# Patient Record
Sex: FEMALE | Race: WHITE | Employment: OTHER | URBAN - METROPOLITAN AREA
[De-identification: names, ages, dates, MRNs, and addresses within clinical notes are randomized per-mention and may not be internally consistent; named-entity substitution may affect disease eponyms.]

---

## 2017-03-16 PROBLEM — I35.9 AORTIC VALVE DISEASE: Status: ACTIVE | Noted: 2017-03-16

## 2017-04-21 PROBLEM — Z79.01 LONG TERM (CURRENT) USE OF ANTICOAGULANTS: Status: ACTIVE | Noted: 2017-04-21

## 2017-06-27 PROCEDURE — 85025 COMPLETE CBC W/AUTO DIFF WBC: CPT | Performed by: FAMILY MEDICINE

## 2017-06-27 PROCEDURE — 36415 COLL VENOUS BLD VENIPUNCTURE: CPT | Performed by: FAMILY MEDICINE

## 2017-10-18 PROBLEM — I65.23 CAROTID ARTERY PLAQUE, BILATERAL: Status: ACTIVE | Noted: 2017-10-18

## 2017-10-18 PROCEDURE — 82607 VITAMIN B-12: CPT | Performed by: FAMILY MEDICINE

## 2017-10-18 PROCEDURE — 82746 ASSAY OF FOLIC ACID SERUM: CPT | Performed by: FAMILY MEDICINE

## 2018-03-06 PROBLEM — I77.9 BILATERAL CAROTID ARTERY DISEASE: Status: ACTIVE | Noted: 2018-03-06

## 2018-03-06 PROBLEM — I77.9 BILATERAL CAROTID ARTERY DISEASE (HCC): Status: ACTIVE | Noted: 2018-03-06

## 2018-10-16 PROCEDURE — 81003 URINALYSIS AUTO W/O SCOPE: CPT | Performed by: FAMILY MEDICINE

## 2019-03-28 PROBLEM — N28.9 RENAL INSUFFICIENCY SYNDROME: Status: ACTIVE | Noted: 2019-03-14

## 2019-06-06 PROBLEM — N17.9 AKI (ACUTE KIDNEY INJURY): Status: ACTIVE | Noted: 2019-06-06

## 2019-06-06 PROBLEM — N17.9 AKI (ACUTE KIDNEY INJURY) (HCC): Status: ACTIVE | Noted: 2019-06-06

## 2019-09-10 PROBLEM — J44.9 COPD (CHRONIC OBSTRUCTIVE PULMONARY DISEASE) (HCC): Status: ACTIVE | Noted: 2019-08-20

## 2020-07-30 PROBLEM — I50.31 ACUTE DIASTOLIC (CONGESTIVE) HEART FAILURE (HCC): Status: ACTIVE | Noted: 2020-07-30

## 2020-07-30 PROBLEM — L03.115 CELLULITIS OF RIGHT LEG: Status: ACTIVE | Noted: 2020-07-17

## 2020-07-30 PROBLEM — N18.30 CKD (CHRONIC KIDNEY DISEASE) STAGE 3, GFR 30-59 ML/MIN (HCC): Status: ACTIVE | Noted: 2020-07-30

## 2020-08-11 PROBLEM — E66.01 MORBID OBESITY WITH BMI OF 40.0-44.9, ADULT (HCC): Status: ACTIVE | Noted: 2020-08-11

## 2020-09-29 PROBLEM — F50.81 BINGE EATING DISORDER: Status: ACTIVE | Noted: 2020-09-29

## 2020-09-29 PROBLEM — F50.819 BINGE EATING DISORDER: Status: ACTIVE | Noted: 2020-09-29

## 2020-10-16 ENCOUNTER — APPOINTMENT (OUTPATIENT)
Dept: WOUND CARE | Facility: HOSPITAL | Age: 65
End: 2020-10-16
Attending: NURSE PRACTITIONER
Payer: MEDICARE

## 2020-10-22 PROBLEM — M10.341: Status: ACTIVE | Noted: 2020-01-09

## 2020-10-23 ENCOUNTER — OFFICE VISIT (OUTPATIENT)
Dept: ORTHOPEDICS CLINIC | Facility: CLINIC | Age: 65
End: 2020-10-23
Payer: MEDICARE

## 2020-10-23 DIAGNOSIS — I73.9 PVD (PERIPHERAL VASCULAR DISEASE) (HCC): Primary | ICD-10-CM

## 2020-10-23 DIAGNOSIS — R09.89 DECREASED PEDAL PULSES: ICD-10-CM

## 2020-10-23 DIAGNOSIS — G62.9 NEUROPATHY: ICD-10-CM

## 2020-10-23 DIAGNOSIS — I87.8 VENOUS STASIS OF BOTH LOWER EXTREMITIES: ICD-10-CM

## 2020-10-23 PROCEDURE — 99212 OFFICE O/P EST SF 10 MIN: CPT | Performed by: PODIATRIST

## 2020-10-23 PROCEDURE — 11721 DEBRIDE NAIL 6 OR MORE: CPT | Performed by: PODIATRIST

## 2020-10-23 NOTE — PROGRESS NOTES
[facility] Progress Note    Subjective: Pt here for nail care    Objective: non palpable pedal pulses, incurvated thickened nails 1 through 10, sensation grossly intact diminished sensation of the toes, lower extremity swelling dry leathery skin s

## 2020-11-08 ENCOUNTER — HOSPITAL ENCOUNTER (INPATIENT)
Facility: HOSPITAL | Age: 65
LOS: 6 days | Discharge: HOME HEALTH | DRG: 603 | End: 2020-11-14
Attending: HOSPITALIST | Admitting: HOSPITALIST
Payer: MEDICARE

## 2020-11-08 ENCOUNTER — APPOINTMENT (OUTPATIENT)
Dept: GENERAL RADIOLOGY | Facility: HOSPITAL | Age: 65
DRG: 603 | End: 2020-11-08
Attending: HOSPITALIST
Payer: MEDICARE

## 2020-11-08 PROCEDURE — 85610 PROTHROMBIN TIME: CPT | Performed by: HOSPITALIST

## 2020-11-08 PROCEDURE — 73560 X-RAY EXAM OF KNEE 1 OR 2: CPT | Performed by: HOSPITALIST

## 2020-11-08 PROCEDURE — 5A09357 ASSISTANCE WITH RESPIRATORY VENTILATION, LESS THAN 24 CONSECUTIVE HOURS, CONTINUOUS POSITIVE AIRWAY PRESSURE: ICD-10-PCS | Performed by: HOSPITALIST

## 2020-11-08 PROCEDURE — 94660 CPAP INITIATION&MGMT: CPT

## 2020-11-08 RX ORDER — ACETAMINOPHEN 325 MG/1
650 TABLET ORAL EVERY 6 HOURS PRN
Status: DISCONTINUED | OUTPATIENT
Start: 2020-11-08 | End: 2020-11-08 | Stop reason: DRUGHIGH

## 2020-11-08 RX ORDER — LEVOTHYROXINE SODIUM 0.15 MG/1
150 TABLET ORAL
Status: DISCONTINUED | OUTPATIENT
Start: 2020-11-09 | End: 2020-11-14

## 2020-11-08 RX ORDER — CEFAZOLIN SODIUM/WATER 2 G/20 ML
2 SYRINGE (ML) INTRAVENOUS EVERY 8 HOURS
Status: DISCONTINUED | OUTPATIENT
Start: 2020-11-08 | End: 2020-11-10

## 2020-11-08 RX ORDER — BUPROPION HYDROCHLORIDE 150 MG/1
150 TABLET, EXTENDED RELEASE ORAL 2 TIMES DAILY
Status: DISCONTINUED | OUTPATIENT
Start: 2020-11-08 | End: 2020-11-14

## 2020-11-08 RX ORDER — NALTREXONE HYDROCHLORIDE 50 MG/1
50 TABLET, FILM COATED ORAL DAILY
Status: DISCONTINUED | OUTPATIENT
Start: 2020-11-09 | End: 2020-11-14

## 2020-11-08 RX ORDER — ONDANSETRON 2 MG/ML
4 INJECTION INTRAMUSCULAR; INTRAVENOUS EVERY 6 HOURS PRN
Status: DISCONTINUED | OUTPATIENT
Start: 2020-11-08 | End: 2020-11-09

## 2020-11-08 RX ORDER — HEPARIN SODIUM 5000 [USP'U]/ML
7500 INJECTION, SOLUTION INTRAVENOUS; SUBCUTANEOUS EVERY 8 HOURS SCHEDULED
Status: DISCONTINUED | OUTPATIENT
Start: 2020-11-08 | End: 2020-11-14

## 2020-11-08 RX ORDER — LOSARTAN POTASSIUM 100 MG/1
100 TABLET ORAL NIGHTLY
Refills: 3 | Status: DISCONTINUED | OUTPATIENT
Start: 2020-11-09 | End: 2020-11-10

## 2020-11-08 RX ORDER — FUROSEMIDE 40 MG/1
40 TABLET ORAL DAILY
Status: DISCONTINUED | OUTPATIENT
Start: 2020-11-09 | End: 2020-11-14

## 2020-11-08 RX ORDER — ACETAMINOPHEN 500 MG
1000 TABLET ORAL EVERY 6 HOURS PRN
Status: DISCONTINUED | OUTPATIENT
Start: 2020-11-08 | End: 2020-11-14

## 2020-11-08 RX ORDER — FLUOXETINE HYDROCHLORIDE 20 MG/1
20 CAPSULE ORAL 2 TIMES DAILY
Status: DISCONTINUED | OUTPATIENT
Start: 2020-11-08 | End: 2020-11-14

## 2020-11-08 RX ORDER — ALLOPURINOL 100 MG/1
100 TABLET ORAL DAILY
Status: DISCONTINUED | OUTPATIENT
Start: 2020-11-08 | End: 2020-11-14

## 2020-11-08 RX ORDER — CETIRIZINE HYDROCHLORIDE 10 MG/1
10 TABLET ORAL NIGHTLY
Status: DISCONTINUED | OUTPATIENT
Start: 2020-11-08 | End: 2020-11-14

## 2020-11-08 RX ORDER — ONDANSETRON 2 MG/ML
4 INJECTION INTRAMUSCULAR; INTRAVENOUS EVERY 6 HOURS PRN
Status: DISCONTINUED | OUTPATIENT
Start: 2020-11-08 | End: 2020-11-14

## 2020-11-08 RX ORDER — ROSUVASTATIN CALCIUM 5 MG/1
5 TABLET, COATED ORAL EVERY OTHER DAY
Status: DISCONTINUED | OUTPATIENT
Start: 2020-11-09 | End: 2020-11-14

## 2020-11-08 RX ORDER — BIOTIN 10000 MCG
CAPSULE ORAL DAILY
Status: DISCONTINUED | OUTPATIENT
Start: 2020-11-08 | End: 2020-11-08

## 2020-11-08 RX ORDER — CYPROHEPTADINE HYDROCHLORIDE 4 MG/1
4 TABLET ORAL NIGHTLY
Status: DISCONTINUED | OUTPATIENT
Start: 2020-11-08 | End: 2020-11-14

## 2020-11-08 RX ORDER — NORTRIPTYLINE HYDROCHLORIDE 25 MG/1
25 CAPSULE ORAL NIGHTLY
Status: DISCONTINUED | OUTPATIENT
Start: 2020-11-08 | End: 2020-11-14

## 2020-11-08 NOTE — PLAN OF CARE
NURSING ADMISSION NOTE      Patient admitted via Wheelchair  Oriented to room. Safety precautions initiated. Bed in low position. Call light in reach. This RN completed admission navigator. Pt in calm and stable manor. Addendum:  Pt A&Ox4. ANG KIMBALL-

## 2020-11-08 NOTE — PROGRESS NOTES
[facility] Pharmacy Note:  Anticoagulation Weight Dose Adjustment for heparin    Prieto Arteaga is a 77year old patient who has been prescribed heparin 5,000 units every 8 hours. Estimated Creatinine Clearance: 64.8 mL/min (based on SCr of 0.88 mg/dL).  Body

## 2020-11-09 ENCOUNTER — APPOINTMENT (OUTPATIENT)
Dept: ULTRASOUND IMAGING | Facility: HOSPITAL | Age: 65
DRG: 603 | End: 2020-11-09
Attending: INTERNAL MEDICINE
Payer: MEDICARE

## 2020-11-09 ENCOUNTER — APPOINTMENT (OUTPATIENT)
Dept: CV DIAGNOSTICS | Facility: HOSPITAL | Age: 65
DRG: 603 | End: 2020-11-09
Attending: HOSPITALIST
Payer: MEDICARE

## 2020-11-09 ENCOUNTER — APPOINTMENT (OUTPATIENT)
Dept: MRI IMAGING | Facility: HOSPITAL | Age: 65
DRG: 603 | End: 2020-11-09
Attending: Other
Payer: MEDICARE

## 2020-11-09 PROCEDURE — 87077 CULTURE AEROBIC IDENTIFY: CPT | Performed by: INTERNAL MEDICINE

## 2020-11-09 PROCEDURE — 87070 CULTURE OTHR SPECIMN AEROBIC: CPT | Performed by: INTERNAL MEDICINE

## 2020-11-09 PROCEDURE — 76882 US LMTD JT/FCL EVL NVASC XTR: CPT | Performed by: INTERNAL MEDICINE

## 2020-11-09 PROCEDURE — 86140 C-REACTIVE PROTEIN: CPT | Performed by: HOSPITALIST

## 2020-11-09 PROCEDURE — 80048 BASIC METABOLIC PNL TOTAL CA: CPT | Performed by: HOSPITALIST

## 2020-11-09 PROCEDURE — 85025 COMPLETE CBC W/AUTO DIFF WBC: CPT | Performed by: HOSPITALIST

## 2020-11-09 PROCEDURE — 85610 PROTHROMBIN TIME: CPT | Performed by: HOSPITALIST

## 2020-11-09 PROCEDURE — 87186 SC STD MICRODIL/AGAR DIL: CPT | Performed by: INTERNAL MEDICINE

## 2020-11-09 PROCEDURE — 87205 SMEAR GRAM STAIN: CPT | Performed by: INTERNAL MEDICINE

## 2020-11-09 PROCEDURE — 93306 TTE W/DOPPLER COMPLETE: CPT | Performed by: HOSPITALIST

## 2020-11-09 PROCEDURE — 70551 MRI BRAIN STEM W/O DYE: CPT | Performed by: OTHER

## 2020-11-09 PROCEDURE — 85652 RBC SED RATE AUTOMATED: CPT | Performed by: HOSPITALIST

## 2020-11-09 PROCEDURE — 83880 ASSAY OF NATRIURETIC PEPTIDE: CPT | Performed by: HOSPITALIST

## 2020-11-09 RX ORDER — POTASSIUM CHLORIDE 20 MEQ/1
40 TABLET, EXTENDED RELEASE ORAL ONCE
Status: COMPLETED | OUTPATIENT
Start: 2020-11-09 | End: 2020-11-09

## 2020-11-09 RX ORDER — DIPHENHYDRAMINE HCL 50 MG
50 CAPSULE ORAL ONCE
Status: COMPLETED | OUTPATIENT
Start: 2020-11-09 | End: 2020-11-09

## 2020-11-09 NOTE — PLAN OF CARE
Assumed care at 7:30 am.  Cardiac monitoring. Electrolyte protocol, K replaced. PIV, SL/ABX per orders. Fall risk education reviewed with patient, patient refusing bed alarm. Floor pads. Patient using call light to get up/use bathroom.   MRI checklist c environment to reduce risk of injury  - Provide assistive devices as appropriate  - Consider OT/PT consult to assist with strengthening/mobility  - Encourage toileting schedule  Outcome: Progressing

## 2020-11-09 NOTE — PLAN OF CARE
Patient A&O x4, c/o pain to right lower leg, lungs diminished,  Dressings changed on lower wounds, currently the larger wound to open to air. Patient allergic to most tape, so wrap used with gauze to cover wounds.   Legs are carmen and discolored from knee

## 2020-11-09 NOTE — CONSULTS
Consulting Physician: Dr. Fraser     CC:  Falls, Word Finding Difficulty    HPI:  This is a 77year old female presenting for evaluation of the above chief complaint. 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   She had mild, residual speech issue • furosemide  40 mg Oral Daily   • Levothyroxine Sodium  150 mcg Oral QAM AC   • melatonin  10 mg Oral Nightly   • Naltrexone HCl  50 mg Oral Daily   • Nortriptyline HCl  25 mg Oral Nightly   • Rosuvastatin Calcium  5 mg Oral QOD   • Losartan Potassium point review of systems was completed. Pertinent positives and negatives noted in the the HPI.     Objective     /65   Pulse 79   Temp 98.2 °F (36.8 °C) (Oral)   Resp 19   Wt 283 lb 1.1 oz (128.4 kg)   LMP  (LMP Unknown)   SpO2 98%   BMI 41.80 kg/m²

## 2020-11-09 NOTE — CM/SW NOTE
11/09/20 1000   CM/SW Referral Data   Referral Source Physician;Social Work (self-referral)   Reason for Referral Discharge planning   Informant Spouse   Patient Info   Patient's Mental Status Alert;Oriented   Patient lives with Alone       MSW spoke to

## 2020-11-09 NOTE — CONSULTS
[address] [facility] Cardiology  Report of Consultation    Raulito Bosch Patient Status:  Inpatient    1955 MRN [id number]   [facility] 3NE-A Attending Michelle Varner MD   Hosp Day # 1 PCP Matthew Claros DO     Reason for Consultation: Infusion:       PRN Medications:   ondansetron HCl, acetaminophen    Outpatient Medications:   No current facility-administered medications on file prior to encounter.      •  Cefadroxil 500 MG Oral Cap, Take 1 capsule (500 mg total) by mouth 2 (two) times Take 5 mg by mouth every other day., Disp: , Rfl:     •  mupirocin 2 % External Ointment, Apply to open areas BID PRN, Disp: 30 g, Rfl: 1    •  ANORO ELLIPTA 62.5-25 MCG/INH Inhalation Aerosol Powder, Breath Activated, , Disp: , Rfl:     •  TRIAMCINOLONE A distress. HEENT:   Normocephalic. Atraumatic. Eyes with no scleral icterus. Neck: Supple. No JVD. Carotids 2+ and equal in symmetric fashion. No bruits are noted. Cardiac: Regular rate and rhythm. There is a normal S1 and S2. No S3 or S4.   No mu Mild regurgitation. Peak velocity (S): 4.55m/sec. Mean gradient      (S): 48mm Hg. 3. Mitral valve: Mildly calcified annulus. 4. Left atrium: The left atrium was mildly to moderately dilated.    5. Pulmonary arteries: Systolic pressure was moderately to

## 2020-11-09 NOTE — PHYSICAL THERAPY NOTE
PT orders received, chart reviewed and per chart review there are several consults pending, specifically ortho consult for the L knee. Also awaiting a MRI of the brain.   Will hold PT eval until these consults/tests have been completed and there are activit

## 2020-11-09 NOTE — H&P
[facility] Hospitalist H&P       CC: No chief complaint on file.        PCP: Gadiel Turner DO    History of Present Illness:  Patient is a 77year old female with PMH sig for COPD, HTN, AS undergoing TAVR eval, PE, hypothyroidism, morbid obesity presents from I • KNEE REPLACEMENT SURGERY  2003    Both knees   • OTHER SURGICAL HISTORY  1989     Thyroid removal   • OTHER SURGICAL HISTORY  2004    LT foot spur removal        ALL:    Adhesive Tape           HIVES    Comment:ALL ADHESIVES  Doxycycline             SW VALSARTAN 160 MG Oral Tab, TAKE 1 TABLET BY MOUTH  DAILY, Disp: 90 tablet, Rfl: 3    •  Cyproheptadine HCl 4 MG Oral Tab, TAKE 1 TABLET BY MOUTH  NIGHTLY, Disp: 90 tablet, Rfl: 3    •  POTASSIUM CHLORIDE ER 10 MEQ Oral Tab CR, TAKE 1 TABLET BY MOUTH  DAILY Hx  Social History    Tobacco Use      Smoking status: Former Smoker        Packs/day: 0.50        Years: 30.00        Pack years: 15        Types: Cigarettes        Quit date: 1991        Years since quittin.8      Smokeless tobacco: Never Used undergoing w/u for TAVR  - also with reported speech difficulties at times  - cardiology and neuro consults appreciated   - echo, orthostatic BP  - PT/OT  - fall precautions    # b/l LE edema, h/o diastolic CHF - ?acute on chronic CHF  - INR therapeutic, d

## 2020-11-09 NOTE — OCCUPATIONAL THERAPY NOTE
OT orders received, chart reviewed and per chart review there are several consults pending, specifically ortho consult for the L knee. Also awaiting a MRI of the brain.   Will hold OT eval until these consults/tests have been completed and there are activit

## 2020-11-09 NOTE — CONSULTS
INFECTIOUS DISEASE CONSULT NOTE    Abel Sneed Patient Status:  Inpatient    1955 MRN [id number]   [facility] 3NE-A Attending Alicia Rapp MD   [address] Day # 1 PCP Viri Arias, removal     Family History   Problem Relation Age of Onset   • Hypertension Father    • Lipids Father    • Diabetes Mother    • Hypertension Mother    • Lipids Mother    • Cancer Brother    • Hypertension Brother       reports that she quit smoking about 2 (SYNTHROID) tab 150 mcg, 150 mcg, Oral, QAM AC  •  melatonin cap/tab 10 mg, 10 mg, Oral, Nightly  •  Naltrexone HCl (ReVia) tab 50 mg, 50 mg, Oral, Daily  •  Nortriptyline HCl (PAMELOR) cap 25 mg, 25 mg, Oral, Nightly  •  Rosuvastatin Calcium (CRESTOR) tab 3.37* 3.7    110   CO2 27.2 27.0       Microbiology: Reviewed in EMR    Radiology: Reviewed    Antibiotics:  cefazolin    ASSESSMENT:  # L knee wound   - Suspect pain with movement is largely due to wound overlying knee rather than PJI   - XR without

## 2020-11-09 NOTE — PROGRESS NOTES
11/09/20 0891   Clinical Encounter Type   Visited With Patient   Referral To Nurse  ( provided HPOA form to Ezra Muller. Ezra Muller would like to review the form.)    received Advance Directives consult. Patient is currently Full Code per nurse.

## 2020-11-10 PROCEDURE — 84132 ASSAY OF SERUM POTASSIUM: CPT | Performed by: HOSPITALIST

## 2020-11-10 PROCEDURE — 97165 OT EVAL LOW COMPLEX 30 MIN: CPT

## 2020-11-10 PROCEDURE — 97116 GAIT TRAINING THERAPY: CPT

## 2020-11-10 PROCEDURE — 97161 PT EVAL LOW COMPLEX 20 MIN: CPT

## 2020-11-10 PROCEDURE — 97530 THERAPEUTIC ACTIVITIES: CPT

## 2020-11-10 PROCEDURE — 85610 PROTHROMBIN TIME: CPT | Performed by: HOSPITALIST

## 2020-11-10 PROCEDURE — 94640 AIRWAY INHALATION TREATMENT: CPT

## 2020-11-10 RX ORDER — VALSARTAN 320 MG/1
160 TABLET ORAL NIGHTLY
Status: DISCONTINUED | OUTPATIENT
Start: 2020-11-10 | End: 2020-11-14

## 2020-11-10 RX ORDER — HYDROXYZINE HYDROCHLORIDE 25 MG/1
25 TABLET, FILM COATED ORAL ONCE
Status: COMPLETED | OUTPATIENT
Start: 2020-11-10 | End: 2020-11-10

## 2020-11-10 NOTE — PROGRESS NOTES
Subjective     No overnight events    • valsartan  160 mg Oral Nightly   • Heparin Sodium (Porcine)  7,500 Units Subcutaneous Q8H [address]   • ceFAZolin  2 g Intravenous Q8H   • allopurinol  100 mg Oral Daily   • umeclidinium-vilanterol  1 puff Inhalation Daily

## 2020-11-10 NOTE — PLAN OF CARE
Received patient awake in bed, with visitor. AOx4. On room air, cpap at night. MRI done for inability to find words. SR on tele. On heparin SQ for VTE. Potassium was replaced. Voids to the bathroom. Up steady , slow with cane. IV ancef for wound infection. mobilization  - Obtain PT/OT consults as needed  - Advance activity as appropriate  - Communicate ordered activity level and limitations with patient/family  Outcome: Progressing     Problem: Impaired Functional Mobility  Goal: Achieve highest/safest level

## 2020-11-10 NOTE — PROGRESS NOTES
[address] [facility] Cardiology  Progress Note    Beltran Fast Patient Status:  Inpatient    1955 MRN [id number]   [facility] 3NE-A Attending Jojo Garcia MD   Hosp Day # 2 PCP Debi Thomas DO     Impression:  1. Severe AS  2.  LLE Subcutaneous, Q8H [address]    •  ceFAZolin sodium (ANCEF/KEFZOL) 2 GM/20ML premix IV syringe 2 g, 2 g, Intravenous, Q8H    •  ondansetron HCl (ZOFRAN) injection 4 mg, 4 mg, Intravenous, Q6H PRN    •  acetaminophen (TYLENOL EXTRA STRENGTH) tab 1,000 mg, 1,000 mg

## 2020-11-10 NOTE — CONSULTS
Critical access hospital  Inpatient Wound Care Contact Note    Sheron Дмитрий Patient Status:  Inpatient    1955 MRN [id number]   [facility] 3NE-A Attending Scott Tobar MD   Hosp Day # 2 PCP Faiza Shepherd DO     Attempted to see patient f

## 2020-11-10 NOTE — PHYSICAL THERAPY NOTE
PHYSICAL THERAPY QUICK EVALUATION - INPATIENT    Room Number: 5665/9097-N  Evaluation Date: 11/10/2020  Presenting Problem: LLE worsening wound  Physician Order: PT Eval and Treat    History related to current admission: Pt is 77year old female admitted [address]  09/2009   • HIP REPLACEMENT SURGERY  09/2009    Rt hip   • KNEE REPLACEMENT SURGERY  2003    Both knees   • OTHER SURGICAL HISTORY  1989     Thyroid removal   • OTHER SURGICAL HISTORY  2004    LT foot spur removal MOBILITY  How much difficulty does the patient currently have. ..  -   Turning over in bed (including adjusting bedclothes, sheets and blankets)?: None   -   Sitting down on and standing up from a chair with arms (e.g., wheelchair, bedside commode, etc.): N at home on 10/28/2020. Pertinent comorbidities and personal factors impacting therapy include those listed above in history related to current admission.   Functional outcome measures completed include AM-PAC with score of 28.97% impairment which supports

## 2020-11-10 NOTE — PROGRESS NOTES
[facility] Hospitalist Progress Note     Millie Abebe Patient Status:  Inpatient    1955 MRN [id number]   [facility] 3NE-A Attending Juan Shepard MD   Hosp Day # 1 PCP Juan Cooley DO     CC: follow up    SUBJECTIVE:  No new complaints Nortriptyline HCl  25 mg Oral Nightly   • Rosuvastatin Calcium  5 mg Oral QOD   • Losartan Potassium  100 mg Oral Nightly     Continuous Infusing Medication:  PRN Medication:ondansetron HCl, acetaminophen       Microbiology:    Hospital Encounter on 11/08/

## 2020-11-10 NOTE — OCCUPATIONAL THERAPY NOTE
OCCUPATIONAL THERAPY QUICK EVALUATION - INPATIENT    Room Number: 5269/3356-C  Evaluation Date: 11/10/2020     Type of Evaluation: Quick Eval  Presenting Problem: cellulitis of R leg    Physician Order: IP Consult to Occupational Therapy  Reason for [facility]) Drives: Yes  Patient Regularly Uses: Glasses    Prior Level of Function: Pt reports IND with ADLs and functional transfers using crutches or RW as needed. Pt IND with IADLs, driving, cooking, cleaning. Pt has adjustable bed at home.      SUBJECTIVE  \"I'v mobility supine <> sit MOD I. Pt completed transfers sit <> stand MOD I utilizing fore-arm crutches from bed. Pt facilitated in functional mobility within room for several minutes requiring MOD I utilizing forearm crutches.      Pt discussed LB dressing d following goals:  Patient able to toilet transfer: MOD I  Patient able to dress lower extremities: MOD I  Patient/Caregiver able to demonstrate safety with ADLS: MOD I

## 2020-11-10 NOTE — PLAN OF CARE
Problem: Impaired Activities of Daily Living  Goal: Achieve highest/safest level of independence in self care  Description: Interventions:  - Assess ability and encourage patient to participate in ADLs to maximize function  - Promote sitting position [facility]

## 2020-11-11 PROCEDURE — 85025 COMPLETE CBC W/AUTO DIFF WBC: CPT | Performed by: HOSPITALIST

## 2020-11-11 PROCEDURE — 80048 BASIC METABOLIC PNL TOTAL CA: CPT | Performed by: HOSPITALIST

## 2020-11-11 PROCEDURE — 05HB33Z INSERTION OF INFUSION DEVICE INTO RIGHT BASILIC VEIN, PERCUTANEOUS APPROACH: ICD-10-PCS | Performed by: HOSPITALIST

## 2020-11-11 PROCEDURE — 36410 VNPNXR 3YR/> PHY/QHP DX/THER: CPT

## 2020-11-11 PROCEDURE — 76937 US GUIDE VASCULAR ACCESS: CPT

## 2020-11-11 RX ORDER — CEFEPIME HYDROCHLORIDE 1 G/50ML
2 INJECTION, SOLUTION INTRAVENOUS EVERY 8 HOURS
Qty: 42 EACH | Refills: 0 | Status: SHIPPED | OUTPATIENT
Start: 2020-11-11 | End: 2020-11-25

## 2020-11-11 RX ORDER — DIPHENHYDRAMINE HCL 50 MG
50 CAPSULE ORAL NIGHTLY
Status: DISCONTINUED | OUTPATIENT
Start: 2020-11-11 | End: 2020-11-14

## 2020-11-11 RX ORDER — DIPHENHYDRAMINE HCL 25 MG
25 CAPSULE ORAL NIGHTLY PRN
Status: DISCONTINUED | OUTPATIENT
Start: 2020-11-11 | End: 2020-11-14

## 2020-11-11 NOTE — PLAN OF CARE
Pt. Is A&o x4 this am. VSS, no tele, RA. Iso begun to r/o COVID - PCR swab was obtained today and results are outstanding. IV antibiotics adjusted.  Bed alarm refused, continued education on importance of bed alarm, patient persistent she will call us when

## 2020-11-11 NOTE — PLAN OF CARE
Pt. Is A&o x4 this am. VSSMONTY. Tele ordered per Cardiology - pt. Shirley she had an allergic reaction to the tele probes previously and would not like tele now. R midline placed today.  IV ABx continued inpatient - to be d/c with home health care on IV ABx

## 2020-11-11 NOTE — CM/SW NOTE
Pt discussed in rounds this am.  Plan is to dc home with IV abs. Pt has midline placed. SW received call from [facility] at [facility] [phone]. They have pt approved for IV abs and need clinical sent to them. Referral sent to [facility] in [facility] [address]   They will arran

## 2020-11-11 NOTE — PROGRESS NOTES
INFECTIOUS DISEASE PROGRESS NOTE    Millie Abebe Patient Status:  Inpatient    1955 MRN [id number]   [facility] 3NE-A Attending Hany Galloway, *   Hosp Day # 3 PCP Juan Cooley DO     Subjective: no acute events o/n.   Not s of IVC filter     Nonrheumatic aortic valve stenosis     Depression, unspecified depression type     Aortic valve disease     Long term (current) use of anticoagulants [Z79.01]     Carotid artery plaque, bilateral     Bilateral carotid artery disease ([address]

## 2020-11-11 NOTE — CM/SW NOTE
Received call from [facility]- they are unable to service pt due to not being able to find [facility] agency that will contract with them. Referral sent to [facility] and [facility]. SW met with pt. Discussed IV abs. She feels she can manage this at home alone.   She

## 2020-11-11 NOTE — RESPIRATORY THERAPY NOTE
SHELDON - Equipment Use Daily Summary:                  . Set Mode:                . Usage in hours:                . 90% Pressure (EPAP) level:                . 90% Insp. Pressure (IPAP): Isabel Mosquera AHI:                .  Supplemental Oxygen:    LPM

## 2020-11-11 NOTE — CONSULTS
Patient took fall 12 days ago injuring her left knee. Patient has bilateral TKAs. Incision dehisced and was closed primarily. Patient admitted for possible wound infection and concern for deeper infection. Exam show mild erythema around incision.  No sig

## 2020-11-11 NOTE — PROGRESS NOTES
[facility] Hospitalist Progress Note     Tata Joanemilee Patient Status:  Inpatient    1955 MRN [id number]   [facility] 3NE-A Attending Sai Lowery MD   Hosp Day # 3 PCP Denice Rojas DO     CC: follow up    SUBJECTIVE:  Sitting up in bed • furosemide  40 mg Oral Daily   • Levothyroxine Sodium  150 mcg Oral QAM AC   • melatonin  10 mg Oral Nightly   • Naltrexone HCl  50 mg Oral Daily   • Nortriptyline HCl  25 mg Oral Nightly   • Rosuvastatin Calcium  5 mg Oral QOD     Continuous Infusing recs  - cont OP diuretics at this t mor     # SHELDON  - per protcol     # morbid obesity  - f/u with PCP  -encourage weight loss     # CKD stage 2  - Cr wnl  - follows with [facility] renal as op - low threshold for renal consultation if needed     # gout  - cont OP

## 2020-11-11 NOTE — PROGRESS NOTES
[facility] Hospitalist Progress Note     Alexis Westbrook Patient Status:  Inpatient    1955 MRN [id number]   [facility] 3NE-A Attending Olesya William MD   Hosp Day # 2 PCP Jaynee Favre, DO     CC: follow up    SUBJECTIVE:  No new complaints Oral Nightly   • FLUoxetine HCl  20 mg Oral BID   • furosemide  40 mg Oral Daily   • Levothyroxine Sodium  150 mcg Oral QAM AC   • melatonin  10 mg Oral Nightly   • Naltrexone HCl  50 mg Oral Daily   • Nortriptyline HCl  25 mg Oral Nightly   • Rosuvastatin rheum as OP    Covid pending (pt was direct admission; ordered per protocol, no symptoms of covid)     Prophy: hep sq.  Resume coumadin when ok with ortho.      Dispo: admit     Elliott Guillaume MD  [facility] IM Hospitalist  Pager: [phone]

## 2020-11-11 NOTE — PLAN OF CARE
A&Ox4, VSS. Lungs clear bilaterally/diminished in bases. Abd is soft,non-tender, non-distended. Bowel sounds active. Voiding freely. L Knee incision red/dry/edematous , closure w/ sutures SARAH. R&L lower leg wounds wrapped w/ kerlix roll c/d/i.  Pt currentl RN  Outcome: Progressing  11/11/2020 0144 by Nae Pat RN  Outcome: Progressing     Problem: Impaired Functional Mobility  Goal: Achieve highest/safest level of mobility/gait  Description: Interventions:  - Assess patient's functional ability and stabil

## 2020-11-11 NOTE — PLAN OF CARE
A&Ox4, VSS.  Lungs clear bilaterally       Problem: SKIN/TISSUE INTEGRITY - ADULT  Goal: Skin integrity remains intact  Description: INTERVENTIONS  - Assess and document risk factors for pressure ulcer development  - Assess and document skin integrity  - Mo

## 2020-11-11 NOTE — CM/SW NOTE
[facility] states out of pocket costs to patient will be $269.83 per week. Pt awaer and asked if they would work out a payment plan. Sw updated Belkys Echevarria from [facility].     Rosy needs advance notice for start of care due to staffing of the [facility]

## 2020-11-11 NOTE — PROGRESS NOTES
[address] [facility] Cardiology  Progress Note    Sheron Choe Patient Status:  Inpatient    1955 MRN [id number]   [facility] 3NE-A Attending Scott Tobar MD   Hosp Day # 3 PCP Faiza Shepherd DO     ADDENDUM:  The patient was person ml     Wt Readings from Last 3 Encounters:  11/08/20 : 283 lb 1.1 oz (128.4 kg)  11/08/20 : 283 lb (128.4 kg)  11/07/20 : 283 lb (128.4 kg)      General: Awake and alert; in no acute distress  Cardiac: irregular rate and regular rhythm; III/VI ALVINO  Lungs: AM

## 2020-11-12 ENCOUNTER — APPOINTMENT (OUTPATIENT)
Dept: ULTRASOUND IMAGING | Facility: HOSPITAL | Age: 65
DRG: 603 | End: 2020-11-12
Attending: NURSE PRACTITIONER
Payer: MEDICARE

## 2020-11-12 PROCEDURE — 84550 ASSAY OF BLOOD/URIC ACID: CPT | Performed by: HOSPITALIST

## 2020-11-12 PROCEDURE — 36410 VNPNXR 3YR/> PHY/QHP DX/THER: CPT

## 2020-11-12 PROCEDURE — 3E03329 INTRODUCTION OF OTHER ANTI-INFECTIVE INTO PERIPHERAL VEIN, PERCUTANEOUS APPROACH: ICD-10-PCS | Performed by: HOSPITALIST

## 2020-11-12 PROCEDURE — 85025 COMPLETE CBC W/AUTO DIFF WBC: CPT | Performed by: HOSPITALIST

## 2020-11-12 PROCEDURE — 94010 BREATHING CAPACITY TEST: CPT

## 2020-11-12 PROCEDURE — 85610 PROTHROMBIN TIME: CPT | Performed by: HOSPITALIST

## 2020-11-12 PROCEDURE — 83735 ASSAY OF MAGNESIUM: CPT | Performed by: HOSPITALIST

## 2020-11-12 PROCEDURE — 05HF33Z INSERTION OF INFUSION DEVICE INTO LEFT CEPHALIC VEIN, PERCUTANEOUS APPROACH: ICD-10-PCS | Performed by: HOSPITALIST

## 2020-11-12 PROCEDURE — 80048 BASIC METABOLIC PNL TOTAL CA: CPT | Performed by: HOSPITALIST

## 2020-11-12 PROCEDURE — 93880 EXTRACRANIAL BILAT STUDY: CPT | Performed by: NURSE PRACTITIONER

## 2020-11-12 PROCEDURE — 83540 ASSAY OF IRON: CPT | Performed by: HOSPITALIST

## 2020-11-12 PROCEDURE — 82728 ASSAY OF FERRITIN: CPT | Performed by: HOSPITALIST

## 2020-11-12 PROCEDURE — 76937 US GUIDE VASCULAR ACCESS: CPT

## 2020-11-12 PROCEDURE — 83550 IRON BINDING TEST: CPT | Performed by: HOSPITALIST

## 2020-11-12 RX ORDER — WARFARIN SODIUM 2 MG/1
6 TABLET ORAL
Status: COMPLETED | OUTPATIENT
Start: 2020-11-12 | End: 2020-11-12

## 2020-11-12 RX ORDER — DIPHENHYDRAMINE HCL 50 MG
50 CAPSULE ORAL ONCE
Status: COMPLETED | OUTPATIENT
Start: 2020-11-13 | End: 2020-11-13

## 2020-11-12 NOTE — PLAN OF CARE
A&Ox4. CPAP at night. VSS. Refuses tele r/t allergic reaction to electrodes. IV cefepime Q8. Complains of pain in RUE at midline insertion site. Heat packs applied with minimal relief. No blood return. Vascular access team reconsulted. Dressings c/d/I.  No Educate and engage patient/family in tolerated activity level and precautions    Outcome: Progressing     Problem: Impaired Functional Mobility  Goal: Achieve highest/safest level of mobility/gait  Description: Interventions:  - Assess patient's functional

## 2020-11-12 NOTE — RESPIRATORY THERAPY NOTE
SHELDON : EQUIPMENT USE: DAILY SUMMARY                                            SET MODE:AUTO CPAP WITH CFLEX                                          USAGE IN HOURS:7:48                                          90%

## 2020-11-12 NOTE — CONSULTS
[facility]  Report of Inpatient Wound Care Consultation     Prieto Arteaga Patient Status:  Inpatient    1955 MRN [id number]   [facility] 3NE-A Attending Roland Tirado, *   Hosp Day # 4 PCP Lucho Arango, DO     REASON F No    Drug use: No      Allergies:  @ALLERGY    Laboratory Data:  Recent Labs   Lab 11/09/20  0739 11/10/20  0846 11/11/20  0952 11/12/20  0756   WBC 6.5  --  6.3 7.3   HGB 8.7*  --  9.2* 10.1*   HCT 27.8*  --  30.7* 31.9*   .0  --  297.0 283.0   K

## 2020-11-12 NOTE — PROGRESS NOTES
[facility] Hospitalist Progress Note     Danelle Patricia Patient Status:  Inpatient    1955 MRN [id number]   [facility] 3NE-A Attending Juliette Hoff MD   Hosp Day # 4 PCP Arnie Burk DO     CC: follow up    SUBJECTIVE:  Sitting up in b 150 mg Oral BID   • Cyproheptadine HCl  4 mg Oral Nightly   • Cetirizine HCl  10 mg Oral Nightly   • FLUoxetine HCl  20 mg Oral BID   • furosemide  40 mg Oral Daily   • Levothyroxine Sodium  150 mcg Oral QAM AC   • melatonin  10 mg Oral Nightly   • Rogeliotre also with reported speech difficulties at times  - cardiology and neuro consults appreciated   - apprec cardiology review - case discussed at structural conference on 11/10.  Plan for further testing for valve replacement prior to discharge     # b/l ROBERT gomez

## 2020-11-12 NOTE — CM/SW NOTE
MSW spoke to bedside Rn who states dc is possible. Barrier to [employer]  MSW to secure home health provider.     DC plan  Home with home health  Home with [facility] rx for ivabex

## 2020-11-12 NOTE — PROGRESS NOTES
[address] [facility] Cardiology  Progress Note    Magda Hill Patient Status:  Inpatient    1955 MRN [id number]   [facility] 3NE-A Attending Courtney Hartman MD   Hosp Day # 4 PCP Ryan Nash DO     Impression:  1. Severe AS  2.  LLE non-tender; obese bowel sounds are normoactive  Extremities: No clubbing/cyanosis; incision left knee, legs wrapped, bilateral non pitting edema noted      •  diphenhydrAMINE HCl (BENADRYL) cap 50 mg, 50 mg, Oral, Nightly    •  diphenhydrAMINE (BENADRYL) c 11/12/2020     11/12/2020    CA 9.3 11/12/2020    MG 2.8 11/12/2020       Telemetry: Patient refusing tele     Discussed plan of care with Dr. Ilana Lopez.      Dariela Aguilar PA-C  11/12/2020  10:12 AM      I saw and examined the patient and agree w

## 2020-11-12 NOTE — PROGRESS NOTES
Pharmacy Dosing Service  Warfarin (Coumadin) Initial Dosing         Pablo Augustin is a 77year old female for whom pharmacy has been consulted to dose warfarin for  Hx of Afib & PE (has IVC filter) .     Pharmacy has been asked to dose warfarin by Giovanny Saucedo

## 2020-11-12 NOTE — IMAGING NOTE
Patient states she developed hives from \"kidney IV dye\" 40 years ago. Nazia Mcdowell RN made aware about 13-hour pre-medication preparation for CT IV contrast dye allergy. (Prednisone 50 mg PO  13 hour, 7 hour and 1 hour prior to Nov 13 at 1015 appointment.   P

## 2020-11-12 NOTE — PLAN OF CARE
Assumed care at 0730. A&O x 4. On RA tolerating well. Patient refusing tele d/t allergic reaction to tele stickers. Patient is cleared for discharge prior to d/c patient is to get cardiac work up for TAVR.  Patient has previous reaction to contrast dye so t Problem: Impaired Functional Mobility  Goal: Achieve highest/safest level of mobility/gait  Description: Interventions:  - Assess patient's functional ability and stability  - Promote increasing activity/tolerance for mobility and gait  - Educate and eng

## 2020-11-13 ENCOUNTER — APPOINTMENT (OUTPATIENT)
Dept: CT IMAGING | Facility: HOSPITAL | Age: 65
DRG: 603 | End: 2020-11-13
Attending: NURSE PRACTITIONER
Payer: MEDICARE

## 2020-11-13 PROCEDURE — 74174 CTA ABD&PLVS W/CONTRAST: CPT | Performed by: NURSE PRACTITIONER

## 2020-11-13 PROCEDURE — 80048 BASIC METABOLIC PNL TOTAL CA: CPT | Performed by: HOSPITALIST

## 2020-11-13 PROCEDURE — 85610 PROTHROMBIN TIME: CPT | Performed by: HOSPITALIST

## 2020-11-13 PROCEDURE — 85025 COMPLETE CBC W/AUTO DIFF WBC: CPT | Performed by: HOSPITALIST

## 2020-11-13 PROCEDURE — 71275 CT ANGIOGRAPHY CHEST: CPT | Performed by: NURSE PRACTITIONER

## 2020-11-13 PROCEDURE — 83735 ASSAY OF MAGNESIUM: CPT | Performed by: HOSPITALIST

## 2020-11-13 RX ORDER — WARFARIN SODIUM 2 MG/1
6 TABLET ORAL
Status: COMPLETED | OUTPATIENT
Start: 2020-11-13 | End: 2020-11-13

## 2020-11-13 NOTE — PLAN OF CARE
Patient alertx4, RA, no tele due to allergic reaction, up to the bathroom with crutches (her own) in the room. No c/o pain or discomfort. Patient pre-medicated for CT scan today. 7 stitches removed from her left shin with minimal bleeding.   Currently, a

## 2020-11-13 NOTE — PLAN OF CARE
Pt. A&O x4, on RA during day, CPAP at night. No tele d/t rash caused by stickers. Up with standby. Daily weight. Left arm precautions. Unit draw. No c/o pain. IV saline locked. Fall and safety precautions in place. Will continue to monitor.      Problem: Pa wounds/incisions during mobilization  - Obtain PT/OT consults as needed  - Advance activity as appropriate  - Communicate ordered activity level and limitations with patient/family  Outcome: Progressing     Problem: Impaired Functional Mobility  Goal: Achi

## 2020-11-13 NOTE — PROGRESS NOTES
[facility] Hospitalist Progress Note     Di Gu Patient Status:  Inpatient    1955 MRN [id number]   [facility] 3NE-A Attending Nia Ennis MD   Hosp Day # 5 PCP Cece Hallman DO     CC: follow up    SUBJECTIVE:  Sitting up in b cefepime  2 g Intravenous Q8H   • Heparin Sodium (Porcine)  7,500 Units Subcutaneous Q8H [address]   • allopurinol  100 mg Oral Daily   • umeclidinium-vilanterol  1 puff Inhalation Daily   • buPROPion HCl ER (SR)  150 mg Oral BID   • Cyproheptadine HCl  4 mg Ora cefepime, mildline in place  - seen by ortho- appreciate- no need for debridement.  Resume coumadin  - wound consult appreciated  -covid neg     # recurrent falls, presyncope  - severe AS, undergoing w/u for TAVR  - also with reported speech difficulties at

## 2020-11-13 NOTE — PROGRESS NOTES
[address] [facility] Cardiology  Progress Note    Jae Avina Patient Status:  Inpatient    1955 MRN [id number]   [facility] 3NE-A Attending Suni Ceballos MD   Hosp Day # 5 PCP Thomas Bojorquez DO     Impression:  1. Severe AS  2.  LLE regular rhythm; +ALVINO  Lungs: Clear to auscultation bilaterally without wheeze or rhonchi; no accessory muscle use  Abdomen: Soft, non-tender; obese bowel sounds are normoactive  Extremities: No clubbing/cyanosis; incision left knee, legs wrapped, bilateral  11/13/2020    K 4.9 11/13/2020     11/13/2020    CO2 27.0 11/13/2020    BUN 35 11/13/2020    CREATSERUM 1.26 11/13/2020     11/13/2020    CA 8.5 11/13/2020    MG 2.6 11/13/2020         [facility]

## 2020-11-13 NOTE — CM/SW NOTE
Expected Discharge Plan:    Destination:  [address]:   Current Barriers to d/c: Medical Clearance; CT on 11/13  Pt/Family aware of plan: yes  [facility] Staff aware of dc plan: Patient discussed in care rounds.     Home Health provider is Yehuda

## 2020-11-13 NOTE — IMAGING NOTE
Patient arrived in room 4 CT scan for her CTA Gated study, alert  , coherent. Assisted patient from bed to CT table. Oxygen at 2 liters per NC. Patient tolerated procedure well. Average heart rate =  79 .  Noted  No allergic reaction  to IV dye contrast: n

## 2020-11-13 NOTE — RESPIRATORY THERAPY NOTE
SHELDON - Equipment Use Daily Summary:                  . Set Mode: AUTO CPAP WITH C-FLEX                . Usage in hours: 1:42                . 90% Pressure (EPAP) level: 11.5                . 90% Insp. Pressure (IPAP): Frandy García  AHI: 13.5

## 2020-11-13 NOTE — PROGRESS NOTES
Patient with minimal complaints regarding her left knee. Exam shows minimal erythema. No drainage. No pain with ROM left knee. Impression: resolving cellulitis left knee  Plan: remove remaining sutures today or tomorrow.  Follow up at office in 1-2 weeks

## 2020-11-14 VITALS
RESPIRATION RATE: 20 BRPM | HEART RATE: 86 BPM | TEMPERATURE: 98 F | DIASTOLIC BLOOD PRESSURE: 76 MMHG | SYSTOLIC BLOOD PRESSURE: 149 MMHG | BODY MASS INDEX: 42 KG/M2 | WEIGHT: 285.63 LBS | OXYGEN SATURATION: 98 %

## 2020-11-14 PROCEDURE — 83735 ASSAY OF MAGNESIUM: CPT | Performed by: HOSPITALIST

## 2020-11-14 PROCEDURE — 85610 PROTHROMBIN TIME: CPT | Performed by: HOSPITALIST

## 2020-11-14 PROCEDURE — 85027 COMPLETE CBC AUTOMATED: CPT | Performed by: HOSPITALIST

## 2020-11-14 RX ORDER — DIPHENHYDRAMINE HCL 25 MG
25 CAPSULE ORAL NIGHTLY PRN
Qty: 30 CAPSULE | Refills: 0 | Status: SHIPPED | OUTPATIENT
Start: 2020-11-14

## 2020-11-14 RX ORDER — WARFARIN SODIUM 2 MG/1
6 TABLET ORAL
Status: DISCONTINUED | OUTPATIENT
Start: 2020-11-14 | End: 2020-11-14

## 2020-11-14 NOTE — PROGRESS NOTES
NURSING DISCHARGE NOTE    Discharged Home via Wheelchair. Accompanied by Support staff  Belongings Taken by patient/family. Patient discharged to home. Tele removed. Patient has midline that will stay at discharge due to L-T abx.   Home health set u

## 2020-11-14 NOTE — CM/SW NOTE
Pt to dc home today. [facility] to deliver after 330pm.  Pt does not want Home RN to come out today. She insists she can do her own infusions tonight. Dilan Phoenix will be out to see pt at 8am tomorrow. Above discussed with RN.     LAUREN Murdock

## 2020-11-14 NOTE — PLAN OF CARE
Patient A&Ox4. No complaints of pain or discomfort. Fall and safety precautions in place. Will continue to monitor. Plan to discharge today.     Problem: Patient/Family Goals  Goal: Patient/Family Long Term Goal  Description: Patient's Long Term Goal: To go

## 2020-11-14 NOTE — PLAN OF CARE
Pt. A&O x4, on RA during day, CPAP at night. No tele d/t allergic reaction to adhesive. No c/o pain, only slight itching around midline site. Fall and safety precautions in place. Will continue to monitor.      Problem: Patient/Family Goals  Goal: Patient/F PT/OT consults as needed  - Advance activity as appropriate  - Communicate ordered activity level and limitations with patient/family  Outcome: Progressing     Problem: Impaired Functional Mobility  Goal: Achieve highest/safest level of mobility/gait  Desc

## 2020-11-14 NOTE — PROCEDURES
Spirometry Findings:  FEV1 is 1.25L, 39% predicted. FVC is 1.85L, 44% predicted. FEV1/ FVC ratio is 0.68. The flow-volume loop demonstrates an obstructive pattern.     Impression:  There is severe airway obstruction on spirometry and visualized   on flow

## 2020-11-14 NOTE — PROGRESS NOTES
Pharmacy Dosing Service  Warfarin (Coumadin) Subsequent Dosing         Cem Newman is a 77year old female for whom pharmacy has been dosing warfarin.       Consulted by:  IRIS Gibson      Indication Hx of Afib & PE (has IVC filter)      Goal INR i

## 2020-11-14 NOTE — PROGRESS NOTES
[address] [facility] Cardiology  Progress Note    Taryn Isaacs Patient Status:  Inpatient    1955 MRN [id number]   [facility] 3NE-A Attending Sara Christy MD   Hosp Day # 6 PCP Katia Paulino DO     Impression:  1. Severe AS  2.  LLE +ALVINO  Lungs: Clear to auscultation bilaterally without wheeze or rhonchi; no accessory muscle use  Abdomen: Soft, non-tender; obese bowel sounds are normoactive  Extremities: No clubbing/cyanosis; incision left knee C/D/I, legs wrapped, bilateral non pitti  11/13/2020    CA 8.6 11/13/2020

## 2020-11-14 NOTE — DISCHARGE SUMMARY
General Medicine Discharge Summary     Patient ID:  Devin Celestin  77year old  [id number]  4/26/1955    Admit date: 11/8/2020    Discharge date and time:  11/14/20    Attending Physician: Karl Meehan, *     Primary Care Physician: Tamanna West instructions/plans     # b/l LE edema, h/o diastolic CHF - ?acute on chronic CHF  - INR therapeutic, dvt unlikely  - daily weights  - apprec cardiology recs  - cont OP diuretics at this t mor     # SHELDON  - per protcol     # morbid obesity  - f/u with PCP  - prosthesis is appropriately aligned without evidence of fracture or loosening.    Dictated by (CST): Milly Mehta MD on 11/08/2020 at 8:04 PM     Finalized by (CST): Milly Mehta MD on 11/08/2020 at 8:05 PM       Mri Brain (cpt=[id number])    Result Date: 11/1 CONCLUSION:  1. No evidence of acute infarct or hemorrhage. 2. Mild chronic microvascular disease.  3. Complete opacification of the maxillary sinuses with decreased signal intensity on the T2 weighted images which may represent concretions from chronic Left CCA Peak Systolic Velocity:  15.16 cm/s             Left Proximal ICA Peak Systolic Velocity:  614.27 cm/s             Left Mid ICA Peak Systolic Velocity:  775.50 cm/s             Left Distal ICA Peak Systolic Velocity:  07.85 cm/s             Left No mass, obstruction, or calcification. ADRENALS:  No mass or enlargement. RETROPERITONEUM:  No mass or adenopathy. BOWEL/MESENTERY:  Gastric postsurgical changes. Diverticular disease. Normal caliber small and large bowel. No free fluid.  ABDOMINAL clinically questioned. 2. Small superficial focal fluid lateral to laceration as detailed above, correlate clinically.    Dictated by (CST): Jersey Champagne MD on 11/09/2020 at 1:07 PM     Finalized by (CST): Jersey Champagne MD on 11/09/2020 at 1:13 PM       [address] [place] [address] Bilateral atelectasis and or scar, consolidation cannot be excluded involving the left lower lobe, correlate clinically. Please see details as above.     Dictated by (CST): Ann-Marie Del Angel MD on 11/13/2020 at 12:45 PM     Finalized by (CST): Ann-Marie Del Angel MD on 11 1. Left ventricle: The cavity size was normal. Wall thickness was normal.    Systolic function was normal. The estimated ejection fraction was 60-65%. No diagnostic evidence for regional wall motion abnormalities.  LVOT    diameter (S): 2.3cm. 2. Aortic Transvalvular velocity was within the normal range. There was no evidence for stenosis. There was trivial regurgitation. Pulmonic valve:   Not well visualized. Doppler:  Transvalvular velocity was within the normal range.  There was no evidence for stenosi valve VTI, S                             106.0 cm       ---------  Aortic mean gradient, S                         48    mm Hg    ---------  Aortic peak gradient, S                         83    mm Hg    ---------  Aortic valve area, VTI 3.6   m/sec    --------- Legend: (L)  and  (H)  romelia values outside specified reference range. ---------------------------------------------------------------------------- Prepared and electronically signed by Alexis Cameron MD 11/09/2020 14:01     Cta VENTRICLE: The left ventricle appears normal in size. There is concentric hypertrophy. AORTIC VALVE: The aortic valve morphology is Orville Type I bicuspid with fusion of the right and non coronary cusps.  There is severe aortic valve calcification (1400 A pericardial thickness at the surgical apex measures less than 1 mm in axial views. There is no pericardial effusion. CONCLUSIONS:  1. The aortic valve morphology is Orville Type I bicuspid with fusion of the right and non coronary cusps.  There is severe MG Oral Cap      !!  Warfarin Sodium 1 MG Oral Tab  TAKE 1 TABLET BY MOUTH AT  NIGHT    !! WARFARIN SODIUM 3 MG Oral Tab  TAKE 1 TABLET BY MOUTH  DAILY    FLUoxetine HCl 20 MG Oral Cap  TAKE 1 CAPSULE BY MOUTH  TWICE DAILY    LEVOTHYROXINE SODIUM 150 MCG Or Medication Changes:      Activity: as directed  Diet: as directed  Wound Care: as directed  Code Status: No Order  O2: prn    Follow-up with       Schedule an appointment with Faiza Shepherd, DO as soon as possible for a visit in 2 week(s) Dago Peterson [phone] who are 72 and older do an annual Medicare Benefit Plan review.  This will help make sure you are on the best Medicare plan to fit your healthcare needs.  Please call [phone] to speak to a licensed agent who can review your Medicare benefits with you. [id number] Follow Up Visit with Gavino Burk MD  Wednesday Jul 14, 2021 12:20 PM  As a patient of ours, you should have received our [employer].  [facility] physicians recommend that their patients who are 72 and older do an annual Medicare Benefit asking patients not to bring anyone with them to their appointment, unless clinically required.             Total Time Coordinating Care: Greater than 30 minutes    Patient had opportunity to ask questions and state understand and agree with therapeutic tara

## 2020-11-14 NOTE — CM/SW NOTE
Sw spoke to RN this am- await medical clearance for dc so we can finalize arrangements for IV abs.     Evelin Hills LCSW  /Discharge Planner  [phone]

## 2020-11-14 NOTE — RESPIRATORY THERAPY NOTE
SHELDON - Equipment Use Daily Summary:  · Set Mode AFLEX  · Usage in hours: 5:05  · 90% Pressure (EPAP) level: 8.0  · 90% Insp Pressure (IPAP):   · AHI: 6.2  · Supplemental Oxygen:   · Comments:

## 2020-11-19 NOTE — CM/SW NOTE
VIKRAM from [facility] ([facility]) called and requested copy of dc instructions. MSW spoke to patient to verify hippa and her consent for msw to send dc instructions, pt consented.  AVS only sent to [facility] for continuity of care in regards to arranging

## 2020-12-22 RX ORDER — ANTIARTHRITIC COMBINATION NO.2 900 MG
5000 TABLET ORAL NIGHTLY
COMMUNITY
End: 2021-04-13

## 2020-12-22 RX ORDER — WARFARIN SODIUM 3 MG/1
3 TABLET ORAL NIGHTLY
COMMUNITY
End: 2020-12-23

## 2020-12-22 RX ORDER — WARFARIN SODIUM 4 MG/1
4 TABLET ORAL NIGHTLY
COMMUNITY
End: 2020-12-23

## 2020-12-26 ENCOUNTER — LAB ENCOUNTER (OUTPATIENT)
Dept: LAB | Age: 65
End: 2020-12-26
Attending: INTERNAL MEDICINE
Payer: MEDICARE

## 2020-12-26 DIAGNOSIS — I35.0 AORTIC STENOSIS: ICD-10-CM

## 2020-12-28 ENCOUNTER — HOSPITAL ENCOUNTER (OUTPATIENT)
Dept: INTERVENTIONAL RADIOLOGY/VASCULAR | Facility: HOSPITAL | Age: 65
Discharge: HOME | End: 2020-12-28
Attending: INTERNAL MEDICINE | Admitting: INTERNAL MEDICINE
Payer: MEDICARE

## 2020-12-28 VITALS
TEMPERATURE: 98 F | RESPIRATION RATE: 15 BRPM | OXYGEN SATURATION: 96 % | SYSTOLIC BLOOD PRESSURE: 99 MMHG | DIASTOLIC BLOOD PRESSURE: 67 MMHG | BODY MASS INDEX: 38.36 KG/M2 | WEIGHT: 259 LBS | HEIGHT: 69 IN | HEART RATE: 85 BPM

## 2020-12-28 DIAGNOSIS — I35.0 AORTIC VALVE STENOSIS, ETIOLOGY OF CARDIAC VALVE DISEASE UNSPECIFIED: ICD-10-CM

## 2020-12-28 DIAGNOSIS — I35.0 AORTIC STENOSIS: Primary | ICD-10-CM

## 2020-12-28 PROCEDURE — 4A023N6 MEASUREMENT OF CARDIAC SAMPLING AND PRESSURE, RIGHT HEART, PERCUTANEOUS APPROACH: ICD-10-PCS | Performed by: INTERNAL MEDICINE

## 2020-12-28 PROCEDURE — 85025 COMPLETE CBC W/AUTO DIFF WBC: CPT | Performed by: INTERNAL MEDICINE

## 2020-12-28 PROCEDURE — 93005 ELECTROCARDIOGRAM TRACING: CPT

## 2020-12-28 PROCEDURE — 93010 ELECTROCARDIOGRAM REPORT: CPT | Performed by: INTERNAL MEDICINE

## 2020-12-28 PROCEDURE — 36415 COLL VENOUS BLD VENIPUNCTURE: CPT

## 2020-12-28 PROCEDURE — B211YZZ FLUOROSCOPY OF MULTIPLE CORONARY ARTERIES USING OTHER CONTRAST: ICD-10-PCS | Performed by: INTERNAL MEDICINE

## 2020-12-28 PROCEDURE — 85610 PROTHROMBIN TIME: CPT

## 2020-12-28 PROCEDURE — 80048 BASIC METABOLIC PNL TOTAL CA: CPT | Performed by: INTERNAL MEDICINE

## 2020-12-28 PROCEDURE — 93456 R HRT CORONARY ARTERY ANGIO: CPT

## 2020-12-28 PROCEDURE — 99152 MOD SED SAME PHYS/QHP 5/>YRS: CPT

## 2020-12-28 RX ORDER — PREDNISONE 50 MG/1
50 TABLET ORAL ONCE
Status: COMPLETED | OUTPATIENT
Start: 2020-12-28 | End: 2020-12-28

## 2020-12-28 RX ORDER — LIDOCAINE HYDROCHLORIDE 10 MG/ML
INJECTION, SOLUTION EPIDURAL; INFILTRATION; INTRACAUDAL; PERINEURAL
Status: COMPLETED
Start: 2020-12-28 | End: 2020-12-28

## 2020-12-28 RX ORDER — ASPIRIN 81 MG/1
324 TABLET, CHEWABLE ORAL ONCE
Status: COMPLETED | OUTPATIENT
Start: 2020-12-28 | End: 2020-12-28

## 2020-12-28 RX ORDER — ASPIRIN 81 MG/1
TABLET, CHEWABLE ORAL
Status: COMPLETED
Start: 2020-12-28 | End: 2020-12-28

## 2020-12-28 RX ORDER — VERAPAMIL HYDROCHLORIDE 2.5 MG/ML
INJECTION, SOLUTION INTRAVENOUS
Status: COMPLETED
Start: 2020-12-28 | End: 2020-12-28

## 2020-12-28 RX ORDER — NITROGLYCERIN 20 MG/100ML
INJECTION INTRAVENOUS
Status: COMPLETED
Start: 2020-12-28 | End: 2020-12-28

## 2020-12-28 RX ORDER — MIDAZOLAM HYDROCHLORIDE 1 MG/ML
INJECTION INTRAMUSCULAR; INTRAVENOUS
Status: COMPLETED
Start: 2020-12-28 | End: 2020-12-28

## 2020-12-28 RX ORDER — SODIUM CHLORIDE 9 MG/ML
INJECTION, SOLUTION INTRAVENOUS
Status: DISCONTINUED | OUTPATIENT
Start: 2020-12-29 | End: 2020-12-28 | Stop reason: HOSPADM

## 2020-12-28 RX ORDER — HEPARIN SODIUM 5000 [USP'U]/ML
INJECTION, SOLUTION INTRAVENOUS; SUBCUTANEOUS
Status: COMPLETED
Start: 2020-12-28 | End: 2020-12-28

## 2020-12-28 RX ADMIN — PREDNISONE 50 MG: 50 TABLET ORAL at 10:45:00

## 2020-12-28 RX ADMIN — ASPIRIN 324 MG: 81 TABLET, CHEWABLE ORAL at 09:45:00

## 2020-12-28 NOTE — CONSULTS
Cardiothoracic Surgery  Structural Heart Consult       Referring: Dr Kolby Huntley  PCP: Dr Selvin Padilla  Reason for consult: severe symptomatic aortic stenosis  77year old with symptomatic aortic stenosis with worse SOB and syncope  Has been hospitalized x 2 for falls, TALAMANTES  Gastrointestinal: denies nausea, vomiting, diarrhea, or abdominal pain  Genitourinary: negative  Musculoskeletal: normal  Integument: had cellulitis, rx with abx  Psychiatric: no issues  Endocrine: negative  Hematological/lymphatic: normal    Physical option for her  The indications, procedure and possible complications as well as the post-operative course was   discussed with the patient and family and questions answered.   Plan: cath today, TAVR next week

## 2020-12-28 NOTE — PROCEDURES
[facility]    PATIENT'S NAME: Marine Old   ATTENDING PHYSICIAN: Lois Parmar. Carol Mraion MD   OPERATING PHYSICIAN: Lois Parmar.  Carol Marion MD   PATIENT ACCOUNT#:   [id number]    LOCATION:  [place] [address] [address]  MEDICAL RECORD #:   [id number]       DATE OF BIR moderate caliber obtuse marginal which gives rise to a trifurcating marginal/posterolateral system. There is 50% disease in the mid circumflex proximal to this 75% stenosis.     Right coronary is a dominant large vessel with moderate diffuse disease into t

## 2020-12-28 NOTE — H&P
[facility] Cardiology  Report of Consultation    Desmond Bar Patient Status:  Outpatient in a Bed    1955 MRN [id number]   Location [facility] Attending Gabrielle Fernández MD   Hosp Day # 0 PCP Sloane Urrutia activity        Days per week: Not on file        Minutes per session: Not on file      Stress: Not on file    Relationships      Social connections        Talks on phone: Not on file        Gets together: Not on file        Attends [employer] service: Not Naltrexone HCl 50 MG Oral Tab, Take 1 tablet (50 mg total) by mouth daily.  (Patient taking differently: Take 50 mg by mouth nightly.  ), Disp: 90 tablet, Rfl: 1    •  Nortriptyline HCl 25 MG Oral Cap, Take 25 mg by mouth nightly.  , Disp: , Rfl:     •  FLU •  Rosuvastatin Calcium 5 MG Oral Tab, Take 5 mg by mouth every other day., Disp: , Rfl:     •  mupirocin 2 % External Ointment, Apply to open areas BID PRN (Patient taking differently: 1 Application 2 (two) times daily as needed.  Apply to open areas B BP: 138/65   Pulse: 80   Resp: 16   Temp: 98.2 °F (36.8 °C)     Wt Readings from Last 3 Encounters:  12/22/20 : 259 lb (117.5 kg)  12/23/20 : 261 lb (118.4 kg)  12/18/20 : 257 lb (116.6 kg)          General: Well developed, well nourished female.   Pt is AVR in future given symptomatic, severe AS    Partha Kent MD  12/28/2020  11:57 AM

## 2020-12-28 NOTE — IVS NOTE
Pt s/p [facility] with Dr. Nick Nguyen. Pt recovered in holding. Right wrist with vasc band, 9ml of air. Right brachial manual, dressing cdi. No bleeding or hematoma. Pt ate and drank. Pt friend at bedside.  After an hour in recovery, air was removed in increments from

## 2020-12-28 NOTE — PROGRESS NOTES
Cath:    W: 20, PA 40/20, RV 40/10, RA 9, CI 3.0L/min/m2    Coronaries: Mid CX 75% into trifurcation. Radial artery/brachial vein. Dictated. May want to fix CX. Plan TAVR for now as is. FU per TAVR RN/coordinator.     [facility] MD

## 2021-01-02 ENCOUNTER — LAB ENCOUNTER (OUTPATIENT)
Dept: LAB | Facility: HOSPITAL | Age: 66
DRG: 267 | End: 2021-01-02
Attending: INTERNAL MEDICINE
Payer: MEDICARE

## 2021-01-02 DIAGNOSIS — I35.0 AORTIC STENOSIS: ICD-10-CM

## 2021-01-02 LAB — SARS-COV-2 RNA RESP QL NAA+PROBE: NOT DETECTED

## 2021-01-04 ENCOUNTER — HOSPITAL ENCOUNTER (INPATIENT)
Facility: HOSPITAL | Age: 66
LOS: 3 days | Discharge: NURSING, REHAB OR LONG-TERM CARE | DRG: 267 | End: 2021-01-07
Attending: INTERNAL MEDICINE | Admitting: INTERNAL MEDICINE
Payer: MEDICARE

## 2021-01-04 ENCOUNTER — ANESTHESIA EVENT (OUTPATIENT)
Dept: CARDIAC SURGERY | Facility: HOSPITAL | Age: 66
DRG: 267 | End: 2021-01-04
Payer: MEDICARE

## 2021-01-04 ENCOUNTER — APPOINTMENT (OUTPATIENT)
Dept: GENERAL RADIOLOGY | Facility: HOSPITAL | Age: 66
DRG: 267 | End: 2021-01-04
Attending: NURSE PRACTITIONER
Payer: MEDICARE

## 2021-01-04 DIAGNOSIS — I48.0 PAF (PAROXYSMAL ATRIAL FIBRILLATION) (HCC): Primary | ICD-10-CM

## 2021-01-04 DIAGNOSIS — Z79.01 LONG TERM (CURRENT) USE OF ANTICOAGULANTS: ICD-10-CM

## 2021-01-04 PROBLEM — Z01.818 PRE-OP TESTING: Status: ACTIVE | Noted: 2021-01-04

## 2021-01-04 LAB
ALBUMIN SERPL-MCNC: 2.5 G/DL (ref 3.4–5)
ALBUMIN/GLOB SERPL: 0.7 {RATIO} (ref 1–2)
ALP LIVER SERPL-CCNC: 66 U/L
ALT SERPL-CCNC: 20 U/L
ANION GAP SERPL CALC-SCNC: 6 MMOL/L (ref 0–18)
ANTIBODY SCREEN: NEGATIVE
AST SERPL-CCNC: 16 U/L (ref 15–37)
ATRIAL RATE: 69 BPM
BILIRUB SERPL-MCNC: 0.5 MG/DL (ref 0.1–2)
BUN BLD-MCNC: 20 MG/DL (ref 7–18)
BUN/CREAT SERPL: 24.1 (ref 10–20)
CALCIUM BLD-MCNC: 8.3 MG/DL (ref 8.5–10.1)
CHLORIDE SERPL-SCNC: 107 MMOL/L (ref 98–112)
CO2 SERPL-SCNC: 28 MMOL/L (ref 21–32)
CREAT BLD-MCNC: 0.83 MG/DL
DEPRECATED RDW RBC AUTO: 47.8 FL (ref 35.1–46.3)
ERYTHROCYTE [DISTWIDTH] IN BLOOD BY AUTOMATED COUNT: 14.1 % (ref 11–15)
EST. AVERAGE GLUCOSE BLD GHB EST-MCNC: 126 MG/DL (ref 68–126)
GLOBULIN PLAS-MCNC: 3.6 G/DL (ref 2.8–4.4)
GLUCOSE BLD-MCNC: 102 MG/DL (ref 70–99)
HBA1C MFR BLD HPLC: 6 % (ref ?–5.7)
HCT VFR BLD AUTO: 30.4 %
HGB BLD-MCNC: 9.4 G/DL
INR BLD: 1.25 (ref 0.89–1.11)
M PROTEIN MFR SERPL ELPH: 6.1 G/DL (ref 6.4–8.2)
MCH RBC QN AUTO: 28.7 PG (ref 26–34)
MCHC RBC AUTO-ENTMCNC: 30.9 G/DL (ref 31–37)
MCV RBC AUTO: 92.7 FL
NT-PROBNP SERPL-MCNC: 531 PG/ML (ref ?–125)
OSMOLALITY SERPL CALC.SUM OF ELEC: 295 MOSM/KG (ref 275–295)
P AXIS: 72 DEGREES
P-R INTERVAL: 188 MS
PLATELET # BLD AUTO: 311 10(3)UL (ref 150–450)
POTASSIUM SERPL-SCNC: 2.6 MMOL/L (ref 3.5–5.1)
PSA SERPL DL<=0.01 NG/ML-MCNC: 16.1 SECONDS (ref 12.4–14.6)
Q-T INTERVAL: 432 MS
QRS DURATION: 104 MS
QTC CALCULATION (BEZET): 462 MS
R AXIS: 36 DEGREES
RBC # BLD AUTO: 3.28 X10(6)UL
RH BLOOD TYPE: POSITIVE
SODIUM SERPL-SCNC: 141 MMOL/L (ref 136–145)
T AXIS: 48 DEGREES
VENTRICULAR RATE: 69 BPM
WBC # BLD AUTO: 6.8 X10(3) UL (ref 4–11)

## 2021-01-04 PROCEDURE — 86920 COMPATIBILITY TEST SPIN: CPT

## 2021-01-04 PROCEDURE — 85027 COMPLETE CBC AUTOMATED: CPT | Performed by: NURSE PRACTITIONER

## 2021-01-04 PROCEDURE — 87081 CULTURE SCREEN ONLY: CPT | Performed by: NURSE PRACTITIONER

## 2021-01-04 PROCEDURE — 83880 ASSAY OF NATRIURETIC PEPTIDE: CPT | Performed by: NURSE PRACTITIONER

## 2021-01-04 PROCEDURE — 86850 RBC ANTIBODY SCREEN: CPT | Performed by: NURSE PRACTITIONER

## 2021-01-04 PROCEDURE — 93005 ELECTROCARDIOGRAM TRACING: CPT

## 2021-01-04 PROCEDURE — 85610 PROTHROMBIN TIME: CPT | Performed by: NURSE PRACTITIONER

## 2021-01-04 PROCEDURE — 80053 COMPREHEN METABOLIC PANEL: CPT | Performed by: NURSE PRACTITIONER

## 2021-01-04 PROCEDURE — 86901 BLOOD TYPING SEROLOGIC RH(D): CPT | Performed by: NURSE PRACTITIONER

## 2021-01-04 PROCEDURE — 83036 HEMOGLOBIN GLYCOSYLATED A1C: CPT | Performed by: NURSE PRACTITIONER

## 2021-01-04 PROCEDURE — 94660 CPAP INITIATION&MGMT: CPT

## 2021-01-04 PROCEDURE — 76937 US GUIDE VASCULAR ACCESS: CPT

## 2021-01-04 PROCEDURE — 93010 ELECTROCARDIOGRAM REPORT: CPT | Performed by: INTERNAL MEDICINE

## 2021-01-04 PROCEDURE — 71045 X-RAY EXAM CHEST 1 VIEW: CPT | Performed by: NURSE PRACTITIONER

## 2021-01-04 PROCEDURE — 86900 BLOOD TYPING SEROLOGIC ABO: CPT | Performed by: NURSE PRACTITIONER

## 2021-01-04 RX ORDER — CYPROHEPTADINE HYDROCHLORIDE 4 MG/1
4 TABLET ORAL NIGHTLY
Status: DISCONTINUED | OUTPATIENT
Start: 2021-01-04 | End: 2021-01-07

## 2021-01-04 RX ORDER — ALLOPURINOL 100 MG/1
100 TABLET ORAL NIGHTLY
Status: DISCONTINUED | OUTPATIENT
Start: 2021-01-04 | End: 2021-01-07

## 2021-01-04 RX ORDER — CHLORHEXIDINE GLUCONATE 4 G/100ML
30 SOLUTION TOPICAL ONCE
Status: COMPLETED | OUTPATIENT
Start: 2021-01-04 | End: 2021-01-04

## 2021-01-04 RX ORDER — CETIRIZINE HYDROCHLORIDE 10 MG/1
10 TABLET ORAL NIGHTLY
Status: DISCONTINUED | OUTPATIENT
Start: 2021-01-04 | End: 2021-01-07

## 2021-01-04 RX ORDER — LEVOTHYROXINE SODIUM 0.15 MG/1
150 TABLET ORAL
Status: DISCONTINUED | OUTPATIENT
Start: 2021-01-05 | End: 2021-01-07

## 2021-01-04 RX ORDER — FLUOXETINE HYDROCHLORIDE 20 MG/1
20 CAPSULE ORAL 2 TIMES DAILY
Status: DISCONTINUED | OUTPATIENT
Start: 2021-01-04 | End: 2021-01-07

## 2021-01-04 RX ORDER — NORTRIPTYLINE HYDROCHLORIDE 25 MG/1
25 CAPSULE ORAL NIGHTLY
Status: DISCONTINUED | OUTPATIENT
Start: 2021-01-04 | End: 2021-01-07

## 2021-01-04 RX ORDER — SODIUM CHLORIDE 9 MG/ML
INJECTION, SOLUTION INTRAVENOUS CONTINUOUS
Status: DISCONTINUED | OUTPATIENT
Start: 2021-01-05 | End: 2021-01-07

## 2021-01-04 RX ORDER — DIPHENHYDRAMINE HCL 25 MG
50 CAPSULE ORAL NIGHTLY
Status: DISCONTINUED | OUTPATIENT
Start: 2021-01-04 | End: 2021-01-07

## 2021-01-04 RX ORDER — ROSUVASTATIN CALCIUM 5 MG/1
5 TABLET, COATED ORAL EVERY OTHER DAY
Status: DISCONTINUED | OUTPATIENT
Start: 2021-01-05 | End: 2021-01-07

## 2021-01-04 RX ORDER — BUPROPION HYDROCHLORIDE 150 MG/1
150 TABLET, EXTENDED RELEASE ORAL 2 TIMES DAILY
Status: DISCONTINUED | OUTPATIENT
Start: 2021-01-04 | End: 2021-01-07

## 2021-01-04 RX ORDER — LOSARTAN POTASSIUM 100 MG/1
100 TABLET ORAL DAILY
Refills: 3 | Status: DISCONTINUED | OUTPATIENT
Start: 2021-01-04 | End: 2021-01-07

## 2021-01-04 NOTE — H&P
General Medicine H&P     No chief complaint on file.        PCP: Ely Hence, DO    History of Present Illness: Patient is a 77year old female with PMH including but not limited to thyroid ca, asthma, COPD, depression, HTN, HT, PSA who p/t EH for planned Smoking status: Former Smoker        Packs/day: 0.50        Years: 30.00        Pack years: 15        Types: Cigarettes        Quit date: 1991        Years since quittin.9      Smokeless tobacco: Never Used    Alcohol use: No       Fam Hx  Family allupurinol    # hypokalemia, severe  -replete per protocol. 2/6    # Proph  - coumadin on hold, INR 1.25        Outpatient records or previous hospital records reviewed.  D/w FAUSTO Vieyra    [facility] to continue to follow patient while in house    [place]

## 2021-01-04 NOTE — PLAN OF CARE
Assumed care of pt at 1130  A&Ox4, up with standby assist and crutches from home, no complaints of pain  R/A, NSR, no chest pain, no shortness of breath at rest, pt endorses dyspnea upon exertion, IS 2000cc  Continent of bowel and bladder, last BM prior to cough, deep breathe, Incentive Spirometry  - Assess the need for suctioning and perform as needed  - Assess and instruct to report SOB or any respiratory difficulty  - Respiratory Therapy support as indicated  - Manage/alleviate anxiety  - Monitor for sign

## 2021-01-04 NOTE — ANESTHESIA PREPROCEDURE EVALUATION
PRE-OP EVALUATION    Patient Name: Rene Carbone    Pre-op Diagnosis: aortic stenosis    Procedure(s):  transcatheter aortic valve replacement, 23mm, femoral approach    Surgeon(s) and Role:     * Wan Dawkins MD - Primary     * Sari Sawant MD    P Inhalation Aerosol Powder, Breath Activated, Inhale 1 puff into the lungs daily.   , Disp: , Rfl:     •  Fexofenadine HCl 180 MG Oral Tab, Take 180 mg by mouth nightly.  , Disp: , Rfl:     •  Cholecalciferol 5000 units Oral Tab, Take 5,000 Units by mouth ni 160 MG Oral Tab, TAKE 1 TABLET BY MOUTH  DAILY (Patient taking differently: Take 160 mg by mouth daily.  ), Disp: 90 tablet, Rfl: 3    •  Cyproheptadine HCl 4 MG Oral Tab, TAKE 1 TABLET BY MOUTH  NIGHTLY (Patient taking differently: Take 4 mg by mouth nigh 0.50        Years: 30.00        Pack years: 15        Types: Cigarettes        Quit date: 1991        Years since quittin.9      Smokeless tobacco: Never Used    Alcohol use: No      Drug use: No     Available pre-op labs reviewed.   Lab Results

## 2021-01-04 NOTE — PROGRESS NOTES
[address] [facility] Cardiology  Inpatient Progress Note    Ramo Field Patient Status:  Inpatient    1955 MRN [id number]   [facility] 8NE-A Attending Margorie Essex, MD   Hosp Day # 0 PCP Yokasta Ospina DO     Impression:  1.  Yessica Damon procedure. Patient and family verbalized understanding and agree with plan of care.     Objective:  /48 (BP Location: Right arm)   Pulse 77   Temp 97.8 °F (36.6 °C) (Tympanic)   Resp 22   Ht 5' 9.02\" (1.753 m)   Wt 268 lb (121.6 kg)   LMP  (LMP U mEq in sodium chloride 0.9% 250 mL IVPB, 40 mEq, Intravenous, Once      DIAGNOSTICS:   BMP:     Lab Results   Component Value Date    GLUCOSE 69 11/05/2014    GLUCOSE 78 05/13/2013    GLUCOSE 81 04/20/2012     Lab Results   Component Value Date    K 2.6 (L moderate caliber obtuse marginal which gives rise to a trifurcating marginal/posterolateral system.   There is 50% disease in the mid circumflex proximal to this 75% stenosis.     Right coronary is a dominant large vessel with moderate diffuse disease into

## 2021-01-04 NOTE — CONSULTS
[address] [place] [address] Cardiology  Report of Consultation    Driscoll Jerome Patient Status:  Inpatient    1955 MRN [id number]   [facility] 8NE-A Attending Lasha Duque MD   Hosp Day # 0 PCP Margaret Malone DO     Reason for Consult Rfl: 0    •  Biotin 5000 MCG Oral Tab, Take 5,000 mcg by mouth nightly., Disp: , Rfl:     •  predniSONE 10 MG Oral Tab, Take 50 mg (5 tabs) by mouth, 13 hours prior to procedure, 50 mg 7 hours prior, and 50 mg 1 hour prior (along with 50 mg Benadryl), Disp DAILY FOR 1-2 WEEKS THEN TAPER TO AS  NEEDED FOR FLARES ), Disp: 454 g, Rfl: 0    •  allopurinol 100 MG Oral Tab, Take 1 tablet (100 mg total) by mouth daily. (Patient taking differently: Take 100 mg by mouth daily.  Gout ), Disp: 90 tablet, Rfl: 0    •  VA then with no side effect. -             kidney IVP dye  Lisinopril              TONGUE SWELLING  Morphine Sulfate        HIVES  Oxycontin               ITCHING  Vicodin [Hydrocodon*    ITCHING    Review of Systems:   No fevers, chills, change in weight or b 01/04/21  1243   PTP  --  16.1*   INR 1.1* 1.25*       No results for input(s): TROP, CK in the last 168 hours. Diagnostics:   Tele: Sinus. EKG: Sinus. Unremarkable. Echo: Normal EF. Peak velocity 4.55m/s. Moderate pulm HTN. Cath:  Moderate disease

## 2021-01-05 ENCOUNTER — APPOINTMENT (OUTPATIENT)
Dept: CV DIAGNOSTICS | Facility: HOSPITAL | Age: 66
DRG: 267 | End: 2021-01-05
Attending: NURSE PRACTITIONER
Payer: MEDICARE

## 2021-01-05 ENCOUNTER — ANESTHESIA (OUTPATIENT)
Dept: CARDIAC SURGERY | Facility: HOSPITAL | Age: 66
DRG: 267 | End: 2021-01-05
Payer: MEDICARE

## 2021-01-05 LAB
ALBUMIN SERPL-MCNC: 2.6 G/DL (ref 3.4–5)
ALBUMIN/GLOB SERPL: 0.6 {RATIO} (ref 1–2)
ALP LIVER SERPL-CCNC: 72 U/L
ALT SERPL-CCNC: 22 U/L
ANION GAP SERPL CALC-SCNC: 6 MMOL/L (ref 0–18)
ANION GAP SERPL CALC-SCNC: 8 MMOL/L (ref 0–18)
AST SERPL-CCNC: 27 U/L (ref 15–37)
ATRIAL RATE: 38 BPM
BILIRUB SERPL-MCNC: 0.5 MG/DL (ref 0.1–2)
BUN BLD-MCNC: 16 MG/DL (ref 7–18)
BUN BLD-MCNC: 18 MG/DL (ref 7–18)
BUN/CREAT SERPL: 21.3 (ref 10–20)
BUN/CREAT SERPL: 22.8 (ref 10–20)
CALCIUM BLD-MCNC: 8.6 MG/DL (ref 8.5–10.1)
CALCIUM BLD-MCNC: 8.8 MG/DL (ref 8.5–10.1)
CHLORIDE SERPL-SCNC: 109 MMOL/L (ref 98–112)
CHLORIDE SERPL-SCNC: 109 MMOL/L (ref 98–112)
CO2 SERPL-SCNC: 23 MMOL/L (ref 21–32)
CO2 SERPL-SCNC: 26 MMOL/L (ref 21–32)
CREAT BLD-MCNC: 0.75 MG/DL
CREAT BLD-MCNC: 0.79 MG/DL
DEPRECATED RDW RBC AUTO: 46.9 FL (ref 35.1–46.3)
DEPRECATED RDW RBC AUTO: 47.8 FL (ref 35.1–46.3)
ERYTHROCYTE [DISTWIDTH] IN BLOOD BY AUTOMATED COUNT: 13.8 % (ref 11–15)
ERYTHROCYTE [DISTWIDTH] IN BLOOD BY AUTOMATED COUNT: 14.2 % (ref 11–15)
GLOBULIN PLAS-MCNC: 4.1 G/DL (ref 2.8–4.4)
GLUCOSE BLD-MCNC: 111 MG/DL (ref 70–99)
GLUCOSE BLD-MCNC: 113 MG/DL (ref 70–99)
GLUCOSE BLD-MCNC: 89 MG/DL (ref 70–99)
HAV IGM SER QL: 2.1 MG/DL (ref 1.6–2.6)
HCT VFR BLD AUTO: 29.8 %
HCT VFR BLD AUTO: 32.8 %
HGB BLD-MCNC: 10.7 G/DL
HGB BLD-MCNC: 9.6 G/DL
INR BLD: 1.21 (ref 0.89–1.11)
M PROTEIN MFR SERPL ELPH: 6.7 G/DL (ref 6.4–8.2)
MCH RBC QN AUTO: 29.7 PG (ref 26–34)
MCH RBC QN AUTO: 30.1 PG (ref 26–34)
MCHC RBC AUTO-ENTMCNC: 32.2 G/DL (ref 31–37)
MCHC RBC AUTO-ENTMCNC: 32.6 G/DL (ref 31–37)
MCV RBC AUTO: 92.3 FL
MCV RBC AUTO: 92.4 FL
OSMOLALITY SERPL CALC.SUM OF ELEC: 292 MOSM/KG (ref 275–295)
OSMOLALITY SERPL CALC.SUM OF ELEC: 293 MOSM/KG (ref 275–295)
PLATELET # BLD AUTO: 252 10(3)UL (ref 150–450)
PLATELET # BLD AUTO: 279 10(3)UL (ref 150–450)
POTASSIUM SERPL-SCNC: 3.8 MMOL/L (ref 3.5–5.1)
POTASSIUM SERPL-SCNC: 3.9 MMOL/L (ref 3.5–5.1)
PSA SERPL DL<=0.01 NG/ML-MCNC: 15.7 SECONDS (ref 12.4–14.6)
Q-T INTERVAL: 458 MS
QRS DURATION: 100 MS
QTC CALCULATION (BEZET): 461 MS
R AXIS: 36 DEGREES
RBC # BLD AUTO: 3.23 X10(6)UL
RBC # BLD AUTO: 3.55 X10(6)UL
SODIUM SERPL-SCNC: 140 MMOL/L (ref 136–145)
SODIUM SERPL-SCNC: 141 MMOL/L (ref 136–145)
T AXIS: 30 DEGREES
VENTRICULAR RATE: 61 BPM
WBC # BLD AUTO: 5.6 X10(3) UL (ref 4–11)
WBC # BLD AUTO: 7.6 X10(3) UL (ref 4–11)

## 2021-01-05 PROCEDURE — 85610 PROTHROMBIN TIME: CPT | Performed by: NURSE PRACTITIONER

## 2021-01-05 PROCEDURE — 85027 COMPLETE CBC AUTOMATED: CPT | Performed by: INTERNAL MEDICINE

## 2021-01-05 PROCEDURE — 93308 TTE F-UP OR LMTD: CPT | Performed by: NURSE PRACTITIONER

## 2021-01-05 PROCEDURE — B3101ZZ FLUOROSCOPY OF THORACIC AORTA USING LOW OSMOLAR CONTRAST: ICD-10-PCS | Performed by: INTERNAL MEDICINE

## 2021-01-05 PROCEDURE — B24BZZ4 ULTRASONOGRAPHY OF HEART WITH AORTA, TRANSESOPHAGEAL: ICD-10-PCS | Performed by: INTERNAL MEDICINE

## 2021-01-05 PROCEDURE — 85027 COMPLETE CBC AUTOMATED: CPT | Performed by: NURSE PRACTITIONER

## 2021-01-05 PROCEDURE — 85347 COAGULATION TIME ACTIVATED: CPT

## 2021-01-05 PROCEDURE — 80053 COMPREHEN METABOLIC PANEL: CPT | Performed by: INTERNAL MEDICINE

## 2021-01-05 PROCEDURE — 83735 ASSAY OF MAGNESIUM: CPT | Performed by: INTERNAL MEDICINE

## 2021-01-05 PROCEDURE — 02RF38Z REPLACEMENT OF AORTIC VALVE WITH ZOOPLASTIC TISSUE, PERCUTANEOUS APPROACH: ICD-10-PCS | Performed by: INTERNAL MEDICINE

## 2021-01-05 PROCEDURE — B41D1ZZ FLUOROSCOPY OF AORTA AND BILATERAL LOWER EXTREMITY ARTERIES USING LOW OSMOLAR CONTRAST: ICD-10-PCS | Performed by: INTERNAL MEDICINE

## 2021-01-05 PROCEDURE — 82962 GLUCOSE BLOOD TEST: CPT

## 2021-01-05 PROCEDURE — S0028 INJECTION, FAMOTIDINE, 20 MG: HCPCS | Performed by: ANESTHESIOLOGY

## 2021-01-05 PROCEDURE — 93010 ELECTROCARDIOGRAM REPORT: CPT | Performed by: INTERNAL MEDICINE

## 2021-01-05 PROCEDURE — 93355 ECHO TRANSESOPHAGEAL (TEE): CPT | Performed by: NURSE PRACTITIONER

## 2021-01-05 PROCEDURE — 93005 ELECTROCARDIOGRAM TRACING: CPT

## 2021-01-05 PROCEDURE — 80048 BASIC METABOLIC PNL TOTAL CA: CPT | Performed by: INTERNAL MEDICINE

## 2021-01-05 DEVICE — VALVE FEM SAPIEN 23MM COMM ULT: Type: IMPLANTABLE DEVICE | Site: HEART | Status: FUNCTIONAL

## 2021-01-05 RX ORDER — ALBUMIN, HUMAN INJ 5% 5 %
250 SOLUTION INTRAVENOUS ONCE AS NEEDED
Status: ACTIVE | OUTPATIENT
Start: 2021-01-05 | End: 2021-01-05

## 2021-01-05 RX ORDER — DOBUTAMINE HYDROCHLORIDE 200 MG/100ML
INJECTION INTRAVENOUS CONTINUOUS PRN
Status: DISCONTINUED | OUTPATIENT
Start: 2021-01-05 | End: 2021-01-07

## 2021-01-05 RX ORDER — CEFAZOLIN SODIUM/WATER 2 G/20 ML
2 SYRINGE (ML) INTRAVENOUS EVERY 8 HOURS
Status: COMPLETED | OUTPATIENT
Start: 2021-01-05 | End: 2021-01-06

## 2021-01-05 RX ORDER — SODIUM PHOSPHATE, DIBASIC AND SODIUM PHOSPHATE, MONOBASIC 7; 19 G/133ML; G/133ML
1 ENEMA RECTAL ONCE AS NEEDED
Status: DISCONTINUED | OUTPATIENT
Start: 2021-01-05 | End: 2021-01-07

## 2021-01-05 RX ORDER — CEFAZOLIN SODIUM/WATER 2 G/20 ML
SYRINGE (ML) INTRAVENOUS AS NEEDED
Status: DISCONTINUED | OUTPATIENT
Start: 2021-01-05 | End: 2021-01-05 | Stop reason: SURG

## 2021-01-05 RX ORDER — NALOXONE HYDROCHLORIDE 0.4 MG/ML
80 INJECTION, SOLUTION INTRAMUSCULAR; INTRAVENOUS; SUBCUTANEOUS AS NEEDED
Status: ACTIVE | OUTPATIENT
Start: 2021-01-05 | End: 2021-01-05

## 2021-01-05 RX ORDER — DOCUSATE SODIUM 100 MG/1
100 CAPSULE, LIQUID FILLED ORAL 2 TIMES DAILY
Status: DISCONTINUED | OUTPATIENT
Start: 2021-01-05 | End: 2021-01-07

## 2021-01-05 RX ORDER — ROCURONIUM BROMIDE 10 MG/ML
INJECTION, SOLUTION INTRAVENOUS AS NEEDED
Status: DISCONTINUED | OUTPATIENT
Start: 2021-01-05 | End: 2021-01-05 | Stop reason: SURG

## 2021-01-05 RX ORDER — PROTAMINE SULFATE 10 MG/ML
INJECTION, SOLUTION INTRAVENOUS AS NEEDED
Status: DISCONTINUED | OUTPATIENT
Start: 2021-01-05 | End: 2021-01-05 | Stop reason: SURG

## 2021-01-05 RX ORDER — HYDRALAZINE HYDROCHLORIDE 20 MG/ML
10 INJECTION INTRAMUSCULAR; INTRAVENOUS
Status: DISCONTINUED | OUTPATIENT
Start: 2021-01-05 | End: 2021-01-07

## 2021-01-05 RX ORDER — MIDAZOLAM HYDROCHLORIDE 1 MG/ML
INJECTION INTRAMUSCULAR; INTRAVENOUS AS NEEDED
Status: DISCONTINUED | OUTPATIENT
Start: 2021-01-05 | End: 2021-01-05 | Stop reason: SURG

## 2021-01-05 RX ORDER — BISACODYL 10 MG
10 SUPPOSITORY, RECTAL RECTAL
Status: DISCONTINUED | OUTPATIENT
Start: 2021-01-05 | End: 2021-01-07

## 2021-01-05 RX ORDER — SODIUM CHLORIDE 9 MG/ML
INJECTION, SOLUTION INTRAVENOUS CONTINUOUS
Status: DISCONTINUED | OUTPATIENT
Start: 2021-01-05 | End: 2021-01-07

## 2021-01-05 RX ORDER — ENOXAPARIN SODIUM 150 MG/ML
1 INJECTION SUBCUTANEOUS EVERY 12 HOURS SCHEDULED
Status: DISCONTINUED | OUTPATIENT
Start: 2021-01-06 | End: 2021-01-07

## 2021-01-05 RX ORDER — AMOXICILLIN 500 MG/1
2000 CAPSULE ORAL DAILY PRN
Qty: 4 CAPSULE | Refills: 4 | Status: SHIPPED | OUTPATIENT
Start: 2021-01-05 | End: 2021-01-15

## 2021-01-05 RX ORDER — NITROGLYCERIN 20 MG/100ML
INJECTION INTRAVENOUS CONTINUOUS PRN
Status: DISCONTINUED | OUTPATIENT
Start: 2021-01-05 | End: 2021-01-07

## 2021-01-05 RX ORDER — ONDANSETRON 2 MG/ML
4 INJECTION INTRAMUSCULAR; INTRAVENOUS EVERY 6 HOURS PRN
Status: DISCONTINUED | OUTPATIENT
Start: 2021-01-05 | End: 2021-01-07

## 2021-01-05 RX ORDER — SODIUM CHLORIDE, SODIUM LACTATE, POTASSIUM CHLORIDE, CALCIUM CHLORIDE 600; 310; 30; 20 MG/100ML; MG/100ML; MG/100ML; MG/100ML
INJECTION, SOLUTION INTRAVENOUS CONTINUOUS
Status: DISCONTINUED | OUTPATIENT
Start: 2021-01-05 | End: 2021-01-07

## 2021-01-05 RX ORDER — DIPHENHYDRAMINE HYDROCHLORIDE 50 MG/ML
50 INJECTION INTRAMUSCULAR; INTRAVENOUS
Status: COMPLETED | OUTPATIENT
Start: 2021-01-05 | End: 2021-01-05

## 2021-01-05 RX ORDER — METHYLPREDNISOLONE SODIUM SUCCINATE 125 MG/2ML
125 INJECTION, POWDER, LYOPHILIZED, FOR SOLUTION INTRAMUSCULAR; INTRAVENOUS
Status: COMPLETED | OUTPATIENT
Start: 2021-01-05 | End: 2021-01-05

## 2021-01-05 RX ORDER — FAMOTIDINE 10 MG/ML
INJECTION, SOLUTION INTRAVENOUS AS NEEDED
Status: DISCONTINUED | OUTPATIENT
Start: 2021-01-05 | End: 2021-01-05 | Stop reason: SURG

## 2021-01-05 RX ORDER — NEOSTIGMINE METHYLSULFATE 1 MG/ML
INJECTION INTRAVENOUS AS NEEDED
Status: DISCONTINUED | OUTPATIENT
Start: 2021-01-05 | End: 2021-01-05 | Stop reason: SURG

## 2021-01-05 RX ORDER — GLYCOPYRROLATE 0.2 MG/ML
INJECTION, SOLUTION INTRAMUSCULAR; INTRAVENOUS AS NEEDED
Status: DISCONTINUED | OUTPATIENT
Start: 2021-01-05 | End: 2021-01-05 | Stop reason: SURG

## 2021-01-05 RX ORDER — CEFAZOLIN SODIUM 1 G/3ML
INJECTION, POWDER, FOR SOLUTION INTRAMUSCULAR; INTRAVENOUS AS NEEDED
Status: DISCONTINUED | OUTPATIENT
Start: 2021-01-05 | End: 2021-01-05 | Stop reason: SURG

## 2021-01-05 RX ORDER — ONDANSETRON 2 MG/ML
4 INJECTION INTRAMUSCULAR; INTRAVENOUS ONCE AS NEEDED
Status: ACTIVE | OUTPATIENT
Start: 2021-01-05 | End: 2021-01-05

## 2021-01-05 RX ORDER — FAMOTIDINE 20 MG/1
20 TABLET ORAL 2 TIMES DAILY
Status: DISCONTINUED | OUTPATIENT
Start: 2021-01-05 | End: 2021-01-07

## 2021-01-05 RX ORDER — POLYETHYLENE GLYCOL 3350 17 G/17G
17 POWDER, FOR SOLUTION ORAL DAILY PRN
Status: DISCONTINUED | OUTPATIENT
Start: 2021-01-05 | End: 2021-01-07

## 2021-01-05 RX ORDER — CEPHALEXIN 500 MG/1
500 CAPSULE ORAL EVERY 6 HOURS SCHEDULED
Status: DISCONTINUED | OUTPATIENT
Start: 2021-01-06 | End: 2021-01-06 | Stop reason: ALTCHOICE

## 2021-01-05 RX ORDER — LIDOCAINE HYDROCHLORIDE 10 MG/ML
INJECTION, SOLUTION EPIDURAL; INFILTRATION; INTRACAUDAL; PERINEURAL AS NEEDED
Status: DISCONTINUED | OUTPATIENT
Start: 2021-01-05 | End: 2021-01-05 | Stop reason: SURG

## 2021-01-05 RX ORDER — FAMOTIDINE 10 MG/ML
20 INJECTION, SOLUTION INTRAVENOUS 2 TIMES DAILY
Status: DISCONTINUED | OUTPATIENT
Start: 2021-01-05 | End: 2021-01-07

## 2021-01-05 RX ORDER — METOCLOPRAMIDE HYDROCHLORIDE 5 MG/ML
10 INJECTION INTRAMUSCULAR; INTRAVENOUS EVERY 8 HOURS PRN
Status: DISCONTINUED | OUTPATIENT
Start: 2021-01-05 | End: 2021-01-07

## 2021-01-05 RX ADMIN — SODIUM CHLORIDE: 9 INJECTION, SOLUTION INTRAVENOUS at 11:45:00

## 2021-01-05 RX ADMIN — GLYCOPYRROLATE 0.8 MG: 0.2 INJECTION, SOLUTION INTRAMUSCULAR; INTRAVENOUS at 11:40:00

## 2021-01-05 RX ADMIN — NEOSTIGMINE METHYLSULFATE 5 MG: 1 INJECTION INTRAVENOUS at 11:40:00

## 2021-01-05 RX ADMIN — CEFAZOLIN SODIUM 1 G: 1 INJECTION, POWDER, FOR SOLUTION INTRAMUSCULAR; INTRAVENOUS at 10:12:00

## 2021-01-05 RX ADMIN — FAMOTIDINE 20 MG: 10 INJECTION, SOLUTION INTRAVENOUS at 09:56:00

## 2021-01-05 RX ADMIN — CEFAZOLIN SODIUM/WATER 2 G: 2 G/20 ML SYRINGE (ML) INTRAVENOUS at 10:12:00

## 2021-01-05 RX ADMIN — SODIUM CHLORIDE: 9 INJECTION, SOLUTION INTRAVENOUS at 09:52:00

## 2021-01-05 RX ADMIN — MIDAZOLAM HYDROCHLORIDE 2 MG: 1 INJECTION INTRAMUSCULAR; INTRAVENOUS at 09:54:00

## 2021-01-05 RX ADMIN — SODIUM CHLORIDE: 9 INJECTION, SOLUTION INTRAVENOUS at 12:02:00

## 2021-01-05 RX ADMIN — PROTAMINE SULFATE 10 MG: 10 INJECTION, SOLUTION INTRAVENOUS at 11:14:00

## 2021-01-05 RX ADMIN — LIDOCAINE HYDROCHLORIDE 100 MG: 10 INJECTION, SOLUTION EPIDURAL; INFILTRATION; INTRACAUDAL; PERINEURAL at 10:06:00

## 2021-01-05 RX ADMIN — ROCURONIUM BROMIDE 70 MG: 10 INJECTION, SOLUTION INTRAVENOUS at 10:06:00

## 2021-01-05 RX ADMIN — PROTAMINE SULFATE 40 MG: 10 INJECTION, SOLUTION INTRAVENOUS at 11:16:00

## 2021-01-05 NOTE — CONSULTS
Pharmacy Dosing Service: Warfarin (Coumadin)    77year old female on Coumadin for chronic Afib and history of PE. Goal INR 2-3. Coumadin was held for TAVR surgery (last dose was Thursday) with Lovenox bridging.  She is now s/p TAVR and will resume Coumad

## 2021-01-05 NOTE — PLAN OF CARE
Pt. Is alert and oriented times four. Lungs clear on ausculation. She is NPO for TAVR this AM. She is sinus rhythm on monitor. She is up in the room with crutches.

## 2021-01-05 NOTE — PLAN OF CARE
Problem: CARDIOVASCULAR - ADULT  Goal: Maintains optimal cardiac output and hemodynamic stability  Description: INTERVENTIONS:  - Monitor vital signs, rhythm, and trends  - Monitor for bleeding, hypotension and signs of decreased cardiac output  - Evalua assess patient for signs and symptoms of electrolyte imbalances  - Administer electrolyte replacement as ordered  - Monitor response to electrolyte replacements, including rhythm and repeat lab results as appropriate  - Fluid restriction as ordered  - Inst

## 2021-01-05 NOTE — OPERATIVE REPORT
[facility]    PATIENT'S NAME: GOPAL, [address] [facility] PHYSICIAN: Andrés Frank. Sarahi Walker M.D. OPERATING PHYSICIAN: Elian Renae.  Fatou Murrell MD   PATIENT ACCOUNT#:   [id number]    LOCATION:  [address] [place] [address]  MEDICAL RECORD #:   [id number]       DATE OF BIRTH: balloon-assisted closure of the right common femoral artery since our Perclose sutures were suboptimal.  An Angio-Seal device was unsuccessful and therefore, we used balloon-assisted closure from the left common femoral artery.   We went up and over with a

## 2021-01-05 NOTE — PROGRESS NOTES
[facility] Hospitalist Progress Note     PCP: Aayush Horn DO    Chief Complaint: follow-up    Overnight/Interim Events:      SUBJECTIVE:  Laying in bed, on 3L. Tired post procedure. 125/64. No cp.  Hard to tell if any SOB    OBJECTIVE:  Temp:  [97.2 °F (36.2 Intravenous Q8H   • [START ON 1/6/2021] cephALEXin  500 mg Oral Q6H [facility]   • [START ON 1/6/2021] enoxaparin  1 mg/kg Subcutaneous Q12H [facility]   • Warfarin Sodium  3 mg Oral Once at night   • buPROPion HCl ER (SR)  150 mg Oral BID   • Levothyroxine Sodium  150 wellbutrin, nortripyline, currently appears stable      # HTN  - arb if ok per cards, monitor c contrast     # CKD 3  -monitor Cr    # Anemia of chronic dz and CKD  -follow hgb, 10.7    # Gout  - allupurinol     # hypokalemia, severe  -replete per protocol

## 2021-01-05 NOTE — ANESTHESIA PROCEDURE NOTES
Peripheral IV  Date/Time: 1/5/2021 10:10 AM  Inserted by: Naaman Barthel, MD    Placement  Needle size: 18 G  Laterality: right  Location: forearm  Local anesthetic: none  Site prep: alcohol  Technique: anatomical landmarks  Attempts: 1

## 2021-01-05 NOTE — CM/SW NOTE
Order noted \"Will be s/p TAVR and lives alone. Leonel Craig need 3-5 days of help with groin care and supervision. \"   There are no facilities nearby her home to chose from.   CM initiated referrals to facilities near [address] [place] [address] [place] will follow up with kailey

## 2021-01-05 NOTE — PROGRESS NOTES
[facility] Cardiology   Post TAVR progress note    Chela Sullivan Patient Status:  Inpatient    1955 MRN [id number]   Location [address] [place] [address] Attending Adri Foreman MD   Hosp Day # 1 PCP Gin Castillo DO     Date of [facility].        Subjective:   Pt recovering in CCU. Arousable; follows commands and answers questions appropriately. C/o scratchy throat but denies any CP or SOB.      Dr. Fabiano Beasley spoke via phone with patient's brother Don Cardona post procedure and discussed pr complete c-3 -7 ectomy   • COLONOSCOPY, POSSIBLE BIOPSY, POSSIBLE POLYPECTOMY 57344 N/A 5/10/2018    Performed by Wen Hardy MD at [address] [place] [address]  09/2009   • HIP REPLACEMENT SURGERY  09/2009    Rt hip   • KNEE REPLACEMENT SURGE HIVES  Oxycontin               ITCHING  Vicodin [Hydrocodon*    ITCHING   Current Meds:  No current outpatient medications on file.        •  iodixanol (VISIPAQUE) 320 MG/ML injection 100 mL, 100 mL, Injection, ONCE PRN    •  0.9% NaCl infusion, , Intraven Net 360 ml     Wt Readings from Last 3 Encounters:  01/05/21 : 268 lb 11.9 oz (121.9 kg)  12/22/20 : 259 lb (117.5 kg)  12/23/20 : 261 lb (118.4 kg)      General: Awake and alert, answers questions and follows directions appropriately  HEENT: No focal de

## 2021-01-05 NOTE — ANESTHESIA PROCEDURE NOTES
Airway  Date/Time: 1/5/2021 10:09 AM  Urgency: elective      General Information and Staff    Patient location during procedure: OR  Anesthesiologist: Mona Priest MD  Performed: anesthesiologist     Indications and Patient Condition  Indications for air

## 2021-01-05 NOTE — PROCEDURES
[facility] [address] [place] [address] Cardiology  Transesophageal Echocardiogram Report    PREOPERATIVE DIAGNOSIS:   Severe aortic stenosis    POSTOPERATIVE DIAGNOSIS:  Transcatheter aortic valve replacement    PROCEDURE PERFORMED: Transesophageal echocardiogram with Doppl

## 2021-01-05 NOTE — ANESTHESIA POSTPROCEDURE EVALUATION
[address] Patient Status:  Inpatient   Age/Gender 77year old female MRN [id number]   [facility] 6NE-A Attending Santa Rankin MD   Hosp Day # 1 PCP Abdelrahman Navarro DO       Anesthesia Post-op Note    Procedure(s):

## 2021-01-05 NOTE — PLAN OF CARE
Pt is AOx4, denies chest pain or dyspnea. Room air. SR on tele. Up with SBA. Plan for TAVR tomorrow. K replaced IV, will check level again in the am.  Consents are signed, groins shaved, and she showered this evening.

## 2021-01-06 ENCOUNTER — TELEPHONE (OUTPATIENT)
Dept: ORTHOPEDICS CLINIC | Facility: CLINIC | Age: 66
End: 2021-01-06

## 2021-01-06 LAB
ANION GAP SERPL CALC-SCNC: 7 MMOL/L (ref 0–18)
ATRIAL RATE: 72 BPM
BUN BLD-MCNC: 15 MG/DL (ref 7–18)
BUN/CREAT SERPL: 20.8 (ref 10–20)
CALCIUM BLD-MCNC: 8.7 MG/DL (ref 8.5–10.1)
CHLORIDE SERPL-SCNC: 109 MMOL/L (ref 98–112)
CO2 SERPL-SCNC: 24 MMOL/L (ref 21–32)
CREAT BLD-MCNC: 0.72 MG/DL
DEPRECATED RDW RBC AUTO: 48.2 FL (ref 35.1–46.3)
ERYTHROCYTE [DISTWIDTH] IN BLOOD BY AUTOMATED COUNT: 14.2 % (ref 11–15)
GLUCOSE BLD-MCNC: 123 MG/DL (ref 70–99)
HCT VFR BLD AUTO: 30.6 %
HGB BLD-MCNC: 9.7 G/DL
INR BLD: 1.02 (ref 0.89–1.11)
ISTAT ACTIVATED CLOTTING TIME: 257 SECONDS (ref 74–137)
MCH RBC QN AUTO: 29.6 PG (ref 26–34)
MCHC RBC AUTO-ENTMCNC: 31.7 G/DL (ref 31–37)
MCV RBC AUTO: 93.3 FL
OSMOLALITY SERPL CALC.SUM OF ELEC: 292 MOSM/KG (ref 275–295)
P AXIS: 79 DEGREES
P-R INTERVAL: 174 MS
PLATELET # BLD AUTO: 270 10(3)UL (ref 150–450)
POTASSIUM SERPL-SCNC: 4.1 MMOL/L (ref 3.5–5.1)
PSA SERPL DL<=0.01 NG/ML-MCNC: 13.7 SECONDS (ref 12.4–14.6)
Q-T INTERVAL: 410 MS
QRS DURATION: 94 MS
QTC CALCULATION (BEZET): 448 MS
R AXIS: 16 DEGREES
RBC # BLD AUTO: 3.28 X10(6)UL
SODIUM SERPL-SCNC: 140 MMOL/L (ref 136–145)
T AXIS: 42 DEGREES
VENTRICULAR RATE: 72 BPM
WBC # BLD AUTO: 11.7 X10(3) UL (ref 4–11)

## 2021-01-06 PROCEDURE — 97161 PT EVAL LOW COMPLEX 20 MIN: CPT

## 2021-01-06 PROCEDURE — 93010 ELECTROCARDIOGRAM REPORT: CPT | Performed by: INTERNAL MEDICINE

## 2021-01-06 PROCEDURE — 85610 PROTHROMBIN TIME: CPT | Performed by: NURSE PRACTITIONER

## 2021-01-06 PROCEDURE — S0028 INJECTION, FAMOTIDINE, 20 MG: HCPCS | Performed by: NURSE PRACTITIONER

## 2021-01-06 PROCEDURE — 80048 BASIC METABOLIC PNL TOTAL CA: CPT | Performed by: NURSE PRACTITIONER

## 2021-01-06 PROCEDURE — 97535 SELF CARE MNGMENT TRAINING: CPT

## 2021-01-06 PROCEDURE — 97165 OT EVAL LOW COMPLEX 30 MIN: CPT

## 2021-01-06 PROCEDURE — 85027 COMPLETE CBC AUTOMATED: CPT | Performed by: NURSE PRACTITIONER

## 2021-01-06 PROCEDURE — 93005 ELECTROCARDIOGRAM TRACING: CPT

## 2021-01-06 RX ORDER — WARFARIN SODIUM 2 MG/1
4 TABLET ORAL
Status: COMPLETED | OUTPATIENT
Start: 2021-01-06 | End: 2021-01-06

## 2021-01-06 RX ORDER — ASPIRIN 81 MG/1
81 TABLET ORAL DAILY
Status: DISCONTINUED | OUTPATIENT
Start: 2021-01-06 | End: 2021-01-07

## 2021-01-06 RX ORDER — CEFADROXIL 500 MG/1
500 CAPSULE ORAL EVERY 12 HOURS
Status: DISCONTINUED | OUTPATIENT
Start: 2021-01-06 | End: 2021-01-07

## 2021-01-06 NOTE — TELEPHONE ENCOUNTER
Patient is calling in regards to her LOV and services. Prefers to speak to Bean Bernstein in regards to this matter. Patient didn't want to explain with me what's going on.

## 2021-01-06 NOTE — PLAN OF CARE
Pharmacy Dosing Service: Warfarin (Coumadin)    77year old female on Coumadin for chronic Afib and history of PE. Goal INR 2-3.     Recent Labs   Lab 01/04/21  1243 01/05/21  1204 01/06/21  0552   INR 1.25* 1.21* 1.02     Potential Drug Interactions: aspi

## 2021-01-06 NOTE — PROGRESS NOTES
[facility] Hospitalist Progress Note     PCP: Elroy Diaz DO    Chief Complaint: follow-up    Overnight/Interim Events:      SUBJECTIVE:  Doing pretty good. Ambulating with PT. Denies cp and sob. OBJECTIVE:  Temp:  [97 °F (36.1 °C)-97.6 °F (36.4 °C)] 97. mg Oral BID   • famoTIDine  20 mg Oral BID    Or   • famoTIDine  20 mg Intravenous BID   • cephALEXin  500 mg Oral Q6H [address]   • enoxaparin  1 mg/kg Subcutaneous Q12H [address]   • buPROPion HCl ER (SR)  150 mg Oral BID   • Levothyroxine Sodium  150 mcg Oral Befor contrast     # CKD 3  -monitor Cr    # Anemia of chronic dz and CKD  -follow hgb     # Gout  - allupurinol     # hypokalemia   -replete per protocol.      # Proph  - coumadin      Dispo: transfer to [address], [facility] Hospitalist  [phone]

## 2021-01-06 NOTE — PHYSICAL THERAPY NOTE
PHYSICAL THERAPY QUICK EVALUATION - INPATIENT    Room Number: 0135/3675-Q  Evaluation Date: 1/6/2021  Presenting Problem: severe aortic stenosis s/p TAVR on 1/5/20  Physician Order: PT Eval and Treat    Problem List  Active Problems:    Pre-op testing order to make her environment safe for her. For example, ramp to enter the home, grab bars by toilet and shower. Pt reports \"everything is elevated\" in order to facilitate sit<>stand transfers and adjustable bed.     SUBJECTIVE  \"I haven't walked this at EOB. Pt completed sit>stand with Mod I.  Pt able to stand unsupported and reach outside base of support in order reach and grasp [place] crutches and put them in placed independently.   Pt ambulated 150 feet with Mod I.  Pt completed stand>sit>supine services. Please re-order if a new functional limitation presents during this admission. GOALS  Patient was able to achieve the following goals . ..     Patient was able to transfer Safely and independently   Patient able to ambulate on level surfaces Sa

## 2021-01-06 NOTE — OCCUPATIONAL THERAPY NOTE
OCCUPATIONAL THERAPY QUICK EVALUATION - INPATIENT    Room Number: 8491/0079-T  Evaluation Date: 1/6/2021     Type of Evaluation: Quick Eval  Presenting Problem: TAVR 1/5    Physician Order: IP Consult to Occupational Therapy  Reason for Therapy:  ADL/IADL bar;Shower chair  Other Equipment: Reacher;Sock aid;Long-handled shoehorn       Hand Dominance: Right  Drives: Yes       Prior Level of Function: lives alone. Independent with ADL and IADL. Uses 2 canes (with arm support).  Enjoys spending time with her ca to the sofa where she kept her travel bag. Pt sorted her clothing, removed underwear and pants, and transported them to bedside, modified independent level. Pt was able to guide underwear on using reacher modified independent level.  Pt uses sock aid to do Please re-order if a new functional limitation presents during this admission.     Patient was able to achieve the following goals:  Patient able to toilet transfer: safely and independently  Patient able to dress lower extremities: safely and independently

## 2021-01-06 NOTE — CM/SW NOTE
01/06/21 1200   CM/SW Referral Data   Referral Source    Reason for Referral Discharge planning   Informant Patient   Patient Info   Patient's Mental Status Alert;Oriented   Patient lives with Alone   Patient Status Prior to Admission   Inde

## 2021-01-06 NOTE — RESPIRATORY THERAPY NOTE
SHELDON - Equipment Use Daily Summary:                  . Set Mode:                . Usage in hours:                . 90% Pressure (EPAP) level:                . 90% Insp. Pressure (IPAP): [place] AHI:                .  Supplemental Oxygen:    LPM

## 2021-01-06 NOTE — PLAN OF CARE
Assumed care of [address] [place] [address]. Alert/oriented x 4. Breathing comfortable with 2L/NC early this morning (did not put on CPAP because of mask being uncomfortable). Ambulated along the hallway only for 25 ft with both groin stable.  Coumadin re-started as orde

## 2021-01-07 VITALS
WEIGHT: 272.69 LBS | OXYGEN SATURATION: 97 % | SYSTOLIC BLOOD PRESSURE: 118 MMHG | HEIGHT: 69.02 IN | RESPIRATION RATE: 18 BRPM | BODY MASS INDEX: 40.39 KG/M2 | DIASTOLIC BLOOD PRESSURE: 65 MMHG | HEART RATE: 71 BPM | TEMPERATURE: 99 F

## 2021-01-07 LAB
INR BLD: 1.06 (ref 0.86–1.11)
PSA SERPL DL<=0.01 NG/ML-MCNC: 14 SECONDS (ref 12.1–14.7)

## 2021-01-07 PROCEDURE — 85610 PROTHROMBIN TIME: CPT | Performed by: NURSE PRACTITIONER

## 2021-01-07 RX ORDER — WARFARIN SODIUM 3 MG/1
TABLET ORAL
Qty: 90 TABLET | Refills: 1 | Status: SHIPPED | OUTPATIENT
Start: 2021-01-07 | End: 2021-04-12

## 2021-01-07 RX ORDER — ENOXAPARIN SODIUM 150 MG/ML
1 INJECTION SUBCUTANEOUS EVERY 12 HOURS SCHEDULED
Qty: 6 SYRINGE | Refills: 0 | Status: SHIPPED | OUTPATIENT
Start: 2021-01-07 | End: 2021-02-03 | Stop reason: ALTCHOICE

## 2021-01-07 RX ORDER — WARFARIN SODIUM 4 MG/1
TABLET ORAL
Refills: 0 | Status: SHIPPED | COMMUNITY
Start: 2021-01-07 | End: 2021-04-12

## 2021-01-07 RX ORDER — CEFADROXIL 500 MG/1
500 CAPSULE ORAL EVERY 12 HOURS
Refills: 0 | Status: SHIPPED | COMMUNITY
Start: 2021-01-07 | End: 2021-02-03 | Stop reason: ALTCHOICE

## 2021-01-07 RX ORDER — ASPIRIN 81 MG/1
81 TABLET ORAL DAILY
Qty: 90 TABLET | Refills: 3 | Status: SHIPPED | OUTPATIENT
Start: 2021-01-08 | End: 2021-02-03 | Stop reason: ALTCHOICE

## 2021-01-07 NOTE — DISCHARGE SUMMARY
General Medicine Discharge Summary     Patient ID:  Hamida Ledesma  77year old  4/26/1955    Admit date: 1/4/2021    Discharge date and time: 1/7/21    Attending Physician: Guss Kawasaki, DO P Depression/ Psych hx  -reviewed home meds, continue SSRI, wellbutrin, nortripyline, currently appears stable      # HTN  - cont home meds     # CKD 3  -monitor Cr     # Anemia of chronic dz and CKD  -follow hgb      # Gout  - allupurinol     # hypokalemia Hr  TAKE 1 TABLET BY MOUTH  TWICE DAILY    furosemide 40 MG Oral Tab  Take 40 mg by mouth nightly.       TRIAMCINOLONE ACETONIDE 0.1 % External Ointment  APPLY TO AFFECTED AREA(S)  TOPICALLY 2 TIMES DAILY FOR 1-2 WEEKS THEN TAPER TO AS  NEEDED FOR FLARES

## 2021-01-07 NOTE — CARDIAC REHAB
Cardiac rehab education completed with patient. Patient referred to outpatient cardiac rehabilitation program at [facility] at [phone].

## 2021-01-07 NOTE — PLAN OF CARE
Report to [facility]. Rad Rehab. V/S stable groins intact . Friend will  and drive to Rehab place. Transported by W/C per RN to [place] entrance where friend is picking up. Discharge instructions and AVS transfer given in envelop.

## 2021-01-07 NOTE — PLAN OF CARE
Assumed care of [facility] since 1930. Alert/oriented x 4. Breathing comfortable with sats >92% on room air (no CPAP during the night because mask is not comfortable.) NSR; VSS. Coumadin 4mg & Lovenox were given as scheduled; waiting for INR result today.  Up

## 2021-01-07 NOTE — CM/SW NOTE
Patient to dc today. Per Paullette Link at [facility], facility is ok to receive at 2 pm. RN aware. Patient to have holter monitor placed prior to transport to [facility]. Patient updated, is agreeable and appreciative.

## 2021-01-08 LAB — BLOOD TYPE BARCODE: 6200

## 2021-01-13 PROBLEM — I50.32 CHRONIC DIASTOLIC CHF (CONGESTIVE HEART FAILURE) (HCC): Status: ACTIVE | Noted: 2021-01-13

## 2021-01-13 PROBLEM — Z95.2 S/P TAVR (TRANSCATHETER AORTIC VALVE REPLACEMENT): Status: ACTIVE | Noted: 2021-01-13

## 2021-01-23 NOTE — OPERATIVE REPORT
CARDIOVASCULAR SURGERY  OPERATIVE NOTE  TAVR        DATE OF PROCEDURE: 1/5/21    INDICATIONS:         77year old with symptomatic aortic stenosis  Pt seen in TAVR clinic  STS Risk Score: 2.9 %       The assessment is that the patient is at low to interme

## 2021-02-08 ENCOUNTER — PATIENT MESSAGE (OUTPATIENT)
Dept: ORTHOPEDICS CLINIC | Facility: CLINIC | Age: 66
End: 2021-02-08

## 2021-02-12 NOTE — TELEPHONE ENCOUNTER
From: Macho Patino  To: Nelson Israel. Guzman Tang DPM  Sent: 2/8/2021 10:42 AM CST  Subject: Non-Urgent Medical Question    Received another bill with the $131 debridement charge. Have you been able to correct this?   I need to set up an appointment with Dr Dewey Landry

## 2021-02-22 ENCOUNTER — LAB ENCOUNTER (OUTPATIENT)
Dept: LAB | Age: 66
End: 2021-02-22
Attending: INTERNAL MEDICINE
Payer: MEDICARE

## 2021-02-22 DIAGNOSIS — I25.10 CAD (CORONARY ARTERY DISEASE): ICD-10-CM

## 2021-02-23 VITALS — WEIGHT: 262 LBS | HEIGHT: 69 IN | BODY MASS INDEX: 38.8 KG/M2

## 2021-02-23 LAB — SARS-COV-2 RNA RESP QL NAA+PROBE: NOT DETECTED

## 2021-02-23 NOTE — IMAGING NOTE
[facility] [phone] [facility] Dr Ean Tenorio. Pt to hold Valsartan and lasix prior to the  CT scan and  Office will call patient with direction and oral hydration.  Okay to proceed with CT

## 2021-02-23 NOTE — IMAGING NOTE
2/23 @ 6607 I spoke to Vaughn De Leon from Dr. [facility] office to make Dr. Savage Bradley aware patient is scheduled for a CTA gated thoracic aorta study with IV contrast. Last GFR= 31.44 from February 18.   Patient appointment is February 23 at 1100 at [facility].  I al

## 2021-02-23 NOTE — IMAGING NOTE
Left message with call back number  in preparation for gated study procedure:  Check-in in the [address] side out-patient registration at 1045. Hold caffeine, decaf drink  and chocolate 12 hours prior. Inquired about CT IV contrast dye allergy.   If she has h

## 2021-02-24 ENCOUNTER — HOSPITAL ENCOUNTER (OUTPATIENT)
Dept: CT IMAGING | Facility: HOSPITAL | Age: 66
Discharge: HOME | End: 2021-02-24
Attending: INTERNAL MEDICINE
Payer: MEDICARE

## 2021-02-24 ENCOUNTER — LAB ENCOUNTER (OUTPATIENT)
Dept: LAB | Age: 66
End: 2021-02-24
Attending: INTERNAL MEDICINE
Payer: MEDICARE

## 2021-02-24 DIAGNOSIS — Z95.2 S/P TAVR (TRANSCATHETER AORTIC VALVE REPLACEMENT): ICD-10-CM

## 2021-02-24 DIAGNOSIS — R79.89 ELEVATED SERUM CREATININE: ICD-10-CM

## 2021-02-24 LAB
ANION GAP SERPL CALC-SCNC: 5 MMOL/L (ref 0–18)
BUN BLD-MCNC: 29 MG/DL (ref 7–18)
BUN/CREAT SERPL: 24.4 (ref 10–20)
CALCIUM BLD-MCNC: 9.1 MG/DL (ref 8.5–10.1)
CHLORIDE SERPL-SCNC: 109 MMOL/L (ref 98–112)
CO2 SERPL-SCNC: 26 MMOL/L (ref 21–32)
CREAT BLD-MCNC: 1.19 MG/DL
GLUCOSE BLD-MCNC: 132 MG/DL (ref 70–99)
OSMOLALITY SERPL CALC.SUM OF ELEC: 298 MOSM/KG (ref 275–295)
PATIENT FASTING Y/N/NP: NO
POTASSIUM SERPL-SCNC: 4.2 MMOL/L (ref 3.5–5.1)
SODIUM SERPL-SCNC: 140 MMOL/L (ref 136–145)

## 2021-02-24 PROCEDURE — 80048 BASIC METABOLIC PNL TOTAL CA: CPT

## 2021-02-24 PROCEDURE — 36415 COLL VENOUS BLD VENIPUNCTURE: CPT

## 2021-02-25 ENCOUNTER — HOSPITAL ENCOUNTER (OUTPATIENT)
Dept: INTERVENTIONAL RADIOLOGY/VASCULAR | Facility: HOSPITAL | Age: 66
Discharge: HOME | End: 2021-02-25
Attending: INTERNAL MEDICINE
Payer: MEDICARE

## 2021-02-25 DIAGNOSIS — I25.10 CAD (CORONARY ARTERY DISEASE): Primary | ICD-10-CM

## 2021-03-03 ENCOUNTER — HOSPITAL ENCOUNTER (OUTPATIENT)
Dept: CT IMAGING | Facility: HOSPITAL | Age: 66
Discharge: HOME | End: 2021-03-03
Attending: INTERNAL MEDICINE
Payer: MEDICARE

## 2021-03-03 VITALS — HEART RATE: 62 BPM | DIASTOLIC BLOOD PRESSURE: 84 MMHG | SYSTOLIC BLOOD PRESSURE: 122 MMHG

## 2021-03-03 DIAGNOSIS — Z95.2 S/P TAVR (TRANSCATHETER AORTIC VALVE REPLACEMENT): ICD-10-CM

## 2021-03-03 DIAGNOSIS — I35.0 MODERATE AORTIC STENOSIS: ICD-10-CM

## 2021-03-03 PROCEDURE — 76937 US GUIDE VASCULAR ACCESS: CPT

## 2021-03-03 PROCEDURE — 36410 VNPNXR 3YR/> PHY/QHP DX/THER: CPT

## 2021-03-03 PROCEDURE — 71275 CT ANGIOGRAPHY CHEST: CPT | Performed by: INTERNAL MEDICINE

## 2021-03-03 NOTE — IMAGING NOTE
Received pt to CT 4, GE at 1055. Pt verified name, , and allergies. Pt noted to have drop in b/p as she lay on the table. Pt sts that is not unusual for her. She is alert and oriented and feeling well. Skin pink, dry, warm.   Pt sts she does have CT

## 2021-03-10 NOTE — PROGRESS NOTES
Pre-procedure labs  Future Appointments  3/15/2021  6:00 AM     IVS RM 2 EP             [facility] IVS              Michelle Garrett  3/15/2021  2:00 PM    Guanaco العراقي MD           HOSP                [address]  3/17/2021  2:40 PM    MD Lea Mancia

## 2021-03-12 ENCOUNTER — LAB ENCOUNTER (OUTPATIENT)
Dept: LAB | Age: 66
End: 2021-03-12
Attending: INTERNAL MEDICINE
Payer: MEDICARE

## 2021-03-12 DIAGNOSIS — I25.10 CAD (CORONARY ARTERY DISEASE): ICD-10-CM

## 2021-03-12 LAB — SARS-COV-2 RNA RESP QL NAA+PROBE: NOT DETECTED

## 2021-03-15 ENCOUNTER — HOSPITAL ENCOUNTER (INPATIENT)
Dept: INTERVENTIONAL RADIOLOGY/VASCULAR | Facility: HOSPITAL | Age: 66
LOS: 3 days | Discharge: HOME | DRG: 302 | End: 2021-03-22
Attending: INTERNAL MEDICINE | Admitting: INTERNAL MEDICINE
Payer: MEDICARE

## 2021-03-15 DIAGNOSIS — I25.10 CAD (CORONARY ARTERY DISEASE): Primary | ICD-10-CM

## 2021-03-15 PROCEDURE — 93452 LEFT HRT CATH W/VENTRCLGRPHY: CPT

## 2021-03-15 PROCEDURE — 99152 MOD SED SAME PHYS/QHP 5/>YRS: CPT

## 2021-03-15 PROCEDURE — 02703ZZ DILATION OF CORONARY ARTERY, ONE ARTERY, PERCUTANEOUS APPROACH: ICD-10-PCS | Performed by: INTERNAL MEDICINE

## 2021-03-15 PROCEDURE — 92921 HC PTCA EA ADDL MAJOR CORONARY ARTERY/BRANCH: CPT

## 2021-03-15 PROCEDURE — 93005 ELECTROCARDIOGRAM TRACING: CPT

## 2021-03-15 PROCEDURE — 0270346 DILATION OF CORONARY ARTERY, ONE ARTERY, BIFURCATION, WITH DRUG-ELUTING INTRALUMINAL DEVICE, PERCUTANEOUS APPROACH: ICD-10-PCS | Performed by: INTERNAL MEDICINE

## 2021-03-15 PROCEDURE — 85025 COMPLETE CBC W/AUTO DIFF WBC: CPT | Performed by: INTERNAL MEDICINE

## 2021-03-15 PROCEDURE — B215YZZ FLUOROSCOPY OF LEFT HEART USING OTHER CONTRAST: ICD-10-PCS | Performed by: INTERNAL MEDICINE

## 2021-03-15 PROCEDURE — 4A023N7 MEASUREMENT OF CARDIAC SAMPLING AND PRESSURE, LEFT HEART, PERCUTANEOUS APPROACH: ICD-10-PCS | Performed by: INTERNAL MEDICINE

## 2021-03-15 PROCEDURE — 99153 MOD SED SAME PHYS/QHP EA: CPT

## 2021-03-15 PROCEDURE — 85610 PROTHROMBIN TIME: CPT | Performed by: INTERNAL MEDICINE

## 2021-03-15 PROCEDURE — 93010 ELECTROCARDIOGRAM REPORT: CPT | Performed by: INTERNAL MEDICINE

## 2021-03-15 RX ORDER — CLOPIDOGREL BISULFATE 75 MG/1
TABLET ORAL
Status: COMPLETED
Start: 2021-03-15 | End: 2021-03-15

## 2021-03-15 RX ORDER — HEPARIN SODIUM 5000 [USP'U]/ML
INJECTION, SOLUTION INTRAVENOUS; SUBCUTANEOUS
Status: COMPLETED
Start: 2021-03-15 | End: 2021-03-15

## 2021-03-15 RX ORDER — LIDOCAINE HYDROCHLORIDE 10 MG/ML
INJECTION, SOLUTION EPIDURAL; INFILTRATION; INTRACAUDAL; PERINEURAL
Status: COMPLETED
Start: 2021-03-15 | End: 2021-03-15

## 2021-03-15 RX ORDER — MIDAZOLAM HYDROCHLORIDE 1 MG/ML
INJECTION INTRAMUSCULAR; INTRAVENOUS
Status: COMPLETED
Start: 2021-03-15 | End: 2021-03-15

## 2021-03-15 RX ORDER — SODIUM CHLORIDE 9 MG/ML
INJECTION, SOLUTION INTRAVENOUS
Status: DISCONTINUED | OUTPATIENT
Start: 2021-03-16 | End: 2021-03-15 | Stop reason: HOSPADM

## 2021-03-15 NOTE — PROGRESS NOTES
Cath:    PCI to CX with 3.5x22mm Tristen, posted with 4.0mm NC.  predilated with 2.5 (as well as OM). Then rewired OM and posted with 1.2 and 2.0. Excellent result. Checked mean gradient across aortic valve, 18-20mmHg mean. High fidelity measurements.

## 2021-03-15 NOTE — H&P
Cards    See EMR. Post TAVR. 80% CX lesion. Planned PCI. Also high echo gradient. Plan LHC, measurement of gradient invasively and PCI to CX.     Past Medical History:   Diagnosis Date   • ASTHMA    • CANCER     thyroid   • COPD (chronic obst Attends [employer] Services:       Active Member of Clubs or Organizations:       Attends Club or Organization Meetings:       Marital Status:   Intimate Partner Violence:       Fear of Current or Ex-Partner:       Emotionally Abused:       Physically Abuse

## 2021-03-15 NOTE — PROGRESS NOTES
Here for PCI with Dr. Chanel Beck, no new complaints since office visit, very SOB with exertion, INR 2.7 will be giving Vit K. Took prednisone and benadryl PO for dye allergy.

## 2021-03-16 PROCEDURE — 80048 BASIC METABOLIC PNL TOTAL CA: CPT | Performed by: HOSPITALIST

## 2021-03-16 PROCEDURE — 85610 PROTHROMBIN TIME: CPT | Performed by: HOSPITALIST

## 2021-03-16 PROCEDURE — 85027 COMPLETE CBC AUTOMATED: CPT | Performed by: HOSPITALIST

## 2021-03-16 PROCEDURE — 82570 ASSAY OF URINE CREATININE: CPT | Performed by: INTERNAL MEDICINE

## 2021-03-16 PROCEDURE — 84540 ASSAY OF URINE/UREA-N: CPT | Performed by: INTERNAL MEDICINE

## 2021-03-16 PROCEDURE — 94640 AIRWAY INHALATION TREATMENT: CPT

## 2021-03-16 PROCEDURE — 84300 ASSAY OF URINE SODIUM: CPT | Performed by: INTERNAL MEDICINE

## 2021-03-16 PROCEDURE — 80048 BASIC METABOLIC PNL TOTAL CA: CPT | Performed by: INTERNAL MEDICINE

## 2021-03-16 RX ORDER — SODIUM CHLORIDE 9 MG/ML
INJECTION, SOLUTION INTRAVENOUS CONTINUOUS
Status: ACTIVE | OUTPATIENT
Start: 2021-03-16 | End: 2021-03-16

## 2021-03-16 RX ORDER — LEVOTHYROXINE SODIUM 0.15 MG/1
150 TABLET ORAL
Status: DISCONTINUED | OUTPATIENT
Start: 2021-03-16 | End: 2021-03-22

## 2021-03-16 RX ORDER — CLOPIDOGREL BISULFATE 75 MG/1
75 TABLET ORAL DAILY
Status: DISCONTINUED | OUTPATIENT
Start: 2021-03-16 | End: 2021-03-22

## 2021-03-16 RX ORDER — FUROSEMIDE 40 MG/1
40 TABLET ORAL
Status: DISCONTINUED | OUTPATIENT
Start: 2021-03-17 | End: 2021-03-16

## 2021-03-16 RX ORDER — ALBUTEROL SULFATE 90 UG/1
2 AEROSOL, METERED RESPIRATORY (INHALATION) EVERY 6 HOURS PRN
Status: DISCONTINUED | OUTPATIENT
Start: 2021-03-16 | End: 2021-03-22

## 2021-03-16 RX ORDER — CLOPIDOGREL BISULFATE 75 MG/1
75 TABLET ORAL DAILY
Qty: 90 TABLET | Refills: 3 | Status: SHIPPED | OUTPATIENT
Start: 2021-03-16 | End: 2021-09-08

## 2021-03-16 RX ORDER — CYPROHEPTADINE HYDROCHLORIDE 4 MG/1
4 TABLET ORAL ONCE
Status: COMPLETED | OUTPATIENT
Start: 2021-03-16 | End: 2021-03-16

## 2021-03-16 RX ORDER — FLUOXETINE HYDROCHLORIDE 20 MG/1
20 CAPSULE ORAL 2 TIMES DAILY
Status: DISCONTINUED | OUTPATIENT
Start: 2021-03-16 | End: 2021-03-22

## 2021-03-16 RX ORDER — LOSARTAN POTASSIUM 100 MG/1
100 TABLET ORAL DAILY
Refills: 3 | Status: DISCONTINUED | OUTPATIENT
Start: 2021-03-16 | End: 2021-03-16

## 2021-03-16 RX ORDER — SODIUM CHLORIDE 9 MG/ML
INJECTION, SOLUTION INTRAVENOUS CONTINUOUS
Status: DISCONTINUED | OUTPATIENT
Start: 2021-03-16 | End: 2021-03-18

## 2021-03-16 RX ORDER — BUPROPION HYDROCHLORIDE 150 MG/1
150 TABLET, EXTENDED RELEASE ORAL 2 TIMES DAILY
Status: DISCONTINUED | OUTPATIENT
Start: 2021-03-16 | End: 2021-03-22

## 2021-03-16 RX ORDER — SODIUM CHLORIDE 9 MG/ML
INJECTION, SOLUTION INTRAVENOUS CONTINUOUS
Status: DISCONTINUED | OUTPATIENT
Start: 2021-03-16 | End: 2021-03-16

## 2021-03-16 RX ORDER — DIPHENHYDRAMINE HCL 50 MG
50 CAPSULE ORAL ONCE
Status: COMPLETED | OUTPATIENT
Start: 2021-03-16 | End: 2021-03-16

## 2021-03-16 RX ORDER — ROSUVASTATIN CALCIUM 5 MG/1
5 TABLET, COATED ORAL EVERY OTHER DAY
Status: DISCONTINUED | OUTPATIENT
Start: 2021-03-16 | End: 2021-03-22

## 2021-03-16 RX ORDER — NORTRIPTYLINE HYDROCHLORIDE 25 MG/1
25 CAPSULE ORAL NIGHTLY
Status: DISCONTINUED | OUTPATIENT
Start: 2021-03-16 | End: 2021-03-22

## 2021-03-16 RX ORDER — ALLOPURINOL 100 MG/1
100 TABLET ORAL DAILY
Status: DISCONTINUED | OUTPATIENT
Start: 2021-03-16 | End: 2021-03-22

## 2021-03-16 RX ORDER — ACETAMINOPHEN 325 MG/1
650 TABLET ORAL EVERY 6 HOURS PRN
Status: DISCONTINUED | OUTPATIENT
Start: 2021-03-16 | End: 2021-03-22

## 2021-03-16 RX ADMIN — FLUOXETINE HYDROCHLORIDE 20 MG: 20 CAPSULE ORAL at 08:19:00

## 2021-03-16 RX ADMIN — DIPHENHYDRAMINE HCL 50 MG: 50 MG CAPSULE ORAL at 01:24:00

## 2021-03-16 RX ADMIN — SODIUM CHLORIDE: 9 INJECTION, SOLUTION INTRAVENOUS at 15:00:00

## 2021-03-16 RX ADMIN — DIPHENHYDRAMINE HCL 50 MG: 50 MG CAPSULE ORAL at 22:11:00

## 2021-03-16 RX ADMIN — BUPROPION HYDROCHLORIDE 150 MG: 150 TABLET, EXTENDED RELEASE ORAL at 01:15:00

## 2021-03-16 RX ADMIN — ALLOPURINOL 100 MG: 100 TABLET ORAL at 08:19:00

## 2021-03-16 RX ADMIN — BUPROPION HYDROCHLORIDE 150 MG: 150 TABLET, EXTENDED RELEASE ORAL at 08:19:00

## 2021-03-16 RX ADMIN — CYPROHEPTADINE HYDROCHLORIDE 4 MG: 4 TABLET ORAL at 22:11:00

## 2021-03-16 RX ADMIN — FLUOXETINE HYDROCHLORIDE 20 MG: 20 CAPSULE ORAL at 01:58:00

## 2021-03-16 RX ADMIN — CLOPIDOGREL BISULFATE 75 MG: 75 TABLET ORAL at 12:23:00

## 2021-03-16 RX ADMIN — LEVOTHYROXINE SODIUM 150 MCG: 0.15 TABLET ORAL at 06:09:00

## 2021-03-16 RX ADMIN — NORTRIPTYLINE HYDROCHLORIDE 25 MG: 25 CAPSULE ORAL at 22:12:00

## 2021-03-16 RX ADMIN — SODIUM CHLORIDE: 9 INJECTION, SOLUTION INTRAVENOUS at 12:24:00

## 2021-03-16 RX ADMIN — ACETAMINOPHEN 650 MG: 325 TABLET ORAL at 23:28:00

## 2021-03-16 RX ADMIN — BUPROPION HYDROCHLORIDE 150 MG: 150 TABLET, EXTENDED RELEASE ORAL at 21:00:00

## 2021-03-16 RX ADMIN — SODIUM CHLORIDE: 9 INJECTION, SOLUTION INTRAVENOUS at 08:20:00

## 2021-03-16 RX ADMIN — FLUOXETINE HYDROCHLORIDE 20 MG: 20 CAPSULE ORAL at 22:12:00

## 2021-03-16 RX ADMIN — NORTRIPTYLINE HYDROCHLORIDE 25 MG: 25 CAPSULE ORAL at 01:58:00

## 2021-03-16 RX ADMIN — ROSUVASTATIN CALCIUM 5 MG: 5 TABLET, COATED ORAL at 08:19:00

## 2021-03-16 NOTE — PLAN OF CARE
Patient with hypotension to 84/30, now getting IVF bolus per renal orders. Discussed with Dr. Juanita Lopes, patient is NOT stable for discharge today.

## 2021-03-16 NOTE — PLAN OF CARE
Pt alert able to make needs known. Denies any chest pain. Right groin soft to touch, no hematoma noted. Kt 5.4 rpt potassium draw this afternoon. IV fluids started. PRV =0. Possible discharge later this afternoon.  Plan of care discussed with pt paulina cardiac monitoring, monitor vital signs, obtain 12 lead EKG if indicated  - Evaluate effectiveness of antiarrhythmic and heart rate control medications as ordered  - Initiate emergency measures for life threatening arrhythmias  - Monitor electrolytes and a

## 2021-03-16 NOTE — CONSULTS
[facility]    Report of Consultation    Hamida Ledesma Patient Status:  Outpatient in a Bed    1955 MRN [id number]   [facility] 2NE-A Attending Miguel Aguirre MD   Hosp Day # 0 PCP Karen Caldera DO     Date of consult: 3/16/2021 09/2009    Rt hip   • KNEE REPLACEMENT SURGERY  2003    Both knees   • OTHER SURGICAL HISTORY  1989     Thyroid removal   • OTHER SURGICAL HISTORY  2004    LT foot spur removal     Family History   Problem Relation Age of Onset   • Hypertension Father    • mg, Oral, Daily  Clopidogrel Bisulfate 75 MG Oral Tab, Take 1 tablet (75 mg total) by mouth daily.           PHYSICAL EXAM:     Vital Signs: BP 93/40   Pulse 68   Temp 97.4 °F (36.3 °C) (Oral)   Resp 20   Ht 5' 9\" (1.753 m)   Wt 265 lb (120.2 kg)   LMP  (L 03/15/2021    BAPERCENT 0.2 03/15/2021    NE 4.18 03/15/2021    LYMABS 0.64 (L) 03/15/2021    MOABSO 0.08 (L) 03/15/2021    EOABSO 0.01 03/15/2021    BAABSO 0.01 03/15/2021     Lab Results   Component Value Date    CREUR 128.00 03/16/2021     Lab Results

## 2021-03-16 NOTE — CARDIAC REHAB
CAD education completed with pt, stent card given. Pt had just begun phase 2 CR close to home post TAVR. Encouraged pt to get order from cardiologist to return.

## 2021-03-16 NOTE — PROGRESS NOTES
[facility] Cardiology  Progress Note    Karine Godwin Patient Status:  Inpatient    1955 MRN [id number]   [facility] 2NE-A Attending Jerardo Garland MD   Hosp Day # 1 PCP Sravani Reasons, DO     Impression:  1.  Severe AS s/p TAVR use  Abdomen: Soft, non-tender; bowel sounds are normoactive  Extremities: No clubbing/cyanosis; moves all 4 extremities normally    Albuterol Sulfate  (90 Base) MCG/ACT inhaler 2 puff, 2 puff, Inhalation, Q6H PRN  allopurinol (ZYLOPRIM) tab 100 mg,

## 2021-03-16 NOTE — PLAN OF CARE
Assumed care of pt at [address]. Alert and oriented x4. On RA. TALAMANTES. Junctional rhythm on tele noted, cardiologist has been made aware by previous RN, asymptomatic. R groin incision site c/d/I. Soft to touch, no hematoma present. Continent to bowel and bladder.

## 2021-03-16 NOTE — RESPIRATORY THERAPY NOTE
PATIENT HAS HISTORY OF SHELDON. PATIENT DECLINED TO USE CPAP DURING HOSPITAL STAY. SHELDON PROTOCOL IN CHART.

## 2021-03-16 NOTE — CONSULTS
General Medicine Consult      Reason for consult: post-op medical management     Consulted by: Dr. Wilmar Mason    PCP: Nguyễn Hawk DO      History of Present Illness: Patient is a 77year old female with PMH sig for CAD, aortic stenosis s/p TAVR, chronic afib, mg benadryl), Disp: 15 tablet, Rfl: 2  Potassium Chloride ER 10 MEQ Oral Cap CR, Take 1 capsule (10 mEq total) by mouth 2 (two) times daily. , Disp: 180 capsule, Rfl: 1  allopurinol 100 MG Oral Tab, Take 1 tablet (100 mg total) by mouth daily. , Disp: 90 tab FOR 1-2 WEEKS THEN TAPER TO AS  NEEDED FOR FLARES (Patient taking differently: Apply 1 Application topically 2 (two) times daily as needed.  APPLY TO AFFECTED AREA(S)  TOPICALLY 2 TIMES DAILY FOR 1-2 WEEKS THEN TAPER TO AS  NEEDED FOR FLARES ), Disp: 629 g, IVP dye  Lisinopril              TONGUE SWELLING  Morphine Sulfate        HIVES  Oxycontin               ITCHING  Vicodin [Hydrocodon*    ITCHING     Soc Hx:  Social History    Tobacco Use      Smoking status: Former Smoker        Packs/day: 0.50        Kassi Pradhan AST, ALB, AMYLASE, LIPASE, LDH in the last 168 hours.     Invalid input(s): ALPHOS, TBIL, DBIL, TPROT    Additional Diagnostics: ECG: Junctional rhythm    Radiology: CARD ECHO 2D DOPPLER (CPT=93306)    Result Date: 2/17/2021  ------------------------------- Stroke volume, 2D                         128   ml     61         ---------  LV E/e', medial                           15           18         ---------   Ventricular septum                        Value        03/12/2020 Reference  IVS thickness, ED, COYX Value        03/12/2020 Reference  Aortic root ID, ED                        3.0   cm     3.1        <4.4  Ascending aorta ID, A-P, ED               3.5   cm     3.4        ---------   Left atrium                               Value 3.1   m/sec  2.6        ---------  Tricuspid peak RV-RA gradient             38    mm Hg  34         ---------  Tricuspid maximal regurg                  2.9   m/sec  ---------- ---------  velocity, PISA   Systemic veins                            Value The vessel is normal in size.  The respirophasic diameter changes are in the normal range (greater than or equal to 50%). ---------------------------------------------------------------------------- Study data:   Location:  [address] [facility] images of the chest were obtained as part of a cardiac CT evaluation. Please refer to Cardiologist report for contrast volume and technique. Dose reduction techniques were used.  Dose information is transmitted to the  [facility] St of Radiology) N techniques were used. Dose information is transmitted to the  [facility] St of Radiology) NRDR ([address]) which includes the Dose Index Registry.  STUDY QUALITY:  Fair LIMITATIONS:    Limited because of noise EXAM FORM: CT Sca vessel. It gives off first and second diagonal branches. LEFT CIRCUMFLEX CORONARY ARTERY (LCx): The left circumflex is a moderate-sized, non-dominant vessel. It gives off a first obtuse marginal branch. It terminates as a second obtuse branch.  RIGHT CORON Dr. Sarai Roth know regarding consult    # Hx Gout  - home allopurinol    # Depression  - home fluoxetine  - home wellbutrin  - home nortriptyline    Dispo: admitted [facility], discharge pending repeat labs    Outpatient records or previous hospital records re

## 2021-03-17 PROCEDURE — 94640 AIRWAY INHALATION TREATMENT: CPT

## 2021-03-17 PROCEDURE — 85027 COMPLETE CBC AUTOMATED: CPT | Performed by: PHYSICIAN ASSISTANT

## 2021-03-17 PROCEDURE — 93010 ELECTROCARDIOGRAM REPORT: CPT | Performed by: INTERNAL MEDICINE

## 2021-03-17 PROCEDURE — 85610 PROTHROMBIN TIME: CPT | Performed by: HOSPITALIST

## 2021-03-17 PROCEDURE — 93005 ELECTROCARDIOGRAM TRACING: CPT

## 2021-03-17 PROCEDURE — 83735 ASSAY OF MAGNESIUM: CPT | Performed by: INTERNAL MEDICINE

## 2021-03-17 PROCEDURE — 80069 RENAL FUNCTION PANEL: CPT | Performed by: INTERNAL MEDICINE

## 2021-03-17 PROCEDURE — 85025 COMPLETE CBC W/AUTO DIFF WBC: CPT | Performed by: INTERNAL MEDICINE

## 2021-03-17 RX ORDER — WARFARIN SODIUM 2 MG/1
4 TABLET ORAL ONCE
Status: COMPLETED | OUTPATIENT
Start: 2021-03-17 | End: 2021-03-17

## 2021-03-17 RX ORDER — WARFARIN SODIUM 2 MG/1
4 TABLET ORAL
Status: COMPLETED | OUTPATIENT
Start: 2021-03-17 | End: 2021-03-17

## 2021-03-17 RX ADMIN — WARFARIN SODIUM 4 MG: 2 TABLET ORAL at 00:12:00

## 2021-03-17 RX ADMIN — ACETAMINOPHEN 650 MG: 325 TABLET ORAL at 14:02:00

## 2021-03-17 RX ADMIN — ACETAMINOPHEN 650 MG: 325 TABLET ORAL at 20:26:00

## 2021-03-17 RX ADMIN — Medication 1 APPLICATION: at 06:05:00

## 2021-03-17 RX ADMIN — BUPROPION HYDROCHLORIDE 150 MG: 150 TABLET, EXTENDED RELEASE ORAL at 20:27:00

## 2021-03-17 RX ADMIN — FLUOXETINE HYDROCHLORIDE 20 MG: 20 CAPSULE ORAL at 09:45:00

## 2021-03-17 RX ADMIN — LEVOTHYROXINE SODIUM 150 MCG: 0.15 TABLET ORAL at 06:05:00

## 2021-03-17 RX ADMIN — SODIUM CHLORIDE: 9 INJECTION, SOLUTION INTRAVENOUS at 09:42:00

## 2021-03-17 RX ADMIN — BUPROPION HYDROCHLORIDE 150 MG: 150 TABLET, EXTENDED RELEASE ORAL at 09:45:00

## 2021-03-17 RX ADMIN — WARFARIN SODIUM 4 MG: 2 TABLET ORAL at 20:27:00

## 2021-03-17 RX ADMIN — ALBUTEROL SULFATE 2 PUFF: 90 AEROSOL, METERED RESPIRATORY (INHALATION) at 00:02:00

## 2021-03-17 RX ADMIN — ALLOPURINOL 100 MG: 100 TABLET ORAL at 09:45:00

## 2021-03-17 RX ADMIN — NORTRIPTYLINE HYDROCHLORIDE 25 MG: 25 CAPSULE ORAL at 20:27:00

## 2021-03-17 RX ADMIN — CLOPIDOGREL BISULFATE 75 MG: 75 TABLET ORAL at 09:45:00

## 2021-03-17 RX ADMIN — SODIUM CHLORIDE: 9 INJECTION, SOLUTION INTRAVENOUS at 22:00:00

## 2021-03-17 RX ADMIN — FLUOXETINE HYDROCHLORIDE 20 MG: 20 CAPSULE ORAL at 20:27:00

## 2021-03-17 RX ADMIN — ALBUTEROL SULFATE 2 PUFF: 90 AEROSOL, METERED RESPIRATORY (INHALATION) at 16:04:00

## 2021-03-17 NOTE — PROGRESS NOTES
[facility] Hospitalist Progress Note     [facility]      SUBJECTIVE:  No acute events overnight  Was not feeling well this afternoon    OBJECTIVE:  Temp:  [97.8 °F (36.6 °C)-98.3 °F (36.8 °C)] 98.3 °F (36.8 °C)  Pulse:  [64-93] 88  Resp:  [19-22] 21  BP: ( 2. 29m^2 :            1955 (65yrs)             Height:           (69in) Gender:         F                              Weight:           (254.5lb) Ordered by:     Laura Neri --------------------------------------------------------------- 2.4   cm     ---------- ---------  LVOT area                                 5     cm^2   3          ---------  LVOT ID                                   2.4   cm     2.0        ---------  LVOT peak velocity, S                     1.25  m/sec 91         22 - 52  LA volume, ES, 1-p A4C            (H)     79    ml     ---------- 22 - 52  LA volume/bsa, ES, 1-p A4C                35    ml/m^2 ---------- ---------  LA volume, ES, 1-p A2C            (H)     87    ml     91         22 - 52  LA volume Hg  37         --------- Legend: (L)  and  (H)  romelia values outside specified reference range. ---------------------------------------------------------------------------- Findings Left ventricle:   The cavity size is normal. Wall thickness is moderately in normal. The estimated ejection fraction    is 60-65%. Wall motion is normal; there are no regional wall motion    abnormalities. Diastolic function cannot be assessed due to the presence    of mitral stenosis. 2. Right ventricle:  The cavity size is normal. MEDIASTINUM:  No mass or adenopathy. PLEURA:  No mass or effusion. CHEST WALL:  No mass or axillary adenopathy. LIMITED ABDOMEN:  Changes of previous partial gastrectomy. BONES:  Degenerative changes in the spine with flowing osteophytes noted.   This is Systemic venous return to the right atrium appears normal. The interatrial septum appears intact.  LEFT ATRIUM:  Pulmonary venous return to the left atrium appears normal. Four pulmonary veins are noted: right superior, right inferior, left superior and lef appears normal in thickness. There is no pericardial effusion. CONCLUSIONS: 1. An Elizondo 23mm Radha 3 Ultra valve is implanted in the aortic position. It has an average internal diameter of 20.2 mm and average external diameter of 23.6 mm.  The valve appe planned C with PCI to Cx. # CAD s/p PCI to Cx  # HLD, HTN  - low blood pressure yesterday, monitor.  Has been getting fluids  - plavix  - ARB  - statin     # Afib  - resume coumadin today per cards notes  - tele     # Chronic Diastolic CHF  - appears e

## 2021-03-17 NOTE — PROGRESS NOTES
[facility] Cardiology  Progress Note    Moraima Gannon Patient Status:  Outpatient in a Bed    1955 MRN [id number]   [facility] 2NE-A Attending Guanaco العراقي MD   Hosp Day # 0 PCP Aayush Horn DO     ADDENDUM:  The patient w 11.3*  --  10.6*  --   --    MCV  --  93.5  --  93.8  --   --    PLT  --  216.0  --  202.0  --   --    INR 3.9* 2.30* 2.7*  --  1.31* 1.32*       Recent Labs   Lab 03/16/21  0623 03/16/21  1430 03/17/21  0535    137 141   K 5.4* 4.5 4.7    109 than or equal to 50%).      Meds:   • Warfarin Sodium  4 mg Oral Once at night   • allopurinol  100 mg Oral Daily   • umeclidinium-vilanterol  1 puff Inhalation Daily   • buPROPion HCl ER (SR)  150 mg Oral BID   • FLUoxetine HCl  20 mg Oral BID   • Levothyr

## 2021-03-17 NOTE — CONSULTS
[facility] Dosing Service  Warfarin (Coumadin) Subsequent Dosing    Ernesto Cifuentes is a 77year old patient for whom pharmacy is dosing warfarin (Coumadin).  Goal INR is 2-3    Recent Labs   Lab 03/14/21  0000 03/15/21  1337 03/15/21  1431 03/16/21  2245 0

## 2021-03-17 NOTE — PROGRESS NOTES
[facility] Dosing Service  Warfarin (Coumadin) Initial Dosing    Jae Avina is a 77year old patient for whom pharmacy has been consulted to dose warfarin (COUMADIN) for atrial fibrillation by Dr. Jerry Ham.  Based on this indication, goal INR is 2-

## 2021-03-17 NOTE — PROGRESS NOTES
[facility]    Nephrology Progress Note    Alexis Westbrook Attending:  Rani Spaulding MD     Cc: Axel Puente    SUBJECTIVE     Not feeling great today  Did not have breakfast  States she is not drinking as much     PHYSICAL EXAM   Vital signs: /56 (BP Locat Continuous        LABS     Lab Results   Component Value Date    CREATSERUM 1.27 03/17/2021    BUN 56 03/17/2021     03/17/2021    K 4.7 03/17/2021     03/17/2021    CO2 24.0 03/17/2021    GLU 75 03/17/2021    CA 8.4 03/17/2021    ALB 2.9 03/17

## 2021-03-17 NOTE — PLAN OF CARE
Alert and oriented x4. On RA. TALAMANTES, prn albuterol given per respiratory. NSR on tele. Continent to bowel and bladder. IVF infusing per order. Prn tylenol given for arthritis pain. Up with 1 x walker. Call light within reach.

## 2021-03-18 ENCOUNTER — APPOINTMENT (OUTPATIENT)
Dept: GENERAL RADIOLOGY | Facility: HOSPITAL | Age: 66
DRG: 302 | End: 2021-03-18
Attending: INTERNAL MEDICINE
Payer: MEDICARE

## 2021-03-18 ENCOUNTER — APPOINTMENT (OUTPATIENT)
Dept: CV DIAGNOSTICS | Facility: HOSPITAL | Age: 66
DRG: 302 | End: 2021-03-18
Attending: PHYSICIAN ASSISTANT
Payer: MEDICARE

## 2021-03-18 PROCEDURE — 93306 TTE W/DOPPLER COMPLETE: CPT | Performed by: PHYSICIAN ASSISTANT

## 2021-03-18 PROCEDURE — 71045 X-RAY EXAM CHEST 1 VIEW: CPT | Performed by: INTERNAL MEDICINE

## 2021-03-18 PROCEDURE — 83735 ASSAY OF MAGNESIUM: CPT | Performed by: INTERNAL MEDICINE

## 2021-03-18 PROCEDURE — 84443 ASSAY THYROID STIM HORMONE: CPT | Performed by: HOSPITALIST

## 2021-03-18 PROCEDURE — 83880 ASSAY OF NATRIURETIC PEPTIDE: CPT | Performed by: HOSPITALIST

## 2021-03-18 PROCEDURE — 80069 RENAL FUNCTION PANEL: CPT | Performed by: INTERNAL MEDICINE

## 2021-03-18 PROCEDURE — 94660 CPAP INITIATION&MGMT: CPT

## 2021-03-18 PROCEDURE — 85610 PROTHROMBIN TIME: CPT | Performed by: HOSPITALIST

## 2021-03-18 PROCEDURE — 94640 AIRWAY INHALATION TREATMENT: CPT

## 2021-03-18 RX ORDER — ALBUTEROL SULFATE 2.5 MG/3ML
2.5 SOLUTION RESPIRATORY (INHALATION)
Status: DISCONTINUED | OUTPATIENT
Start: 2021-03-18 | End: 2021-03-22

## 2021-03-18 RX ORDER — FUROSEMIDE 10 MG/ML
40 INJECTION INTRAMUSCULAR; INTRAVENOUS ONCE
Status: COMPLETED | OUTPATIENT
Start: 2021-03-18 | End: 2021-03-18

## 2021-03-18 RX ORDER — PREDNISONE 20 MG/1
40 TABLET ORAL
Status: DISCONTINUED | OUTPATIENT
Start: 2021-03-19 | End: 2021-03-19

## 2021-03-18 RX ORDER — WARFARIN SODIUM 5 MG/1
5 TABLET ORAL
Status: COMPLETED | OUTPATIENT
Start: 2021-03-18 | End: 2021-03-18

## 2021-03-18 RX ADMIN — LEVOTHYROXINE SODIUM 150 MCG: 0.15 TABLET ORAL at 06:30:00

## 2021-03-18 RX ADMIN — WARFARIN SODIUM 5 MG: 5 TABLET ORAL at 20:43:00

## 2021-03-18 RX ADMIN — ALBUTEROL SULFATE 2.5 MG: 2.5 SOLUTION RESPIRATORY (INHALATION) at 19:19:00

## 2021-03-18 RX ADMIN — ACETAMINOPHEN 650 MG: 325 TABLET ORAL at 20:36:00

## 2021-03-18 RX ADMIN — ROSUVASTATIN CALCIUM 5 MG: 5 TABLET, COATED ORAL at 09:24:00

## 2021-03-18 RX ADMIN — ACETAMINOPHEN 650 MG: 325 TABLET ORAL at 06:29:00

## 2021-03-18 RX ADMIN — NORTRIPTYLINE HYDROCHLORIDE 25 MG: 25 CAPSULE ORAL at 20:49:00

## 2021-03-18 RX ADMIN — ALLOPURINOL 100 MG: 100 TABLET ORAL at 09:24:00

## 2021-03-18 RX ADMIN — ALBUTEROL SULFATE 2 PUFF: 90 AEROSOL, METERED RESPIRATORY (INHALATION) at 06:31:00

## 2021-03-18 RX ADMIN — FUROSEMIDE 40 MG: 10 INJECTION INTRAMUSCULAR; INTRAVENOUS at 15:36:00

## 2021-03-18 RX ADMIN — CLOPIDOGREL BISULFATE 75 MG: 75 TABLET ORAL at 09:24:00

## 2021-03-18 RX ADMIN — FLUOXETINE HYDROCHLORIDE 20 MG: 20 CAPSULE ORAL at 20:43:00

## 2021-03-18 RX ADMIN — BUPROPION HYDROCHLORIDE 150 MG: 150 TABLET, EXTENDED RELEASE ORAL at 20:43:00

## 2021-03-18 RX ADMIN — SODIUM CHLORIDE: 9 INJECTION, SOLUTION INTRAVENOUS at 09:31:00

## 2021-03-18 RX ADMIN — FLUOXETINE HYDROCHLORIDE 20 MG: 20 CAPSULE ORAL at 09:24:00

## 2021-03-18 RX ADMIN — BUPROPION HYDROCHLORIDE 150 MG: 150 TABLET, EXTENDED RELEASE ORAL at 09:24:00

## 2021-03-18 RX ADMIN — ALBUTEROL SULFATE 2 PUFF: 90 AEROSOL, METERED RESPIRATORY (INHALATION) at 00:50:00

## 2021-03-18 NOTE — PROGRESS NOTES
[facility] Hospitalist Progress Note     PCP: Zenaida Alvarado DO    CC:  Follow up    SUBJECTIVE:   Pt had echo done. Sitting up in bed, on RA. Says sob is better.  No current cp/n/v/f/c. +U    OBJECTIVE:  Temp:  [97.9 °F (36.6 °C)-98.3 °F (36.8 °C)] 98 °F (36.7 100 mg Oral Daily   • umeclidinium-vilanterol  1 puff Inhalation Daily   • buPROPion HCl ER (SR)  150 mg Oral BID   • FLUoxetine HCl  20 mg Oral BID   • Levothyroxine Sodium  150 mcg Oral Before breakfast   • Nortriptyline HCl  25 mg Oral Nightly   • Rosuv

## 2021-03-18 NOTE — PLAN OF CARE
Received patient at 1900. Patient A/O x 4. Tele Rhythm NSR, ST with activity. O2 Saturation 96%. On RA. No C/O chest pain or SOB. TALAMANTES noted when pt up to bathroom. Patient voiding with no issue. Prn tylenol given for pain.  Bed is locked and in low p effects  - Notify MD/LIP if interventions unsuccessful or patient reports new pain  - Anticipate increased pain with activity and pre-medicate as appropriate  Outcome: Progressing

## 2021-03-18 NOTE — CONSULTS
[facility] Dosing Service  Warfarin (Coumadin) Subsequent Dosing    Harjeet Garcia is a 77year old patient for whom pharmacy is dosing warfarin (Coumadin).  Goal INR is 2-3    Recent Labs   Lab 03/15/21  1337 03/15/21  1431 03/16/21  2245 03/17/21  0535 0

## 2021-03-18 NOTE — PLAN OF CARE
Assumed care of patient at 0730. Alert and oriented. Vital signs stable. Sinus rhythm on telemetry. IVF maintained as ordered. SBP stable. Patient c/o left arm pain, citing her arthritis. Reports generally not feeling good. Cardiology to bedside.   EK effectiveness of antiarrhythmic and heart rate control medications as ordered  - Initiate emergency measures for life threatening arrhythmias  - Monitor electrolytes and administer replacement therapy as ordered  Outcome: Progressing

## 2021-03-18 NOTE — CONSULTS
[address] [facility] Consult Note  [facility]  Report of Consultation    Lolyd Claire Patient Status:  Outpatient in a Bed    1955 MRN [id number]   [facility] 2NE-A Attending Joanie Willis MD   [facility] D Thyroid removal   • OTHER SURGICAL HISTORY  2004    LT foot spur removal     Family History   Problem Relation Age of Onset   • Hypertension Father    • Lipids Father    • Diabetes Mother    • Hypertension Mother    • Lipids Mother    • Cancer Brother    • paroxysmal nocturnal dyspnea, lower extremity edema. Gastrointestinal: Negative for dysphagia, odynophagia, reflux symptoms, nausea, vomiting, change in bowel habits, diarrhea, constipation and abdominal pain.   Integument/breast: Negative for rash, skin l Normal strength and sensation in all extremities  EXT: trace edema  SKIN: No rashes, ulcers, nodules    Lab Data Review:  Recent Labs   Lab 03/16/21  1430 03/17/21  0535 03/18/21  0610   * 75 91   BUN 58* 56* 31*   CREATSERUM 1.78* 1.27* 0.84   GFRA hypertension Aortic Stenosis s/p TAVR with persistent AV gradient  · Moderate Mitral Stenosis  · COPD hx  · SHELDON hx  · PE hx on warfarin  · Herbert    PLAN    · Pt has multifactorial cause of dyspnea.  Echo today now showing elevated rvsp which presumably came f

## 2021-03-18 NOTE — PROGRESS NOTES
Problem: CARDIOVASCULAR - ADULT  Goal: Maintains optimal cardiac output and hemodynamic stability  Description: INTERVENTIONS:  - Monitor vital signs, rhythm, and trends  - Monitor for bleeding, hypotension and signs of decreased cardiac output  - Evaluate consult as order. Lasix IVP given as order. Daily wt. Medicate prn for pain  Up in chair with all meals. Will continue POC.

## 2021-03-18 NOTE — PROGRESS NOTES
[facility]    Nephrology Progress Note    Cem Newman Attending:  Koffi Fregoso MD     Cc: Crow Dewitt    SUBJECTIVE     Not feeling great  Some SOB overnight, IVFs stopped.  On RA    PHYSICAL EXAM   Vital signs: /45 (BP Location: Right arm)   Pulse 82 Lab Results   Component Value Date    CREATSERUM 0.84 03/18/2021    BUN 31 03/18/2021     03/18/2021    K 4.3 03/18/2021     03/18/2021    CO2 21.0 03/18/2021    GLU 91 03/18/2021    CA 9.0 03/18/2021    ALB 3.1 03/18/2021    INR 1.14 03/18 suspect hypovolemic. Sp IVFs, improved but still low (pt notes BP normally 120s). Hold ARB. Rechecking ECHO    SOB  -- CXR no sig pulm edema  -- repeat echo pending   -- IV diuretic ordered     Hyperkalemia   -- sp IVFs for kaliuresis. Low K diet.  ARB on h

## 2021-03-18 NOTE — PROGRESS NOTES
[facility] Cardiology  Progress Note    Tata Mariano Patient Status:  Outpatient in a Bed    1955 MRN [id number]   [facility] 2NE-A Attending Lalita Rubio MD   Hosp Day # 0 PCP Denice Rojas DO       Subjective:      Ms. Trixie Paniagua --   --  2.5 2.3   PHOS  --   --  4.0 2.9   * 124* 75 91       Recent Labs   Lab 03/17/21  0535 03/18/21  0610   ALB 2.9* 3.1*       No results for input(s): PGLU in the last 168 hours.     Tele: sinus rhythm in the 90s    Pertinent cardiac diagnosti Calcium  5 mg Oral QOD   • Clopidogrel Bisulfate  75 mg Oral Daily        Assessment/Plan:      Impression:  1. Severe AS s/p TAVR with 23mm S3 Ultra valve on 1/5/2021->mean gradient of 32 by echo on 2/17/2021; cath gradient 18-20 on 3/16/2021  2.  Paroxysm

## 2021-03-19 ENCOUNTER — APPOINTMENT (OUTPATIENT)
Dept: CT IMAGING | Facility: HOSPITAL | Age: 66
DRG: 302 | End: 2021-03-19
Attending: INTERNAL MEDICINE
Payer: MEDICARE

## 2021-03-19 ENCOUNTER — APPOINTMENT (OUTPATIENT)
Dept: NUCLEAR MEDICINE | Facility: HOSPITAL | Age: 66
DRG: 302 | End: 2021-03-19
Attending: HOSPITALIST
Payer: MEDICARE

## 2021-03-19 PROBLEM — I25.10 CAD (CORONARY ARTERY DISEASE): Status: ACTIVE | Noted: 2021-03-19

## 2021-03-19 PROCEDURE — 94640 AIRWAY INHALATION TREATMENT: CPT

## 2021-03-19 PROCEDURE — 85027 COMPLETE CBC AUTOMATED: CPT | Performed by: INTERNAL MEDICINE

## 2021-03-19 PROCEDURE — 78580 LUNG PERFUSION IMAGING: CPT | Performed by: HOSPITALIST

## 2021-03-19 PROCEDURE — 83735 ASSAY OF MAGNESIUM: CPT | Performed by: INTERNAL MEDICINE

## 2021-03-19 PROCEDURE — 80069 RENAL FUNCTION PANEL: CPT | Performed by: INTERNAL MEDICINE

## 2021-03-19 PROCEDURE — 74176 CT ABD & PELVIS W/O CONTRAST: CPT | Performed by: INTERNAL MEDICINE

## 2021-03-19 PROCEDURE — 85610 PROTHROMBIN TIME: CPT | Performed by: HOSPITALIST

## 2021-03-19 RX ORDER — FUROSEMIDE 10 MG/ML
20 INJECTION INTRAMUSCULAR; INTRAVENOUS ONCE
Status: COMPLETED | OUTPATIENT
Start: 2021-03-19 | End: 2021-03-19

## 2021-03-19 RX ORDER — WARFARIN SODIUM 5 MG/1
5 TABLET ORAL
Status: COMPLETED | OUTPATIENT
Start: 2021-03-19 | End: 2021-03-19

## 2021-03-19 RX ADMIN — NORTRIPTYLINE HYDROCHLORIDE 25 MG: 25 CAPSULE ORAL at 20:41:00

## 2021-03-19 RX ADMIN — ALLOPURINOL 100 MG: 100 TABLET ORAL at 08:27:00

## 2021-03-19 RX ADMIN — WARFARIN SODIUM 5 MG: 5 TABLET ORAL at 20:41:00

## 2021-03-19 RX ADMIN — BUPROPION HYDROCHLORIDE 150 MG: 150 TABLET, EXTENDED RELEASE ORAL at 08:27:00

## 2021-03-19 RX ADMIN — PREDNISONE 40 MG: 20 TABLET ORAL at 08:27:00

## 2021-03-19 RX ADMIN — LEVOTHYROXINE SODIUM 150 MCG: 0.15 TABLET ORAL at 06:46:00

## 2021-03-19 RX ADMIN — ALBUTEROL SULFATE 2.5 MG: 2.5 SOLUTION RESPIRATORY (INHALATION) at 07:20:00

## 2021-03-19 RX ADMIN — ALBUTEROL SULFATE 2.5 MG: 2.5 SOLUTION RESPIRATORY (INHALATION) at 12:30:00

## 2021-03-19 RX ADMIN — FLUOXETINE HYDROCHLORIDE 20 MG: 20 CAPSULE ORAL at 20:41:00

## 2021-03-19 RX ADMIN — CLOPIDOGREL BISULFATE 75 MG: 75 TABLET ORAL at 08:27:00

## 2021-03-19 RX ADMIN — FUROSEMIDE 20 MG: 10 INJECTION INTRAMUSCULAR; INTRAVENOUS at 13:21:00

## 2021-03-19 RX ADMIN — ACETAMINOPHEN 650 MG: 325 TABLET ORAL at 16:42:00

## 2021-03-19 RX ADMIN — FLUOXETINE HYDROCHLORIDE 20 MG: 20 CAPSULE ORAL at 08:27:00

## 2021-03-19 RX ADMIN — ALBUTEROL SULFATE 2.5 MG: 2.5 SOLUTION RESPIRATORY (INHALATION) at 20:45:00

## 2021-03-19 RX ADMIN — BUPROPION HYDROCHLORIDE 150 MG: 150 TABLET, EXTENDED RELEASE ORAL at 20:41:00

## 2021-03-19 NOTE — PLAN OF CARE
Received patient at 1900. Patient A/O x 4. Tele Rhythm NSR. O2 Saturation 96%. On RA. No C/O chest pain or SOB. TALAMANTES noted when pt up to bathroom. Patient voiding with no issue. Prn tylenol given for pain. Bed is locked and in low position.   Call light MD/LIP if interventions unsuccessful or patient reports new pain  - Anticipate increased pain with activity and pre-medicate as appropriate  Outcome: Progressing

## 2021-03-19 NOTE — PROGRESS NOTES
[facility] Cardiology  Progress Note    Saud Vinson Patient Status:  Outpatient in a Bed    1955 MRN [id number]   [facility] 2NE-A Attending Jess Alexander MD   Hosp Day # 0 PCP Randa Hidalgo DO     Impression:  1.  Severe A night  furosemide (LASIX) injection 20 mg, 20 mg, Intravenous, Once  albuterol sulfate (VENTOLIN) (2.5 MG/3ML) 0.083% nebulizer solution 2.5 mg, 2.5 mg, Nebulization, Q6H WA  triamcinolone acetonide (KENALOG) 0.1 % ointment 1 Application, 1 Application, To

## 2021-03-19 NOTE — PLAN OF CARE
Received patient at 0730. Alert and orientated x4. Tele Rhythm NSR. O2 saturation 100% on RA. Breath sounds clear/diminished. Productive, strong cough. Pt reports shortness of breath improved. No C/O chest pain or dizziness. Pt voiding with no issues.  Pt pain and pain management  - Manage/alleviate anxiety  - Utilize distraction and/or relaxation techniques  - Monitor for opioid side effects  - Notify MD/LIP if interventions unsuccessful or patient reports new pain  - Anticipate increased pain with activit

## 2021-03-19 NOTE — PROGRESS NOTES
[facility]    Nephrology Progress Note    Karine Godwin Attending:  Jerardo Garland MD     Cc: Harrison Bailey    SUBJECTIVE     SOB improved but still worse than baseline   No other complaints    PHYSICAL EXAM   Vital signs: /43 (BP Location: Left arm)   Pul (TYLENOL) tab 650 mg, 650 mg, Oral, Q6H PRN  Clopidogrel Bisulfate (PLAVIX) tab 75 mg, 75 mg, Oral, Daily        LABS     Lab Results   Component Value Date    WBC 5.2 03/19/2021    HGB 8.8 03/19/2021    HCT 26.5 03/19/2021    .0 03/19/2021    CREAT Cardiomegaly with normal pulmonary vascularity. Mild scarring/atelectasis     in the lower lungs. No pleural     effusion or pneumothorax.   Degenerative changes in the spine.                               =====    CONCLUSION:  No lobar pneumonia or overt

## 2021-03-19 NOTE — PROGRESS NOTES
Pt refuses bed alarm, bed in lowest position, and will not allow nurse to leave door open until she is safely seated in the bathroom. Explained to pt floor protocol.

## 2021-03-19 NOTE — PROGRESS NOTES
[facility] Dosing Service  Warfarin (Coumadin) Subsequent Dosing    Rene Carbone is a 77year old patient for whom pharmacy is dosing warfarin (Coumadin).  Goal INR is 2-3    Recent Labs   Lab 03/15/21  1431 03/16/21  2245 03/17/21  0535 03/18/21  0610 0

## 2021-03-19 NOTE — PROGRESS NOTES
[facility]  Progress Note    Prieto Arteaga Patient Status:  Outpatient in a Bed    1955 MRN [id number]   [facility] 2NE-A Attending Coco Ochoa MD   Hosp Day # 0 PCP Lucho Arango DO     Subjective:   Prieto Arteaga is a(n) 77 ye SERGEI    Invalid input(s): [address] [facility], [address], [address] [place] [address], [address] [place] [address] Lab Results    COVID-19  Lab Results   Component Value Date    COVID19 Not Detected 03/12/2021    COVID19 Not Detected 02/22/2021    COVID19 Not Detected 01/02/2021       Pro-Ca warfarin  · ERINN    Plan:  · ProBNP elevated.   Would continue diuresis with close monitoring of renal function  · O2 per protocol, wean as tolerated for sats >89%  · Stop prednisone, continue inhalers  · Encourage us of nocturnal CPAP, used well overnight

## 2021-03-19 NOTE — PROGRESS NOTES
[facility] Hospitalist Progress Note     PCP: Elroy Diaz DO    CC:  Follow up    SUBJECTIVE:  Just returned from v/q scan. Says sob improved with albuterol and lasix. +U, +flatus.  No cp/n/v/f/c    OBJECTIVE:  Temp:  [97.7 °F (36.5 °C)-98.6 °F (37 °C)] 98.1 03/18/21  0610 03/19/21  0614   ALB 2.9* 3.1* 2.7*       cxr 3/17 reviewed personally- no consolidation    Meds:     • predniSONE  40 mg Oral Daily with breakfast   • albuterol sulfate  2.5 mg Nebulization Q6H WA   • allopurinol  100 mg Oral Daily   • umec Michelle    Questions/concerns were discussed with patient and/or family by bedside.        Thank Vincent Soto MD    [facility]  Answering Service number: [phone]

## 2021-03-20 PROCEDURE — 85027 COMPLETE CBC AUTOMATED: CPT | Performed by: HOSPITALIST

## 2021-03-20 PROCEDURE — 94640 AIRWAY INHALATION TREATMENT: CPT

## 2021-03-20 PROCEDURE — 85610 PROTHROMBIN TIME: CPT | Performed by: HOSPITALIST

## 2021-03-20 PROCEDURE — 82728 ASSAY OF FERRITIN: CPT | Performed by: INTERNAL MEDICINE

## 2021-03-20 PROCEDURE — 83540 ASSAY OF IRON: CPT | Performed by: INTERNAL MEDICINE

## 2021-03-20 PROCEDURE — 83735 ASSAY OF MAGNESIUM: CPT | Performed by: INTERNAL MEDICINE

## 2021-03-20 PROCEDURE — 83550 IRON BINDING TEST: CPT | Performed by: INTERNAL MEDICINE

## 2021-03-20 PROCEDURE — 80069 RENAL FUNCTION PANEL: CPT | Performed by: INTERNAL MEDICINE

## 2021-03-20 RX ORDER — FUROSEMIDE 10 MG/ML
40 INJECTION INTRAMUSCULAR; INTRAVENOUS
Status: DISCONTINUED | OUTPATIENT
Start: 2021-03-20 | End: 2021-03-21

## 2021-03-20 RX ORDER — DIPHENHYDRAMINE HCL 50 MG
CAPSULE ORAL
Status: COMPLETED
Start: 2021-03-20 | End: 2021-03-21

## 2021-03-20 RX ORDER — CYPROHEPTADINE HYDROCHLORIDE 4 MG/1
4 TABLET ORAL NIGHTLY
Status: DISCONTINUED | OUTPATIENT
Start: 2021-03-20 | End: 2021-03-22

## 2021-03-20 RX ORDER — DIPHENHYDRAMINE HCL 25 MG
25 CAPSULE ORAL ONCE AS NEEDED
Status: DISCONTINUED | OUTPATIENT
Start: 2021-03-20 | End: 2021-03-20

## 2021-03-20 RX ORDER — DIPHENHYDRAMINE HCL 50 MG
50 CAPSULE ORAL ONCE AS NEEDED
Status: ACTIVE | OUTPATIENT
Start: 2021-03-20 | End: 2021-03-20

## 2021-03-20 RX ORDER — DIPHENHYDRAMINE HCL 50 MG
50 CAPSULE ORAL NIGHTLY PRN
Status: DISCONTINUED | OUTPATIENT
Start: 2021-03-20 | End: 2021-03-22

## 2021-03-20 RX ADMIN — ALBUTEROL SULFATE 2.5 MG: 2.5 SOLUTION RESPIRATORY (INHALATION) at 20:17:00

## 2021-03-20 RX ADMIN — ALBUTEROL SULFATE 2.5 MG: 2.5 SOLUTION RESPIRATORY (INHALATION) at 13:42:00

## 2021-03-20 RX ADMIN — FLUOXETINE HYDROCHLORIDE 20 MG: 20 CAPSULE ORAL at 20:32:00

## 2021-03-20 RX ADMIN — BUPROPION HYDROCHLORIDE 150 MG: 150 TABLET, EXTENDED RELEASE ORAL at 20:24:00

## 2021-03-20 RX ADMIN — NORTRIPTYLINE HYDROCHLORIDE 25 MG: 25 CAPSULE ORAL at 20:32:00

## 2021-03-20 RX ADMIN — CLOPIDOGREL BISULFATE 75 MG: 75 TABLET ORAL at 09:14:00

## 2021-03-20 RX ADMIN — FUROSEMIDE 40 MG: 10 INJECTION INTRAMUSCULAR; INTRAVENOUS at 09:13:00

## 2021-03-20 RX ADMIN — CYPROHEPTADINE HYDROCHLORIDE 4 MG: 4 TABLET ORAL at 23:00:00

## 2021-03-20 RX ADMIN — ACETAMINOPHEN 650 MG: 325 TABLET ORAL at 20:23:00

## 2021-03-20 RX ADMIN — ALBUTEROL SULFATE 2.5 MG: 2.5 SOLUTION RESPIRATORY (INHALATION) at 07:49:00

## 2021-03-20 RX ADMIN — ROSUVASTATIN CALCIUM 5 MG: 5 TABLET, COATED ORAL at 09:14:00

## 2021-03-20 RX ADMIN — LEVOTHYROXINE SODIUM 150 MCG: 0.15 TABLET ORAL at 06:16:00

## 2021-03-20 RX ADMIN — BUPROPION HYDROCHLORIDE 150 MG: 150 TABLET, EXTENDED RELEASE ORAL at 09:14:00

## 2021-03-20 RX ADMIN — ACETAMINOPHEN 650 MG: 325 TABLET ORAL at 02:04:00

## 2021-03-20 RX ADMIN — DIPHENHYDRAMINE HCL 50 MG: 50 MG CAPSULE ORAL at 22:35:00

## 2021-03-20 RX ADMIN — FUROSEMIDE 40 MG: 10 INJECTION INTRAMUSCULAR; INTRAVENOUS at 16:05:00

## 2021-03-20 RX ADMIN — FLUOXETINE HYDROCHLORIDE 20 MG: 20 CAPSULE ORAL at 09:15:00

## 2021-03-20 RX ADMIN — ALLOPURINOL 100 MG: 100 TABLET ORAL at 09:14:00

## 2021-03-20 NOTE — PLAN OF CARE
Received bedside report on this Pt., at 1520. Pt., awake, A&Ox4, calm, pleasant and cooperative. Pt., is in SR on Tele monitor, sats greater than 94% on RA during the day, CPAP at night. Pt., up to the BR with SBA, independent at times, steady gait noted.

## 2021-03-20 NOTE — PLAN OF CARE
Assumed care of pt @2330. Pt axox4. Denies chest pain or sob. C/o sob with activity but states it is \"better today\". Sinus rhythm on tele. Non pitting edema to ble. Legs taught/carmen. Right groin soft, alejandro. Lungs clear. O2 sat on RA 97%.  Pt wears cpap at hematoma  - Assess quality of pulses, skin color and temperature  - Assess for signs of decreased coronary artery perfusion - ex.  Angina  - Evaluate fluid balance, assess for edema, trend weights  Outcome: Progressing  Goal: Absence of cardiac arrhythmias

## 2021-03-20 NOTE — PROGRESS NOTES
[facility] Cardiology  Progress Note    Hamida Ledesma Patient Status:  Outpatient in a Bed    1955 MRN [id number]   [facility] 2NE-A Attending Miguel Aguirre MD   Hosp Day # 1 PCP Karen Caldera DO     Impression:  1.  Severe A 1 Application, 1 Application, Topical, BID PRN  Albuterol Sulfate  (90 Base) MCG/ACT inhaler 2 puff, 2 puff, Inhalation, Q6H PRN  allopurinol (ZYLOPRIM) tab 100 mg, 100 mg, Oral, Daily  umeclidinium-vilanterol (ANORO ELLIPTA) 62.5-25 MCG/INH inhaler

## 2021-03-20 NOTE — PROGRESS NOTES
[facility] Hospitalist Progress Note     PCP: Melia Marquez DO    CC:  Follow up    SUBJECTIVE:   Says sob improved- walking from bathroom to bed with walker.  +U, +flatus.  No cp/n/v/f/c    OBJECTIVE:  Temp:  [97.6 °F (36.4 °C)-98.2 °F (36.8 °C)] 97.8 °F (36.6 Recent Labs   Lab 03/17/21  0535 03/18/21  0610 03/19/21  0614 03/20/21  0637   ALB 2.9* 3.1* 2.7* 3.1*       cxr 3/17 reviewed personally- no consolidation    Meds:     • furosemide  40 mg Intravenous BID (Diuretic)   • albuterol sulfate  2.5 mg Neb anemia- HDS, order venofer, pt has had venofer in past  -follow up outpatient  -ct a/p without rp bleed     **PPx-scds, coumadin monitor INR       Questions/concerns were discussed with patient and/or family by bedside.        Thank Rowena Neff

## 2021-03-20 NOTE — PROGRESS NOTES
[facility] Pulmonary/Critical Care Progress Note     SUBJECTIVE/Interval history: All events, procedures, notes reviewed. Pt feels better today with less sob.  Diuresis and nebs helping most. No chest pain    Review of S GFRAA 83 84 71   GFRNAA 72 73 61   CA 9.0 8.9 9.3    140 139   K 4.3 3.8 3.8    110 107   CO2 21.0 24.0 26.0     Recent Labs   Lab 03/15/21  1337 03/16/21  0623 03/17/21  1417 03/19/21  0614 03/20/21  0637   RBC 3.67*   < > 2.97* 2.84* 3.18* 9:43 AM       XR CHEST AP PORTABLE  (CPT=71045)    Result Date: 3/18/2021  CONCLUSION:  No lobar pneumonia or overt congestive failure. Mild atelectasis/scarring in the lower lungs.     Dictated by (CST): Naima Boyd MD on 3/18/2021 at 10:44 AM     Fin

## 2021-03-20 NOTE — PROGRESS NOTES
[facility]    Nephrology Progress Note    Macho Patino Attending:  Ailyn Phillips MD     Cc: Jojo Mccabe    SUBJECTIVE     Respiratory status much improved  Good urine output this morning with IV Lasix  No acute complaints    PHYSICAL EXAM   Vital signs: BP 1 25 mg, 25 mg, Oral, Nightly  Rosuvastatin Calcium (CRESTOR) tab 5 mg, 5 mg, Oral, QOD  acetaminophen (TYLENOL) tab 650 mg, 650 mg, Oral, Q6H PRN  Clopidogrel Bisulfate (PLAVIX) tab 75 mg, 75 mg, Oral, Daily        LABS     Lab Results   Component Value Roger noted.    BOWEL/MESENTERY:  The appendix is normal.    ABDOMINAL WALL:  No mass or hernia. URINARY BLADDER:  No visible focal wall thickening, lesion, or calculus. PELVIC NODES:  No adenopathy. PELVIC ORGANS:  No visible mass.   Pelvic organs Technologist)  Patient has     shortness of breath. FINDINGS:  Cervical fusion hardware, right shoulder arthroplasty device,     and changes of previous TAVR noted. Surgical clips in the upper abdomen.       Cardiomegaly with normal pulmonary

## 2021-03-21 PROCEDURE — 94640 AIRWAY INHALATION TREATMENT: CPT

## 2021-03-21 PROCEDURE — 85610 PROTHROMBIN TIME: CPT | Performed by: HOSPITALIST

## 2021-03-21 PROCEDURE — 80069 RENAL FUNCTION PANEL: CPT | Performed by: INTERNAL MEDICINE

## 2021-03-21 PROCEDURE — 85018 HEMOGLOBIN: CPT | Performed by: HOSPITALIST

## 2021-03-21 PROCEDURE — 83735 ASSAY OF MAGNESIUM: CPT | Performed by: INTERNAL MEDICINE

## 2021-03-21 RX ORDER — FUROSEMIDE 40 MG/1
40 TABLET ORAL
Status: DISCONTINUED | OUTPATIENT
Start: 2021-03-21 | End: 2021-03-22

## 2021-03-21 RX ADMIN — CYPROHEPTADINE HYDROCHLORIDE 4 MG: 4 TABLET ORAL at 21:54:00

## 2021-03-21 RX ADMIN — NORTRIPTYLINE HYDROCHLORIDE 25 MG: 25 CAPSULE ORAL at 21:11:00

## 2021-03-21 RX ADMIN — ALBUTEROL SULFATE 2.5 MG: 2.5 SOLUTION RESPIRATORY (INHALATION) at 13:49:00

## 2021-03-21 RX ADMIN — FUROSEMIDE 40 MG: 40 TABLET ORAL at 10:42:00

## 2021-03-21 RX ADMIN — FLUOXETINE HYDROCHLORIDE 20 MG: 20 CAPSULE ORAL at 09:11:00

## 2021-03-21 RX ADMIN — DIPHENHYDRAMINE HCL 50 MG: 50 MG CAPSULE ORAL at 21:10:00

## 2021-03-21 RX ADMIN — CLOPIDOGREL BISULFATE 75 MG: 75 TABLET ORAL at 09:11:00

## 2021-03-21 RX ADMIN — BUPROPION HYDROCHLORIDE 150 MG: 150 TABLET, EXTENDED RELEASE ORAL at 09:11:00

## 2021-03-21 RX ADMIN — ALBUTEROL SULFATE 2.5 MG: 2.5 SOLUTION RESPIRATORY (INHALATION) at 08:53:00

## 2021-03-21 RX ADMIN — ALLOPURINOL 100 MG: 100 TABLET ORAL at 09:11:00

## 2021-03-21 RX ADMIN — FLUOXETINE HYDROCHLORIDE 20 MG: 20 CAPSULE ORAL at 21:05:00

## 2021-03-21 RX ADMIN — LEVOTHYROXINE SODIUM 150 MCG: 0.15 TABLET ORAL at 05:00:00

## 2021-03-21 RX ADMIN — ALBUTEROL SULFATE 2.5 MG: 2.5 SOLUTION RESPIRATORY (INHALATION) at 19:39:00

## 2021-03-21 RX ADMIN — BUPROPION HYDROCHLORIDE 150 MG: 150 TABLET, EXTENDED RELEASE ORAL at 21:00:00

## 2021-03-21 RX ADMIN — FUROSEMIDE 40 MG: 40 TABLET ORAL at 16:52:00

## 2021-03-21 NOTE — PROGRESS NOTES
[facility] Pulmonary/Critical Care Progress Note     SUBJECTIVE/Interval history: All events, procedures, notes reviewed. Pt ambulated in hallway today but reported \"panting\" nebs helping.  She has not ambulated in day 03/19/21  0614 03/20/21  0637 03/21/21  0605   GLU 90 79 77   BUN 23* 30* 31*   CREATSERUM 0.83 0.96 0.90   GFRAA 84 71 77   GFRNAA 73 61 66   CA 8.9 9.3 8.7    139 138   K 3.8 3.8 3.9    107 107   CO2 24.0 26.0 26.0     Recent Labs   Lab 03/15 (VQL=[id number])    Result Date: 3/19/2021  CONCLUSION:  No perfusion defects seen. Low probability for acute pulmonary embolism.    Dictated by (CST): Dulce Maria Colon MD on 3/19/2021 at 9:41 AM     Finalized by (CST): Dulce Maria Colon MD on 3/19/2021 at 9:43 AM

## 2021-03-21 NOTE — PLAN OF CARE
Patient is alert and oriented x4. NSR on tele. Oxygenation is 100% on RA. L sounds diminished. VSS. Patient denies chest pain, shortness of breath, palpitations. Denies pain. Patient is resting comfortably in bed at this time.     POC: switch to PO lasix, I and heart rate control medications as ordered  - Initiate emergency measures for life threatening arrhythmias  - Monitor electrolytes and administer replacement therapy as ordered  Outcome: Progressing     Problem: RESPIRATORY - ADULT  Goal: Achieves optim and assess patient for signs and symptoms of electrolyte imbalances  - Administer electrolyte replacement as ordered  - Monitor response to electrolyte replacements, including rhythm and repeat lab results as appropriate  - Fluid restriction as ordered  -

## 2021-03-21 NOTE — PROGRESS NOTES
[facility] Cardiology  Progress Note    Moraima Gannon Patient Status:  Outpatient in a Bed    1955 MRN [id number]   [facility] 2NE-A Attending Guanaco العراقي MD   Hosp Day # 2 PCP Aayush Horn DO     Impression:  1.  Severe A Nightly  diphenhydrAMINE HCl (BENADRYL) cap 50 mg, 50 mg, Oral, Nightly PRN  albuterol sulfate (VENTOLIN) (2.5 MG/3ML) 0.083% nebulizer solution 2.5 mg, 2.5 mg, Nebulization, Q6H WA  triamcinolone acetonide (KENALOG) 0.1 % ointment 1 Application, 1 Applica

## 2021-03-21 NOTE — PLAN OF CARE
Tele monitoring. I/o. Daily weight. Possible discharge in am, right groin with per close. Viktor/. Dr kinsey regarding pt request for benadryl/periactin at hs order as per pta meds. Awaiting return call.  Pt states that she has not had her allergy medication perfusion - ex.  Angina  - Evaluate fluid balance, assess for edema, trend weights  Outcome: Progressing  Goal: Absence of cardiac arrhythmias or at baseline  Description: INTERVENTIONS:  - Continuous cardiac monitoring, monitor vital signs, obtain 12 lead replacement as ordered  - Monitor response to electrolyte replacements, including rhythm and repeat lab results as appropriate  - Fluid restriction as ordered  - Instruct patient on fluid and nutrition restrictions as appropriate  Outcome: Alexandrea August

## 2021-03-21 NOTE — PROGRESS NOTES
[facility] Hospitalist Progress Note     PCP: Cece Hallman DO    CC:  Follow up    SUBJECTIVE:  Says feels about same as yesterday. Was slightly sob when fixing sheet on bed. Says has chronic cough. Was slightly lightheaded this AM. +U, +flatus.  No cp/n/v/f/ 0. 96 0.90   CA 8.4* 9.0 8.9 9.3 8.7   MG 2.5 2.3 2.1 2.2 2.0   PHOS 4.0 2.9 3.8 3.9 4.1   GLU 75 91 90 79 77       Recent Labs   Lab 03/17/21  0535 03/18/21  0610 03/19/21  0614 03/20/21  0637 03/21/21  0605   ALB 2.9* 3.1* 2.7* 3.1* 2.9*        Meds: IVF  - tele  - appreciate renal recs     # Hx Gout  - home allopurinol     # MDD In partial remission-stable  - home fluoxetine  - home wellbutrin  - home nortriptyline    **chronic anemia- HDS, s/p venofer   -follow up outpatient  -ct a/p without rp bleed

## 2021-03-21 NOTE — PROGRESS NOTES
[facility]    Nephrology Progress Note    Lloyd Claire Attending:  Joanie Willis MD     Cc: Leopold Cheney    SUBJECTIVE     Nice diuresis with Lasix.   Weight down 8 pounds this admission  Hemoglobin stable  INR stubborn    PHYSICAL EXAM   Vital signs: /5 mcg, Oral, Before breakfast  Nortriptyline HCl (PAMELOR) cap 25 mg, 25 mg, Oral, Nightly  Rosuvastatin Calcium (CRESTOR) tab 5 mg, 5 mg, Oral, QOD  acetaminophen (TYLENOL) tab 650 mg, 650 mg, Oral, Q6H PRN  Clopidogrel Bisulfate (PLAVIX) tab 75 mg, 75 mg, BOWEL/MESENTERY:  The appendix is normal.    ABDOMINAL WALL:  No mass or hernia. URINARY BLADDER:  No visible focal wall thickening, lesion, or calculus. PELVIC NODES:  No adenopathy. PELVIC ORGANS:  No visible mass.   Pelvic organs appropria Technologist)  Patient has     shortness of breath. FINDINGS:  Cervical fusion hardware, right shoulder arthroplasty device,     and changes of previous TAVR noted. Surgical clips in the upper abdomen.       Cardiomegaly with normal pulmonary

## 2021-03-22 VITALS
HEART RATE: 88 BPM | WEIGHT: 261.94 LBS | SYSTOLIC BLOOD PRESSURE: 148 MMHG | OXYGEN SATURATION: 93 % | DIASTOLIC BLOOD PRESSURE: 73 MMHG | RESPIRATION RATE: 18 BRPM | TEMPERATURE: 98 F | BODY MASS INDEX: 38.8 KG/M2 | HEIGHT: 69 IN

## 2021-03-22 PROCEDURE — 80069 RENAL FUNCTION PANEL: CPT | Performed by: INTERNAL MEDICINE

## 2021-03-22 PROCEDURE — 83735 ASSAY OF MAGNESIUM: CPT | Performed by: INTERNAL MEDICINE

## 2021-03-22 PROCEDURE — 85610 PROTHROMBIN TIME: CPT | Performed by: INTERNAL MEDICINE

## 2021-03-22 PROCEDURE — 94640 AIRWAY INHALATION TREATMENT: CPT

## 2021-03-22 RX ORDER — POTASSIUM CHLORIDE 20 MEQ/1
40 TABLET, EXTENDED RELEASE ORAL ONCE
Status: COMPLETED | OUTPATIENT
Start: 2021-03-22 | End: 2021-03-22

## 2021-03-22 RX ORDER — FUROSEMIDE 40 MG/1
40 TABLET ORAL
Status: DISCONTINUED | OUTPATIENT
Start: 2021-03-24 | End: 2021-03-22

## 2021-03-22 RX ORDER — FUROSEMIDE 40 MG/1
40 TABLET ORAL
Qty: 30 TABLET | Refills: 1 | Status: SHIPPED | OUTPATIENT
Start: 2021-03-24 | End: 2021-09-08 | Stop reason: DRUGHIGH

## 2021-03-22 RX ORDER — FUROSEMIDE 40 MG/1
40 TABLET ORAL DAILY
Qty: 30 TABLET | Refills: 11 | Status: SHIPPED | OUTPATIENT
Start: 2021-03-22 | End: 2021-03-22

## 2021-03-22 RX ADMIN — FUROSEMIDE 40 MG: 40 TABLET ORAL at 09:21:00

## 2021-03-22 RX ADMIN — ALLOPURINOL 100 MG: 100 TABLET ORAL at 09:20:00

## 2021-03-22 RX ADMIN — BUPROPION HYDROCHLORIDE 150 MG: 150 TABLET, EXTENDED RELEASE ORAL at 09:20:00

## 2021-03-22 RX ADMIN — FLUOXETINE HYDROCHLORIDE 20 MG: 20 CAPSULE ORAL at 09:20:00

## 2021-03-22 RX ADMIN — ALBUTEROL SULFATE 2.5 MG: 2.5 SOLUTION RESPIRATORY (INHALATION) at 13:36:00

## 2021-03-22 RX ADMIN — ALBUTEROL SULFATE 2.5 MG: 2.5 SOLUTION RESPIRATORY (INHALATION) at 07:30:00

## 2021-03-22 RX ADMIN — ROSUVASTATIN CALCIUM 5 MG: 5 TABLET, COATED ORAL at 09:21:00

## 2021-03-22 RX ADMIN — CLOPIDOGREL BISULFATE 75 MG: 75 TABLET ORAL at 09:20:00

## 2021-03-22 RX ADMIN — LEVOTHYROXINE SODIUM 150 MCG: 0.15 TABLET ORAL at 06:32:00

## 2021-03-22 RX ADMIN — POTASSIUM CHLORIDE 40 MEQ: 20 TABLET, EXTENDED RELEASE ORAL at 10:25:00

## 2021-03-22 NOTE — PLAN OF CARE
Received patient, alert and oriented. Denied chest pain, denied SOB. Up independently in the room. Discussed POC. Due med given. Safety measures reinforced, call light within reach. Needs attended to. Will continue to monitor.     Problem: [address] and administer replacement therapy as ordered  Outcome: Progressing     Problem: RESPIRATORY - ADULT  Goal: Achieves optimal ventilation and oxygenation  Description: INTERVENTIONS:  - Assess for changes in respiratory status  - Assess for changes in menta response to electrolyte replacements, including rhythm and repeat lab results as appropriate  - Fluid restriction as ordered  - Instruct patient on fluid and nutrition restrictions as appropriate  Outcome: Progressing  Goal: Hemodynamic stability and optim

## 2021-03-22 NOTE — PROGRESS NOTES
[facility]  Progress Note    Di Gu Patient Status:  Outpatient in a Bed    1955 MRN [id number]   [facility] 2NE-A Attending Pablo Garcia MD   Hosp Day # 3 PCP Cece Hallman DO     Subjective:   Di Gu is a(n) 77 ye 139 138 140   K 3.8 3.9 3.5    107 106   CO2 26.0 26.0 29.0       Recent Labs   Lab 03/20/21  0637 03/21/21  0605 03/22/21  0558   MG 2.2 2.0 2.0       Lab Results   Component Value Date    PHOS 3.2 03/22/2021        Recent Labs   Lab 03/20/21  8263 (SYNTHROID) tab 150 mcg, 150 mcg, Oral, Before breakfast  Nortriptyline HCl (PAMELOR) cap 25 mg, 25 mg, Oral, Nightly  Rosuvastatin Calcium (CRESTOR) tab 5 mg, 5 mg, Oral, QOD  acetaminophen (TYLENOL) tab 650 mg, 650 mg, Oral, Q6H PRN  Clopidogrel Bisulfat

## 2021-03-22 NOTE — PLAN OF CARE
Patient is alert and oriented x4. NSR on tele. Oxygenation is 100% on RA. L sounds clear-diminished. VSS. Patient denies chest pain, shortness of breath, palpitations. Demonstrates TALAMANTES. Denies pain. Patient is resting comfortably in bed at this time.     PO Absence of cardiac arrhythmias or at baseline  Description: INTERVENTIONS:  - Continuous cardiac monitoring, monitor vital signs, obtain 12 lead EKG if indicated  - Evaluate effectiveness of antiarrhythmic and heart rate control medications as ordered  - I within prescribed range  Description: INTERVENTIONS:  - Monitor Blood Glucose as ordered  - Assess for signs and symptoms of hyperglycemia and hypoglycemia  - Administer ordered medications to maintain glucose within target range  - Assess barriers to adeq specific interventions  3/22/2021 0953 by Anne Gandhi RN  Outcome: Progressing  3/22/2021 0953 by Anne Gandhi RN  Outcome: Progressing  Goal: Patient/Family Short Term Goal  Description: Patient's Short Term Goal: decrease shortness of breath    In

## 2021-03-22 NOTE — PROGRESS NOTES
[facility] Cardiology Progress Note - Shraddha Anna Patient Status:  Inpatient    1955 MRN [id number]   [facility] 2NE-A Attending Coco Ochoa MD   Hosp Day # 3 PCP Lucho Arango DO     Subjective:  Patient feel failure (HCC)     CKD (chronic kidney disease) stage 3, GFR 30-59 ml/min     Cellulitis of right leg     Morbid obesity with BMI of 40.0-44.9, adult (HCC)     Binge eating disorder     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, right hand     Pre-op testing     Chronic 5.2 6. 2  --   --    HGB 10.6*  --  9.0*  --  9.3*  --  8.8* 9.7* 9.2*  --    MCV 93.8  --  96.6  --  96.3  --  93.3 94.7  --   --    .0  --  179.0  --  170.0  --  161.0 207.0  --   --    INR  --    < > 1.32*   < >  --  1.14* 1.41* 1.29* 1.39* 1.71* PRN  allopurinol (ZYLOPRIM) tab 100 mg, 100 mg, Oral, Daily  umeclidinium-vilanterol (ANORO ELLIPTA) 62.5-25 MCG/INH inhaler 1 puff, 1 puff, Inhalation, Daily  buPROPion HCl ER (SR) (WELLBUTRIN SR) 12 hr tab 150 mg, 150 mg, Oral, BID  FLUoxetine HCl (PROZA

## 2021-03-22 NOTE — DIETARY NOTE
[address]     Admitting diagnosis:  CAD (coronary artery disease) [I25.10]    Ht: 175.3 cm (5' 9\")  Wt: 118.8 kg (261 lb 14.5 oz). Body mass index is 38.68 kg/m².   Ideal body weight: 66.2 kg (145 lb 15.1 oz)

## 2021-03-22 NOTE — DISCHARGE SUMMARY
General Medicine Discharge Summary     Patient ID:  Shanae Layton  77year old  4/26/1955    Admit date: 3/15/2021    Discharge date and time: 3/22/2021    Attending Physician: Denise Baires MD     [facility] inhalers  - no signs of acute exacerbation  -v/q scan unremarkable. pulm on, appreciate.  MDI per pulm     # mild ERINN (resolved) on CKD stage II  - appreciate renal recs  - cont to hold ARB on dc     # Hyperkalemia, RESOLVED  - IVF  - tele  - appreciate meredith nightly.       FLUoxetine HCl 20 MG Oral Cap  TAKE 1 CAPSULE BY MOUTH  TWICE DAILY    LEVOTHYROXINE SODIUM 150 MCG Oral Tab  TAKE 1 TABLET BY MOUTH  BEFORE BREAKFAST    buPROPion HCl ER, SR, 150 MG Oral Tablet 12 Hr  TAKE 1 TABLET BY MOUTH  TWICE DAILY    T Yolanda [facility]  [phone]

## 2021-03-22 NOTE — PROGRESS NOTES
[facility]    Nephrology Progress Note    Ryan Shrestha Attending:  Disha Norris MD       SUBJECTIVE:     Swelling has resolved  Still with some shortness of breath with ambulation  Has some questions about diet/rehab - this was discussed with mayur 94.7 03/20/2021    MCH 30.5 03/20/2021    MCHC 32.2 03/20/2021    RDW 14.2 03/20/2021    NEPRELIM 7.60 03/17/2021    NEPERCENT 78.2 03/17/2021    LYPERCENT 11.7 03/17/2021    MOPERCENT 8.0 03/17/2021    EOPERCENT 1.3 03/17/2021    BAPERCENT 0.4 03/17/2021 5 mg, 5 mg, Oral, QOD  acetaminophen (TYLENOL) tab 650 mg, 650 mg, Oral, Q6H PRN  Clopidogrel Bisulfate (PLAVIX) tab 75 mg, 75 mg, Oral, Daily        ASSESSMENT/PLAN:     76 yo F with history of CKD stage 3 in the past, followed by Dr. Madelaine Rosario, CAD, aortic s

## 2021-03-23 NOTE — PLAN OF CARE
Received bedside report on this Pt., at 1530. Pt., awake, A&Ox4, calm, pleasant and cooperative. Pt., was in SR on Tele monitor, sats greater than 92% on RA, denied any pain or distress. Pt., up ad shayan in room. Chart reviewed, Discharge orders on chart.  Pt

## 2021-03-26 ENCOUNTER — OFFICE VISIT (OUTPATIENT)
Dept: ORTHOPEDICS CLINIC | Facility: CLINIC | Age: 66
End: 2021-03-26
Payer: MEDICARE

## 2021-03-26 DIAGNOSIS — M20.41 HAMMER TOES OF BOTH FEET: ICD-10-CM

## 2021-03-26 DIAGNOSIS — I73.9 PERIPHERAL VASCULAR DISEASE (HCC): Primary | ICD-10-CM

## 2021-03-26 DIAGNOSIS — M20.42 HAMMER TOES OF BOTH FEET: ICD-10-CM

## 2021-03-26 DIAGNOSIS — M79.671 PAIN IN BOTH FEET: ICD-10-CM

## 2021-03-26 DIAGNOSIS — M79.672 PAIN IN BOTH FEET: ICD-10-CM

## 2021-03-26 PROCEDURE — 99213 OFFICE O/P EST LOW 20 MIN: CPT | Performed by: PODIATRIST

## 2021-03-26 PROCEDURE — 1111F DSCHRG MED/CURRENT MED MERGE: CPT | Performed by: PODIATRIST

## 2021-03-26 NOTE — PROGRESS NOTES
[facility] Progress Note    Subjective: Patient is here today for foot care, there has been some issues with billing and she has not been able to come back.   She has also had other medical issues since that time    Objective:     Exam atrophic ski

## 2021-03-30 NOTE — PROCEDURES
[address]    PATIENT'S NAME: Lyric Gonsales   ATTENDING PHYSICIAN: Mary Avila. Jovanny Marx MD   OPERATING PHYSICIAN: Mary Avila.  Jovanny Marx MD   PATIENT ACCOUNT#:   [id number]    LOCATION:  [address] [place] [address]  MEDICAL RECORD #:   [id number]       DATE OF BIRTH:  [birth year] re-zeroed several times and we did a pullback to check for normalization which was appropriate. This would demonstrate that the echo main gradient is indeed significantly over estimating what the true hemodynamic invasive gradient is.   We therefore ended

## 2021-04-09 ENCOUNTER — TELEPHONE (OUTPATIENT)
Dept: ORTHOPEDICS CLINIC | Facility: CLINIC | Age: 66
End: 2021-04-09

## 2021-04-13 ENCOUNTER — OFFICE VISIT (OUTPATIENT)
Dept: ORTHOPEDICS CLINIC | Facility: CLINIC | Age: 66
End: 2021-04-13
Payer: MEDICARE

## 2021-04-13 VITALS — HEIGHT: 69 IN | WEIGHT: 262 LBS | BODY MASS INDEX: 38.8 KG/M2

## 2021-04-13 DIAGNOSIS — M12.812 ROTATOR CUFF TEAR ARTHROPATHY OF LEFT SHOULDER: ICD-10-CM

## 2021-04-13 DIAGNOSIS — M75.102 ROTATOR CUFF TEAR ARTHROPATHY OF LEFT SHOULDER: ICD-10-CM

## 2021-04-13 DIAGNOSIS — M75.42 IMPINGEMENT SYNDROME OF LEFT SHOULDER REGION: ICD-10-CM

## 2021-04-13 DIAGNOSIS — M75.41 IMPINGEMENT SYNDROME OF RIGHT SHOULDER: Primary | ICD-10-CM

## 2021-04-13 PROCEDURE — 99212 OFFICE O/P EST SF 10 MIN: CPT | Performed by: ORTHOPAEDIC SURGERY

## 2021-04-13 PROCEDURE — 20610 DRAIN/INJ JOINT/BURSA W/O US: CPT | Performed by: ORTHOPAEDIC SURGERY

## 2021-04-13 RX ORDER — TRIAMCINOLONE ACETONIDE 40 MG/ML
40 INJECTION, SUSPENSION INTRA-ARTICULAR; INTRAMUSCULAR ONCE
Status: COMPLETED | OUTPATIENT
Start: 2021-04-13 | End: 2021-04-13

## 2021-04-13 RX ADMIN — TRIAMCINOLONE ACETONIDE 40 MG: 40 INJECTION, SUSPENSION INTRA-ARTICULAR; INTRAMUSCULAR at 17:03:00

## 2021-04-13 RX ADMIN — TRIAMCINOLONE ACETONIDE 40 MG: 40 INJECTION, SUSPENSION INTRA-ARTICULAR; INTRAMUSCULAR at 17:05:00

## 2021-04-13 NOTE — PROGRESS NOTES
Patient is a 55-year-old white female with bilateral shoulder pain. Patient has had a diagnosis of a rotator cuff tear of the left shoulder.   She has had a hemiarthroplasty of her right shoulder in the past and has had a diagnosis of residual impingement

## 2021-04-15 PROBLEM — Z09 HOSPITAL DISCHARGE FOLLOW-UP: Status: ACTIVE | Noted: 2021-04-15

## 2021-04-15 PROBLEM — Z95.5 S/P CORONARY ARTERY STENT PLACEMENT: Status: ACTIVE | Noted: 2021-04-15

## 2021-07-20 ENCOUNTER — OFFICE VISIT (OUTPATIENT)
Dept: ORTHOPEDICS CLINIC | Facility: CLINIC | Age: 66
End: 2021-07-20
Payer: MEDICARE

## 2021-07-20 VITALS — OXYGEN SATURATION: 97 % | HEART RATE: 88 BPM

## 2021-07-20 DIAGNOSIS — M20.41 HAMMER TOES OF BOTH FEET: Primary | ICD-10-CM

## 2021-07-20 DIAGNOSIS — M20.42 HAMMER TOES OF BOTH FEET: Primary | ICD-10-CM

## 2021-07-20 DIAGNOSIS — I73.9 PERIPHERAL VASCULAR DISEASE (HCC): ICD-10-CM

## 2021-07-20 PROCEDURE — 99213 OFFICE O/P EST LOW 20 MIN: CPT | Performed by: PODIATRIST

## 2021-07-20 NOTE — PROGRESS NOTES
[facility] Progress Note    Subjective: Is here after some absence for evaluation of her feet specifically hammertoes and painful feet. Morelia Fearing is a fall risk and she has multiple medical problems.   She recently had her own nails done so she does no

## 2021-07-21 ENCOUNTER — TELEPHONE (OUTPATIENT)
Dept: ORTHOPEDICS CLINIC | Facility: CLINIC | Age: 66
End: 2021-07-21

## 2021-07-21 NOTE — TELEPHONE ENCOUNTER
Patient is still being billed for her nail care back on 10/23/20. She is trying to support the nail trimming with other foot diagnoses but billing has said they cannot submit a claim twice. Is there anything else [facility] can do to help get this covered?  Ventura Godwin

## 2021-07-28 ENCOUNTER — APPOINTMENT (OUTPATIENT)
Dept: GENERAL RADIOLOGY | Facility: HOSPITAL | Age: 66
End: 2021-07-28
Attending: EMERGENCY MEDICINE
Payer: MEDICARE

## 2021-07-28 ENCOUNTER — HOSPITAL ENCOUNTER (EMERGENCY)
Facility: HOSPITAL | Age: 66
Discharge: HOME | End: 2021-07-29
Attending: EMERGENCY MEDICINE
Payer: MEDICARE

## 2021-07-28 DIAGNOSIS — N17.9 AKI (ACUTE KIDNEY INJURY) (HCC): ICD-10-CM

## 2021-07-28 DIAGNOSIS — R04.2 HEMOPTYSIS: Primary | ICD-10-CM

## 2021-07-28 DIAGNOSIS — E87.6 HYPOKALEMIA: ICD-10-CM

## 2021-07-28 LAB
ALBUMIN SERPL-MCNC: 3.7 G/DL (ref 3.4–5)
ALBUMIN/GLOB SERPL: 0.8 {RATIO} (ref 1–2)
ALP LIVER SERPL-CCNC: 101 U/L
ALT SERPL-CCNC: 20 U/L
ANION GAP SERPL CALC-SCNC: 4 MMOL/L (ref 0–18)
APTT PPP: 50.1 SECONDS (ref 25.4–36.1)
AST SERPL-CCNC: 25 U/L (ref 15–37)
BASOPHILS # BLD AUTO: 0.06 X10(3) UL (ref 0–0.2)
BASOPHILS NFR BLD AUTO: 0.9 %
BILIRUB SERPL-MCNC: 0.4 MG/DL (ref 0.1–2)
BUN BLD-MCNC: 35 MG/DL (ref 7–18)
BUN/CREAT SERPL: 19.4 (ref 10–20)
CALCIUM BLD-MCNC: 9.6 MG/DL (ref 8.5–10.1)
CHLORIDE SERPL-SCNC: 95 MMOL/L (ref 98–112)
CO2 SERPL-SCNC: 34 MMOL/L (ref 21–32)
CREAT BLD-MCNC: 1.8 MG/DL
DEPRECATED RDW RBC AUTO: 48.6 FL (ref 35.1–46.3)
EOSINOPHIL # BLD AUTO: 0.25 X10(3) UL (ref 0–0.7)
EOSINOPHIL NFR BLD AUTO: 3.6 %
ERYTHROCYTE [DISTWIDTH] IN BLOOD BY AUTOMATED COUNT: 14.1 % (ref 11–15)
GLOBULIN PLAS-MCNC: 4.4 G/DL (ref 2.8–4.4)
GLUCOSE BLD-MCNC: 119 MG/DL (ref 70–99)
HCT VFR BLD AUTO: 33.7 %
HGB BLD-MCNC: 10.7 G/DL
IMM GRANULOCYTES # BLD AUTO: 0.02 X10(3) UL (ref 0–1)
IMM GRANULOCYTES NFR BLD: 0.3 %
INR BLD: 2.49 (ref 0.89–1.11)
LYMPHOCYTES # BLD AUTO: 1.81 X10(3) UL (ref 1–4)
LYMPHOCYTES NFR BLD AUTO: 26.4 %
M PROTEIN MFR SERPL ELPH: 8.1 G/DL (ref 6.4–8.2)
MCH RBC QN AUTO: 29.8 PG (ref 26–34)
MCHC RBC AUTO-ENTMCNC: 31.8 G/DL (ref 31–37)
MCV RBC AUTO: 93.9 FL
MONOCYTES # BLD AUTO: 0.76 X10(3) UL (ref 0.1–1)
MONOCYTES NFR BLD AUTO: 11.1 %
NEUTROPHILS # BLD AUTO: 3.95 X10 (3) UL (ref 1.5–7.7)
NEUTROPHILS # BLD AUTO: 3.95 X10(3) UL (ref 1.5–7.7)
NEUTROPHILS NFR BLD AUTO: 57.7 %
OSMOLALITY SERPL CALC.SUM OF ELEC: 285 MOSM/KG (ref 275–295)
PLATELET # BLD AUTO: 388 10(3)UL (ref 150–450)
POTASSIUM SERPL-SCNC: 3.2 MMOL/L (ref 3.5–5.1)
PSA SERPL DL<=0.01 NG/ML-MCNC: 27.5 SECONDS (ref 12.2–14.5)
RBC # BLD AUTO: 3.59 X10(6)UL
SODIUM SERPL-SCNC: 133 MMOL/L (ref 136–145)
TROPONIN I SERPL-MCNC: <0.045 NG/ML (ref ?–0.04)
WBC # BLD AUTO: 6.9 X10(3) UL (ref 4–11)

## 2021-07-28 PROCEDURE — 84484 ASSAY OF TROPONIN QUANT: CPT | Performed by: EMERGENCY MEDICINE

## 2021-07-28 PROCEDURE — 36415 COLL VENOUS BLD VENIPUNCTURE: CPT

## 2021-07-28 PROCEDURE — 99284 EMERGENCY DEPT VISIT MOD MDM: CPT

## 2021-07-28 PROCEDURE — 80053 COMPREHEN METABOLIC PANEL: CPT | Performed by: EMERGENCY MEDICINE

## 2021-07-28 PROCEDURE — 85730 THROMBOPLASTIN TIME PARTIAL: CPT | Performed by: EMERGENCY MEDICINE

## 2021-07-28 PROCEDURE — 85025 COMPLETE CBC W/AUTO DIFF WBC: CPT | Performed by: EMERGENCY MEDICINE

## 2021-07-28 PROCEDURE — 93010 ELECTROCARDIOGRAM REPORT: CPT

## 2021-07-28 PROCEDURE — 85610 PROTHROMBIN TIME: CPT | Performed by: EMERGENCY MEDICINE

## 2021-07-28 PROCEDURE — 71045 X-RAY EXAM CHEST 1 VIEW: CPT | Performed by: EMERGENCY MEDICINE

## 2021-07-28 PROCEDURE — 93005 ELECTROCARDIOGRAM TRACING: CPT

## 2021-07-29 VITALS
RESPIRATION RATE: 17 BRPM | TEMPERATURE: 98 F | OXYGEN SATURATION: 100 % | BODY MASS INDEX: 39.27 KG/M2 | HEART RATE: 78 BPM | SYSTOLIC BLOOD PRESSURE: 108 MMHG | DIASTOLIC BLOOD PRESSURE: 57 MMHG | WEIGHT: 265.13 LBS | HEIGHT: 69 IN

## 2021-07-29 LAB
ATRIAL RATE: 79 BPM
P AXIS: 64 DEGREES
P-R INTERVAL: 192 MS
Q-T INTERVAL: 428 MS
QRS DURATION: 106 MS
QTC CALCULATION (BEZET): 490 MS
R AXIS: 33 DEGREES
T AXIS: 47 DEGREES
VENTRICULAR RATE: 79 BPM

## 2021-07-29 NOTE — ED PROVIDER NOTES
Patient Seen in: [facility] Emergency Department      History   Patient presents with:  Cough/URI  Abnormal Result    Stated Complaint: abnormal echo today, cough    HPI/Subjective:   HPI    26-year-old female past medical history of aortic valve re Palpations: Abdomen is soft. Musculoskeletal:      Cervical back: Normal range of motion and neck supple. Skin:     General: Skin is warm and dry. Capillary Refill: Capillary refill takes less than 2 seconds. Neurological:      Mental Status:  Sh depressions, mild QT prolongation              XR CHEST AP PORTABLE  (CPT=71045)    Result Date: 7/28/2021  CONCLUSION:  No evidence of active cardiopulmonary disease.     Dictated by (CST): Janae Oliver MD on 7/28/2021 at 11:17 PM     Finalized by (CST):

## 2021-07-29 NOTE — ED INITIAL ASSESSMENT (HPI)
Pt states she started coughing yesterday, \" sputum is dark green, today it's old coagulated blood. \" Pt is on Coumadin and Plavix. Also c/o ANDRIA, she denies CP, reports dizziness/lightheadedness in the last 2 days.  Pt had a 2 D echo today, no results yet

## 2021-08-09 NOTE — PROGRESS NOTES
Received report from cath nurse. Pt Lying supine in bed. R groin soft, no hematoma. Dressing CDI. R pedal +2. BP and site assessment every 15 minutes. EKG completed, found to be in junctional rhythm. HR 40s-50s. BP stable. Pt asymptomatic at this time.  [facility] No

## 2021-09-08 ENCOUNTER — OFFICE VISIT (OUTPATIENT)
Dept: WOUND CARE | Facility: HOSPITAL | Age: 66
End: 2021-09-08
Attending: NURSE PRACTITIONER
Payer: MEDICARE

## 2021-09-08 VITALS
HEIGHT: 69 IN | TEMPERATURE: 98 F | WEIGHT: 278 LBS | DIASTOLIC BLOOD PRESSURE: 71 MMHG | SYSTOLIC BLOOD PRESSURE: 157 MMHG | BODY MASS INDEX: 41.18 KG/M2 | HEART RATE: 69 BPM | RESPIRATION RATE: 18 BRPM

## 2021-09-08 DIAGNOSIS — L97.812 NON-PRESSURE CHRONIC ULCER OF OTHER PART OF RIGHT LOWER LEG WITH FAT LAYER EXPOSED (HCC): ICD-10-CM

## 2021-09-08 DIAGNOSIS — L97.919 CHRONIC VENOUS HYPERTENSION WITH ULCER AND INFLAMMATION, BILATERAL (HCC): Primary | ICD-10-CM

## 2021-09-08 DIAGNOSIS — I87.333 CHRONIC VENOUS HYPERTENSION WITH ULCER AND INFLAMMATION, BILATERAL (HCC): Primary | ICD-10-CM

## 2021-09-08 DIAGNOSIS — L97.929 CHRONIC VENOUS HYPERTENSION WITH ULCER AND INFLAMMATION, BILATERAL (HCC): Primary | ICD-10-CM

## 2021-09-08 PROCEDURE — 99203 OFFICE O/P NEW LOW 30 MIN: CPT | Performed by: NURSE PRACTITIONER

## 2021-09-08 NOTE — PATIENT INSTRUCTIONS
Please return in 1 week, (call and come back in if the wraps fall    You may shower with protection of the wound (ie a cast cover or similar). Cleansing/dressing:   In clinic , with each dressing change:    1. please cleanse the limb, foot, and between t wound healing) and take 2 packets/day.  you can order online at [employer] or [employer]    Concerns:  Signs of infection may include the following: • Increase in redness • Red \"streaks\" from wound • Increase in swelling • Fever • Unusual odor • Change in the adryan

## 2021-09-08 NOTE — PROGRESS NOTES
CHIEF COMPLAINT:   Patient presents with:  Wound Care: Patient is here for the management of a venous ulcer on the right medilal lower leg. Patient states she noticed the wound about 2 weeks ago. Has been using listerine on the wound and wears tubigrip. Monet Solomon (100 mg total) by mouth daily. , Disp: 90 tablet, Rfl: 3  •  Levothyroxine Sodium 150 MCG Oral Tab, TAKE 1 TABLET BY MOUTH  DAILY BEFORE BREAKFAST, Disp: 90 tablet, Rfl: 1  •  dilTIAZem (CARDIZEM CD) 120 MG Oral Capsule SR 24 Hr, Take 1 capsule (120 mg tota daily.  ), Disp: 180 capsule, Rfl: 3  •  buPROPion HCl ER, SR, 150 MG Oral Tablet 12 Hr, TAKE 1 TABLET BY MOUTH  TWICE DAILY (Patient taking differently: Take 150 mg by mouth 2 (two) times daily.  ), Disp: 180 tablet, Rfl: 3  •  Melatonin 10 MG Oral Cap, T Sulfate        HIVES  Oxycontin               ITCHING  Vicodin [Hydrocodon*    ITCHING   REVIEW OF SYSTEMS:   This information was obtained from the patient/family and chart. See HPI for pertinent positives, otherwise 10 pt ROS negative.   Review of Syst (126.1 kg).      Lab Results   Component Value Date    BUN 35 (H) 07/28/2021    CREATSERUM 1.80 (H) 07/28/2021    GFRCKDEPI 40.37 (L) 07/24/2021    ALB 3.7 07/28/2021    TP 8.1 07/28/2021    A1C 6.0 (H) 01/04/2021         POC Glucose   Date Value Ref Range 09/08/21 1113    Wound Number (Wound Clinic Only): # 1 right medial lower leg  Primary Wound Type: Venous Ulcer  Location: Leg  Wound Location Orientation: Anterior;Right;Medial      Assessments 9/8/2021 11:20 AM   Wound Image     Drainage Amount Small   D Questions and concerns addressed. Red flags to RTC or ED reviewed. Patient (or parent) agrees to plan. Patient is new to clinic, not followed by ehv. I spent  44  minutes with the patient.  This time included:    preparing to see the patient (eg, rev (Follow the protein handout in your welcome folder)  11. Vitamin C: Citrus fruits and juices, strawberries, tomatoes, tomato juice, peppers, baked potatoes, spinach, broccoli, cauliflower, Chunky sprouts, cabbage  12.  Vitamin A: Dark green, leafy vegetab

## 2021-09-08 NOTE — PROGRESS NOTES
.Weekly Wound Education Note    Teaching Provided To: Patient  Training Topics: Edema control; Compression; Discharge instructions;Dressing  Training Method: Explain/Verbal;Written  Training Response: Patient responds and understands         tarah Mcclendon

## 2021-09-15 ENCOUNTER — TELEPHONE (OUTPATIENT)
Dept: WOUND CARE | Facility: HOSPITAL | Age: 66
End: 2021-09-15

## 2021-09-15 NOTE — TELEPHONE ENCOUNTER
Patient called asking if she is able to remove the wrap today to shower, since she will be coming in tomorrow. Writer advised pt not to remove wrap - this could increase swelling. She verbalizes understanding.

## 2021-09-16 ENCOUNTER — OFFICE VISIT (OUTPATIENT)
Dept: WOUND CARE | Facility: HOSPITAL | Age: 66
End: 2021-09-16
Attending: NURSE PRACTITIONER
Payer: MEDICARE

## 2021-09-16 VITALS
RESPIRATION RATE: 18 BRPM | DIASTOLIC BLOOD PRESSURE: 72 MMHG | TEMPERATURE: 97 F | HEART RATE: 70 BPM | SYSTOLIC BLOOD PRESSURE: 124 MMHG

## 2021-09-16 DIAGNOSIS — L97.919 CHRONIC VENOUS HYPERTENSION WITH ULCER AND INFLAMMATION, BILATERAL (HCC): Primary | ICD-10-CM

## 2021-09-16 DIAGNOSIS — L97.812 NON-PRESSURE CHRONIC ULCER OF OTHER PART OF RIGHT LOWER LEG WITH FAT LAYER EXPOSED (HCC): ICD-10-CM

## 2021-09-16 DIAGNOSIS — L97.929 CHRONIC VENOUS HYPERTENSION WITH ULCER AND INFLAMMATION, BILATERAL (HCC): Primary | ICD-10-CM

## 2021-09-16 DIAGNOSIS — I87.333 CHRONIC VENOUS HYPERTENSION WITH ULCER AND INFLAMMATION, BILATERAL (HCC): Primary | ICD-10-CM

## 2021-09-16 PROCEDURE — 99214 OFFICE O/P EST MOD 30 MIN: CPT | Performed by: NURSE PRACTITIONER

## 2021-09-16 NOTE — PROGRESS NOTES
CHIEF COMPLAINT:   Patient presents with:  Wound Care: Follow-up, denies pain. Some discomfort to ankle ankle, wrap tolerated.      HPI:   Information obtained from patient and chart  9-8-21 INITIAL:  49-year-old female past medical history of aortic valve insurance for skin subs. MEDICATIONS:     Current Outpatient Medications:   •  furosemide 40 MG Oral Tab, Take 1 tablet (40 mg total) by mouth daily as needed. , Disp: 90 tablet, Rfl: 3  •  valsartan 40 MG Oral Tab, Take 40 mg by mouth daily. , Disp: , R for Allergies. , Disp: 30 capsule, Rfl: 0  •  Nortriptyline HCl 25 MG Oral Cap, Take 25 mg by mouth nightly.  , Disp: , Rfl:   •  FLUoxetine HCl 20 MG Oral Cap, TAKE 1 CAPSULE BY MOUTH  TWICE DAILY (Patient taking differently: Take 20 mg by mouth 2 (two) ti Sulfate        HIVES  Oxycontin               ITCHING  Vicodin [Hydrocodon*    ITCHING   REVIEW OF SYSTEMS:   This information was obtained from the patient/family and chart. See HPI for pertinent positives, otherwise 10 pt ROS negative.   Review of Syst of Measurement - Right Ankle:  (11)              Right Ankle cm[de-identified] 22.3     WOUND ASSESSMENT:       Wound 09/08/21 # 1 right medial lower leg Venous Ulcer Leg Anterior;Right;Medial (Active)   Date First Assessed/Time First Assessed: 09/08/21 1113    Wound N Chronic venous hypertension with ulcer and inflammation, bilateral (HCC)  - VEIN CLINIC EVAL & TREAT ([facility])  - VEIN CLINIC FOLLOW UP ([facility])    Risks, benefits, and alternatives of current treatment plan discussed in detail. Questions and concerns addressed. toes remove wrap completely and call the wound center @ [phone]. Refer to the \"Multi-layer compression bandage application patient information sheet\" given to you in clinic.     Nutrition and blood sugar control:  Focus on the following:  10. Polyi

## 2021-09-16 NOTE — PATIENT INSTRUCTIONS
Please return in 1 week. Please call [facility] and schedule an appointment for venous insufficiency work up/evaluation   [id number] may shower with protection of the wound (ie a cast cover or similar). Cleansing/dressing:   In clinic , w seafood  14. Consider supplementing with Alvino by [employer] (These are essential branch chain amino acids that help with tissue building and wound healing) and take 2 packets/day.  you can order online at [employer]ott or [address] [place] [address]    Concerns:  Signs of infection

## 2021-09-16 NOTE — PROGRESS NOTES
Weekly Wound Education Note    Teaching Provided To: Patient  Training Topics: Cleasing and general instructions; Compression;  Discharge instructions; Dressing; Edema control; Nutrition; Signs and symptoms of infection; Venous disease; Test/procedures  Tra

## 2021-09-23 ENCOUNTER — OFFICE VISIT (OUTPATIENT)
Dept: WOUND CARE | Facility: HOSPITAL | Age: 66
End: 2021-09-23
Attending: NURSE PRACTITIONER
Payer: MEDICARE

## 2021-09-23 VITALS
RESPIRATION RATE: 14 BRPM | DIASTOLIC BLOOD PRESSURE: 69 MMHG | SYSTOLIC BLOOD PRESSURE: 133 MMHG | HEART RATE: 68 BPM | TEMPERATURE: 99 F

## 2021-09-23 DIAGNOSIS — L97.919 CHRONIC VENOUS HYPERTENSION WITH ULCER AND INFLAMMATION, BILATERAL (HCC): ICD-10-CM

## 2021-09-23 DIAGNOSIS — L97.812 NON-PRESSURE CHRONIC ULCER OF OTHER PART OF RIGHT LOWER LEG WITH FAT LAYER EXPOSED (HCC): Primary | ICD-10-CM

## 2021-09-23 DIAGNOSIS — I87.333 CHRONIC VENOUS HYPERTENSION WITH ULCER AND INFLAMMATION, BILATERAL (HCC): ICD-10-CM

## 2021-09-23 DIAGNOSIS — L97.929 CHRONIC VENOUS HYPERTENSION WITH ULCER AND INFLAMMATION, BILATERAL (HCC): ICD-10-CM

## 2021-09-23 PROCEDURE — 99213 OFFICE O/P EST LOW 20 MIN: CPT | Performed by: NURSE PRACTITIONER

## 2021-09-23 NOTE — PATIENT INSTRUCTIONS
Please return in 1 week    Please call [facility] and schedule an appointment for venous insufficiency work up/evaluation   [phone] may shower with protection of the wound (ie a cast cover or similar). Cleansing/dressing:   In clinic , jimmie seafood  14. Consider supplementing with Alvino by Marley [employer]oon (These are essential branch chain amino acids that help with tissue building and wound healing) and take 2 packets/day.  you can order online at [employer] or [address] [place] [address]    Concerns:  Signs of infection

## 2021-09-23 NOTE — PROGRESS NOTES
CHIEF COMPLAINT:   Patient presents with:  Wound Care: Patients is here for a follow up.  Patients denies any issues     HPI:   Information obtained from patient and chart  9-8-21 INITIAL:  18-year-old female past medical history of aortic valve replacement subs.     9-23-21 Patient returns today, she adopted a senior cat and so has not had a chance to make her vascular appointment yet. She did receive her garments and we verified they are the correct size.   The wounds are less fibrotic with some granulation (along with 50 mg benadryl) (Patient not taking: No sig reported), Disp: 15 tablet, Rfl: 2  •  Albuterol Sulfate  (90 Base) MCG/ACT Inhalation Aero Soln, Inhale 2 puffs into the lungs every 6 (six) hours as needed. , Disp: , Rfl:   •  Potassium Chlor HIVES    Comment:ALL ADHESIVES  Codeine                 HIVES  Doxycycline             SWELLING  I.V. Dye                HIVES    Comment:Patient does not remember name of certain IV dye. Has had scans since then with no side effect. - crutches  Integumentary:  refer to wound characteristics and images   Psychiatric:  Judgment and insight intact. Alert and oriented times 3. No evidence of depression, anxiety, or agitation. Calm, cooperative, and communicative.  Appropriate interactions an Comprilan 8cm; Comprilan 10cm; Comprilan 12cm; Stockinette 4in Comprilan 8cm; Comprilan 10cm; Comprilan 12cm;  Stockinette 4in   Dressing Applied Yes Yes   Compression Wrap Location Toes to Knee Toes to Knee   Compression Wrap Status Clean; Dry Clean; Dry; diuretics as directed by your physician. Do not skip doses or change doses unless instructed to do so by your physician. 8. Decrease salt intake, follow recommended 2 grams daily. 9. Do not get leg(s) with compression wrap wet.  If wraps are too tight as risk of your wound not healing, infection, possible loss of limb and even loss of life.        Lani Galarza FNP-C, CWCN-AP, CFCN, CSWS, WC, [facility]  9/23/2021

## 2021-09-23 NOTE — PROGRESS NOTES
Weekly Wound Education Note    Teaching Provided To: Patient  Training Topics: Cleasing and general instructions; Compression;  Discharge instructions; Edema control; Dressing  Training Method: Explain/Verbal  Training Response: Patient responds and underst

## 2021-09-30 ENCOUNTER — OFFICE VISIT (OUTPATIENT)
Dept: WOUND CARE | Facility: HOSPITAL | Age: 66
End: 2021-09-30
Attending: NURSE PRACTITIONER
Payer: MEDICARE

## 2021-09-30 VITALS
RESPIRATION RATE: 16 BRPM | SYSTOLIC BLOOD PRESSURE: 154 MMHG | TEMPERATURE: 98 F | DIASTOLIC BLOOD PRESSURE: 71 MMHG | HEART RATE: 73 BPM

## 2021-09-30 DIAGNOSIS — I87.333 CHRONIC VENOUS HYPERTENSION WITH ULCER AND INFLAMMATION, BILATERAL (HCC): ICD-10-CM

## 2021-09-30 DIAGNOSIS — L97.919 CHRONIC VENOUS HYPERTENSION WITH ULCER AND INFLAMMATION, BILATERAL (HCC): ICD-10-CM

## 2021-09-30 DIAGNOSIS — L97.929 CHRONIC VENOUS HYPERTENSION WITH ULCER AND INFLAMMATION, BILATERAL (HCC): ICD-10-CM

## 2021-09-30 DIAGNOSIS — L97.812 NON-PRESSURE CHRONIC ULCER OF OTHER PART OF RIGHT LOWER LEG WITH FAT LAYER EXPOSED (HCC): Primary | ICD-10-CM

## 2021-09-30 PROCEDURE — 99213 OFFICE O/P EST LOW 20 MIN: CPT | Performed by: NURSE PRACTITIONER

## 2021-09-30 NOTE — PROGRESS NOTES
Weekly Wound Education Note    Teaching Provided To: Patient  Training Topics: Cleasing and general instructions; Compression;  Discharge instructions; Dressing; Edema control  Training Method: Explain/Verbal  Training Response: Patient responds and underst

## 2021-09-30 NOTE — PATIENT INSTRUCTIONS
Please return in 1 week    [facility] appointment for venous insufficiency ultrasound, scheduled for October    You may shower with protection of the wound (ie a cast cover or similar). Cleansing/dressing: In clinic , with each dressing change: 1. [employer] (These are essential branch chain amino acids that help with tissue building and wound healing) and take 2 packets/day.  you can order online at [employer] or [employer]    Concerns:  Signs of infection may include the following: • Increase in redness •

## 2021-09-30 NOTE — PROGRESS NOTES
CHIEF COMPLAINT:   Patient presents with:  Wound Care: Follow up for Right lower leg wound. No concerns at this time.     HPI:   Information obtained from patient and chart  9-8-21 INITIAL:  41-year-old female past medical history of aortic valve replacemen skin subs. 9-23-21 Patient returns today, she adopted a senior cat and so has not had a chance to make her vascular appointment yet. She did receive her garments and we verified they are the correct size.   The wounds are less fibrotic with some granul 453.6 g, Rfl: 1  •  amoxicillin 500 MG Oral Cap, Take 2000 mg (4 capsules) 1 hour prior to dental procedures and cleanings, Disp: 4 capsule, Rfl: 4  •  predniSONE 10 MG Oral Tab, Take 50 mg by mouth 13 hours prior to test, 50 mg 7 hours prior to test, and Units by mouth nightly.  , Disp: , Rfl:   •  triamcinolone acetonide 40 MG/ML Injection Suspension, Inject 80 mg into the muscle every 4 (four) months., Disp: , Rfl:   •  Multiple Vitamin (MULTIVITAMIN OR), Take 1 tablet by mouth daily.   , Disp: , Rfl:   A are warm. toenails are wnl for color, thickness and hygeine. Skin is dry. no hairgrowth on leg. Musculoskeletal:  Gait and station stable with crutches  Integumentary:  refer to wound characteristics and images   Psychiatric:  Judgment and insight intact. 9/8/2021 11:49 AM 9/30/2021  1:36 PM   Response to Treatment Well tolerated Well tolerated   Compression Layers Multilayer Multilayer   Compression Product Type Comprilan 8cm; Comprilan 10cm; Comprilan 12cm;  Stockinette 4in Coflex   Dressing Applied Yes Andrés Martinez the heart when sitting or as much as possible. 7. Take you diuretics as directed by your physician. Do not skip doses or change doses unless instructed to do so by your physician. 8. Decrease salt intake, follow recommended 2 grams daily.   9. Do not get important. If you do not follow all instructions you are at risk of your wound not healing, infection, possible loss of limb and even loss of life.        Lani Galarza FNP-C, CWCN-AP, CFCN, CSWS, [facility]  9/30/2021

## 2021-10-07 ENCOUNTER — OFFICE VISIT (OUTPATIENT)
Dept: WOUND CARE | Facility: HOSPITAL | Age: 66
End: 2021-10-07
Attending: NURSE PRACTITIONER
Payer: MEDICARE

## 2021-10-07 VITALS
SYSTOLIC BLOOD PRESSURE: 145 MMHG | DIASTOLIC BLOOD PRESSURE: 85 MMHG | RESPIRATION RATE: 14 BRPM | HEART RATE: 67 BPM | TEMPERATURE: 98 F

## 2021-10-07 DIAGNOSIS — I87.333 CHRONIC VENOUS HYPERTENSION WITH ULCER AND INFLAMMATION, BILATERAL (HCC): ICD-10-CM

## 2021-10-07 DIAGNOSIS — L97.919 CHRONIC VENOUS HYPERTENSION WITH ULCER AND INFLAMMATION, BILATERAL (HCC): ICD-10-CM

## 2021-10-07 DIAGNOSIS — L97.812 NON-PRESSURE CHRONIC ULCER OF OTHER PART OF RIGHT LOWER LEG WITH FAT LAYER EXPOSED (HCC): Primary | ICD-10-CM

## 2021-10-07 DIAGNOSIS — L97.929 CHRONIC VENOUS HYPERTENSION WITH ULCER AND INFLAMMATION, BILATERAL (HCC): ICD-10-CM

## 2021-10-07 PROCEDURE — 99213 OFFICE O/P EST LOW 20 MIN: CPT | Performed by: NURSE PRACTITIONER

## 2021-10-07 NOTE — PROGRESS NOTES
Weekly Wound Education Note    Teaching Provided To: Patient  Training Topics: Compression;  Discharge instructions; Dressing; Edema control; Cleasing and general instructions  Training Method: Explain/Verbal  Training Response: Patient responds and underst

## 2021-10-07 NOTE — PATIENT INSTRUCTIONS
Please return in 1 week    [facility] appointment for venous insufficiency ultrasound, scheduled for October    You may shower with protection of the wound (ie a cast cover or similar). Cleansing/dressing: In clinic , with each dressing change: 1. Radiant Zemax (These are essential branch chain amino acids that help with tissue building and wound healing) and take 2 packets/day.  you can order online at [employer] or [employer]    Concerns:  Signs of infection may include the following: • Increase in redness •

## 2021-10-07 NOTE — PROGRESS NOTES
CHIEF COMPLAINT:   Patient presents with:  Wound Care: Patients is here for a follow up.  Patients has no complian    HPI:   Information obtained from patient and chart  9-8-21 INITIAL:  13-year-old female past medical history of aortic valve replacement mi subs.     9-23-21 Patient returns today, she adopted a senior cat and so has not had a chance to make her vascular appointment yet. She did receive her garments and we verified they are the correct size.   The wounds are less fibrotic with some granulation tablet, Rfl: 1  •  dilTIAZem (CARDIZEM CD) 120 MG Oral Capsule SR 24 Hr, Take 1 capsule (120 mg total) by mouth daily. , Disp: 90 capsule, Rfl: 1  •  metolazone 2.5 MG Oral Tab, Take 1 tablet (2.5 mg total) by mouth daily as needed (weight gain). , Disp: 14 External Ointment, Apply to open areas BID PRN (Patient taking differently: 1 Application 2 (two) times daily as needed.  Apply to open areas BID PRN), Disp: 30 g, Rfl: 1  •  ANORO ELLIPTA 62.5-25 MCG/INH Inhalation Aerosol Powder, Breath Activated, Inhale Final       Vital signs reviewed. Appears stated age, well groomed. Constitutional:  Bp wnl for patient. Pulse Regular and wnl for patient. Respirations easy and unlabored. Temperature wnl. obese. Appearance neat and clean. Appears in no acute distress. Edema; Other (Comment) Hemosiderin staining; Edema;  Maceration   Wound Granulation Tissue Pink; Firm Spongy; Pink   Wound Bed Granulation (%) 5 % 80 %   Wound Bed Epithelium (%) 75 % —   Wound Bed Slough (%) 20 % 20 %   Wound Odor None None       No Linked and washcloth. 2. Dry thoroughly. 3. Cleanse/soak the wound with VASHE (or other hypochlorous wound cleanser), dab dry.     4. Apply the following dressings:  betamethasone and ammonium lactate periwound>endoform>xeroform> abd pad    Compression:  cofl odor • Change in the amount of wound drainage     Should you experience any significant changes in your wound(s) or have any questions regarding your home care instructions please contact the wound center [facility] @ [phone] If after regular bus

## 2021-10-11 ENCOUNTER — TELEPHONE (OUTPATIENT)
Dept: WOUND CARE | Facility: HOSPITAL | Age: 66
End: 2021-10-11

## 2021-10-11 NOTE — TELEPHONE ENCOUNTER
Patient called stating wrap slid down and causing irritation. Writer returned call - advising patient to remove wrap and apply compression. She verbalizes understanding. She will be removing wrap and applying spandagrip.  Pt states she has an appointment to

## 2021-10-11 NOTE — TELEPHONE ENCOUNTER
Got a call from the patient she said that her wrap is sliding down and it's irritating, Offer the patient to come in today, She said she can't make it today she has 2 's appointments.  Told the patient if she can remove her wrap and put compression stock

## 2021-10-12 ENCOUNTER — OFFICE VISIT (OUTPATIENT)
Dept: WOUND CARE | Facility: HOSPITAL | Age: 66
End: 2021-10-12
Attending: NURSE PRACTITIONER
Payer: MEDICARE

## 2021-10-12 VITALS
DIASTOLIC BLOOD PRESSURE: 89 MMHG | TEMPERATURE: 98 F | SYSTOLIC BLOOD PRESSURE: 121 MMHG | HEART RATE: 76 BPM | RESPIRATION RATE: 18 BRPM

## 2021-10-12 DIAGNOSIS — I87.333 CHRONIC VENOUS HYPERTENSION WITH ULCER AND INFLAMMATION, BILATERAL (HCC): ICD-10-CM

## 2021-10-12 DIAGNOSIS — L97.919 CHRONIC VENOUS HYPERTENSION WITH ULCER AND INFLAMMATION, BILATERAL (HCC): ICD-10-CM

## 2021-10-12 DIAGNOSIS — L97.812 NON-PRESSURE CHRONIC ULCER OF OTHER PART OF RIGHT LOWER LEG WITH FAT LAYER EXPOSED (HCC): Primary | ICD-10-CM

## 2021-10-12 DIAGNOSIS — L97.929 CHRONIC VENOUS HYPERTENSION WITH ULCER AND INFLAMMATION, BILATERAL (HCC): ICD-10-CM

## 2021-10-12 PROCEDURE — 99213 OFFICE O/P EST LOW 20 MIN: CPT | Performed by: NURSE PRACTITIONER

## 2021-10-12 NOTE — PATIENT INSTRUCTIONS
Please return in 1 week    [facility] appointment for venous insufficiency ultrasound, scheduled for October    You may shower with protection of the wound (ie a cast cover or similar). Cleansing/dressing: In clinic , with each dressing change: 1. are essential branch chain amino acids that help with tissue building and wound healing) and take 2 packets/day.  you can order online at [employer] or [employer]    Concerns:  Signs of infection may include the following: • Increase in redness • Red \"streaks\" fr

## 2021-10-12 NOTE — PROGRESS NOTES
CHIEF COMPLAINT:   Patient presents with:  Wound Care: Pt here for follow up. She states the applied her wrap wrong last week and it caused it to slide down.     HPI:   Information obtained from patient and chart  9-8-21 INITIAL:  49-year-old female past me omega, will also run insurance for skin subs. 9-23-21 Patient returns today, she adopted a senior cat and so has not had a chance to make her vascular appointment yet. She did receive her garments and we verified they are the correct size.   The wou 2  •  warfarin 3 MG Oral Tab, Take two tabs by mouth on tues, thur, sat and one tab along with a 1 mg tab all other days or as directed by anticoagulation clinic., Disp: 130 tablet, Rfl: 3  •  allopurinol 100 MG Oral Tab, Take 1 tablet (100 mg total) by mo SR, 150 MG Oral Tablet 12 Hr, TAKE 1 TABLET BY MOUTH  TWICE DAILY (Patient taking differently: Take 150 mg by mouth 2 (two) times daily.), Disp: 180 tablet, Rfl: 3  •  Melatonin 10 MG Oral Cap, Take 10 mg by mouth nightly.  , Disp: , Rfl:   •  Rosuvastatin as of an earlier encounter on 10/12/21: 69\". Weight as of an earlier encounter on 10/12/21: 278 lb (126.1 kg).      Lab Results   Component Value Date    BUN 29.0 (H) 10/07/2021    CREATSERUM 1.03 (H) 10/07/2021    GFRCKDEPI 55.99 (L) 10/07/2021    ALB Drainage Description Serous;  Yellow Serosanguineous   Treatments Compression Compression; Vashe   Wound Length (cm) 6.8 cm 0.7 cm   Wound Width (cm) 1.7 cm 0.4 cm   Wound Surface Area (cm^2) 11.56 cm^2 0.28 cm^2   Wound Depth (cm) 0.1 cm 0.1 cm   Wound V examination and/or evaluation, counseling and educating the patient, documenting in the record  DISCHARGE:    Patient Instructions   Please return in 1 week    [facility] appointment for venous insufficiency ultrasound, scheduled for October    You may s liver, fortified cereals  13. Zinc: Fortified cereals, red meats, seafood  14. Consider supplementing with Alvino by [employer] (These are essential branch chain amino acids that help with tissue building and wound healing) and take 2 packets/day.  you can

## 2021-10-14 ENCOUNTER — APPOINTMENT (OUTPATIENT)
Dept: WOUND CARE | Facility: HOSPITAL | Age: 66
End: 2021-10-14
Attending: NURSE PRACTITIONER
Payer: MEDICARE

## 2021-10-19 ENCOUNTER — OFFICE VISIT (OUTPATIENT)
Dept: WOUND CARE | Facility: HOSPITAL | Age: 66
End: 2021-10-19
Attending: NURSE PRACTITIONER
Payer: MEDICARE

## 2021-10-19 VITALS
HEART RATE: 88 BPM | RESPIRATION RATE: 16 BRPM | SYSTOLIC BLOOD PRESSURE: 144 MMHG | TEMPERATURE: 99 F | DIASTOLIC BLOOD PRESSURE: 68 MMHG

## 2021-10-19 DIAGNOSIS — L97.929 CHRONIC VENOUS HYPERTENSION WITH ULCER AND INFLAMMATION, BILATERAL (HCC): ICD-10-CM

## 2021-10-19 DIAGNOSIS — L97.919 CHRONIC VENOUS HYPERTENSION WITH ULCER AND INFLAMMATION, BILATERAL (HCC): ICD-10-CM

## 2021-10-19 DIAGNOSIS — I87.333 CHRONIC VENOUS HYPERTENSION WITH ULCER AND INFLAMMATION, BILATERAL (HCC): ICD-10-CM

## 2021-10-19 DIAGNOSIS — L97.812 NON-PRESSURE CHRONIC ULCER OF OTHER PART OF RIGHT LOWER LEG WITH FAT LAYER EXPOSED (HCC): Primary | ICD-10-CM

## 2021-10-19 PROCEDURE — 99213 OFFICE O/P EST LOW 20 MIN: CPT | Performed by: NURSE PRACTITIONER

## 2021-10-19 NOTE — PROGRESS NOTES
Weekly Wound Education Note    Teaching Provided To: Patient  Training Topics: Cleasing and general instructions; Compression; Discharge instructions;Dressing;Edema control  Training Method: Explain/Verbal  Training Response: Patient responds and understands

## 2021-10-19 NOTE — PROGRESS NOTES
CHIEF COMPLAINT:   Patient presents with:  Wound Care: Patient is here for a wound care follow up. No new concerns or current pain.     HPI:   Information obtained from patient and chart  9-8-21 INITIAL:  55-year-old female past medical history of aortic va insurance for skin subs. 9-23-21 Patient returns today, she adopted a senior cat and so has not had a chance to make her vascular appointment yet. She did receive her garments and we verified they are the correct size.   The wounds are less fibrotic wi Take 1 tablet (75 mg total) by mouth daily. , Disp: 90 tablet, Rfl: 3  •  furosemide 40 MG Oral Tab, Take 1 tablet (40 mg total) by mouth daily as needed. , Disp: 90 tablet, Rfl: 3  •  valsartan 40 MG Oral Tab, Take 40 mg by mouth daily. , Disp: , Rfl:   •  w HCl 25 MG Oral Cap, Take 1 capsule (25 mg total) by mouth nightly as needed for Allergies. , Disp: 30 capsule, Rfl: 0  •  Nortriptyline HCl 25 MG Oral Cap, Take 25 mg by mouth nightly.  , Disp: , Rfl:   •  FLUoxetine HCl 20 MG Oral Cap, TAKE 1 CAPSULE BY MO patient/family and chart. See HPI for pertinent positives, otherwise 10 pt ROS negative. Review of Systems     HISTORY:   Past medical, surgical, family and social history updated where appropriate. PHYSICAL EXAM:   /68   Pulse 88   Temp 98. (Active)   Date First Assessed/Time First Assessed: 09/08/21 1113    Wound Number (Wound Clinic Only): # 1 right medial lower leg  Primary Wound Type: Venous Ulcer  Location: Leg  Wound Location Orientation: Anterior;Right;Medial      Assessments 9/8/2021 (or parent) agrees to plan. I spent 20 minutes with the patient.  This time included:    preparing to see the patient (eg, review notes and recent diagnostics),  seeing the patient, obtaining and/or reviewing separately obtained history, performing a m fruits and juices, strawberries, tomatoes, tomato juice, peppers, baked potatoes, spinach, broccoli, cauliflower, [place] sprouts, cabbage  11.  Vitamin A: Dark green, leafy vegetables, orange or yellow vegetables, cantaloupe, fortified dairy products, kelly

## 2021-10-19 NOTE — PATIENT INSTRUCTIONS
Please return in 1 week    Patient discharge and wound care instructions  Hamida Ledesma  10/19/2021    You may shower with protection of the wound (ie a cast cover or similar). Cleansing/dressing:   In clinic , with each dressing change:    1. please kelly chain amino acids that help with tissue building and wound healing) and take 2 packets/day.  you can order online at [employer] or [employer]    Concerns:  Signs of infection may include the following: • Increase in redness • Red \"streaks\" from wound • Increase i

## 2021-10-26 ENCOUNTER — OFFICE VISIT (OUTPATIENT)
Dept: WOUND CARE | Facility: HOSPITAL | Age: 66
End: 2021-10-26
Attending: NURSE PRACTITIONER
Payer: MEDICARE

## 2021-10-26 VITALS
DIASTOLIC BLOOD PRESSURE: 80 MMHG | SYSTOLIC BLOOD PRESSURE: 166 MMHG | RESPIRATION RATE: 16 BRPM | TEMPERATURE: 98 F | HEART RATE: 86 BPM

## 2021-10-26 DIAGNOSIS — L97.812 NON-PRESSURE CHRONIC ULCER OF OTHER PART OF RIGHT LOWER LEG WITH FAT LAYER EXPOSED (HCC): Primary | ICD-10-CM

## 2021-10-26 DIAGNOSIS — L97.919 CHRONIC VENOUS HYPERTENSION WITH ULCER AND INFLAMMATION, BILATERAL (HCC): ICD-10-CM

## 2021-10-26 DIAGNOSIS — I87.333 CHRONIC VENOUS HYPERTENSION WITH ULCER AND INFLAMMATION, BILATERAL (HCC): ICD-10-CM

## 2021-10-26 DIAGNOSIS — L97.929 CHRONIC VENOUS HYPERTENSION WITH ULCER AND INFLAMMATION, BILATERAL (HCC): ICD-10-CM

## 2021-10-26 PROCEDURE — 99215 OFFICE O/P EST HI 40 MIN: CPT | Performed by: NURSE PRACTITIONER

## 2021-10-26 NOTE — PATIENT INSTRUCTIONS
Please return in 1 week    [facility]:  Dr. Cecy Zamora [phone]    Patient discharge and wound care instructions  Saud Vinson  10/26/2021      You may shower with protection of the wound (ie a cast cover or similar). Cleansing/dressing:   In clinic , acids that help with tissue building and wound healing) and take 2 packets/day.  you can order online at [employer] or [employer]    Concerns:  Signs of infection may include the following: • Increase in redness • Red \"streaks\" from wound • Increase in swelling •

## 2021-10-26 NOTE — PROGRESS NOTES
CHIEF COMPLAINT:   Patient presents with:  Wound Care: Patient is here for a wound care follow up. She denies any pain. She is having diffculty \"tapering\" the Circaid.      HPI:   Information obtained from patient and chart  9-8-21 INITIAL:  73-year-old f patient will be fully resolved in the next 2 weeks. 10-12-21 patient returns today, she is here a couple days early as her wrap was falling down and causing irritation and pain, so her appointment was moved up to earlier in the week.  She has a red, blan 60 tablet, Rfl: 2  •  warfarin 3 MG Oral Tab, Take two tabs by mouth on tues, thur, sat and one tab along with a 1 mg tab all other days or as directed by anticoagulation clinic., Disp: 130 tablet, Rfl: 3  •  allopurinol 100 MG Oral Tab, Take 1 tablet (100 buPROPion HCl ER, SR, 150 MG Oral Tablet 12 Hr, TAKE 1 TABLET BY MOUTH  TWICE DAILY (Patient taking differently: Take 150 mg by mouth 2 (two) times daily.), Disp: 180 tablet, Rfl: 3  •  Melatonin 10 MG Oral Cap, Take 10 mg by mouth nightly.  , Disp: , Rfl: the following:    Height as of 10/12/21: 69\". Weight as of 10/12/21: 278 lb (126.1 kg). Lab Results   Component Value Date    BUN 29.0 (H) 10/07/2021    CREATSERUM 1.03 (H) 10/07/2021    GFRCKDEPI 55.99 (L) 10/07/2021    ALB 3.7 07/28/2021    TP 8. Description Serous; Yellow Serosanguineous   Treatments Compression —   Wound Length (cm) 6.8 cm 1.3 cm   Wound Width (cm) 1.7 cm 1 cm   Wound Surface Area (cm^2) 11.56 cm^2 1. 3 cm^2   Wound Depth (cm) 0.1 cm 0.1 cm   Wound Volume (cm^3) 1.16 cm^3 0. 13 cm^3 Apply the following dressings:  Enluxtra>coflex 2 layer  Managing your edema:  4. Avoid prolonged standing in one place. It is better to have your calf muscles moving to pump fluid out of the legs      5.  Elevate leg(s) above the level of the heart when [facility] @ [phone] If after regular business hours, please call your family doctor or local emergency room. The treatment plan has been discussed at length between you and your provider. Follow all instructions carefully, it is very important.  If you

## 2021-10-28 ENCOUNTER — APPOINTMENT (OUTPATIENT)
Dept: WOUND CARE | Facility: HOSPITAL | Age: 66
End: 2021-10-28
Attending: NURSE PRACTITIONER
Payer: MEDICARE

## 2021-11-02 ENCOUNTER — OFFICE VISIT (OUTPATIENT)
Dept: WOUND CARE | Facility: HOSPITAL | Age: 66
End: 2021-11-02
Attending: NURSE PRACTITIONER
Payer: MEDICARE

## 2021-11-02 VITALS
HEART RATE: 69 BPM | TEMPERATURE: 98 F | SYSTOLIC BLOOD PRESSURE: 105 MMHG | DIASTOLIC BLOOD PRESSURE: 60 MMHG | RESPIRATION RATE: 16 BRPM

## 2021-11-02 DIAGNOSIS — L97.812 NON-PRESSURE CHRONIC ULCER OF OTHER PART OF RIGHT LOWER LEG WITH FAT LAYER EXPOSED (HCC): Primary | ICD-10-CM

## 2021-11-02 DIAGNOSIS — I87.333 CHRONIC VENOUS HYPERTENSION WITH ULCER AND INFLAMMATION, BILATERAL (HCC): ICD-10-CM

## 2021-11-02 DIAGNOSIS — L97.919 CHRONIC VENOUS HYPERTENSION WITH ULCER AND INFLAMMATION, BILATERAL (HCC): ICD-10-CM

## 2021-11-02 DIAGNOSIS — L97.929 CHRONIC VENOUS HYPERTENSION WITH ULCER AND INFLAMMATION, BILATERAL (HCC): ICD-10-CM

## 2021-11-02 PROCEDURE — 99214 OFFICE O/P EST MOD 30 MIN: CPT | Performed by: NURSE PRACTITIONER

## 2021-11-02 RX ORDER — CEPHALEXIN 500 MG/1
500 CAPSULE ORAL 3 TIMES DAILY
Qty: 21 CAPSULE | Refills: 0 | Status: SHIPPED | OUTPATIENT
Start: 2021-11-02 | End: 2021-11-09

## 2021-11-02 RX ORDER — CEPHALEXIN 500 MG/1
500 CAPSULE ORAL 3 TIMES DAILY
Qty: 21 CAPSULE | Refills: 0 | Status: SHIPPED | OUTPATIENT
Start: 2021-11-02 | End: 2021-11-02

## 2021-11-02 NOTE — PROGRESS NOTES
Weekly Wound Education Note    Teaching Provided To: Patient  Training Topics: Cleasing and general instructions; Discharge instructions; Compression;Edema control;Dressing  Training Method: Explain/Verbal  Training Response: Patient responds and understands

## 2021-11-02 NOTE — PROGRESS NOTES
CHIEF COMPLAINT:   Patient presents with:  Wound Care: Patients is here for a follow up.  Patients denies any issues     HPI:   Information obtained from patient and chart  9-8-21 INITIAL:  78-year-old female past medical history of aortic valve replacement weeks. 10-12-21 patient returns today, she is here a couple days early as her wrap was falling down and causing irritation and pain, so her appointment was moved up to earlier in the week.  She has a red, blancheable area on the lateral calf (~2cm diamet Current Outpatient Medications:   •  cephalexin 500 MG Oral Cap, Take 1 capsule (500 mg total) by mouth 3 (three) times daily for 7 days. , Disp: 21 capsule, Rfl: 0  •  POTASSIUM CHLORIDE ER 10 MEQ Oral Cap CR, TAKE 1 CAPSULE BY MOUTH  TWICE DAILY, Disp prior to test, and 50 mg 1 hour prior to test (along with 50 mg benadryl), Disp: 15 tablet, Rfl: 2  •  Albuterol Sulfate  (90 Base) MCG/ACT Inhalation Aero Soln, Inhale 2 puffs into the lungs every 6 (six) hours as needed. , Disp: , Rfl:   •  Caro dye  Lisinopril              TONGUE SWELLING  Morphine Sulfate        HIVES  Oxycontin               ITCHING  Vicodin [Hydrocodon*    ITCHING   REVIEW OF SYSTEMS:   This information was obtained from the patient/family and chart.     See HPI for pertinent p Point of Measurement - Right Ankle: 11           Right Ankle from[de-identified] Heel  Right Ankle cm[de-identified] 21.4     WOUND ASSESSMENT:       Wound 09/08/21 # 1 right medial lower leg Venous Ulcer Leg Anterior;Right;Medial (Active)   Date First Assessed/Time First Assesse current treatment plan discussed in detail. Questions and concerns addressed. Red flags to RTC or ED reviewed. Patient (or parent) agrees to plan. I spent 30 minutes with the patient.  This time included:    preparing to see the patient (eg, review n particularly [place] yogurt), tofu, soy nuts, soy protein products (Follow the protein handout in your welcome folder)  10.  Vitamin C: Citrus fruits and juices, strawberries, tomatoes, tomato juice, peppers, baked potatoes, spinach, broccoli, cauliflower, Br

## 2021-11-05 NOTE — PROGRESS NOTES
Spoke to patient - went over culture results, continue cephalexin. Another bacteria growing - sensitivities is needed - will call if new orders need to be done. She verbalizes understanding.

## 2021-11-09 ENCOUNTER — OFFICE VISIT (OUTPATIENT)
Dept: WOUND CARE | Facility: HOSPITAL | Age: 66
End: 2021-11-09
Attending: NURSE PRACTITIONER
Payer: MEDICARE

## 2021-11-09 VITALS
DIASTOLIC BLOOD PRESSURE: 59 MMHG | HEART RATE: 80 BPM | RESPIRATION RATE: 18 BRPM | TEMPERATURE: 97 F | SYSTOLIC BLOOD PRESSURE: 90 MMHG

## 2021-11-09 DIAGNOSIS — I87.333 CHRONIC VENOUS HYPERTENSION WITH ULCER AND INFLAMMATION, BILATERAL (HCC): ICD-10-CM

## 2021-11-09 DIAGNOSIS — A49.01 NON-INVASIVE METHICILLIN SENSITIVE STAPHYLOCOCCUS AUREUS INFECTION: ICD-10-CM

## 2021-11-09 DIAGNOSIS — L97.812 NON-PRESSURE CHRONIC ULCER OF OTHER PART OF RIGHT LOWER LEG WITH FAT LAYER EXPOSED (HCC): Primary | ICD-10-CM

## 2021-11-09 DIAGNOSIS — L97.919 CHRONIC VENOUS HYPERTENSION WITH ULCER AND INFLAMMATION, BILATERAL (HCC): ICD-10-CM

## 2021-11-09 DIAGNOSIS — L97.929 CHRONIC VENOUS HYPERTENSION WITH ULCER AND INFLAMMATION, BILATERAL (HCC): ICD-10-CM

## 2021-11-09 PROCEDURE — 15271 SKIN SUB GRAFT TRNK/ARM/LEG: CPT | Performed by: NURSE PRACTITIONER

## 2021-11-09 NOTE — PROGRESS NOTES
Patient ID: Macho Patino is a 74year old female.     Cellular tissue product application    Date/Time: 11/9/2021 11:59 AM  Performed by: Marylouise Schirmer, APRN  Authorized by: Marylouise Schirmer, APRN   Associated Wounds:   Wound 09/08/21 # 1 right medial lower

## 2021-11-09 NOTE — PROGRESS NOTES
CHIEF COMPLAINT:   Patient presents with:  Wound Care: Patient is here for a follow up of right lower leg wound.     HPI:   Information obtained from patient and chart  9-8-21 INITIAL:  39-year-old female past medical history of aortic valve replacement clemente weeks. 10-12-21 patient returns today, she is here a couple days early as her wrap was falling down and causing irritation and pain, so her appointment was moved up to earlier in the week.  She has a red, blancheable area on the lateral calf (~2cm diamet returns today, her cx did grow out mssa and cornybacterium patient instructed to continue on the keflex started at her appointment, she also did have carotid ultrasound which showed subclavian steal syndrome:   she is to f/u with neurology.   She has not ha FOR 1-2 WEEKS THEN TAPER TO AS  NEEDED FOR FLARES, Disp: 453.6 g, Rfl: 1  •  predniSONE 10 MG Oral Tab, Take 50 mg by mouth 13 hours prior to test, 50 mg 7 hours prior to test, and 50 mg 1 hour prior to test (along with 50 mg benadryl), Disp: 15 tablet, Rf Take 2000 mg (4 capsules) 1 hour prior to dental procedures and cleanings (Patient not taking: Reported on 11/9/2021), Disp: 4 capsule, Rfl: 4  ALLERGIES:     Adhesive Tape           HIVES    Comment:ALL ADHESIVES  Codeine                 HIVES  Doxycyclin thickness and hygeine. Skin hydration is wnl. no hairgrowth on leg. Musculoskeletal:  Gait and station stable with crutches  Integumentary:  refer to wound characteristics and images   Psychiatric:  Judgment and insight intact. Alert and oriented times 3. Date/Time: 10/26/21 1702   Location: Leg  Wound Location Orientation: Anterior;Right      Assessments 10/26/2021  5:03 PM 11/9/2021 11:38 AM   Response to Treatment Well tolerated Well tolerated   Compression Layers 2 2   Compression Product Type Coflex Co separately obtained history, performing a medically appropriate examination and/or evaluation, counseling and educating the patient, documenting in the record, Bill skin sub application only.   DISCHARGE:    Patient Instructions   Please return in 1 week leafy vegetables, orange or yellow vegetables, cantaloupe, fortified dairy products, liver, fortified cereals  12. Zinc: Fortified cereals, red meats, seafood  13.  Consider supplementing with Alvino by [employer] (These are essential branch chain amino aci

## 2021-11-09 NOTE — PROGRESS NOTES
Weekly Wound Education Note    Teaching Provided To: Patient  Training Topics: Cleasing and general instructions;Dressing;Edema control; Discharge instructions; Compression  Training Method: Demonstration;Explain/Verbal  Training Response: Patient responds a

## 2021-11-09 NOTE — PATIENT INSTRUCTIONS
Please return in 1 week    [facility]:  Dr. Author Lockwood [phone]    Patient discharge and wound care instructions  Rene Carbone  11/9/2021      You may shower with protection of the wound (ie a cast cover or similar). Cleansing/dressing:   In clinic , essential branch chain amino acids that help with tissue building and wound healing) and take 2 packets/day.  you can order online at [employer] or [employer]    Concerns:  Signs of infection may include the following: • Increase in redness • Red \"streaks\" from w

## 2021-11-12 ENCOUNTER — TELEPHONE (OUTPATIENT)
Dept: HEMATOLOGY/ONCOLOGY | Age: 66
End: 2021-11-12

## 2021-11-16 ENCOUNTER — OFFICE VISIT (OUTPATIENT)
Dept: WOUND CARE | Facility: HOSPITAL | Age: 66
End: 2021-11-16
Attending: NURSE PRACTITIONER
Payer: MEDICARE

## 2021-11-16 VITALS
DIASTOLIC BLOOD PRESSURE: 59 MMHG | TEMPERATURE: 98 F | RESPIRATION RATE: 14 BRPM | SYSTOLIC BLOOD PRESSURE: 89 MMHG | HEART RATE: 60 BPM

## 2021-11-16 DIAGNOSIS — L97.812 NON-PRESSURE CHRONIC ULCER OF OTHER PART OF RIGHT LOWER LEG WITH FAT LAYER EXPOSED (HCC): Primary | ICD-10-CM

## 2021-11-16 DIAGNOSIS — L97.919 CHRONIC VENOUS HYPERTENSION WITH ULCER AND INFLAMMATION, BILATERAL (HCC): ICD-10-CM

## 2021-11-16 DIAGNOSIS — I87.333 CHRONIC VENOUS HYPERTENSION WITH ULCER AND INFLAMMATION, BILATERAL (HCC): ICD-10-CM

## 2021-11-16 DIAGNOSIS — L97.929 CHRONIC VENOUS HYPERTENSION WITH ULCER AND INFLAMMATION, BILATERAL (HCC): ICD-10-CM

## 2021-11-16 PROCEDURE — 99214 OFFICE O/P EST MOD 30 MIN: CPT | Performed by: NURSE PRACTITIONER

## 2021-11-16 NOTE — PROGRESS NOTES
Weekly Wound Education Note    Teaching Provided To: Patient  Training Topics: Cleasing and general instructions; Compression; Discharge instructions;Edema control;Dressing  Training Method: Explain/Verbal  Training Response: Patient responds and understands

## 2021-11-16 NOTE — PATIENT INSTRUCTIONS
Please return in 1 week    [facility]:  Dr. Dutch Oppenheim [phone]    Patient discharge and wound care instructions  Nicanor Hollow  11/16/2021      You may shower with protection of the wound (ie a cast cover or similar). Cleansing/dressing:   In clinic , essential branch chain amino acids that help with tissue building and wound healing) and take 2 packets/day.  you can order online at [employer] or [employer]    Concerns:  Signs of infection may include the following: • Increase in redness • Red \"streaks\" from w

## 2021-11-16 NOTE — PROGRESS NOTES
CHIEF COMPLAINT:   Patient presents with:  Wound Care: Patient is here for a follow up visit for a right leg wound.     HPI:   Information obtained from patient and chart  9-8-21 INITIAL:  78-year-old female past medical history of aortic valve replacement weeks. 10-12-21 patient returns today, she is here a couple days early as her wrap was falling down and causing irritation and pain, so her appointment was moved up to earlier in the week.  She has a red, blancheable area on the lateral calf (~2cm diamet returns today, her cx did grow out mssa and cornybacterium patient instructed to continue on the keflex started at her appointment, she also did have carotid ultrasound which showed subclavian steal syndrome:   she is to f/u with neurology.   She has not ha BREAKFAST, Disp: 90 tablet, Rfl: 1  •  dilTIAZem (CARDIZEM CD) 120 MG Oral Capsule SR 24 Hr, Take 1 capsule (120 mg total) by mouth daily. , Disp: 90 capsule, Rfl: 1  •  metolazone 2.5 MG Oral Tab, Take 1 tablet (2.5 mg total) by mouth daily as needed (teodoro Disp: , Rfl:   •  Fexofenadine HCl 180 MG Oral Tab, Take 180 mg by mouth nightly.  , Disp: , Rfl:   •  Cholecalciferol 5000 units Oral Tab, Take 5,000 Units by mouth nightly.  , Disp: , Rfl:   •  triamcinolone acetonide 40 MG/ML Injection Suspension, Injec developed. Cardiovascular:dp/pt palpable right. Right lower Extremity free of varicosities, edema stable, hyperpigmented, skin remains fibrotic. Capillary refill < 3 seconds. Digits are warm. toenails are wnl for color, thickness and hygeine.  Skin hydrati Order Priority Status Authorizing Provider   11/09/21 1159 Cellular tissue product application Routine Completed ERICA Treadwell       Compression Wrap 10/26/21 Leg Anterior;Right (Active)   Placement Date/Time: 10/26/21 3411   Location: Leg  Wound Lo wound with VASHE (or other hypochlorous wound cleanser), dab dry. Apply the following dressings: keracis>silicone contact>xeroform>coflex 2 layer  Managing your edema:  4. Avoid prolonged standing in one place.  It is better to have your calf muscles mov any questions regarding your home care instructions please contact the wound center [facility] @ [phone] If after regular business hours, please call your family doctor or local emergency room.  The treatment plan has been discussed at length betluis

## 2021-11-23 ENCOUNTER — HOSPITAL ENCOUNTER (OUTPATIENT)
Dept: GENERAL RADIOLOGY | Age: 66
Discharge: HOME | End: 2021-11-23
Attending: ORTHOPAEDIC SURGERY
Payer: MEDICARE

## 2021-11-23 ENCOUNTER — APPOINTMENT (OUTPATIENT)
Dept: WOUND CARE | Facility: HOSPITAL | Age: 66
End: 2021-11-23
Attending: NURSE PRACTITIONER
Payer: MEDICARE

## 2021-11-23 ENCOUNTER — OFFICE VISIT (OUTPATIENT)
Dept: ORTHOPEDICS CLINIC | Facility: CLINIC | Age: 66
End: 2021-11-23
Payer: MEDICARE

## 2021-11-23 VITALS — HEIGHT: 69 IN | WEIGHT: 284 LBS | BODY MASS INDEX: 42.06 KG/M2

## 2021-11-23 VITALS
DIASTOLIC BLOOD PRESSURE: 75 MMHG | HEART RATE: 90 BPM | TEMPERATURE: 98 F | RESPIRATION RATE: 20 BRPM | SYSTOLIC BLOOD PRESSURE: 145 MMHG

## 2021-11-23 DIAGNOSIS — M19.012 PRIMARY OSTEOARTHRITIS OF LEFT SHOULDER: Primary | ICD-10-CM

## 2021-11-23 DIAGNOSIS — Z96.641 H/O TOTAL HIP ARTHROPLASTY, RIGHT: ICD-10-CM

## 2021-11-23 DIAGNOSIS — I87.333 CHRONIC VENOUS HYPERTENSION WITH ULCER AND INFLAMMATION, BILATERAL (HCC): ICD-10-CM

## 2021-11-23 DIAGNOSIS — L97.812 NON-PRESSURE CHRONIC ULCER OF OTHER PART OF RIGHT LOWER LEG WITH FAT LAYER EXPOSED (HCC): Primary | ICD-10-CM

## 2021-11-23 DIAGNOSIS — L97.919 CHRONIC VENOUS HYPERTENSION WITH ULCER AND INFLAMMATION, BILATERAL (HCC): ICD-10-CM

## 2021-11-23 DIAGNOSIS — L97.929 CHRONIC VENOUS HYPERTENSION WITH ULCER AND INFLAMMATION, BILATERAL (HCC): ICD-10-CM

## 2021-11-23 DIAGNOSIS — M19.012 PRIMARY OSTEOARTHRITIS OF LEFT SHOULDER: ICD-10-CM

## 2021-11-23 PROCEDURE — 99213 OFFICE O/P EST LOW 20 MIN: CPT | Performed by: NURSE PRACTITIONER

## 2021-11-23 PROCEDURE — 99213 OFFICE O/P EST LOW 20 MIN: CPT | Performed by: ORTHOPAEDIC SURGERY

## 2021-11-23 PROCEDURE — 73030 X-RAY EXAM OF SHOULDER: CPT | Performed by: ORTHOPAEDIC SURGERY

## 2021-11-23 PROCEDURE — 73502 X-RAY EXAM HIP UNI 2-3 VIEWS: CPT | Performed by: ORTHOPAEDIC SURGERY

## 2021-11-23 PROCEDURE — 20610 DRAIN/INJ JOINT/BURSA W/O US: CPT | Performed by: ORTHOPAEDIC SURGERY

## 2021-11-23 RX ORDER — TRIAMCINOLONE ACETONIDE 40 MG/ML
40 INJECTION, SUSPENSION INTRA-ARTICULAR; INTRAMUSCULAR ONCE
Status: COMPLETED | OUTPATIENT
Start: 2021-11-23 | End: 2021-11-23

## 2021-11-23 RX ADMIN — TRIAMCINOLONE ACETONIDE 40 MG: 40 INJECTION, SUSPENSION INTRA-ARTICULAR; INTRAMUSCULAR at 14:59:00

## 2021-11-23 RX ADMIN — TRIAMCINOLONE ACETONIDE 40 MG: 40 INJECTION, SUSPENSION INTRA-ARTICULAR; INTRAMUSCULAR at 14:58:00

## 2021-11-23 NOTE — PROGRESS NOTES
Weekly Wound Education Note    Teaching Provided To: Patient  Training Topics: Cleasing and general instructions; Discharge instructions;Dressing;Edema control; Compression  Training Method: Explain/Verbal  Training Response: Patient responds and understands

## 2021-11-23 NOTE — PROGRESS NOTES
CHIEF COMPLAINT:   Patient presents with:  Wound Care: Patient is here for a wound care follow up. She complains of increased pain to the wound that is 3/10.      HPI:   Information obtained from patient and chart  9-8-21 INITIAL:  78-year-old female past m been managing her edema with her farrow wraps and spandagrip, I did adjust her farrow wraps to \"smooth\" the overlaps better so that she can get 40 mmhg of compression. We will return patient to the coflex for more consistent compression.   No s/s of infe leigha.     MEDICATIONS:     Current Outpatient Medications:   •  POTASSIUM CHLORIDE ER 10 MEQ Oral Cap CR, TAKE 1 CAPSULE BY MOUTH  TWICE DAILY, Disp: 180 capsule, Rfl: 1  •  FLUOXETINE 20 MG Oral Cap, TAKE 1 CAPSULE BY MOUTH  TWICE DAILY, Disp: 180 benadryl), Disp: 15 tablet, Rfl: 2  •  Albuterol Sulfate  (90 Base) MCG/ACT Inhalation Aero Soln, Inhale 2 puffs into the lungs every 6 (six) hours as needed. , Disp: , Rfl:   •  Cyproheptadine HCl 4 MG Oral Tab, TAKE 1 TABLET BY MOUTH  NIGHTLY, Disp HIVES  Oxycontin               ITCHING  Vicodin [Hydrocodon*    ITCHING   REVIEW OF SYSTEMS:   This information was obtained from the patient/family and chart. See HPI for pertinent positives, otherwise 10 pt ROS negative.   Review of Systems     HISTORY from[de-identified] Heel  Right Ankle cm[de-identified] 22.5     WOUND ASSESSMENT:       Wound 09/08/21 # 1 right medial lower leg Venous Ulcer Leg Anterior;Right;Medial (Active)   Date First Assessed/Time First Assessed: 09/08/21 1113    Wound Number (Wound Clinic Only): # 1 right ulcer and inflammation, bilateral ([facility] [address]    Risks, benefits, and alternatives of current treatment plan discussed in detail. Questions and concerns addressed. Red flags to RTC or ED reviewed. Patient (or parent) agrees to plan.       I spent 25 minutes wit clinic. Nutrition and blood sugar control:  Focus on the followin. Protein: Meats, beans, eggs, milk and yogurt particularly [place] yogurt), tofu, soy nuts, soy protein products (Follow the protein handout in your welcome folder)  10.  Vitamin C: Cit

## 2021-11-23 NOTE — PROGRESS NOTES
Patient is a 64-year-old white female here for evaluation of her left shoulder and her right hip. She has had a bilateral total hip arthroplasties performed. Right hip gives her problems particularly when she is lying on her side.   Patient is also notici prosthesis might be very helpful to her since I do not think these injections have been helping her as much as she would like. She will think about this. Regarding the hip will keep an eye on it. There is no evidence of the prosthesis loosening however.

## 2021-11-30 ENCOUNTER — APPOINTMENT (OUTPATIENT)
Dept: WOUND CARE | Facility: HOSPITAL | Age: 66
End: 2021-11-30
Attending: NURSE PRACTITIONER
Payer: MEDICARE

## 2021-11-30 VITALS
TEMPERATURE: 98 F | RESPIRATION RATE: 18 BRPM | HEART RATE: 65 BPM | SYSTOLIC BLOOD PRESSURE: 109 MMHG | DIASTOLIC BLOOD PRESSURE: 71 MMHG

## 2021-11-30 DIAGNOSIS — I87.333 CHRONIC VENOUS HYPERTENSION WITH ULCER AND INFLAMMATION, BILATERAL (HCC): ICD-10-CM

## 2021-11-30 DIAGNOSIS — L97.812 NON-PRESSURE CHRONIC ULCER OF OTHER PART OF RIGHT LOWER LEG WITH FAT LAYER EXPOSED (HCC): Primary | ICD-10-CM

## 2021-11-30 DIAGNOSIS — L97.919 CHRONIC VENOUS HYPERTENSION WITH ULCER AND INFLAMMATION, BILATERAL (HCC): ICD-10-CM

## 2021-11-30 DIAGNOSIS — L97.929 CHRONIC VENOUS HYPERTENSION WITH ULCER AND INFLAMMATION, BILATERAL (HCC): ICD-10-CM

## 2021-11-30 PROBLEM — L03.115 CELLULITIS OF RIGHT LEG: Status: RESOLVED | Noted: 2020-07-17 | Resolved: 2021-11-30

## 2021-11-30 PROBLEM — I50.31 ACUTE DIASTOLIC (CONGESTIVE) HEART FAILURE (HCC): Status: RESOLVED | Noted: 2020-07-30 | Resolved: 2021-11-30

## 2021-11-30 PROCEDURE — 99213 OFFICE O/P EST LOW 20 MIN: CPT | Performed by: NURSE PRACTITIONER

## 2021-11-30 NOTE — PROGRESS NOTES
CHIEF COMPLAINT:   Patient presents with:  Wound Care: Follow-up. Denies pain or concerns. Arrives with wrap on.     HPI:   Information obtained from patient and chart  9-8-21 INITIAL:  58-year-old female past medical history of aortic valve replacement clemente infection) but that need to be addressed. I will start her on keflex, wound was cultured. We discussed that if the abx needs to be changed with the culture results/sensitivities we will call her.  Patient is agreeable to plan    11-9-21 patient returns to rick wraps seem to \"sag\" down and she is needing to adjust them multiple times a day. We will see if she would qualify for an extremitease garment (as it is the correct compression and a more \"firm\" garment and may work better for her).  Patient is ag MG Oral Cap, Take 2000 mg (4 capsules) 1 hour prior to dental procedures and cleanings, Disp: 4 capsule, Rfl: 4  •  predniSONE 10 MG Oral Tab, Take 50 mg by mouth 13 hours prior to test, 50 mg 7 hours prior to test, and 50 mg 1 hour prior to test (along wi SWELLING  I.V. Dye                HIVES    Comment:Patient does not remember name of certain IV dye. Has had scans since then with no side effect. -             kidney IVP dye  Lisinopril              TONGUE SWELLING  Morphine Sulfate anxiety, or agitation. Calm, cooperative, and communicative. Appropriate interactions and affect.   EDEMA:   Calf     Point of Measurement - Right Calf: 32           Right Calf cm[de-identified] 34  Ankle     Point of Measurement - Right Ankle: 11              Right Ank Compression Wrap Status Clean;Dry; Intact Clean;Dry; Intact       No Linked orders to display        ASSESSMENT AND PLAN:      1. 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  Chronic venous hypertension wit recommended 2 grams daily. 8. Do not get leg(s) with compression wrap wet. If wraps are too tight as indicated by pain, numbness/tingling or discoloration of toes remove wrap completely and call the wound center @ [phone].   Refer to the \"Multi-layer [facility]  11/30/2021

## 2021-11-30 NOTE — PATIENT INSTRUCTIONS
Please return on Thursday December 9    Will order extremitease garment from [employer]  Bring your compression and the spandagrip to the appointment for your tests    Patient discharge and wound care instructions  Yi Mccall  11/30/2021    You may shower wi cereals  12. Zinc: Fortified cereals, red meats, seafood  13. Consider supplementing with Alvino by [employer] (These are essential branch chain amino acids that help with tissue building and wound healing) and take 2 packets/day.  you can order online at a

## 2021-12-07 ENCOUNTER — APPOINTMENT (OUTPATIENT)
Dept: WOUND CARE | Facility: HOSPITAL | Age: 66
End: 2021-12-07
Attending: NURSE PRACTITIONER
Payer: MEDICARE

## 2021-12-09 ENCOUNTER — OFFICE VISIT (OUTPATIENT)
Dept: WOUND CARE | Facility: HOSPITAL | Age: 66
End: 2021-12-09
Attending: NURSE PRACTITIONER
Payer: MEDICARE

## 2021-12-09 VITALS
TEMPERATURE: 98 F | SYSTOLIC BLOOD PRESSURE: 160 MMHG | RESPIRATION RATE: 14 BRPM | DIASTOLIC BLOOD PRESSURE: 70 MMHG | HEART RATE: 79 BPM

## 2021-12-09 DIAGNOSIS — L97.812 NON-PRESSURE CHRONIC ULCER OF OTHER PART OF RIGHT LOWER LEG WITH FAT LAYER EXPOSED (HCC): Primary | ICD-10-CM

## 2021-12-09 DIAGNOSIS — I87.333 CHRONIC VENOUS HYPERTENSION WITH ULCER AND INFLAMMATION, BILATERAL (HCC): ICD-10-CM

## 2021-12-09 DIAGNOSIS — L97.929 CHRONIC VENOUS HYPERTENSION WITH ULCER AND INFLAMMATION, BILATERAL (HCC): ICD-10-CM

## 2021-12-09 DIAGNOSIS — L97.919 CHRONIC VENOUS HYPERTENSION WITH ULCER AND INFLAMMATION, BILATERAL (HCC): ICD-10-CM

## 2021-12-09 PROCEDURE — 99215 OFFICE O/P EST HI 40 MIN: CPT | Performed by: NURSE PRACTITIONER

## 2021-12-09 RX ORDER — GENTAMICIN SULFATE 1 MG/G
1 OINTMENT TOPICAL 2 TIMES DAILY
Qty: 45 G | Refills: 0 | Status: SHIPPED | OUTPATIENT
Start: 2021-12-09 | End: 2021-12-23

## 2021-12-09 NOTE — PROGRESS NOTES
CHIEF COMPLAINT:   Patient presents with:  Wound Care: Patients is here for a follow up.  Patients denies any problem     HPI:   Information obtained from patient and chart  9-8-21 INITIAL:  59-year-old female past medical history of aortic valve replacemen infection) but that need to be addressed. I will start her on keflex, wound was cultured. We discussed that if the abx needs to be changed with the culture results/sensitivities we will call her.  Patient is agreeable to plan    11-9-21 patient returns to rick wraps seem to \"sag\" down and she is needing to adjust them multiple times a day. We will see if she would qualify for an extremitease garment (as it is the correct compression and a more \"firm\" garment and may work better for her).  Patient is ag anticoagulation clinic., Disp: 60 tablet, Rfl: 2  •  warfarin 3 MG Oral Tab, Take two tabs by mouth on tues, thur, sat and one tab along with a 1 mg tab all other days or as directed by anticoagulation clinic., Disp: 130 tablet, Rfl: 3  •  allopurinol 100 Calcium 5 MG Oral Tab, Take 5 mg by mouth every other day., Disp: , Rfl:   •  mupirocin 2 % External Ointment, Apply to open areas BID PRN (Patient taking differently: 1 Application 2 (two) times daily as needed.  Apply to open areas BID PRN), Disp: 30 g, R (A) 11/30/2021         POC Glucose   Date Value Ref Range Status   01/05/2021 113 (H) 70 - 99 mg/dL Final       Vital signs reviewed. Appears stated age, well groomed. Constitutional:  Bp wnl for patient. Pulse Regular and wnl for patient.  Respirations e Non-staged Wound Description Full thickness Full thickness   Mira-wound Assessment Hemosiderin staining;Edema; Other (Comment) Hemosiderin staining;Edema   Wound Granulation Tissue Pink;Firm Firm;Pink   Wound Bed Granulation (%) 5 % 100 %   Wound Bed Epit following websites/ products for options:   Www.MyActivityPal. PBC Lasers   Www.Terra-Gen Powere. PBC Lasers     Sonja averyorts   Athleta       Patient discharge and wound care instructions  Nicanor Hollow  12/9/2021      You may shower and cleanse area with mild soap and water packets/day.  you can order online at [employer] or [facility]    Concerns:  Signs of infection may include the following: • Increase in redness • Red \"streaks\" from wound • Increase in swelling • Fever • Unusual odor • Change in the amount of wound drainage

## 2021-12-09 NOTE — PATIENT INSTRUCTIONS
Please return on 1 week    Options for Thigh compression:  1) call [facility] [email or web address]  2)  Check the following websites/ products for options:   Www.Gauzy. [employer] Shoes   Www.Fishin' Glue. [employer] Shoes     Sonja Reyes 2       Patient di Twilio (These are essential branch chain amino acids that help with tissue building and wound healing) and take 2 packets/day.  you can order online at [employer] or [employer]    Concerns:  Signs of infection may include the following: • Increase in redness •

## 2021-12-10 ENCOUNTER — TELEPHONE (OUTPATIENT)
Dept: WOUND CARE | Facility: HOSPITAL | Age: 66
End: 2021-12-10

## 2021-12-10 NOTE — TELEPHONE ENCOUNTER
Returned call to patient who stated originally the Gentamicin ointment was too expensive but with her RX card is turned out to be $5 a tube.

## 2021-12-16 ENCOUNTER — OFFICE VISIT (OUTPATIENT)
Dept: WOUND CARE | Facility: HOSPITAL | Age: 66
End: 2021-12-16
Attending: NURSE PRACTITIONER
Payer: MEDICARE

## 2021-12-16 VITALS
SYSTOLIC BLOOD PRESSURE: 133 MMHG | HEART RATE: 77 BPM | DIASTOLIC BLOOD PRESSURE: 83 MMHG | TEMPERATURE: 98 F | RESPIRATION RATE: 14 BRPM

## 2021-12-16 DIAGNOSIS — I87.333 CHRONIC VENOUS HYPERTENSION WITH ULCER AND INFLAMMATION, BILATERAL (HCC): ICD-10-CM

## 2021-12-16 DIAGNOSIS — L97.919 CHRONIC VENOUS HYPERTENSION WITH ULCER AND INFLAMMATION, BILATERAL (HCC): ICD-10-CM

## 2021-12-16 DIAGNOSIS — L97.929 CHRONIC VENOUS HYPERTENSION WITH ULCER AND INFLAMMATION, BILATERAL (HCC): ICD-10-CM

## 2021-12-16 DIAGNOSIS — L97.812 NON-PRESSURE CHRONIC ULCER OF OTHER PART OF RIGHT LOWER LEG WITH FAT LAYER EXPOSED (HCC): Primary | ICD-10-CM

## 2021-12-16 PROCEDURE — 99213 OFFICE O/P EST LOW 20 MIN: CPT | Performed by: NURSE PRACTITIONER

## 2021-12-16 NOTE — PATIENT INSTRUCTIONS
Please return week after your procedure with Dr. Cindi Soria for Thigh compression:  1) order the capris from [email or web address]. com OR  2)  Check the following websites/ products for options:   Www.KidsCash. com   Www.[email or web address] com     [employer] com cereals  13. Zinc: Fortified cereals, red meats, seafood  14. Consider supplementing with Alvino by [employer] (These are essential branch chain amino acids that help with tissue building and wound healing) and take 2 packets/day.  you can order online at a

## 2021-12-16 NOTE — PROGRESS NOTES
CHIEF COMPLAINT:   Patient presents with:  Wound Care: Follow up for right lower leg wound. No complaints at this time.      HPI:   Information obtained from patient and chart  9-8-21 INITIAL:  69-year-old female past medical history of aortic valve replace infection) but that need to be addressed. I will start her on keflex, wound was cultured. We discussed that if the abx needs to be changed with the culture results/sensitivities we will call her.  Patient is agreeable to plan    11-9-21 patient returns to rick wraps seem to \"sag\" down and she is needing to adjust them multiple times a day. We will see if she would qualify for an extremitease garment (as it is the correct compression and a more \"firm\" garment and may work better for her).  Patient is ag daily for 14 days. , Disp: 45 g, Rfl: 0  •  FLUoxetine 20 MG Oral Cap, Take two tablets in am and one at night, Disp: 270 capsule, Rfl: 1  •  POTASSIUM CHLORIDE ER 10 MEQ Oral Cap CR, TAKE 1 CAPSULE BY MOUTH  TWICE DAILY, Disp: 180 capsule, Rfl: 1  •  FLUOX Inhalation Aero Soln, Inhale 2 puffs into the lungs every 6 (six) hours as needed. , Disp: , Rfl:   •  Cyproheptadine HCl 4 MG Oral Tab, TAKE 1 TABLET BY MOUTH  NIGHTLY, Disp: 90 tablet, Rfl: 3  •  diphenhydrAMINE HCl 25 MG Oral Cap, Take 1 capsule (25 mg t surgical, family and social history updated where appropriate.       PHYSICAL EXAM:   /83   Pulse 77   Temp 98.3 °F (36.8 °C)   Resp 14   LMP  (LMP Unknown)    Estimated body mass index is 42.38 kg/m² as calculated from the following:    Height as of Location: Leg  Wound Location Orientation: Anterior;Right;Medial      Assessments 9/8/2021 11:20 AM 12/16/2021  3:00 PM   Wound Image       Drainage Amount Small Small   Drainage Description Serous; Yellow Yellow;Serous   Treatments Compression —   Wound Vikas Mendoza following websites/ products for options:   Www.[email or web address]. [employer]   Www.[employer]. SnapDash     Sonja averyorts   Athleta       Patient discharge and wound care instructions  Nicanor Johnson  12/16/2021    You may shower and cleanse area with mild soap and water, that help with tissue building and wound healing) and take 2 packets/day.  you can order online at [employer] or [employer]    Concerns:  Signs of infection may include the following: • Increase in redness • Red \"streaks\" from wound • Increase in swelling • Fever

## 2022-01-06 ENCOUNTER — OFFICE VISIT (OUTPATIENT)
Dept: WOUND CARE | Facility: HOSPITAL | Age: 67
End: 2022-01-06
Attending: NURSE PRACTITIONER
Payer: MEDICARE

## 2022-01-06 VITALS
TEMPERATURE: 98 F | DIASTOLIC BLOOD PRESSURE: 54 MMHG | RESPIRATION RATE: 20 BRPM | HEART RATE: 84 BPM | SYSTOLIC BLOOD PRESSURE: 100 MMHG

## 2022-01-06 DIAGNOSIS — L97.919 CHRONIC VENOUS HYPERTENSION WITH ULCER AND INFLAMMATION, BILATERAL (HCC): ICD-10-CM

## 2022-01-06 DIAGNOSIS — I87.333 CHRONIC VENOUS HYPERTENSION WITH ULCER AND INFLAMMATION, BILATERAL (HCC): ICD-10-CM

## 2022-01-06 DIAGNOSIS — L97.812 NON-PRESSURE CHRONIC ULCER OF OTHER PART OF RIGHT LOWER LEG WITH FAT LAYER EXPOSED (HCC): Primary | ICD-10-CM

## 2022-01-06 DIAGNOSIS — A49.01 NON-INVASIVE METHICILLIN SENSITIVE STAPHYLOCOCCUS AUREUS INFECTION: ICD-10-CM

## 2022-01-06 DIAGNOSIS — L97.929 CHRONIC VENOUS HYPERTENSION WITH ULCER AND INFLAMMATION, BILATERAL (HCC): ICD-10-CM

## 2022-01-06 PROBLEM — I25.118 CORONARY ARTERY DISEASE INVOLVING NATIVE CORONARY ARTERY OF NATIVE HEART WITH OTHER FORM OF ANGINA PECTORIS (HCC): Status: ACTIVE | Noted: 2021-03-19

## 2022-01-06 PROBLEM — F32.4 MAJOR DEPRESSIVE DISORDER, SINGLE EPISODE, IN PARTIAL REMISSION: Status: ACTIVE | Noted: 2022-01-06

## 2022-01-06 PROBLEM — F32.4 MAJOR DEPRESSIVE DISORDER, SINGLE EPISODE, IN PARTIAL REMISSION (HCC): Status: ACTIVE | Noted: 2022-01-06

## 2022-01-06 PROBLEM — I25.118 CORONARY ARTERY DISEASE INVOLVING NATIVE CORONARY ARTERY OF NATIVE HEART WITH OTHER FORM OF ANGINA PECTORIS: Status: ACTIVE | Noted: 2021-03-19

## 2022-01-06 PROCEDURE — 99214 OFFICE O/P EST MOD 30 MIN: CPT | Performed by: NURSE PRACTITIONER

## 2022-01-06 NOTE — PROGRESS NOTES
Weekly Wound Education Note    Teaching Provided To: Patient  Training Topics: Dressing;Cleasing and general instructions; Discharge instructions;Edema control; Compression; Foot care  Training Method: Explain/Verbal;Written  Training Response: Patient Yarely El

## 2022-01-06 NOTE — PROGRESS NOTES
CHIEF COMPLAINT:   Patient presents with:  Wound Care: Patient is here for a wound care follow up.  She states that she has a new wound today from trying to cut her toe nail     HPI:   Information obtained from patient and chart  9-8-21 INITIAL:  66-year-ol would most likely either need a totally custom stocking or she could look into a 2 part velcro system or \"alexander\" compression. I showed her different options and gave her websites to look into options. The wound has not improved.  Her edema is stable and Current Outpatient Medications:   •  enoxaparin 150 MG/ML Subcutaneous Solution, Inject 0.87 ml (130 mg) into the abdomen every 12 hours as directed by anticoagulation clinic. Rotate injection sites. Keep 2 inches from belly button. , Disp: 6 each, Rfl: WEEKS THEN TAPER TO AS  NEEDED FOR FLARES, Disp: 453.6 g, Rfl: 1  •  amoxicillin 500 MG Oral Cap, Take 2000 mg (4 capsules) 1 hour prior to dental procedures and cleanings, Disp: 4 capsule, Rfl: 4  •  predniSONE 10 MG Oral Tab, Take 50 mg by mouth 13 hours dye.             Has had scans since then with no side effect. -             kidney IVP dye  Lisinopril              TONGUE SWELLING  Morphine Sulfate        HIVES  Oxycontin               ITCHING  Vicodin [Hydrocodon*    ITCHING   REVIEW OF SYSTEMS:   This 32        Right Calf from[de-identified] Heel  Right Calf cm[de-identified] 34.5  Ankle     Point of Measurement - Right Ankle: 11           Right Ankle from[de-identified] Heel  Right Ankle cm[de-identified] 21.5     WOUND ASSESSMENT:       Wound 09/08/21 # 1 right medial lower leg Venous Ulcer Leg Anterio cm   Wound Surface Area (cm^2) 0.55 cm^2   Wound Depth (cm) 0.1 cm   Wound Volume (cm^3) 0.055 cm^3   Non-staged Wound Description Full thickness   Mira-wound Assessment Edema   Wound Granulation Tissue Spongy;Pink   Wound Bed Granulation (%) 100 %       N moisture  Wear your compression    Managing your edema:  5. Avoid prolonged standing in one place. It is better to have your calf muscles moving to pump fluid out of the legs      6.  Elevate leg(s) above the level of the heart when sitting or as much as po infection, possible loss of limb and even loss of life.        Lani Galarza FNP-C, CWCN-AP, CFCN, CSWS, [facility], [facility]  1/6/2022

## 2022-01-06 NOTE — PATIENT INSTRUCTIONS
Please return 2 weeks  Monroeadonis Horvath! Make sure you monitor the toe redness.     Patient discharge and wound care instructions  Freddy Lawton  1/6/2022      You may shower and cleanse area with mild soap and water, rinse wound with saline or wound cleanser, dab dry and wound healing) and take 2 packets/day.  you can order online at [employer] or [facility] DEBORAH GLEASON    Concerns:  Signs of infection may include the following: • Increase in redness • Red \"streaks\" from wound • Increase in swelling • Fever • Unusual odor • Change in the

## 2022-01-11 ENCOUNTER — APPOINTMENT (OUTPATIENT)
Dept: GENERAL RADIOLOGY | Facility: HOSPITAL | Age: 67
End: 2022-01-11
Attending: EMERGENCY MEDICINE
Payer: MEDICARE

## 2022-01-11 ENCOUNTER — HOSPITAL ENCOUNTER (EMERGENCY)
Facility: HOSPITAL | Age: 67
Discharge: HOME | End: 2022-01-11
Attending: EMERGENCY MEDICINE
Payer: MEDICARE

## 2022-01-11 ENCOUNTER — OFFICE VISIT (OUTPATIENT)
Dept: WOUND CARE | Facility: HOSPITAL | Age: 67
End: 2022-01-11
Attending: NURSE PRACTITIONER
Payer: MEDICARE

## 2022-01-11 VITALS
SYSTOLIC BLOOD PRESSURE: 97 MMHG | HEART RATE: 77 BPM | RESPIRATION RATE: 19 BRPM | OXYGEN SATURATION: 98 % | TEMPERATURE: 97 F | DIASTOLIC BLOOD PRESSURE: 61 MMHG | WEIGHT: 283 LBS | BODY MASS INDEX: 42 KG/M2

## 2022-01-11 VITALS
HEART RATE: 80 BPM | SYSTOLIC BLOOD PRESSURE: 90 MMHG | TEMPERATURE: 97 F | DIASTOLIC BLOOD PRESSURE: 50 MMHG | RESPIRATION RATE: 18 BRPM

## 2022-01-11 DIAGNOSIS — I95.0 IDIOPATHIC HYPOTENSION: ICD-10-CM

## 2022-01-11 DIAGNOSIS — L97.922 NON-PRESSURE CHRONIC ULCER OF LEFT LOWER LEG WITH FAT LAYER EXPOSED (HCC): ICD-10-CM

## 2022-01-11 DIAGNOSIS — L97.812 NON-PRESSURE CHRONIC ULCER OF OTHER PART OF RIGHT LOWER LEG WITH FAT LAYER EXPOSED (HCC): ICD-10-CM

## 2022-01-11 DIAGNOSIS — L97.919 CHRONIC VENOUS HYPERTENSION WITH ULCER AND INFLAMMATION, BILATERAL (HCC): Primary | ICD-10-CM

## 2022-01-11 DIAGNOSIS — I87.333 CHRONIC VENOUS HYPERTENSION WITH ULCER AND INFLAMMATION, BILATERAL (HCC): Primary | ICD-10-CM

## 2022-01-11 DIAGNOSIS — D64.9 ANEMIA, UNSPECIFIED TYPE: ICD-10-CM

## 2022-01-11 DIAGNOSIS — R06.02 SHORTNESS OF BREATH: Primary | ICD-10-CM

## 2022-01-11 DIAGNOSIS — L97.929 CHRONIC VENOUS HYPERTENSION WITH ULCER AND INFLAMMATION, BILATERAL (HCC): Primary | ICD-10-CM

## 2022-01-11 DIAGNOSIS — R06.02 SHORTNESS OF BREATH AT REST: ICD-10-CM

## 2022-01-11 LAB
ANION GAP SERPL CALC-SCNC: 5 MMOL/L (ref 0–18)
BASOPHILS # BLD AUTO: 0.03 X10(3) UL (ref 0–0.2)
BASOPHILS NFR BLD AUTO: 0.5 %
BUN BLD-MCNC: 42 MG/DL (ref 7–18)
CALCIUM BLD-MCNC: 9.3 MG/DL (ref 8.5–10.1)
CHLORIDE SERPL-SCNC: 106 MMOL/L (ref 98–112)
CO2 SERPL-SCNC: 28 MMOL/L (ref 21–32)
CREAT BLD-MCNC: 1.77 MG/DL
EOSINOPHIL # BLD AUTO: 0.09 X10(3) UL (ref 0–0.7)
EOSINOPHIL NFR BLD AUTO: 1.4 %
ERYTHROCYTE [DISTWIDTH] IN BLOOD BY AUTOMATED COUNT: 15.8 %
GLUCOSE BLD-MCNC: 98 MG/DL (ref 70–99)
HCT VFR BLD AUTO: 34.5 %
HGB BLD-MCNC: 11.1 G/DL
IMM GRANULOCYTES # BLD AUTO: 0.06 X10(3) UL (ref 0–1)
IMM GRANULOCYTES NFR BLD: 1 %
LYMPHOCYTES # BLD AUTO: 1.49 X10(3) UL (ref 1–4)
LYMPHOCYTES NFR BLD AUTO: 23.9 %
MCH RBC QN AUTO: 30.9 PG (ref 26–34)
MCHC RBC AUTO-ENTMCNC: 32.2 G/DL (ref 31–37)
MCV RBC AUTO: 96.1 FL
MONOCYTES # BLD AUTO: 0.74 X10(3) UL (ref 0.1–1)
MONOCYTES NFR BLD AUTO: 11.9 %
NEUTROPHILS # BLD AUTO: 3.83 X10 (3) UL (ref 1.5–7.7)
NEUTROPHILS # BLD AUTO: 3.83 X10(3) UL (ref 1.5–7.7)
NEUTROPHILS NFR BLD AUTO: 61.3 %
NT-PROBNP SERPL-MCNC: 440 PG/ML (ref ?–125)
OSMOLALITY SERPL CALC.SUM OF ELEC: 298 MOSM/KG (ref 275–295)
PLATELET # BLD AUTO: 280 10(3)UL (ref 150–450)
POTASSIUM SERPL-SCNC: 4.2 MMOL/L (ref 3.5–5.1)
RBC # BLD AUTO: 3.59 X10(6)UL
SODIUM SERPL-SCNC: 139 MMOL/L (ref 136–145)
TROPONIN I HIGH SENSITIVITY: 22 NG/L
WBC # BLD AUTO: 6.2 X10(3) UL (ref 4–11)

## 2022-01-11 PROCEDURE — 80048 BASIC METABOLIC PNL TOTAL CA: CPT | Performed by: EMERGENCY MEDICINE

## 2022-01-11 PROCEDURE — 99284 EMERGENCY DEPT VISIT MOD MDM: CPT

## 2022-01-11 PROCEDURE — 83880 ASSAY OF NATRIURETIC PEPTIDE: CPT | Performed by: EMERGENCY MEDICINE

## 2022-01-11 PROCEDURE — 93005 ELECTROCARDIOGRAM TRACING: CPT

## 2022-01-11 PROCEDURE — 93010 ELECTROCARDIOGRAM REPORT: CPT

## 2022-01-11 PROCEDURE — 85025 COMPLETE CBC W/AUTO DIFF WBC: CPT | Performed by: EMERGENCY MEDICINE

## 2022-01-11 PROCEDURE — 36415 COLL VENOUS BLD VENIPUNCTURE: CPT

## 2022-01-11 PROCEDURE — 84484 ASSAY OF TROPONIN QUANT: CPT | Performed by: EMERGENCY MEDICINE

## 2022-01-11 PROCEDURE — 71045 X-RAY EXAM CHEST 1 VIEW: CPT | Performed by: EMERGENCY MEDICINE

## 2022-01-11 PROCEDURE — 99214 OFFICE O/P EST MOD 30 MIN: CPT | Performed by: NURSE PRACTITIONER

## 2022-01-11 NOTE — ED INITIAL ASSESSMENT (HPI)
Per pt, \"I was up at the wound clinic, but I've been having a lot of trouble breathing. I have CHF and COPD. I had low blood pressure in the clinic so they decided I should be seen here. \"    Pt presents alert, orientedx4, easy WOB at rest, +SOB with exer

## 2022-01-11 NOTE — ED QUICK NOTES
Pt is here from the [address] [place] [address] following hypotension and difficulty breathing prior to arrival. Pt presents awake and alert. Pt has a hx of congestive heart failure.

## 2022-01-11 NOTE — PROGRESS NOTES
Weekly Wound Education Note    Teaching Provided To: Patient  Training Topics: Cleasing and general instructions;Dressing; Discharge instructions; Compression;Edema control  Training Method: Explain/Verbal  Training Response: Patient responds and understands

## 2022-01-11 NOTE — PATIENT INSTRUCTIONS
Please return 1 week    TO ED TODAY FOR SHORTNESS OF BREATH, LOW BP, BLEEDING WITH LOW INR  Will order extremitease for the left leg    Patient discharge and wound care instructions  Rina Werner  1/11/2022      You may shower and cleanse area with mild s building and wound healing) and take 2 packets/day.  you [facility] at [facility] [facility]    Concerns:  Signs of infection may include the following: • Increase in redness • Red \"streaks\" from wound • Increase in swelling • Fever • Unusual odor • Layne Bevel

## 2022-01-11 NOTE — PROGRESS NOTES
CHIEF COMPLAINT:   Patient presents with:  Wound Care: Patient is here for a follow up of right lower leg and right great toe wounds. New wound noted on the left leg.     HPI:   Information obtained from patient and chart  9-8-21 INITIAL:  49-year-old female likely either need a totally custom stocking or she could look into a 2 part velcro system or \"alexander\" compression. I showed her different options and gave her websites to look into options. The wound has not improved.  Her edema is stable and the wound i today, she did f/u with her pcp last week re several health issues. She also did contact me via [employer] last week that she had a new wound on her left leg, she was putting gentamicin on the area.  Her inr is subtherapeutic and she is working with her pcp m directed by anticoagulation clinic., Disp: 130 tablet, Rfl: 3  •  allopurinol 100 MG Oral Tab, Take 1 tablet (100 mg total) by mouth daily. , Disp: 90 tablet, Rfl: 3  •  dilTIAZem (CARDIZEM CD) 120 MG Oral Capsule SR 24 Hr, Take 1 capsule (120 mg total) by (Patient not taking: Reported on 1/11/2022), Disp: 4 capsule, Rfl: 4  •  predniSONE 10 MG Oral Tab, Take 50 mg by mouth 13 hours prior to test, 50 mg 7 hours prior to test, and 50 mg 1 hour prior to test (along with 50 mg benadryl) (Patient not taking: Rep is sob at rest. Pulse Regular and wnl for patient. Respirations easy and unlabored. Temperature wnl. obese. Appearance neat and clean. Appears in no acute distress. Well nourished and well developed. Cardiovascular: dp/pt palpable bilaterally.  Bilateral l staining;Edema; Other (Comment) Hemosiderin staining;Edema   Wound Granulation Tissue Pink;Firm Pink;Firm   Wound Bed Granulation (%) 5 % 100 %   Wound Bed Epithelium (%) 75 % —   Wound Bed Slough (%) 20 % —   Wound Odor None None       Inactive Orders   Da (cm) 1.1 cm   Wound Surface Area (cm^2) 0.66 cm^2   Wound Depth (cm) 0.1 cm   Wound Volume (cm^3) 0.066 cm^3   Margins Well-defined edges   Non-staged Wound Description Full thickness   Mira-wound Assessment Edema; Hyperpigmented   Wound Granulation Tissue sob   DISCHARGE:    Patient Instructions   Please return 1 week    TO ED TODAY FOR SHORTNESS OF BREATH, LOW BP, BLEEDING WITH LOW INR  Will order extremitease for the left leg    Patient discharge and wound care instructions  Vernelle Kussmaul  1/11/2022 chain amino acids that help with tissue building and wound healing) and take 2 packets/day.  you can order online at [employer] or [facility]    Concerns:  Signs of infection may include the following: • Increase in redness • Red \"streaks\" from wound • Increase i

## 2022-01-12 LAB
ATRIAL RATE: 75 BPM
P AXIS: 49 DEGREES
P-R INTERVAL: 164 MS
Q-T INTERVAL: 394 MS
QRS DURATION: 96 MS
QTC CALCULATION (BEZET): 439 MS
R AXIS: 34 DEGREES
T AXIS: 36 DEGREES
VENTRICULAR RATE: 75 BPM

## 2022-01-12 NOTE — ED PROVIDER NOTES
Patient Seen in: [facility] Emergency Department      History   Patient presents with:  Hypotension  Difficulty Breathing    Stated Complaint:     Subjective:   Patient is a 25-year-old female presents today with increased shortness of breath over t include asthma. She has been behaving normally. Urine output has been normal. The last void occurred less than 6 hours ago. There were no sick contacts. She has received no recent medical care.            Objective:   Past Medical History:   Diagnosis Date Endocrine: Negative for polyuria. Genitourinary: Negative for dysuria. Musculoskeletal: Negative for arthralgias. Skin: Negative for rash. Allergic/Immunologic: Negative for immunocompromised state. Neurological: Negative for headaches.    Hemat person, place, and time.    Psychiatric:         Mood and Affect: Mood normal.         Behavior: Behavior normal.              ED Course     Labs Reviewed   BASIC METABOLIC PANEL (8) - Abnormal; Notable for the following components:       Result Value    BU Sun ---------------------------------------------------------------------------- Indications:   (Varicose veins of both lower extremities with pain [J43.981 (ICD-10-CM)]). ---------------------------------------------------------------------------- Impress !-----------------! !femoral proximal !       !          !                   !                 ! +-----------------+-------+----------+-------------------+-----------------+ ! Right superficial!Patent ! None      ! Normal phasicity;  !-----------------! !femo +-----------------+-------+----------+-------------------+-----------------+ ! Right soleal     !Patent ! None      ! Compressible       !-----------------! +-----------------+-------+----------+-------------------+-----------------+ ! Right great      !------ 1/11/2022  PROCEDURE:  XR CHEST AP PORTABLE  (CPT=71045)  TECHNIQUE:  AP chest radiograph was obtained.   COMPARISON:  [facility] , XR, XR CHEST AP PORTABLE  (CPT=71045), 7/28/2021, 10:56 PM.  INDICATIONS:  sob  PATIENT STATED HISTORY: (As transcribed by Amalia Mcdanielcal ---------------------------------------------------------------------------- Indications:   (Varicose veins of both lower extremities with pain [G33.600 (ICD-10-CM)]). ---------------------------------------------------------------------------- Impressions

## 2022-01-13 LAB — SARS-COV-2 RNA RESP QL NAA+PROBE: NOT DETECTED

## 2022-01-20 ENCOUNTER — OFFICE VISIT (OUTPATIENT)
Dept: WOUND CARE | Facility: HOSPITAL | Age: 67
End: 2022-01-20
Attending: NURSE PRACTITIONER
Payer: MEDICARE

## 2022-01-20 VITALS
HEART RATE: 65 BPM | SYSTOLIC BLOOD PRESSURE: 112 MMHG | DIASTOLIC BLOOD PRESSURE: 71 MMHG | RESPIRATION RATE: 14 BRPM | TEMPERATURE: 98 F

## 2022-01-20 DIAGNOSIS — L97.812 NON-PRESSURE CHRONIC ULCER OF OTHER PART OF RIGHT LOWER LEG WITH FAT LAYER EXPOSED (HCC): ICD-10-CM

## 2022-01-20 DIAGNOSIS — L97.919 CHRONIC VENOUS HYPERTENSION WITH ULCER AND INFLAMMATION, BILATERAL (HCC): Primary | ICD-10-CM

## 2022-01-20 DIAGNOSIS — L97.929 CHRONIC VENOUS HYPERTENSION WITH ULCER AND INFLAMMATION, BILATERAL (HCC): Primary | ICD-10-CM

## 2022-01-20 DIAGNOSIS — L97.922 NON-PRESSURE CHRONIC ULCER OF LEFT LOWER LEG WITH FAT LAYER EXPOSED (HCC): ICD-10-CM

## 2022-01-20 DIAGNOSIS — I87.333 CHRONIC VENOUS HYPERTENSION WITH ULCER AND INFLAMMATION, BILATERAL (HCC): Primary | ICD-10-CM

## 2022-01-20 PROCEDURE — 99213 OFFICE O/P EST LOW 20 MIN: CPT | Performed by: NURSE PRACTITIONER

## 2022-01-20 NOTE — PATIENT INSTRUCTIONS
Please return 2 week    Patient discharge and wound care instructions  Mark Casillas  1/20/2022    You may shower and cleanse area with mild soap and water, rinse wound with saline or wound cleanser, dab dry with gauze and apply your dressings as directed. the following: • Increase in redness • Red \"streaks\" from wound • Increase in swelling • Fever • Unusual odor • Change in the amount of wound drainage     Should you experience any significant changes in your wound(s) or have any questions regarding your

## 2022-01-20 NOTE — PROGRESS NOTES
CHIEF COMPLAINT:   Patient presents with:  Wound Care: Patients is here for a follow up.  Patients denies any issues     HPI:   Information obtained from patient and chart  9-8-21 INITIAL:  17-year-old female past medical history of aortic valve replacement she could look into a 2 part velcro system or \"alexander\" compression. I showed her different options and gave her websites to look into options. The wound has not improved. Her edema is stable and the wound is very tender to touch.  We discussed utilizing g several health issues. She also did contact me via [employer] last week that she had a new wound on her left leg, she was putting gentamicin on the area. Her inr is subtherapeutic and she is working with her pcp managing that.  Today the presents and she is v in am and one at night, Disp: 270 capsule, Rfl: 1  •  POTASSIUM CHLORIDE ER 10 MEQ Oral Cap CR, TAKE 1 CAPSULE BY MOUTH  TWICE DAILY, Disp: 180 capsule, Rfl: 1  •  FLUOXETINE 20 MG Oral Cap, TAKE 1 CAPSULE BY MOUTH  TWICE DAILY, Disp: 180 capsule, Rfl: 1 the lungs every 6 (six) hours as needed. , Disp: , Rfl:   •  Cyproheptadine HCl 4 MG Oral Tab, TAKE 1 TABLET BY MOUTH  NIGHTLY, Disp: 90 tablet, Rfl: 3  •  diphenhydrAMINE HCl 25 MG Oral Cap, Take 1 capsule (25 mg total) by mouth nightly as needed for Aller Estimated body mass index is 41.5 kg/m² as calculated from the following:    Height as of 1/14/22: 69\". Weight as of 1/14/22: 281 lb (127.5 kg).       01/20/22  1501   BP: 112/71   Pulse: 65   Resp: 14   Temp: 98 °F (36.7 °C)     Lab Results   Compone Only): # 1 right medial lower leg  Primary Wound Type: Venous Ulcer  Location: Leg  Wound Location Orientation: Anterior;Right;Medial      Assessments 9/8/2021 11:20 AM 1/20/2022  3:16 PM   Wound Image       Drainage Amount Small Small   Drainage Descripti Wound Granulation Tissue Spongy;Pink Firm;Pink   Wound Bed Granulation (%) 100 % 100 %   Wound Odor — None       No Linked orders to display       Wound 01/11/22 #3 left posterior leg Venous Ulcer Leg Left; Lower; Posterior (Active)   Date First Assessed/T documents include medical language and terminology, abbreviations, and treatment information that may sound technical and at times possibly unfamiliar. In addition, at times, the verbiage may appear blunt or direct.  These documents are one tool providers u followin. Protein: Meats, beans, eggs, milk and yogurt particularly [place] yogurt), tofu, soy nuts, soy protein products (Follow the protein handout in your welcome folder)  10.  Vitamin C: Citrus fruits and juices, strawberries, tomatoes, tomato juice,

## 2022-02-03 ENCOUNTER — OFFICE VISIT (OUTPATIENT)
Dept: WOUND CARE | Facility: HOSPITAL | Age: 67
End: 2022-02-03
Attending: NURSE PRACTITIONER
Payer: MEDICARE

## 2022-02-03 VITALS
TEMPERATURE: 99 F | RESPIRATION RATE: 14 BRPM | HEART RATE: 88 BPM | SYSTOLIC BLOOD PRESSURE: 117 MMHG | DIASTOLIC BLOOD PRESSURE: 70 MMHG

## 2022-02-03 DIAGNOSIS — I87.333 CHRONIC VENOUS HYPERTENSION WITH ULCER AND INFLAMMATION, BILATERAL (HCC): Primary | ICD-10-CM

## 2022-02-03 DIAGNOSIS — L97.922 NON-PRESSURE CHRONIC ULCER OF LEFT LOWER LEG WITH FAT LAYER EXPOSED (HCC): ICD-10-CM

## 2022-02-03 DIAGNOSIS — L97.919 CHRONIC VENOUS HYPERTENSION WITH ULCER AND INFLAMMATION, BILATERAL (HCC): Primary | ICD-10-CM

## 2022-02-03 DIAGNOSIS — L97.929 CHRONIC VENOUS HYPERTENSION WITH ULCER AND INFLAMMATION, BILATERAL (HCC): Primary | ICD-10-CM

## 2022-02-03 DIAGNOSIS — L97.812 NON-PRESSURE CHRONIC ULCER OF OTHER PART OF RIGHT LOWER LEG WITH FAT LAYER EXPOSED (HCC): ICD-10-CM

## 2022-02-03 PROCEDURE — 99215 OFFICE O/P EST HI 40 MIN: CPT | Performed by: NURSE PRACTITIONER

## 2022-02-03 RX ORDER — CEPHALEXIN 500 MG/1
500 CAPSULE ORAL 3 TIMES DAILY
Qty: 21 CAPSULE | Refills: 0 | Status: SHIPPED | OUTPATIENT
Start: 2022-02-03 | End: 2022-02-10

## 2022-02-03 NOTE — PROGRESS NOTES
Weekly Wound Education Note    Teaching Provided To: Patient  Training Topics: Cleasing and general instructions;Dressing; Compression;Edema control; Discharge instructions  Training Method: Explain/Verbal  Training Response: Patient responds and understands        Notes: Toe and left leg healed. Right Leg: wound cultured.  Transfer and ABD pad, comprilan 1x8, 1x10 BLE - removed in 3-4 days applied extremit-ease garment

## 2022-02-04 ENCOUNTER — TELEPHONE (OUTPATIENT)
Dept: WOUND CARE | Facility: HOSPITAL | Age: 67
End: 2022-02-04

## 2022-02-04 NOTE — TELEPHONE ENCOUNTER
Returning patient's call - she states that wrap on right leg is very painful and unable to sleep. She removed and applied her velcro compression. She was able to keep dressing on wound will change it on Sunday.

## 2022-02-10 ENCOUNTER — OFFICE VISIT (OUTPATIENT)
Dept: WOUND CARE | Facility: HOSPITAL | Age: 67
End: 2022-02-10
Attending: NURSE PRACTITIONER
Payer: MEDICARE

## 2022-02-10 VITALS
HEART RATE: 69 BPM | TEMPERATURE: 99 F | RESPIRATION RATE: 18 BRPM | DIASTOLIC BLOOD PRESSURE: 75 MMHG | SYSTOLIC BLOOD PRESSURE: 135 MMHG

## 2022-02-10 DIAGNOSIS — L97.812 NON-PRESSURE CHRONIC ULCER OF OTHER PART OF RIGHT LOWER LEG WITH FAT LAYER EXPOSED (HCC): ICD-10-CM

## 2022-02-10 DIAGNOSIS — L97.919 CHRONIC VENOUS HYPERTENSION WITH ULCER AND INFLAMMATION, BILATERAL (HCC): Primary | ICD-10-CM

## 2022-02-10 DIAGNOSIS — I87.333 CHRONIC VENOUS HYPERTENSION WITH ULCER AND INFLAMMATION, BILATERAL (HCC): Primary | ICD-10-CM

## 2022-02-10 DIAGNOSIS — A49.01 NON-INVASIVE METHICILLIN SENSITIVE STAPHYLOCOCCUS AUREUS INFECTION: ICD-10-CM

## 2022-02-10 DIAGNOSIS — I89.0 LYMPHEDEMA: ICD-10-CM

## 2022-02-10 DIAGNOSIS — L97.929 CHRONIC VENOUS HYPERTENSION WITH ULCER AND INFLAMMATION, BILATERAL (HCC): Primary | ICD-10-CM

## 2022-02-10 PROCEDURE — 99215 OFFICE O/P EST HI 40 MIN: CPT | Performed by: NURSE PRACTITIONER

## 2022-02-10 NOTE — PROGRESS NOTES
Weekly Wound Education Note    Teaching Provided To: Patient  Training Topics: Cleasing and general instructions; Compression; Discharge instructions;Dressing;Edema control  Training Method: Explain/Verbal  Training Response: Patient responds and understands        Notes: Stable, switched to coloplaste traid paste with gauze. Abdulkadir paste order through [employer] [phone], face sheet faxed.

## 2022-02-17 ENCOUNTER — APPOINTMENT (OUTPATIENT)
Dept: WOUND CARE | Facility: HOSPITAL | Age: 67
End: 2022-02-17
Attending: NURSE PRACTITIONER
Payer: MEDICARE

## 2022-02-18 ENCOUNTER — OFFICE VISIT (OUTPATIENT)
Dept: WOUND CARE | Facility: HOSPITAL | Age: 67
End: 2022-02-18
Attending: NURSE PRACTITIONER
Payer: MEDICARE

## 2022-02-18 VITALS
RESPIRATION RATE: 18 BRPM | SYSTOLIC BLOOD PRESSURE: 109 MMHG | TEMPERATURE: 99 F | DIASTOLIC BLOOD PRESSURE: 71 MMHG | HEART RATE: 77 BPM

## 2022-02-18 DIAGNOSIS — I87.333 CHRONIC VENOUS HYPERTENSION WITH ULCER AND INFLAMMATION, BILATERAL (HCC): ICD-10-CM

## 2022-02-18 DIAGNOSIS — L97.929 CHRONIC VENOUS HYPERTENSION WITH ULCER AND INFLAMMATION, BILATERAL (HCC): ICD-10-CM

## 2022-02-18 DIAGNOSIS — L97.812 NON-PRESSURE CHRONIC ULCER OF OTHER PART OF RIGHT LOWER LEG WITH FAT LAYER EXPOSED (HCC): Primary | ICD-10-CM

## 2022-02-18 DIAGNOSIS — I89.0 LYMPHEDEMA: ICD-10-CM

## 2022-02-18 DIAGNOSIS — L97.919 CHRONIC VENOUS HYPERTENSION WITH ULCER AND INFLAMMATION, BILATERAL (HCC): ICD-10-CM

## 2022-02-18 DIAGNOSIS — A49.01 NON-INVASIVE METHICILLIN SENSITIVE STAPHYLOCOCCUS AUREUS INFECTION: ICD-10-CM

## 2022-02-18 PROCEDURE — 99214 OFFICE O/P EST MOD 30 MIN: CPT | Performed by: NURSE PRACTITIONER

## 2022-02-18 NOTE — PROGRESS NOTES
Weekly Wound Education Note    Teaching Provided To: Patient  Training Topics: Compression;Dressing; Discharge instructions;Edema control  Training Method: Explain/Verbal;Written  Training Response: Patient responds and understands         Silver alginate, border gauze, change daily. Continue wearing Extremitease with liner.

## 2022-02-24 ENCOUNTER — OFFICE VISIT (OUTPATIENT)
Dept: WOUND CARE | Facility: HOSPITAL | Age: 67
End: 2022-02-24
Attending: NURSE PRACTITIONER
Payer: MEDICARE

## 2022-02-24 VITALS
HEART RATE: 77 BPM | DIASTOLIC BLOOD PRESSURE: 68 MMHG | TEMPERATURE: 98 F | RESPIRATION RATE: 16 BRPM | SYSTOLIC BLOOD PRESSURE: 130 MMHG

## 2022-02-24 DIAGNOSIS — L97.929 CHRONIC VENOUS HYPERTENSION WITH ULCER AND INFLAMMATION, BILATERAL (HCC): Primary | ICD-10-CM

## 2022-02-24 DIAGNOSIS — I89.8 LYMPHORRHEA: ICD-10-CM

## 2022-02-24 DIAGNOSIS — I89.0 LYMPHEDEMA: ICD-10-CM

## 2022-02-24 DIAGNOSIS — I87.333 CHRONIC VENOUS HYPERTENSION WITH ULCER AND INFLAMMATION, BILATERAL (HCC): Primary | ICD-10-CM

## 2022-02-24 DIAGNOSIS — L97.812 NON-PRESSURE CHRONIC ULCER OF OTHER PART OF RIGHT LOWER LEG WITH FAT LAYER EXPOSED (HCC): ICD-10-CM

## 2022-02-24 DIAGNOSIS — L97.919 CHRONIC VENOUS HYPERTENSION WITH ULCER AND INFLAMMATION, BILATERAL (HCC): Primary | ICD-10-CM

## 2022-02-24 PROCEDURE — 99214 OFFICE O/P EST MOD 30 MIN: CPT | Performed by: NURSE PRACTITIONER

## 2022-02-24 NOTE — PROGRESS NOTES
Weekly Wound Education Note    Teaching Provided To: Patient  Training Topics: Cleasing and general instructions;Dressing; Compression; Discharge instructions;Edema control  Training Method: Explain/Verbal  Training Response: Patient responds and understands        Notes: Patient to start gentamycin. Bacitracin and ABD pad applied today in clinic. Tactile came in for a demostration.

## 2022-03-02 ENCOUNTER — TELEPHONE (OUTPATIENT)
Dept: ORTHOPEDICS CLINIC | Facility: CLINIC | Age: 67
End: 2022-03-02

## 2022-03-02 NOTE — TELEPHONE ENCOUNTER
Patient is looking to start physical therapy. Patient has an upcoming appt. Future Appointments   Date Time Provider Yanique Gallegos   3/29/2022  3:40 PM Marie Freedman MD [facility] [id number]       Referral can be faxed to [phone].

## 2022-03-02 NOTE — TELEPHONE ENCOUNTER
Spoke to patient who states that she has severe stenosis, of her lower back. States Dr Jeri Morales has seen her in the past for this. Would like to know if Dr Jeri Morales can write a PT script for her. She is a pt of Dr Jeri Morales for over 10 yrs.     Physical therapy-[facility] for [address] Phone [phone] fax [phone]  [facility]

## 2022-03-03 ENCOUNTER — APPOINTMENT (OUTPATIENT)
Dept: WOUND CARE | Facility: HOSPITAL | Age: 67
End: 2022-03-03
Attending: NURSE PRACTITIONER
Payer: MEDICARE

## 2022-03-03 NOTE — TELEPHONE ENCOUNTER
Patient is complaining of low back pain and pain radiating to the legs and weakness of the legs. Patient has had a prior diagnosis of degenerative disc disease lumbar spine and stenosis. Patient was advised to go ahead with physical therapy but if that is not effective she will need to make an appointment for follow-up and consider epidural injections or possible imaging studies.

## 2022-03-10 ENCOUNTER — OFFICE VISIT (OUTPATIENT)
Dept: WOUND CARE | Facility: HOSPITAL | Age: 67
End: 2022-03-10
Attending: NURSE PRACTITIONER
Payer: MEDICARE

## 2022-03-10 ENCOUNTER — HOSPITAL ENCOUNTER (OUTPATIENT)
Dept: GENERAL RADIOLOGY | Facility: HOSPITAL | Age: 67
Discharge: HOME | End: 2022-03-10
Attending: NURSE PRACTITIONER
Payer: MEDICARE

## 2022-03-10 VITALS
SYSTOLIC BLOOD PRESSURE: 121 MMHG | HEART RATE: 83 BPM | DIASTOLIC BLOOD PRESSURE: 69 MMHG | RESPIRATION RATE: 16 BRPM | TEMPERATURE: 99 F

## 2022-03-10 DIAGNOSIS — L97.512 NON-PRESSURE CHRONIC ULCER OF OTHER PART OF RIGHT FOOT WITH FAT LAYER EXPOSED (HCC): ICD-10-CM

## 2022-03-10 DIAGNOSIS — L97.919 CHRONIC VENOUS HYPERTENSION WITH ULCER AND INFLAMMATION, BILATERAL (HCC): Primary | ICD-10-CM

## 2022-03-10 DIAGNOSIS — L97.812 NON-PRESSURE CHRONIC ULCER OF OTHER PART OF RIGHT LOWER LEG WITH FAT LAYER EXPOSED (HCC): ICD-10-CM

## 2022-03-10 DIAGNOSIS — M79.674 PAIN OF RIGHT GREAT TOE: ICD-10-CM

## 2022-03-10 DIAGNOSIS — I89.0 LYMPHEDEMA: ICD-10-CM

## 2022-03-10 DIAGNOSIS — L97.929 CHRONIC VENOUS HYPERTENSION WITH ULCER AND INFLAMMATION, BILATERAL (HCC): Primary | ICD-10-CM

## 2022-03-10 DIAGNOSIS — I87.333 CHRONIC VENOUS HYPERTENSION WITH ULCER AND INFLAMMATION, BILATERAL (HCC): Primary | ICD-10-CM

## 2022-03-10 PROCEDURE — 73630 X-RAY EXAM OF FOOT: CPT | Performed by: NURSE PRACTITIONER

## 2022-03-10 PROCEDURE — 99215 OFFICE O/P EST HI 40 MIN: CPT | Performed by: NURSE PRACTITIONER

## 2022-03-10 NOTE — PROGRESS NOTES
Weekly Wound Education Note    Teaching Provided To: Patient  Training Topics: Cleasing and general instructions;Dressing; Discharge instructions; Compression;Edema control  Training Method: Explain/Verbal  Training Response: Patient responds and understands        Notes: Switched dressing to enluxtra, applied comprilan 1x8, 1x10, and black foam. New wound to Right great toe - honey gel and thick foam. xray ordered for toe.

## 2022-03-10 NOTE — PROGRESS NOTES
Spoke to patient - informed her xray appears to show osteomyelitis in big toe. Discussed that she will need to see a podiatrist and infectious disease. [facility]. She verbalizes understanding and will continue honey gel at this time.

## 2022-03-14 ENCOUNTER — HOSPITAL ENCOUNTER (INPATIENT)
Facility: HOSPITAL | Age: 67
LOS: 4 days | Discharge: NURSING, REHAB OR LONG-TERM CARE | DRG: 540 | End: 2022-03-18
Attending: EMERGENCY MEDICINE | Admitting: INTERNAL MEDICINE
Payer: MEDICARE

## 2022-03-14 ENCOUNTER — TELEPHONE (OUTPATIENT)
Dept: WOUND CARE | Facility: HOSPITAL | Age: 67
End: 2022-03-14

## 2022-03-14 DIAGNOSIS — E87.1 HYPONATREMIA: Primary | ICD-10-CM

## 2022-03-14 DIAGNOSIS — D64.9 ANEMIA, UNSPECIFIED TYPE: ICD-10-CM

## 2022-03-14 DIAGNOSIS — M86.9 OSTEOMYELITIS OF TOE OF RIGHT FOOT (HCC): ICD-10-CM

## 2022-03-14 DIAGNOSIS — E87.6 HYPOKALEMIA: ICD-10-CM

## 2022-03-14 LAB
ALBUMIN SERPL-MCNC: 3.4 G/DL (ref 3.4–5)
ALBUMIN/GLOB SERPL: 0.8 {RATIO} (ref 1–2)
ALP LIVER SERPL-CCNC: 93 U/L
ALT SERPL-CCNC: 21 U/L
ANION GAP SERPL CALC-SCNC: 6 MMOL/L (ref 0–18)
APTT PPP: 40 SECONDS (ref 23.3–35.6)
AST SERPL-CCNC: 29 U/L (ref 15–37)
BASOPHILS # BLD AUTO: 0.04 X10(3) UL (ref 0–0.2)
BASOPHILS NFR BLD AUTO: 0.4 %
BILIRUB SERPL-MCNC: 0.6 MG/DL (ref 0.1–2)
BUN BLD-MCNC: 25 MG/DL (ref 7–18)
CALCIUM BLD-MCNC: 9.7 MG/DL (ref 8.5–10.1)
CHLORIDE SERPL-SCNC: 103 MMOL/L (ref 98–112)
CO2 SERPL-SCNC: 26 MMOL/L (ref 21–32)
CREAT BLD-MCNC: 1.29 MG/DL
EOSINOPHIL # BLD AUTO: 0.1 X10(3) UL (ref 0–0.7)
EOSINOPHIL NFR BLD AUTO: 1.1 %
ERYTHROCYTE [DISTWIDTH] IN BLOOD BY AUTOMATED COUNT: 13.8 %
GLOBULIN PLAS-MCNC: 4.2 G/DL (ref 2.8–4.4)
GLUCOSE BLD-MCNC: 98 MG/DL (ref 70–99)
HCT VFR BLD AUTO: 33.8 %
HGB BLD-MCNC: 10.9 G/DL
IMM GRANULOCYTES # BLD AUTO: 0.04 X10(3) UL (ref 0–1)
IMM GRANULOCYTES NFR BLD: 0.4 %
INR BLD: 2.44 (ref 0.8–1.2)
LACTATE SERPL-SCNC: 0.5 MMOL/L (ref 0.4–2)
LYMPHOCYTES # BLD AUTO: 1.63 X10(3) UL (ref 1–4)
LYMPHOCYTES NFR BLD AUTO: 17.9 %
MCH RBC QN AUTO: 30.4 PG (ref 26–34)
MCHC RBC AUTO-ENTMCNC: 32.2 G/DL (ref 31–37)
MCV RBC AUTO: 94.2 FL
MONOCYTES # BLD AUTO: 1.23 X10(3) UL (ref 0.1–1)
MONOCYTES NFR BLD AUTO: 13.5 %
NEUTROPHILS # BLD AUTO: 6.09 X10 (3) UL (ref 1.5–7.7)
NEUTROPHILS # BLD AUTO: 6.09 X10(3) UL (ref 1.5–7.7)
NEUTROPHILS NFR BLD AUTO: 66.7 %
OSMOLALITY SERPL CALC.SUM OF ELEC: 284 MOSM/KG (ref 275–295)
PLATELET # BLD AUTO: 349 10(3)UL (ref 150–450)
POTASSIUM SERPL-SCNC: 3.3 MMOL/L (ref 3.5–5.1)
PROCALCITONIN SERPL-MCNC: <0.05 NG/ML (ref ?–0.16)
PROT SERPL-MCNC: 7.6 G/DL (ref 6.4–8.2)
PROTHROMBIN TIME: 26.6 SECONDS (ref 11.6–14.8)
RBC # BLD AUTO: 3.59 X10(6)UL
SARS-COV-2 RNA RESP QL NAA+PROBE: NOT DETECTED
SODIUM SERPL-SCNC: 135 MMOL/L (ref 136–145)
WBC # BLD AUTO: 9.1 X10(3) UL (ref 4–11)

## 2022-03-14 PROCEDURE — 85610 PROTHROMBIN TIME: CPT | Performed by: EMERGENCY MEDICINE

## 2022-03-14 PROCEDURE — 87040 BLOOD CULTURE FOR BACTERIA: CPT | Performed by: EMERGENCY MEDICINE

## 2022-03-14 PROCEDURE — 99285 EMERGENCY DEPT VISIT HI MDM: CPT

## 2022-03-14 PROCEDURE — 84145 PROCALCITONIN (PCT): CPT | Performed by: EMERGENCY MEDICINE

## 2022-03-14 PROCEDURE — 85730 THROMBOPLASTIN TIME PARTIAL: CPT | Performed by: EMERGENCY MEDICINE

## 2022-03-14 PROCEDURE — 84145 PROCALCITONIN (PCT): CPT

## 2022-03-14 PROCEDURE — 85025 COMPLETE CBC W/AUTO DIFF WBC: CPT | Performed by: EMERGENCY MEDICINE

## 2022-03-14 PROCEDURE — 83605 ASSAY OF LACTIC ACID: CPT | Performed by: EMERGENCY MEDICINE

## 2022-03-14 PROCEDURE — 87040 BLOOD CULTURE FOR BACTERIA: CPT

## 2022-03-14 PROCEDURE — 80053 COMPREHEN METABOLIC PANEL: CPT

## 2022-03-14 PROCEDURE — 36415 COLL VENOUS BLD VENIPUNCTURE: CPT

## 2022-03-14 PROCEDURE — 96365 THER/PROPH/DIAG IV INF INIT: CPT

## 2022-03-14 PROCEDURE — 83605 ASSAY OF LACTIC ACID: CPT

## 2022-03-14 PROCEDURE — 85025 COMPLETE CBC W/AUTO DIFF WBC: CPT

## 2022-03-14 PROCEDURE — 80053 COMPREHEN METABOLIC PANEL: CPT | Performed by: EMERGENCY MEDICINE

## 2022-03-14 RX ORDER — VANCOMYCIN 2 GRAM/500 ML IN 0.9 % SODIUM CHLORIDE INTRAVENOUS
25 ONCE
Status: COMPLETED | OUTPATIENT
Start: 2022-03-14 | End: 2022-03-15

## 2022-03-15 ENCOUNTER — TELEPHONE (OUTPATIENT)
Dept: ORTHOPEDICS CLINIC | Facility: CLINIC | Age: 67
End: 2022-03-15

## 2022-03-15 ENCOUNTER — APPOINTMENT (OUTPATIENT)
Dept: MRI IMAGING | Facility: HOSPITAL | Age: 67
DRG: 540 | End: 2022-03-15
Attending: INTERNAL MEDICINE
Payer: MEDICARE

## 2022-03-15 LAB
ANION GAP SERPL CALC-SCNC: 5 MMOL/L (ref 0–18)
BASOPHILS # BLD AUTO: 0.03 X10(3) UL (ref 0–0.2)
BASOPHILS NFR BLD AUTO: 0.4 %
BUN BLD-MCNC: 24 MG/DL (ref 7–18)
CALCIUM BLD-MCNC: 9.2 MG/DL (ref 8.5–10.1)
CHLORIDE SERPL-SCNC: 105 MMOL/L (ref 98–112)
CO2 SERPL-SCNC: 28 MMOL/L (ref 21–32)
CREAT BLD-MCNC: 1.1 MG/DL
EOSINOPHIL # BLD AUTO: 0.12 X10(3) UL (ref 0–0.7)
EOSINOPHIL NFR BLD AUTO: 1.5 %
ERYTHROCYTE [DISTWIDTH] IN BLOOD BY AUTOMATED COUNT: 13.9 %
GLUCOSE BLD-MCNC: 95 MG/DL (ref 70–99)
HCT VFR BLD AUTO: 30.2 %
HGB BLD-MCNC: 9.8 G/DL
IMM GRANULOCYTES # BLD AUTO: 0.03 X10(3) UL (ref 0–1)
IMM GRANULOCYTES NFR BLD: 0.4 %
INR BLD: 2.44 (ref 0.8–1.2)
LYMPHOCYTES # BLD AUTO: 1.6 X10(3) UL (ref 1–4)
LYMPHOCYTES NFR BLD AUTO: 19.5 %
MCH RBC QN AUTO: 31.2 PG (ref 26–34)
MCHC RBC AUTO-ENTMCNC: 32.5 G/DL (ref 31–37)
MCV RBC AUTO: 96.2 FL
MONOCYTES # BLD AUTO: 1.2 X10(3) UL (ref 0.1–1)
MONOCYTES NFR BLD AUTO: 14.7 %
NEUTROPHILS # BLD AUTO: 5.21 X10 (3) UL (ref 1.5–7.7)
NEUTROPHILS # BLD AUTO: 5.21 X10(3) UL (ref 1.5–7.7)
NEUTROPHILS NFR BLD AUTO: 63.5 %
OSMOLALITY SERPL CALC.SUM OF ELEC: 290 MOSM/KG (ref 275–295)
PLATELET # BLD AUTO: 287 10(3)UL (ref 150–450)
POTASSIUM SERPL-SCNC: 3.2 MMOL/L (ref 3.5–5.1)
PROTHROMBIN TIME: 26.6 SECONDS (ref 11.6–14.8)
RBC # BLD AUTO: 3.14 X10(6)UL
SODIUM SERPL-SCNC: 138 MMOL/L (ref 136–145)
URATE SERPL-MCNC: 7.6 MG/DL
VANCOMYCIN PEAK SERPL-MCNC: 25.4 UG/ML (ref 30–50)
WBC # BLD AUTO: 8.2 X10(3) UL (ref 4–11)

## 2022-03-15 PROCEDURE — 80202 ASSAY OF VANCOMYCIN: CPT | Performed by: STUDENT IN AN ORGANIZED HEALTH CARE EDUCATION/TRAINING PROGRAM

## 2022-03-15 PROCEDURE — 84550 ASSAY OF BLOOD/URIC ACID: CPT | Performed by: INTERNAL MEDICINE

## 2022-03-15 PROCEDURE — A9575 INJ GADOTERATE MEGLUMI 0.1ML: HCPCS | Performed by: HOSPITALIST

## 2022-03-15 PROCEDURE — 5A09357 ASSISTANCE WITH RESPIRATORY VENTILATION, LESS THAN 24 CONSECUTIVE HOURS, CONTINUOUS POSITIVE AIRWAY PRESSURE: ICD-10-PCS | Performed by: INTERNAL MEDICINE

## 2022-03-15 PROCEDURE — 80048 BASIC METABOLIC PNL TOTAL CA: CPT | Performed by: STUDENT IN AN ORGANIZED HEALTH CARE EDUCATION/TRAINING PROGRAM

## 2022-03-15 PROCEDURE — 87075 CULTR BACTERIA EXCEPT BLOOD: CPT | Performed by: PODIATRIST

## 2022-03-15 PROCEDURE — 87205 SMEAR GRAM STAIN: CPT | Performed by: PODIATRIST

## 2022-03-15 PROCEDURE — 87077 CULTURE AEROBIC IDENTIFY: CPT | Performed by: PODIATRIST

## 2022-03-15 PROCEDURE — 87186 SC STD MICRODIL/AGAR DIL: CPT | Performed by: PODIATRIST

## 2022-03-15 PROCEDURE — 87070 CULTURE OTHR SPECIMN AEROBIC: CPT | Performed by: PODIATRIST

## 2022-03-15 PROCEDURE — 85610 PROTHROMBIN TIME: CPT | Performed by: STUDENT IN AN ORGANIZED HEALTH CARE EDUCATION/TRAINING PROGRAM

## 2022-03-15 PROCEDURE — 73720 MRI LWR EXTREMITY W/O&W/DYE: CPT | Performed by: INTERNAL MEDICINE

## 2022-03-15 PROCEDURE — 94640 AIRWAY INHALATION TREATMENT: CPT

## 2022-03-15 PROCEDURE — 94660 CPAP INITIATION&MGMT: CPT

## 2022-03-15 PROCEDURE — 85025 COMPLETE CBC W/AUTO DIFF WBC: CPT | Performed by: STUDENT IN AN ORGANIZED HEALTH CARE EDUCATION/TRAINING PROGRAM

## 2022-03-15 RX ORDER — ALBUTEROL SULFATE 90 UG/1
2 AEROSOL, METERED RESPIRATORY (INHALATION) EVERY 6 HOURS PRN
Status: DISCONTINUED | OUTPATIENT
Start: 2022-03-15 | End: 2022-03-18

## 2022-03-15 RX ORDER — BUPROPION HYDROCHLORIDE 150 MG/1
150 TABLET, EXTENDED RELEASE ORAL 2 TIMES DAILY
Status: DISCONTINUED | OUTPATIENT
Start: 2022-03-15 | End: 2022-03-18

## 2022-03-15 RX ORDER — CEFAZOLIN SODIUM/WATER 2 G/20 ML
2 SYRINGE (ML) INTRAVENOUS EVERY 8 HOURS
Status: DISCONTINUED | OUTPATIENT
Start: 2022-03-15 | End: 2022-03-18

## 2022-03-15 RX ORDER — LEVOTHYROXINE SODIUM 0.15 MG/1
150 TABLET ORAL
Status: DISCONTINUED | OUTPATIENT
Start: 2022-03-15 | End: 2022-03-18

## 2022-03-15 RX ORDER — LOSARTAN POTASSIUM 50 MG/1
50 TABLET ORAL NIGHTLY
Refills: 3 | Status: DISCONTINUED | OUTPATIENT
Start: 2022-03-15 | End: 2022-03-18

## 2022-03-15 RX ORDER — ONDANSETRON 2 MG/ML
4 INJECTION INTRAMUSCULAR; INTRAVENOUS EVERY 6 HOURS PRN
Status: DISCONTINUED | OUTPATIENT
Start: 2022-03-15 | End: 2022-03-18

## 2022-03-15 RX ORDER — CLOPIDOGREL BISULFATE 75 MG/1
75 TABLET ORAL NIGHTLY
Status: DISCONTINUED | OUTPATIENT
Start: 2022-03-15 | End: 2022-03-18

## 2022-03-15 RX ORDER — ACETAMINOPHEN 325 MG/1
650 TABLET ORAL EVERY 6 HOURS PRN
Status: DISCONTINUED | OUTPATIENT
Start: 2022-03-15 | End: 2022-03-18

## 2022-03-15 RX ORDER — ROSUVASTATIN CALCIUM 5 MG/1
5 TABLET, COATED ORAL EVERY OTHER DAY
Status: DISCONTINUED | OUTPATIENT
Start: 2022-03-15 | End: 2022-03-18

## 2022-03-15 RX ORDER — NORTRIPTYLINE HYDROCHLORIDE 25 MG/1
25 CAPSULE ORAL NIGHTLY
Status: DISCONTINUED | OUTPATIENT
Start: 2022-03-15 | End: 2022-03-18

## 2022-03-15 RX ORDER — METOLAZONE 2.5 MG/1
2.5 TABLET ORAL DAILY PRN
Status: DISCONTINUED | OUTPATIENT
Start: 2022-03-15 | End: 2022-03-15

## 2022-03-15 RX ORDER — POTASSIUM CHLORIDE 20 MEQ/1
40 TABLET, EXTENDED RELEASE ORAL ONCE
Status: COMPLETED | OUTPATIENT
Start: 2022-03-15 | End: 2022-03-15

## 2022-03-15 RX ORDER — BISACODYL 10 MG
10 SUPPOSITORY, RECTAL RECTAL
Status: DISCONTINUED | OUTPATIENT
Start: 2022-03-15 | End: 2022-03-18

## 2022-03-15 RX ORDER — SENNOSIDES 8.6 MG
17.2 TABLET ORAL NIGHTLY PRN
Status: DISCONTINUED | OUTPATIENT
Start: 2022-03-15 | End: 2022-03-18

## 2022-03-15 RX ORDER — METOCLOPRAMIDE HYDROCHLORIDE 5 MG/ML
10 INJECTION INTRAMUSCULAR; INTRAVENOUS EVERY 8 HOURS PRN
Status: DISCONTINUED | OUTPATIENT
Start: 2022-03-15 | End: 2022-03-18

## 2022-03-15 RX ORDER — FLUOXETINE HYDROCHLORIDE 20 MG/1
20 CAPSULE ORAL 2 TIMES DAILY
Status: DISCONTINUED | OUTPATIENT
Start: 2022-03-15 | End: 2022-03-18

## 2022-03-15 RX ORDER — SODIUM PHOSPHATE, DIBASIC AND SODIUM PHOSPHATE, MONOBASIC 7; 19 G/133ML; G/133ML
1 ENEMA RECTAL ONCE AS NEEDED
Status: DISCONTINUED | OUTPATIENT
Start: 2022-03-15 | End: 2022-03-18

## 2022-03-15 RX ORDER — DILTIAZEM HYDROCHLORIDE 120 MG/1
120 CAPSULE, EXTENDED RELEASE ORAL DAILY
Status: DISCONTINUED | OUTPATIENT
Start: 2022-03-15 | End: 2022-03-18

## 2022-03-15 RX ORDER — POLYETHYLENE GLYCOL 3350 17 G/17G
17 POWDER, FOR SOLUTION ORAL DAILY PRN
Status: DISCONTINUED | OUTPATIENT
Start: 2022-03-15 | End: 2022-03-18

## 2022-03-15 RX ORDER — ALLOPURINOL 100 MG/1
100 TABLET ORAL DAILY
Status: DISCONTINUED | OUTPATIENT
Start: 2022-03-15 | End: 2022-03-18

## 2022-03-15 RX ORDER — VANCOMYCIN HYDROCHLORIDE
15 EVERY 12 HOURS
Status: DISCONTINUED | OUTPATIENT
Start: 2022-03-15 | End: 2022-03-15

## 2022-03-15 NOTE — PROGRESS NOTES
[facility] Dosing Service    Initial Pharmacokinetic Consult for Vancomycin AUC Dosing    Asya Quintanilla is a 74year old patient who is being treated for osteomyelitis. Pharmacy has been asked to dose vancomycin by Dr. Eugenie Spangler. Weights:  Ideal body weight: 66.2 kg (145 lb 15.1 oz)  Adjusted ideal body weight: 92.4 kg (203 lb 11.6 oz)  Actual weight:  131.7 kg (290 lb 6.4 oz)    Labs:  Lab Results   Component Value Date    CREATSERUM 1.29 03/14/2022      CrCl:  Estimated Creatinine Clearance: 43.6 mL/min (A) (based on SCr of 1.29 mg/dL (H)). Based on the above:    1. This patient has received a loading dose of Vancomycin  2000 mg IVPB (25mg/kg, capped at 2000 mg) x 1 dose. 2. Vancomycin peak and trough will be obtained prior to the next dose, in order to calculate AUC24. Goal AUC24 is 400-600 mg-h/L.    3. Pharmacy will order SCr as clinically indicated while on vancomycin to assess renal function. 4. Pharmacy will follow and monitor renal function, toxicity and efficacy. We appreciate the opportunity to assist in the care of this patient.     Farzaneh Butler, PharmD  3/15/2022  1:38 AM  [address] [place] [address] Extension: [phone]

## 2022-03-15 NOTE — PROGRESS NOTES
[facility] Dosing Service  Warfarin (Coumadin) Initial Dosing    Logan Vincent is a 74year old patient for whom pharmacy has been consulted to dose warfarin (COUMADIN) for atrial fibrillation by Dr. Sonny Granado. Based on this indication, goal INR is 2-3. Pertinent Patient Medical History: Atrial fibrillation, PE, Transcatheter aortic valve replacement, CHF, CAD  Potential Drug Interactions: Allopurinol, Clopidogrel and Levothyroxine    Latest INR:   Recent Labs     03/14/22 2127   INR 2.44*       Other Anticoagulants: None    Home regimen (if applicable): Warfarin 6 mg on Tuesday, Friday and 4.5 mg on Sunday, Monday, Wednesday, Thursday, Saturday    Date/Time last dose was given (if applicable): 2/44/42    Based on above -    1. For today, Give warfarin (COUMADIN) 4.5 mg x 1 dose now. 2.  PT/INR ordered daily while on warfarin. 3.  Pharmacy will continue to follow. We appreciate the opportunity to assist in the care of this patient.     Asael Guy, PharmD  3/15/2022  1:52 AM

## 2022-03-15 NOTE — PLAN OF CARE
Pt seen by Dr. Kizzy Duran this am.  Antibiotic changed to Ancef as ordered. Right medial shin wound with scant serous drainage, right great toe wound dry with redness and swelling to periwound. Seen by wound RN and new dressings applied. Podiatry consulted , awaiting return call. Up ad shayan in room. MRI ordered for right foot. Taking tylenol prn for pain control with relief. Pt verbalized understanding of POC.

## 2022-03-15 NOTE — PROGRESS NOTES
Pharmacy Dosing Service  Warfarin (Coumadin) Subsequent Dosing            Freddy Lawton is a 74year old female for whom pharmacy has been dosing warfarin. Consulting physician: Dr. Jenna Black     Indication: Afib, Hx of PE     Goal INR is 2-3 (2.5-3.5 per [facility] clinic)               Recent Labs   Lab 03/11/22  0000 03/14/22 2127 03/15/22  0542   INR 2.9 2.44* 2.44*      Significant drug interactions:  allopurinol, clopidogrel (taking bot PTA)   Other anticoagulants:  n/a  Home regimen (if applicable):  6mg Tues/Fri & 4.5mg ROW  Date/Time last dose was given: 3/15 early am        Inpatient Dosing History:     Date 3/14 3/15   INR 2.44 2.44   Warfarin dose   4.5mg early am                                                                                   A/P:  1. The INR is therapeutic. 2.  Ordered warfarin 6mg for tonight at 2100. 3.  PT/INR ordered daily while on warfarin. Pharmacy will continue to follow. We appreciate the opportunity to assist in her care.         Snow Cast, PharmD, BCPS  3/15/2022  8:26 AM

## 2022-03-15 NOTE — ED QUICK NOTES
Orders for admission, patient is aware of plan and ready to go upstairs. Any questions, please call ED RN jamil at extension [phone].      Patient Covid vaccination status: Fully vaccinated     COVID Test Ordered in ED: Rapid SARS-CoV-2 by PCR    COVID Suspicion at Admission: N/A    Running Infusions: vancomycin @250/hr      Mental Status/LOC at time of transport: a&o x4    Other pertinent information:   CIWA score: N/A   NIH score:  N/A

## 2022-03-15 NOTE — PLAN OF CARE
NURSING ADMISSION NOTE      Patient admitted via Cart  Oriented to room. Safety precautions initiated. Bed in low position. Call light in reach. Received from ER awake, A/Ox4. VSS, afebrile, NSR on tele monitoring. Able to ambulate to toilet independently using forearm crutches. Tip of rt big toe noted with intact blister. Rt medial lower leg with open wound, scant serous drainage noted, pt states she goes to wound clinic. Wound covered with Mepilex. PP appreciated using doppler, skin to BLE dusky in color, np edema present to both legs- chronic per pt. Dr Aaron Edmonds paged to order home meds. Pt updated with plan of care, verbalized understanding.

## 2022-03-15 NOTE — TELEPHONE ENCOUNTER
Patient is currently in [facility] in the Ortho Unit. Patient is requesting a call from Dr. Niels Lino because she would like to ask him some questions about what's going on with her.

## 2022-03-15 NOTE — ED INITIAL ASSESSMENT (HPI)
Pt to the ER via walk in for a right foot wound and fever that started in Jan. Pt states she went to the MD who told her it might be osteomyelitis. Pt presents to the ER a&ox4. Pt unable to ambulate on foot. Respiration even skin warm and dry.

## 2022-03-16 LAB
ANION GAP SERPL CALC-SCNC: 7 MMOL/L (ref 0–18)
BASOPHILS # BLD AUTO: 0.03 X10(3) UL (ref 0–0.2)
BASOPHILS NFR BLD AUTO: 0.5 %
BUN BLD-MCNC: 22 MG/DL (ref 7–18)
CALCIUM BLD-MCNC: 8.9 MG/DL (ref 8.5–10.1)
CHLORIDE SERPL-SCNC: 105 MMOL/L (ref 98–112)
CO2 SERPL-SCNC: 28 MMOL/L (ref 21–32)
CREAT BLD-MCNC: 1.19 MG/DL
EOSINOPHIL # BLD AUTO: 0.14 X10(3) UL (ref 0–0.7)
EOSINOPHIL NFR BLD AUTO: 2.3 %
ERYTHROCYTE [DISTWIDTH] IN BLOOD BY AUTOMATED COUNT: 13.7 %
GLUCOSE BLD-MCNC: 112 MG/DL (ref 70–99)
HCT VFR BLD AUTO: 31.1 %
HGB BLD-MCNC: 9.6 G/DL
IMM GRANULOCYTES # BLD AUTO: 0.02 X10(3) UL (ref 0–1)
IMM GRANULOCYTES NFR BLD: 0.3 %
INR BLD: 2.83 (ref 0.8–1.2)
LYMPHOCYTES # BLD AUTO: 1.27 X10(3) UL (ref 1–4)
LYMPHOCYTES NFR BLD AUTO: 20.9 %
MAGNESIUM SERPL-MCNC: 2.2 MG/DL (ref 1.6–2.6)
MCH RBC QN AUTO: 29.8 PG (ref 26–34)
MCHC RBC AUTO-ENTMCNC: 30.9 G/DL (ref 31–37)
MCV RBC AUTO: 96.6 FL
MONOCYTES # BLD AUTO: 0.88 X10(3) UL (ref 0.1–1)
MONOCYTES NFR BLD AUTO: 14.5 %
NEUTROPHILS # BLD AUTO: 3.74 X10 (3) UL (ref 1.5–7.7)
NEUTROPHILS # BLD AUTO: 3.74 X10(3) UL (ref 1.5–7.7)
NEUTROPHILS NFR BLD AUTO: 61.5 %
OSMOLALITY SERPL CALC.SUM OF ELEC: 294 MOSM/KG (ref 275–295)
PLATELET # BLD AUTO: 304 10(3)UL (ref 150–450)
POTASSIUM SERPL-SCNC: 3.2 MMOL/L (ref 3.5–5.1)
POTASSIUM SERPL-SCNC: 3.2 MMOL/L (ref 3.5–5.1)
PROTHROMBIN TIME: 29.9 SECONDS (ref 11.6–14.8)
RBC # BLD AUTO: 3.22 X10(6)UL
SODIUM SERPL-SCNC: 140 MMOL/L (ref 136–145)
WBC # BLD AUTO: 6.1 X10(3) UL (ref 4–11)

## 2022-03-16 PROCEDURE — 83735 ASSAY OF MAGNESIUM: CPT | Performed by: HOSPITALIST

## 2022-03-16 PROCEDURE — 85025 COMPLETE CBC W/AUTO DIFF WBC: CPT | Performed by: HOSPITALIST

## 2022-03-16 PROCEDURE — 84132 ASSAY OF SERUM POTASSIUM: CPT | Performed by: HOSPITALIST

## 2022-03-16 PROCEDURE — 85610 PROTHROMBIN TIME: CPT | Performed by: STUDENT IN AN ORGANIZED HEALTH CARE EDUCATION/TRAINING PROGRAM

## 2022-03-16 PROCEDURE — 80048 BASIC METABOLIC PNL TOTAL CA: CPT | Performed by: HOSPITALIST

## 2022-03-16 RX ORDER — POTASSIUM CHLORIDE 20 MEQ/1
40 TABLET, EXTENDED RELEASE ORAL ONCE
Status: COMPLETED | OUTPATIENT
Start: 2022-03-16 | End: 2022-03-16

## 2022-03-16 RX ORDER — WARFARIN SODIUM 1 MG/1
3 TABLET ORAL
Status: COMPLETED | OUTPATIENT
Start: 2022-03-16 | End: 2022-03-16

## 2022-03-16 RX ORDER — MINERAL OIL AND PETROLATUM 150; 830 MG/G; MG/G
OINTMENT OPHTHALMIC NIGHTLY
Status: DISCONTINUED | OUTPATIENT
Start: 2022-03-16 | End: 2022-03-18

## 2022-03-16 NOTE — PLAN OF CARE
Pt continues to receive IV Ancef as ordered. Dr. Des Degroot spoke with pt regarding MRI results, OM, and need for long term antibiotics. Pt verbalized understanding. RLE dressings changed as per order. Up with SBA in room. Will continue to monitor.

## 2022-03-16 NOTE — PROGRESS NOTES
Reviewed MRI and discussed findings with patient. Findings consistent with osteomyelitis at the distal phalanx of the right great toe. Discussed treatment options with patient. Toe is viable and there is no bone exposure within small wound. We would start with 6 weeks of IV antibiotics per infectious disease. Discussed with patient there is risk for amputation if failure to improve. Pt relates understanding. Cultures are pending. Afebrile. White blood cell 6.1. Continue local wound care.

## 2022-03-16 NOTE — PLAN OF CARE
Patient resting in room, vitals WDL. Tele, SHELDON w/ CPAP. Voiding freely. Mepilex to inner R shin, CDI. Gauze and tape to R great toe changed, santyl applied, CDI. R great toe is red at the tip. Patient reports pain 6/10, with some relief by PO medication. Plan of care discussed with patient, all questions answered. 7810:  Paged Dr. Byers Frees regarding patients L eye. Patient awoke with bright red L eye, w/o drainage, some discomfort. R eye has no redness.

## 2022-03-16 NOTE — CONSULTS
[facility] Dosing Service  Warfarin (Coumadin) Subsequent Dosing    Roberto Velásquez is a 74year old patient for whom pharmacy is dosing warfarin (Coumadin). Goal INR is 2-3    Recent Labs   Lab 03/11/22  0000 03/14/22  2127 03/15/22  0542 03/16/22  0457   INR 2.9 2.44* 2.44* 2.83*       Consulted by:  Dr Mark Serrano  Indication:  A-fib/ Hx of PE  Potential Drug Interactions:  Allopurinol, Levothyroxine, Plavix  Other Anticoagulants:  None  Home regimen (if applicable):   warfarin 6mg Tues/Fri & 4.5mg ROW    Inpatient Dosing History:       Date 3/14 3/15 3/16   INR 2.44 2.44 2.83   Warfarin dose   4.5mg early am   6 mg @ HS                                                                                    Based on above -  1. For today, Give warfarin (COUMADIN) 3 mg at 2100 tonight  2   PT/INR ordered daily while on warfarin  3. Pharmacy will continue to follow. We appreciate the opportunity to assist in the care of this patient.     Tom Mattson, PharmD  3/16/2022  2:44 PM

## 2022-03-17 ENCOUNTER — APPOINTMENT (OUTPATIENT)
Dept: WOUND CARE | Facility: HOSPITAL | Age: 67
End: 2022-03-17
Attending: NURSE PRACTITIONER
Payer: MEDICARE

## 2022-03-17 LAB
INR BLD: 3.04 (ref 0.8–1.2)
POTASSIUM SERPL-SCNC: 3.7 MMOL/L (ref 3.5–5.1)
PROTHROMBIN TIME: 31.7 SECONDS (ref 11.6–14.8)

## 2022-03-17 PROCEDURE — 84132 ASSAY OF SERUM POTASSIUM: CPT | Performed by: HOSPITALIST

## 2022-03-17 PROCEDURE — 85610 PROTHROMBIN TIME: CPT | Performed by: STUDENT IN AN ORGANIZED HEALTH CARE EDUCATION/TRAINING PROGRAM

## 2022-03-17 RX ORDER — WARFARIN SODIUM 2.5 MG/1
2.5 TABLET ORAL
Status: COMPLETED | OUTPATIENT
Start: 2022-03-17 | End: 2022-03-17

## 2022-03-17 NOTE — PLAN OF CARE
Patient A&Ox4, VSS on room air. SHELDON with Cpap at night. Dressing to right toe and calf changed this morning, wound care completed, dressing is C/D/I. Waiting cultures, will have ABX on discharge. Plan for PT/OT to evaluate patient. TELE, . Plan of care reviewed with patient, all questions answered, verbalized understanding.

## 2022-03-17 NOTE — PLAN OF CARE
Patient alert and oriented x 4 , vital signs stable ,denies any chest pain or short of breath . Using CPAP at night ,o2 sats in the 90's . Redness to left eye MD aware . Rt great toe and rt shin wound with dressing clean and dry . Dressing change daily . On iv antibiotics . Gets up with min assist ,voiding well . Waiting for final culture  ,may need long term antibiotics for the osteo . ID following . All safety precautions in place . Reminded to \"call don't fall\" . Will continue to monitor .

## 2022-03-17 NOTE — CM/SW NOTE
03/17/22 1100   CM/SW Referral Data   Referral Source Social Work (self-referral)   Reason for Referral Discharge planning   Informant Patient;EMR   Patient Info   Patient's Current Mental Status at Time of Assessment Oriented; Alert   Patient's [address]   Number of Levels in Home 1   Number of Stair in Home 4 to enter home   Patient lives with Alone   Discharge Needs   Anticipated D/C needs Home health care;Subacute rehab; To be determined; Infusion care     Patient is a 75 y/o female who admitted with hyponatremia and toe osteomyelitis. Patient will require IV abx at discharge. Met with patient, who is alert and oriented, to discuss discharge plan. Patient lives alone in a ranch style home, 4 stairs to enter, in [place]. Patient has two brothers who live locally, no children. Discussed need for IV abx at discharge and options of receiving them through: NH, outpatient IV abx, or IV abx at home with [facility]. Patient declined outpatient IV abx due to distance she would have to drive daily to an infusion center. Patient is interested in [facility] or Home with [facility]. Spoke with [facility] and they do not have a contract with a [address] [place] [address] that provides services to the [facility]. Patient has a hx with [facility] and [address]. Referrals sent to [facility], [facility], and [facility] in [address]. Awaiting PT/OT recommendations. SW will continue to follow.

## 2022-03-17 NOTE — CONSULTS
[facility] Dosing Service  Warfarin (Coumadin) Subsequent Dosing    Roberto Velásquez is a 74year old patient for whom pharmacy is dosing warfarin (Coumadin). Goal INR is 2-3    Recent Labs   Lab 03/11/22  0000 03/14/22  2127 03/15/22  0542 03/16/22  0457 03/17/22  0527   INR 2.9 2.44* 2.44* 2.83* 3.04*       Consulted by:  Dr Mark Serrano  Indication:  A-fib/ Hx of PE  Potential Drug Interactions:  Allopurinol, Levothyroxine, Plavix  Other Anticoagulants:  None  Home regimen (if applicable):   warfarin 6mg Tues/Fri & 4.5mg ROW     Inpatient Dosing History:        Date 3/14 3/15 3/16 3/17   INR 2.44 2.44 2.83 3.04   Warfarin dose   4.5mg early am   6 mg @ HS 3 mg                                                                                      Based on above -  1. For today, Give warfarin (COUMADIN) 2.5 mg at 2100 tonight  2   PT/INR ordered daily while on warfarin  3. Pharmacy will continue to follow. We appreciate the opportunity to assist in the care of this patient.     Mohit Atkins, Vianca  3/17/2022  10:50 AM

## 2022-03-18 VITALS
WEIGHT: 290.38 LBS | BODY MASS INDEX: 43.01 KG/M2 | OXYGEN SATURATION: 96 % | HEIGHT: 69 IN | RESPIRATION RATE: 20 BRPM | DIASTOLIC BLOOD PRESSURE: 47 MMHG | HEART RATE: 71 BPM | SYSTOLIC BLOOD PRESSURE: 104 MMHG | TEMPERATURE: 98 F

## 2022-03-18 LAB
ANION GAP SERPL CALC-SCNC: 6 MMOL/L (ref 0–18)
BASOPHILS # BLD AUTO: 0.03 X10(3) UL (ref 0–0.2)
BASOPHILS NFR BLD AUTO: 0.4 %
BUN BLD-MCNC: 29 MG/DL (ref 7–18)
CALCIUM BLD-MCNC: 9.2 MG/DL (ref 8.5–10.1)
CHLORIDE SERPL-SCNC: 106 MMOL/L (ref 98–112)
CREAT BLD-MCNC: 1.04 MG/DL
EOSINOPHIL # BLD AUTO: 0.23 X10(3) UL (ref 0–0.7)
EOSINOPHIL NFR BLD AUTO: 2.9 %
ERYTHROCYTE [DISTWIDTH] IN BLOOD BY AUTOMATED COUNT: 13.8 %
GLUCOSE BLD-MCNC: 104 MG/DL (ref 70–99)
HCT VFR BLD AUTO: 28.9 %
HGB BLD-MCNC: 9.2 G/DL
IMM GRANULOCYTES # BLD AUTO: 0.03 X10(3) UL (ref 0–1)
IMM GRANULOCYTES NFR BLD: 0.4 %
INR BLD: 2.71 (ref 0.8–1.2)
LYMPHOCYTES # BLD AUTO: 1.33 X10(3) UL (ref 1–4)
LYMPHOCYTES NFR BLD AUTO: 16.8 %
MCH RBC QN AUTO: 30.4 PG (ref 26–34)
MCHC RBC AUTO-ENTMCNC: 31.8 G/DL (ref 31–37)
MCV RBC AUTO: 95.4 FL
MONOCYTES # BLD AUTO: 0.78 X10(3) UL (ref 0.1–1)
MONOCYTES NFR BLD AUTO: 9.9 %
NEUTROPHILS # BLD AUTO: 5.5 X10 (3) UL (ref 1.5–7.7)
NEUTROPHILS # BLD AUTO: 5.5 X10(3) UL (ref 1.5–7.7)
NEUTROPHILS NFR BLD AUTO: 69.6 %
OSMOLALITY SERPL CALC.SUM OF ELEC: 294 MOSM/KG (ref 275–295)
PLATELET # BLD AUTO: 291 10(3)UL (ref 150–450)
POTASSIUM SERPL-SCNC: 3.4 MMOL/L (ref 3.5–5.1)
PROTHROMBIN TIME: 29 SECONDS (ref 11.6–14.8)
SODIUM SERPL-SCNC: 139 MMOL/L (ref 136–145)
WBC # BLD AUTO: 7.9 X10(3) UL (ref 4–11)

## 2022-03-18 PROCEDURE — 85610 PROTHROMBIN TIME: CPT | Performed by: STUDENT IN AN ORGANIZED HEALTH CARE EDUCATION/TRAINING PROGRAM

## 2022-03-18 PROCEDURE — 97530 THERAPEUTIC ACTIVITIES: CPT

## 2022-03-18 PROCEDURE — 80048 BASIC METABOLIC PNL TOTAL CA: CPT | Performed by: HOSPITALIST

## 2022-03-18 PROCEDURE — 85025 COMPLETE CBC W/AUTO DIFF WBC: CPT | Performed by: HOSPITALIST

## 2022-03-18 PROCEDURE — 97161 PT EVAL LOW COMPLEX 20 MIN: CPT

## 2022-03-18 PROCEDURE — 97116 GAIT TRAINING THERAPY: CPT

## 2022-03-18 PROCEDURE — 02HV33Z INSERTION OF INFUSION DEVICE INTO SUPERIOR VENA CAVA, PERCUTANEOUS APPROACH: ICD-10-PCS | Performed by: INTERNAL MEDICINE

## 2022-03-18 PROCEDURE — 97165 OT EVAL LOW COMPLEX 30 MIN: CPT

## 2022-03-18 PROCEDURE — 36573 INSJ PICC RS&I 5 YR+: CPT

## 2022-03-18 RX ORDER — LIDOCAINE HYDROCHLORIDE 10 MG/ML
5 INJECTION, SOLUTION EPIDURAL; INFILTRATION; INTRACAUDAL; PERINEURAL ONCE
Status: COMPLETED | OUTPATIENT
Start: 2022-03-18 | End: 2022-03-18

## 2022-03-18 RX ORDER — WARFARIN SODIUM 1 MG/1
3 TABLET ORAL
Status: DISCONTINUED | OUTPATIENT
Start: 2022-03-18 | End: 2022-03-18

## 2022-03-18 RX ORDER — CEFAZOLIN SODIUM/WATER 2 G/20 ML
2 SYRINGE (ML) INTRAVENOUS EVERY 12 HOURS
Qty: 1400 ML | Refills: 0 | Status: SHIPPED | OUTPATIENT
Start: 2022-03-18 | End: 2022-04-22

## 2022-03-18 RX ORDER — POTASSIUM CHLORIDE 20 MEQ/1
40 TABLET, EXTENDED RELEASE ORAL ONCE
Status: COMPLETED | OUTPATIENT
Start: 2022-03-18 | End: 2022-03-18

## 2022-03-18 NOTE — PLAN OF CARE
Patient A&O X4 on RA- hx SHELDON w/ CPAP. VSS, /IS/telemetry- NSR. Refusing SCDs, on coumadin- pharmacy dosing. Voiding freely, LBM 3/16. On IV Ancef Q8h. Redness noted to left eye- on scheduled eye ointment. Generalized bruising and skin tears. Dressings to right toe and right shin, c/d/i- orders to change daily. Pain controlled with PO medication. PT/OT to eval patient. Up ad shayan. Reminded to use call light.  Plan to wait for final cultures and PT/OT eval.

## 2022-03-18 NOTE — PLAN OF CARE
Problem: PAIN - ADULT  Goal: Verbalizes/displays adequate comfort level or patient's stated pain goal  Description: INTERVENTIONS:  - Encourage pt to monitor pain and request assistance  - Assess pain using appropriate pain scale  - Administer analgesics based on type and severity of pain and evaluate response  - Implement non-pharmacological measures as appropriate and evaluate response  - Consider cultural and social influences on pain and pain management  - Manage/alleviate anxiety  - Utilize distraction and/or relaxation techniques  - Monitor for opioid side effects  - Notify MD/LIP if interventions unsuccessful or patient reports new pain  - Anticipate increased pain with activity and pre-medicate as appropriate  Outcome: Progressing     Problem: SAFETY ADULT - FALL  Goal: Free from fall injury  Description: INTERVENTIONS:  - Assess pt frequently for physical needs  - Identify cognitive and physical deficits and behaviors that affect risk of falls.   - [place] fall precautions as indicated by assessment.  - Educate pt/family on patient safety including physical limitations  - Instruct pt to call for assistance with activity based on assessment  - Modify environment to reduce risk of injury  - Provide assistive devices as appropriate  - Consider OT/PT consult to assist with strengthening/mobility  - Encourage toileting schedule  Outcome: Progressing

## 2022-03-18 NOTE — PROGRESS NOTES
Patient medically cleared for discharge to [facility] pending [facility] [place] approval.  Patient is ok to drive herself to [facility] per Dr. Castro Thrasher.

## 2022-03-18 NOTE — CM/SW NOTE
Patient selected [facility] for [address], reserved in [address]. PASRR level 1 screen submitted and DON called. Updated Josep Snooks and [facility] that patient is discharging to [facility]. Informed by RN that patient is medically cleared for discharge. Spoke with Terence Humphrey at [facility] who confirmed bed available today. Spoke with [address] [place] [address] and scheduled transportation for 630pm. Spoke with patient who is agreeable with discharge and aware of [facility] cost. PCS form completed. SW will remain available. Addendum:   RN confirmed with Podiatrist that patient is able to drive herself to [facility]. Riki Couch. PASRR was completed, awaiting Level 1 clinical review. ED case manager following.

## 2022-03-18 NOTE — CONSULTS
[facility] Dosing Service  Warfarin (Coumadin) Subsequent Dosing    Vernelle Kussmaul is a 74year old patient for whom pharmacy is dosing warfarin (Coumadin). Goal INR is 2-3    Recent Labs   Lab 03/14/22 2127 03/15/22  0542 03/16/22  0457 03/17/22  0527 03/18/22  0518   INR 2.44* 2.44* 2.83* 3.04* 2.71*       Consulted by:  Dr Roseann Blum  Indication:  A-fib/ Hx of PE  Potential Drug Interactions:  Allopurinol, Levothyroxine, Plavix, Cefazolin  Other Anticoagulants:  None  Home regimen (if applicable):  warfarin 6mg Tues/Fri & 4.5mg ROW    Inpatient Dosing History:     Date 3/14 3/15 3/16 3/17 3/18   INR 2.44 2.44 2.83 3.04 2.71   Warfarin dose   4.5mg early am   6 mg @ HS 3 mg  2.5 mg                 Based on above -  1. For today, Give warfarin (COUMADIN) 3 mg at 2100 tonight  2   PT/INR ordered daily while on warfarin  3. Pharmacy will continue to follow. We appreciate the opportunity to assist in the care of this patient.     Jose Cruz Cowan, PharmD  3/18/2022  2:16 PM

## 2022-03-18 NOTE — CM/SW NOTE
Met with patient to discuss updated discharge plan. Patient prefers to go to [facility] for IV abx and wound care but would like to set up plan for home IV abx as back up plan. Provided patient with [facility] list from [address]. Informed patient that [facility] able to accept and service her area, discussed cost and informed patient she has a credit from 2020 on her account. Provided [facility] list from [address]. Awaiting patient choice and final plan for IV abx. SW will remain available.

## 2022-03-19 NOTE — PROGRESS NOTES
Called in report to [facility] [phone], report given to [facility]. Transfer papers given to patient and instructed to give it the RN at the facility, she verbalized understanding.

## 2022-03-19 NOTE — PROGRESS NOTES
NURSING DISCHARGE NOTE    Discharged Nursing home via Wheelchair. Accompanied by Support staff  Belongings Taken by patient/family.   Patient will drive herself to [facility]

## 2022-03-24 ENCOUNTER — APPOINTMENT (OUTPATIENT)
Dept: WOUND CARE | Facility: HOSPITAL | Age: 67
End: 2022-03-24
Attending: NURSE PRACTITIONER
Payer: MEDICARE

## 2022-03-31 ENCOUNTER — APPOINTMENT (OUTPATIENT)
Dept: WOUND CARE | Facility: HOSPITAL | Age: 67
End: 2022-03-31
Attending: NURSE PRACTITIONER
Payer: MEDICARE

## 2022-04-19 NOTE — IP AVS SNAPSHOT
Patient Demographics     Address  [address] [address] Phone  [phone] [facility])  [phone] (Work)  [phone] [facility]) *Preferred* E-mail Address  [email or web address]      Emergency Contact(s)     Name Relation Home Work Mobile · Notify your Dentist of need to premedicate prior to dental procedures including cleaning  · The antibiotic has been ordered for you. Call your pharmacy to make sure it is available.   · Contact [facility] [phone] if you are having any surgical pr · Before undergoing MRI (magnetic resonance imaging), always notify the doctor (or medical technician) that you have an implanted heart valve. Failure to do so may result in damage to the valve that could result in death.      FOLLOW-UP CARE:  · You will be Structural Heart Team:  [phone]  Dr. Valentino Coons Dr. Tressie Cohens Dr. Dossie Burn Dr. Craig Reeks APN, CVNP  [facility], [address] or on weekends, please call [phone]    Future Appointments   Date Time Provider Yanique Gallegos conclusive evidence that links GI or  tract procedures with the  development of IE.  They also concluded that antibiotics before dental  procedures are reasonable for certain patients at increased risk of  developing IE and at highest risk of poor outcome – Completely repaired congenital heart defect with prosthetic  material or device, whether placed by surgery or catheter  intervention, during the first six months after the procedure†  – Repaired CHD with residual shunts or valvular regurgitation  at the Ampicillin OR 2 g IM or IV* 50 mg/kg IM  or IV  Cefazolin or  ceftriaxone 1 g IM or IV 50 mg/kg IM  or IV  Allergic to  penicillins or  ampicillin—  Oral regimen  Cephalexin**† 2 g 50 mg/kg  OR  Clindamycin 600 mg 20 mg/kg  OR  Azithromycin or  clarithromy Specialty: Nurse Practitioner Family  Why: @ 10:30am for TAVR follow up with echocardiogram following at 11:00am .   Contact information:  [address] [address] [phone]             INR check Saturday 1/9/2020. [    ]     Cyproheptadine HCl 4 MG Tabs  Commonly known as: PERIACTIN      TAKE 1 TABLET BY MOUTH  NIGHTLY   Christiano Boyle MD   [    ]   [    ]   [    ]   [    ]     diphenhydrAMINE HCl 25 MG Caps  Commonly known as: BENADRYL      Take 1 capsule (25 mg t [    ]   [    ]     Potassium Chloride ER 10 MEQ Tbcr  Commonly known as: K-DUR      Take 1 tablet (10 mEq total) by mouth daily.    Nick Collins MD   [    ]   [    ]   [    ]   [    ]     predniSONE 10 MG Tabs  Commonly known as: DELTASONE      Take 50 Please  your prescriptions at the location directed by your doctor or nurse    Bring a paper prescription for each of these medications  aspirin 81 MG Tbec  Enoxaparin Sodium 120 MG/0.8ML Soln           [id number] - MAR ACTION REPORT  (last 24 hrs) [id number] Enoxaparin Sodium (LOVENOX) 120 MG/0.8ML injection 120 mg 01/07/21 1043 Given            RIGHT LOWER ABDOMEN     Order ID Medication Name Action Time Action Reason Comments    [id number] Enoxaparin Sodium (LOVENOX) 120 MG/0.8ML injection 120 mg 0 Author: Sarah Martinez MD Service: Hospitalist Author Type: Physician    Filed: 1/4/2021  2:56 PM Date of Service: 1/4/2021  1:24 PM Status: Addendum    :  Sarah Martinez MD (Physician)    Related Notes: Original Note by Verna Rosado Has had scans since then with no side effect. -             kidney IVP dye  Lisinopril              TONGUE SWELLING  Morphine Sulfate        HIVES  Oxycontin               ITCHING  Vicodin [Hydrocodon*    ITCHING       •  Chlorhexidine Gluconate, - had [facility] 12/28, reviewed notes  -     # SHELDON  -protocol    # Hypothyroidism  -cont home levothyroxine     # HL  -statin    # Major Depression/ Psych hx  -reviewed home meds, continue SSRI, wellbutrin, nortripyline, currently appears stable     # H • OTHER DISEASES     Lymph edema   • OTHER DISEASES     Pulmonary hypertension   • SLEEP APNEA       Past Surgical History:   Procedure Laterality Date   • ANESTH,SHOULDER REPLACEMENT Right 2014    hemiathroplasty   • BACK SURGERY  2000   • BACK SURGERY  2 Lungs: CTAB, no wheezes  Abd: s/nt/nd, +bs  LE: no c/e/e  Neuro: CN 2-12 intact, no focal deficits      LABS:   Lab Results   Component Value Date    WBC 6.8 01/04/2021    HGB 9.4 01/04/2021    HCT 30.4 01/04/2021    .0 01/04/2021    CREATSERUM 0.83 : Denise Baires MD (Physician)     Consult Orders    1.  Consult to Cardiology [[id number]] ordered by DISHA Griffin at 01/04/21 1310 Sarah Cash Cardiology  Report of Consultation    Shanae Layton Patient Status:  Inpatie No current facility-administered medications on file prior to encounter. •  Cefadroxil 500 MG Oral Cap, Take 1 capsule (500 mg total) by mouth 2 (two) times daily for 10 days. , Disp: 20 capsule, Rfl: 0    •  Biotin 5000 MCG Oral Tab, Take 5,000 mcg by •  TRIAMCINOLONE ACETONIDE 0.1 % External Ointment, APPLY TO AFFECTED AREA(S)  TOPICALLY 2 TIMES DAILY FOR 1-2 WEEKS THEN TAPER TO AS  NEEDED FOR FLARES (Patient taking differently: Apply 1 Application topically 2 (two) times daily as needed.  APPLY TO AFFE •  WARFARIN SODIUM 3 MG Oral Tab, TAKE 1 TABLET BY MOUTH  DAILY (Patient not taking:  ), Disp: 90 tablet, Rfl: 1        Allergies:     Adhesive Tape           HIVES    Comment:ALL ADHESIVES  Codeine                 HIVES  Doxycycline             SWELLING Lab 01/04/21  1243   *   BUN 20*   CREATSERUM 0.83   GFRAA 84   GFRNAA 73   CA 8.3*   ALB 2.5*      K 2.6*      CO2 28.0   ALKPHO 66   AST 16   ALT 20   BILT 0.5   TP 6.1*       Recent Labs   Lab 01/04/21  1243   RBC 3.28*   HGB 9.4*   H Please note that only IHP DMG and EMG patients enrolled in the Medicare ACO, [facility] ACO and [address] [place] [address] will be handled by the [address] [place], [place] [address]S [facility] Management team.  For all other patients, please follow usual protocol for discharge care transition.     Risk of readmissi IP CONSULT TO CARDIOLOGY  IP CONSULT TO CARDIOTHORACIC SURGERY  IP CONSULT TO PHARMACY  IP CONSULT TO RESPIRATORY CARE    Operative Procedures: Procedure(s) (LRB):  HEART TRANSCATHETER AORTIC VALVE REPLACEMENT FEMORAL APPROACH (N/A)       Patient instructi TAKE 1 TABLET BY MOUTH  NIGHTLY    Ubiquinol 100 MG Oral Cap  Take 100 mg by mouth daily. Melatonin 10 MG Oral Cap  Take 10 mg by mouth nightly. Rosuvastatin Calcium 5 MG Oral Tab  Take 5 mg by mouth every other day.     mupirocin 2 % External Oin Physical Therapy Notes (last 72 hours) (Notes from 1/4/2021  2:30 PM through 1/7/2021  2:30 PM)      Physical Therapy Note signed by Yamileth Anthony PT at 1/6/2021  1:22 PM  Version 1 of 1    Author: Yamileth Anthony PT Service: Rehab Author Type: Radha Mancuso Patient Owned Equipment: Rolling walker(Loftstrand crutches)  Patient Regularly Uses: Glasses    Prior Level of Navarro: The pt reports that she is typically ambulates with bilateral Loftstrand crutches, but will also the rolling walker in the house. Approx Degree of Impairment: 11.2%   Standardized Score (AM-PAC Scale): 56.93   CMS Modifier (G-Code): CI      FUNCTIONAL ABILITY STATUS  Gait Assessment  Gait Assistance: Modified independent  Distance (ft): 150  Assistive Device: (bilateral Loftstrand cr Functional outcome measures completed include Elderly Mobility Scale and AM-PAC. The patient scores 20/20 on the Elderly Mobility Scale, indicating patient is able to maneuver alone and safely and is idependent in basic ADLs.   Per the Elderly Mobility Sca Reason for Therapy:  ADL/IADL Dysfunction and Discharge Planning    History related to current admission: Pt was admitted from home on 1/4 for scheduled TAVR. 1/5 s/p TAVR. Hx of thyroid cancer, depression, HTN, OA, depression, B TKR 2003.   Recent hospital Prior Level of Function: lives alone. Independent with ADL and IADL. Uses 2 canes (with arm support). Enjoys spending time with her cat. Pt orders groceries and items online. SUBJECTIVE   \"I want to go home. I can take care of myself fine. \"    [facility] Provided: Modified independent to stand with 2 canes (has lower arm support) and to ambulate in hallway. HR 90 to 101, 98% room air after being back in the room. Pt commented, \"Wow, I can walk more than before. \"[MS.1]  OT returned in the Clinical Decision Making  LOW - Analysis of occupational profile, problem-focused assessments, limited treatment options    Overall Complexity  LOW     OT Discharge Recommendations: Home  OT Device Recommendations: None    PLAN   Patient has been evaluated Kenalog Per 10mg Inj 04/04/16     Kenalog Per 10mg Inj 12/01/15     Kenalog Per 10mg Inj 07/27/15     Kenalog Per 10mg Inj 03/23/15     Kenalog Per 10mg Inj 11/24/14     Kenalog Per 10mg Inj 07/28/14     Kenalog Per 10mg Inj 03/25/14     Kenalog Per 10mg Rifampin Counseling: I discussed with the patient the risks of rifampin including but not limited to liver damage, kidney damage, red-orange body fluids, nausea/vomiting and severe allergy.

## 2022-04-27 ENCOUNTER — OFFICE VISIT (OUTPATIENT)
Dept: WOUND CARE | Facility: HOSPITAL | Age: 67
End: 2022-04-27
Attending: NURSE PRACTITIONER
Payer: MEDICARE

## 2022-04-27 VITALS
DIASTOLIC BLOOD PRESSURE: 68 MMHG | RESPIRATION RATE: 18 BRPM | HEART RATE: 71 BPM | TEMPERATURE: 98 F | SYSTOLIC BLOOD PRESSURE: 102 MMHG

## 2022-04-27 DIAGNOSIS — L97.929 CHRONIC VENOUS HYPERTENSION WITH ULCER AND INFLAMMATION, BILATERAL (HCC): Primary | ICD-10-CM

## 2022-04-27 DIAGNOSIS — L97.812 NON-PRESSURE CHRONIC ULCER OF OTHER PART OF RIGHT LOWER LEG WITH FAT LAYER EXPOSED (HCC): ICD-10-CM

## 2022-04-27 DIAGNOSIS — I87.333 CHRONIC VENOUS HYPERTENSION WITH ULCER AND INFLAMMATION, BILATERAL (HCC): Primary | ICD-10-CM

## 2022-04-27 DIAGNOSIS — I89.8 LYMPHORRHEA: ICD-10-CM

## 2022-04-27 DIAGNOSIS — I89.0 LYMPHEDEMA: ICD-10-CM

## 2022-04-27 DIAGNOSIS — L97.919 CHRONIC VENOUS HYPERTENSION WITH ULCER AND INFLAMMATION, BILATERAL (HCC): Primary | ICD-10-CM

## 2022-04-27 PROCEDURE — 99215 OFFICE O/P EST HI 40 MIN: CPT | Performed by: NURSE PRACTITIONER

## 2022-04-27 NOTE — PROGRESS NOTES
.Weekly Wound Education Note    Teaching Provided To: Patient  Training Topics: Discharge instructions;Dressing;Edema control; Compression  Training Method: Explain/Verbal;Written  Training Response: Patient responds and understands           Betadine to great toe. Hydrofera transfer, kerramax, calamine layer under Coflex 2 layer.

## 2022-05-03 ENCOUNTER — OFFICE VISIT (OUTPATIENT)
Dept: WOUND CARE | Facility: HOSPITAL | Age: 67
End: 2022-05-03
Attending: NURSE PRACTITIONER
Payer: MEDICARE

## 2022-05-03 VITALS
SYSTOLIC BLOOD PRESSURE: 111 MMHG | DIASTOLIC BLOOD PRESSURE: 56 MMHG | RESPIRATION RATE: 16 BRPM | TEMPERATURE: 98 F | HEART RATE: 78 BPM

## 2022-05-03 DIAGNOSIS — I87.333 CHRONIC VENOUS HYPERTENSION WITH ULCER AND INFLAMMATION, BILATERAL (HCC): Primary | ICD-10-CM

## 2022-05-03 DIAGNOSIS — L97.929 CHRONIC VENOUS HYPERTENSION WITH ULCER AND INFLAMMATION, BILATERAL (HCC): Primary | ICD-10-CM

## 2022-05-03 DIAGNOSIS — I89.0 LYMPHEDEMA: ICD-10-CM

## 2022-05-03 DIAGNOSIS — L97.812 NON-PRESSURE CHRONIC ULCER OF OTHER PART OF RIGHT LOWER LEG WITH FAT LAYER EXPOSED (HCC): ICD-10-CM

## 2022-05-03 DIAGNOSIS — L97.919 CHRONIC VENOUS HYPERTENSION WITH ULCER AND INFLAMMATION, BILATERAL (HCC): Primary | ICD-10-CM

## 2022-05-03 PROCEDURE — 29581 APPL MULTLAYER CMPRN SYS LEG: CPT

## 2022-05-10 ENCOUNTER — OFFICE VISIT (OUTPATIENT)
Dept: ORTHOPEDICS CLINIC | Facility: CLINIC | Age: 67
End: 2022-05-10
Payer: MEDICARE

## 2022-05-10 VITALS — BODY MASS INDEX: 42.95 KG/M2 | HEIGHT: 69 IN | WEIGHT: 290 LBS

## 2022-05-10 DIAGNOSIS — M76.12 PSOAS TENDINITIS OF LEFT SIDE: ICD-10-CM

## 2022-05-10 DIAGNOSIS — M75.42 IMPINGEMENT SYNDROME OF LEFT SHOULDER REGION: ICD-10-CM

## 2022-05-10 DIAGNOSIS — M72.2 PLANTAR FASCIITIS, BILATERAL: Primary | ICD-10-CM

## 2022-05-10 DIAGNOSIS — Z96.643 H/O TOTAL HIP ARTHROPLASTY, BILATERAL: ICD-10-CM

## 2022-05-10 DIAGNOSIS — M19.012 PRIMARY OSTEOARTHRITIS OF LEFT SHOULDER: ICD-10-CM

## 2022-05-10 PROCEDURE — 99214 OFFICE O/P EST MOD 30 MIN: CPT | Performed by: ORTHOPAEDIC SURGERY

## 2022-05-10 NOTE — PROGRESS NOTES
Patient is a 58-year-old white female here for evaluation of her left shoulder left hip and bilateral feet. Patient has had somewhat progressive pain of her left shoulder but fairly severe more recently. Patient states however that since she made the appointment her pain of her left shoulder is substantially improved. Additionally she has had some persistent pain of her left hip and particularly difficulties in raising the left hip of the left leg off the ground when sitting. She also is complaining of bilateral foot pain. She was hospitalized recently for osteomyelitis of her toes and was off her feet for quite a while. Patient's exam shows her to have positive impingement sign left shoulder. Mild crepitation with passive range of motion left shoulder. She has about 130 degrees of elevation passively. Actively she has fairly good strength of abduction but grossly 4+/5. Exam of the patient's left hip shows her to have minimal pain with passive range of motion left hip. She does however have some pain moderate nature with resisted flexion of the left hip and also has some weakness of hip flexion on the left. Right hip shows no pain with resisted flexion and no significant weakness. Exam of her feet shows her to have tenderness in the mid plantar fascial region bilaterally. No palpable swelling. No palpable defects. Impression is that of mild degenerative arthritis of the left shoulder. Second impression is that of impingement of the left shoulder. Third impression is that of psoas tendinitis left hip. Fourth impression is that of stable total hip arthroplasty bilaterally. Fifth impression is that of bilateral plantar fasciitis. Recommendations are for her to see physical therapy for modalities for the plantar fascia as bilaterally. Regarding the left hip she might need to consider an injection of the psoas tendon under fluoroscopy if the problem persists or progresses.   Regarding the shoulder she is presently doing fairly well we will watch it for now. If her symptoms recur might consider a cortisone injection either in the bursa or in the glenohumeral joint. Patient understands the rationale. Did give her a prescription for therapy.

## 2022-05-12 ENCOUNTER — OFFICE VISIT (OUTPATIENT)
Dept: WOUND CARE | Facility: HOSPITAL | Age: 67
End: 2022-05-12
Attending: NURSE PRACTITIONER
Payer: MEDICARE

## 2022-05-12 VITALS
TEMPERATURE: 99 F | SYSTOLIC BLOOD PRESSURE: 111 MMHG | DIASTOLIC BLOOD PRESSURE: 70 MMHG | RESPIRATION RATE: 16 BRPM | HEART RATE: 105 BPM

## 2022-05-12 DIAGNOSIS — I89.8 LYMPHORRHEA: ICD-10-CM

## 2022-05-12 DIAGNOSIS — L97.812 NON-PRESSURE CHRONIC ULCER OF OTHER PART OF RIGHT LOWER LEG WITH FAT LAYER EXPOSED (HCC): ICD-10-CM

## 2022-05-12 DIAGNOSIS — I87.333 CHRONIC VENOUS HYPERTENSION WITH ULCER AND INFLAMMATION, BILATERAL (HCC): Primary | ICD-10-CM

## 2022-05-12 DIAGNOSIS — L97.929 CHRONIC VENOUS HYPERTENSION WITH ULCER AND INFLAMMATION, BILATERAL (HCC): Primary | ICD-10-CM

## 2022-05-12 DIAGNOSIS — I89.0 LYMPHEDEMA: ICD-10-CM

## 2022-05-12 DIAGNOSIS — L97.919 CHRONIC VENOUS HYPERTENSION WITH ULCER AND INFLAMMATION, BILATERAL (HCC): Primary | ICD-10-CM

## 2022-05-12 PROCEDURE — 99213 OFFICE O/P EST LOW 20 MIN: CPT | Performed by: NURSE PRACTITIONER

## 2022-05-12 NOTE — PROGRESS NOTES
Weekly Wound Education Note    Teaching Provided To: Patient  Training Topics: Cleasing and general instructions;Dressing; Compression;Edema control; Discharge instructions  Training Method: Explain/Verbal  Training Response: Reinforcement needed        Notes: Stable. Ya Tuttle measured pt today. Iodoflex, gauze, kerlix to leg. Continue betadine to toe.  Velcro wrap/liner BLE

## 2022-05-19 ENCOUNTER — APPOINTMENT (OUTPATIENT)
Dept: WOUND CARE | Facility: HOSPITAL | Age: 67
End: 2022-05-19
Attending: NURSE PRACTITIONER
Payer: MEDICARE

## 2022-05-26 ENCOUNTER — OFFICE VISIT (OUTPATIENT)
Dept: WOUND CARE | Facility: HOSPITAL | Age: 67
End: 2022-05-26
Attending: NURSE PRACTITIONER
Payer: MEDICARE

## 2022-05-26 VITALS
DIASTOLIC BLOOD PRESSURE: 67 MMHG | TEMPERATURE: 98 F | HEART RATE: 104 BPM | SYSTOLIC BLOOD PRESSURE: 116 MMHG | RESPIRATION RATE: 18 BRPM

## 2022-05-26 DIAGNOSIS — L97.919 CHRONIC VENOUS HYPERTENSION WITH ULCER AND INFLAMMATION, BILATERAL (HCC): Primary | ICD-10-CM

## 2022-05-26 DIAGNOSIS — L97.812 NON-PRESSURE CHRONIC ULCER OF OTHER PART OF RIGHT LOWER LEG WITH FAT LAYER EXPOSED (HCC): ICD-10-CM

## 2022-05-26 DIAGNOSIS — I87.333 CHRONIC VENOUS HYPERTENSION WITH ULCER AND INFLAMMATION, BILATERAL (HCC): Primary | ICD-10-CM

## 2022-05-26 DIAGNOSIS — L97.929 CHRONIC VENOUS HYPERTENSION WITH ULCER AND INFLAMMATION, BILATERAL (HCC): Primary | ICD-10-CM

## 2022-05-26 DIAGNOSIS — I89.0 LYMPHEDEMA: ICD-10-CM

## 2022-05-26 DIAGNOSIS — L97.512 NON-PRESSURE CHRONIC ULCER OF OTHER PART OF RIGHT FOOT WITH FAT LAYER EXPOSED (HCC): ICD-10-CM

## 2022-05-26 PROCEDURE — 15271 SKIN SUB GRAFT TRNK/ARM/LEG: CPT | Performed by: NURSE PRACTITIONER

## 2022-05-26 NOTE — PROGRESS NOTES
Patient ID: Binu Hartman is a 69year old female. Cellular tissue product application    Date/Time: 5/26/2022 2:08 PM  Performed by: ERICA Cross  Authorized by: ERICA Cross   Associated Wounds:   Wound 09/08/21 # 1 right medial lower leg Venous Ulcer Leg Anterior;Right;Medial    Consent:     Consent obtained:  Verbal    Consent given by:  Patient    Risks discussed:  Bleeding, infection and pain  Procedure details:     Location:  trunk/arms/legs    Product applied:  Epifix    Product lot #:  [id number]    Product expiration:  3/1/2027    Amount used (cm^2): 2    Secured: Yes      Secured with:  Silicone contact layer  Comments:      Dressed with hydrofera transfer and kerramax.

## 2022-05-26 NOTE — PROGRESS NOTES
Weekly Wound Education Note    Teaching Provided To: Patient  Training Topics: Cleasing and general instructions;Dressing; Discharge instructions; Compression;Edema control;Skin substitute  Training Method: Explain/Verbal  Training Response: Reinforcement needed        Notes: Epifix applied, silicone contact layer, hydrofera transfer, kerramax, conforming gauze to leg - extremitease applied. Monitor toe for drainage.

## 2022-05-31 ENCOUNTER — PATIENT MESSAGE (OUTPATIENT)
Dept: ORTHOPEDICS CLINIC | Facility: CLINIC | Age: 67
End: 2022-05-31

## 2022-06-01 ENCOUNTER — PATIENT MESSAGE (OUTPATIENT)
Dept: ORTHOPEDICS CLINIC | Facility: CLINIC | Age: 67
End: 2022-06-01

## 2022-06-02 ENCOUNTER — OFFICE VISIT (OUTPATIENT)
Dept: WOUND CARE | Facility: HOSPITAL | Age: 67
End: 2022-06-02
Attending: NURSE PRACTITIONER
Payer: MEDICARE

## 2022-06-02 VITALS
SYSTOLIC BLOOD PRESSURE: 116 MMHG | DIASTOLIC BLOOD PRESSURE: 63 MMHG | TEMPERATURE: 98 F | HEART RATE: 84 BPM | RESPIRATION RATE: 18 BRPM

## 2022-06-02 DIAGNOSIS — L97.929 CHRONIC VENOUS HYPERTENSION WITH ULCER AND INFLAMMATION, BILATERAL (HCC): Primary | ICD-10-CM

## 2022-06-02 DIAGNOSIS — I89.0 LYMPHEDEMA: ICD-10-CM

## 2022-06-02 DIAGNOSIS — I87.333 CHRONIC VENOUS HYPERTENSION WITH ULCER AND INFLAMMATION, BILATERAL (HCC): Primary | ICD-10-CM

## 2022-06-02 DIAGNOSIS — L97.919 CHRONIC VENOUS HYPERTENSION WITH ULCER AND INFLAMMATION, BILATERAL (HCC): Primary | ICD-10-CM

## 2022-06-02 DIAGNOSIS — L97.812 NON-PRESSURE CHRONIC ULCER OF OTHER PART OF RIGHT LOWER LEG WITH FAT LAYER EXPOSED (HCC): ICD-10-CM

## 2022-06-02 PROCEDURE — 99213 OFFICE O/P EST LOW 20 MIN: CPT | Performed by: NURSE PRACTITIONER

## 2022-06-02 NOTE — PROGRESS NOTES
Weekly Wound Education Note    Teaching Provided To: Patient  Training Topics: Cleasing and general instructions;Dressing; Discharge instructions; Compression;Edema control  Training Method: Explain/Verbal  Training Response: Reinforcement needed        Notes: Stable. Swithced to [employer]. Continue with velcro wraps. Pt to go to immediate care after this visit due to shoulder pain.

## 2022-06-02 NOTE — TELEPHONE ENCOUNTER
Future Appointments   Date Time Provider Yanique Gallegos   6/2/2022  2:30 PM Cindi Crigler, APRN [address] [facility] [address] Wound [place]   6/9/2022  2:30 PM Cindi Crigler, APRN [address] [facility] [address] Wound [place]   6/14/2022  3:00 PM Melvina Mirza MD [facility] [id number]   6/16/2022  2:30 PM Cindi Crigler, APRN [address] [facility] [address] Wound [place] Double   6/23/2022  2:30 PM Cindi Crigler, APRN [address] [facility] [address] Wound [place]   6/28/2022  3:20 PM Melvina Mirza MD [facility] [id number]   6/30/2022  2:30 PM Cindi Crigler, [address] [facility]

## 2022-06-09 ENCOUNTER — APPOINTMENT (OUTPATIENT)
Dept: WOUND CARE | Facility: HOSPITAL | Age: 67
End: 2022-06-09
Attending: NURSE PRACTITIONER
Payer: MEDICARE

## 2022-06-14 ENCOUNTER — OFFICE VISIT (OUTPATIENT)
Dept: ORTHOPEDICS CLINIC | Facility: CLINIC | Age: 67
End: 2022-06-14
Payer: MEDICARE

## 2022-06-14 DIAGNOSIS — M75.42 IMPINGEMENT SYNDROME OF LEFT SHOULDER REGION: Primary | ICD-10-CM

## 2022-06-14 DIAGNOSIS — M19.012 PRIMARY OSTEOARTHRITIS OF LEFT SHOULDER: ICD-10-CM

## 2022-06-14 PROCEDURE — 99212 OFFICE O/P EST SF 10 MIN: CPT | Performed by: ORTHOPAEDIC SURGERY

## 2022-06-14 RX ORDER — DILTIAZEM HYDROCHLORIDE 240 MG/1
240 CAPSULE, COATED, EXTENDED RELEASE ORAL DAILY
COMMUNITY
Start: 2022-05-17

## 2022-06-16 ENCOUNTER — OFFICE VISIT (OUTPATIENT)
Dept: WOUND CARE | Facility: HOSPITAL | Age: 67
End: 2022-06-16
Attending: NURSE PRACTITIONER
Payer: MEDICARE

## 2022-06-16 VITALS
RESPIRATION RATE: 18 BRPM | DIASTOLIC BLOOD PRESSURE: 69 MMHG | HEART RATE: 123 BPM | SYSTOLIC BLOOD PRESSURE: 107 MMHG | TEMPERATURE: 98 F

## 2022-06-16 DIAGNOSIS — I87.333 CHRONIC VENOUS HYPERTENSION WITH ULCER AND INFLAMMATION, BILATERAL (HCC): Primary | ICD-10-CM

## 2022-06-16 DIAGNOSIS — L97.812 NON-PRESSURE CHRONIC ULCER OF OTHER PART OF RIGHT LOWER LEG WITH FAT LAYER EXPOSED (HCC): ICD-10-CM

## 2022-06-16 DIAGNOSIS — L97.919 CHRONIC VENOUS HYPERTENSION WITH ULCER AND INFLAMMATION, BILATERAL (HCC): Primary | ICD-10-CM

## 2022-06-16 DIAGNOSIS — L03.115 CELLULITIS OF RIGHT THIGH: ICD-10-CM

## 2022-06-16 DIAGNOSIS — L97.512 NON-PRESSURE CHRONIC ULCER OF OTHER PART OF RIGHT FOOT WITH FAT LAYER EXPOSED (HCC): ICD-10-CM

## 2022-06-16 DIAGNOSIS — I89.0 LYMPHEDEMA: ICD-10-CM

## 2022-06-16 DIAGNOSIS — L97.929 CHRONIC VENOUS HYPERTENSION WITH ULCER AND INFLAMMATION, BILATERAL (HCC): Primary | ICD-10-CM

## 2022-06-16 PROCEDURE — 99214 OFFICE O/P EST MOD 30 MIN: CPT | Performed by: NURSE PRACTITIONER

## 2022-06-16 RX ORDER — CLINDAMYCIN HYDROCHLORIDE 300 MG/1
300 CAPSULE ORAL 3 TIMES DAILY
Qty: 30 CAPSULE | Refills: 0 | Status: SHIPPED | OUTPATIENT
Start: 2022-06-16 | End: 2022-06-26

## 2022-06-16 NOTE — PROGRESS NOTES
Weekly Wound Education Note    Teaching Provided To: Patient  Training Topics: Cleasing and general instructions;Dressing; Discharge instructions; Compression;Edema control  Training Method: Explain/Verbal  Training Response: Reinforcement needed        Notes: Oral abx ordered today. Wound stable, Switched to hydrofera transfer, gauze, conforming gauze and velcro wrap/liner.

## 2022-06-23 ENCOUNTER — APPOINTMENT (OUTPATIENT)
Dept: WOUND CARE | Facility: HOSPITAL | Age: 67
End: 2022-06-23
Attending: NURSE PRACTITIONER
Payer: MEDICARE

## 2022-06-25 ENCOUNTER — HOSPITAL ENCOUNTER (EMERGENCY)
Facility: HOSPITAL | Age: 67
Discharge: HOME | End: 2022-06-25
Payer: MEDICARE

## 2022-06-25 ENCOUNTER — APPOINTMENT (OUTPATIENT)
Dept: GENERAL RADIOLOGY | Facility: HOSPITAL | Age: 67
End: 2022-06-25
Payer: MEDICARE

## 2022-06-25 VITALS
SYSTOLIC BLOOD PRESSURE: 97 MMHG | HEART RATE: 71 BPM | RESPIRATION RATE: 20 BRPM | OXYGEN SATURATION: 93 % | BODY MASS INDEX: 40 KG/M2 | WEIGHT: 271 LBS | DIASTOLIC BLOOD PRESSURE: 66 MMHG | TEMPERATURE: 98 F

## 2022-06-25 DIAGNOSIS — T45.515A WARFARIN-INDUCED COAGULOPATHY (HCC): Primary | ICD-10-CM

## 2022-06-25 DIAGNOSIS — M25.00 HEMARTHROSIS: ICD-10-CM

## 2022-06-25 DIAGNOSIS — D68.32 WARFARIN-INDUCED COAGULOPATHY (HCC): Primary | ICD-10-CM

## 2022-06-25 LAB
BASOPHILS # BLD AUTO: 0.01 X10(3) UL (ref 0–0.2)
BASOPHILS NFR BLD AUTO: 0.1 %
EOSINOPHIL # BLD AUTO: 0.03 X10(3) UL (ref 0–0.7)
EOSINOPHIL NFR BLD AUTO: 0.3 %
ERYTHROCYTE [DISTWIDTH] IN BLOOD BY AUTOMATED COUNT: 17.3 %
HCT VFR BLD AUTO: 27.2 %
HGB BLD-MCNC: 8.6 G/DL
IMM GRANULOCYTES # BLD AUTO: 0.06 X10(3) UL (ref 0–1)
IMM GRANULOCYTES NFR BLD: 0.5 %
INR BLD: 6.15 (ref 0.8–1.2)
LYMPHOCYTES # BLD AUTO: 0.67 X10(3) UL (ref 1–4)
LYMPHOCYTES NFR BLD AUTO: 6.1 %
MCH RBC QN AUTO: 26.7 PG (ref 26–34)
MCHC RBC AUTO-ENTMCNC: 31.6 G/DL (ref 31–37)
MCV RBC AUTO: 84.5 FL
MONOCYTES # BLD AUTO: 0.99 X10(3) UL (ref 0.1–1)
MONOCYTES NFR BLD AUTO: 9 %
NEUTROPHILS # BLD AUTO: 9.18 X10 (3) UL (ref 1.5–7.7)
NEUTROPHILS # BLD AUTO: 9.18 X10(3) UL (ref 1.5–7.7)
NEUTROPHILS NFR BLD AUTO: 84 %
PLATELET # BLD AUTO: 270 10(3)UL (ref 150–450)
PROTHROMBIN TIME: 55.1 SECONDS (ref 11.6–14.8)
RBC # BLD AUTO: 3.22 X10(6)UL
WBC # BLD AUTO: 10.9 X10(3) UL (ref 4–11)

## 2022-06-25 PROCEDURE — 99283 EMERGENCY DEPT VISIT LOW MDM: CPT

## 2022-06-25 PROCEDURE — 85025 COMPLETE CBC W/AUTO DIFF WBC: CPT | Performed by: EMERGENCY MEDICINE

## 2022-06-25 PROCEDURE — 36415 COLL VENOUS BLD VENIPUNCTURE: CPT

## 2022-06-25 PROCEDURE — 73562 X-RAY EXAM OF KNEE 3: CPT

## 2022-06-25 PROCEDURE — 99284 EMERGENCY DEPT VISIT MOD MDM: CPT

## 2022-06-25 PROCEDURE — 73560 X-RAY EXAM OF KNEE 1 OR 2: CPT

## 2022-06-25 PROCEDURE — 85610 PROTHROMBIN TIME: CPT | Performed by: EMERGENCY MEDICINE

## 2022-06-25 NOTE — ED INITIAL ASSESSMENT (HPI)
Pt here for right knee pain and hematoma  Per pt, \"I went to the immediate care because I'm having pain with my right knee. They sent me here because there is a hematoma and I'm on warfarin. \"  No known injury    Pt presents alert, orientedx4, easy WOB, c/o 5/10 right knee pain  +swelling and bruising to right knee

## 2022-06-30 ENCOUNTER — HOSPITAL ENCOUNTER (INPATIENT)
Facility: HOSPITAL | Age: 67
LOS: 6 days | Discharge: NURSING, REHAB OR LONG-TERM CARE | End: 2022-07-06
Attending: STUDENT IN AN ORGANIZED HEALTH CARE EDUCATION/TRAINING PROGRAM | Admitting: HOSPITALIST
Payer: MEDICARE

## 2022-06-30 ENCOUNTER — APPOINTMENT (OUTPATIENT)
Dept: GENERAL RADIOLOGY | Facility: HOSPITAL | Age: 67
End: 2022-06-30
Attending: STUDENT IN AN ORGANIZED HEALTH CARE EDUCATION/TRAINING PROGRAM
Payer: MEDICARE

## 2022-06-30 ENCOUNTER — APPOINTMENT (OUTPATIENT)
Dept: WOUND CARE | Facility: HOSPITAL | Age: 67
End: 2022-06-30
Attending: NURSE PRACTITIONER
Payer: MEDICARE

## 2022-06-30 VITALS
DIASTOLIC BLOOD PRESSURE: 77 MMHG | HEART RATE: 112 BPM | TEMPERATURE: 99 F | SYSTOLIC BLOOD PRESSURE: 124 MMHG | RESPIRATION RATE: 16 BRPM

## 2022-06-30 DIAGNOSIS — E87.70 LOCALIZED EDEMA DUE TO FLUID OVERLOAD: ICD-10-CM

## 2022-06-30 DIAGNOSIS — R06.00 DYSPNEA ON EXERTION: ICD-10-CM

## 2022-06-30 DIAGNOSIS — L03.115 CELLULITIS OF RIGHT THIGH: ICD-10-CM

## 2022-06-30 DIAGNOSIS — L97.812 NON-PRESSURE CHRONIC ULCER OF OTHER PART OF RIGHT LOWER LEG WITH FAT LAYER EXPOSED (HCC): Primary | ICD-10-CM

## 2022-06-30 DIAGNOSIS — L03.115 CELLULITIS OF RIGHT LOWER EXTREMITY: Primary | ICD-10-CM

## 2022-06-30 DIAGNOSIS — I48.91 ATRIAL FIBRILLATION, UNSPECIFIED TYPE (HCC): ICD-10-CM

## 2022-06-30 LAB
ALBUMIN SERPL-MCNC: 2.4 G/DL (ref 3.4–5)
ALBUMIN/GLOB SERPL: 0.5 {RATIO} (ref 1–2)
ALP LIVER SERPL-CCNC: 98 U/L
ALT SERPL-CCNC: 21 U/L
ANION GAP SERPL CALC-SCNC: 11 MMOL/L (ref 0–18)
ANION GAP SERPL CALC-SCNC: 9 MMOL/L (ref 0–18)
AST SERPL-CCNC: 23 U/L (ref 15–37)
BASOPHILS # BLD AUTO: 0.04 X10(3) UL (ref 0–0.2)
BASOPHILS NFR BLD AUTO: 0.2 %
BILIRUB SERPL-MCNC: 0.8 MG/DL (ref 0.1–2)
BUN BLD-MCNC: 19 MG/DL (ref 7–18)
BUN BLD-MCNC: 22 MG/DL (ref 7–18)
CALCIUM BLD-MCNC: 9 MG/DL (ref 8.5–10.1)
CALCIUM BLD-MCNC: 9.1 MG/DL (ref 8.5–10.1)
CHLORIDE SERPL-SCNC: 93 MMOL/L (ref 98–112)
CHLORIDE SERPL-SCNC: 96 MMOL/L (ref 98–112)
CO2 SERPL-SCNC: 27 MMOL/L (ref 21–32)
CO2 SERPL-SCNC: 29 MMOL/L (ref 21–32)
CREAT BLD-MCNC: 0.91 MG/DL
CREAT BLD-MCNC: 1.17 MG/DL
EOSINOPHIL # BLD AUTO: 0.03 X10(3) UL (ref 0–0.7)
EOSINOPHIL NFR BLD AUTO: 0.2 %
ERYTHROCYTE [DISTWIDTH] IN BLOOD BY AUTOMATED COUNT: 17.2 %
GLOBULIN PLAS-MCNC: 4.9 G/DL (ref 2.8–4.4)
GLUCOSE BLD-MCNC: 67 MG/DL (ref 70–99)
GLUCOSE BLD-MCNC: 75 MG/DL (ref 70–99)
HCT VFR BLD AUTO: 30.1 %
HGB BLD-MCNC: 9.5 G/DL
IMM GRANULOCYTES # BLD AUTO: 0.37 X10(3) UL (ref 0–1)
IMM GRANULOCYTES NFR BLD: 2.1 %
INR BLD: 2.06 (ref 0.8–1.2)
LYMPHOCYTES # BLD AUTO: 1.2 X10(3) UL (ref 1–4)
LYMPHOCYTES NFR BLD AUTO: 7 %
MCH RBC QN AUTO: 26.7 PG (ref 26–34)
MCHC RBC AUTO-ENTMCNC: 31.6 G/DL (ref 31–37)
MCV RBC AUTO: 84.6 FL
MONOCYTES # BLD AUTO: 1.37 X10(3) UL (ref 0.1–1)
MONOCYTES NFR BLD AUTO: 7.9 %
NEUTROPHILS # BLD AUTO: 14.24 X10 (3) UL (ref 1.5–7.7)
NEUTROPHILS # BLD AUTO: 14.24 X10(3) UL (ref 1.5–7.7)
NEUTROPHILS NFR BLD AUTO: 82.6 %
NT-PROBNP SERPL-MCNC: 2232 PG/ML (ref ?–125)
OSMOLALITY SERPL CALC.SUM OF ELEC: 275 MOSM/KG (ref 275–295)
OSMOLALITY SERPL CALC.SUM OF ELEC: 278 MOSM/KG (ref 275–295)
PLATELET # BLD AUTO: 485 10(3)UL (ref 150–450)
POTASSIUM SERPL-SCNC: 2.6 MMOL/L (ref 3.5–5.1)
POTASSIUM SERPL-SCNC: 3.4 MMOL/L (ref 3.5–5.1)
PROT SERPL-MCNC: 7.3 G/DL (ref 6.4–8.2)
PROTHROMBIN TIME: 23.3 SECONDS (ref 11.6–14.8)
RBC # BLD AUTO: 3.56 X10(6)UL
SARS-COV-2 RNA RESP QL NAA+PROBE: NOT DETECTED
SODIUM SERPL-SCNC: 132 MMOL/L (ref 136–145)
SODIUM SERPL-SCNC: 133 MMOL/L (ref 136–145)
WBC # BLD AUTO: 17.3 X10(3) UL (ref 4–11)

## 2022-06-30 PROCEDURE — 71045 X-RAY EXAM CHEST 1 VIEW: CPT | Performed by: STUDENT IN AN ORGANIZED HEALTH CARE EDUCATION/TRAINING PROGRAM

## 2022-06-30 PROCEDURE — 83880 ASSAY OF NATRIURETIC PEPTIDE: CPT | Performed by: STUDENT IN AN ORGANIZED HEALTH CARE EDUCATION/TRAINING PROGRAM

## 2022-06-30 PROCEDURE — 99285 EMERGENCY DEPT VISIT HI MDM: CPT

## 2022-06-30 PROCEDURE — 96366 THER/PROPH/DIAG IV INF ADDON: CPT

## 2022-06-30 PROCEDURE — 87040 BLOOD CULTURE FOR BACTERIA: CPT | Performed by: STUDENT IN AN ORGANIZED HEALTH CARE EDUCATION/TRAINING PROGRAM

## 2022-06-30 PROCEDURE — 99214 OFFICE O/P EST MOD 30 MIN: CPT | Performed by: NURSE PRACTITIONER

## 2022-06-30 PROCEDURE — 96365 THER/PROPH/DIAG IV INF INIT: CPT

## 2022-06-30 PROCEDURE — 36415 COLL VENOUS BLD VENIPUNCTURE: CPT

## 2022-06-30 PROCEDURE — 96375 TX/PRO/DX INJ NEW DRUG ADDON: CPT

## 2022-06-30 PROCEDURE — 80053 COMPREHEN METABOLIC PANEL: CPT | Performed by: STUDENT IN AN ORGANIZED HEALTH CARE EDUCATION/TRAINING PROGRAM

## 2022-06-30 PROCEDURE — 85610 PROTHROMBIN TIME: CPT | Performed by: STUDENT IN AN ORGANIZED HEALTH CARE EDUCATION/TRAINING PROGRAM

## 2022-06-30 PROCEDURE — 85025 COMPLETE CBC W/AUTO DIFF WBC: CPT | Performed by: STUDENT IN AN ORGANIZED HEALTH CARE EDUCATION/TRAINING PROGRAM

## 2022-06-30 RX ORDER — ALLOPURINOL 100 MG/1
100 TABLET ORAL DAILY
Status: DISCONTINUED | OUTPATIENT
Start: 2022-06-30 | End: 2022-07-06

## 2022-06-30 RX ORDER — SENNOSIDES 8.6 MG
1300 CAPSULE ORAL EVERY 8 HOURS PRN
COMMUNITY

## 2022-06-30 RX ORDER — BUPROPION HYDROCHLORIDE 150 MG/1
150 TABLET, EXTENDED RELEASE ORAL 2 TIMES DAILY
Status: DISCONTINUED | OUTPATIENT
Start: 2022-06-30 | End: 2022-07-06

## 2022-06-30 RX ORDER — POTASSIUM CHLORIDE 14.9 MG/ML
20 INJECTION INTRAVENOUS ONCE
Status: DISCONTINUED | OUTPATIENT
Start: 2022-06-30 | End: 2022-06-30

## 2022-06-30 RX ORDER — MAGNESIUM SULFATE HEPTAHYDRATE 40 MG/ML
2 INJECTION, SOLUTION INTRAVENOUS ONCE
Status: DISCONTINUED | OUTPATIENT
Start: 2022-06-30 | End: 2022-07-06

## 2022-06-30 RX ORDER — MELATONIN
5000 NIGHTLY
Status: DISCONTINUED | OUTPATIENT
Start: 2022-06-30 | End: 2022-07-06

## 2022-06-30 RX ORDER — DIPHENHYDRAMINE HCL 25 MG
25 CAPSULE ORAL NIGHTLY PRN
Status: DISCONTINUED | OUTPATIENT
Start: 2022-06-30 | End: 2022-07-06

## 2022-06-30 RX ORDER — CLOPIDOGREL BISULFATE 75 MG/1
75 TABLET ORAL NIGHTLY
Status: DISCONTINUED | OUTPATIENT
Start: 2022-06-30 | End: 2022-07-06

## 2022-06-30 RX ORDER — CEFAZOLIN SODIUM/WATER 2 G/20 ML
2 SYRINGE (ML) INTRAVENOUS ONCE
Status: COMPLETED | OUTPATIENT
Start: 2022-06-30 | End: 2022-06-30

## 2022-06-30 RX ORDER — POTASSIUM CHLORIDE 20 MEQ/1
40 TABLET, EXTENDED RELEASE ORAL ONCE
Status: DISCONTINUED | OUTPATIENT
Start: 2022-06-30 | End: 2022-06-30

## 2022-06-30 RX ORDER — CYPROHEPTADINE HYDROCHLORIDE 4 MG/1
4 TABLET ORAL NIGHTLY
Status: DISCONTINUED | OUTPATIENT
Start: 2022-06-30 | End: 2022-07-06

## 2022-06-30 RX ORDER — POTASSIUM CHLORIDE 20 MEQ/1
40 TABLET, EXTENDED RELEASE ORAL ONCE
Status: COMPLETED | OUTPATIENT
Start: 2022-06-30 | End: 2022-06-30

## 2022-06-30 RX ORDER — LOSARTAN POTASSIUM 50 MG/1
50 TABLET ORAL DAILY
Status: DISCONTINUED | OUTPATIENT
Start: 2022-07-01 | End: 2022-06-30 | Stop reason: ALTCHOICE

## 2022-06-30 RX ORDER — DILTIAZEM HYDROCHLORIDE 180 MG/1
360 CAPSULE, EXTENDED RELEASE ORAL DAILY
Status: DISCONTINUED | OUTPATIENT
Start: 2022-06-30 | End: 2022-07-04

## 2022-06-30 RX ORDER — LEVOTHYROXINE SODIUM 0.15 MG/1
150 TABLET ORAL
Status: DISCONTINUED | OUTPATIENT
Start: 2022-07-01 | End: 2022-07-06

## 2022-06-30 RX ORDER — CEFAZOLIN SODIUM/WATER 2 G/20 ML
2 SYRINGE (ML) INTRAVENOUS EVERY 8 HOURS
Status: DISCONTINUED | OUTPATIENT
Start: 2022-07-01 | End: 2022-07-06

## 2022-06-30 RX ORDER — FLUOXETINE HYDROCHLORIDE 20 MG/1
20 CAPSULE ORAL 2 TIMES DAILY
Status: DISCONTINUED | OUTPATIENT
Start: 2022-06-30 | End: 2022-07-06

## 2022-06-30 RX ORDER — ROSUVASTATIN CALCIUM 5 MG/1
5 TABLET, COATED ORAL EVERY OTHER DAY
Status: DISCONTINUED | OUTPATIENT
Start: 2022-07-01 | End: 2022-07-06

## 2022-06-30 RX ORDER — ALBUTEROL SULFATE 90 UG/1
2 AEROSOL, METERED RESPIRATORY (INHALATION) EVERY 6 HOURS PRN
Status: DISCONTINUED | OUTPATIENT
Start: 2022-06-30 | End: 2022-07-06

## 2022-06-30 NOTE — ED INITIAL ASSESSMENT (HPI)
PT TO THE ED FROM THE WOUND CENTER. WOUNDCENTER SENT PT IN FOR INCREASED PULSE, FLUID OVERLOAD AND ANDRIA.  PT STATES SHE IS ALWAYS SOB, BUT TODAY IT SEEMS ALITTLE WORSE

## 2022-06-30 NOTE — PROGRESS NOTES
Weekly Wound Education Note    Teaching Provided To: Patient  Training Topics: Cleasing and general instructions;Dressing; Discharge instructions  Training Method: Explain/Verbal  Training Response: Patient responds and understands        Notes: Wound to leg stable, silver foam applied. Per provider toe wound healed. Sent to ER due to elevated pulse, difficulty breathing, pain.

## 2022-06-30 NOTE — PATIENT INSTRUCTIONS
Patient discharge and wound care instructions  Ofelia Nurse  6/30/2022    Patient to ED:  Heart rate, fluid overload, right knee cellulitis

## 2022-07-01 ENCOUNTER — APPOINTMENT (OUTPATIENT)
Dept: CT IMAGING | Facility: HOSPITAL | Age: 67
End: 2022-07-01
Attending: INTERNAL MEDICINE
Payer: MEDICARE

## 2022-07-01 LAB
ALBUMIN SERPL-MCNC: 2 G/DL (ref 3.4–5)
ALBUMIN/GLOB SERPL: 0.5 {RATIO} (ref 1–2)
ALP LIVER SERPL-CCNC: 84 U/L
ALT SERPL-CCNC: 16 U/L
ANION GAP SERPL CALC-SCNC: 8 MMOL/L (ref 0–18)
AST SERPL-CCNC: 21 U/L (ref 15–37)
BASOPHILS # BLD AUTO: 0.03 X10(3) UL (ref 0–0.2)
BASOPHILS NFR BLD AUTO: 0.2 %
BILIRUB SERPL-MCNC: 0.4 MG/DL (ref 0.1–2)
BUN BLD-MCNC: 20 MG/DL (ref 7–18)
CALCIUM BLD-MCNC: 8.7 MG/DL (ref 8.5–10.1)
CHLORIDE SERPL-SCNC: 98 MMOL/L (ref 98–112)
CO2 SERPL-SCNC: 30 MMOL/L (ref 21–32)
CREAT BLD-MCNC: 1.09 MG/DL
EOSINOPHIL # BLD AUTO: 0.02 X10(3) UL (ref 0–0.7)
EOSINOPHIL NFR BLD AUTO: 0.2 %
ERYTHROCYTE [DISTWIDTH] IN BLOOD BY AUTOMATED COUNT: 17.3 %
GLOBULIN PLAS-MCNC: 4.1 G/DL (ref 2.8–4.4)
GLUCOSE BLD-MCNC: 132 MG/DL (ref 70–99)
GLUCOSE BLD-MCNC: 154 MG/DL (ref 70–99)
HCT VFR BLD AUTO: 26 %
HGB BLD-MCNC: 8.2 G/DL
IMM GRANULOCYTES # BLD AUTO: 0.21 X10(3) UL (ref 0–1)
IMM GRANULOCYTES NFR BLD: 1.6 %
INR BLD: 2.49 (ref 0.8–1.2)
LYMPHOCYTES # BLD AUTO: 1.17 X10(3) UL (ref 1–4)
LYMPHOCYTES NFR BLD AUTO: 8.9 %
MAGNESIUM SERPL-MCNC: 2 MG/DL (ref 1.6–2.6)
MCH RBC QN AUTO: 26.9 PG (ref 26–34)
MCHC RBC AUTO-ENTMCNC: 31.5 G/DL (ref 31–37)
MCV RBC AUTO: 85.2 FL
MONOCYTES # BLD AUTO: 0.98 X10(3) UL (ref 0.1–1)
MONOCYTES NFR BLD AUTO: 7.4 %
NEUTROPHILS # BLD AUTO: 10.77 X10 (3) UL (ref 1.5–7.7)
NEUTROPHILS # BLD AUTO: 10.77 X10(3) UL (ref 1.5–7.7)
NEUTROPHILS NFR BLD AUTO: 81.7 %
OSMOLALITY SERPL CALC.SUM OF ELEC: 286 MOSM/KG (ref 275–295)
PLATELET # BLD AUTO: 454 10(3)UL (ref 150–450)
POTASSIUM SERPL-SCNC: 2.7 MMOL/L (ref 3.5–5.1)
POTASSIUM SERPL-SCNC: 3 MMOL/L (ref 3.5–5.1)
PROT SERPL-MCNC: 6.1 G/DL (ref 6.4–8.2)
PROTHROMBIN TIME: 27 SECONDS (ref 11.6–14.8)
RBC # BLD AUTO: 3.05 X10(6)UL
SODIUM SERPL-SCNC: 136 MMOL/L (ref 136–145)
WBC # BLD AUTO: 13.2 X10(3) UL (ref 4–11)

## 2022-07-01 PROCEDURE — 82962 GLUCOSE BLOOD TEST: CPT

## 2022-07-01 PROCEDURE — 84132 ASSAY OF SERUM POTASSIUM: CPT | Performed by: HOSPITALIST

## 2022-07-01 PROCEDURE — 83735 ASSAY OF MAGNESIUM: CPT | Performed by: HOSPITALIST

## 2022-07-01 PROCEDURE — 85610 PROTHROMBIN TIME: CPT | Performed by: HOSPITALIST

## 2022-07-01 PROCEDURE — 73700 CT LOWER EXTREMITY W/O DYE: CPT | Performed by: INTERNAL MEDICINE

## 2022-07-01 PROCEDURE — 80053 COMPREHEN METABOLIC PANEL: CPT | Performed by: HOSPITALIST

## 2022-07-01 PROCEDURE — 85025 COMPLETE CBC W/AUTO DIFF WBC: CPT | Performed by: HOSPITALIST

## 2022-07-01 PROCEDURE — 94660 CPAP INITIATION&MGMT: CPT

## 2022-07-01 RX ORDER — SALIVA STIMULANT COMB. NO.3
SPRAY, NON-AEROSOL (ML) MUCOUS MEMBRANE AS NEEDED
Status: DISCONTINUED | OUTPATIENT
Start: 2022-07-01 | End: 2022-07-06

## 2022-07-01 RX ORDER — DEXTROSE MONOHYDRATE 25 G/50ML
50 INJECTION, SOLUTION INTRAVENOUS
Status: DISCONTINUED | OUTPATIENT
Start: 2022-07-01 | End: 2022-07-06

## 2022-07-01 RX ORDER — POTASSIUM CHLORIDE 1.5 G/1.77G
20 POWDER, FOR SOLUTION ORAL 2 TIMES DAILY
Status: DISCONTINUED | OUTPATIENT
Start: 2022-07-01 | End: 2022-07-01 | Stop reason: SDUPTHER

## 2022-07-01 RX ORDER — SPIRONOLACTONE 25 MG/1
25 TABLET ORAL DAILY
Status: DISCONTINUED | OUTPATIENT
Start: 2022-07-02 | End: 2022-07-06

## 2022-07-01 RX ORDER — NICOTINE POLACRILEX 4 MG
15 LOZENGE BUCCAL
Status: DISCONTINUED | OUTPATIENT
Start: 2022-07-01 | End: 2022-07-06

## 2022-07-01 RX ORDER — ACETAMINOPHEN 325 MG/1
650 TABLET ORAL NIGHTLY PRN
Status: DISCONTINUED | OUTPATIENT
Start: 2022-07-01 | End: 2022-07-01

## 2022-07-01 RX ORDER — NICOTINE POLACRILEX 4 MG
30 LOZENGE BUCCAL
Status: DISCONTINUED | OUTPATIENT
Start: 2022-07-01 | End: 2022-07-06

## 2022-07-01 RX ORDER — POTASSIUM CHLORIDE 20 MEQ/1
40 TABLET, EXTENDED RELEASE ORAL EVERY 4 HOURS
Status: DISPENSED | OUTPATIENT
Start: 2022-07-01 | End: 2022-07-01

## 2022-07-01 RX ORDER — DIGOXIN 125 MCG
125 TABLET ORAL DAILY
Status: DISCONTINUED | OUTPATIENT
Start: 2022-07-01 | End: 2022-07-06

## 2022-07-01 RX ORDER — SPIRONOLACTONE 25 MG/1
25 TABLET ORAL DAILY
Status: DISCONTINUED | OUTPATIENT
Start: 2022-07-01 | End: 2022-07-01

## 2022-07-01 RX ORDER — ACETAMINOPHEN 325 MG/1
650 TABLET ORAL EVERY 6 HOURS PRN
Status: DISCONTINUED | OUTPATIENT
Start: 2022-07-01 | End: 2022-07-06

## 2022-07-01 RX ORDER — POTASSIUM CHLORIDE 14.9 MG/ML
20 INJECTION INTRAVENOUS ONCE
Status: COMPLETED | OUTPATIENT
Start: 2022-07-01 | End: 2022-07-01

## 2022-07-01 RX ORDER — NICOTINE POLACRILEX 4 MG
15 LOZENGE BUCCAL ONCE
Status: DISCONTINUED | OUTPATIENT
Start: 2022-07-01 | End: 2022-07-06

## 2022-07-01 RX ORDER — DILTIAZEM HYDROCHLORIDE 5 MG/ML
10 INJECTION INTRAVENOUS EVERY 6 HOURS PRN
Status: DISCONTINUED | OUTPATIENT
Start: 2022-07-01 | End: 2022-07-06

## 2022-07-01 RX ORDER — POTASSIUM CHLORIDE 29.8 MG/ML
40 INJECTION INTRAVENOUS ONCE
Status: DISCONTINUED | OUTPATIENT
Start: 2022-07-01 | End: 2022-07-01 | Stop reason: SDUPTHER

## 2022-07-01 NOTE — PROGRESS NOTES
[address] [facility] Dosing Service  Warfarin (Coumadin) Initial Dosing    Ezio Ying is a 69year old patient for whom pharmacy has been consulted to dose warfarin (COUMADIN) for  aortic valve replacement  by Dr. Costa White. Based on this indication, goal INR is 2.5-3.5. Pertinent Patient Medical History: Aortic valve replacement, pafib, PE. Potential Drug Interactions: allopurinol, Plavix, cefazolin    Latest INR:   Recent Labs     06/30/22  1639   INR 2.06*       Other Anticoagulants:  none  Home regimen (if applicable): Warfarin 4.5 mg daily. Patient follows with anticoagulation clinic. Per Coumadin Clinic, it was reduced after recently elevated INR. Date/Time last dose was given (if applicable): 3/15    Based on above -  1. For today, Give warfarin (COUMADIN) 4.5 mg at 2100 tonight    2. PT/INR ordered daily while on warfarin    3. Pharmacy will continue to follow. We appreciate the opportunity to assist in the care of this patient.     Yg Mccain PharmD  6/30/2022  9:30 PM

## 2022-07-01 NOTE — PROGRESS NOTES
120 [facility] Dosing Service  Warfarin (Coumadin) Subsequent Dosing    Carmine Khan is a 69year old patient for whom pharmacy is dosing warfarin (Coumadin). Goal INR is 2.5-3.5    Recent Labs   Lab 06/25/22  1902 06/30/22  1639 07/01/22  0633   INR 6.15* 2.06* 2.49*       Consulted by:  Dr Sotck Reveal  Indication:  aortic valve replacement, afib, PE  Potential Drug Interactions:  allopurinol, levothyroxine, cefazolin - can increase INR; Plavix - can increase risk of bleeding  Other Anticoagulants:  none  Home regimen (if applicable):  Coumadin 6.9II daily. Patient follows with Anticoagulation Clinic. Per Coumadin Clinic, it was reduced after recently elevated INR 6/25/22. Inpatient Dosing History:    Date 6/30 7/1       INR 2.09 2.49       Coumadin dose 4.5mg                 Based on above -  1. For today, Give warfarin (COUMADIN) 4.5 mg at 2100 tonight per home regimen  2   PT/INR ordered daily while on warfarin  3. Pharmacy will continue to follow. We appreciate the opportunity to assist in the care of this patient.     Roel Lemon, PharmD  7/1/2022  7:58 AM

## 2022-07-01 NOTE — PROGRESS NOTES
New admission, pt A&Ox4 from home alone came in due to worsening  RLE cellulitis, has chronic BLE edema/ wounds and sees out pt wound care. Finished clindamycin outpt didn't seem to help. Pt is on room air, uses CPAP machine at night. Using a purewix due to the leg pain and being on furosemide. Overnight glucose was at 67 pt hadn't had much of an appetite the last couple days so we gave pt food and a glucose tablet this brought sugar back up to 154. ID on for consult will notify in the morning. Call light within reach, frequent checks made, needs met.

## 2022-07-02 LAB
ALBUMIN SERPL-MCNC: 2 G/DL (ref 3.4–5)
ALBUMIN/GLOB SERPL: 0.5 {RATIO} (ref 1–2)
ALP LIVER SERPL-CCNC: 78 U/L
ALT SERPL-CCNC: 8 U/L
ANION GAP SERPL CALC-SCNC: 7 MMOL/L (ref 0–18)
AST SERPL-CCNC: 22 U/L (ref 15–37)
ATRIAL RATE: 107 BPM
BASOPHILS # BLD AUTO: 0.07 X10(3) UL (ref 0–0.2)
BASOPHILS NFR BLD AUTO: 0.5 %
BILIRUB SERPL-MCNC: 0.3 MG/DL (ref 0.1–2)
BUN BLD-MCNC: 19 MG/DL (ref 7–18)
CALCIUM BLD-MCNC: 8.6 MG/DL (ref 8.5–10.1)
CHLORIDE SERPL-SCNC: 101 MMOL/L (ref 98–112)
CO2 SERPL-SCNC: 28 MMOL/L (ref 21–32)
CREAT BLD-MCNC: 0.84 MG/DL
EOSINOPHIL # BLD AUTO: 0.04 X10(3) UL (ref 0–0.7)
EOSINOPHIL NFR BLD AUTO: 0.3 %
ERYTHROCYTE [DISTWIDTH] IN BLOOD BY AUTOMATED COUNT: 17.6 %
GLOBULIN PLAS-MCNC: 4.4 G/DL (ref 2.8–4.4)
GLUCOSE BLD-MCNC: 106 MG/DL (ref 70–99)
HCT VFR BLD AUTO: 27 %
HGB BLD-MCNC: 8.5 G/DL
IMM GRANULOCYTES # BLD AUTO: 0.28 X10(3) UL (ref 0–1)
IMM GRANULOCYTES NFR BLD: 1.9 %
INR BLD: 3.02 (ref 0.8–1.2)
LYMPHOCYTES # BLD AUTO: 1.27 X10(3) UL (ref 1–4)
LYMPHOCYTES NFR BLD AUTO: 8.5 %
MAGNESIUM SERPL-MCNC: 2.1 MG/DL (ref 1.6–2.6)
MCH RBC QN AUTO: 26.8 PG (ref 26–34)
MCHC RBC AUTO-ENTMCNC: 31.5 G/DL (ref 31–37)
MCV RBC AUTO: 85.2 FL
MONOCYTES # BLD AUTO: 1.32 X10(3) UL (ref 0.1–1)
MONOCYTES NFR BLD AUTO: 8.8 %
NEUTROPHILS # BLD AUTO: 11.95 X10 (3) UL (ref 1.5–7.7)
NEUTROPHILS # BLD AUTO: 11.95 X10(3) UL (ref 1.5–7.7)
NEUTROPHILS NFR BLD AUTO: 80 %
OSMOLALITY SERPL CALC.SUM OF ELEC: 285 MOSM/KG (ref 275–295)
PLATELET # BLD AUTO: 484 10(3)UL (ref 150–450)
POTASSIUM SERPL-SCNC: 3.4 MMOL/L (ref 3.5–5.1)
POTASSIUM SERPL-SCNC: 3.5 MMOL/L (ref 3.5–5.1)
POTASSIUM SERPL-SCNC: 3.7 MMOL/L (ref 3.5–5.1)
PROT SERPL-MCNC: 6.4 G/DL (ref 6.4–8.2)
PROTHROMBIN TIME: 31.6 SECONDS (ref 11.6–14.8)
Q-T INTERVAL: 282 MS
QRS DURATION: 104 MS
QTC CALCULATION (BEZET): 379 MS
R AXIS: -17 DEGREES
RBC # BLD AUTO: 3.17 X10(6)UL
SODIUM SERPL-SCNC: 136 MMOL/L (ref 136–145)
T AXIS: 73 DEGREES
TROPONIN I HIGH SENSITIVITY: 12 NG/L
TROPONIN I HIGH SENSITIVITY: 13 NG/L
VENTRICULAR RATE: 109 BPM
WBC # BLD AUTO: 14.9 X10(3) UL (ref 4–11)

## 2022-07-02 PROCEDURE — 84484 ASSAY OF TROPONIN QUANT: CPT | Performed by: HOSPITALIST

## 2022-07-02 PROCEDURE — 97116 GAIT TRAINING THERAPY: CPT

## 2022-07-02 PROCEDURE — 97530 THERAPEUTIC ACTIVITIES: CPT

## 2022-07-02 PROCEDURE — 84132 ASSAY OF SERUM POTASSIUM: CPT | Performed by: HOSPITALIST

## 2022-07-02 PROCEDURE — 97162 PT EVAL MOD COMPLEX 30 MIN: CPT

## 2022-07-02 PROCEDURE — 80053 COMPREHEN METABOLIC PANEL: CPT | Performed by: HOSPITALIST

## 2022-07-02 PROCEDURE — 93005 ELECTROCARDIOGRAM TRACING: CPT

## 2022-07-02 PROCEDURE — 93010 ELECTROCARDIOGRAM REPORT: CPT | Performed by: INTERNAL MEDICINE

## 2022-07-02 PROCEDURE — 83735 ASSAY OF MAGNESIUM: CPT | Performed by: HOSPITALIST

## 2022-07-02 PROCEDURE — 85610 PROTHROMBIN TIME: CPT | Performed by: HOSPITALIST

## 2022-07-02 PROCEDURE — 97166 OT EVAL MOD COMPLEX 45 MIN: CPT

## 2022-07-02 PROCEDURE — 85025 COMPLETE CBC W/AUTO DIFF WBC: CPT | Performed by: HOSPITALIST

## 2022-07-02 RX ORDER — WARFARIN SODIUM 2 MG/1
4 TABLET ORAL
Status: COMPLETED | OUTPATIENT
Start: 2022-07-02 | End: 2022-07-02

## 2022-07-02 RX ORDER — POTASSIUM CHLORIDE 20 MEQ/1
40 TABLET, EXTENDED RELEASE ORAL ONCE
Status: COMPLETED | OUTPATIENT
Start: 2022-07-02 | End: 2022-07-02

## 2022-07-02 RX ORDER — POTASSIUM CHLORIDE 20 MEQ/1
40 TABLET, EXTENDED RELEASE ORAL EVERY 4 HOURS
Status: COMPLETED | OUTPATIENT
Start: 2022-07-02 | End: 2022-07-02

## 2022-07-02 RX ORDER — CALCIUM CARBONATE 200(500)MG
1000 TABLET,CHEWABLE ORAL 3 TIMES DAILY PRN
Status: DISCONTINUED | OUTPATIENT
Start: 2022-07-02 | End: 2022-07-06

## 2022-07-02 NOTE — PROGRESS NOTES
Pt A&OX4 on room air, VSS, complaints of some pain near cellulitis site, administered prn tylenol. Pt had CT of the femur completed this evening results showed dermal thickening with subcutaneous edema involving the medial right thigh. No aggressive cortical destructive process to suggest osteomyelitis at this time. Postsurgical changes of right total hip arthoplasty and knee arthroplasty. Components appear well seated. There is a trace knee effusion. Pt complaint of dry mouth got order for biotene. purewix inplace, call light within reach, frequent checks made, needs met. Pt complaint of heartburn, requested for tums, MD placed order.

## 2022-07-02 NOTE — PLAN OF CARE
Pain controlled with tylenol at this time  Plan for PTOT  Right leg elevated  IV ancef  Potassium replaced per protocol  INR WNL  Daily weight needed today      1050  While working with physical therapy patient stated she was having chest pain  ekg was done and showed afib  Trop ordered  hosp and cardiology paged  IS given  INR therapeutic       1800  Potassium replacement scheduled for this evening again.

## 2022-07-02 NOTE — CONSULTS
120 [facility] Dosing Service  Warfarin (Coumadin) Subsequent Dosing    Keyanna Gordillo is a 69year old patient for whom pharmacy is dosing warfarin (Coumadin). Goal INR is 2.5-3.5    Recent Labs   Lab 06/25/22  1902 06/30/22  1639 07/01/22  0633 07/02/22  0547   INR 6.15* 2.06* 2.49* 3.02*       Consulted by: Dr. Estefani Foster  Indication: aortic valve,  Potential Drug Interactions: allopurinol  Other Anticoagulants: none  Home regimen (if applicable): 4.5 mg qhs    Inpatient Dosing History:     Date 6/30 7/1 7/2          INR 2.09 2.49 3.02          Coumadin dose 4.5mg 4.5 mg   4 mg                  Based on above -  1. For today, Give warfarin (COUMADIN) 4 mg at 2100 tonight  2   PT/INR ordered daily while on warfarin  3. Pharmacy will continue to follow. We appreciate the opportunity to assist in the care of this patient.     Ova Soulier, PharmD  7/2/2022  10:32 AM

## 2022-07-02 NOTE — PROGRESS NOTES
Seen by ID and Cardio for consult w/ orders given and carried out, On continued iv abt for cellulitis on rt upper leg,Has multiple bruises from falls at home, On high fall risk,Assisted needs, For ct scan of rt femur,Medicated w*/ tylenol for pain,Potassium replaced per protocol, Assisted needs

## 2022-07-02 NOTE — RESPIRATORY THERAPY NOTE
Patient did not use CPAP for SHELDON last night because he she is refusing . the nasal pillows as well as the nasal mask.

## 2022-07-03 LAB
ANION GAP SERPL CALC-SCNC: 7 MMOL/L (ref 0–18)
BUN BLD-MCNC: 17 MG/DL (ref 7–18)
CALCIUM BLD-MCNC: 9 MG/DL (ref 8.5–10.1)
CHLORIDE SERPL-SCNC: 103 MMOL/L (ref 98–112)
CO2 SERPL-SCNC: 29 MMOL/L (ref 21–32)
CREAT BLD-MCNC: 0.85 MG/DL
GLUCOSE BLD-MCNC: 110 MG/DL (ref 70–99)
INR BLD: 3.7 (ref 0.8–1.2)
OSMOLALITY SERPL CALC.SUM OF ELEC: 290 MOSM/KG (ref 275–295)
POTASSIUM SERPL-SCNC: 4 MMOL/L (ref 3.5–5.1)
POTASSIUM SERPL-SCNC: 4 MMOL/L (ref 3.5–5.1)
PROTHROMBIN TIME: 37 SECONDS (ref 11.6–14.8)
SODIUM SERPL-SCNC: 139 MMOL/L (ref 136–145)

## 2022-07-03 PROCEDURE — 80048 BASIC METABOLIC PNL TOTAL CA: CPT | Performed by: HOSPITALIST

## 2022-07-03 PROCEDURE — 85610 PROTHROMBIN TIME: CPT | Performed by: HOSPITALIST

## 2022-07-03 PROCEDURE — 84132 ASSAY OF SERUM POTASSIUM: CPT | Performed by: HOSPITALIST

## 2022-07-03 RX ORDER — PANTOPRAZOLE SODIUM 40 MG/1
40 TABLET, DELAYED RELEASE ORAL
Status: DISCONTINUED | OUTPATIENT
Start: 2022-07-03 | End: 2022-07-06

## 2022-07-03 RX ORDER — FAMOTIDINE 20 MG/1
20 TABLET, FILM COATED ORAL 2 TIMES DAILY
Status: DISCONTINUED | OUTPATIENT
Start: 2022-07-03 | End: 2022-07-03

## 2022-07-03 NOTE — PLAN OF CARE
Pt is aaox4, complains of tolerable LE pain, elevated on pillows, on IV abtx, afebrile. Problem: PAIN - ADULT  Goal: Verbalizes/displays adequate comfort level or patient's stated pain goal  Description: INTERVENTIONS:  - Encourage pt to monitor pain and request assistance  - Assess pain using appropriate pain scale  - Administer analgesics based on type and severity of pain and evaluate response  - Implement non-pharmacological measures as appropriate and evaluate response  - Consider cultural and social influences on pain and pain management  - Manage/alleviate anxiety  - Utilize distraction and/or relaxation techniques  - Monitor for opioid side effects  - Notify MD/LIP if interventions unsuccessful or patient reports new pain  - Anticipate increased pain with activity and pre-medicate as appropriate  Outcome: Progressing     Problem: SAFETY ADULT - FALL  Goal: Free from fall injury  Description: INTERVENTIONS:  - Assess pt frequently for physical needs  - Identify cognitive and physical deficits and behaviors that affect risk of falls.   - [place] fall precautions as indicated by assessment.  - Educate pt/family on patient safety including physical limitations  - Instruct pt to call for assistance with activity based on assessment  - Modify environment to reduce risk of injury  - Provide assistive devices as appropriate  - Consider OT/PT consult to assist with strengthening/mobility  - Encourage toileting schedule  Outcome: Progressing     Problem: RISK FOR INFECTION - ADULT  Goal: Absence of fever/infection during anticipated neutropenic period  Description: INTERVENTIONS  - Monitor WBC  - Administer growth factors as ordered  - Implement neutropenic guidelines  Outcome: Progressing

## 2022-07-03 NOTE — CONSULTS
120 [facility] Dosing Service  Warfarin (Coumadin) Subsequent Dosing    Mark Casillas is a 69year old patient for whom pharmacy is dosing warfarin (Coumadin). Goal INR is 2.5-3.5    Recent Labs   Lab 06/30/22  1639 07/01/22  0633 07/02/22  0547 07/03/22  0551   INR 2.06* 2.49* 3.02* 3.70*       Consulted by:  Dr. Yamile Bailey  Indication:  Aortic valve,  Potential Drug Interactions: Allopurinol  Other Anticoagulants:  none  Home regimen (if applicable): Warfarin 4.5 mg qhs    Inpatient Dosing History:    Date 6/30 7/1 7/2 7/3     INR 2.09 2.49 3.02 3.7     Coumadin dose 4.5 mg 4.5 mg 4 mg               Based on above -  1. For today, Hold  warfarin (COUMADIN) tonight  2   PT/INR ordered daily while on warfarin  3. Pharmacy will continue to follow. We appreciate the opportunity to assist in the care of this patient.     SATYA Jarrett [facility]  7/3/2022  8:28 AM

## 2022-07-03 NOTE — PLAN OF CARE
Patient alert and oriented x4. VSS. Afebrile. No complaints of pain at this time. Medications administered per MAR. Wound dressing c/d/I. Safety precautions continued. Call light within reach. Will continue to monitor. Problem: PAIN - ADULT  Goal: Verbalizes/displays adequate comfort level or patient's stated pain goal  Description: INTERVENTIONS:  - Encourage pt to monitor pain and request assistance  - Assess pain using appropriate pain scale  - Administer analgesics based on type and severity of pain and evaluate response  - Implement non-pharmacological measures as appropriate and evaluate response  - Consider cultural and social influences on pain and pain management  - Manage/alleviate anxiety  - Utilize distraction and/or relaxation techniques  - Monitor for opioid side effects  - Notify MD/LIP if interventions unsuccessful or patient reports new pain  - Anticipate increased pain with activity and pre-medicate as appropriate  Outcome: Progressing     Problem: SAFETY ADULT - FALL  Goal: Free from fall injury  Description: INTERVENTIONS:  - Assess pt frequently for physical needs  - Identify cognitive and physical deficits and behaviors that affect risk of falls.   - [place] fall precautions as indicated by assessment.  - Educate pt/family on patient safety including physical limitations  - Instruct pt to call for assistance with activity based on assessment  - Modify environment to reduce risk of injury  - Provide assistive devices as appropriate  - Consider OT/PT consult to assist with strengthening/mobility  - Encourage toileting schedule  Outcome: Progressing

## 2022-07-04 LAB
ANION GAP SERPL CALC-SCNC: 5 MMOL/L (ref 0–18)
BASOPHILS # BLD AUTO: 0.06 X10(3) UL (ref 0–0.2)
BASOPHILS NFR BLD AUTO: 0.5 %
BUN BLD-MCNC: 15 MG/DL (ref 7–18)
CALCIUM BLD-MCNC: 9.3 MG/DL (ref 8.5–10.1)
CHLORIDE SERPL-SCNC: 101 MMOL/L (ref 98–112)
CO2 SERPL-SCNC: 32 MMOL/L (ref 21–32)
CREAT BLD-MCNC: 0.94 MG/DL
EOSINOPHIL # BLD AUTO: 0.1 X10(3) UL (ref 0–0.7)
EOSINOPHIL NFR BLD AUTO: 0.8 %
ERYTHROCYTE [DISTWIDTH] IN BLOOD BY AUTOMATED COUNT: 18.1 %
GLUCOSE BLD-MCNC: 136 MG/DL (ref 70–99)
HCT VFR BLD AUTO: 29.6 %
HGB BLD-MCNC: 9.1 G/DL
IMM GRANULOCYTES # BLD AUTO: 0.23 X10(3) UL (ref 0–1)
IMM GRANULOCYTES NFR BLD: 1.9 %
INR BLD: 3.86 (ref 0.8–1.2)
LYMPHOCYTES # BLD AUTO: 1.26 X10(3) UL (ref 1–4)
LYMPHOCYTES NFR BLD AUTO: 10.6 %
MCH RBC QN AUTO: 27 PG (ref 26–34)
MCHC RBC AUTO-ENTMCNC: 30.7 G/DL (ref 31–37)
MCV RBC AUTO: 87.8 FL
MONOCYTES # BLD AUTO: 0.98 X10(3) UL (ref 0.1–1)
MONOCYTES NFR BLD AUTO: 8.2 %
NEUTROPHILS # BLD AUTO: 9.29 X10 (3) UL (ref 1.5–7.7)
NEUTROPHILS # BLD AUTO: 9.29 X10(3) UL (ref 1.5–7.7)
NEUTROPHILS NFR BLD AUTO: 78 %
OSMOLALITY SERPL CALC.SUM OF ELEC: 289 MOSM/KG (ref 275–295)
PLATELET # BLD AUTO: 506 10(3)UL (ref 150–450)
POTASSIUM SERPL-SCNC: 3.5 MMOL/L (ref 3.5–5.1)
POTASSIUM SERPL-SCNC: 4 MMOL/L (ref 3.5–5.1)
PROTHROMBIN TIME: 38.3 SECONDS (ref 11.6–14.8)
RBC # BLD AUTO: 3.37 X10(6)UL
SODIUM SERPL-SCNC: 138 MMOL/L (ref 136–145)
WBC # BLD AUTO: 11.9 X10(3) UL (ref 4–11)

## 2022-07-04 PROCEDURE — 85610 PROTHROMBIN TIME: CPT | Performed by: HOSPITALIST

## 2022-07-04 PROCEDURE — 84132 ASSAY OF SERUM POTASSIUM: CPT | Performed by: HOSPITALIST

## 2022-07-04 PROCEDURE — 85025 COMPLETE CBC W/AUTO DIFF WBC: CPT | Performed by: HOSPITALIST

## 2022-07-04 PROCEDURE — 80048 BASIC METABOLIC PNL TOTAL CA: CPT | Performed by: HOSPITALIST

## 2022-07-04 RX ORDER — DILTIAZEM HYDROCHLORIDE 180 MG/1
180 CAPSULE, EXTENDED RELEASE ORAL 2 TIMES DAILY
Status: DISCONTINUED | OUTPATIENT
Start: 2022-07-05 | End: 2022-07-05

## 2022-07-04 RX ORDER — DILTIAZEM HYDROCHLORIDE 120 MG/1
240 CAPSULE, EXTENDED RELEASE ORAL DAILY
Status: COMPLETED | OUTPATIENT
Start: 2022-07-04 | End: 2022-07-04

## 2022-07-04 RX ORDER — DIGOXIN 125 MCG
250 TABLET ORAL DAILY
Status: COMPLETED | OUTPATIENT
Start: 2022-07-04 | End: 2022-07-04

## 2022-07-04 RX ORDER — METOPROLOL SUCCINATE 50 MG/1
50 TABLET, EXTENDED RELEASE ORAL
Status: COMPLETED | OUTPATIENT
Start: 2022-07-04 | End: 2022-07-04

## 2022-07-04 RX ORDER — POTASSIUM CHLORIDE 1.5 G/1.77G
40 POWDER, FOR SOLUTION ORAL EVERY 4 HOURS
Status: DISCONTINUED | OUTPATIENT
Start: 2022-07-04 | End: 2022-07-04

## 2022-07-04 NOTE — PLAN OF CARE
Patient alert and oriented x4. VSS. Afebrile. PRN tylenol administered for pain per MAR. Wound dressing c/d/I. Medications administered per MAR. Safety precautions continued. Call light within reach. Will continue to monitor. Problem: PAIN - ADULT  Goal: Verbalizes/displays adequate comfort level or patient's stated pain goal  Description: INTERVENTIONS:  - Encourage pt to monitor pain and request assistance  - Assess pain using appropriate pain scale  - Administer analgesics based on type and severity of pain and evaluate response  - Implement non-pharmacological measures as appropriate and evaluate response  - Consider cultural and social influences on pain and pain management  - Manage/alleviate anxiety  - Utilize distraction and/or relaxation techniques  - Monitor for opioid side effects  - Notify MD/LIP if interventions unsuccessful or patient reports new pain  - Anticipate increased pain with activity and pre-medicate as appropriate  Outcome: Progressing     Problem: SAFETY ADULT - FALL  Goal: Free from fall injury  Description: INTERVENTIONS:  - Assess pt frequently for physical needs  - Identify cognitive and physical deficits and behaviors that affect risk of falls.   - [place] fall precautions as indicated by assessment.  - Educate pt/family on patient safety including physical limitations  - Instruct pt to call for assistance with activity based on assessment  - Modify environment to reduce risk of injury  - Provide assistive devices as appropriate  - Consider OT/PT consult to assist with strengthening/mobility  - Encourage toileting schedule  Outcome: Progressing     Problem: RISK FOR INFECTION - ADULT  Goal: Absence of fever/infection during anticipated neutropenic period  Description: INTERVENTIONS  - Monitor WBC  - Administer growth factors as ordered  - Implement neutropenic guidelines  Outcome: Progressing

## 2022-07-04 NOTE — PROGRESS NOTES
Pt A&Ox4, reports pain in RLE. HR afib 100-130, cards aware. All scheduled meds administered per [facility] Fall precautions maintained. Pt repositioned for comfort. Encouraged to ambulate and to avoid PW, pt verbalized understanding. Hourly rounded on, WCTM      @1500, cards MD notified of afib RVR. Telephone order given of diltiazem and digoxin. Administered to pt with no effects, maintaining HR of 110-140 in afib. MD notified, telephone ordered metoprolol. Pt HR maintained 120-130s afib. MD notified, instructed to start titratable Cardizem gtt. Pt educated on POC, [address].

## 2022-07-04 NOTE — CM/SW NOTE
PT is recommending NAIF at discharge. Per chart review, pt resides at home alone in [address] [place] [address] Dr. KABA sent NAIF referrals within the immediate area. Unable to find rehabs around the [facility] at this time. PASRR completed, however, queued for review. Will need to upload assessment in Aidin once available. Unit SENDY/CLIFFORD to follow pt for ongoing dc planning & provide choice list once available.  &  to remain available and supportive for discharge planning needs.     ERROL Malin, [facility]  Discharge [address] [facility].

## 2022-07-05 LAB
ANION GAP SERPL CALC-SCNC: 3 MMOL/L (ref 0–18)
BASOPHILS # BLD AUTO: 0.1 X10(3) UL (ref 0–0.2)
BASOPHILS NFR BLD AUTO: 0.8 %
BUN BLD-MCNC: 20 MG/DL (ref 7–18)
CALCIUM BLD-MCNC: 8.9 MG/DL (ref 8.5–10.1)
CHLORIDE SERPL-SCNC: 103 MMOL/L (ref 98–112)
CO2 SERPL-SCNC: 30 MMOL/L (ref 21–32)
CREAT BLD-MCNC: 0.68 MG/DL
EOSINOPHIL # BLD AUTO: 0.12 X10(3) UL (ref 0–0.7)
EOSINOPHIL NFR BLD AUTO: 1 %
ERYTHROCYTE [DISTWIDTH] IN BLOOD BY AUTOMATED COUNT: 18.1 %
GLUCOSE BLD-MCNC: 132 MG/DL (ref 70–99)
HCT VFR BLD AUTO: 28.9 %
HGB BLD-MCNC: 8.9 G/DL
IMM GRANULOCYTES # BLD AUTO: 0.25 X10(3) UL (ref 0–1)
IMM GRANULOCYTES NFR BLD: 2.1 %
INR BLD: 3.45 (ref 0.8–1.2)
LYMPHOCYTES # BLD AUTO: 1.46 X10(3) UL (ref 1–4)
LYMPHOCYTES NFR BLD AUTO: 12.4 %
MCH RBC QN AUTO: 26.9 PG (ref 26–34)
MCHC RBC AUTO-ENTMCNC: 30.8 G/DL (ref 31–37)
MCV RBC AUTO: 87.3 FL
MONOCYTES # BLD AUTO: 1.15 X10(3) UL (ref 0.1–1)
MONOCYTES NFR BLD AUTO: 9.7 %
NEUTROPHILS # BLD AUTO: 8.73 X10 (3) UL (ref 1.5–7.7)
NEUTROPHILS # BLD AUTO: 8.73 X10(3) UL (ref 1.5–7.7)
NEUTROPHILS NFR BLD AUTO: 74 %
OSMOLALITY SERPL CALC.SUM OF ELEC: 286 MOSM/KG (ref 275–295)
PLATELET # BLD AUTO: 528 10(3)UL (ref 150–450)
POTASSIUM SERPL-SCNC: 3.8 MMOL/L (ref 3.5–5.1)
PROTHROMBIN TIME: 35 SECONDS (ref 11.6–14.8)
RBC # BLD AUTO: 3.31 X10(6)UL
SODIUM SERPL-SCNC: 136 MMOL/L (ref 136–145)
WBC # BLD AUTO: 11.8 X10(3) UL (ref 4–11)

## 2022-07-05 PROCEDURE — 85610 PROTHROMBIN TIME: CPT | Performed by: HOSPITALIST

## 2022-07-05 PROCEDURE — 97530 THERAPEUTIC ACTIVITIES: CPT

## 2022-07-05 PROCEDURE — 85025 COMPLETE CBC W/AUTO DIFF WBC: CPT | Performed by: HOSPITALIST

## 2022-07-05 PROCEDURE — 97116 GAIT TRAINING THERAPY: CPT

## 2022-07-05 PROCEDURE — 80048 BASIC METABOLIC PNL TOTAL CA: CPT | Performed by: HOSPITALIST

## 2022-07-05 RX ORDER — WARFARIN SODIUM 1 MG/1
1 TABLET ORAL
Status: COMPLETED | OUTPATIENT
Start: 2022-07-05 | End: 2022-07-05

## 2022-07-05 RX ORDER — POTASSIUM CHLORIDE 20 MEQ/1
40 TABLET, EXTENDED RELEASE ORAL ONCE
Status: COMPLETED | OUTPATIENT
Start: 2022-07-05 | End: 2022-07-05

## 2022-07-05 RX ORDER — DILTIAZEM HYDROCHLORIDE 240 MG/1
240 CAPSULE, COATED, EXTENDED RELEASE ORAL 2 TIMES DAILY
Status: DISCONTINUED | OUTPATIENT
Start: 2022-07-05 | End: 2022-07-06

## 2022-07-05 NOTE — CONSULTS
120 [facility] Dosing Service  Warfarin (Coumadin) Subsequent Dosing    Alyse May is a 69year old patient for whom pharmacy is dosing warfarin (Coumadin). Goal INR is 2.5-3.5    Recent Labs   Lab 07/01/22  0633 07/02/22  0547 07/03/22  0551 07/04/22  0819 07/05/22  0544   INR 2.49* 3.02* 3.70* 3.86* 3.45*       Consulted by:  Dr Shay Gannon  Indication:  Aortic valve replacement  Potential Drug Interactions:  Allopurinol, Levothyroxine, Plavix  Other Anticoagulants:  None  Home regimen (if applicable): Warfarin 4.5 mg QHS    Inpatient Dosing History:     Date 6/30 7/1 7/2 7/3 7/4  7.5    INR 2.09 2.49 3.02 3.7 3.86  3.45    Coumadin dose 4.5 mg 4.5 mg 4 mg held   Held                    Based on above -  1. For today, Give warfarin (COUMADIN) 1 mg at 2100 tonight  2   PT/INR ordered daily while on warfarin  3. Pharmacy will continue to follow. We appreciate the opportunity to assist in the care of this patient.     Akiko Williamson, PharmD  7/5/2022  3:49 PM

## 2022-07-05 NOTE — CM/SW NOTE
Care Progression Note:  Length of stay: 5  GMLOS: 4.6  Avoidable Delays:   Code Status: full    Acute Medical Issue/Factors: right thigh cellulitis--wbc down, on IV cefazolin. A-fib, on iv cardiazem infusion, on coumadin--inr 3.45  Cellulitis of right lower extremity       Discharge Barriers:   Expected discharge date: ?7-6-22   Expected next site of care: [facility]    / available for discharge planning.

## 2022-07-05 NOTE — PLAN OF CARE
Assumed care of patient approx 2000. Patient alert and oriented x4. Afib on telemetry with -140s prior to cardizem gtt. Cardizem gtt initiated and HR approx 90s-120. On Coumadin, INR 3.86. Reports chronic pain, declines pain medication. Eduard Chalet in place for incontinence. IV Ancef given for cellulitis RLE, redness noted to upper leg. Patient updated on plan of care. Call light within reach, bed alarm in place. Problem: PAIN - ADULT  Goal: Verbalizes/displays adequate comfort level or patient's stated pain goal  Description: INTERVENTIONS:  - Encourage pt to monitor pain and request assistance  - Assess pain using appropriate pain scale  - Administer analgesics based on type and severity of pain and evaluate response  - Implement non-pharmacological measures as appropriate and evaluate response  - Consider cultural and social influences on pain and pain management  - Manage/alleviate anxiety  - Utilize distraction and/or relaxation techniques  - Monitor for opioid side effects  - Notify MD/LIP if interventions unsuccessful or patient reports new pain  - Anticipate increased pain with activity and pre-medicate as appropriate  Outcome: Progressing     Problem: SAFETY ADULT - FALL  Goal: Free from fall injury  Description: INTERVENTIONS:  - Assess pt frequently for physical needs  - Identify cognitive and physical deficits and behaviors that affect risk of falls.   - [place] fall precautions as indicated by assessment.  - Educate pt/family on patient safety including physical limitations  - Instruct pt to call for assistance with activity based on assessment  - Modify environment to reduce risk of injury  - Provide assistive devices as appropriate  - Consider OT/PT consult to assist with strengthening/mobility  - Encourage toileting schedule  Outcome: Progressing     Problem: RISK FOR INFECTION - ADULT  Goal: Absence of fever/infection during anticipated neutropenic period  Description: INTERVENTIONS  - Monitor WBC  - Administer growth factors as ordered  - Implement neutropenic guidelines  Outcome: Progressing     Problem: Patient/Family Goals  Goal: Patient/Family Long Term Goal  Description: Patient's Long Term Goal: return home     Interventions:  - PT eval  - IV abx  - cardiac consult  - See additional Care Plan goals for specific interventions  Outcome: Progressing  Goal: Patient/Family Short Term Goal  Description: Patient's Short Term Goal: heart rate control    Interventions:   - IV cardizem  - transfer to CTU  - cardiac consult  - See additional Care Plan goals for specific interventions  Outcome: Progressing

## 2022-07-05 NOTE — OCCUPATIONAL THERAPY NOTE
Att'd to see pt this AM. Pt politely declined at this time as she was eating breakfast. OT will re-attempt at later time. OT re-attempted in PM. Pt had just finished working with PT and reported feeling too fatigued at this time and politely declined OT this day.

## 2022-07-05 NOTE — CM/SW NOTE
Patient is a 68 y/o female who admitted with cellulitis of right lower extremity. PT recommending NAIF. Met with patient, who is alert and oriented, to discuss discharge planning. Patient is agreeable to NAIF. Provided accepting NAIF list. Patient was at [facility] previously and chose them. [place] [id number] in [facility]. Await patient to be medically cleared for discharge. SW will continue to follow.      GEN New  Discharge Planner  [phone]

## 2022-07-05 NOTE — PLAN OF CARE
Pt declines complaints of pain, malaise, or cardiac symptoms. A&Ox4, lungs diminished with equal expansion, on rm air. Pt in Afib and on Cardizem gtt @ 5mg/hr. Abdomen soft and non-tender with active bowel sounds in all 4 quadrants, incontinent of B&B. Right lower extremity warm and red. Patient  updated with plan of care. Problem: PAIN - ADULT  Goal: Verbalizes/displays adequate comfort level or patient's stated pain goal  Description: INTERVENTIONS:  - Encourage pt to monitor pain and request assistance  - Assess pain using appropriate pain scale  - Administer analgesics based on type and severity of pain and evaluate response  - Implement non-pharmacological measures as appropriate and evaluate response  - Consider cultural and social influences on pain and pain management  - Manage/alleviate anxiety  - Utilize distraction and/or relaxation techniques  - Monitor for opioid side effects  - Notify MD/LIP if interventions unsuccessful or patient reports new pain  - Anticipate increased pain with activity and pre-medicate as appropriate  7/5/2022 0856 by Shahid Angeles  Outcome: Progressing  7/5/2022 0856 by Shahid Angeles  Outcome: Progressing     Problem: SAFETY ADULT - FALL  Goal: Free from fall injury  Description: INTERVENTIONS:  - Assess pt frequently for physical needs  - Identify cognitive and physical deficits and behaviors that affect risk of falls.   - [place] fall precautions as indicated by assessment.  - Educate pt/family on patient safety including physical limitations  - Instruct pt to call for assistance with activity based on assessment  - Modify environment to reduce risk of injury  - Provide assistive devices as appropriate  - Consider OT/PT consult to assist with strengthening/mobility  - Encourage toileting schedule  7/5/2022 0856 by Shahid Angeles  Outcome: Progressing  7/5/2022 0856 by Shahid Angeles  Outcome: Progressing     Problem: RISK FOR INFECTION - ADULT  Goal: Absence of fever/infection during anticipated neutropenic period  Description: INTERVENTIONS  - Monitor WBC  - Administer growth factors as ordered  - Implement neutropenic guidelines  7/5/2022 0856 by Kiki Fernandez  Outcome: Progressing  7/5/2022 0856 by Kiki Fernandez  Outcome: Progressing     Problem: Patient/Family Goals  Goal: Patient/Family Long Term Goal  Description: Patient's Long Term Goal: \"go home\"    Interventions:  - PT eval  - IV abx  -Frequent ambulation  - See additional Care Plan goals for specific interventions  7/5/2022 0856 by Kiki Fernandez  Outcome: Progressing  7/5/2022 0856 by Kiki Fernandez  Outcome: Progressing  Goal: Patient/Family Short Term Goal  Description: Patient's Short Term Goal: \"control heart rate\"    Interventions:   - IV cardizem  -cards to see  - See additional Care Plan goals for specific interventions  7/5/2022 0856 by Kiki Fernandez  Outcome: Progressing  7/5/2022 0856 by Kiki Fernandez  Outcome: Progressing

## 2022-07-06 VITALS
HEART RATE: 81 BPM | SYSTOLIC BLOOD PRESSURE: 112 MMHG | OXYGEN SATURATION: 79 % | TEMPERATURE: 98 F | RESPIRATION RATE: 18 BRPM | DIASTOLIC BLOOD PRESSURE: 48 MMHG | BODY MASS INDEX: 41 KG/M2 | WEIGHT: 278.19 LBS

## 2022-07-06 LAB
ANION GAP SERPL CALC-SCNC: 5 MMOL/L (ref 0–18)
BASOPHILS # BLD AUTO: 0.04 X10(3) UL (ref 0–0.2)
BASOPHILS NFR BLD AUTO: 0.4 %
BUN BLD-MCNC: 20 MG/DL (ref 7–18)
CALCIUM BLD-MCNC: 9.1 MG/DL (ref 8.5–10.1)
CHLORIDE SERPL-SCNC: 104 MMOL/L (ref 98–112)
CO2 SERPL-SCNC: 29 MMOL/L (ref 21–32)
CREAT BLD-MCNC: 0.78 MG/DL
DIGOXIN SERPL-MCNC: 1.01 NG/ML (ref 0.8–2)
EOSINOPHIL # BLD AUTO: 0.1 X10(3) UL (ref 0–0.7)
EOSINOPHIL NFR BLD AUTO: 1 %
ERYTHROCYTE [DISTWIDTH] IN BLOOD BY AUTOMATED COUNT: 18.4 %
GLUCOSE BLD-MCNC: 122 MG/DL (ref 70–99)
HCT VFR BLD AUTO: 28.3 %
HGB BLD-MCNC: 8.6 G/DL
IMM GRANULOCYTES # BLD AUTO: 0.22 X10(3) UL (ref 0–1)
IMM GRANULOCYTES NFR BLD: 2.2 %
INR BLD: 2.65 (ref 0.8–1.2)
LYMPHOCYTES # BLD AUTO: 1.32 X10(3) UL (ref 1–4)
LYMPHOCYTES NFR BLD AUTO: 13.1 %
MCH RBC QN AUTO: 27 PG (ref 26–34)
MCHC RBC AUTO-ENTMCNC: 30.4 G/DL (ref 31–37)
MCV RBC AUTO: 88.7 FL
MONOCYTES # BLD AUTO: 1.02 X10(3) UL (ref 0.1–1)
MONOCYTES NFR BLD AUTO: 10.1 %
NEUTROPHILS # BLD AUTO: 7.35 X10 (3) UL (ref 1.5–7.7)
NEUTROPHILS # BLD AUTO: 7.35 X10(3) UL (ref 1.5–7.7)
NEUTROPHILS NFR BLD AUTO: 73.2 %
OSMOLALITY SERPL CALC.SUM OF ELEC: 290 MOSM/KG (ref 275–295)
PLATELET # BLD AUTO: 553 10(3)UL (ref 150–450)
POTASSIUM SERPL-SCNC: 3.9 MMOL/L (ref 3.5–5.1)
POTASSIUM SERPL-SCNC: 3.9 MMOL/L (ref 3.5–5.1)
PROTHROMBIN TIME: 28.4 SECONDS (ref 11.6–14.8)
RBC # BLD AUTO: 3.19 X10(6)UL
SODIUM SERPL-SCNC: 138 MMOL/L (ref 136–145)
WBC # BLD AUTO: 10.1 X10(3) UL (ref 4–11)

## 2022-07-06 PROCEDURE — 80048 BASIC METABOLIC PNL TOTAL CA: CPT | Performed by: HOSPITALIST

## 2022-07-06 PROCEDURE — 80162 ASSAY OF DIGOXIN TOTAL: CPT | Performed by: INTERNAL MEDICINE

## 2022-07-06 PROCEDURE — 85025 COMPLETE CBC W/AUTO DIFF WBC: CPT | Performed by: HOSPITALIST

## 2022-07-06 PROCEDURE — 85610 PROTHROMBIN TIME: CPT | Performed by: HOSPITALIST

## 2022-07-06 PROCEDURE — 84132 ASSAY OF SERUM POTASSIUM: CPT | Performed by: HOSPITALIST

## 2022-07-06 RX ORDER — DILTIAZEM HYDROCHLORIDE 240 MG/1
240 CAPSULE, COATED, EXTENDED RELEASE ORAL 2 TIMES DAILY
Qty: 30 CAPSULE | Refills: 0 | Status: SHIPPED | OUTPATIENT
Start: 2022-07-06

## 2022-07-06 RX ORDER — CEPHALEXIN 500 MG/1
500 CAPSULE ORAL 4 TIMES DAILY
Qty: 56 CAPSULE | Refills: 0 | Status: SHIPPED | COMMUNITY
Start: 2022-07-06 | End: 2022-07-06

## 2022-07-06 RX ORDER — SPIRONOLACTONE 25 MG/1
25 TABLET ORAL DAILY
Qty: 30 TABLET | Refills: 0 | Status: SHIPPED | OUTPATIENT
Start: 2022-07-07

## 2022-07-06 RX ORDER — SPIRONOLACTONE 25 MG/1
25 TABLET ORAL DAILY
Qty: 30 TABLET | Refills: 0 | Status: SHIPPED | OUTPATIENT
Start: 2022-07-07 | End: 2022-07-06

## 2022-07-06 RX ORDER — DIGOXIN 125 MCG
125 TABLET ORAL DAILY
Qty: 30 TABLET | Refills: 0 | Status: SHIPPED | OUTPATIENT
Start: 2022-07-07 | End: 2022-07-06

## 2022-07-06 RX ORDER — WARFARIN SODIUM 2.5 MG/1
2.5 TABLET ORAL NIGHTLY
Qty: 5 TABLET | Refills: 0 | Status: SHIPPED | OUTPATIENT
Start: 2022-07-06

## 2022-07-06 RX ORDER — DIGOXIN 125 MCG
125 TABLET ORAL DAILY
Qty: 30 TABLET | Refills: 0 | Status: SHIPPED | OUTPATIENT
Start: 2022-07-07

## 2022-07-06 RX ORDER — CEPHALEXIN 500 MG/1
500 CAPSULE ORAL 4 TIMES DAILY
Qty: 56 CAPSULE | Refills: 0 | Status: SHIPPED | COMMUNITY
Start: 2022-07-06 | End: 2022-07-21

## 2022-07-06 RX ORDER — CEPHALEXIN 500 MG/1
500 CAPSULE ORAL EVERY 6 HOURS SCHEDULED
Status: DISCONTINUED | OUTPATIENT
Start: 2022-07-06 | End: 2022-07-06

## 2022-07-06 RX ORDER — FUROSEMIDE 20 MG/1
60 TABLET ORAL DAILY
Qty: 90 TABLET | Refills: 0 | Status: SHIPPED | OUTPATIENT
Start: 2022-07-07

## 2022-07-06 RX ORDER — WARFARIN SODIUM 2.5 MG/1
2.5 TABLET ORAL
Status: DISCONTINUED | OUTPATIENT
Start: 2022-07-06 | End: 2022-07-06

## 2022-07-06 NOTE — PLAN OF CARE
Assumed care of patient at [address]. Pt A/Ox 4. O2 sats maintained on room air. Afib on tele, HR 80-90's. Last BM today. Fozia Cleverly in place for incontinence. Pt reports right leg pain, PRN tylenol given. Pt up x2 and walker. Cardizem drip infusing at 5mL/hr. IV Ancef for cellulitis. Pt updated on plan of care. Care needs met. Bed in lowest position, Call light within reach. Bed alarm on. Problem: PAIN - ADULT  Goal: Verbalizes/displays adequate comfort level or patient's stated pain goal  Description: INTERVENTIONS:  - Encourage pt to monitor pain and request assistance  - Assess pain using appropriate pain scale  - Administer analgesics based on type and severity of pain and evaluate response  - Implement non-pharmacological measures as appropriate and evaluate response  - Consider cultural and social influences on pain and pain management  - Manage/alleviate anxiety  - Utilize distraction and/or relaxation techniques  - Monitor for opioid side effects  - Notify MD/LIP if interventions unsuccessful or patient reports new pain  - Anticipate increased pain with activity and pre-medicate as appropriate  Outcome: Progressing     Problem: SAFETY ADULT - FALL  Goal: Free from fall injury  Description: INTERVENTIONS:  - Assess pt frequently for physical needs  - Identify cognitive and physical deficits and behaviors that affect risk of falls.   - [place] fall precautions as indicated by assessment.  - Educate pt/family on patient safety including physical limitations  - Instruct pt to call for assistance with activity based on assessment  - Modify environment to reduce risk of injury  - Provide assistive devices as appropriate  - Consider OT/PT consult to assist with strengthening/mobility  - Encourage toileting schedule  Outcome: Progressing     Problem: RISK FOR INFECTION - ADULT  Goal: Absence of fever/infection during anticipated neutropenic period  Description: INTERVENTIONS  - Monitor WBC  - Administer growth factors as ordered  - Implement neutropenic guidelines  Outcome: Progressing     Problem: Patient/Family Goals  Goal: Patient/Family Long Term Goal  Description: Patient's Long Term Goal: \"go home\"    Interventions:  - PT eval  - IV abx  -Frequent ambulation  - See additional Care Plan goals for specific interventions  Outcome: Progressing  Goal: Patient/Family Short Term Goal  Description: Patient's Short Term Goal: \"control heart rate\"    Interventions:   - IV and PO cardizem  - See additional Care Plan goals for specific interventions  Outcome: Progressing

## 2022-07-06 NOTE — PLAN OF CARE
Pt declines complaints of pain, malaise, or cardiac symptoms. A&Ox4, lungs diminished with equal expansion, on rm air. Pt in Afib on monitor. Cardizem gtt turned off. Abdomen soft and non-tender with active bowel sounds in all 4 quadrants, incontinent of B&B. Right lower extremity warm and red. Left IV red and tender to touch. IV removed and cold pack placed. Possible dc to [facility] today. Patient updated with plan of care. Problem: PAIN - ADULT  Goal: Verbalizes/displays adequate comfort level or patient's stated pain goal  Description: INTERVENTIONS:  - Encourage pt to monitor pain and request assistance  - Assess pain using appropriate pain scale  - Administer analgesics based on type and severity of pain and evaluate response  - Implement non-pharmacological measures as appropriate and evaluate response  - Consider cultural and social influences on pain and pain management  - Manage/alleviate anxiety  - Utilize distraction and/or relaxation techniques  - Monitor for opioid side effects  - Notify MD/LIP if interventions unsuccessful or patient reports new pain  - Anticipate increased pain with activity and pre-medicate as appropriate  Outcome: Progressing     Problem: SAFETY ADULT - FALL  Goal: Free from fall injury  Description: INTERVENTIONS:  - Assess pt frequently for physical needs  - Identify cognitive and physical deficits and behaviors that affect risk of falls.   - [place] fall precautions as indicated by assessment.  - Educate pt/family on patient safety including physical limitations  - Instruct pt to call for assistance with activity based on assessment  - Modify environment to reduce risk of injury  - Provide assistive devices as appropriate  - Consider OT/PT consult to assist with strengthening/mobility  - Encourage toileting schedule  Outcome: Progressing     Problem: RISK FOR INFECTION - ADULT  Goal: Absence of fever/infection during anticipated neutropenic period  Description: INTERVENTIONS  - Monitor WBC  - Administer growth factors as ordered  - Implement neutropenic guidelines  Outcome: Progressing     Problem: Patient/Family Goals  Goal: Patient/Family Long Term Goal  Description: Patient's Long Term Goal: \"go home\"    Interventions:  - PT eval  - IV abx  -Frequent ambulation  - See additional Care Plan goals for specific interventions  Outcome: Progressing  Goal: Patient/Family Short Term Goal  Description: Patient's Short Term Goal: \"control heart rate\"    Interventions:   - PO cardizem  -Cards to see  - See additional Care Plan goals for specific interventions  Outcome: Progressing

## 2022-07-06 NOTE — CONSULTS
[facility] Dosing Service  Warfarin (Coumadin) Subsequent Dosing    Louis Gomez is a 69year old patient for whom pharmacy is dosing warfarin (Coumadin). Goal INR is 2.5-3.5    Recent Labs   Lab 07/02/22  0547 07/03/22  0551 07/04/22  0819 07/05/22  0544 07/06/22  0554   INR 3.02* 3.70* 3.86* 3.45* 2.65*       Consulted by: Dr. Massiel Garnica  Indication: aortic valve   Potential Drug Interactions: allopurinol, plavix and synthroid  Other Anticoagulants: none  Home regimen (if applicable): 4.5 mg at bedtime    Inpatient Dosing History:     Date 6/30 7/1 7/2 7/3 7/4  7.5    INR 2.09 2.49 3.02 3.7 3.86  3.45    Coumadin dose 4.5 mg 4.5 mg 4 mg held   Held  1 mg       Inpatient Dosing History:    Date 7/6        INR 2.65        Coumadin dose 2.5 mg                 Based on above -  1. For today, Give warfarin (COUMADIN) 2.5 mg at 2100 tonight  2   PT/INR ordered daily while on warfarin  3. Pharmacy will continue to follow. We appreciate the opportunity to assist in the care of this patient.     Tammi Kumar, PharmD  7/6/2022  7:49 AM

## 2022-07-06 NOTE — CM/SW NOTE
Freya called Oralia at [employer] [facility] [employer] re: dc for today. She requested updates be sent in [facility] which was done. Await word from [place] on ok to transfer today.     Kacie Briscoe, [facility]  /Discharge Planner  [phone]

## 2022-07-06 NOTE — CM/SW NOTE
07/06/22 1016   Discharge disposition   Expected discharge disposition [id number] Provider   ([facility]   Discharge transportation [facility] has been arranged for transport today  (they are sending ambulance at a [facility] rate)  to [facility]. RN to call report to [phone]. Pt updated.     Morgan Culver LCSW  /Discharge Planner  [phone]

## 2022-07-06 NOTE — CM/SW NOTE
Pt discussed in rounds this am- pt may be cleared to dc today to [facility]. Per RN, pt is asking to drive herself to [facility] since her car is here. SW discussed this with PT and with [facility] and driving self is not advised. SW met with pt and she is agreeable to [employer] and understands costs. Her car is  parked here. SW spoke to supervisor of [employer] and no issues with keeping car here for duration of rehab. Await medical clearance for dc to [facility] today.     Olamide Conte LCSW  /Discharge Planner  [phone]

## 2022-07-06 NOTE — PROGRESS NOTES
Pt discharged to [facility]. Tele removed. IV removed earlier in day due to it being red and tender to touch. AVS, F/U instructions, all medications, adverse reactions and side effects of all medications were provided with handouts and discussed with patient and RN Whitney Shea at [facility]. Pt refused to have her medications that were prescribed for refilled because she \"has enough at home\". Only digoxin and spironolactone refilled through meds to beds [facility]. Pt verbalized understanding. Pt wheeled down via [address] Pt left denying of any complaints of pain, malaise, or cardiac symptoms. All needs met by staff.

## 2022-10-05 ENCOUNTER — HOSPITAL ENCOUNTER (INPATIENT)
Facility: HOSPITAL | Age: 67
LOS: 3 days | Discharge: HOME | End: 2022-10-11
Attending: EMERGENCY MEDICINE | Admitting: INTERNAL MEDICINE
Payer: MEDICARE

## 2022-10-05 DIAGNOSIS — E87.70 HYPERVOLEMIA, UNSPECIFIED HYPERVOLEMIA TYPE: Primary | ICD-10-CM

## 2022-10-05 DIAGNOSIS — R06.09 EXERTIONAL DYSPNEA: ICD-10-CM

## 2022-10-05 LAB
ALBUMIN SERPL-MCNC: 3 G/DL (ref 3.4–5)
ALBUMIN/GLOB SERPL: 0.7 {RATIO} (ref 1–2)
ALP LIVER SERPL-CCNC: 124 U/L
ALT SERPL-CCNC: 20 U/L
ANION GAP SERPL CALC-SCNC: 6 MMOL/L (ref 0–18)
AST SERPL-CCNC: 24 U/L (ref 15–37)
BASOPHILS # BLD AUTO: 0.05 X10(3) UL (ref 0–0.2)
BASOPHILS NFR BLD AUTO: 0.6 %
BILIRUB SERPL-MCNC: 0.7 MG/DL (ref 0.1–2)
BILIRUB UR QL STRIP.AUTO: NEGATIVE
BUN BLD-MCNC: 19 MG/DL (ref 7–18)
CALCIUM BLD-MCNC: 9 MG/DL (ref 8.5–10.1)
CHLORIDE SERPL-SCNC: 103 MMOL/L (ref 98–112)
CLARITY UR REFRACT.AUTO: CLEAR
CO2 SERPL-SCNC: 27 MMOL/L (ref 21–32)
COLOR UR AUTO: YELLOW
CREAT BLD-MCNC: 0.95 MG/DL
EOSINOPHIL # BLD AUTO: 0.09 X10(3) UL (ref 0–0.7)
EOSINOPHIL NFR BLD AUTO: 1 %
ERYTHROCYTE [DISTWIDTH] IN BLOOD BY AUTOMATED COUNT: 16.5 %
GFR SERPLBLD BASED ON 1.73 SQ M-ARVRAT: 65 ML/MIN/1.73M2 (ref 60–?)
GLOBULIN PLAS-MCNC: 4.1 G/DL (ref 2.8–4.4)
GLUCOSE BLD-MCNC: 112 MG/DL (ref 70–99)
GLUCOSE UR STRIP.AUTO-MCNC: NEGATIVE MG/DL
HCT VFR BLD AUTO: 32 %
HGB BLD-MCNC: 9.7 G/DL
IMM GRANULOCYTES # BLD AUTO: 0.06 X10(3) UL (ref 0–1)
IMM GRANULOCYTES NFR BLD: 0.7 %
KETONES UR STRIP.AUTO-MCNC: NEGATIVE MG/DL
LYMPHOCYTES # BLD AUTO: 1.75 X10(3) UL (ref 1–4)
LYMPHOCYTES NFR BLD AUTO: 20.1 %
MCH RBC QN AUTO: 26 PG (ref 26–34)
MCHC RBC AUTO-ENTMCNC: 30.3 G/DL (ref 31–37)
MCV RBC AUTO: 85.8 FL
MONOCYTES # BLD AUTO: 1.07 X10(3) UL (ref 0.1–1)
MONOCYTES NFR BLD AUTO: 12.3 %
NEUTROPHILS # BLD AUTO: 5.68 X10 (3) UL (ref 1.5–7.7)
NEUTROPHILS # BLD AUTO: 5.68 X10(3) UL (ref 1.5–7.7)
NEUTROPHILS NFR BLD AUTO: 65.3 %
NITRITE UR QL STRIP.AUTO: NEGATIVE
NT-PROBNP SERPL-MCNC: 1093 PG/ML (ref ?–125)
OSMOLALITY SERPL CALC.SUM OF ELEC: 285 MOSM/KG (ref 275–295)
PH UR STRIP.AUTO: 6 [PH] (ref 5–8)
PLATELET # BLD AUTO: 469 10(3)UL (ref 150–450)
POTASSIUM SERPL-SCNC: 3.6 MMOL/L (ref 3.5–5.1)
PROT SERPL-MCNC: 7.1 G/DL (ref 6.4–8.2)
PROT UR STRIP.AUTO-MCNC: 30 MG/DL
RBC # BLD AUTO: 3.73 X10(6)UL
RBC UR QL AUTO: NEGATIVE
SODIUM SERPL-SCNC: 136 MMOL/L (ref 136–145)
SP GR UR STRIP.AUTO: 1.02 (ref 1–1.03)
UROBILINOGEN UR STRIP.AUTO-MCNC: 4 MG/DL
WBC # BLD AUTO: 8.7 X10(3) UL (ref 4–11)

## 2022-10-05 PROCEDURE — 85025 COMPLETE CBC W/AUTO DIFF WBC: CPT

## 2022-10-05 PROCEDURE — 80053 COMPREHEN METABOLIC PANEL: CPT

## 2022-10-05 PROCEDURE — 83880 ASSAY OF NATRIURETIC PEPTIDE: CPT

## 2022-10-05 PROCEDURE — 81001 URINALYSIS AUTO W/SCOPE: CPT | Performed by: EMERGENCY MEDICINE

## 2022-10-05 PROCEDURE — 99285 EMERGENCY DEPT VISIT HI MDM: CPT

## 2022-10-05 PROCEDURE — 87086 URINE CULTURE/COLONY COUNT: CPT | Performed by: EMERGENCY MEDICINE

## 2022-10-05 PROCEDURE — 96374 THER/PROPH/DIAG INJ IV PUSH: CPT

## 2022-10-05 PROCEDURE — 83880 ASSAY OF NATRIURETIC PEPTIDE: CPT | Performed by: EMERGENCY MEDICINE

## 2022-10-05 PROCEDURE — 80053 COMPREHEN METABOLIC PANEL: CPT | Performed by: EMERGENCY MEDICINE

## 2022-10-05 PROCEDURE — 85610 PROTHROMBIN TIME: CPT | Performed by: EMERGENCY MEDICINE

## 2022-10-05 PROCEDURE — 93010 ELECTROCARDIOGRAM REPORT: CPT

## 2022-10-05 PROCEDURE — 85025 COMPLETE CBC W/AUTO DIFF WBC: CPT | Performed by: EMERGENCY MEDICINE

## 2022-10-06 ENCOUNTER — APPOINTMENT (OUTPATIENT)
Dept: GENERAL RADIOLOGY | Facility: HOSPITAL | Age: 67
End: 2022-10-06
Attending: EMERGENCY MEDICINE
Payer: MEDICARE

## 2022-10-06 ENCOUNTER — APPOINTMENT (OUTPATIENT)
Dept: CV DIAGNOSTICS | Facility: HOSPITAL | Age: 67
End: 2022-10-06
Attending: INTERNAL MEDICINE
Payer: MEDICARE

## 2022-10-06 PROBLEM — E87.70 HYPERVOLEMIA: Status: ACTIVE | Noted: 2022-10-06

## 2022-10-06 PROBLEM — R06.09 EXERTIONAL DYSPNEA: Status: ACTIVE | Noted: 2022-10-06

## 2022-10-06 PROBLEM — E87.70 HYPERVOLEMIA, UNSPECIFIED HYPERVOLEMIA TYPE: Status: ACTIVE | Noted: 2022-10-06

## 2022-10-06 LAB
ANION GAP SERPL CALC-SCNC: 5 MMOL/L (ref 0–18)
ATRIAL RATE: 108 BPM
BASOPHILS # BLD AUTO: 0.04 X10(3) UL (ref 0–0.2)
BASOPHILS NFR BLD AUTO: 0.5 %
BUN BLD-MCNC: 14 MG/DL (ref 7–18)
CALCIUM BLD-MCNC: 8.5 MG/DL (ref 8.5–10.1)
CHLORIDE SERPL-SCNC: 107 MMOL/L (ref 98–112)
CO2 SERPL-SCNC: 27 MMOL/L (ref 21–32)
CREAT BLD-MCNC: 0.7 MG/DL
DIGOXIN SERPL-MCNC: 0.92 NG/ML (ref 0.8–2)
EOSINOPHIL # BLD AUTO: 0.07 X10(3) UL (ref 0–0.7)
EOSINOPHIL NFR BLD AUTO: 0.8 %
ERYTHROCYTE [DISTWIDTH] IN BLOOD BY AUTOMATED COUNT: 16.7 %
GFR SERPLBLD BASED ON 1.73 SQ M-ARVRAT: 94 ML/MIN/1.73M2 (ref 60–?)
GLUCOSE BLD-MCNC: 94 MG/DL (ref 70–99)
HCT VFR BLD AUTO: 28.3 %
HGB BLD-MCNC: 8.9 G/DL
IMM GRANULOCYTES # BLD AUTO: 0.05 X10(3) UL (ref 0–1)
IMM GRANULOCYTES NFR BLD: 0.6 %
INR BLD: 3.33 (ref 0.85–1.16)
INR BLD: 3.96 (ref 0.85–1.16)
LYMPHOCYTES # BLD AUTO: 1.49 X10(3) UL (ref 1–4)
LYMPHOCYTES NFR BLD AUTO: 17.8 %
MCH RBC QN AUTO: 27.1 PG (ref 26–34)
MCHC RBC AUTO-ENTMCNC: 31.4 G/DL (ref 31–37)
MCV RBC AUTO: 86 FL
MONOCYTES # BLD AUTO: 1.05 X10(3) UL (ref 0.1–1)
MONOCYTES NFR BLD AUTO: 12.5 %
NEUTROPHILS # BLD AUTO: 5.68 X10 (3) UL (ref 1.5–7.7)
NEUTROPHILS # BLD AUTO: 5.68 X10(3) UL (ref 1.5–7.7)
NEUTROPHILS NFR BLD AUTO: 67.8 %
OSMOLALITY SERPL CALC.SUM OF ELEC: 288 MOSM/KG (ref 275–295)
PLATELET # BLD AUTO: 436 10(3)UL (ref 150–450)
POTASSIUM SERPL-SCNC: 3.2 MMOL/L (ref 3.5–5.1)
POTASSIUM SERPL-SCNC: 3.9 MMOL/L (ref 3.5–5.1)
PROTHROMBIN TIME: 33.3 SECONDS (ref 11.6–14.8)
PROTHROMBIN TIME: 38.1 SECONDS (ref 11.6–14.8)
Q-T INTERVAL: 356 MS
QRS DURATION: 108 MS
QTC CALCULATION (BEZET): 423 MS
R AXIS: 78 DEGREES
RBC # BLD AUTO: 3.29 X10(6)UL
SARS-COV-2 RNA RESP QL NAA+PROBE: NOT DETECTED
SODIUM SERPL-SCNC: 139 MMOL/L (ref 136–145)
T AXIS: 8 DEGREES
TROPONIN I HIGH SENSITIVITY: 33 NG/L
TROPONIN I HIGH SENSITIVITY: 33 NG/L
VENTRICULAR RATE: 85 BPM
WBC # BLD AUTO: 8.4 X10(3) UL (ref 4–11)

## 2022-10-06 PROCEDURE — 84132 ASSAY OF SERUM POTASSIUM: CPT | Performed by: INTERNAL MEDICINE

## 2022-10-06 PROCEDURE — 97161 PT EVAL LOW COMPLEX 20 MIN: CPT

## 2022-10-06 PROCEDURE — 71045 X-RAY EXAM CHEST 1 VIEW: CPT | Performed by: EMERGENCY MEDICINE

## 2022-10-06 PROCEDURE — 84484 ASSAY OF TROPONIN QUANT: CPT | Performed by: HOSPITALIST

## 2022-10-06 PROCEDURE — 93306 TTE W/DOPPLER COMPLETE: CPT | Performed by: INTERNAL MEDICINE

## 2022-10-06 PROCEDURE — 93005 ELECTROCARDIOGRAM TRACING: CPT

## 2022-10-06 PROCEDURE — 84484 ASSAY OF TROPONIN QUANT: CPT | Performed by: EMERGENCY MEDICINE

## 2022-10-06 PROCEDURE — 97530 THERAPEUTIC ACTIVITIES: CPT

## 2022-10-06 PROCEDURE — 80162 ASSAY OF DIGOXIN TOTAL: CPT | Performed by: HOSPITALIST

## 2022-10-06 PROCEDURE — 85025 COMPLETE CBC W/AUTO DIFF WBC: CPT | Performed by: HOSPITALIST

## 2022-10-06 PROCEDURE — 85610 PROTHROMBIN TIME: CPT | Performed by: HOSPITALIST

## 2022-10-06 PROCEDURE — 80048 BASIC METABOLIC PNL TOTAL CA: CPT | Performed by: HOSPITALIST

## 2022-10-06 RX ORDER — LEVOTHYROXINE SODIUM 0.15 MG/1
150 TABLET ORAL
Status: DISCONTINUED | OUTPATIENT
Start: 2022-10-06 | End: 2022-10-11

## 2022-10-06 RX ORDER — ONDANSETRON 2 MG/ML
4 INJECTION INTRAMUSCULAR; INTRAVENOUS EVERY 6 HOURS PRN
Status: DISCONTINUED | OUTPATIENT
Start: 2022-10-06 | End: 2022-10-11

## 2022-10-06 RX ORDER — DILTIAZEM HYDROCHLORIDE 240 MG/1
240 CAPSULE, COATED, EXTENDED RELEASE ORAL 2 TIMES DAILY
Status: DISCONTINUED | OUTPATIENT
Start: 2022-10-06 | End: 2022-10-11

## 2022-10-06 RX ORDER — FUROSEMIDE 10 MG/ML
40 INJECTION INTRAMUSCULAR; INTRAVENOUS ONCE
Status: COMPLETED | OUTPATIENT
Start: 2022-10-06 | End: 2022-10-06

## 2022-10-06 RX ORDER — ENOXAPARIN SODIUM 100 MG/ML
40 INJECTION SUBCUTANEOUS DAILY
Status: DISCONTINUED | OUTPATIENT
Start: 2022-10-06 | End: 2022-10-07

## 2022-10-06 RX ORDER — FUROSEMIDE 10 MG/ML
40 INJECTION INTRAMUSCULAR; INTRAVENOUS
Status: DISCONTINUED | OUTPATIENT
Start: 2022-10-06 | End: 2022-10-11

## 2022-10-06 RX ORDER — DIGOXIN 125 MCG
125 TABLET ORAL DAILY
Status: DISCONTINUED | OUTPATIENT
Start: 2022-10-06 | End: 2022-10-11

## 2022-10-06 RX ORDER — CYPROHEPTADINE HYDROCHLORIDE 4 MG/1
4 TABLET ORAL NIGHTLY
Status: DISCONTINUED | OUTPATIENT
Start: 2022-10-06 | End: 2022-10-11

## 2022-10-06 RX ORDER — ALBUTEROL SULFATE 90 UG/1
2 AEROSOL, METERED RESPIRATORY (INHALATION) EVERY 6 HOURS PRN
Status: DISCONTINUED | OUTPATIENT
Start: 2022-10-06 | End: 2022-10-11

## 2022-10-06 RX ORDER — DIPHENHYDRAMINE HCL 25 MG
25 CAPSULE ORAL NIGHTLY PRN
Status: DISCONTINUED | OUTPATIENT
Start: 2022-10-06 | End: 2022-10-11

## 2022-10-06 RX ORDER — BUPROPION HYDROCHLORIDE 150 MG/1
150 TABLET, EXTENDED RELEASE ORAL 2 TIMES DAILY
Status: DISCONTINUED | OUTPATIENT
Start: 2022-10-06 | End: 2022-10-11

## 2022-10-06 RX ORDER — CLOPIDOGREL BISULFATE 75 MG/1
75 TABLET ORAL NIGHTLY
Status: DISCONTINUED | OUTPATIENT
Start: 2022-10-06 | End: 2022-10-11

## 2022-10-06 RX ORDER — NORTRIPTYLINE HYDROCHLORIDE 25 MG/1
25 CAPSULE ORAL NIGHTLY
Status: DISCONTINUED | OUTPATIENT
Start: 2022-10-06 | End: 2022-10-11

## 2022-10-06 RX ORDER — ROSUVASTATIN CALCIUM 5 MG/1
5 TABLET, COATED ORAL EVERY OTHER DAY
Status: DISCONTINUED | OUTPATIENT
Start: 2022-10-06 | End: 2022-10-11

## 2022-10-06 RX ORDER — FLUOXETINE HYDROCHLORIDE 20 MG/1
20 CAPSULE ORAL 2 TIMES DAILY
Status: DISCONTINUED | OUTPATIENT
Start: 2022-10-06 | End: 2022-10-11

## 2022-10-06 RX ORDER — SPIRONOLACTONE 25 MG/1
25 TABLET ORAL DAILY
Status: DISCONTINUED | OUTPATIENT
Start: 2022-10-06 | End: 2022-10-11

## 2022-10-06 RX ORDER — ALLOPURINOL 100 MG/1
100 TABLET ORAL DAILY
Status: DISCONTINUED | OUTPATIENT
Start: 2022-10-06 | End: 2022-10-11

## 2022-10-06 RX ORDER — POTASSIUM CHLORIDE 20 MEQ/1
40 TABLET, EXTENDED RELEASE ORAL EVERY 4 HOURS
Status: COMPLETED | OUTPATIENT
Start: 2022-10-06 | End: 2022-10-06

## 2022-10-06 NOTE — ED QUICK NOTES
Orders for admission, patient is aware of plan and ready to go upstairs. Any questions, please call ED RN Trenna Barthel at extension [phone]. Patient Covid vaccination status: Fully vaccinated     COVID Test Ordered in ED: Rapid SARS-CoV-2 by PCR Neg    COVID Suspicion at Admission: Low clinical suspicion for COVID    Running Infusions:  None but Lasix 40mg IVP given and diuresed about 950 shortly after the lasix through the [address]. Mental Status/LOC at time of transport: X4    Other pertinent information: Lower extremities are extremely swollen. resps non labored at rest no resp distres at this time. O2 sat does not really work on her hands she will constantly desat with the probe there r/t poor circulation. I put a sticky one on her ear and she has not desatted since and keeps her sats in the mid to high 90's.   CIWA score: N/A   NIH score:  N/A

## 2022-10-06 NOTE — ED INITIAL ASSESSMENT (HPI)
Pt sts she started having increased swelling in her legs 3 weeks ago and continues to worsen.  Sent here from [facility]

## 2022-10-06 NOTE — PROGRESS NOTES
A&OX4. Reports pain 3 out of 10 but says this is her baseline. A-fib takes coumadin. Trop (-) x 2. INR @ 0556 3.33. Cardiology will be consulted this morning. Use crutches to ambulte they are at bedside. Pt/Ot evale in. Room air in day CPAP at night. Daily weight.  Not complaining of any SOB at the moment

## 2022-10-06 NOTE — PLAN OF CARE
Problem: CARDIOVASCULAR - ADULT  Goal: Maintains optimal cardiac output and hemodynamic stability  Description: INTERVENTIONS:  - Monitor vital signs, rhythm, and trends  - Monitor for bleeding, hypotension and signs of decreased cardiac output  - Evaluate effectiveness of vasoactive medications to optimize hemodynamic stability  - Monitor arterial and/or venous puncture sites for bleeding and/or hematoma  - Assess quality of pulses, skin color and temperature  - Assess for signs of decreased coronary artery perfusion - ex. Angina  - Evaluate fluid balance, assess for edema, trend weights  Outcome: Progressing  Goal: Absence of cardiac arrhythmias or at baseline  Description: INTERVENTIONS:  - Continuous cardiac monitoring, monitor vital signs, obtain 12 lead EKG if indicated  - Evaluate effectiveness of antiarrhythmic and heart rate control medications as ordered  - Initiate emergency measures for life threatening arrhythmias  - Monitor electrolytes and administer replacement therapy as ordered  Outcome: Progressing     Problem: RESPIRATORY - ADULT  Goal: Achieves optimal ventilation and oxygenation  Description: INTERVENTIONS:  - Assess for changes in respiratory status  - Assess for changes in mentation and behavior  - Position to facilitate oxygenation and minimize respiratory effort  - Oxygen supplementation based on oxygen saturation or ABGs  - Provide Smoking Cessation handout, if applicable  - Encourage broncho-pulmonary hygiene including cough, deep breathe, Incentive Spirometry  - Assess the need for suctioning and perform as needed  - Assess and instruct to report SOB or any respiratory difficulty  - Respiratory Therapy support as indicated  - Manage/alleviate anxiety  - Monitor for signs/symptoms of CO2 retention  Outcome: Progressing  Received in bed. Denies any CP or SOB at rest on RA. Fall precautions and fluid restrictions continued. K+3.2, creat 0.70 this am, INR 3.33. Will continue to monitor. Labs and meds as ordered. Up to chair tid for meals. Hold coumadin today as ordered (pharmacy dosing). IV diuretics as ordered; K+ replacement as per protocol, fluid restriction and fall precautions as ordered. 2DEcho today.

## 2022-10-06 NOTE — PROGRESS NOTES
[facility] Dosing Service  Warfarin (Coumadin) Initial Dosing    Nasreen Hollingsworth is a 69year old patient for whom pharmacy has been consulted to dose warfarin (COUMADIN) for hx of PE/atrial fibrillation by Dr. Randa Naranjo. Based on this indication, goal INR is 2-3. Pertinent Patient Medical History: PE, Atrial fibrillation, HTN, Aortic valve disease, CHF  Potential Drug Interactions: Allopurinol, Clopidogrel and Levothyroxine  Latest INR:   Recent Labs     10/06/22  0505   INR 3.33*       Other Anticoagulants: None    Home regimen (if applicable): Warfarin 3 mg on Tuesday, Thursday and 4.5 mg on Sunday, Monday, Wednesday, Friday, Saturday. Date/Time last dose was given (if applicable): 69/7/14    Based on above -    1. For today, Hold warfarin (COUMADIN) dose. 2.  PT/INR ordered daily while on warfarin. 3.  Pharmacy will continue to follow. We appreciate the opportunity to assist in the care of this patient.     Roxy Harman, PharmD  10/6/2022  7:07 AM

## 2022-10-07 LAB
ANION GAP SERPL CALC-SCNC: 7 MMOL/L (ref 0–18)
BASOPHILS # BLD AUTO: 0.03 X10(3) UL (ref 0–0.2)
BASOPHILS NFR BLD AUTO: 0.4 %
BUN BLD-MCNC: 14 MG/DL (ref 7–18)
CALCIUM BLD-MCNC: 8.9 MG/DL (ref 8.5–10.1)
CHLORIDE SERPL-SCNC: 105 MMOL/L (ref 98–112)
CO2 SERPL-SCNC: 27 MMOL/L (ref 21–32)
CREAT BLD-MCNC: 0.75 MG/DL
EOSINOPHIL # BLD AUTO: 0.08 X10(3) UL (ref 0–0.7)
EOSINOPHIL NFR BLD AUTO: 1.2 %
ERYTHROCYTE [DISTWIDTH] IN BLOOD BY AUTOMATED COUNT: 16.6 %
GFR SERPLBLD BASED ON 1.73 SQ M-ARVRAT: 86 ML/MIN/1.73M2 (ref 60–?)
GLUCOSE BLD-MCNC: 104 MG/DL (ref 70–99)
HCT VFR BLD AUTO: 28.8 %
HGB BLD-MCNC: 8.7 G/DL
IMM GRANULOCYTES # BLD AUTO: 0.02 X10(3) UL (ref 0–1)
IMM GRANULOCYTES NFR BLD: 0.3 %
INR BLD: 2.46 (ref 0.85–1.16)
LYMPHOCYTES # BLD AUTO: 1.34 X10(3) UL (ref 1–4)
LYMPHOCYTES NFR BLD AUTO: 19.5 %
MCH RBC QN AUTO: 25.7 PG (ref 26–34)
MCHC RBC AUTO-ENTMCNC: 30.2 G/DL (ref 31–37)
MCV RBC AUTO: 85 FL
MONOCYTES # BLD AUTO: 0.92 X10(3) UL (ref 0.1–1)
MONOCYTES NFR BLD AUTO: 13.4 %
NEUTROPHILS # BLD AUTO: 4.49 X10 (3) UL (ref 1.5–7.7)
NEUTROPHILS # BLD AUTO: 4.49 X10(3) UL (ref 1.5–7.7)
NEUTROPHILS NFR BLD AUTO: 65.2 %
OSMOLALITY SERPL CALC.SUM OF ELEC: 289 MOSM/KG (ref 275–295)
PLATELET # BLD AUTO: 393 10(3)UL (ref 150–450)
POTASSIUM SERPL-SCNC: 3.5 MMOL/L (ref 3.5–5.1)
POTASSIUM SERPL-SCNC: 4.7 MMOL/L (ref 3.5–5.1)
PROTHROMBIN TIME: 26.4 SECONDS (ref 11.6–14.8)
RBC # BLD AUTO: 3.39 X10(6)UL
SODIUM SERPL-SCNC: 139 MMOL/L (ref 136–145)
WBC # BLD AUTO: 6.9 X10(3) UL (ref 4–11)

## 2022-10-07 PROCEDURE — 84132 ASSAY OF SERUM POTASSIUM: CPT | Performed by: INTERNAL MEDICINE

## 2022-10-07 PROCEDURE — 80048 BASIC METABOLIC PNL TOTAL CA: CPT | Performed by: HOSPITALIST

## 2022-10-07 PROCEDURE — 85610 PROTHROMBIN TIME: CPT | Performed by: HOSPITALIST

## 2022-10-07 PROCEDURE — 85025 COMPLETE CBC W/AUTO DIFF WBC: CPT | Performed by: HOSPITALIST

## 2022-10-07 RX ORDER — POTASSIUM CHLORIDE 20 MEQ/1
40 TABLET, EXTENDED RELEASE ORAL EVERY 4 HOURS
Status: COMPLETED | OUTPATIENT
Start: 2022-10-07 | End: 2022-10-07

## 2022-10-07 NOTE — PLAN OF CARE
Assumed care of pt at 0700. Patient alert and oriented x 4. Continuous tele monitoring in place. Afib on the monitor. Rate controlled. Denies pain, dyspnea, and dizziness. . Potassium replaced per protocol. Educated regarding potassium replacement, reasons for low potassium, function or potassium in  the body and treatment. Verbalized understanding. INR 2.46. Lovenox Dcd and coumadin to be given this evening. IV lasix given per order. Safety and comfort maintained. Will continue to monitor closely. Problem: CARDIOVASCULAR - ADULT  Goal: Maintains optimal cardiac output and hemodynamic stability  Description: INTERVENTIONS:  - Monitor vital signs, rhythm, and trends  - Monitor for bleeding, hypotension and signs of decreased cardiac output  - Evaluate effectiveness of vasoactive medications to optimize hemodynamic stability  - Monitor arterial and/or venous puncture sites for bleeding and/or hematoma  - Assess quality of pulses, skin color and temperature  - Assess for signs of decreased coronary artery perfusion - ex.  Angina  - Evaluate fluid balance, assess for edema, trend weights  Outcome: Progressing  Goal: Absence of cardiac arrhythmias or at baseline  Description: INTERVENTIONS:  - Continuous cardiac monitoring, monitor vital signs, obtain 12 lead EKG if indicated  - Evaluate effectiveness of antiarrhythmic and heart rate control medications as ordered  - Initiate emergency measures for life threatening arrhythmias  - Monitor electrolytes and administer replacement therapy as ordered  Outcome: Progressing     Problem: RESPIRATORY - ADULT  Goal: Achieves optimal ventilation and oxygenation  Description: INTERVENTIONS:  - Assess for changes in respiratory status  - Assess for changes in mentation and behavior  - Position to facilitate oxygenation and minimize respiratory effort  - Oxygen supplementation based on oxygen saturation or ABGs  - Provide Smoking Cessation handout, if applicable  - Encourage broncho-pulmonary hygiene including cough, deep breathe, Incentive Spirometry  - Assess the need for suctioning and perform as needed  - Assess and instruct to report SOB or any respiratory difficulty  - Respiratory Therapy support as indicated  - Manage/alleviate anxiety  - Monitor for signs/symptoms of CO2 retention  Outcome: Progressing

## 2022-10-07 NOTE — PROGRESS NOTES
[address] [facility] Dosing Service  Warfarin (Coumadin) Initial Dosing    Freddy Lawton is a 69year old patient for whom pharmacy has been consulted to dose warfarin (COUMADIN) for hx of transcatheter aortic valve replacement by Dr. Katelyn Alvarez. Based on this indication, goal INR is 2.5-3.5 per cardiology. Pertinent Patient Medical History: s/p TAVR, PE, Atrial fibrillation, HTN, CHF  Potential Drug Interactions: Allopurinol, Clopidogrel and Levothyroxine    Latest INR:   Recent Labs     10/06/22  0505   INR 3.33*       Other Anticoagulants: None    Home regimen (if applicable): Warfarin 3 mg on Tuesday, Thursday and 4.5 mg on Sunday, Monday, Wednesday, Friday, Saturday. Date/Time last dose was given (if applicable): 10/4/93    Based on above -    1. For today, Give warfarin (COUMADIN) 3 mg x 1 dose now. 2.  PT/INR ordered daily while on warfarin. 3.  Pharmacy will continue to follow. We appreciate the opportunity to assist in the care of this patient.     Srinivasan Leyva, PharmD  10/6/2022  10:28 PM

## 2022-10-07 NOTE — OCCUPATIONAL THERAPY NOTE
OT order received and pt chart reviewed. Per discussion with Pt and review of PT eval, pt is at her baseline and presents with no skilled IP OT needs at this time. Pt declines politely, any OT services while in hospital at this time and acknowledges that she can notify the RN if new concerns arise. OT will sign off at this time.

## 2022-10-07 NOTE — CONSULTS
120 [facility] Dosing Service  Warfarin (Coumadin) Subsequent Dosing    Katie Caballero is a 69year old patient for whom pharmacy is dosing warfarin (Coumadin). Goal INR is 2.5-3.5    Recent Labs   Lab 10/05/22  2342 10/06/22  0505 10/07/22  0650   INR 3.96* 3.33* 2.46*       Consulted by:  Dr. Ned Smith  Indication:  Transcatheter aortic valve replacement   Potential Drug Interactions:  allopurinol, clopidogrel, levothyroxine  Other Anticoagulants:  lovenox dc'd 10/7 1045   Home regimen (if applicable): Warfarin 3 mg TuTh, warfarin 4.5mg SuMoWeFrSa    Inpatient Dosing History:    Date 10/6 10/7       INR 3.33 2.46       Coumadin dose 3 mg                 Based on above -  1. For today, Give warfarin (COUMADIN) 4.5 mg at 2100 tonight  2   PT/INR ordered daily while on warfarin  3. Pharmacy will continue to follow. We appreciate the opportunity to assist in the care of this patient.     Christen Moody, PharmNIRALI  10/7/2022  10:55 AM

## 2022-10-07 NOTE — PLAN OF CARE
Assumed care of patient at shift change. Patient alert, oriented, follows commands. RA, oxygen saturations stable. No complaints of pain. Purewick in place.

## 2022-10-08 LAB
ANION GAP SERPL CALC-SCNC: 5 MMOL/L (ref 0–18)
BASOPHILS # BLD AUTO: 0.03 X10(3) UL (ref 0–0.2)
BASOPHILS NFR BLD AUTO: 0.3 %
BUN BLD-MCNC: 16 MG/DL (ref 7–18)
CALCIUM BLD-MCNC: 9.2 MG/DL (ref 8.5–10.1)
CHLORIDE SERPL-SCNC: 103 MMOL/L (ref 98–112)
CO2 SERPL-SCNC: 29 MMOL/L (ref 21–32)
CREAT BLD-MCNC: 0.84 MG/DL
EOSINOPHIL # BLD AUTO: 0.08 X10(3) UL (ref 0–0.7)
EOSINOPHIL NFR BLD AUTO: 0.9 %
ERYTHROCYTE [DISTWIDTH] IN BLOOD BY AUTOMATED COUNT: 16.7 %
GFR SERPLBLD BASED ON 1.73 SQ M-ARVRAT: 75 ML/MIN/1.73M2 (ref 60–?)
GLUCOSE BLD-MCNC: 138 MG/DL (ref 70–99)
HCT VFR BLD AUTO: 28.8 %
HGB BLD-MCNC: 8.6 G/DL
IMM GRANULOCYTES # BLD AUTO: 0.05 X10(3) UL (ref 0–1)
IMM GRANULOCYTES NFR BLD: 0.6 %
INR BLD: 2.11 (ref 0.85–1.16)
LYMPHOCYTES # BLD AUTO: 1.24 X10(3) UL (ref 1–4)
LYMPHOCYTES NFR BLD AUTO: 14.4 %
MCH RBC QN AUTO: 25.6 PG (ref 26–34)
MCHC RBC AUTO-ENTMCNC: 29.9 G/DL (ref 31–37)
MCV RBC AUTO: 85.7 FL
MONOCYTES # BLD AUTO: 0.97 X10(3) UL (ref 0.1–1)
MONOCYTES NFR BLD AUTO: 11.2 %
NEUTROPHILS # BLD AUTO: 6.26 X10 (3) UL (ref 1.5–7.7)
NEUTROPHILS # BLD AUTO: 6.26 X10(3) UL (ref 1.5–7.7)
NEUTROPHILS NFR BLD AUTO: 72.6 %
OSMOLALITY SERPL CALC.SUM OF ELEC: 287 MOSM/KG (ref 275–295)
PLATELET # BLD AUTO: 405 10(3)UL (ref 150–450)
POTASSIUM SERPL-SCNC: 3.4 MMOL/L (ref 3.5–5.1)
POTASSIUM SERPL-SCNC: 3.5 MMOL/L (ref 3.5–5.1)
PROTHROMBIN TIME: 23.4 SECONDS (ref 11.6–14.8)
RBC # BLD AUTO: 3.36 X10(6)UL
SODIUM SERPL-SCNC: 137 MMOL/L (ref 136–145)
WBC # BLD AUTO: 8.6 X10(3) UL (ref 4–11)

## 2022-10-08 PROCEDURE — 80048 BASIC METABOLIC PNL TOTAL CA: CPT | Performed by: HOSPITALIST

## 2022-10-08 PROCEDURE — 84132 ASSAY OF SERUM POTASSIUM: CPT | Performed by: INTERNAL MEDICINE

## 2022-10-08 PROCEDURE — 85025 COMPLETE CBC W/AUTO DIFF WBC: CPT | Performed by: HOSPITALIST

## 2022-10-08 PROCEDURE — 85610 PROTHROMBIN TIME: CPT | Performed by: HOSPITALIST

## 2022-10-08 RX ORDER — POTASSIUM CHLORIDE 20 MEQ/1
40 TABLET, EXTENDED RELEASE ORAL EVERY 4 HOURS
Status: DISCONTINUED | OUTPATIENT
Start: 2022-10-08 | End: 2022-10-08

## 2022-10-08 RX ORDER — WARFARIN SODIUM 5 MG/1
5 TABLET ORAL
Status: COMPLETED | OUTPATIENT
Start: 2022-10-08 | End: 2022-10-08

## 2022-10-08 RX ORDER — POTASSIUM CHLORIDE 1.5 G/1.77G
40 POWDER, FOR SOLUTION ORAL 2 TIMES DAILY
Status: DISCONTINUED | OUTPATIENT
Start: 2022-10-08 | End: 2022-10-08

## 2022-10-08 RX ORDER — POTASSIUM CHLORIDE 1.5 G/1.77G
40 POWDER, FOR SOLUTION ORAL ONCE
Status: COMPLETED | OUTPATIENT
Start: 2022-10-08 | End: 2022-10-08

## 2022-10-08 NOTE — CONSULTS
120 [facility] Dosing Service  Warfarin (Coumadin) Subsequent Dosing    Angel Blackman is a 69year old patient for whom pharmacy is dosing warfarin (Coumadin). Goal INR is 2.5-3.5    Recent Labs   Lab 10/05/22  2342 10/06/22  0505 10/07/22  0650 10/08/22  0623   INR 3.96* 3.33* 2.46* 2.11*       Consulted by:  Dr. Rockland Skiff  Indication:  Transcatheter aortic valve replacement  Potential Drug Interactions:  allopurinol, clopidogrel, levothyroxine  Other Anticoagulants:  lovenox dc'd 10/7  Home regimen (if applicable):  warfarin 3 mg TuTh, warfarin 4.5mg SuMoWeFrSa    Inpatient Dosing History:    Date 10/6 10/7 10/8      INR 3.33 2.46 2.11      Coumadin dose 3 mg 4.5 mg                Based on above -  1. For today, Give warfarin (COUMADIN) 5 mg at 2100 tonight  2   PT/INR ordered daily while on warfarin  3. Pharmacy will continue to follow. We appreciate the opportunity to assist in the care of this patient.     Chiki Canas PharmD  10/8/2022  1:59 PM

## 2022-10-08 NOTE — PLAN OF CARE
A/O x 4  Room air. Tele Afib  Lasix, Purewick  1+ BLE edema, redness noted to LLE worse, not weeping overnight  Coumadin dosing per pharmacy  Benadryl PO PRN allergies  1-2 assist with own crutches  Denies anything for pain  Bed alarm on  Daily weights  Will continue to monitor. Problem: CARDIOVASCULAR - ADULT  Goal: Maintains optimal cardiac output and hemodynamic stability  Description: INTERVENTIONS:  - Monitor vital signs, rhythm, and trends  - Monitor for bleeding, hypotension and signs of decreased cardiac output  - Evaluate effectiveness of vasoactive medications to optimize hemodynamic stability  - Monitor arterial and/or venous puncture sites for bleeding and/or hematoma  - Assess quality of pulses, skin color and temperature  - Assess for signs of decreased coronary artery perfusion - ex.  Angina  - Evaluate fluid balance, assess for edema, trend weights  Outcome: Progressing  Goal: Absence of cardiac arrhythmias or at baseline  Description: INTERVENTIONS:  - Continuous cardiac monitoring, monitor vital signs, obtain 12 lead EKG if indicated  - Evaluate effectiveness of antiarrhythmic and heart rate control medications as ordered  - Initiate emergency measures for life threatening arrhythmias  - Monitor electrolytes and administer replacement therapy as ordered  Outcome: Progressing     Problem: RESPIRATORY - ADULT  Goal: Achieves optimal ventilation and oxygenation  Description: INTERVENTIONS:  - Assess for changes in respiratory status  - Assess for changes in mentation and behavior  - Position to facilitate oxygenation and minimize respiratory effort  - Oxygen supplementation based on oxygen saturation or ABGs  - Provide Smoking Cessation handout, if applicable  - Encourage broncho-pulmonary hygiene including cough, deep breathe, Incentive Spirometry  - Assess the need for suctioning and perform as needed  - Assess and instruct to report SOB or any respiratory difficulty  - Respiratory Therapy support as indicated  - Manage/alleviate anxiety  - Monitor for signs/symptoms of CO2 retention  Outcome: Progressing

## 2022-10-08 NOTE — PLAN OF CARE
Potassium replaced per protocol patient refused at first but took later  Patient prefers powder form in ice water  Refusing to get out of bed   Inr today=2.11  had coumadin 4.5 mg last night  pharmacy dosing 5 mg tonight  A fib controlled  rate;    hemog stable 8.6 today 8.7 yesterday  Deny lightheadedness  deny dizziness  Problem: CARDIOVASCULAR - ADULT  Goal: Maintains optimal cardiac output and hemodynamic stability  Description: INTERVENTIONS:  - Monitor vital signs, rhythm, and trends  - Monitor for bleeding, hypotension and signs of decreased cardiac output  - Evaluate effectiveness of vasoactive medications to optimize hemodynamic stability  - Monitor arterial and/or venous puncture sites for bleeding and/or hematoma  - Assess quality of pulses, skin color and temperature  - Assess for signs of decreased coronary artery perfusion - ex.  Angina  - Evaluate fluid balance, assess for edema, trend weights  10/8/2022 1641 by Pieter Chau RN  Outcome: Progressing  10/8/2022 1640 by Pieter Chau RN  Outcome: Progressing     Problem: CARDIOVASCULAR - ADULT  Goal: Absence of cardiac arrhythmias or at baseline  Description: INTERVENTIONS:  - Continuous cardiac monitoring, monitor vital signs, obtain 12 lead EKG if indicated  - Evaluate effectiveness of antiarrhythmic and heart rate control medications as ordered  - Initiate emergency measures for life threatening arrhythmias  - Monitor electrolytes and administer replacement therapy as ordered  10/8/2022 1641 by Pieter Chau RN  Outcome: Progressing  10/8/2022 1640 by Pieter Chau RN  Outcome: Progressing     Problem: RESPIRATORY - ADULT  Goal: Achieves optimal ventilation and oxygenation  Description: INTERVENTIONS:  - Assess for changes in respiratory status  - Assess for changes in mentation and behavior  - Position to facilitate oxygenation and minimize respiratory effort  - Oxygen supplementation based on oxygen saturation or ABGs  - Provide Smoking Cessation handout, if applicable  - Encourage broncho-pulmonary hygiene including cough, deep breathe, Incentive Spirometry  - Assess the need for suctioning and perform as needed  - Assess and instruct to report SOB or any respiratory difficulty  - Respiratory Therapy support as indicated  - Manage/alleviate anxiety  - Monitor for signs/symptoms of CO2 retention  10/8/2022 1641 by Mesha Ospina RN  Outcome: Progressing  10/8/2022 1640 by Mesha Ospina RN  Outcome: Progressing     Problem: METABOLIC/FLUID AND ELECTROLYTES - ADULT  Goal: Electrolytes maintained within normal limits  Description: INTERVENTIONS:  - Monitor labs and rhythm and assess patient for signs and symptoms of electrolyte imbalances  - Administer electrolyte replacement as ordered  - Monitor response to electrolyte replacements, including rhythm and repeat lab results as appropriate  - Fluid restriction as ordered  - Instruct patient on fluid and nutrition restrictions as appropriate  Outcome: Not Progressing

## 2022-10-09 LAB
ANION GAP SERPL CALC-SCNC: 5 MMOL/L (ref 0–18)
BUN BLD-MCNC: 16 MG/DL (ref 7–18)
CALCIUM BLD-MCNC: 9.1 MG/DL (ref 8.5–10.1)
CHLORIDE SERPL-SCNC: 104 MMOL/L (ref 98–112)
CO2 SERPL-SCNC: 28 MMOL/L (ref 21–32)
CREAT BLD-MCNC: 0.65 MG/DL
GFR SERPLBLD BASED ON 1.73 SQ M-ARVRAT: 95 ML/MIN/1.73M2 (ref 60–?)
GLUCOSE BLD-MCNC: 119 MG/DL (ref 70–99)
INR BLD: 2.21 (ref 0.85–1.16)
OSMOLALITY SERPL CALC.SUM OF ELEC: 286 MOSM/KG (ref 275–295)
POTASSIUM SERPL-SCNC: 4.4 MMOL/L (ref 3.5–5.1)
POTASSIUM SERPL-SCNC: 4.4 MMOL/L (ref 3.5–5.1)
PROTHROMBIN TIME: 24.3 SECONDS (ref 11.6–14.8)
SODIUM SERPL-SCNC: 137 MMOL/L (ref 136–145)

## 2022-10-09 PROCEDURE — 84132 ASSAY OF SERUM POTASSIUM: CPT | Performed by: INTERNAL MEDICINE

## 2022-10-09 PROCEDURE — 85610 PROTHROMBIN TIME: CPT | Performed by: INTERNAL MEDICINE

## 2022-10-09 PROCEDURE — 80048 BASIC METABOLIC PNL TOTAL CA: CPT | Performed by: NURSE PRACTITIONER

## 2022-10-09 RX ORDER — POTASSIUM CHLORIDE 1.5 G/1.77G
40 POWDER, FOR SOLUTION ORAL EVERY 4 HOURS
Status: COMPLETED | OUTPATIENT
Start: 2022-10-09 | End: 2022-10-09

## 2022-10-09 RX ORDER — WARFARIN SODIUM 5 MG/1
5 TABLET ORAL
Status: COMPLETED | OUTPATIENT
Start: 2022-10-09 | End: 2022-10-09

## 2022-10-09 NOTE — PLAN OF CARE
Assumed care of pt at [address], pt states she has chronic generalized pain but declined pain meds for such. Remains on Fluid restrictions  But K low and will only take po liquid replacement. Received pt on room air however with sleep pt desatted to 86% at 0230 and 2L per nc applied. purewick in place iv sl. Lungs diminished with crackles noted to the posterior bilateral lungs to the bases. Pt with np cough. Will continue to monitor.

## 2022-10-09 NOTE — PLAN OF CARE
Continue iv lasix today per cardiology  Patient needs a lot of encouragement to increase activity  Per patient w/ iv  lasix has to urinate a lot and unable to walk on hallways until done w/ iv lasix  Moderate assisst w/ transfer activity  Problem: CARDIOVASCULAR - ADULT  Goal: Maintains optimal cardiac output and hemodynamic stability  Description: INTERVENTIONS:  - Monitor vital signs, rhythm, and trends  - Monitor for bleeding, hypotension and signs of decreased cardiac output  - Evaluate effectiveness of vasoactive medications to optimize hemodynamic stability  - Monitor arterial and/or venous puncture sites for bleeding and/or hematoma  - Assess quality of pulses, skin color and temperature  - Assess for signs of decreased coronary artery perfusion - ex.  Angina  - Evaluate fluid balance, assess for edema, trend weights  Outcome: Progressing     Problem: CARDIOVASCULAR - ADULT  Goal: Absence of cardiac arrhythmias or at baseline  Description: INTERVENTIONS:  - Continuous cardiac monitoring, monitor vital signs, obtain 12 lead EKG if indicated  - Evaluate effectiveness of antiarrhythmic and heart rate control medications as ordered  - Initiate emergency measures for life threatening arrhythmias  - Monitor electrolytes and administer replacement therapy as ordered  Outcome: Progressing     Problem: RESPIRATORY - ADULT  Goal: Achieves optimal ventilation and oxygenation  Description: INTERVENTIONS:  - Assess for changes in respiratory status  - Assess for changes in mentation and behavior  - Position to facilitate oxygenation and minimize respiratory effort  - Oxygen supplementation based on oxygen saturation or ABGs  - Provide Smoking Cessation handout, if applicable  - Encourage broncho-pulmonary hygiene including cough, deep breathe, Incentive Spirometry  - Assess the need for suctioning and perform as needed  - Assess and instruct to report SOB or any respiratory difficulty  - Respiratory Therapy support as indicated  - Manage/alleviate anxiety  - Monitor for signs/symptoms of CO2 retention  Outcome: Progressing     Problem: METABOLIC/FLUID AND ELECTROLYTES - ADULT  Goal: Electrolytes maintained within normal limits  Description: INTERVENTIONS:  - Monitor labs and rhythm and assess patient for signs and symptoms of electrolyte imbalances  - Administer electrolyte replacement as ordered  - Monitor response to electrolyte replacements, including rhythm and repeat lab results as appropriate  - Fluid restriction as ordered  - Instruct patient on fluid and nutrition restrictions as appropriate  Outcome: Progressing

## 2022-10-09 NOTE — CONSULTS
[facility] Dosing Service  Warfarin (Coumadin) Subsequent Dosing    Joss Christianson is a 69year old patient for whom pharmacy is dosing warfarin (Coumadin). Goal INR is 2.5-3.5    Recent Labs   Lab 10/05/22  2342 10/06/22  0505 10/07/22  0650 10/08/22  0623 10/09/22  0856   INR 3.96* 3.33* 2.46* 2.11* 2.21*     Consulted by:  Dr. Gloria Camacho  Indication:  Transcatheter aortic valve replacement   Potential Drug Interactions:  allopurinol, clopidogrel, levothyroxine  Other Anticoagulants:  lovenox dc'd 10/7 1045   Home regimen (if applicable): Warfarin 3 mg TuTh, warfarin 4.5mg [id number]      Inpatient Dosing History:    Date 10/6 10/7 10/8 10/9     INR 3.33 2,46 2.11 2.21     Coumadin dose 3 mg 4.5 mg 5 mg               Based on above -  1. For today, Give warfarin (COUMADIN) 5 mg at 2100 tonight  2   PT/INR ordered daily while on warfarin  3. Pharmacy will continue to follow. We appreciate the opportunity to assist in the care of this patient.     Eun Nicoel, [facility]  10/9/2022  2:34 PM

## 2022-10-10 LAB
ANION GAP SERPL CALC-SCNC: 7 MMOL/L (ref 0–18)
BUN BLD-MCNC: 19 MG/DL (ref 7–18)
CALCIUM BLD-MCNC: 8.8 MG/DL (ref 8.5–10.1)
CHLORIDE SERPL-SCNC: 101 MMOL/L (ref 98–112)
CO2 SERPL-SCNC: 29 MMOL/L (ref 21–32)
CREAT BLD-MCNC: 0.62 MG/DL
GFR SERPLBLD BASED ON 1.73 SQ M-ARVRAT: 96 ML/MIN/1.73M2 (ref 60–?)
GLUCOSE BLD-MCNC: 118 MG/DL (ref 70–99)
INR BLD: 2.35 (ref 0.85–1.16)
OSMOLALITY SERPL CALC.SUM OF ELEC: 287 MOSM/KG (ref 275–295)
POTASSIUM SERPL-SCNC: 3.5 MMOL/L (ref 3.5–5.1)
POTASSIUM SERPL-SCNC: 3.8 MMOL/L (ref 3.5–5.1)
PROTHROMBIN TIME: 25.5 SECONDS (ref 11.6–14.8)
SODIUM SERPL-SCNC: 137 MMOL/L (ref 136–145)

## 2022-10-10 PROCEDURE — 85610 PROTHROMBIN TIME: CPT | Performed by: INTERNAL MEDICINE

## 2022-10-10 PROCEDURE — 80048 BASIC METABOLIC PNL TOTAL CA: CPT | Performed by: NURSE PRACTITIONER

## 2022-10-10 PROCEDURE — 84132 ASSAY OF SERUM POTASSIUM: CPT | Performed by: INTERNAL MEDICINE

## 2022-10-10 PROCEDURE — 80053 COMPREHEN METABOLIC PANEL: CPT | Performed by: NURSE PRACTITIONER

## 2022-10-10 RX ORDER — WARFARIN SODIUM 5 MG/1
5 TABLET ORAL
Status: DISCONTINUED | OUTPATIENT
Start: 2022-10-10 | End: 2022-10-10

## 2022-10-10 RX ORDER — SODIUM PHOSPHATE, DIBASIC AND SODIUM PHOSPHATE, MONOBASIC 7; 19 G/133ML; G/133ML
1 ENEMA RECTAL ONCE AS NEEDED
Status: DISCONTINUED | OUTPATIENT
Start: 2022-10-10 | End: 2022-10-11

## 2022-10-10 RX ORDER — POTASSIUM CHLORIDE 20 MEQ/1
40 TABLET, EXTENDED RELEASE ORAL EVERY 4 HOURS
Status: DISCONTINUED | OUTPATIENT
Start: 2022-10-10 | End: 2022-10-10

## 2022-10-10 RX ORDER — SENNOSIDES 8.6 MG
8.6 TABLET ORAL 2 TIMES DAILY PRN
Status: DISCONTINUED | OUTPATIENT
Start: 2022-10-10 | End: 2022-10-11

## 2022-10-10 RX ORDER — BISACODYL 10 MG
10 SUPPOSITORY, RECTAL RECTAL
Status: DISCONTINUED | OUTPATIENT
Start: 2022-10-10 | End: 2022-10-11

## 2022-10-10 RX ORDER — POTASSIUM CHLORIDE 1.5 G/1.77G
40 POWDER, FOR SOLUTION ORAL EVERY 4 HOURS
Status: COMPLETED | OUTPATIENT
Start: 2022-10-10 | End: 2022-10-10

## 2022-10-10 RX ORDER — POLYETHYLENE GLYCOL 3350 17 G/17G
17 POWDER, FOR SOLUTION ORAL DAILY PRN
Status: DISCONTINUED | OUTPATIENT
Start: 2022-10-10 | End: 2022-10-11

## 2022-10-10 NOTE — CONSULTS
[facility] Dosing Service  Warfarin (Coumadin) Subsequent Dosing    Nishant Fam is a 69year old patient for whom pharmacy is dosing warfarin (Coumadin). Goal INR is 2.5-3.5    Recent Labs   Lab 10/06/22  0505 10/07/22  0650 10/08/22  0623 10/09/22  0856 10/10/22  0728   INR 3.33* 2.46* 2.11* 2.21* 2.35*     Consulted by:  Dr. Francesco Wong  Indication:  Transcatheter aortic valve replacement   Potential Drug Interactions:  allopurinol, clopidogrel, levothyroxine  Other Anticoagulants:  lovenox dc'd 10/7 1045   Home regimen (if applicable): Warfarin 3 mg TuTh, warfarin 4.5mg SuMoWeFrSa    Inpatient Dosing History:     Date 10/6 10/7 10/8 10/9 10/10      INR 3.33 2,46 2.11 2.21 2.35      Coumadin dose 3 mg 4.5 mg 5 mg 5 mg            Based on above -  1. For today, Give warfarin (COUMADIN) 6 mg at 2100 tonight  2   PT/INR ordered daily while on warfarin  3. Pharmacy will continue to follow. We appreciate the opportunity to assist in the care of this patient.     Svetlana Head [facility]  10/10/2022  12:57 PM

## 2022-10-10 NOTE — PLAN OF CARE
Assumed care of patient at 1. Pt A/Ox 4. O2 sats maintained on room air. Afib on tele. Last BM 10/5, pt passing gas. Cristel Pagoda in place for incontinence. Pt reports generalized pain with repositioning, delcining need for pain medication. Pt up x2 assist and crutches in room. PT/OT to see. Pt updated on plan of care. Care needs met. Bed in lowest position, Call light within reach. Bed alarm on. POC: PT/OT to see, transition to oral lasix    Problem: Patient/Family Goals  Goal: Patient/Family Long Term Goal  Description: Patient's Long Term Goal: discharge home    Interventions:  - taking prescribed medications, PT/OT to see  - See additional Care Plan goals for specific interventions  Outcome: Progressing  Goal: Patient/Family Short Term Goal  Description: Patient's Short Term Goal: feel better/stronger    Interventions:   - taking prescribed medications, PT/OT to see  - See additional Care Plan goals for specific interventions  Outcome: Progressing     Problem: CARDIOVASCULAR - ADULT  Goal: Maintains optimal cardiac output and hemodynamic stability  Description: INTERVENTIONS:  - Monitor vital signs, rhythm, and trends  - Monitor for bleeding, hypotension and signs of decreased cardiac output  - Evaluate effectiveness of vasoactive medications to optimize hemodynamic stability  - Monitor arterial and/or venous puncture sites for bleeding and/or hematoma  - Assess quality of pulses, skin color and temperature  - Assess for signs of decreased coronary artery perfusion - ex.  Angina  - Evaluate fluid balance, assess for edema, trend weights  Outcome: Progressing  Goal: Absence of cardiac arrhythmias or at baseline  Description: INTERVENTIONS:  - Continuous cardiac monitoring, monitor vital signs, obtain 12 lead EKG if indicated  - Evaluate effectiveness of antiarrhythmic and heart rate control medications as ordered  - Initiate emergency measures for life threatening arrhythmias  - Monitor electrolytes and administer replacement therapy as ordered  Outcome: Progressing     Problem: RESPIRATORY - ADULT  Goal: Achieves optimal ventilation and oxygenation  Description: INTERVENTIONS:  - Assess for changes in respiratory status  - Assess for changes in mentation and behavior  - Position to facilitate oxygenation and minimize respiratory effort  - Oxygen supplementation based on oxygen saturation or ABGs  - Provide Smoking Cessation handout, if applicable  - Encourage broncho-pulmonary hygiene including cough, deep breathe, Incentive Spirometry  - Assess the need for suctioning and perform as needed  - Assess and instruct to report SOB or any respiratory difficulty  - Respiratory Therapy support as indicated  - Manage/alleviate anxiety  - Monitor for signs/symptoms of CO2 retention  Outcome: Progressing     Problem: METABOLIC/FLUID AND ELECTROLYTES - ADULT  Goal: Electrolytes maintained within normal limits  Description: INTERVENTIONS:  - Monitor labs and rhythm and assess patient for signs and symptoms of electrolyte imbalances  - Administer electrolyte replacement as ordered  - Monitor response to electrolyte replacements, including rhythm and repeat lab results as appropriate  - Fluid restriction as ordered  - Instruct patient on fluid and nutrition restrictions as appropriate  Outcome: Progressing

## 2022-10-10 NOTE — PLAN OF CARE
Shift Note:  Assumed care of patient. Patient alert and oriented x4. Patient on room air, denies difficulty breathing, lung sounds clear with mild bilateral lower lobe crackles. Denies any cardiac symptoms, Afib with controlled HR in 80s, on tele. Denies pain at this time. Continent of bowel and incontinent of bladder, last BM 10/5. Ambulates with crutches, pivots to chair, call light within reach, tolerating care well. POC:  - Diurese   - PT to reeval   - Poss DC? Problem: Patient/Family Goals  Goal: Patient/Family Long Term Goal  Description: Patient's Long Term Goal: discharge home    Interventions:  - taking prescribed medications, PT/OT to see  - See additional Care Plan goals for specific interventions  Outcome: Progressing  Goal: Patient/Family Short Term Goal  Description: Patient's Short Term Goal: feel better/stronger    Interventions:   - taking prescribed medications, PT/OT to see  - See additional Care Plan goals for specific interventions  Outcome: Progressing     Problem: CARDIOVASCULAR - ADULT  Goal: Maintains optimal cardiac output and hemodynamic stability  Description: INTERVENTIONS:  - Monitor vital signs, rhythm, and trends  - Monitor for bleeding, hypotension and signs of decreased cardiac output  - Evaluate effectiveness of vasoactive medications to optimize hemodynamic stability  - Monitor arterial and/or venous puncture sites for bleeding and/or hematoma  - Assess quality of pulses, skin color and temperature  - Assess for signs of decreased coronary artery perfusion - ex.  Angina  - Evaluate fluid balance, assess for edema, trend weights  Outcome: Progressing  Goal: Absence of cardiac arrhythmias or at baseline  Description: INTERVENTIONS:  - Continuous cardiac monitoring, monitor vital signs, obtain 12 lead EKG if indicated  - Evaluate effectiveness of antiarrhythmic and heart rate control medications as ordered  - Initiate emergency measures for life threatening arrhythmias  - Monitor electrolytes and administer replacement therapy as ordered  Outcome: Progressing     Problem: RESPIRATORY - ADULT  Goal: Achieves optimal ventilation and oxygenation  Description: INTERVENTIONS:  - Assess for changes in respiratory status  - Assess for changes in mentation and behavior  - Position to facilitate oxygenation and minimize respiratory effort  - Oxygen supplementation based on oxygen saturation or ABGs  - Provide Smoking Cessation handout, if applicable  - Encourage broncho-pulmonary hygiene including cough, deep breathe, Incentive Spirometry  - Assess the need for suctioning and perform as needed  - Assess and instruct to report SOB or any respiratory difficulty  - Respiratory Therapy support as indicated  - Manage/alleviate anxiety  - Monitor for signs/symptoms of CO2 retention  Outcome: Progressing     Problem: METABOLIC/FLUID AND ELECTROLYTES - ADULT  Goal: Electrolytes maintained within normal limits  Description: INTERVENTIONS:  - Monitor labs and rhythm and assess patient for signs and symptoms of electrolyte imbalances  - Administer electrolyte replacement as ordered  - Monitor response to electrolyte replacements, including rhythm and repeat lab results as appropriate  - Fluid restriction as ordered  - Instruct patient on fluid and nutrition restrictions as appropriate  Outcome: Progressing

## 2022-10-10 NOTE — PHYSICAL THERAPY NOTE
Pt attempted to see this afternoon for potential re-eval per pt request. PT previously signed off of PT services as she is at her baseline level of functioning. Pt continues to state she is at her baseline and does not need therapy. Will sign off again.     Kaylynn Heller, PT  10/10/22

## 2022-10-11 VITALS
RESPIRATION RATE: 17 BRPM | WEIGHT: 229.31 LBS | OXYGEN SATURATION: 97 % | HEART RATE: 78 BPM | SYSTOLIC BLOOD PRESSURE: 107 MMHG | DIASTOLIC BLOOD PRESSURE: 93 MMHG | TEMPERATURE: 98 F | BODY MASS INDEX: 34 KG/M2

## 2022-10-11 LAB
INR BLD: 2.46 (ref 0.85–1.16)
PROTHROMBIN TIME: 26.4 SECONDS (ref 11.6–14.8)

## 2022-10-11 PROCEDURE — 85610 PROTHROMBIN TIME: CPT | Performed by: INTERNAL MEDICINE

## 2022-10-11 PROCEDURE — 90471 IMMUNIZATION ADMIN: CPT

## 2022-10-11 PROCEDURE — 85025 COMPLETE CBC W/AUTO DIFF WBC: CPT | Performed by: EMERGENCY MEDICINE

## 2022-10-11 RX ORDER — FUROSEMIDE 20 MG/1
40 TABLET ORAL 2 TIMES DAILY
Qty: 120 TABLET | Refills: 5 | Status: SHIPPED | OUTPATIENT
Start: 2022-10-11

## 2022-10-11 RX ORDER — CARVEDILOL 3.12 MG/1
3.12 TABLET ORAL 2 TIMES DAILY WITH MEALS
Qty: 60 TABLET | Refills: 5 | Status: SHIPPED | OUTPATIENT
Start: 2022-10-11

## 2022-10-11 NOTE — PLAN OF CARE
Shift Note:  Assumed care of patient. Patient alert and oriented x4, glasses and crutches at the bedside Patient on room air, denies difficulty breathing, lung sounds clear with mild bilateral lower lobe crackles. Denies any cardiac symptoms, Afib with controlled HR in 80s, on tele. Denies pain at this time. Continent of bowel and incontinent of bladder, last BM 10/5. Ambulates with crutches, pivots to chair, call light within reach, tolerating care well. POC:  - Discharge later today         Problem: Patient/Family Goals  Goal: Patient/Family Long Term Goal  Description: Patient's Long Term Goal: discharge home    Interventions:  - taking prescribed medications, appropriate consults  - See additional Care Plan goals for specific interventions  Outcome: Completed  Goal: Patient/Family Short Term Goal  Description: Patient's Short Term Goal: feel better/stronger    Interventions:   - taking prescribed medications, appropriate consults  - See additional Care Plan goals for specific interventions  Outcome: Completed     Problem: CARDIOVASCULAR - ADULT  Goal: Maintains optimal cardiac output and hemodynamic stability  Description: INTERVENTIONS:  - Monitor vital signs, rhythm, and trends  - Monitor for bleeding, hypotension and signs of decreased cardiac output  - Evaluate effectiveness of vasoactive medications to optimize hemodynamic stability  - Monitor arterial and/or venous puncture sites for bleeding and/or hematoma  - Assess quality of pulses, skin color and temperature  - Assess for signs of decreased coronary artery perfusion - ex.  Angina  - Evaluate fluid balance, assess for edema, trend weights  Outcome: Completed  Goal: Absence of cardiac arrhythmias or at baseline  Description: INTERVENTIONS:  - Continuous cardiac monitoring, monitor vital signs, obtain 12 lead EKG if indicated  - Evaluate effectiveness of antiarrhythmic and heart rate control medications as ordered  - Initiate emergency measures for life threatening arrhythmias  - Monitor electrolytes and administer replacement therapy as ordered  Outcome: Completed     Problem: RESPIRATORY - ADULT  Goal: Achieves optimal ventilation and oxygenation  Description: INTERVENTIONS:  - Assess for changes in respiratory status  - Assess for changes in mentation and behavior  - Position to facilitate oxygenation and minimize respiratory effort  - Oxygen supplementation based on oxygen saturation or ABGs  - Provide Smoking Cessation handout, if applicable  - Encourage broncho-pulmonary hygiene including cough, deep breathe, Incentive Spirometry  - Assess the need for suctioning and perform as needed  - Assess and instruct to report SOB or any respiratory difficulty  - Respiratory Therapy support as indicated  - Manage/alleviate anxiety  - Monitor for signs/symptoms of CO2 retention  Outcome: Completed     Problem: METABOLIC/FLUID AND ELECTROLYTES - ADULT  Goal: Electrolytes maintained within normal limits  Description: INTERVENTIONS:  - Monitor labs and rhythm and assess patient for signs and symptoms of electrolyte imbalances  - Administer electrolyte replacement as ordered  - Monitor response to electrolyte replacements, including rhythm and repeat lab results as appropriate  - Fluid restriction as ordered  - Instruct patient on fluid and nutrition restrictions as appropriate  Outcome: Completed     Problem: Diabetes/Glucose Control  Goal: Glucose maintained within prescribed range  Description: INTERVENTIONS:  - Monitor Blood Glucose as ordered  - Assess for signs and symptoms of hyperglycemia and hypoglycemia  - Administer ordered medications to maintain glucose within target range  - Assess barriers to adequate nutritional intake and initiate nutrition consult as needed  - Instruct patient on self management of diabetes  Outcome: Completed

## 2022-10-11 NOTE — PROGRESS NOTES
Patient chart reviewed for discharge: Medication Reconciliation completed, Specialist/PCP follow up listed, and disease specific Instructions/Education included in After Visit Summary. Discharge RN notified patient's RN of AVS completion and verified all consultants have signed off. Patient's RN to notify DC RN if discharge status changes.

## 2022-10-11 NOTE — PLAN OF CARE
Assumed care of patient at [address] [facility]. Pt A/Ox 4. O2 sats maintained on room air, pt reporting dyspnea with exertion. Afib on tele. Last BM 10/10. Savita Mayans in place for incontinence. Pt reports generalized pain, declining need for pain medication. Pt up x1 with crutches to commode. Pt updated on plan of care. Care needs met. Bed in lowest position, Call light within reach. Bed alarm on. POC: IV lasix BID    Problem: Patient/Family Goals  Goal: Patient/Family Long Term Goal  Description: Patient's Long Term Goal: discharge home    Interventions:  - taking prescribed medications, appropriate consults  - See additional Care Plan goals for specific interventions  Outcome: Progressing  Goal: Patient/Family Short Term Goal  Description: Patient's Short Term Goal: feel better/stronger    Interventions:   - taking prescribed medications, appropriate consults  - See additional Care Plan goals for specific interventions  Outcome: Progressing     Problem: CARDIOVASCULAR - ADULT  Goal: Maintains optimal cardiac output and hemodynamic stability  Description: INTERVENTIONS:  - Monitor vital signs, rhythm, and trends  - Monitor for bleeding, hypotension and signs of decreased cardiac output  - Evaluate effectiveness of vasoactive medications to optimize hemodynamic stability  - Monitor arterial and/or venous puncture sites for bleeding and/or hematoma  - Assess quality of pulses, skin color and temperature  - Assess for signs of decreased coronary artery perfusion - ex.  Angina  - Evaluate fluid balance, assess for edema, trend weights  Outcome: Progressing  Goal: Absence of cardiac arrhythmias or at baseline  Description: INTERVENTIONS:  - Continuous cardiac monitoring, monitor vital signs, obtain 12 lead EKG if indicated  - Evaluate effectiveness of antiarrhythmic and heart rate control medications as ordered  - Initiate emergency measures for life threatening arrhythmias  - Monitor electrolytes and administer replacement therapy as ordered  Outcome: Progressing     Problem: RESPIRATORY - ADULT  Goal: Achieves optimal ventilation and oxygenation  Description: INTERVENTIONS:  - Assess for changes in respiratory status  - Assess for changes in mentation and behavior  - Position to facilitate oxygenation and minimize respiratory effort  - Oxygen supplementation based on oxygen saturation or ABGs  - Provide Smoking Cessation handout, if applicable  - Encourage broncho-pulmonary hygiene including cough, deep breathe, Incentive Spirometry  - Assess the need for suctioning and perform as needed  - Assess and instruct to report SOB or any respiratory difficulty  - Respiratory Therapy support as indicated  - Manage/alleviate anxiety  - Monitor for signs/symptoms of CO2 retention  Outcome: Progressing     Problem: METABOLIC/FLUID AND ELECTROLYTES - ADULT  Goal: Electrolytes maintained within normal limits  Description: INTERVENTIONS:  - Monitor labs and rhythm and assess patient for signs and symptoms of electrolyte imbalances  - Administer electrolyte replacement as ordered  - Monitor response to electrolyte replacements, including rhythm and repeat lab results as appropriate  - Fluid restriction as ordered  - Instruct patient on fluid and nutrition restrictions as appropriate  Outcome: Progressing

## 2022-10-11 NOTE — PROGRESS NOTES
Patient tele discontinued, IV discontinued with catheter intact. Patient denies chest pain, shortness of breath, dizziness, or palpitations. Patient discharge instructions reviewed with patient, verbalize understanding. Patient escorted via wheelchair to the [employer] by this RN.

## 2022-10-11 NOTE — PROGRESS NOTES
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 Goal INR is 2.5-3.5    Recent Labs   Lab 10/07/22  0650 10/08/22  0623 10/09/22  0856 10/10/22  0728 10/11/22  0654   INR 2.46* 2.11* 2.21* 2.35* 2.46*     Consulted by:  Dr. Andres Tripp  Indication:  Transcatheter aortic valve replacement   Potential Drug Interactions:  allopurinol, clopidogrel, levothyroxine  Other Anticoagulants:  lovenox dc'd 10/7 1045   Home regimen (if applicable): Warfarin 3 mg TuTh, warfarin 4.5mg [id number]     Inpatient Dosing History:     Date 10/6 10/7 10/8 10/9 10/10   10/11     INR 3.33 2,46 2.11 2.21 2.35   2.46     Coumadin dose 3 mg 4.5 mg 5 mg 5 mg  6 mg  6 mg                   Based on above -  1. For today, Give warfarin (COUMADIN) 6 mg at 2100 tonight  2   PT/INR ordered daily while on warfarin  3. Pharmacy will continue to follow. We appreciate the opportunity to assist in the care of this patient.     [address] [place] [address]  10/11/2022  11:34 AM

## 2022-10-12 ENCOUNTER — HOSPITAL ENCOUNTER (OUTPATIENT)
Facility: HOSPITAL | Age: 67
Setting detail: OBSERVATION
Discharge: NURSING, REHAB OR LONG-TERM CARE | End: 2022-10-14
Attending: EMERGENCY MEDICINE | Admitting: HOSPITALIST
Payer: MEDICARE

## 2022-10-12 ENCOUNTER — APPOINTMENT (OUTPATIENT)
Dept: GENERAL RADIOLOGY | Facility: HOSPITAL | Age: 67
End: 2022-10-12
Attending: EMERGENCY MEDICINE
Payer: MEDICARE

## 2022-10-12 DIAGNOSIS — R53.1 WEAKNESS GENERALIZED: Primary | ICD-10-CM

## 2022-10-12 DIAGNOSIS — M25.562 ACUTE PAIN OF LEFT KNEE: ICD-10-CM

## 2022-10-12 DIAGNOSIS — M25.559 HIP PAIN: ICD-10-CM

## 2022-10-12 LAB
ALBUMIN SERPL-MCNC: 2.7 G/DL (ref 3.4–5)
ALBUMIN/GLOB SERPL: 0.6 {RATIO} (ref 1–2)
ALP LIVER SERPL-CCNC: 106 U/L
ALT SERPL-CCNC: 19 U/L
ANION GAP SERPL CALC-SCNC: 6 MMOL/L (ref 0–18)
AST SERPL-CCNC: 19 U/L (ref 15–37)
BASOPHILS # BLD AUTO: 0.05 X10(3) UL (ref 0–0.2)
BASOPHILS NFR BLD AUTO: 0.6 %
BILIRUB SERPL-MCNC: 0.6 MG/DL (ref 0.1–2)
BUN BLD-MCNC: 19 MG/DL (ref 7–18)
CALCIUM BLD-MCNC: 9.4 MG/DL (ref 8.5–10.1)
CHLORIDE SERPL-SCNC: 99 MMOL/L (ref 98–112)
CO2 SERPL-SCNC: 31 MMOL/L (ref 21–32)
CREAT BLD-MCNC: 0.8 MG/DL
EOSINOPHIL # BLD AUTO: 0.13 X10(3) UL (ref 0–0.7)
EOSINOPHIL NFR BLD AUTO: 1.5 %
ERYTHROCYTE [DISTWIDTH] IN BLOOD BY AUTOMATED COUNT: 16.7 %
GFR SERPLBLD BASED ON 1.73 SQ M-ARVRAT: 80 ML/MIN/1.73M2 (ref 60–?)
GLOBULIN PLAS-MCNC: 4.6 G/DL (ref 2.8–4.4)
GLUCOSE BLD-MCNC: 101 MG/DL (ref 70–99)
HCT VFR BLD AUTO: 32.5 %
HGB BLD-MCNC: 9.7 G/DL
IMM GRANULOCYTES # BLD AUTO: 0.04 X10(3) UL (ref 0–1)
IMM GRANULOCYTES NFR BLD: 0.5 %
INR BLD: 2.27 (ref 0.85–1.16)
LYMPHOCYTES # BLD AUTO: 1.13 X10(3) UL (ref 1–4)
LYMPHOCYTES NFR BLD AUTO: 13.4 %
MCH RBC QN AUTO: 26 PG (ref 26–34)
MCHC RBC AUTO-ENTMCNC: 29.8 G/DL (ref 31–37)
MCV RBC AUTO: 87.1 FL
MONOCYTES # BLD AUTO: 1.06 X10(3) UL (ref 0.1–1)
MONOCYTES NFR BLD AUTO: 12.6 %
NEUTROPHILS # BLD AUTO: 6.03 X10 (3) UL (ref 1.5–7.7)
NEUTROPHILS # BLD AUTO: 6.03 X10(3) UL (ref 1.5–7.7)
NEUTROPHILS NFR BLD AUTO: 71.4 %
OSMOLALITY SERPL CALC.SUM OF ELEC: 284 MOSM/KG (ref 275–295)
PLATELET # BLD AUTO: 422 10(3)UL (ref 150–450)
POTASSIUM SERPL-SCNC: 3.9 MMOL/L (ref 3.5–5.1)
PROT SERPL-MCNC: 7.3 G/DL (ref 6.4–8.2)
PROTHROMBIN TIME: 24.8 SECONDS (ref 11.6–14.8)
RBC # BLD AUTO: 3.73 X10(6)UL
SARS-COV-2 RNA RESP QL NAA+PROBE: NOT DETECTED
SODIUM SERPL-SCNC: 136 MMOL/L (ref 136–145)
WBC # BLD AUTO: 8.4 X10(3) UL (ref 4–11)

## 2022-10-12 PROCEDURE — 36415 COLL VENOUS BLD VENIPUNCTURE: CPT

## 2022-10-12 PROCEDURE — 73560 X-RAY EXAM OF KNEE 1 OR 2: CPT | Performed by: EMERGENCY MEDICINE

## 2022-10-12 PROCEDURE — 99285 EMERGENCY DEPT VISIT HI MDM: CPT

## 2022-10-12 PROCEDURE — 73502 X-RAY EXAM HIP UNI 2-3 VIEWS: CPT | Performed by: EMERGENCY MEDICINE

## 2022-10-12 PROCEDURE — 85610 PROTHROMBIN TIME: CPT | Performed by: EMERGENCY MEDICINE

## 2022-10-12 RX ORDER — CARVEDILOL 3.12 MG/1
3.12 TABLET ORAL 2 TIMES DAILY WITH MEALS
Status: DISCONTINUED | OUTPATIENT
Start: 2022-10-12 | End: 2022-10-14

## 2022-10-12 RX ORDER — LEVOTHYROXINE SODIUM 0.15 MG/1
150 TABLET ORAL
Status: DISCONTINUED | OUTPATIENT
Start: 2022-10-13 | End: 2022-10-14

## 2022-10-12 RX ORDER — CLOPIDOGREL BISULFATE 75 MG/1
75 TABLET ORAL DAILY
Status: DISCONTINUED | OUTPATIENT
Start: 2022-10-13 | End: 2022-10-14

## 2022-10-12 RX ORDER — ACETAMINOPHEN 500 MG
1000 TABLET ORAL ONCE
Status: COMPLETED | OUTPATIENT
Start: 2022-10-12 | End: 2022-10-12

## 2022-10-12 RX ORDER — ACETAMINOPHEN 500 MG
500 TABLET ORAL EVERY 4 HOURS PRN
Status: DISCONTINUED | OUTPATIENT
Start: 2022-10-12 | End: 2022-10-14

## 2022-10-12 RX ORDER — FLUOXETINE HYDROCHLORIDE 20 MG/1
20 CAPSULE ORAL 2 TIMES DAILY
Status: DISCONTINUED | OUTPATIENT
Start: 2022-10-12 | End: 2022-10-14

## 2022-10-12 RX ORDER — SPIRONOLACTONE 25 MG/1
25 TABLET ORAL DAILY
Status: DISCONTINUED | OUTPATIENT
Start: 2022-10-13 | End: 2022-10-14

## 2022-10-12 RX ORDER — SENNOSIDES 8.6 MG
17.2 TABLET ORAL NIGHTLY PRN
Status: DISCONTINUED | OUTPATIENT
Start: 2022-10-12 | End: 2022-10-14

## 2022-10-12 RX ORDER — NORTRIPTYLINE HYDROCHLORIDE 25 MG/1
25 CAPSULE ORAL NIGHTLY
Status: DISCONTINUED | OUTPATIENT
Start: 2022-10-12 | End: 2022-10-14

## 2022-10-12 RX ORDER — METOCLOPRAMIDE HYDROCHLORIDE 5 MG/ML
10 INJECTION INTRAMUSCULAR; INTRAVENOUS EVERY 8 HOURS PRN
Status: DISCONTINUED | OUTPATIENT
Start: 2022-10-12 | End: 2022-10-14

## 2022-10-12 RX ORDER — FLUOXETINE HYDROCHLORIDE 20 MG/1
20 CAPSULE ORAL 2 TIMES DAILY
COMMUNITY

## 2022-10-12 RX ORDER — BISACODYL 10 MG
10 SUPPOSITORY, RECTAL RECTAL
Status: DISCONTINUED | OUTPATIENT
Start: 2022-10-12 | End: 2022-10-14

## 2022-10-12 RX ORDER — POLYETHYLENE GLYCOL 3350 17 G/17G
17 POWDER, FOR SOLUTION ORAL DAILY PRN
Status: DISCONTINUED | OUTPATIENT
Start: 2022-10-12 | End: 2022-10-14

## 2022-10-12 RX ORDER — DIGOXIN 125 MCG
125 TABLET ORAL DAILY
Status: DISCONTINUED | OUTPATIENT
Start: 2022-10-13 | End: 2022-10-14

## 2022-10-12 RX ORDER — ALBUTEROL SULFATE 90 UG/1
2 AEROSOL, METERED RESPIRATORY (INHALATION) EVERY 6 HOURS PRN
Status: DISCONTINUED | OUTPATIENT
Start: 2022-10-12 | End: 2022-10-14

## 2022-10-12 RX ORDER — FUROSEMIDE 40 MG/1
40 TABLET ORAL 2 TIMES DAILY
Status: DISCONTINUED | OUTPATIENT
Start: 2022-10-12 | End: 2022-10-14

## 2022-10-12 RX ORDER — POTASSIUM CHLORIDE 750 MG/1
10 TABLET, EXTENDED RELEASE ORAL 2 TIMES DAILY
COMMUNITY

## 2022-10-12 RX ORDER — CYPROHEPTADINE HYDROCHLORIDE 4 MG/1
4 TABLET ORAL NIGHTLY
Status: DISCONTINUED | OUTPATIENT
Start: 2022-10-12 | End: 2022-10-14

## 2022-10-12 RX ORDER — WARFARIN SODIUM 3 MG/1
3 TABLET ORAL SEE ADMIN INSTRUCTIONS
COMMUNITY

## 2022-10-12 RX ORDER — ALLOPURINOL 100 MG/1
100 TABLET ORAL DAILY
Status: DISCONTINUED | OUTPATIENT
Start: 2022-10-13 | End: 2022-10-14

## 2022-10-12 RX ORDER — DIPHENHYDRAMINE HCL 25 MG
50 CAPSULE ORAL NIGHTLY
Status: DISCONTINUED | OUTPATIENT
Start: 2022-10-12 | End: 2022-10-14

## 2022-10-12 RX ORDER — WARFARIN SODIUM 1 MG/1
4.5 TABLET ORAL SEE ADMIN INSTRUCTIONS
COMMUNITY

## 2022-10-12 RX ORDER — BUPROPION HYDROCHLORIDE 150 MG/1
150 TABLET, EXTENDED RELEASE ORAL 2 TIMES DAILY
COMMUNITY

## 2022-10-12 RX ORDER — ROSUVASTATIN CALCIUM 5 MG/1
5 TABLET, COATED ORAL EVERY OTHER DAY
Status: DISCONTINUED | OUTPATIENT
Start: 2022-10-12 | End: 2022-10-14

## 2022-10-12 RX ORDER — LEVOTHYROXINE SODIUM 0.15 MG/1
150 TABLET ORAL
COMMUNITY

## 2022-10-12 RX ORDER — CYPROHEPTADINE HYDROCHLORIDE 4 MG/1
4 TABLET ORAL NIGHTLY
COMMUNITY

## 2022-10-12 RX ORDER — ONDANSETRON 2 MG/ML
4 INJECTION INTRAMUSCULAR; INTRAVENOUS EVERY 6 HOURS PRN
Status: DISCONTINUED | OUTPATIENT
Start: 2022-10-12 | End: 2022-10-14

## 2022-10-12 RX ORDER — BUPROPION HYDROCHLORIDE 150 MG/1
150 TABLET, EXTENDED RELEASE ORAL 2 TIMES DAILY
Status: DISCONTINUED | OUTPATIENT
Start: 2022-10-13 | End: 2022-10-14

## 2022-10-12 NOTE — ED QUICK NOTES
Orders for admission, patient is aware of plan and ready to go upstairs. Any questions, please call ED RN Halina Miranda at extension [phone]. Patient Covid vaccination status: Fully vaccinated     COVID Test Ordered in ED: Rapid SARS-CoV-2 by PCR    COVID Suspicion at Admission: Low clinical suspicion for COVID    Running Infusions:  None    Mental Status/LOC at time of transport: A/Ox3    Other pertinent information: unable to get SNF placement from ED.   CIWA score: N/A   NIH score:  N/A

## 2022-10-12 NOTE — ED QUICK NOTES
Pt resting on cart, evaluated by case management and will be placed in SNF. Dinner tray offered, pt declined. Lights dimmed for pt comfort. Call light at bedside.

## 2022-10-12 NOTE — CM/SW NOTE
Spoke to patient at the bedside who was just recently hospitalized here. Pt just left the hospital yesterday and has been getting progressively weaker at this time. Pt is from [facility] but she follows up with her doctors here. She is open to going to a rehab facility at this time. She mentioned that she has gone to the [facility] facilities in the past and would be open to going to them. I will do a PASSR screen and send a referral on aidin.

## 2022-10-13 LAB
ANION GAP SERPL CALC-SCNC: 8 MMOL/L (ref 0–18)
BASOPHILS # BLD AUTO: 0.05 X10(3) UL (ref 0–0.2)
BASOPHILS NFR BLD AUTO: 0.6 %
BUN BLD-MCNC: 18 MG/DL (ref 7–18)
CALCIUM BLD-MCNC: 8.7 MG/DL (ref 8.5–10.1)
CHLORIDE SERPL-SCNC: 102 MMOL/L (ref 98–112)
CO2 SERPL-SCNC: 29 MMOL/L (ref 21–32)
CREAT BLD-MCNC: 0.64 MG/DL
EOSINOPHIL # BLD AUTO: 0.24 X10(3) UL (ref 0–0.7)
EOSINOPHIL NFR BLD AUTO: 3.1 %
ERYTHROCYTE [DISTWIDTH] IN BLOOD BY AUTOMATED COUNT: 16.5 %
GFR SERPLBLD BASED ON 1.73 SQ M-ARVRAT: 96 ML/MIN/1.73M2 (ref 60–?)
GLUCOSE BLD-MCNC: 101 MG/DL (ref 70–99)
HCT VFR BLD AUTO: 31.4 %
HGB BLD-MCNC: 9.4 G/DL
IMM GRANULOCYTES # BLD AUTO: 0.04 X10(3) UL (ref 0–1)
IMM GRANULOCYTES NFR BLD: 0.5 %
INR BLD: 2.04 (ref 0.85–1.16)
LYMPHOCYTES # BLD AUTO: 1.2 X10(3) UL (ref 1–4)
LYMPHOCYTES NFR BLD AUTO: 15.4 %
MCH RBC QN AUTO: 25.6 PG (ref 26–34)
MCHC RBC AUTO-ENTMCNC: 29.9 G/DL (ref 31–37)
MCV RBC AUTO: 85.6 FL
MONOCYTES # BLD AUTO: 0.96 X10(3) UL (ref 0.1–1)
MONOCYTES NFR BLD AUTO: 12.3 %
NEUTROPHILS # BLD AUTO: 5.32 X10 (3) UL (ref 1.5–7.7)
NEUTROPHILS # BLD AUTO: 5.32 X10(3) UL (ref 1.5–7.7)
NEUTROPHILS NFR BLD AUTO: 68.1 %
OSMOLALITY SERPL CALC.SUM OF ELEC: 290 MOSM/KG (ref 275–295)
PLATELET # BLD AUTO: 457 10(3)UL (ref 150–450)
POTASSIUM SERPL-SCNC: 3.2 MMOL/L (ref 3.5–5.1)
POTASSIUM SERPL-SCNC: 4 MMOL/L (ref 3.5–5.1)
PROTHROMBIN TIME: 22.8 SECONDS (ref 11.6–14.8)
RBC # BLD AUTO: 3.67 X10(6)UL
SODIUM SERPL-SCNC: 139 MMOL/L (ref 136–145)
WBC # BLD AUTO: 7.8 X10(3) UL (ref 4–11)

## 2022-10-13 PROCEDURE — 85610 PROTHROMBIN TIME: CPT | Performed by: STUDENT IN AN ORGANIZED HEALTH CARE EDUCATION/TRAINING PROGRAM

## 2022-10-13 PROCEDURE — 97162 PT EVAL MOD COMPLEX 30 MIN: CPT

## 2022-10-13 PROCEDURE — 85025 COMPLETE CBC W/AUTO DIFF WBC: CPT | Performed by: HOSPITALIST

## 2022-10-13 PROCEDURE — 97530 THERAPEUTIC ACTIVITIES: CPT

## 2022-10-13 PROCEDURE — 80048 BASIC METABOLIC PNL TOTAL CA: CPT | Performed by: HOSPITALIST

## 2022-10-13 PROCEDURE — 97116 GAIT TRAINING THERAPY: CPT

## 2022-10-13 PROCEDURE — 84132 ASSAY OF SERUM POTASSIUM: CPT | Performed by: HOSPITALIST

## 2022-10-13 RX ORDER — POTASSIUM CHLORIDE 1.5 G/1.77G
40 POWDER, FOR SOLUTION ORAL EVERY 4 HOURS
Status: COMPLETED | OUTPATIENT
Start: 2022-10-13 | End: 2022-10-13

## 2022-10-13 RX ORDER — CALCIUM CARBONATE 200(500)MG
1000 TABLET,CHEWABLE ORAL 3 TIMES DAILY PRN
Status: DISCONTINUED | OUTPATIENT
Start: 2022-10-13 | End: 2022-10-14

## 2022-10-13 RX ORDER — POTASSIUM CHLORIDE 20 MEQ/1
40 TABLET, EXTENDED RELEASE ORAL EVERY 4 HOURS
Status: DISCONTINUED | OUTPATIENT
Start: 2022-10-13 | End: 2022-10-13

## 2022-10-13 NOTE — PROGRESS NOTES
Pharmacy Dosing Service  Warfarin (Coumadin) Initial Dosing         Nishant Fam is a 69year old female for whom pharmacy has been consulted to dose warfarin for Hx of PE & Afib. Pharmacy has been asked to dose warfarin by Dr. Rachael Thompson. Based on this indication, goal INR is 2-3. Surgery date (if applicable): n/a  Significant drug interactions:  clopidogrel & allopurinol     Latest INR:   Recent Labs     10/13/22  0744   INR 2.04*       Other anticoagulants:  n/a  Home regimen (if applicable):   3mg T/Th & 4.5mg ROW   Date/Time last dose was given (if applicable): 83/30 pm      A/P:  1. The INR is therapeutic. 2.  Ordered warfarin 3mg for tonight at 2100. 3.  PT/INR ordered daily while on warfarin. Pharmacy will continue to follow. We appreciate the opportunity to assist in her care.         Marito HaneyD, BCPS  10/13/2022  10:55 AM

## 2022-10-13 NOTE — PROGRESS NOTES
10/13/22 1205   Choose the answer that best describes how you felt over the past week. Are you basically satisfied with your life? Y   Have you dropped many of your activities and interests? Y  (because of her physical reasons)   Do you feel that your life is empty? N   Do you often get bored? N   Are you hopeful about the future? Y   Are you bothered by thoughts you can't get out of your head? N   Are you in good spirits most of the time? Y   Are you afraid that something bad is going to happen to you? N   Do you feel happy most of the time? Y   Do you often feel helpless? Y   Do you often get restless and fidgety? N   Do you prefer to stay at home, rather than going out and doing new things? N   Do you frequently worry about the future? N   Do you feel you have more problems with memory than most? N   Do you think it is wonderful to be alive now? Y   Do you often feel downhearted and blue? N   Do you feel pretty worthless the way you are now? N   Do you worry a lot about the past? N   Do you find life very exciting? N   Is it hard for you to get started on new projects? Y   Do you feel full of energy? N   Do you feel that your situation is hopeless? N   Do you think that most people are better off than you are? N   Do you frequently get upset over little things? N   Do you frequently feel like crying? N   Do you have trouble concentrating? N   Do you enjoy getting up in the morning? Y   Do you prefer to avoid social gatherings? N   Is it easy for you to make decisions? Y   Is your mind as clear as it used to be? Y   Geriatric Depression Scale Total 5   [facility] Resource Referral Counselor Note    Triny Mccrary Patient Status:  Observation    1955 MRN [id number]   [facility] 3NE-A Attending Naun Sparks, *   Hosp Day # 0 PCP Jonh Quinteros DO       S(subjective) Patient admits to a history of anxiety and depression for years.   She said she is on Prozac per pcp and feels stable with symptoms. Patient said, considering all the medical things wrong with her she thinks she is doing mentally fine. She denies any suicidal thoughts. O(objective) The patient is alert and oriented x4. Her mood and affect is wnl. A(assessment) The phq4 score was 6. Completed the GDS and she scored 5. P(plan) The patient wants to continue to follow up with her pcp. She declined any follow up with a mental health provider.       Constance Field RN  10/13/2022  12:42 PM

## 2022-10-13 NOTE — PLAN OF CARE
NURSING ADMISSION NOTE      Patient admitted via Cart  Oriented to room. Safety precautions initiated. Bed in low position. Call light in reach. Assumed care at 2100. Admission navigator completed. A/O x3-4. RA. Afib on tele. C/o mild pain in L hip and L knee. Purewick in place, briefed. Cardiac elec protocol. Regular diet. PT and psych liaison to see. CM/SW following. Safety prec in place. Needs being met at this time.

## 2022-10-13 NOTE — CM/SW NOTE
Choice list provided, and pt will review. Wants [facility] but they have not responded.      Pt also needs to complete a level two pasrr

## 2022-10-13 NOTE — PLAN OF CARE
The patient is A/Ox4, on RA, no SOB, afib on tele, afebrile. Reports generalized pain due to hx. Knees/hips replacements, declines medications. PT eval completed, rec NAIF. This writer notified LSW of PT recommendation. Discharge pending placement to [facility]. Safety precautions in place. Staff will continue to monitor.            Problem: Patient/Family Goals  Goal: Patient/Family Long Term Goal  Description: Patient's Long Term Goal: dc    Interventions:  - medications  - See additional Care Plan goals for specific interventions  Outcome: Progressing  Goal: Patient/Family Short Term Goal  Description: Patient's Short Term Goal: safety    Interventions:   - frequent rounding   - See additional Care Plan goals for specific interventions  Outcome: Progressing     Problem: PAIN - ADULT  Goal: Verbalizes/displays adequate comfort level or patient's stated pain goal  Description: INTERVENTIONS:  - Encourage pt to monitor pain and request assistance  - Assess pain using appropriate pain scale  - Administer analgesics based on type and severity of pain and evaluate response  - Implement non-pharmacological measures as appropriate and evaluate response  - Consider cultural and social influences on pain and pain management  - Manage/alleviate anxiety  - Utilize distraction and/or relaxation techniques  - Monitor for opioid side effects  - Notify MD/LIP if interventions unsuccessful or patient reports new pain  - Anticipate increased pain with activity and pre-medicate as appropriate  Outcome: Progressing     Problem: RISK FOR INFECTION - ADULT  Goal: Absence of fever/infection during anticipated neutropenic period  Description: INTERVENTIONS  - Monitor WBC  - Administer growth factors as ordered  - Implement neutropenic guidelines  Outcome: Progressing     Problem: SAFETY ADULT - FALL  Goal: Free from fall injury  Description: INTERVENTIONS:  - Assess pt frequently for physical needs  - Identify cognitive and physical deficits and behaviors that affect risk of falls.   - [place] fall precautions as indicated by assessment.  - Educate pt/family on patient safety including physical limitations  - Instruct pt to call for assistance with activity based on assessment  - Modify environment to reduce risk of injury  - Provide assistive devices as appropriate  - Consider OT/PT consult to assist with strengthening/mobility  - Encourage toileting schedule  Outcome: Progressing     Problem: DISCHARGE PLANNING  Goal: Discharge to home or other facility with appropriate resources  Description: INTERVENTIONS:  - Identify barriers to discharge w/pt and caregiver  - Include patient/family/discharge partner in discharge planning  - Arrange for needed discharge resources and transportation as appropriate  - Identify discharge learning needs (meds, wound care, etc)  - Arrange for interpreters to assist at discharge as needed  - Consider post-discharge preferences of patient/family/discharge partner  - Complete POLST form as appropriate  - Assess patient's ability to be responsible for managing their own health  - Refer to Case Management Department for coordinating discharge planning if the patient needs post-hospital services based on physician/LIP order or complex needs related to functional status, cognitive ability or social support system  Outcome: Progressing

## 2022-10-14 VITALS
OXYGEN SATURATION: 97 % | WEIGHT: 227.63 LBS | HEIGHT: 69 IN | TEMPERATURE: 98 F | SYSTOLIC BLOOD PRESSURE: 118 MMHG | BODY MASS INDEX: 33.71 KG/M2 | DIASTOLIC BLOOD PRESSURE: 41 MMHG | HEART RATE: 75 BPM | RESPIRATION RATE: 16 BRPM

## 2022-10-14 LAB
ANION GAP SERPL CALC-SCNC: 8 MMOL/L (ref 0–18)
BUN BLD-MCNC: 18 MG/DL (ref 7–18)
CALCIUM BLD-MCNC: 8.8 MG/DL (ref 8.5–10.1)
CHLORIDE SERPL-SCNC: 103 MMOL/L (ref 98–112)
CO2 SERPL-SCNC: 28 MMOL/L (ref 21–32)
CREAT BLD-MCNC: 0.65 MG/DL
GFR SERPLBLD BASED ON 1.73 SQ M-ARVRAT: 95 ML/MIN/1.73M2 (ref 60–?)
GLUCOSE BLD-MCNC: 113 MG/DL (ref 70–99)
INR BLD: 2.04 (ref 0.85–1.16)
OSMOLALITY SERPL CALC.SUM OF ELEC: 291 MOSM/KG (ref 275–295)
POTASSIUM SERPL-SCNC: 3.8 MMOL/L (ref 3.5–5.1)
PROTHROMBIN TIME: 22.8 SECONDS (ref 11.6–14.8)
SARS-COV-2 RNA RESP QL NAA+PROBE: NOT DETECTED
SODIUM SERPL-SCNC: 139 MMOL/L (ref 136–145)

## 2022-10-14 PROCEDURE — 85610 PROTHROMBIN TIME: CPT | Performed by: HOSPITALIST

## 2022-10-14 PROCEDURE — 80048 BASIC METABOLIC PNL TOTAL CA: CPT | Performed by: HOSPITALIST

## 2022-10-14 NOTE — PROGRESS NOTES
Discharged  To [facility]  via ambulance  Accompanied by Support staff   Belongings taken by patient/family  PIV removed and no bleeding noted at the site.    Tele box removed & placed in the drawer  Discharge Navigator completed  Discharge instructions reviewed with patient a bedside  All questions & concerns addressed at his time.     '

## 2022-10-14 NOTE — CM/SW NOTE
DC Set up for 5:45pm  BLS   PCS completed    [facility] [address], [address] [place] [address]  Phone: [phone]

## 2022-10-14 NOTE — PHYSICAL THERAPY NOTE
Attempted to see pt for PT. Per RN, pt is being discharged today to [facility]. Will follow up at a later date if dc is delayed.

## 2022-10-14 NOTE — PLAN OF CARE
Patient is A/Ox4. C/O weakness and refuses to get up to chair. VSS. Afebrile. Patient is on RA. . Lung sounds clear. No Cough/SOB. SHELDON and refused CPAP. on Tele NSR. A-fib on Warfarin. SCD on. Regular Diet. Denies any N/V/Diarrhea. Patient has left knee and hip pain. Tylenol PRN per MAR. Bilateral skin discoloration and rash. Up with assist. PT recommends NAIF. WCTM. Patient updated on plan of discharging to [facility].               Problem: Patient/Family Goals  Goal: Patient/Family Long Term Goal  Description: Patient's Long Term Goal: Discharge to [facility]    Interventions:  - Follow plan of care  - See additional Care Plan goals for specific interventions  Outcome: Progressing  Goal: Patient/Family Short Term Goal  Description: Patient's Short Term Goal: 10/14 Discharge to [facility]    Interventions:   - covid rapid test pending NAIF placement, stay safe  - See additional Care Plan goals for specific interventions  Outcome: Progressing     Problem: PAIN - ADULT  Goal: Verbalizes/displays adequate comfort level or patient's stated pain goal  Description: INTERVENTIONS:  - Encourage pt to monitor pain and request assistance  - Assess pain using appropriate pain scale  - Administer analgesics based on type and severity of pain and evaluate response  - Implement non-pharmacological measures as appropriate and evaluate response  - Consider cultural and social influences on pain and pain management  - Manage/alleviate anxiety  - Utilize distraction and/or relaxation techniques  - Monitor for opioid side effects  - Notify MD/LIP if interventions unsuccessful or patient reports new pain  - Anticipate increased pain with activity and pre-medicate as appropriate  Outcome: Progressing     Problem: RISK FOR INFECTION - ADULT  Goal: Absence of fever/infection during anticipated neutropenic period  Description: INTERVENTIONS  - Monitor WBC  - Administer growth factors as ordered  - Implement neutropenic guidelines  Outcome: Progressing Problem: SAFETY ADULT - FALL  Goal: Free from fall injury  Description: INTERVENTIONS:  - Assess pt frequently for physical needs  - Identify cognitive and physical deficits and behaviors that affect risk of falls.   - [place] fall precautions as indicated by assessment.  - Educate pt/family on patient safety including physical limitations  - Instruct pt to call for assistance with activity based on assessment  - Modify environment to reduce risk of injury  - Provide assistive devices as appropriate  - Consider OT/PT consult to assist with strengthening/mobility  - Encourage toileting schedule  Outcome: Progressing     Problem: DISCHARGE PLANNING  Goal: Discharge to home or other facility with appropriate resources  Description: INTERVENTIONS:  - Identify barriers to discharge w/pt and caregiver  - Include patient/family/discharge partner in discharge planning  - Arrange for needed discharge resources and transportation as appropriate  - Identify discharge learning needs (meds, wound care, etc)  - Arrange for interpreters to assist at discharge as needed  - Consider post-discharge preferences of patient/family/discharge partner  - Complete POLST form as appropriate  - Assess patient's ability to be responsible for managing their own health  - Refer to Case Management Department for coordinating discharge planning if the patient needs post-hospital services based on physician/LIP order or complex needs related to functional status, cognitive ability or social support system  Outcome: Progressing

## 2022-10-14 NOTE — PLAN OF CARE
Assumed care for this pt at [address] [place] [address] Road. Pt alert oriented, vss, pt pain managed by prn medications. Pt pain in left hip and knee. Pt denies any questions at this time, external cath in place. Will continue to monitor  Problem: PAIN - ADULT  Goal: Verbalizes/displays adequate comfort level or patient's stated pain goal  Description: INTERVENTIONS:  - Encourage pt to monitor pain and request assistance  - Assess pain using appropriate pain scale  - Administer analgesics based on type and severity of pain and evaluate response  - Implement non-pharmacological measures as appropriate and evaluate response  - Consider cultural and social influences on pain and pain management  - Manage/alleviate anxiety  - Utilize distraction and/or relaxation techniques  - Monitor for opioid side effects  - Notify MD/LIP if interventions unsuccessful or patient reports new pain  - Anticipate increased pain with activity and pre-medicate as appropriate  Outcome: Progressing     Problem: RISK FOR INFECTION - ADULT  Goal: Absence of fever/infection during anticipated neutropenic period  Description: INTERVENTIONS  - Monitor WBC  - Administer growth factors as ordered  - Implement neutropenic guidelines  Outcome: Progressing     Problem: SAFETY ADULT - FALL  Goal: Free from fall injury  Description: INTERVENTIONS:  - Assess pt frequently for physical needs  - Identify cognitive and physical deficits and behaviors that affect risk of falls.   - [place] fall precautions as indicated by assessment.  - Educate pt/family on patient safety including physical limitations  - Instruct pt to call for assistance with activity based on assessment  - Modify environment to reduce risk of injury  - Provide assistive devices as appropriate  - Consider OT/PT consult to assist with strengthening/mobility  - Encourage toileting schedule  Outcome: Progressing     Problem: DISCHARGE PLANNING  Goal: Discharge to home or other facility with appropriate resources  Description: INTERVENTIONS:  - Identify barriers to discharge w/pt and caregiver  - Include patient/family/discharge partner in discharge planning  - Arrange for needed discharge resources and transportation as appropriate  - Identify discharge learning needs (meds, wound care, etc)  - Arrange for interpreters to assist at discharge as needed  - Consider post-discharge preferences of patient/family/discharge partner  - Complete POLST form as appropriate  - Assess patient's ability to be responsible for managing their own health  - Refer to Case Management Department for coordinating discharge planning if the patient needs post-hospital services based on physician/LIP order or complex needs related to functional status, cognitive ability or social support system  Outcome: Progressing

## 2022-10-14 NOTE — PROGRESS NOTES
Report given for [facility] [address] [employer] [place] and all the questions answered.

## 2022-10-14 NOTE — PROGRESS NOTES
Pharmacy Dosing Service  Warfarin (Coumadin) Subsequent Dosing         Meli Lopes is a 69year old female for whom pharmacy has been dosing warfarin. Consulting physician: Dr Calli Cortez    Indication: Hx of Afib & PE    Goal INR is 2-3      Recent Labs   Lab 10/10/22  0728 10/11/22  0654 10/12/22  1241 10/13/22  0744 10/14/22  0651   INR 2.35* 2.46* 2.27* 2.04* 2.04*     Significant drug interactions:  clopidogrel & allopurinol   Other anticoagulants:  n/a  Home regimen (if applicable):  3mg T/Th & 4.5mg ROW  Date/Time last dose was given: 10/13pm      Inpatient Dosing History:    Date 10/13 10/14   INR 2.04 2.04   Warfarin dose 4mg             A/P:  1. The INR is therapeutic. 2.  Ordered warfarin 4.5mg for tonight at 2100. 3.  PT/INR ordered daily while on warfarin. Pharmacy will continue to follow. We appreciate the opportunity to assist in her care.       Lolita Parikh, Vianca, BCPS  10/14/2022  8:08 AM

## 2022-11-29 ENCOUNTER — HOSPITAL ENCOUNTER (INPATIENT)
Facility: HOSPITAL | Age: 67
LOS: 8 days | Discharge: NURSING, REHAB OR LONG-TERM CARE | End: 2022-12-08
Attending: EMERGENCY MEDICINE | Admitting: INTERNAL MEDICINE
Payer: MEDICARE

## 2022-11-29 ENCOUNTER — APPOINTMENT (OUTPATIENT)
Dept: CT IMAGING | Facility: HOSPITAL | Age: 67
DRG: 872 | End: 2022-11-29
Attending: EMERGENCY MEDICINE
Payer: MEDICARE

## 2022-11-29 ENCOUNTER — APPOINTMENT (OUTPATIENT)
Dept: CT IMAGING | Facility: HOSPITAL | Age: 67
End: 2022-11-29
Attending: EMERGENCY MEDICINE
Payer: MEDICARE

## 2022-11-29 ENCOUNTER — HOSPITAL ENCOUNTER (INPATIENT)
Facility: HOSPITAL | Age: 67
LOS: 8 days | Discharge: NURSING, REHAB OR LONG-TERM CARE | DRG: 872 | End: 2022-12-08
Attending: EMERGENCY MEDICINE | Admitting: INTERNAL MEDICINE
Payer: MEDICARE

## 2022-11-29 DIAGNOSIS — R31.0 GROSS HEMATURIA: Primary | ICD-10-CM

## 2022-11-29 LAB
ALBUMIN SERPL-MCNC: 2.2 G/DL (ref 3.4–5)
ALBUMIN/GLOB SERPL: 0.6 {RATIO} (ref 1–2)
ALP LIVER SERPL-CCNC: 121 U/L
ALT SERPL-CCNC: 8 U/L
ANION GAP SERPL CALC-SCNC: 3 MMOL/L (ref 0–18)
AST SERPL-CCNC: 17 U/L (ref 15–37)
BASOPHILS # BLD AUTO: 0.04 X10(3) UL (ref 0–0.2)
BASOPHILS NFR BLD AUTO: 0.3 %
BILIRUB SERPL-MCNC: 1 MG/DL (ref 0.1–2)
BILIRUB UR QL CFM: NEGATIVE
BUN BLD-MCNC: 14 MG/DL (ref 7–18)
CALCIUM BLD-MCNC: 8.7 MG/DL (ref 8.5–10.1)
CHLORIDE SERPL-SCNC: 103 MMOL/L (ref 98–112)
CO2 SERPL-SCNC: 27 MMOL/L (ref 21–32)
CREAT BLD-MCNC: 0.6 MG/DL
DIGOXIN SERPL-MCNC: 0.76 NG/ML (ref 0.8–2)
EOSINOPHIL # BLD AUTO: 0.08 X10(3) UL (ref 0–0.7)
EOSINOPHIL NFR BLD AUTO: 0.6 %
ERYTHROCYTE [DISTWIDTH] IN BLOOD BY AUTOMATED COUNT: 18.7 %
GFR SERPLBLD BASED ON 1.73 SQ M-ARVRAT: 97 ML/MIN/1.73M2 (ref 60–?)
GLOBULIN PLAS-MCNC: 3.8 G/DL (ref 2.8–4.4)
GLUCOSE BLD-MCNC: 75 MG/DL (ref 70–99)
GLUCOSE UR STRIP.AUTO-MCNC: NEGATIVE MG/DL
HCT VFR BLD AUTO: 27.4 %
HGB BLD-MCNC: 8.7 G/DL
HYALINE CASTS #/AREA URNS AUTO: PRESENT /LPF
IMM GRANULOCYTES # BLD AUTO: 0.06 X10(3) UL (ref 0–1)
IMM GRANULOCYTES NFR BLD: 0.4 %
INR BLD: 2.72 (ref 0.85–1.16)
KETONES UR STRIP.AUTO-MCNC: NEGATIVE MG/DL
LYMPHOCYTES # BLD AUTO: 1.27 X10(3) UL (ref 1–4)
LYMPHOCYTES NFR BLD AUTO: 9.5 %
MCH RBC QN AUTO: 26.7 PG (ref 26–34)
MCHC RBC AUTO-ENTMCNC: 31.8 G/DL (ref 31–37)
MCV RBC AUTO: 84 FL
MONOCYTES # BLD AUTO: 1.8 X10(3) UL (ref 0.1–1)
MONOCYTES NFR BLD AUTO: 13.4 %
NEUTROPHILS # BLD AUTO: 10.15 X10 (3) UL (ref 1.5–7.7)
NEUTROPHILS # BLD AUTO: 10.15 X10(3) UL (ref 1.5–7.7)
NEUTROPHILS NFR BLD AUTO: 75.8 %
NITRITE UR QL STRIP.AUTO: POSITIVE
OSMOLALITY SERPL CALC.SUM OF ELEC: 275 MOSM/KG (ref 275–295)
PH UR STRIP.AUTO: 6 [PH] (ref 5–8)
PLATELET # BLD AUTO: 408 10(3)UL (ref 150–450)
POTASSIUM SERPL-SCNC: 3.7 MMOL/L (ref 3.5–5.1)
PROT SERPL-MCNC: 6 G/DL (ref 6.4–8.2)
PROTHROMBIN TIME: 28.5 SECONDS (ref 11.6–14.8)
RBC # BLD AUTO: 3.26 X10(6)UL
RBC #/AREA URNS AUTO: >10 /HPF
SARS-COV-2 RNA RESP QL NAA+PROBE: NOT DETECTED
SODIUM SERPL-SCNC: 133 MMOL/L (ref 136–145)
SP GR UR STRIP.AUTO: 1.02 (ref 1–1.03)
UROBILINOGEN UR STRIP.AUTO-MCNC: 1 MG/DL
WBC # BLD AUTO: 13.4 X10(3) UL (ref 4–11)
WBC #/AREA URNS AUTO: >50 /HPF
WBC CLUMPS UR QL AUTO: PRESENT /HPF

## 2022-11-29 PROCEDURE — 96361 HYDRATE IV INFUSION ADD-ON: CPT

## 2022-11-29 PROCEDURE — 87186 SC STD MICRODIL/AGAR DIL: CPT | Performed by: EMERGENCY MEDICINE

## 2022-11-29 PROCEDURE — 80162 ASSAY OF DIGOXIN TOTAL: CPT | Performed by: EMERGENCY MEDICINE

## 2022-11-29 PROCEDURE — 80053 COMPREHEN METABOLIC PANEL: CPT | Performed by: EMERGENCY MEDICINE

## 2022-11-29 PROCEDURE — 85610 PROTHROMBIN TIME: CPT | Performed by: EMERGENCY MEDICINE

## 2022-11-29 PROCEDURE — 96375 TX/PRO/DX INJ NEW DRUG ADDON: CPT

## 2022-11-29 PROCEDURE — 93005 ELECTROCARDIOGRAM TRACING: CPT

## 2022-11-29 PROCEDURE — 81001 URINALYSIS AUTO W/SCOPE: CPT | Performed by: EMERGENCY MEDICINE

## 2022-11-29 PROCEDURE — 87088 URINE BACTERIA CULTURE: CPT | Performed by: EMERGENCY MEDICINE

## 2022-11-29 PROCEDURE — 51700 IRRIGATION OF BLADDER: CPT

## 2022-11-29 PROCEDURE — 93010 ELECTROCARDIOGRAM REPORT: CPT

## 2022-11-29 PROCEDURE — 99285 EMERGENCY DEPT VISIT HI MDM: CPT

## 2022-11-29 PROCEDURE — 81015 MICROSCOPIC EXAM OF URINE: CPT | Performed by: EMERGENCY MEDICINE

## 2022-11-29 PROCEDURE — 85025 COMPLETE CBC W/AUTO DIFF WBC: CPT | Performed by: EMERGENCY MEDICINE

## 2022-11-29 PROCEDURE — 87086 URINE CULTURE/COLONY COUNT: CPT | Performed by: EMERGENCY MEDICINE

## 2022-11-29 PROCEDURE — 87184 SC STD DISK METHOD PER PLATE: CPT | Performed by: EMERGENCY MEDICINE

## 2022-11-29 PROCEDURE — 74177 CT ABD & PELVIS W/CONTRAST: CPT | Performed by: EMERGENCY MEDICINE

## 2022-11-29 PROCEDURE — 96365 THER/PROPH/DIAG IV INF INIT: CPT

## 2022-11-29 RX ORDER — ONDANSETRON 4 MG/1
4 TABLET, FILM COATED ORAL EVERY 6 HOURS PRN
COMMUNITY

## 2022-11-29 RX ORDER — METOPROLOL TARTRATE 5 MG/5ML
5 INJECTION INTRAVENOUS ONCE
Status: COMPLETED | OUTPATIENT
Start: 2022-11-29 | End: 2022-11-29

## 2022-11-29 RX ORDER — IOHEXOL 350 MG/ML
100 INJECTION, SOLUTION INTRAVENOUS
Status: COMPLETED | OUTPATIENT
Start: 2022-11-29 | End: 2022-11-29

## 2022-11-29 RX ORDER — MAGNESIUM HYDROXIDE 1200 MG/15ML
3000 LIQUID ORAL CONTINUOUS
Status: DISCONTINUED | OUTPATIENT
Start: 2022-11-29 | End: 2022-12-08

## 2022-11-29 RX ORDER — FUROSEMIDE 20 MG/1
20 TABLET ORAL DAILY
COMMUNITY

## 2022-11-29 RX ORDER — DOCUSATE SODIUM 100 MG/1
100 CAPSULE, LIQUID FILLED ORAL 2 TIMES DAILY
COMMUNITY

## 2022-11-29 RX ORDER — CALCIUM CARBONATE 200(500)MG
1 TABLET,CHEWABLE ORAL EVERY 8 HOURS PRN
COMMUNITY

## 2022-11-29 RX ORDER — ATORVASTATIN CALCIUM 10 MG/1
10 TABLET, FILM COATED ORAL NIGHTLY
COMMUNITY

## 2022-11-29 RX ORDER — ASPIRIN 81 MG/1
81 TABLET ORAL DAILY
COMMUNITY
End: 2022-12-08

## 2022-11-29 RX ORDER — ASCORBIC ACID 500 MG
500 TABLET ORAL 2 TIMES DAILY
COMMUNITY

## 2022-11-29 RX ORDER — BISACODYL 10 MG
10 SUPPOSITORY, RECTAL RECTAL DAILY PRN
COMMUNITY

## 2022-11-29 RX ORDER — ACETAMINOPHEN 325 MG/1
650 TABLET ORAL EVERY 8 HOURS PRN
COMMUNITY

## 2022-11-29 RX ORDER — SPIRONOLACTONE 25 MG/1
12.5 TABLET ORAL DAILY
COMMUNITY

## 2022-11-29 NOTE — ED INITIAL ASSESSMENT (HPI)
Pt on blood thinners. Pt states bright red blood and clots in urine x 2 days. [facility] also concerned due to INR at 5.

## 2022-11-30 ENCOUNTER — APPOINTMENT (OUTPATIENT)
Dept: CV DIAGNOSTICS | Facility: HOSPITAL | Age: 67
DRG: 872 | End: 2022-11-30
Attending: INTERNAL MEDICINE
Payer: MEDICARE

## 2022-11-30 ENCOUNTER — APPOINTMENT (OUTPATIENT)
Dept: CV DIAGNOSTICS | Facility: HOSPITAL | Age: 67
End: 2022-11-30
Attending: INTERNAL MEDICINE
Payer: MEDICARE

## 2022-11-30 ENCOUNTER — APPOINTMENT (OUTPATIENT)
Dept: GENERAL RADIOLOGY | Facility: HOSPITAL | Age: 67
End: 2022-11-30
Attending: INTERNAL MEDICINE
Payer: MEDICARE

## 2022-11-30 ENCOUNTER — APPOINTMENT (OUTPATIENT)
Dept: GENERAL RADIOLOGY | Facility: HOSPITAL | Age: 67
DRG: 872 | End: 2022-11-30
Attending: INTERNAL MEDICINE
Payer: MEDICARE

## 2022-11-30 LAB
ANION GAP SERPL CALC-SCNC: 6 MMOL/L (ref 0–18)
ATRIAL RATE: 138 BPM
BASOPHILS # BLD AUTO: 0.05 X10(3) UL (ref 0–0.2)
BASOPHILS NFR BLD AUTO: 0.2 %
BUN BLD-MCNC: 14 MG/DL (ref 7–18)
CALCIUM BLD-MCNC: 8.8 MG/DL (ref 8.5–10.1)
CHLORIDE SERPL-SCNC: 101 MMOL/L (ref 98–112)
CO2 SERPL-SCNC: 29 MMOL/L (ref 21–32)
CREAT BLD-MCNC: 0.65 MG/DL
EOSINOPHIL # BLD AUTO: 0.01 X10(3) UL (ref 0–0.7)
EOSINOPHIL NFR BLD AUTO: 0 %
ERYTHROCYTE [DISTWIDTH] IN BLOOD BY AUTOMATED COUNT: 18.8 %
GFR SERPLBLD BASED ON 1.73 SQ M-ARVRAT: 95 ML/MIN/1.73M2 (ref 60–?)
GLUCOSE BLD-MCNC: 83 MG/DL (ref 70–99)
GLUCOSE BLD-MCNC: 95 MG/DL (ref 70–99)
HCT VFR BLD AUTO: 28.7 %
HGB BLD-MCNC: 8.8 G/DL
IMM GRANULOCYTES # BLD AUTO: 0.13 X10(3) UL (ref 0–1)
IMM GRANULOCYTES NFR BLD: 0.6 %
INR BLD: 1.95 (ref 0.85–1.16)
LACTATE SERPL-SCNC: 0.8 MMOL/L (ref 0.4–2)
LYMPHOCYTES # BLD AUTO: 1.4 X10(3) UL (ref 1–4)
LYMPHOCYTES NFR BLD AUTO: 6.7 %
MAGNESIUM SERPL-MCNC: 2 MG/DL (ref 1.6–2.6)
MCH RBC QN AUTO: 25.8 PG (ref 26–34)
MCHC RBC AUTO-ENTMCNC: 30.7 G/DL (ref 31–37)
MCV RBC AUTO: 84.2 FL
MONOCYTES # BLD AUTO: 2.65 X10(3) UL (ref 0.1–1)
MONOCYTES NFR BLD AUTO: 12.7 %
NEUTROPHILS # BLD AUTO: 16.62 X10 (3) UL (ref 1.5–7.7)
NEUTROPHILS # BLD AUTO: 16.62 X10(3) UL (ref 1.5–7.7)
NEUTROPHILS NFR BLD AUTO: 79.8 %
OSMOLALITY SERPL CALC.SUM OF ELEC: 282 MOSM/KG (ref 275–295)
PLATELET # BLD AUTO: 383 10(3)UL (ref 150–450)
PLATELET MORPHOLOGY: NORMAL
POTASSIUM SERPL-SCNC: 3.5 MMOL/L (ref 3.5–5.1)
PROTHROMBIN TIME: 22.1 SECONDS (ref 11.6–14.8)
Q-T INTERVAL: 328 MS
QRS DURATION: 106 MS
QTC CALCULATION (BEZET): 459 MS
R AXIS: 42 DEGREES
RBC # BLD AUTO: 3.41 X10(6)UL
SODIUM SERPL-SCNC: 136 MMOL/L (ref 136–145)
T AXIS: 0 DEGREES
T4 FREE SERPL-MCNC: 1.6 NG/DL (ref 0.8–1.7)
TSI SER-ACNC: 0.01 MIU/ML (ref 0.36–3.74)
VENTRICULAR RATE: 118 BPM
WBC # BLD AUTO: 20.9 X10(3) UL (ref 4–11)

## 2022-11-30 PROCEDURE — 87040 BLOOD CULTURE FOR BACTERIA: CPT | Performed by: HOSPITALIST

## 2022-11-30 PROCEDURE — 71045 X-RAY EXAM CHEST 1 VIEW: CPT | Performed by: INTERNAL MEDICINE

## 2022-11-30 PROCEDURE — 92526 ORAL FUNCTION THERAPY: CPT

## 2022-11-30 PROCEDURE — 82962 GLUCOSE BLOOD TEST: CPT

## 2022-11-30 PROCEDURE — 97530 THERAPEUTIC ACTIVITIES: CPT

## 2022-11-30 PROCEDURE — 84439 ASSAY OF FREE THYROXINE: CPT | Performed by: HOSPITALIST

## 2022-11-30 PROCEDURE — 85025 COMPLETE CBC W/AUTO DIFF WBC: CPT | Performed by: HOSPITALIST

## 2022-11-30 PROCEDURE — 83735 ASSAY OF MAGNESIUM: CPT | Performed by: HOSPITALIST

## 2022-11-30 PROCEDURE — 97162 PT EVAL MOD COMPLEX 30 MIN: CPT

## 2022-11-30 PROCEDURE — 85610 PROTHROMBIN TIME: CPT | Performed by: HOSPITALIST

## 2022-11-30 PROCEDURE — 84443 ASSAY THYROID STIM HORMONE: CPT | Performed by: HOSPITALIST

## 2022-11-30 PROCEDURE — 80048 BASIC METABOLIC PNL TOTAL CA: CPT | Performed by: HOSPITALIST

## 2022-11-30 PROCEDURE — 97165 OT EVAL LOW COMPLEX 30 MIN: CPT

## 2022-11-30 PROCEDURE — 92610 EVALUATE SWALLOWING FUNCTION: CPT

## 2022-11-30 PROCEDURE — 93306 TTE W/DOPPLER COMPLETE: CPT | Performed by: INTERNAL MEDICINE

## 2022-11-30 PROCEDURE — 83605 ASSAY OF LACTIC ACID: CPT | Performed by: HOSPITALIST

## 2022-11-30 RX ORDER — SENNOSIDES 8.6 MG
17.2 TABLET ORAL NIGHTLY PRN
Status: DISCONTINUED | OUTPATIENT
Start: 2022-11-30 | End: 2022-12-08

## 2022-11-30 RX ORDER — DILTIAZEM HYDROCHLORIDE 5 MG/ML
5 INJECTION INTRAVENOUS ONCE
Status: COMPLETED | OUTPATIENT
Start: 2022-11-30 | End: 2022-11-30

## 2022-11-30 RX ORDER — LEVOTHYROXINE SODIUM 0.15 MG/1
150 TABLET ORAL
Status: DISCONTINUED | OUTPATIENT
Start: 2022-11-30 | End: 2022-11-30

## 2022-11-30 RX ORDER — FLUOXETINE HYDROCHLORIDE 20 MG/1
20 CAPSULE ORAL 2 TIMES DAILY
Status: DISCONTINUED | OUTPATIENT
Start: 2022-11-30 | End: 2022-12-08

## 2022-11-30 RX ORDER — BUPROPION HYDROCHLORIDE 150 MG/1
150 TABLET, EXTENDED RELEASE ORAL 2 TIMES DAILY
Status: DISCONTINUED | OUTPATIENT
Start: 2022-11-30 | End: 2022-12-08

## 2022-11-30 RX ORDER — POLYETHYLENE GLYCOL 3350 17 G/17G
17 POWDER, FOR SOLUTION ORAL DAILY PRN
Status: DISCONTINUED | OUTPATIENT
Start: 2022-11-30 | End: 2022-12-08

## 2022-11-30 RX ORDER — BISACODYL 10 MG
10 SUPPOSITORY, RECTAL RECTAL DAILY PRN
Status: DISCONTINUED | OUTPATIENT
Start: 2022-11-30 | End: 2022-12-08

## 2022-11-30 RX ORDER — DILTIAZEM HYDROCHLORIDE 5 MG/ML
5 INJECTION INTRAVENOUS ONCE
Status: DISCONTINUED | OUTPATIENT
Start: 2022-11-30 | End: 2022-11-30

## 2022-11-30 RX ORDER — ONDANSETRON 2 MG/ML
4 INJECTION INTRAMUSCULAR; INTRAVENOUS EVERY 6 HOURS PRN
Status: DISCONTINUED | OUTPATIENT
Start: 2022-11-30 | End: 2022-12-08

## 2022-11-30 RX ORDER — ALLOPURINOL 100 MG/1
100 TABLET ORAL DAILY
Status: DISCONTINUED | OUTPATIENT
Start: 2022-11-30 | End: 2022-12-08

## 2022-11-30 RX ORDER — ACETAMINOPHEN 500 MG
1000 TABLET ORAL EVERY 8 HOURS PRN
Status: DISCONTINUED | OUTPATIENT
Start: 2022-11-30 | End: 2022-12-08

## 2022-11-30 RX ORDER — SODIUM CHLORIDE 9 MG/ML
INJECTION, SOLUTION INTRAVENOUS CONTINUOUS
Status: DISCONTINUED | OUTPATIENT
Start: 2022-11-30 | End: 2022-12-01

## 2022-11-30 RX ORDER — METOPROLOL TARTRATE 5 MG/5ML
5 INJECTION INTRAVENOUS EVERY 6 HOURS
Status: DISCONTINUED | OUTPATIENT
Start: 2022-11-30 | End: 2022-11-30

## 2022-11-30 RX ORDER — NORTRIPTYLINE HYDROCHLORIDE 25 MG/1
25 CAPSULE ORAL NIGHTLY
Status: DISCONTINUED | OUTPATIENT
Start: 2022-11-30 | End: 2022-12-08

## 2022-11-30 RX ORDER — DOCUSATE SODIUM 100 MG/1
100 CAPSULE, LIQUID FILLED ORAL 2 TIMES DAILY
Status: DISCONTINUED | OUTPATIENT
Start: 2022-11-30 | End: 2022-12-08

## 2022-11-30 RX ORDER — METOPROLOL TARTRATE 5 MG/5ML
5 INJECTION INTRAVENOUS ONCE
Status: DISCONTINUED | OUTPATIENT
Start: 2022-11-30 | End: 2022-11-30

## 2022-11-30 RX ORDER — CALCIUM CARBONATE 200(500)MG
500 TABLET,CHEWABLE ORAL EVERY 8 HOURS PRN
Status: DISCONTINUED | OUTPATIENT
Start: 2022-11-30 | End: 2022-12-08

## 2022-11-30 RX ORDER — ROSUVASTATIN CALCIUM 5 MG/1
5 TABLET, COATED ORAL EVERY OTHER DAY
Status: DISCONTINUED | OUTPATIENT
Start: 2022-11-30 | End: 2022-12-08

## 2022-11-30 RX ORDER — CARVEDILOL 3.12 MG/1
3.12 TABLET ORAL 2 TIMES DAILY WITH MEALS
Status: DISCONTINUED | OUTPATIENT
Start: 2022-11-30 | End: 2022-12-01

## 2022-11-30 RX ORDER — DIGOXIN 125 MCG
125 TABLET ORAL DAILY
Status: DISCONTINUED | OUTPATIENT
Start: 2022-11-30 | End: 2022-12-08

## 2022-11-30 RX ORDER — BISACODYL 10 MG
10 SUPPOSITORY, RECTAL RECTAL
Status: DISCONTINUED | OUTPATIENT
Start: 2022-11-30 | End: 2022-12-08

## 2022-11-30 NOTE — PROGRESS NOTES
11/30/22 0333   Provider Notification   Reason for Communication Other (comment)  (Sepsis Alert)   Provider Name   (Sania Marcum MD)   Method of Communication Page   Response See orders   Notification Time [id number]

## 2022-11-30 NOTE — DISCHARGE INSTRUCTIONS
Doctor has asked you to schedule a Cystoscopy    This is a procedure done in the office. There is no preparation needed on your part. A Cystoscopy is a test that allows your doctor to look the inside the bladder and the urethra using a thin, lighted instrument called a cystoscope. The cystoscope is inserted into your urethra and slowly advanced into the bladder. A cystoscopy allows your doctor to look at areas of your bladder and urethra that usually do not show up well on X-rays. Tiny surgical instruments can be inserted through the cystoscope that allow your doctor to remove samples of tissue (biopsy) or samples of urine. Small bladder stones and some small growths can be removed during cystoscopy. This may eliminate the need for more extensive surgery. Why It Is Done  Find the cause of symptoms such as blood in the urine (hematuria), painful urination (dysuria), urinary incontinence, urinary frequency or hesitancy, an inability to pass urine (retention), or a sudden and overwhelming need to urinate (urgency). Find the cause of problems of the urinary tract, such as frequent, repeated urinary tract infections or urinary tract infections that do not respond to treatment. Look for problems in the urinary tract, such as blockage in the urethra caused by an enlarged prostate, kidney stones, or tumors. Evaluate problems that cannot be seen on X-ray or to further investigate problems detected by ultrasound or during intravenous pyelography, such as kidney stones or tumors. Remove tissue samples for biopsy. Remove foreign objects. Treat urinary tract problems. For example, cystoscopy can be done to remove urinary tract stones or growths, treat bleeding in the bladder, relieve blockages in the urethra, or treat or remove tumors. How To Prepare  You may eat and drink normally before the procedure. You may be asked to give a urine sample at the time of your procedure.  Please do not urinate before. How It Is Done  A cystoscopy is performed by a urologist, with one or more assistants. The test is done in a special testing room in the doctor's office. You will undress from the waist down, and be given a paper drape to cover yourself. You will lie on your back on a special table. Females will have their knees bent and feet placed in stirrups. Males will lay flat on the table, legs straight. Your genital area is cleaned with an antiseptic solution. A local anesthetic jelly will be inserted into the urethra. No needles are used. After the anesthetic takes effect, a well-lubricated cystoscope is inserted into your urethra and slowly advanced into your bladder. If your urethra has a spot that is too narrow to allow the scope to pass, other smaller instruments are inserted first to gradually enlarge the opening. After the cystoscope is inside your bladder, sterile water runs through the scope to help expand your bladder and to create a clear view. Tiny instruments may be inserted through the scope to collect tissue samples for a biopsy if necessary; the tissue samples are sent to the laboratory for analysis. Cautery may be used if bleeding occurs from a specimen site. The cystoscope is usually in your bladder for only 1-2 minutes. But the entire test with preparation may take up to 20 minutes or longer. You will be able to get up immediately following the procedure and proceed with normal daily activities. After the test  You may need to urinate frequently. You may experience some burning during urination for a day or two. Drink lots of fluids to help minimize the burning and help prevent a urinary tract infection. You may also see a tinge of blood in the urine for 2-3 days. Some patients experience pain for a few days afterwards. This is normal.  If the pain is intolerable please contact our office or go to the nearest Emergency Room/Immediate Care.    You will be given an instruction sheet following your cystoscopy with post procedure instructions. Results  Your doctor may be able to talk to you about the results during the cystoscopy. If a biopsy is preformed, the results usually take several days to become available. You will be called promptly with results.    Bebo OBREGON  Phone: [phone]  Fax: [phone]

## 2022-11-30 NOTE — PROGRESS NOTES
NURSING ADMISSION NOTE      Patient admitted via Cart  Oriented to room. Safety precautions initiated. Bed in low position. Call light in reach. Patient transferred to bed, gown changed, tele applied, vitals obtained. Admission navigator completed by this RN. Head to toe assessment complete, see flowsheet. Orders reviewed and carried out. CBI ongoing, clear irrigation, 4 bags hung so far since admit to floor. HR in 110s and sepsis alert flagged, MD notified. Safety precautions in place. Call light within reach. All needs met at this time.

## 2022-11-30 NOTE — PLAN OF CARE
69year old female a/o x 4. Patinet on tele with afib RVR. Cardizem bolus of 10 mg was ordered along with 5mg/hr. HR now controlled between 100-110. Patient on 3L O2 satting at 99%. CBI was clamped upon arrival but tiny clots were still seen in line until 1530. At 1540, 10mL balloon holding urinary catheter in place was deflated and catheter was removed from patient. Patient had a temperature of 102.1 at 1229 and zosyn was ordered. IV was started in patients right wrist and piggyback  zosyn IV is running in that line. Patient sleeping comfortably. ID consulted and will see tomorrow morning  Problem: Patient/Family Goals  Goal: Patient/Family Long Term Goal  Description: Patient's Long Term Goal: Be discharged     Interventions:  - follow plan of care. - See additional Care Plan goals for specific interventions  Reactivated  Goal: Patient/Family Short Term Goal  Description: Patient's Short Term Goal: resolve sepsis    Interventions:   - follow plan of care.    - See additional Care Plan goals for specific interventions  Reactivated

## 2022-11-30 NOTE — PROGRESS NOTES
11/30/22 0400   Provider Notification   Reason for Communication Other (comment); Order clarification  (Pt allergic to metoprolol)   Provider Name   (Shannen Murrell MD)   Method of Communication Page   Response See orders   Notification Time 0082

## 2022-12-01 LAB
ANION GAP SERPL CALC-SCNC: 6 MMOL/L (ref 0–18)
BUN BLD-MCNC: 19 MG/DL (ref 7–18)
CALCIUM BLD-MCNC: 8.3 MG/DL (ref 8.5–10.1)
CHLORIDE SERPL-SCNC: 102 MMOL/L (ref 98–112)
CO2 SERPL-SCNC: 26 MMOL/L (ref 21–32)
CREAT BLD-MCNC: 0.61 MG/DL
ERYTHROCYTE [DISTWIDTH] IN BLOOD BY AUTOMATED COUNT: 18.7 %
GFR SERPLBLD BASED ON 1.73 SQ M-ARVRAT: 97 ML/MIN/1.73M2 (ref 60–?)
GLUCOSE BLD-MCNC: 104 MG/DL (ref 70–99)
HCT VFR BLD AUTO: 26 %
HGB BLD-MCNC: 8 G/DL
INR BLD: 1.7 (ref 0.85–1.16)
MAGNESIUM SERPL-MCNC: 2 MG/DL (ref 1.6–2.6)
MCH RBC QN AUTO: 26.5 PG (ref 26–34)
MCHC RBC AUTO-ENTMCNC: 30.8 G/DL (ref 31–37)
MCV RBC AUTO: 86.1 FL
OSMOLALITY SERPL CALC.SUM OF ELEC: 281 MOSM/KG (ref 275–295)
PLATELET # BLD AUTO: 311 10(3)UL (ref 150–450)
POTASSIUM SERPL-SCNC: 3.3 MMOL/L (ref 3.5–5.1)
PROTHROMBIN TIME: 19.8 SECONDS (ref 11.6–14.8)
RBC # BLD AUTO: 3.02 X10(6)UL
SODIUM SERPL-SCNC: 134 MMOL/L (ref 136–145)
WBC # BLD AUTO: 18.9 X10(3) UL (ref 4–11)

## 2022-12-01 PROCEDURE — 85027 COMPLETE CBC AUTOMATED: CPT | Performed by: HOSPITALIST

## 2022-12-01 PROCEDURE — 80048 BASIC METABOLIC PNL TOTAL CA: CPT | Performed by: HOSPITALIST

## 2022-12-01 PROCEDURE — 87070 CULTURE OTHR SPECIMN AEROBIC: CPT | Performed by: INTERNAL MEDICINE

## 2022-12-01 PROCEDURE — 83735 ASSAY OF MAGNESIUM: CPT | Performed by: HOSPITALIST

## 2022-12-01 PROCEDURE — 87186 SC STD MICRODIL/AGAR DIL: CPT | Performed by: INTERNAL MEDICINE

## 2022-12-01 PROCEDURE — 87077 CULTURE AEROBIC IDENTIFY: CPT | Performed by: INTERNAL MEDICINE

## 2022-12-01 PROCEDURE — 85610 PROTHROMBIN TIME: CPT | Performed by: HOSPITALIST

## 2022-12-01 RX ORDER — VERAPAMIL HYDROCHLORIDE 40 MG/1
40 TABLET ORAL EVERY 8 HOURS SCHEDULED
Status: DISCONTINUED | OUTPATIENT
Start: 2022-12-01 | End: 2022-12-02

## 2022-12-01 RX ORDER — VANCOMYCIN 2 GRAM/500 ML IN 0.9 % SODIUM CHLORIDE INTRAVENOUS
25 ONCE
Status: COMPLETED | OUTPATIENT
Start: 2022-12-01 | End: 2022-12-01

## 2022-12-01 RX ORDER — VANCOMYCIN HYDROCHLORIDE
15 EVERY 12 HOURS
Status: DISCONTINUED | OUTPATIENT
Start: 2022-12-02 | End: 2022-12-05

## 2022-12-01 RX ORDER — POTASSIUM CHLORIDE 750 MG/1
40 TABLET, EXTENDED RELEASE ORAL ONCE
Status: COMPLETED | OUTPATIENT
Start: 2022-12-01 | End: 2022-12-01

## 2022-12-01 RX ORDER — CARVEDILOL 3.12 MG/1
3.12 TABLET ORAL 3 TIMES DAILY
Status: DISCONTINUED | OUTPATIENT
Start: 2022-12-01 | End: 2022-12-03

## 2022-12-01 NOTE — PLAN OF CARE
Pt alert and oriented x 4   Assumed pt care at 0730  PIV left hand removed and sent for culture per ID  PIV right hand and extended PIV left FA  Cardizem drip at 5ml/hr  0.9 NS at 75ml/hr  Cardiac Diet  Potasium replaced per protocol  IV Rocephin and IV Vano  Mild pain when repositioning but declines meds    Problem: CARDIOVASCULAR - ADULT  Goal: Maintains optimal cardiac output and hemodynamic stability  Description: INTERVENTIONS:  - Monitor vital signs, rhythm, and trends  - Monitor for bleeding, hypotension and signs of decreased cardiac output  - Evaluate effectiveness of vasoactive medications to optimize hemodynamic stability  - Monitor arterial and/or venous puncture sites for bleeding and/or hematoma  - Assess quality of pulses, skin color and temperature  - Assess for signs of decreased coronary artery perfusion - ex.  Angina  - Evaluate fluid balance, assess for edema, trend weights  Outcome: Progressing  Goal: Absence of cardiac arrhythmias or at baseline  Description: INTERVENTIONS:  - Continuous cardiac monitoring, monitor vital signs, obtain 12 lead EKG if indicated  - Evaluate effectiveness of antiarrhythmic and heart rate control medications as ordered  - Initiate emergency measures for life threatening arrhythmias  - Monitor electrolytes and administer replacement therapy as ordered  Outcome: Progressing     Problem: CARDIOVASCULAR - ADULT  Goal: Absence of cardiac arrhythmias or at baseline  Description: INTERVENTIONS:  - Continuous cardiac monitoring, monitor vital signs, obtain 12 lead EKG if indicated  - Evaluate effectiveness of antiarrhythmic and heart rate control medications as ordered  - Initiate emergency measures for life threatening arrhythmias  - Monitor electrolytes and administer replacement therapy as ordered  Outcome: Progressing

## 2022-12-01 NOTE — CM/SW NOTE
MSW met with Pt and she does not want to return to [facility] or [facility] AT [facility]. She wants to dc home and do OPPT. She states there is a location by her she has used in the past, she feels she can drive herself there. [address] Patient [facility]  Phone: [phone]  Fax: [phone]    MSW faxed HP and Clinicals. Barrier to [employer]: MSW to send signed OP PT orders.

## 2022-12-01 NOTE — PROGRESS NOTES
Assumed care at [address] [place] [address] [address] x 4.  3L N/C. Lung sounds clear. Diminished. Denies pain. Afib on tele. On Cardizem gtt at 10 ml/he. HR controlled. Voiding freely post gorman catheter removal. No blood noted. Afebrile. All other VS are WNL. Remains on IV Zosyn and tolerating well. IVF 0.9 N/S at 75 ml/hr. Possible discharge to [facility] when medically cleared.

## 2022-12-01 NOTE — PROGRESS NOTES
[facility] Dosing Service    Initial Pharmacokinetic Consult for Vancomycin AUC Dosing    Raymond Silva is a 69year old patient who is being treated for cellulitis with concern for septic phlebitis. Pharmacy has been asked to dose vancomycin by Dr. Pillo Albarado. Weights:  Ideal body weight: 66.2 kg (145 lb 15.1 oz)  Adjusted ideal body weight: 77.3 kg (170 lb 5.2 oz)  Actual weight:  93.8 kg (206 lb 14.4 oz)    Labs:  Lab Results   Component Value Date    CREATSERUM 0.61 12/01/2022      CrCl:  Estimated Creatinine Clearance: 91 mL/min (based on SCr of 0.61 mg/dL). Based on the above:    1. This patient will receive a loading dose of Vancomycin  2000 mg IVPB (25mg/kg, capped at 2000 mg) x 1 dose. This will be followed by 1250 mg Q 12 hours based upon adjusted body weight of 77.3 kg and renal function. 2. Vancomycin peak and trough will be obtained at steady state in order to calculate AUC24. Goal AUC24 is 400-600 mg-h/L.    3. Pharmacy will order SCr as clinically indicated while on vancomycin to assess renal function. 4. Pharmacy will follow and monitor renal function, toxicity and efficacy. We appreciate the opportunity to assist in the care of this patient.     Chip Mccormick PharmD  12/1/2022  11:10 AM  [address] [place] [address] Extension: [phone]

## 2022-12-02 ENCOUNTER — APPOINTMENT (OUTPATIENT)
Dept: ULTRASOUND IMAGING | Facility: HOSPITAL | Age: 67
End: 2022-12-02
Attending: INTERNAL MEDICINE
Payer: MEDICARE

## 2022-12-02 ENCOUNTER — APPOINTMENT (OUTPATIENT)
Dept: ULTRASOUND IMAGING | Facility: HOSPITAL | Age: 67
DRG: 872 | End: 2022-12-02
Attending: INTERNAL MEDICINE
Payer: MEDICARE

## 2022-12-02 LAB
ANION GAP SERPL CALC-SCNC: 7 MMOL/L (ref 0–18)
BASOPHILS # BLD AUTO: 0.04 X10(3) UL (ref 0–0.2)
BASOPHILS NFR BLD AUTO: 0.3 %
BUN BLD-MCNC: 25 MG/DL (ref 7–18)
CALCIUM BLD-MCNC: 8.6 MG/DL (ref 8.5–10.1)
CHLORIDE SERPL-SCNC: 104 MMOL/L (ref 98–112)
CO2 SERPL-SCNC: 24 MMOL/L (ref 21–32)
CREAT BLD-MCNC: 0.62 MG/DL
CREAT BLD-MCNC: 0.62 MG/DL
EOSINOPHIL # BLD AUTO: 0.36 X10(3) UL (ref 0–0.7)
EOSINOPHIL NFR BLD AUTO: 2.8 %
ERYTHROCYTE [DISTWIDTH] IN BLOOD BY AUTOMATED COUNT: 18.7 %
GFR SERPLBLD BASED ON 1.73 SQ M-ARVRAT: 96 ML/MIN/1.73M2 (ref 60–?)
GFR SERPLBLD BASED ON 1.73 SQ M-ARVRAT: 96 ML/MIN/1.73M2 (ref 60–?)
GLUCOSE BLD-MCNC: 101 MG/DL (ref 70–99)
HCT VFR BLD AUTO: 26.8 %
HGB BLD-MCNC: 8.2 G/DL
IMM GRANULOCYTES # BLD AUTO: 0.07 X10(3) UL (ref 0–1)
IMM GRANULOCYTES NFR BLD: 0.5 %
INR BLD: 1.5 (ref 0.85–1.16)
LYMPHOCYTES # BLD AUTO: 1.19 X10(3) UL (ref 1–4)
LYMPHOCYTES NFR BLD AUTO: 9.1 %
MAGNESIUM SERPL-MCNC: 2.1 MG/DL (ref 1.6–2.6)
MCH RBC QN AUTO: 26.6 PG (ref 26–34)
MCHC RBC AUTO-ENTMCNC: 30.6 G/DL (ref 31–37)
MCV RBC AUTO: 87 FL
MONOCYTES # BLD AUTO: 2 X10(3) UL (ref 0.1–1)
MONOCYTES NFR BLD AUTO: 15.3 %
NEUTROPHILS # BLD AUTO: 9.43 X10 (3) UL (ref 1.5–7.7)
NEUTROPHILS # BLD AUTO: 9.43 X10(3) UL (ref 1.5–7.7)
NEUTROPHILS NFR BLD AUTO: 72 %
OSMOLALITY SERPL CALC.SUM OF ELEC: 285 MOSM/KG (ref 275–295)
PLATELET # BLD AUTO: 301 10(3)UL (ref 150–450)
POTASSIUM SERPL-SCNC: 3.9 MMOL/L (ref 3.5–5.1)
POTASSIUM SERPL-SCNC: 3.9 MMOL/L (ref 3.5–5.1)
PROTHROMBIN TIME: 18 SECONDS (ref 11.6–14.8)
RBC # BLD AUTO: 3.08 X10(6)UL
SODIUM SERPL-SCNC: 135 MMOL/L (ref 136–145)
WBC # BLD AUTO: 13.1 X10(3) UL (ref 4–11)

## 2022-12-02 PROCEDURE — 85610 PROTHROMBIN TIME: CPT | Performed by: HOSPITALIST

## 2022-12-02 PROCEDURE — 85025 COMPLETE CBC W/AUTO DIFF WBC: CPT | Performed by: HOSPITALIST

## 2022-12-02 PROCEDURE — 80048 BASIC METABOLIC PNL TOTAL CA: CPT | Performed by: HOSPITALIST

## 2022-12-02 PROCEDURE — 97530 THERAPEUTIC ACTIVITIES: CPT

## 2022-12-02 PROCEDURE — 82565 ASSAY OF CREATININE: CPT | Performed by: INTERNAL MEDICINE

## 2022-12-02 PROCEDURE — 92526 ORAL FUNCTION THERAPY: CPT

## 2022-12-02 PROCEDURE — 84132 ASSAY OF SERUM POTASSIUM: CPT | Performed by: HOSPITALIST

## 2022-12-02 PROCEDURE — 93971 EXTREMITY STUDY: CPT | Performed by: INTERNAL MEDICINE

## 2022-12-02 PROCEDURE — 83735 ASSAY OF MAGNESIUM: CPT | Performed by: HOSPITALIST

## 2022-12-02 PROCEDURE — 97116 GAIT TRAINING THERAPY: CPT

## 2022-12-02 RX ORDER — VERAPAMIL HYDROCHLORIDE 80 MG/1
80 TABLET ORAL EVERY 12 HOURS SCHEDULED
Status: DISCONTINUED | OUTPATIENT
Start: 2022-12-03 | End: 2022-12-08

## 2022-12-02 NOTE — PLAN OF CARE
70 y/o F. A&Ox4. RA. VSS. A Fib, on tele. Patient denies n/v at this time. Reports pain to L Wrist, PRN given per MAR. Cardizem infusing at 5mL/hr. NS infusing at 75 mL/hr. Cardiac diet. Problem: CARDIOVASCULAR - ADULT  Goal: Maintains optimal cardiac output and hemodynamic stability  Description: INTERVENTIONS:  - Monitor vital signs, rhythm, and trends  - Monitor for bleeding, hypotension and signs of decreased cardiac output  - Evaluate effectiveness of vasoactive medications to optimize hemodynamic stability  - Monitor arterial and/or venous puncture sites for bleeding and/or hematoma  - Assess quality of pulses, skin color and temperature  - Assess for signs of decreased coronary artery perfusion - ex.  Angina  - Evaluate fluid balance, assess for edema, trend weights  Outcome: Progressing  Goal: Absence of cardiac arrhythmias or at baseline  Description: INTERVENTIONS:  - Continuous cardiac monitoring, monitor vital signs, obtain 12 lead EKG if indicated  - Evaluate effectiveness of antiarrhythmic and heart rate control medications as ordered  - Initiate emergency measures for life threatening arrhythmias  - Monitor electrolytes and administer replacement therapy as ordered  Outcome: Progressing

## 2022-12-03 LAB
ANION GAP SERPL CALC-SCNC: 6 MMOL/L (ref 0–18)
BUN BLD-MCNC: 20 MG/DL (ref 7–18)
CALCIUM BLD-MCNC: 9.1 MG/DL (ref 8.5–10.1)
CHLORIDE SERPL-SCNC: 107 MMOL/L (ref 98–112)
CO2 SERPL-SCNC: 24 MMOL/L (ref 21–32)
CREAT BLD-MCNC: 0.5 MG/DL
CREAT BLD-MCNC: 0.5 MG/DL
ERYTHROCYTE [DISTWIDTH] IN BLOOD BY AUTOMATED COUNT: 18.6 %
GFR SERPLBLD BASED ON 1.73 SQ M-ARVRAT: 101 ML/MIN/1.73M2 (ref 60–?)
GFR SERPLBLD BASED ON 1.73 SQ M-ARVRAT: 101 ML/MIN/1.73M2 (ref 60–?)
GLUCOSE BLD-MCNC: 91 MG/DL (ref 70–99)
HCT VFR BLD AUTO: 24.7 %
HGB BLD-MCNC: 7.6 G/DL
INR BLD: 1.42 (ref 0.85–1.16)
MCH RBC QN AUTO: 26.3 PG (ref 26–34)
MCHC RBC AUTO-ENTMCNC: 30.8 G/DL (ref 31–37)
MCV RBC AUTO: 85.5 FL
OSMOLALITY SERPL CALC.SUM OF ELEC: 286 MOSM/KG (ref 275–295)
PLATELET # BLD AUTO: 317 10(3)UL (ref 150–450)
POTASSIUM SERPL-SCNC: 3.7 MMOL/L (ref 3.5–5.1)
PROTHROMBIN TIME: 17.3 SECONDS (ref 11.6–14.8)
RBC # BLD AUTO: 2.89 X10(6)UL
SODIUM SERPL-SCNC: 137 MMOL/L (ref 136–145)
WBC # BLD AUTO: 7.7 X10(3) UL (ref 4–11)

## 2022-12-03 PROCEDURE — 85610 PROTHROMBIN TIME: CPT | Performed by: HOSPITALIST

## 2022-12-03 PROCEDURE — 80048 BASIC METABOLIC PNL TOTAL CA: CPT | Performed by: HOSPITALIST

## 2022-12-03 PROCEDURE — 82565 ASSAY OF CREATININE: CPT | Performed by: INTERNAL MEDICINE

## 2022-12-03 PROCEDURE — 85027 COMPLETE CBC AUTOMATED: CPT | Performed by: HOSPITALIST

## 2022-12-03 RX ORDER — POTASSIUM CHLORIDE 20 MEQ/1
40 TABLET, EXTENDED RELEASE ORAL ONCE
Status: DISCONTINUED | OUTPATIENT
Start: 2022-12-03 | End: 2022-12-03

## 2022-12-03 RX ORDER — POTASSIUM CHLORIDE 14.9 MG/ML
20 INJECTION INTRAVENOUS ONCE
Status: COMPLETED | OUTPATIENT
Start: 2022-12-03 | End: 2022-12-03

## 2022-12-03 RX ORDER — CARVEDILOL 3.12 MG/1
3.12 TABLET ORAL 2 TIMES DAILY WITH MEALS
Status: DISCONTINUED | OUTPATIENT
Start: 2022-12-03 | End: 2022-12-08

## 2022-12-03 NOTE — PLAN OF CARE
Pt a&o x 4. Annoyed w/ care this am.   States 'i just want to sleep'  More interactive this afternoon. Replacing potassium  Hospitalist aware of hgb 7.6 today, 8.2 today  Vss. Afib. No anticoagulants. Refusing scd's  Using purewick, producing clear yellow urine  Using adult brief  Appetite fair for lunch. Refused breakfast  Reports feeling like her abdominal folds & under breast 'going to get red'. Nystatin order obtained, as requested by pt  Reports pain to left arm, area when iv infiltrated. States it's not getting better'. Hospitalist reinforced that ultrasound to arm = negative dvt. Refusing pain meds. Awaiting cultures from 12/1/22 iv cath    Resting in bed throughout day. Does not make any attempt to get oob  Reports recent fall @ [facility]    Requires asst x 2 to turn in bed    Pt states that when she is discharged from hospital, she does not want to go back to [facility]. States she wants to go home. Pt also states she lives by herself & does not have stairs @ home. Also states that she still drives & walks with crutches x 2 @ home.                 Problem: Patient/Family Goals  Goal: Patient/Family Long Term Goal  Description: Patient's Long Term Goal: discharge    Interventions:  - rocephin & vanco.  Sw & cm following  - See additional Care Plan goals for specific interventions  Outcome: Progressing  Goal: Patient/Family Short Term Goal  Description: Patient's Short Term Goal: discharge    Interventions:   - encourage nutrition.    - See additional Care Plan goals for specific interventions  Outcome: Progressing     Problem: PAIN - ADULT  Goal: Verbalizes/displays adequate comfort level or patient's stated pain goal  Description: INTERVENTIONS:  - Encourage pt to monitor pain and request assistance  - Assess pain using appropriate pain scale  - Administer analgesics based on type and severity of pain and evaluate response  - Implement non-pharmacological measures as appropriate and evaluate response  - Consider cultural and social influences on pain and pain management  - Manage/alleviate anxiety  - Utilize distraction and/or relaxation techniques  - Monitor for opioid side effects  - Notify MD/LIP if interventions unsuccessful or patient reports new pain  - Anticipate increased pain with activity and pre-medicate as appropriate  Outcome: Progressing     Problem: RISK FOR INFECTION - ADULT  Goal: Absence of fever/infection during anticipated neutropenic period  Description: INTERVENTIONS  - Monitor WBC  - Administer growth factors as ordered  - Implement neutropenic guidelines  Outcome: Progressing     Problem: DISCHARGE PLANNING  Goal: Discharge to home or other facility with appropriate resources  Description: INTERVENTIONS:  - Identify barriers to discharge w/pt and caregiver  - Include patient/family/discharge partner in discharge planning  - Arrange for needed discharge resources and transportation as appropriate  - Identify discharge learning needs (meds, wound care, etc)  - Arrange for interpreters to assist at discharge as needed  - Consider post-discharge preferences of patient/family/discharge partner  - Complete POLST form as appropriate  - Assess patient's ability to be responsible for managing their own health  - Refer to Case Management Department for coordinating discharge planning if the patient needs post-hospital services based on physician/LIP order or complex needs related to functional status, cognitive ability or social support system  Outcome: Progressing     Problem: CARDIOVASCULAR - ADULT  Goal: Maintains optimal cardiac output and hemodynamic stability  Description: INTERVENTIONS:  - Monitor vital signs, rhythm, and trends  - Monitor for bleeding, hypotension and signs of decreased cardiac output  - Evaluate effectiveness of vasoactive medications to optimize hemodynamic stability  - Monitor arterial and/or venous puncture sites for bleeding and/or hematoma  - Assess quality of pulses, skin color and temperature  - Assess for signs of decreased coronary artery perfusion - ex.  Angina  - Evaluate fluid balance, assess for edema, trend weights  Outcome: Progressing  Goal: Absence of cardiac arrhythmias or at baseline  Description: INTERVENTIONS:  - Continuous cardiac monitoring, monitor vital signs, obtain 12 lead EKG if indicated  - Evaluate effectiveness of antiarrhythmic and heart rate control medications as ordered  - Initiate emergency measures for life threatening arrhythmias  - Monitor electrolytes and administer replacement therapy as ordered  Outcome: Progressing     Problem: GASTROINTESTINAL - ADULT  Goal: Maintains adequate nutritional intake (undernourished)  Description: INTERVENTIONS:  - Monitor percentage of each meal consumed  - Identify factors contributing to decreased intake, treat as appropriate  - Assist with meals as needed  - Monitor I&O, WT and lab values  - Obtain nutritional consult as needed  - Optimize oral hygiene and moisture  - Encourage food from home; allow for food preferences  - Enhance eating environment  Outcome: Progressing     Problem: METABOLIC/FLUID AND ELECTROLYTES - ADULT  Goal: Electrolytes maintained within normal limits  Description: INTERVENTIONS:  - Monitor labs and rhythm and assess patient for signs and symptoms of electrolyte imbalances  - Administer electrolyte replacement as ordered  - Monitor response to electrolyte replacements, including rhythm and repeat lab results as appropriate  - Fluid restriction as ordered  - Instruct patient on fluid and nutrition restrictions as appropriate  Outcome: Progressing

## 2022-12-03 NOTE — CM/SW NOTE
Received message from RN that pt is not appropriate to dc home. Pt is not getting out of bed, wearing adult briefs, states she still drives? ??    Pt admitted to [facility] from [place]- pt was scheduled to dc from [facility] and LTC was advised so [facility] will not accept back. Sw will send more NAIF referrals in aidin and follow up with pt once responses revceived. Pt had told previous SW that she did not want HH and wanted OP therapy.     Isha Hays, JAMAW  /Discharge Planner

## 2022-12-03 NOTE — PLAN OF CARE
68 y/o F. A&Ox4. RA. VSS. A Fib, on tele. Patient denies n/v at this time. Reports pain to L Wrist, non-pharm interventions used. Cardiac diet. Problem: CARDIOVASCULAR - ADULT  Goal: Maintains optimal cardiac output and hemodynamic stability  Description: INTERVENTIONS:  - Monitor vital signs, rhythm, and trends  - Monitor for bleeding, hypotension and signs of decreased cardiac output  - Evaluate effectiveness of vasoactive medications to optimize hemodynamic stability  - Monitor arterial and/or venous puncture sites for bleeding and/or hematoma  - Assess quality of pulses, skin color and temperature  - Assess for signs of decreased coronary artery perfusion - ex.  Angina  - Evaluate fluid balance, assess for edema, trend weights  Outcome: Progressing  Goal: Absence of cardiac arrhythmias or at baseline  Description: INTERVENTIONS:  - Continuous cardiac monitoring, monitor vital signs, obtain 12 lead EKG if indicated  - Evaluate effectiveness of antiarrhythmic and heart rate control medications as ordered  - Initiate emergency measures for life threatening arrhythmias  - Monitor electrolytes and administer replacement therapy as ordered  Outcome: Progressing

## 2022-12-04 LAB
ANION GAP SERPL CALC-SCNC: 5 MMOL/L (ref 0–18)
BUN BLD-MCNC: 18 MG/DL (ref 7–18)
CALCIUM BLD-MCNC: 8.8 MG/DL (ref 8.5–10.1)
CHLORIDE SERPL-SCNC: 107 MMOL/L (ref 98–112)
CO2 SERPL-SCNC: 26 MMOL/L (ref 21–32)
CREAT BLD-MCNC: 0.43 MG/DL
CREAT BLD-MCNC: 0.43 MG/DL
ERYTHROCYTE [DISTWIDTH] IN BLOOD BY AUTOMATED COUNT: 18.6 %
GFR SERPLBLD BASED ON 1.73 SQ M-ARVRAT: 105 ML/MIN/1.73M2 (ref 60–?)
GFR SERPLBLD BASED ON 1.73 SQ M-ARVRAT: 105 ML/MIN/1.73M2 (ref 60–?)
GLUCOSE BLD-MCNC: 97 MG/DL (ref 70–99)
HCT VFR BLD AUTO: 26.1 %
HGB BLD-MCNC: 7.8 G/DL
MAGNESIUM SERPL-MCNC: 2 MG/DL (ref 1.6–2.6)
MCH RBC QN AUTO: 25.7 PG (ref 26–34)
MCHC RBC AUTO-ENTMCNC: 29.9 G/DL (ref 31–37)
MCV RBC AUTO: 86.1 FL
OSMOLALITY SERPL CALC.SUM OF ELEC: 288 MOSM/KG (ref 275–295)
PLATELET # BLD AUTO: 323 10(3)UL (ref 150–450)
POTASSIUM SERPL-SCNC: 4 MMOL/L (ref 3.5–5.1)
POTASSIUM SERPL-SCNC: 4 MMOL/L (ref 3.5–5.1)
RBC # BLD AUTO: 3.03 X10(6)UL
SODIUM SERPL-SCNC: 138 MMOL/L (ref 136–145)
URATE SERPL-MCNC: 4.1 MG/DL
VANCOMYCIN PEAK SERPL-MCNC: 35.1 UG/ML (ref 30–50)
WBC # BLD AUTO: 7.5 X10(3) UL (ref 4–11)

## 2022-12-04 PROCEDURE — 85027 COMPLETE CBC AUTOMATED: CPT | Performed by: HOSPITALIST

## 2022-12-04 PROCEDURE — 83735 ASSAY OF MAGNESIUM: CPT | Performed by: HOSPITALIST

## 2022-12-04 PROCEDURE — 80048 BASIC METABOLIC PNL TOTAL CA: CPT | Performed by: HOSPITALIST

## 2022-12-04 PROCEDURE — 80202 ASSAY OF VANCOMYCIN: CPT | Performed by: INTERNAL MEDICINE

## 2022-12-04 PROCEDURE — 82565 ASSAY OF CREATININE: CPT | Performed by: INTERNAL MEDICINE

## 2022-12-04 PROCEDURE — 84550 ASSAY OF BLOOD/URIC ACID: CPT | Performed by: HOSPITALIST

## 2022-12-04 PROCEDURE — 84132 ASSAY OF SERUM POTASSIUM: CPT | Performed by: HOSPITALIST

## 2022-12-04 RX ORDER — WARFARIN SODIUM 5 MG/1
5 TABLET ORAL
Status: COMPLETED | OUTPATIENT
Start: 2022-12-04 | End: 2022-12-04

## 2022-12-04 NOTE — PLAN OF CARE
70 y/o F. A&Ox4. RA. VSS. A Fib, on tele. Patient denies n/v at this time. Reports pain to L Wrist, declining PRN  Purewick in place. Cardiac diet.       Problem: PAIN - ADULT  Goal: Verbalizes/displays adequate comfort level or patient's stated pain goal  Description: INTERVENTIONS:  - Encourage pt to monitor pain and request assistance  - Assess pain using appropriate pain scale  - Administer analgesics based on type and severity of pain and evaluate response  - Implement non-pharmacological measures as appropriate and evaluate response  - Consider cultural and social influences on pain and pain management  - Manage/alleviate anxiety  - Utilize distraction and/or relaxation techniques  - Monitor for opioid side effects  - Notify MD/LIP if interventions unsuccessful or patient reports new pain  - Anticipate increased pain with activity and pre-medicate as appropriate  Outcome: Progressing     Problem: RISK FOR INFECTION - ADULT  Goal: Absence of fever/infection during anticipated neutropenic period  Description: INTERVENTIONS  - Monitor WBC  - Administer growth factors as ordered  - Implement neutropenic guidelines  Outcome: Progressing

## 2022-12-04 NOTE — PROGRESS NOTES
[facility] Dosing Service  Warfarin (Coumadin) Initial Dosing    Dre Stacy is a 69year old patient with a history of afib and aortic valve replaement on outpatient coumadin. Pharmacy has been consulted to dose warfarin (COUMADIN)  by Dr. David Haque. Goal INR is 2.5-3.5 as confirmed with anticoag clinic notes. Coumadin has been on hold for hematuria prior to today during this admission. Now approved to resume by Urology. Pertinent Patient Medical History:  Transcatheter aortic valve replacement, afib  Potential Drug Interactions:  allopurinol, rocephin    Latest INR:   Recent Labs     12/03/22  0707   INR 1.42*       Other Anticoagulants:  none  Home regimen (if applicable):  warfarin 3 mg tues, thur and 4.5 mg the other days per cardiology notes from 10/11/22   Date/Time last dose was given (if applicable): prior to admission    Based on above -  1. For today, Give warfarin (COUMADIN) 5 mg at 2100 tonight    2. PT/INR ordered daily while on warfarin    3. Pharmacy will continue to follow. We appreciate the opportunity to assist in the care of this patient.     Red Pierce, PharmD  12/4/2022  11:33 AM

## 2022-12-05 LAB
ANION GAP SERPL CALC-SCNC: 4 MMOL/L (ref 0–18)
BUN BLD-MCNC: 15 MG/DL (ref 7–18)
CALCIUM BLD-MCNC: 9.1 MG/DL (ref 8.5–10.1)
CHLORIDE SERPL-SCNC: 107 MMOL/L (ref 98–112)
CO2 SERPL-SCNC: 27 MMOL/L (ref 21–32)
CREAT BLD-MCNC: 0.49 MG/DL
GFR SERPLBLD BASED ON 1.73 SQ M-ARVRAT: 102 ML/MIN/1.73M2 (ref 60–?)
GLUCOSE BLD-MCNC: 98 MG/DL (ref 70–99)
INR BLD: 1.28 (ref 0.85–1.16)
MAGNESIUM SERPL-MCNC: 2 MG/DL (ref 1.6–2.6)
OSMOLALITY SERPL CALC.SUM OF ELEC: 287 MOSM/KG (ref 275–295)
POTASSIUM SERPL-SCNC: 4.1 MMOL/L (ref 3.5–5.1)
PROTHROMBIN TIME: 15.9 SECONDS (ref 11.6–14.8)
SODIUM SERPL-SCNC: 138 MMOL/L (ref 136–145)
VANCOMYCIN TROUGH SERPL-MCNC: 26.1 UG/ML (ref 10–20)

## 2022-12-05 PROCEDURE — 83735 ASSAY OF MAGNESIUM: CPT | Performed by: HOSPITALIST

## 2022-12-05 PROCEDURE — 85610 PROTHROMBIN TIME: CPT | Performed by: HOSPITALIST

## 2022-12-05 PROCEDURE — 80048 BASIC METABOLIC PNL TOTAL CA: CPT | Performed by: HOSPITALIST

## 2022-12-05 PROCEDURE — 84443 ASSAY THYROID STIM HORMONE: CPT | Performed by: INTERNAL MEDICINE

## 2022-12-05 PROCEDURE — 80202 ASSAY OF VANCOMYCIN: CPT | Performed by: INTERNAL MEDICINE

## 2022-12-05 RX ORDER — CIPROFLOXACIN 500 MG/1
500 TABLET, FILM COATED ORAL EVERY 12 HOURS SCHEDULED
Status: DISCONTINUED | OUTPATIENT
Start: 2022-12-05 | End: 2022-12-08

## 2022-12-05 RX ORDER — CIPROFLOXACIN 500 MG/1
500 TABLET, FILM COATED ORAL EVERY 12 HOURS SCHEDULED
Qty: 14 TABLET | Refills: 0 | Status: SHIPPED | OUTPATIENT
Start: 2022-12-05 | End: 2022-12-12

## 2022-12-05 RX ORDER — VERAPAMIL HYDROCHLORIDE 80 MG/1
80 TABLET ORAL EVERY 12 HOURS SCHEDULED
Qty: 60 TABLET | Refills: 5 | Status: SHIPPED | OUTPATIENT
Start: 2022-12-05

## 2022-12-05 RX ORDER — CEFADROXIL 500 MG/1
500 CAPSULE ORAL 2 TIMES DAILY
Qty: 28 CAPSULE | Refills: 0 | Status: SHIPPED | OUTPATIENT
Start: 2022-12-05 | End: 2022-12-19

## 2022-12-05 RX ORDER — WARFARIN SODIUM 5 MG/1
5 TABLET ORAL
Status: COMPLETED | OUTPATIENT
Start: 2022-12-05 | End: 2022-12-05

## 2022-12-05 RX ORDER — LEVOTHYROXINE SODIUM 0.12 MG/1
125 TABLET ORAL
Qty: 30 TABLET | Refills: 0 | Status: SHIPPED | OUTPATIENT
Start: 2022-12-05

## 2022-12-05 RX ORDER — VANCOMYCIN HYDROCHLORIDE
1500 EVERY 24 HOURS
Status: DISCONTINUED | OUTPATIENT
Start: 2022-12-06 | End: 2022-12-05

## 2022-12-05 NOTE — CM/SW NOTE
MSW met with pt,IM provided. She was also provided Choice list, MSW will f/u after lunch for choice. 3:30pm  MSW met with pt and she picked [facility]. [facility] can accept on 12/6.

## 2022-12-05 NOTE — PLAN OF CARE
Assumed patient care at 7:30. A/O x3. Room air. Afib on tele. Purwick in place. Rocephin Q24 and vanco Q24. Cardiac diet. Right forearm IV C/D/I and SL.  All safety precautions are in place and will continue to monitor the patient    Problem: PAIN - ADULT  Goal: Verbalizes/displays adequate comfort level or patient's stated pain goal  Description: INTERVENTIONS:  - Encourage pt to monitor pain and request assistance  - Assess pain using appropriate pain scale  - Administer analgesics based on type and severity of pain and evaluate response  - Implement non-pharmacological measures as appropriate and evaluate response  - Consider cultural and social influences on pain and pain management  - Manage/alleviate anxiety  - Utilize distraction and/or relaxation techniques  - Monitor for opioid side effects  - Notify MD/LIP if interventions unsuccessful or patient reports new pain  - Anticipate increased pain with activity and pre-medicate as appropriate  Outcome: Progressing     Problem: RISK FOR INFECTION - ADULT  Goal: Absence of fever/infection during anticipated neutropenic period  Description: INTERVENTIONS  - Monitor WBC  - Administer growth factors as ordered  - Implement neutropenic guidelines  Outcome: Progressing

## 2022-12-05 NOTE — PLAN OF CARE
Assumed care at 0730. Pt is A&OX 3  RA, VSS  Tele: Afib  Complains of pain on left wrist, denies having pain medication. IV abx. Warfarin to be started. Pt refused SCDs', education provided, pt continued to refused. Purewick in place. Bed in lowest position, call light within reach. Problem: RISK FOR INFECTION - ADULT  Goal: Absence of fever/infection during anticipated neutropenic period  Description: INTERVENTIONS  - Monitor WBC  - Administer growth factors as ordered  - Implement neutropenic guidelines  Outcome: Progressing     Problem: CARDIOVASCULAR - ADULT  Goal: Maintains optimal cardiac output and hemodynamic stability  Description: INTERVENTIONS:  - Monitor vital signs, rhythm, and trends  - Monitor for bleeding, hypotension and signs of decreased cardiac output  - Evaluate effectiveness of vasoactive medications to optimize hemodynamic stability  - Monitor arterial and/or venous puncture sites for bleeding and/or hematoma  - Assess quality of pulses, skin color and temperature  - Assess for signs of decreased coronary artery perfusion - ex.  Angina  - Evaluate fluid balance, assess for edema, trend weights  Outcome: Progressing

## 2022-12-05 NOTE — PROGRESS NOTES
[facility] Dosing Service  Warfarin (Coumadin) Subsequent Dosing    Katie Caballero is a 69year old patient for whom pharmacy is dosing warfarin (Coumadin). Goal INR is 2.5-3.5    Recent Labs   Lab 11/30/22  0829 12/01/22  0859 12/02/22  0756 12/03/22  0707 12/05/22  0813   INR 1.95* 1.70* 1.50* 1.42* 1.28*       Consulted by:  Dr. Milan Johnson  Indication:  AVR and afib  Potential Drug Interactions:  no major interactions  Other Anticoagulants:  n/a  Home regimen (if applicable):  3 mg TuTh, 4.5 mg ROW    Inpatient Dosing History:    Date 12/4 12/5       INR 1.42 1.28       Coumadin dose 5                 Based on above -  1. For today, Give warfarin (COUMADIN) 5 mg at 2100 tonight  2   PT/INR ordered daily while on warfarin  3. Pharmacy will continue to follow. We appreciate the opportunity to assist in the care of this patient.     Jeanne Dai, PharmD  12/5/2022  3:20 PM

## 2022-12-05 NOTE — PROGRESS NOTES
Pt awake and alert upon assessment. On tele running afib. RA.  IV Vanc. Dose adjusted. Warfarin restarted. Zanck. Needs dc plan.

## 2022-12-06 ENCOUNTER — APPOINTMENT (OUTPATIENT)
Dept: GENERAL RADIOLOGY | Facility: HOSPITAL | Age: 67
End: 2022-12-06
Attending: INTERNAL MEDICINE
Payer: MEDICARE

## 2022-12-06 ENCOUNTER — APPOINTMENT (OUTPATIENT)
Dept: GENERAL RADIOLOGY | Facility: HOSPITAL | Age: 67
DRG: 872 | End: 2022-12-06
Attending: INTERNAL MEDICINE
Payer: MEDICARE

## 2022-12-06 LAB
INR BLD: 1.41 (ref 0.85–1.16)
PROTHROMBIN TIME: 17.2 SECONDS (ref 11.6–14.8)

## 2022-12-06 PROCEDURE — 71045 X-RAY EXAM CHEST 1 VIEW: CPT | Performed by: INTERNAL MEDICINE

## 2022-12-06 PROCEDURE — 85610 PROTHROMBIN TIME: CPT | Performed by: HOSPITALIST

## 2022-12-06 PROCEDURE — 97530 THERAPEUTIC ACTIVITIES: CPT

## 2022-12-06 RX ORDER — CEFAZOLIN SODIUM/WATER 2 G/20 ML
2 SYRINGE (ML) INTRAVENOUS EVERY 8 HOURS
Status: DISCONTINUED | OUTPATIENT
Start: 2022-12-06 | End: 2022-12-08

## 2022-12-06 RX ORDER — WARFARIN SODIUM 5 MG/1
5 TABLET ORAL
Status: COMPLETED | OUTPATIENT
Start: 2022-12-06 | End: 2022-12-06

## 2022-12-06 NOTE — PROGRESS NOTES
Pt awake and alert upon assessment. On tele running afib. RA. PO Cipro. Plan to dc to [facility] at 1100.

## 2022-12-06 NOTE — PLAN OF CARE
Assumed patient care at 7:30. A/O x3. Room air. Afib on tele. Purwick in place. Cardiac diet. Right forearm IV C/D/I and SL.  All safety precautions are in place and will continue to monitor the patient    Problem: PAIN - ADULT  Goal: Verbalizes/displays adequate comfort level or patient's stated pain goal  Description: INTERVENTIONS:  - Encourage pt to monitor pain and request assistance  - Assess pain using appropriate pain scale  - Administer analgesics based on type and severity of pain and evaluate response  - Implement non-pharmacological measures as appropriate and evaluate response  - Consider cultural and social influences on pain and pain management  - Manage/alleviate anxiety  - Utilize distraction and/or relaxation techniques  - Monitor for opioid side effects  - Notify MD/LIP if interventions unsuccessful or patient reports new pain  - Anticipate increased pain with activity and pre-medicate as appropriate  Outcome: Progressing     Problem: RISK FOR INFECTION - ADULT  Goal: Absence of fever/infection during anticipated neutropenic period  Description: INTERVENTIONS  - Monitor WBC  - Administer growth factors as ordered  - Implement neutropenic guidelines  Outcome: Progressing

## 2022-12-06 NOTE — PROGRESS NOTES
[facility] Dosing Service  Warfarin (Coumadin) Subsequent Dosing    Alyson Rodrigues is a 69year old patient for whom pharmacy is dosing warfarin (Coumadin). Goal INR is 2.5-3.5    Recent Labs   Lab 11/30/22  0829 12/01/22  0859 12/02/22  0756 12/03/22  0707 12/05/22  0813   INR 1.95* 1.70* 1.50* 1.42* 1.28*       Consulted by:  Dr. Josie Stern  Indication:  AVR and afib  Potential Drug Interactions:  cipro, miconazole  Other Anticoagulants:  n/a  Home regimen (if applicable):  3 mg TuTh, 4.5 mg ROW     Inpatient Dosing History:     Date 12/4 12/5 12/6          INR 1.42 1.28 1.45          Coumadin dose 5  5                                                                                               Based on above -  1. For today, Give warfarin (COUMADIN) 5 mg at 2100 tonight  2   PT/INR ordered daily while on warfarin  3. Pharmacy will continue to follow. We appreciate the opportunity to assist in the care of this patient.     Lex Nash, PharmD  12/6/2022  10:01 AM

## 2022-12-07 LAB
INR BLD: 1.65 (ref 0.85–1.16)
PROTHROMBIN TIME: 19.4 SECONDS (ref 11.6–14.8)

## 2022-12-07 PROCEDURE — 85610 PROTHROMBIN TIME: CPT | Performed by: HOSPITALIST

## 2022-12-07 RX ORDER — FUROSEMIDE 20 MG/1
20 TABLET ORAL DAILY
Status: DISCONTINUED | OUTPATIENT
Start: 2022-12-07 | End: 2022-12-08

## 2022-12-07 RX ORDER — WARFARIN SODIUM 5 MG/1
5 TABLET ORAL
Status: COMPLETED | OUTPATIENT
Start: 2022-12-07 | End: 2022-12-07

## 2022-12-07 RX ORDER — FUROSEMIDE 20 MG/1
20 TABLET ORAL DAILY
Qty: 30 TABLET | Refills: 5 | Status: SHIPPED | OUTPATIENT
Start: 2022-12-07

## 2022-12-07 NOTE — PROGRESS NOTES
[facility] Dosing Service  Warfarin (Coumadin) Subsequent Dosing    Frederick Barry is a 69year old patient for whom pharmacy is dosing warfarin (Coumadin). Goal INR is 2.5-3.5    Recent Labs   Lab 12/02/22  0756 12/03/22  0707 12/05/22  0813 12/06/22  0932 12/07/22  0653   INR 1.50* 1.42* 1.28* 1.41* 1.65*       Consulted by:  Dr. Cliff Brock  Indication:  AVR and afib  Potential Drug Interactions:  cipro, miconazole  Other Anticoagulants:  n/a  Home regimen (if applicable):  3 mg TuTh, 4.5 mg ROW     Inpatient Dosing History:     Date 12/4 12/5 12/6 12/7        INR 1.42 1.28 1.45   1.65       Coumadin dose 5  5  4                   Based on above -  1. For today, Give warfarin (COUMADIN) 5 mg at 2100 tonight. 2   PT/INR ordered daily while on warfarin  3. Pharmacy will continue to follow. We appreciate the opportunity to assist in the care of this patient.     Salma Medina, PharmD  12/7/2022  10:06 AM

## 2022-12-07 NOTE — PLAN OF CARE
Assumed pt care at 0730. Pt is A&Ox4.   RA, VSS. Tele: Afib rate controlled. Complains of left wrist pain, declines any interventions. Pt complains of mild SOB, O2 Sat 96%, declines oxygen use, attending notified, per cards NP hold off on diuresis until cardiologist sees pt. Possible discharged to [facility] today. Pt has poor appetite only ate a late lunch. Pt has discoloration of BLE d/t venous stasis. Pt refused sitting on the chair. Purewick in place. Bed in lowest position, call light within reach, bed alarm on. Pt started on lasix. Not discharging today.      Problem: PAIN - ADULT  Goal: Verbalizes/displays adequate comfort level or patient's stated pain goal  Description: INTERVENTIONS:  - Encourage pt to monitor pain and request assistance  - Assess pain using appropriate pain scale  - Administer analgesics based on type and severity of pain and evaluate response  - Implement non-pharmacological measures as appropriate and evaluate response  - Consider cultural and social influences on pain and pain management  - Manage/alleviate anxiety  - Utilize distraction and/or relaxation techniques  - Monitor for opioid side effects  - Notify MD/LIP if interventions unsuccessful or patient reports new pain  - Anticipate increased pain with activity and pre-medicate as appropriate  Outcome: Progressing     Problem: RISK FOR INFECTION - ADULT  Goal: Absence of fever/infection during anticipated neutropenic period  Description: INTERVENTIONS  - Monitor WBC  - Administer growth factors as ordered  - Implement neutropenic guidelines  Outcome: Progressing     Problem: CARDIOVASCULAR - ADULT  Goal: Maintains optimal cardiac output and hemodynamic stability  Description: INTERVENTIONS:  - Monitor vital signs, rhythm, and trends  - Monitor for bleeding, hypotension and signs of decreased cardiac output  - Evaluate effectiveness of vasoactive medications to optimize hemodynamic stability  - Monitor arterial and/or venous puncture sites for bleeding and/or hematoma  - Assess quality of pulses, skin color and temperature  - Assess for signs of decreased coronary artery perfusion - ex.  Angina  - Evaluate fluid balance, assess for edema, trend weights  Outcome: Progressing     Problem: GASTROINTESTINAL - ADULT  Goal: Maintains adequate nutritional intake (undernourished)  Description: INTERVENTIONS:  - Monitor percentage of each meal consumed  - Identify factors contributing to decreased intake, treat as appropriate  - Assist with meals as needed  - Monitor I&O, WT and lab values  - Obtain nutritional consult as needed  - Optimize oral hygiene and moisture  - Encourage food from home; allow for food preferences  - Enhance eating environment  Outcome: Progressing     Problem: RESPIRATORY - ADULT  Goal: Achieves optimal ventilation and oxygenation  Description: INTERVENTIONS:  - Assess for changes in respiratory status  - Assess for changes in mentation and behavior  - Position to facilitate oxygenation and minimize respiratory effort  - Oxygen supplementation based on oxygen saturation or ABGs  - Provide Smoking Cessation handout, if applicable  - Encourage broncho-pulmonary hygiene including cough, deep breathe, Incentive Spirometry  - Assess the need for suctioning and perform as needed  - Assess and instruct to report SOB or any respiratory difficulty  - Respiratory Therapy support as indicated  - Manage/alleviate anxiety  - Monitor for signs/symptoms of CO2 retention  Outcome: Progressing

## 2022-12-07 NOTE — PLAN OF CARE
A&Ox4. On room air, lungs clear. States shortness of breath with exertion. Productive cough with green phlegm per patient. Abdomen soft and nontender, BS active. Denies N/V/D- states poor appetite. Right forearm IV saline locked. Receiving PO Cipro for UTI and IV Ancef for cellulitis of RUE. Controlled A-fib on tele, on Coumadin. Purewick in place. Plan is for patient to discharge to [facility]. Needs met. Will monitor.      Problem: PAIN - ADULT  Goal: Verbalizes/displays adequate comfort level or patient's stated pain goal  Description: INTERVENTIONS:  - Encourage pt to monitor pain and request assistance  - Assess pain using appropriate pain scale  - Administer analgesics based on type and severity of pain and evaluate response  - Implement non-pharmacological measures as appropriate and evaluate response  - Consider cultural and social influences on pain and pain management  - Manage/alleviate anxiety  - Utilize distraction and/or relaxation techniques  - Monitor for opioid side effects  - Notify MD/LIP if interventions unsuccessful or patient reports new pain  - Anticipate increased pain with activity and pre-medicate as appropriate  12/7/2022 0052 by Rick Kimball RN  Outcome: Progressing  12/7/2022 0052 by Rick Kimball RN  Outcome: Progressing     Problem: RISK FOR INFECTION - ADULT  Goal: Absence of fever/infection during anticipated neutropenic period  Description: INTERVENTIONS  - Monitor WBC  - Administer growth factors as ordered  - Implement neutropenic guidelines  12/7/2022 0052 by Rick Kimball RN  Outcome: Progressing  12/7/2022 0052 by Rick Kimball RN  Outcome: Progressing     Problem: DISCHARGE PLANNING  Goal: Discharge to home or other facility with appropriate resources  Description: INTERVENTIONS:  - Identify barriers to discharge w/pt and caregiver  - Include patient/family/discharge partner in discharge planning  - Arrange for needed discharge resources and transportation as appropriate  - Identify discharge learning needs (meds, wound care, etc)  - Arrange for interpreters to assist at discharge as needed  - Consider post-discharge preferences of patient/family/discharge partner  - Complete POLST form as appropriate  - Assess patient's ability to be responsible for managing their own health  - Refer to Case Management Department for coordinating discharge planning if the patient needs post-hospital services based on physician/LIP order or complex needs related to functional status, cognitive ability or social support system  12/7/2022 0052 by Génesis Lutz RN  Outcome: Progressing  12/7/2022 0052 by Génesis Lutz RN  Outcome: Progressing     Problem: CARDIOVASCULAR - ADULT  Goal: Maintains optimal cardiac output and hemodynamic stability  Description: INTERVENTIONS:  - Monitor vital signs, rhythm, and trends  - Monitor for bleeding, hypotension and signs of decreased cardiac output  - Evaluate effectiveness of vasoactive medications to optimize hemodynamic stability  - Monitor arterial and/or venous puncture sites for bleeding and/or hematoma  - Assess quality of pulses, skin color and temperature  - Assess for signs of decreased coronary artery perfusion - ex.  Angina  - Evaluate fluid balance, assess for edema, trend weights  12/7/2022 0052 by Génesis Lutz RN  Outcome: Progressing  12/7/2022 0052 by Génesis Lutz RN  Outcome: Progressing  Goal: Absence of cardiac arrhythmias or at baseline  Description: INTERVENTIONS:  - Continuous cardiac monitoring, monitor vital signs, obtain 12 lead EKG if indicated  - Evaluate effectiveness of antiarrhythmic and heart rate control medications as ordered  - Initiate emergency measures for life threatening arrhythmias  - Monitor electrolytes and administer replacement therapy as ordered  12/7/2022 0052 by Génesis Lutz RN  Outcome: Progressing  12/7/2022 0052 by Génesis Lutz RN  Outcome: Progressing     Problem: GASTROINTESTINAL - ADULT  Goal: Maintains adequate nutritional intake (undernourished)  Description: INTERVENTIONS:  - Monitor percentage of each meal consumed  - Identify factors contributing to decreased intake, treat as appropriate  - Assist with meals as needed  - Monitor I&O, WT and lab values  - Obtain nutritional consult as needed  - Optimize oral hygiene and moisture  - Encourage food from home; allow for food preferences  - Enhance eating environment  12/7/2022 0052 by Charleen Love RN  Outcome: Progressing  12/7/2022 0052 by Charleen Love RN  Outcome: Progressing     Problem: METABOLIC/FLUID AND ELECTROLYTES - ADULT  Goal: Electrolytes maintained within normal limits  Description: INTERVENTIONS:  - Monitor labs and rhythm and assess patient for signs and symptoms of electrolyte imbalances  - Administer electrolyte replacement as ordered  - Monitor response to electrolyte replacements, including rhythm and repeat lab results as appropriate  - Fluid restriction as ordered  - Instruct patient on fluid and nutrition restrictions as appropriate  12/7/2022 0052 by Charleen Love RN  Outcome: Progressing  12/7/2022 0052 by Charleen Love RN  Outcome: Progressing

## 2022-12-08 VITALS
HEART RATE: 90 BPM | WEIGHT: 206.88 LBS | OXYGEN SATURATION: 90 % | BODY MASS INDEX: 30.64 KG/M2 | SYSTOLIC BLOOD PRESSURE: 114 MMHG | TEMPERATURE: 97 F | HEIGHT: 69 IN | RESPIRATION RATE: 20 BRPM | DIASTOLIC BLOOD PRESSURE: 62 MMHG

## 2022-12-08 LAB
INR BLD: 1.92 (ref 0.85–1.16)
PROTHROMBIN TIME: 21.8 SECONDS (ref 11.6–14.8)
TSI SER-ACNC: 0.46 MIU/ML (ref 0.36–3.74)

## 2022-12-08 PROCEDURE — 97530 THERAPEUTIC ACTIVITIES: CPT

## 2022-12-08 PROCEDURE — 85610 PROTHROMBIN TIME: CPT | Performed by: HOSPITALIST

## 2022-12-08 PROCEDURE — 97110 THERAPEUTIC EXERCISES: CPT

## 2022-12-08 NOTE — PLAN OF CARE
[facility] care 0730. Alert and oriented x4. PIV saline locked. RA. Tele- A fib. VTE prophylaxis- Coumadin. Poor appetite. PO cipro- UTI. Pure wick and briefed. Redness on sacrum. C/o being uncomfortable. Possible discharge today.        Problem: PAIN - ADULT  Goal: Verbalizes/displays adequate comfort level or patient's stated pain goal  Description: INTERVENTIONS:  - Encourage pt to monitor pain and request assistance  - Assess pain using appropriate pain scale  - Administer analgesics based on type and severity of pain and evaluate response  - Implement non-pharmacological measures as appropriate and evaluate response  - Consider cultural and social influences on pain and pain management  - Manage/alleviate anxiety  - Utilize distraction and/or relaxation techniques  - Monitor for opioid side effects  - Notify MD/LIP if interventions unsuccessful or patient reports new pain  - Anticipate increased pain with activity and pre-medicate as appropriate  Outcome: Progressing     Problem: RISK FOR INFECTION - ADULT  Goal: Absence of fever/infection during anticipated neutropenic period  Description: INTERVENTIONS  - Monitor WBC  - Administer growth factors as ordered  - Implement neutropenic guidelines  Outcome: Progressing     Problem: DISCHARGE PLANNING  Goal: Discharge to home or other facility with appropriate resources  Description: INTERVENTIONS:  - Identify barriers to discharge w/pt and caregiver  - Include patient/family/discharge partner in discharge planning  - Arrange for needed discharge resources and transportation as appropriate  - Identify discharge learning needs (meds, wound care, etc)  - Arrange for interpreters to assist at discharge as needed  - Consider post-discharge preferences of patient/family/discharge partner  - Complete POLST form as appropriate  - Assess patient's ability to be responsible for managing their own health  - Refer to Case Management Department for coordinating discharge planning if the patient needs post-hospital services based on physician/LIP order or complex needs related to functional status, cognitive ability or social support system  Outcome: Progressing     Problem: CARDIOVASCULAR - ADULT  Goal: Maintains optimal cardiac output and hemodynamic stability  Description: INTERVENTIONS:  - Monitor vital signs, rhythm, and trends  - Monitor for bleeding, hypotension and signs of decreased cardiac output  - Evaluate effectiveness of vasoactive medications to optimize hemodynamic stability  - Monitor arterial and/or venous puncture sites for bleeding and/or hematoma  - Assess quality of pulses, skin color and temperature  - Assess for signs of decreased coronary artery perfusion - ex.  Angina  - Evaluate fluid balance, assess for edema, trend weights  Outcome: Progressing  Goal: Absence of cardiac arrhythmias or at baseline  Description: INTERVENTIONS:  - Continuous cardiac monitoring, monitor vital signs, obtain 12 lead EKG if indicated  - Evaluate effectiveness of antiarrhythmic and heart rate control medications as ordered  - Initiate emergency measures for life threatening arrhythmias  - Monitor electrolytes and administer replacement therapy as ordered  Outcome: Progressing     Problem: GASTROINTESTINAL - ADULT  Goal: Maintains adequate nutritional intake (undernourished)  Description: INTERVENTIONS:  - Monitor percentage of each meal consumed  - Identify factors contributing to decreased intake, treat as appropriate  - Assist with meals as needed  - Monitor I&O, WT and lab values  - Obtain nutritional consult as needed  - Optimize oral hygiene and moisture  - Encourage food from home; allow for food preferences  - Enhance eating environment  Outcome: Progressing     Problem: METABOLIC/FLUID AND ELECTROLYTES - ADULT  Goal: Electrolytes maintained within normal limits  Description: INTERVENTIONS:  - Monitor labs and rhythm and assess patient for signs and symptoms of electrolyte imbalances  - Administer electrolyte replacement as ordered  - Monitor response to electrolyte replacements, including rhythm and repeat lab results as appropriate  - Fluid restriction as ordered  - Instruct patient on fluid and nutrition restrictions as appropriate  Outcome: Progressing     Problem: RESPIRATORY - ADULT  Goal: Achieves optimal ventilation and oxygenation  Description: INTERVENTIONS:  - Assess for changes in respiratory status  - Assess for changes in mentation and behavior  - Position to facilitate oxygenation and minimize respiratory effort  - Oxygen supplementation based on oxygen saturation or ABGs  - Provide Smoking Cessation handout, if applicable  - Encourage broncho-pulmonary hygiene including cough, deep breathe, Incentive Spirometry  - Assess the need for suctioning and perform as needed  - Assess and instruct to report SOB or any respiratory difficulty  - Respiratory Therapy support as indicated  - Manage/alleviate anxiety  - Monitor for signs/symptoms of CO2 retention  Outcome: Progressing

## 2022-12-08 NOTE — PROGRESS NOTES
[facility] Dosing Service  Warfarin (Coumadin) Subsequent Dosing    Angel Blackman is a 69year old patient for whom pharmacy is dosing warfarin (Coumadin). Goal INR is 2.5-3.5. Recent Labs   Lab 12/03/22  0707 12/05/22  0813 12/06/22  0932 12/07/22  0653 12/08/22  0738   INR 1.42* 1.28* 1.41* 1.65* 1.92*     Consulted by:  Dr. Brandee Silveira  Indication:  AVR and afib  Potential Drug Interactions:  cipro, miconazole  Other Anticoagulants:  n/a  Home regimen (if applicable):  3 mg TuTh, 4.5 mg ROW     Inpatient Dosing History:     Date 12/4 12/5 12/6 12/7 12/8      INR 1.42 1.28 1.45   1.65  1.92     Coumadin dose 5  5  5 5                         Based on above -  1. For today, Give warfarin (COUMADIN) 3 mg at 2100 tonight INR uptrending nicely, continue home regimen. 2   PT/INR ordered daily while on warfarin  3. Pharmacy will continue to follow. We appreciate the opportunity to assist in the care of this patient.     Estefani Peters, PharmD  12/8/2022  10:13 AM

## 2022-12-08 NOTE — PLAN OF CARE
Assumed care for this pt at 2330. Pt alert oriented, vss, afib on tele. Denies any pain or shortness of breath. Iv antibiotics continue. Will continue to monitor.

## 2022-12-08 NOTE — CM/SW NOTE
DC PLAN:  DC to [facility] (Jessica Aggie in admissions states they can accept).   S  DC Time 2:45pm

## 2022-12-08 NOTE — PROGRESS NOTES
Assumed care at [address] [place] [address] [address]  On R/A  Afib on tele-HR controlled. Tylenol 650 mg PO x pain with some relief. Denies SOB  IV abx Ancef for cellulitis of LUE, and PO Cipro for UTI well tolerated  Possible discharge to [facility] when medically cleared.

## 2022-12-08 NOTE — PROGRESS NOTES
1438: Attempted to call nurses station at [facility]. Was on hold for 10 minutes. Will call back. 1500: attempted to call again was placed on hold again. 1518: gave Zakiya Precise report.

## 2022-12-08 NOTE — PROGRESS NOTES
NURSING DISCHARGE NOTE    Discharged Rehab facility via Ambulance. Accompanied by Support staff  Belongings Taken by patient/family. PIV discontinued. AVS and paperwork given to ambulance.

## 2022-12-09 NOTE — CM/SW NOTE
9:15am  Pt called and wants to be sent somewhere else, she wants a private room and not happy with care. [facility] to see if they can accept.

## 2023-01-03 ENCOUNTER — HOSPITAL ENCOUNTER (EMERGENCY)
Facility: HOSPITAL | Age: 68
Discharge: HOME | End: 2023-01-03
Attending: EMERGENCY MEDICINE
Payer: MEDICARE

## 2023-01-03 ENCOUNTER — APPOINTMENT (OUTPATIENT)
Dept: CT IMAGING | Facility: HOSPITAL | Age: 68
End: 2023-01-03
Attending: EMERGENCY MEDICINE
Payer: MEDICARE

## 2023-01-03 VITALS
DIASTOLIC BLOOD PRESSURE: 72 MMHG | HEIGHT: 69 IN | WEIGHT: 214 LBS | TEMPERATURE: 99 F | SYSTOLIC BLOOD PRESSURE: 110 MMHG | RESPIRATION RATE: 15 BRPM | BODY MASS INDEX: 31.7 KG/M2 | HEART RATE: 96 BPM | OXYGEN SATURATION: 97 %

## 2023-01-03 DIAGNOSIS — K56.41 FECAL IMPACTION (HCC): Primary | ICD-10-CM

## 2023-01-03 DIAGNOSIS — R10.9 ABDOMINAL PAIN OF UNKNOWN ETIOLOGY: ICD-10-CM

## 2023-01-03 DIAGNOSIS — K80.20 CALCULUS OF GALLBLADDER WITHOUT CHOLECYSTITIS WITHOUT OBSTRUCTION: ICD-10-CM

## 2023-01-03 LAB
ALBUMIN SERPL-MCNC: 2.3 G/DL (ref 3.4–5)
ALBUMIN/GLOB SERPL: 0.6 {RATIO} (ref 1–2)
ALP LIVER SERPL-CCNC: 111 U/L
ALT SERPL-CCNC: 11 U/L
ANION GAP SERPL CALC-SCNC: 2 MMOL/L (ref 0–18)
AST SERPL-CCNC: 25 U/L (ref 15–37)
BASOPHILS # BLD AUTO: 0.03 X10(3) UL (ref 0–0.2)
BASOPHILS NFR BLD AUTO: 0.4 %
BILIRUB SERPL-MCNC: 0.4 MG/DL (ref 0.1–2)
BILIRUB UR QL STRIP.AUTO: NEGATIVE
BUN BLD-MCNC: 19 MG/DL (ref 7–18)
CALCIUM BLD-MCNC: 8.9 MG/DL (ref 8.5–10.1)
CHLORIDE SERPL-SCNC: 107 MMOL/L (ref 98–112)
CLARITY UR REFRACT.AUTO: CLEAR
CO2 SERPL-SCNC: 26 MMOL/L (ref 21–32)
COLOR UR AUTO: YELLOW
CREAT BLD-MCNC: 0.76 MG/DL
EOSINOPHIL # BLD AUTO: 0.22 X10(3) UL (ref 0–0.7)
EOSINOPHIL NFR BLD AUTO: 3 %
ERYTHROCYTE [DISTWIDTH] IN BLOOD BY AUTOMATED COUNT: 19.2 %
GFR SERPLBLD BASED ON 1.73 SQ M-ARVRAT: 85 ML/MIN/1.73M2 (ref 60–?)
GLOBULIN PLAS-MCNC: 4.1 G/DL (ref 2.8–4.4)
GLUCOSE BLD-MCNC: 86 MG/DL (ref 70–99)
GLUCOSE UR STRIP.AUTO-MCNC: NEGATIVE MG/DL
HCT VFR BLD AUTO: 28.1 %
HGB BLD-MCNC: 8.8 G/DL
IMM GRANULOCYTES # BLD AUTO: 0.03 X10(3) UL (ref 0–1)
IMM GRANULOCYTES NFR BLD: 0.4 %
KETONES UR STRIP.AUTO-MCNC: NEGATIVE MG/DL
LEUKOCYTE ESTERASE UR QL STRIP.AUTO: NEGATIVE
LIPASE SERPL-CCNC: 75 U/L (ref 73–393)
LYMPHOCYTES # BLD AUTO: 1.95 X10(3) UL (ref 1–4)
LYMPHOCYTES NFR BLD AUTO: 26.9 %
MCH RBC QN AUTO: 25.1 PG (ref 26–34)
MCHC RBC AUTO-ENTMCNC: 31.3 G/DL (ref 31–37)
MCV RBC AUTO: 80.3 FL
MONOCYTES # BLD AUTO: 0.82 X10(3) UL (ref 0.1–1)
MONOCYTES NFR BLD AUTO: 11.3 %
NEUTROPHILS # BLD AUTO: 4.21 X10 (3) UL (ref 1.5–7.7)
NEUTROPHILS # BLD AUTO: 4.21 X10(3) UL (ref 1.5–7.7)
NEUTROPHILS NFR BLD AUTO: 58 %
NITRITE UR QL STRIP.AUTO: NEGATIVE
OSMOLALITY SERPL CALC.SUM OF ELEC: 282 MOSM/KG (ref 275–295)
PH UR STRIP.AUTO: 7 [PH] (ref 5–8)
PLATELET # BLD AUTO: 351 10(3)UL (ref 150–450)
POTASSIUM SERPL-SCNC: 3.9 MMOL/L (ref 3.5–5.1)
PROT SERPL-MCNC: 6.4 G/DL (ref 6.4–8.2)
PROT UR STRIP.AUTO-MCNC: NEGATIVE MG/DL
RBC # BLD AUTO: 3.5 X10(6)UL
RBC UR QL AUTO: NEGATIVE
SODIUM SERPL-SCNC: 135 MMOL/L (ref 136–145)
SP GR UR STRIP.AUTO: 1.01 (ref 1–1.03)
UROBILINOGEN UR STRIP.AUTO-MCNC: <2 MG/DL
WBC # BLD AUTO: 7.3 X10(3) UL (ref 4–11)

## 2023-01-03 PROCEDURE — 99283 EMERGENCY DEPT VISIT LOW MDM: CPT | Performed by: EMERGENCY MEDICINE

## 2023-01-03 PROCEDURE — 99284 EMERGENCY DEPT VISIT MOD MDM: CPT | Performed by: EMERGENCY MEDICINE

## 2023-01-03 PROCEDURE — 51701 INSERT BLADDER CATHETER: CPT | Performed by: EMERGENCY MEDICINE

## 2023-01-03 PROCEDURE — 74176 CT ABD & PELVIS W/O CONTRAST: CPT | Performed by: EMERGENCY MEDICINE

## 2023-01-03 PROCEDURE — 96361 HYDRATE IV INFUSION ADD-ON: CPT | Performed by: EMERGENCY MEDICINE

## 2023-01-03 PROCEDURE — 80053 COMPREHEN METABOLIC PANEL: CPT | Performed by: EMERGENCY MEDICINE

## 2023-01-03 PROCEDURE — 85025 COMPLETE CBC W/AUTO DIFF WBC: CPT | Performed by: EMERGENCY MEDICINE

## 2023-01-03 PROCEDURE — 83690 ASSAY OF LIPASE: CPT | Performed by: EMERGENCY MEDICINE

## 2023-01-03 PROCEDURE — 81003 URINALYSIS AUTO W/O SCOPE: CPT | Performed by: EMERGENCY MEDICINE

## 2023-01-03 PROCEDURE — 96360 HYDRATION IV INFUSION INIT: CPT | Performed by: EMERGENCY MEDICINE

## 2023-01-03 RX ORDER — POLYETHYLENE GLYCOL 3350 17 G/17G
17 POWDER, FOR SOLUTION ORAL DAILY PRN
Qty: 12 EACH | Refills: 0 | Status: ON HOLD | OUTPATIENT
Start: 2023-01-03 | End: 2023-01-13

## 2023-01-03 RX ORDER — SODIUM CHLORIDE 9 MG/ML
INJECTION, SOLUTION INTRAVENOUS CONTINUOUS
Status: DISCONTINUED | OUTPATIENT
Start: 2023-01-03 | End: 2023-01-03

## 2023-01-03 RX ORDER — LIDOCAINE 50 MG/G
PATCH TOPICAL EVERY 24 HOURS
COMMUNITY

## 2023-01-03 RX ORDER — SENNA AND DOCUSATE SODIUM 50; 8.6 MG/1; MG/1
1 TABLET, FILM COATED ORAL DAILY
COMMUNITY

## 2023-01-03 NOTE — DISCHARGE INSTRUCTIONS
Clear liquids slowly advance your diet use MiraLAX as directed.   You do have an incidental gallstone that should be followed up with your own primary care physician for general surgery consultation

## 2023-01-03 NOTE — ED INITIAL ASSESSMENT (HPI)
Patient to ED via EMS. Reports with abdominal pain for the past few days. Also had this pain the week before. Decreased urine output per nursing home.

## 2023-01-07 ENCOUNTER — HOSPITAL ENCOUNTER (INPATIENT)
Facility: HOSPITAL | Age: 68
LOS: 6 days | Discharge: NURSING, REHAB OR LONG-TERM CARE | End: 2023-01-13
Attending: EMERGENCY MEDICINE | Admitting: INTERNAL MEDICINE
Payer: MEDICARE

## 2023-01-07 ENCOUNTER — APPOINTMENT (OUTPATIENT)
Dept: GENERAL RADIOLOGY | Facility: HOSPITAL | Age: 68
End: 2023-01-07
Attending: EMERGENCY MEDICINE
Payer: MEDICARE

## 2023-01-07 DIAGNOSIS — D64.9 ANEMIA, UNSPECIFIED TYPE: ICD-10-CM

## 2023-01-07 DIAGNOSIS — B33.8 RESPIRATORY SYNCYTIAL VIRUS (RSV): ICD-10-CM

## 2023-01-07 DIAGNOSIS — I48.91 ATRIAL FIBRILLATION WITH RVR (HCC): Primary | ICD-10-CM

## 2023-01-07 DIAGNOSIS — I50.9 ACUTE ON CHRONIC CONGESTIVE HEART FAILURE, UNSPECIFIED HEART FAILURE TYPE (HCC): ICD-10-CM

## 2023-01-07 DIAGNOSIS — K59.00 CONSTIPATION, UNSPECIFIED CONSTIPATION TYPE: ICD-10-CM

## 2023-01-07 LAB
ANION GAP SERPL CALC-SCNC: 5 MMOL/L (ref 0–18)
BASOPHILS # BLD AUTO: 0.03 X10(3) UL (ref 0–0.2)
BASOPHILS NFR BLD AUTO: 0.4 %
BUN BLD-MCNC: 16 MG/DL (ref 7–18)
CALCIUM BLD-MCNC: 9.5 MG/DL (ref 8.5–10.1)
CHLORIDE SERPL-SCNC: 106 MMOL/L (ref 98–112)
CO2 SERPL-SCNC: 27 MMOL/L (ref 21–32)
CREAT BLD-MCNC: 0.59 MG/DL
DIGOXIN SERPL-MCNC: 0.6 NG/ML (ref 0.8–2)
EOSINOPHIL # BLD AUTO: 0.21 X10(3) UL (ref 0–0.7)
EOSINOPHIL NFR BLD AUTO: 2.9 %
ERYTHROCYTE [DISTWIDTH] IN BLOOD BY AUTOMATED COUNT: 18.8 %
FLUAV + FLUBV RNA SPEC NAA+PROBE: NEGATIVE
FLUAV + FLUBV RNA SPEC NAA+PROBE: NEGATIVE
GFR SERPLBLD BASED ON 1.73 SQ M-ARVRAT: 97 ML/MIN/1.73M2 (ref 60–?)
GLUCOSE BLD-MCNC: 80 MG/DL (ref 70–99)
HCT VFR BLD AUTO: 27.4 %
HGB BLD-MCNC: 8.5 G/DL
IMM GRANULOCYTES # BLD AUTO: 0.02 X10(3) UL (ref 0–1)
IMM GRANULOCYTES NFR BLD: 0.3 %
INR BLD: 1.64 (ref 0.85–1.16)
LYMPHOCYTES # BLD AUTO: 1.44 X10(3) UL (ref 1–4)
LYMPHOCYTES NFR BLD AUTO: 20.2 %
MCH RBC QN AUTO: 25.5 PG (ref 26–34)
MCHC RBC AUTO-ENTMCNC: 31 G/DL (ref 31–37)
MCV RBC AUTO: 82.3 FL
MONOCYTES # BLD AUTO: 0.93 X10(3) UL (ref 0.1–1)
MONOCYTES NFR BLD AUTO: 13 %
NEUTROPHILS # BLD AUTO: 4.51 X10 (3) UL (ref 1.5–7.7)
NEUTROPHILS # BLD AUTO: 4.51 X10(3) UL (ref 1.5–7.7)
NEUTROPHILS NFR BLD AUTO: 63.2 %
NT-PROBNP SERPL-MCNC: 3903 PG/ML (ref ?–125)
OSMOLALITY SERPL CALC.SUM OF ELEC: 286 MOSM/KG (ref 275–295)
PLATELET # BLD AUTO: 339 10(3)UL (ref 150–450)
POTASSIUM SERPL-SCNC: 3.9 MMOL/L (ref 3.5–5.1)
PROTHROMBIN TIME: 19.3 SECONDS (ref 11.6–14.8)
RBC # BLD AUTO: 3.33 X10(6)UL
RSV RNA SPEC NAA+PROBE: POSITIVE
SARS-COV-2 RNA RESP QL NAA+PROBE: NOT DETECTED
SODIUM SERPL-SCNC: 138 MMOL/L (ref 136–145)
TROPONIN I HIGH SENSITIVITY: 24 NG/L
WBC # BLD AUTO: 7.1 X10(3) UL (ref 4–11)

## 2023-01-07 PROCEDURE — 83880 ASSAY OF NATRIURETIC PEPTIDE: CPT | Performed by: EMERGENCY MEDICINE

## 2023-01-07 PROCEDURE — 93005 ELECTROCARDIOGRAM TRACING: CPT

## 2023-01-07 PROCEDURE — 71045 X-RAY EXAM CHEST 1 VIEW: CPT | Performed by: EMERGENCY MEDICINE

## 2023-01-07 PROCEDURE — 84484 ASSAY OF TROPONIN QUANT: CPT | Performed by: EMERGENCY MEDICINE

## 2023-01-07 PROCEDURE — 74018 RADEX ABDOMEN 1 VIEW: CPT | Performed by: EMERGENCY MEDICINE

## 2023-01-07 PROCEDURE — 99285 EMERGENCY DEPT VISIT HI MDM: CPT

## 2023-01-07 PROCEDURE — 99291 CRITICAL CARE FIRST HOUR: CPT

## 2023-01-07 PROCEDURE — 0241U SARS-COV-2/FLU A AND B/RSV BY PCR (GENEXPERT): CPT | Performed by: EMERGENCY MEDICINE

## 2023-01-07 PROCEDURE — 80162 ASSAY OF DIGOXIN TOTAL: CPT | Performed by: EMERGENCY MEDICINE

## 2023-01-07 PROCEDURE — 85610 PROTHROMBIN TIME: CPT | Performed by: EMERGENCY MEDICINE

## 2023-01-07 PROCEDURE — 93010 ELECTROCARDIOGRAM REPORT: CPT

## 2023-01-07 PROCEDURE — 96365 THER/PROPH/DIAG IV INF INIT: CPT

## 2023-01-07 PROCEDURE — 80048 BASIC METABOLIC PNL TOTAL CA: CPT | Performed by: EMERGENCY MEDICINE

## 2023-01-07 PROCEDURE — 74019 RADEX ABDOMEN 2 VIEWS: CPT | Performed by: EMERGENCY MEDICINE

## 2023-01-07 PROCEDURE — 96375 TX/PRO/DX INJ NEW DRUG ADDON: CPT

## 2023-01-07 PROCEDURE — 85025 COMPLETE CBC W/AUTO DIFF WBC: CPT | Performed by: EMERGENCY MEDICINE

## 2023-01-07 RX ORDER — SPIRONOLACTONE 25 MG/1
25 TABLET ORAL DAILY
Status: DISCONTINUED | OUTPATIENT
Start: 2023-01-08 | End: 2023-01-13

## 2023-01-07 RX ORDER — WARFARIN SODIUM 2 MG/1
4 TABLET ORAL
Status: DISCONTINUED | OUTPATIENT
Start: 2023-01-08 | End: 2023-01-08 | Stop reason: DRUGHIGH

## 2023-01-07 RX ORDER — ZINC SULFATE 50(220)MG
220 CAPSULE ORAL DAILY
Status: DISCONTINUED | OUTPATIENT
Start: 2023-01-08 | End: 2023-01-13

## 2023-01-07 RX ORDER — ACETAMINOPHEN 500 MG
500 TABLET ORAL EVERY 4 HOURS PRN
Status: DISCONTINUED | OUTPATIENT
Start: 2023-01-07 | End: 2023-01-08

## 2023-01-07 RX ORDER — ACETAMINOPHEN 500 MG
1000 TABLET ORAL ONCE
Status: COMPLETED | OUTPATIENT
Start: 2023-01-07 | End: 2023-01-07

## 2023-01-07 RX ORDER — NORTRIPTYLINE HYDROCHLORIDE 25 MG/1
25 CAPSULE ORAL NIGHTLY
Status: DISCONTINUED | OUTPATIENT
Start: 2023-01-08 | End: 2023-01-13

## 2023-01-07 RX ORDER — FLUOXETINE HYDROCHLORIDE 20 MG/1
20 CAPSULE ORAL 2 TIMES DAILY
Status: DISCONTINUED | OUTPATIENT
Start: 2023-01-08 | End: 2023-01-13

## 2023-01-07 RX ORDER — ALBUTEROL SULFATE 90 UG/1
2 AEROSOL, METERED RESPIRATORY (INHALATION) EVERY 6 HOURS PRN
Status: DISCONTINUED | OUTPATIENT
Start: 2023-01-07 | End: 2023-01-13

## 2023-01-07 RX ORDER — FUROSEMIDE 10 MG/ML
20 INJECTION INTRAMUSCULAR; INTRAVENOUS ONCE
Status: COMPLETED | OUTPATIENT
Start: 2023-01-07 | End: 2023-01-07

## 2023-01-07 RX ORDER — ATORVASTATIN CALCIUM 10 MG/1
10 TABLET, FILM COATED ORAL NIGHTLY
Status: DISCONTINUED | OUTPATIENT
Start: 2023-01-08 | End: 2023-01-10

## 2023-01-07 RX ORDER — ONDANSETRON 2 MG/ML
4 INJECTION INTRAMUSCULAR; INTRAVENOUS EVERY 6 HOURS PRN
Status: DISCONTINUED | OUTPATIENT
Start: 2023-01-07 | End: 2023-01-13

## 2023-01-07 RX ORDER — DIGOXIN 125 MCG
125 TABLET ORAL DAILY
Status: DISCONTINUED | OUTPATIENT
Start: 2023-01-08 | End: 2023-01-13

## 2023-01-07 RX ORDER — ASCORBIC ACID 500 MG
500 TABLET ORAL 2 TIMES DAILY
Status: DISCONTINUED | OUTPATIENT
Start: 2023-01-08 | End: 2023-01-13

## 2023-01-07 RX ORDER — CALCIUM CARBONATE 200(500)MG
500 TABLET,CHEWABLE ORAL EVERY 8 HOURS PRN
Status: DISCONTINUED | OUTPATIENT
Start: 2023-01-07 | End: 2023-01-13

## 2023-01-07 RX ORDER — METOCLOPRAMIDE HYDROCHLORIDE 5 MG/ML
10 INJECTION INTRAMUSCULAR; INTRAVENOUS EVERY 8 HOURS PRN
Status: DISCONTINUED | OUTPATIENT
Start: 2023-01-07 | End: 2023-01-13

## 2023-01-07 RX ORDER — POLYETHYLENE GLYCOL 3350 17 G/17G
17 POWDER, FOR SOLUTION ORAL DAILY
Status: DISCONTINUED | OUTPATIENT
Start: 2023-01-07 | End: 2023-01-13

## 2023-01-07 RX ORDER — ALLOPURINOL 100 MG/1
100 TABLET ORAL DAILY
Status: DISCONTINUED | OUTPATIENT
Start: 2023-01-08 | End: 2023-01-13

## 2023-01-07 RX ORDER — BISACODYL 10 MG
10 SUPPOSITORY, RECTAL RECTAL
Status: DISCONTINUED | OUTPATIENT
Start: 2023-01-07 | End: 2023-01-13

## 2023-01-07 RX ORDER — BUPROPION HYDROCHLORIDE 150 MG/1
150 TABLET, EXTENDED RELEASE ORAL 2 TIMES DAILY
Status: DISCONTINUED | OUTPATIENT
Start: 2023-01-08 | End: 2023-01-13

## 2023-01-07 RX ORDER — SENNA AND DOCUSATE SODIUM 50; 8.6 MG/1; MG/1
2 TABLET, FILM COATED ORAL 2 TIMES DAILY
Status: DISCONTINUED | OUTPATIENT
Start: 2023-01-07 | End: 2023-01-13

## 2023-01-07 RX ORDER — LEVOTHYROXINE SODIUM 0.12 MG/1
125 TABLET ORAL
Status: DISCONTINUED | OUTPATIENT
Start: 2023-01-08 | End: 2023-01-13

## 2023-01-08 ENCOUNTER — APPOINTMENT (OUTPATIENT)
Dept: ULTRASOUND IMAGING | Facility: HOSPITAL | Age: 68
End: 2023-01-08
Attending: INTERNAL MEDICINE
Payer: MEDICARE

## 2023-01-08 LAB
ANION GAP SERPL CALC-SCNC: 5 MMOL/L (ref 0–18)
BASOPHILS # BLD AUTO: 0.03 X10(3) UL (ref 0–0.2)
BASOPHILS NFR BLD AUTO: 0.4 %
BUN BLD-MCNC: 14 MG/DL (ref 7–18)
CALCIUM BLD-MCNC: 9.2 MG/DL (ref 8.5–10.1)
CHLORIDE SERPL-SCNC: 106 MMOL/L (ref 98–112)
CO2 SERPL-SCNC: 28 MMOL/L (ref 21–32)
CREAT BLD-MCNC: 0.66 MG/DL
EOSINOPHIL # BLD AUTO: 0.18 X10(3) UL (ref 0–0.7)
EOSINOPHIL NFR BLD AUTO: 2.6 %
ERYTHROCYTE [DISTWIDTH] IN BLOOD BY AUTOMATED COUNT: 18.8 %
GFR SERPLBLD BASED ON 1.73 SQ M-ARVRAT: 95 ML/MIN/1.73M2 (ref 60–?)
GLUCOSE BLD-MCNC: 82 MG/DL (ref 70–99)
HCT VFR BLD AUTO: 28.4 %
HGB BLD-MCNC: 8.7 G/DL
IMM GRANULOCYTES # BLD AUTO: 0.01 X10(3) UL (ref 0–1)
IMM GRANULOCYTES NFR BLD: 0.1 %
INR BLD: 1.31 (ref 0.85–1.16)
LYMPHOCYTES # BLD AUTO: 1.14 X10(3) UL (ref 1–4)
LYMPHOCYTES NFR BLD AUTO: 16.5 %
MAGNESIUM SERPL-MCNC: 1.9 MG/DL (ref 1.6–2.6)
MCH RBC QN AUTO: 25.9 PG (ref 26–34)
MCHC RBC AUTO-ENTMCNC: 30.6 G/DL (ref 31–37)
MCV RBC AUTO: 84.5 FL
MONOCYTES # BLD AUTO: 0.89 X10(3) UL (ref 0.1–1)
MONOCYTES NFR BLD AUTO: 12.9 %
NEUTROPHILS # BLD AUTO: 4.67 X10 (3) UL (ref 1.5–7.7)
NEUTROPHILS # BLD AUTO: 4.67 X10(3) UL (ref 1.5–7.7)
NEUTROPHILS NFR BLD AUTO: 67.5 %
OSMOLALITY SERPL CALC.SUM OF ELEC: 288 MOSM/KG (ref 275–295)
PHOSPHATE SERPL-MCNC: 4.3 MG/DL (ref 2.5–4.9)
PLATELET # BLD AUTO: 330 10(3)UL (ref 150–450)
POTASSIUM SERPL-SCNC: 3.4 MMOL/L (ref 3.5–5.1)
PROTHROMBIN TIME: 16.3 SECONDS (ref 11.6–14.8)
Q-T INTERVAL: 348 MS
QRS DURATION: 96 MS
QTC CALCULATION (BEZET): 464 MS
R AXIS: 54 DEGREES
RBC # BLD AUTO: 3.36 X10(6)UL
SODIUM SERPL-SCNC: 139 MMOL/L (ref 136–145)
T AXIS: 53 DEGREES
VENTRICULAR RATE: 107 BPM
WBC # BLD AUTO: 6.9 X10(3) UL (ref 4–11)

## 2023-01-08 PROCEDURE — 5A09357 ASSISTANCE WITH RESPIRATORY VENTILATION, LESS THAN 24 CONSECUTIVE HOURS, CONTINUOUS POSITIVE AIRWAY PRESSURE: ICD-10-PCS | Performed by: HOSPITALIST

## 2023-01-08 PROCEDURE — 94660 CPAP INITIATION&MGMT: CPT

## 2023-01-08 PROCEDURE — 84100 ASSAY OF PHOSPHORUS: CPT | Performed by: INTERNAL MEDICINE

## 2023-01-08 PROCEDURE — 85025 COMPLETE CBC W/AUTO DIFF WBC: CPT | Performed by: INTERNAL MEDICINE

## 2023-01-08 PROCEDURE — 83735 ASSAY OF MAGNESIUM: CPT | Performed by: INTERNAL MEDICINE

## 2023-01-08 PROCEDURE — 93971 EXTREMITY STUDY: CPT | Performed by: INTERNAL MEDICINE

## 2023-01-08 PROCEDURE — 94799 UNLISTED PULMONARY SVC/PX: CPT

## 2023-01-08 PROCEDURE — 80048 BASIC METABOLIC PNL TOTAL CA: CPT | Performed by: INTERNAL MEDICINE

## 2023-01-08 PROCEDURE — 85610 PROTHROMBIN TIME: CPT | Performed by: INTERNAL MEDICINE

## 2023-01-08 RX ORDER — SODIUM PHOSPHATE, DIBASIC AND SODIUM PHOSPHATE, MONOBASIC 7; 19 G/133ML; G/133ML
1 ENEMA RECTAL ONCE AS NEEDED
Status: ACTIVE | OUTPATIENT
Start: 2023-01-08 | End: 2023-01-08

## 2023-01-08 RX ORDER — CYPROHEPTADINE HYDROCHLORIDE 4 MG/1
4 TABLET ORAL DAILY
Status: DISCONTINUED | OUTPATIENT
Start: 2023-01-08 | End: 2023-01-13

## 2023-01-08 RX ORDER — BENZONATATE 100 MG/1
100 CAPSULE ORAL 3 TIMES DAILY PRN
Status: DISCONTINUED | OUTPATIENT
Start: 2023-01-08 | End: 2023-01-13

## 2023-01-08 RX ORDER — ACETAMINOPHEN 500 MG
500 TABLET ORAL EVERY 6 HOURS PRN
Status: DISCONTINUED | OUTPATIENT
Start: 2023-01-08 | End: 2023-01-13

## 2023-01-08 RX ORDER — FUROSEMIDE 20 MG/1
20 TABLET ORAL DAILY
Status: DISCONTINUED | OUTPATIENT
Start: 2023-01-09 | End: 2023-01-13

## 2023-01-08 RX ORDER — ACETAMINOPHEN 500 MG
1000 TABLET ORAL EVERY 6 HOURS PRN
Status: DISCONTINUED | OUTPATIENT
Start: 2023-01-08 | End: 2023-01-13

## 2023-01-08 RX ORDER — FUROSEMIDE 10 MG/ML
20 INJECTION INTRAMUSCULAR; INTRAVENOUS ONCE
Status: COMPLETED | OUTPATIENT
Start: 2023-01-08 | End: 2023-01-08

## 2023-01-08 RX ORDER — POTASSIUM CHLORIDE 20 MEQ/1
40 TABLET, EXTENDED RELEASE ORAL EVERY 4 HOURS
Status: DISCONTINUED | OUTPATIENT
Start: 2023-01-08 | End: 2023-01-08

## 2023-01-08 RX ORDER — FUROSEMIDE 20 MG/1
20 TABLET ORAL DAILY
Status: DISCONTINUED | OUTPATIENT
Start: 2023-01-08 | End: 2023-01-08

## 2023-01-08 RX ORDER — WARFARIN SODIUM 5 MG/1
5 TABLET ORAL
Status: COMPLETED | OUTPATIENT
Start: 2023-01-08 | End: 2023-01-08

## 2023-01-08 RX ORDER — DIPHENHYDRAMINE HCL 25 MG
50 CAPSULE ORAL NIGHTLY PRN
Status: DISCONTINUED | OUTPATIENT
Start: 2023-01-08 | End: 2023-01-13

## 2023-01-08 NOTE — PHYSICAL THERAPY NOTE
PT order received, chart reviewed. Upon meeting with pt, pt beginning to cry and decline therapy. Attempted to encourage OOB to chair as pt reports was set to dc from [facility] on Sat. Pt continues to decline. RN present at end and aware of pt's refusal at this time. Pt agreeable to PT returning at a later date.

## 2023-01-08 NOTE — PROGRESS NOTES
Pharmacy Note: Dietary Supplement Discontinuation Per Policy    Arginine powder has been discontinued on [facility] per policy. This supplement may be restarted upon discharge using the medication reconciliation process.     Thank you,   Sae Becker, [facility]  1/8/2023,  9:07 AM

## 2023-01-08 NOTE — ED INITIAL ASSESSMENT (HPI)
Pt to the ER via [facility] for constipation \"for a few days. Pt from [facility] NH and was given medications for constipations with no relief. Per EMS NH was able to get some stool out but it was hard. Pt c/o generalized body aches/pain. Hx of fibromyalgia.    aox4

## 2023-01-08 NOTE — CONSULTS
[facility] Dosing Service  Warfarin (Coumadin) Initial Dosing    Freddy Lawton is a 69year old patient for whom pharmacy has been consulted to dose warfarin (COUMADIN) for Prevention of systemic embolism by Dr. Yue Kaur. Based on this indication, goal INR is 2.5-3.5 per outpatient Coumadin clinic notes and recent inpatient admission in Dec '22. Pertinent Patient Medical History:  afib, AVR, h/o PE  Potential Drug Interactions:  allopurinol, levothyroxine (on both PTA)    Latest INR:   Recent Labs     01/08/23  0846   INR 1.31*       Other Anticoagulants:  none  Home regimen (if applicable):  3.5 mg daily (RN confirmed with NH)  Date/Time last dose was given (if applicable): PTA 1/6 - RN confirmed with NH)    Based on above -  1. For today, Give warfarin (COUMADIN) 5 mg at 2100 tonight    2. PT/INR ordered daily while on warfarin. S/W cardiology Dr Kathrine Sims - no need to bridge with heparin or Lovenox while INR subtherapeutic per MD.    3.  Pharmacy will continue to follow. We appreciate the opportunity to assist in the care of this patient.     Jeni Parsons, [facility]  1/8/2023  11:15 AM

## 2023-01-08 NOTE — PLAN OF CARE
Patient alert and oriented x 4. On RA. Up with x2 assist w/ walker. Afib on tele. Incontinent of bowel/bladder. No complaints of shortness of breath, chest pain/discomfort. POC: diltiazem gtt, monitor HR, US of L leg. Call light within reach. Fall precautions in place. 1130: spoke to nurse at [facility], patient takes 3.5mg Coumadin, last dose 1/6  Problem: Patient/Family Goals  Goal: Patient/Family Long Term Goal  Description: Patient's Long Term Goal: \"Go home\"    Interventions:  - MD consults as needed  - Diagnostics as needed  - See additional Care Plan goals for specific interventions  Outcome: Progressing  Goal: Patient/Family Short Term Goal  Description: Patient's Short Term Goal: \"No more constipation\"    Interventions:   - Stool softeners, laxatives as needed  - See additional Care Plan goals for specific interventions  Outcome: Progressing     Problem: CARDIOVASCULAR - ADULT  Goal: Maintains optimal cardiac output and hemodynamic stability  Description: INTERVENTIONS:  - Monitor vital signs, rhythm, and trends  - Monitor for bleeding, hypotension and signs of decreased cardiac output  - Evaluate effectiveness of vasoactive medications to optimize hemodynamic stability  - Monitor arterial and/or venous puncture sites for bleeding and/or hematoma  - Assess quality of pulses, skin color and temperature  - Assess for signs of decreased coronary artery perfusion - ex.  Angina  - Evaluate fluid balance, assess for edema, trend weights  Outcome: Progressing  Goal: Absence of cardiac arrhythmias or at baseline  Description: INTERVENTIONS:  - Continuous cardiac monitoring, monitor vital signs, obtain 12 lead EKG if indicated  - Evaluate effectiveness of antiarrhythmic and heart rate control medications as ordered  - Initiate emergency measures for life threatening arrhythmias  - Monitor electrolytes and administer replacement therapy as ordered  Outcome: Progressing

## 2023-01-08 NOTE — PLAN OF CARE
Received pt around 0480 [id number], admitted for constipation and a-fib/RVR. A/Ox4. On 2L w/ sats >90. Monitor shows a-fib, rates . Incontinent of bowel and bladder, purewick in place. Reports generalized pain, treated w/ positioning. PT/OT to evaluate ambulation status, pt able to sit at edge of bed. Plan for MD's to see, cont cardizem drip. Safety precautions in place, call light within reach, bed alarm on. Problem: Patient/Family Goals  Goal: Patient/Family Long Term Goal  Description: Patient's Long Term Goal: \"Go home\"    Interventions:  - MD consults as needed  - Diagnostics as needed  - See additional Care Plan goals for specific interventions  Outcome: Progressing  Goal: Patient/Family Short Term Goal  Description: Patient's Short Term Goal: \"No more constipation\"    Interventions:   - Stool softeners, laxatives as needed  - See additional Care Plan goals for specific interventions  Outcome: Progressing     Problem: CARDIOVASCULAR - ADULT  Goal: Maintains optimal cardiac output and hemodynamic stability  Description: INTERVENTIONS:  - Monitor vital signs, rhythm, and trends  - Monitor for bleeding, hypotension and signs of decreased cardiac output  - Evaluate effectiveness of vasoactive medications to optimize hemodynamic stability  - Monitor arterial and/or venous puncture sites for bleeding and/or hematoma  - Assess quality of pulses, skin color and temperature  - Assess for signs of decreased coronary artery perfusion - ex.  Angina  - Evaluate fluid balance, assess for edema, trend weights  Outcome: Progressing  Goal: Absence of cardiac arrhythmias or at baseline  Description: INTERVENTIONS:  - Continuous cardiac monitoring, monitor vital signs, obtain 12 lead EKG if indicated  - Evaluate effectiveness of antiarrhythmic and heart rate control medications as ordered  - Initiate emergency measures for life threatening arrhythmias  - Monitor electrolytes and administer replacement therapy as ordered  Outcome: Progressing

## 2023-01-09 ENCOUNTER — APPOINTMENT (OUTPATIENT)
Dept: CT IMAGING | Facility: HOSPITAL | Age: 68
End: 2023-01-09
Attending: INTERNAL MEDICINE
Payer: MEDICARE

## 2023-01-09 LAB
ANION GAP SERPL CALC-SCNC: 7 MMOL/L (ref 0–18)
BASOPHILS # BLD AUTO: 0.03 X10(3) UL (ref 0–0.2)
BASOPHILS NFR BLD AUTO: 0.4 %
BUN BLD-MCNC: 15 MG/DL (ref 7–18)
CALCIUM BLD-MCNC: 9.5 MG/DL (ref 8.5–10.1)
CHLORIDE SERPL-SCNC: 102 MMOL/L (ref 98–112)
CO2 SERPL-SCNC: 27 MMOL/L (ref 21–32)
CREAT BLD-MCNC: 0.64 MG/DL
EOSINOPHIL # BLD AUTO: 0.27 X10(3) UL (ref 0–0.7)
EOSINOPHIL NFR BLD AUTO: 3.3 %
ERYTHROCYTE [DISTWIDTH] IN BLOOD BY AUTOMATED COUNT: 18.8 %
GFR SERPLBLD BASED ON 1.73 SQ M-ARVRAT: 96 ML/MIN/1.73M2 (ref 60–?)
GLUCOSE BLD-MCNC: 90 MG/DL (ref 70–99)
HCT VFR BLD AUTO: 29.7 %
HGB BLD-MCNC: 9.2 G/DL
IMM GRANULOCYTES # BLD AUTO: 0.04 X10(3) UL (ref 0–1)
IMM GRANULOCYTES NFR BLD: 0.5 %
INR BLD: 1.17 (ref 0.85–1.16)
LYMPHOCYTES # BLD AUTO: 1.47 X10(3) UL (ref 1–4)
LYMPHOCYTES NFR BLD AUTO: 17.9 %
MCH RBC QN AUTO: 26 PG (ref 26–34)
MCHC RBC AUTO-ENTMCNC: 31 G/DL (ref 31–37)
MCV RBC AUTO: 83.9 FL
MONOCYTES # BLD AUTO: 1.04 X10(3) UL (ref 0.1–1)
MONOCYTES NFR BLD AUTO: 12.6 %
NEUTROPHILS # BLD AUTO: 5.38 X10 (3) UL (ref 1.5–7.7)
NEUTROPHILS # BLD AUTO: 5.38 X10(3) UL (ref 1.5–7.7)
NEUTROPHILS NFR BLD AUTO: 65.3 %
OSMOLALITY SERPL CALC.SUM OF ELEC: 282 MOSM/KG (ref 275–295)
PLATELET # BLD AUTO: 350 10(3)UL (ref 150–450)
POTASSIUM SERPL-SCNC: 3.3 MMOL/L (ref 3.5–5.1)
POTASSIUM SERPL-SCNC: 3.3 MMOL/L (ref 3.5–5.1)
PROTHROMBIN TIME: 14.8 SECONDS (ref 11.6–14.8)
RBC # BLD AUTO: 3.54 X10(6)UL
SODIUM SERPL-SCNC: 136 MMOL/L (ref 136–145)
WBC # BLD AUTO: 8.2 X10(3) UL (ref 4–11)

## 2023-01-09 PROCEDURE — 97116 GAIT TRAINING THERAPY: CPT

## 2023-01-09 PROCEDURE — 85610 PROTHROMBIN TIME: CPT | Performed by: INTERNAL MEDICINE

## 2023-01-09 PROCEDURE — 84132 ASSAY OF SERUM POTASSIUM: CPT | Performed by: INTERNAL MEDICINE

## 2023-01-09 PROCEDURE — 94799 UNLISTED PULMONARY SVC/PX: CPT

## 2023-01-09 PROCEDURE — 97535 SELF CARE MNGMENT TRAINING: CPT

## 2023-01-09 PROCEDURE — 97530 THERAPEUTIC ACTIVITIES: CPT

## 2023-01-09 PROCEDURE — 74177 CT ABD & PELVIS W/CONTRAST: CPT | Performed by: INTERNAL MEDICINE

## 2023-01-09 PROCEDURE — 97166 OT EVAL MOD COMPLEX 45 MIN: CPT

## 2023-01-09 PROCEDURE — 97161 PT EVAL LOW COMPLEX 20 MIN: CPT

## 2023-01-09 PROCEDURE — 85025 COMPLETE CBC W/AUTO DIFF WBC: CPT | Performed by: INTERNAL MEDICINE

## 2023-01-09 PROCEDURE — 80048 BASIC METABOLIC PNL TOTAL CA: CPT | Performed by: INTERNAL MEDICINE

## 2023-01-09 RX ORDER — ENOXAPARIN SODIUM 100 MG/ML
1 INJECTION SUBCUTANEOUS EVERY 12 HOURS SCHEDULED
Status: DISCONTINUED | OUTPATIENT
Start: 2023-01-09 | End: 2023-01-11

## 2023-01-09 RX ORDER — IOHEXOL 350 MG/ML
100 INJECTION, SOLUTION INTRAVENOUS
Status: COMPLETED | OUTPATIENT
Start: 2023-01-09 | End: 2023-01-09

## 2023-01-09 RX ORDER — WARFARIN SODIUM 7.5 MG/1
7.5 TABLET ORAL
Status: COMPLETED | OUTPATIENT
Start: 2023-01-09 | End: 2023-01-09

## 2023-01-09 RX ORDER — POTASSIUM CHLORIDE 14.9 MG/ML
20 INJECTION INTRAVENOUS ONCE
Status: COMPLETED | OUTPATIENT
Start: 2023-01-09 | End: 2023-01-09

## 2023-01-09 RX ORDER — DICYCLOMINE HYDROCHLORIDE 10 MG/1
10 CAPSULE ORAL 3 TIMES DAILY PRN
Status: DISCONTINUED | OUTPATIENT
Start: 2023-01-09 | End: 2023-01-13

## 2023-01-09 RX ORDER — DILTIAZEM HYDROCHLORIDE 60 MG/1
60 TABLET, FILM COATED ORAL EVERY 8 HOURS SCHEDULED
Status: DISCONTINUED | OUTPATIENT
Start: 2023-01-09 | End: 2023-01-10

## 2023-01-09 RX ORDER — POTASSIUM CHLORIDE 20 MEQ/1
40 TABLET, EXTENDED RELEASE ORAL EVERY 4 HOURS
Status: DISCONTINUED | OUTPATIENT
Start: 2023-01-09 | End: 2023-01-09

## 2023-01-09 NOTE — PROGRESS NOTES
[facility] Dosing Service  Warfarin (Coumadin) Subsequent Dosing    Meli Lopes is a 69year old patient for whom pharmacy is dosing warfarin (Coumadin). Goal INR is 2.5-3.5    Recent Labs   Lab 01/07/23  2006 01/08/23  0846 01/09/23  0650   INR 1.64* 1.31* 1.17*       Consulted by:  Dr Makla Haley  Indication:  afib  Potential Drug Interactions:  allopurinol, levothyroxine - can increase INR  Other Anticoagulants:  Lovenox 1mg/kg BID  Home regimen (if applicable):  Coumadin 7.5UU 5 days/week and 4mg 2 days/week    Inpatient Dosing History:    Date 1/8 1/9       INR 1.31 1.17       Coumadin dose 5mg                 Based on above -  1. For today, Give warfarin (COUMADIN) 7.5 mg at 2100 tonight  2   PT/INR ordered daily while on warfarin  3. Pharmacy will continue to follow. We appreciate the opportunity to assist in the care of this patient.     Bolivar Roth, PharmD  1/9/2023  2:21 PM

## 2023-01-10 LAB
ANION GAP SERPL CALC-SCNC: 9 MMOL/L (ref 0–18)
BASOPHILS # BLD AUTO: 0.03 X10(3) UL (ref 0–0.2)
BASOPHILS NFR BLD AUTO: 0.5 %
BUN BLD-MCNC: 16 MG/DL (ref 7–18)
CALCIUM BLD-MCNC: 9.4 MG/DL (ref 8.5–10.1)
CHLORIDE SERPL-SCNC: 105 MMOL/L (ref 98–112)
CO2 SERPL-SCNC: 25 MMOL/L (ref 21–32)
CREAT BLD-MCNC: 0.62 MG/DL
DIGOXIN SERPL-MCNC: 0.7 NG/ML (ref 0.8–2)
EOSINOPHIL # BLD AUTO: 0.25 X10(3) UL (ref 0–0.7)
EOSINOPHIL NFR BLD AUTO: 3.8 %
ERYTHROCYTE [DISTWIDTH] IN BLOOD BY AUTOMATED COUNT: 18.6 %
GFR SERPLBLD BASED ON 1.73 SQ M-ARVRAT: 96 ML/MIN/1.73M2 (ref 60–?)
GLUCOSE BLD-MCNC: 98 MG/DL (ref 70–99)
HCT VFR BLD AUTO: 27.7 %
HGB BLD-MCNC: 8.7 G/DL
IMM GRANULOCYTES # BLD AUTO: 0.02 X10(3) UL (ref 0–1)
IMM GRANULOCYTES NFR BLD: 0.3 %
INR BLD: 1.56 (ref 0.85–1.16)
LYMPHOCYTES # BLD AUTO: 1.47 X10(3) UL (ref 1–4)
LYMPHOCYTES NFR BLD AUTO: 22.5 %
MCH RBC QN AUTO: 25.7 PG (ref 26–34)
MCHC RBC AUTO-ENTMCNC: 31.4 G/DL (ref 31–37)
MCV RBC AUTO: 82 FL
MONOCYTES # BLD AUTO: 0.92 X10(3) UL (ref 0.1–1)
MONOCYTES NFR BLD AUTO: 14.1 %
NEUTROPHILS # BLD AUTO: 3.85 X10 (3) UL (ref 1.5–7.7)
NEUTROPHILS # BLD AUTO: 3.85 X10(3) UL (ref 1.5–7.7)
NEUTROPHILS NFR BLD AUTO: 58.8 %
OSMOLALITY SERPL CALC.SUM OF ELEC: 289 MOSM/KG (ref 275–295)
PLATELET # BLD AUTO: 336 10(3)UL (ref 150–450)
POTASSIUM SERPL-SCNC: 3.7 MMOL/L (ref 3.5–5.1)
POTASSIUM SERPL-SCNC: 3.8 MMOL/L (ref 3.5–5.1)
PROTHROMBIN TIME: 18.6 SECONDS (ref 11.6–14.8)
RBC # BLD AUTO: 3.38 X10(6)UL
SODIUM SERPL-SCNC: 139 MMOL/L (ref 136–145)
WBC # BLD AUTO: 6.5 X10(3) UL (ref 4–11)

## 2023-01-10 PROCEDURE — 85610 PROTHROMBIN TIME: CPT | Performed by: INTERNAL MEDICINE

## 2023-01-10 PROCEDURE — 80162 ASSAY OF DIGOXIN TOTAL: CPT | Performed by: INTERNAL MEDICINE

## 2023-01-10 PROCEDURE — 85025 COMPLETE CBC W/AUTO DIFF WBC: CPT | Performed by: INTERNAL MEDICINE

## 2023-01-10 PROCEDURE — 80048 BASIC METABOLIC PNL TOTAL CA: CPT | Performed by: INTERNAL MEDICINE

## 2023-01-10 PROCEDURE — 94799 UNLISTED PULMONARY SVC/PX: CPT

## 2023-01-10 PROCEDURE — 84132 ASSAY OF SERUM POTASSIUM: CPT | Performed by: INTERNAL MEDICINE

## 2023-01-10 RX ORDER — ATORVASTATIN CALCIUM 40 MG/1
40 TABLET, FILM COATED ORAL NIGHTLY
Status: DISCONTINUED | OUTPATIENT
Start: 2023-01-10 | End: 2023-01-13

## 2023-01-10 RX ORDER — FUROSEMIDE 20 MG/1
20 TABLET ORAL DAILY
Qty: 90 TABLET | Refills: 1 | Status: SHIPPED | OUTPATIENT
Start: 2023-01-11 | End: 2023-01-13

## 2023-01-10 RX ORDER — ASPIRIN 81 MG/1
81 TABLET ORAL DAILY
Qty: 90 TABLET | Refills: 3 | Status: SHIPPED | OUTPATIENT
Start: 2023-01-10 | End: 2023-01-13

## 2023-01-10 RX ORDER — DILTIAZEM HYDROCHLORIDE 240 MG/1
240 CAPSULE, COATED, EXTENDED RELEASE ORAL DAILY
Status: DISCONTINUED | OUTPATIENT
Start: 2023-01-10 | End: 2023-01-13

## 2023-01-10 RX ORDER — WARFARIN SODIUM 7.5 MG/1
7.5 TABLET ORAL
Status: COMPLETED | OUTPATIENT
Start: 2023-01-10 | End: 2023-01-10

## 2023-01-10 RX ORDER — POTASSIUM CHLORIDE 14.9 MG/ML
20 INJECTION INTRAVENOUS ONCE
Status: COMPLETED | OUTPATIENT
Start: 2023-01-10 | End: 2023-01-10

## 2023-01-10 RX ORDER — DILTIAZEM HYDROCHLORIDE 240 MG/1
240 CAPSULE, COATED, EXTENDED RELEASE ORAL DAILY
Qty: 90 CAPSULE | Refills: 3 | Status: SHIPPED | OUTPATIENT
Start: 2023-01-10 | End: 2023-01-13

## 2023-01-10 RX ORDER — CARVEDILOL 3.12 MG/1
3.12 TABLET ORAL 2 TIMES DAILY WITH MEALS
Status: DISCONTINUED | OUTPATIENT
Start: 2023-01-10 | End: 2023-01-13

## 2023-01-10 RX ORDER — ATORVASTATIN CALCIUM 40 MG/1
40 TABLET, FILM COATED ORAL NIGHTLY
Qty: 90 TABLET | Refills: 3 | Status: SHIPPED | OUTPATIENT
Start: 2023-01-10 | End: 2023-01-13

## 2023-01-10 RX ORDER — BISACODYL 10 MG
10 SUPPOSITORY, RECTAL RECTAL
Status: DISCONTINUED | OUTPATIENT
Start: 2023-01-10 | End: 2023-01-10

## 2023-01-10 RX ORDER — ASPIRIN 81 MG/1
81 TABLET ORAL DAILY
Status: DISCONTINUED | OUTPATIENT
Start: 2023-01-10 | End: 2023-01-13

## 2023-01-10 NOTE — CM/SW NOTE
01/10/23 1100   CM/SW Referral Data   Referral Source Social Work (self-referral)   Reason for Referral Discharge planning   Informant Patient;EMR   Patient Info   Patient's Current Mental Status at Time of Assessment Alert;Oriented   Patient's [address]   Patient lives with Alone   Discharge Needs   Anticipated D/C needs Subacute rehab;Transportation services   Services Requested   PASRR Level 1 Submitted No - Current within 90 days     HOME SITUATION  Type of Home: House   Home Layout: One level;Ramped entrance  Lives With: Alone  Drives: Yes  Patient Owned Equipment: Rolling walker;Rollator (forearm crutches, adjustable bed, raised toilet)  Patient Regularly Uses: Glasses     Prior Level of [place]:   reports she was scheduled to be dc'd from [facility] this weekned, uses [employer] crutches at baseline and had not progressed back to using those at rehab had been using a RW, had been working on doing ADLs with supervision assist at [place]. States she does NOT want to go back she wants to go home alone admitting she has no additional support in the area and would need to drive herself home. (Per PT evaluation)      Patient is a 70 y/o female who admitted with RSV, Constipation. PT/OT recommending [facility]. Met with patient, who was alert and oriented, to discuss discharge planning. She normally lives alone in a house in [facility]. She comes to [facility] for all her healthcare. She admitted to the hospital from [facility] and does not wish to return. [facility] prefers [facility] however [facility] unable to [facility] patient [facility]. Sent referrals in aidin. Awaiting accepting [facility] and patient's choice.  will continue to follow.      Claudean Pean, LSW  Discharge Planner  [phone]

## 2023-01-10 NOTE — PLAN OF CARE
Pt alert and oriented x 4. Continuous tele monitoring in place. Afib on the monitor. Report spasmotic pain to abd/rectum. Tylenol given x1. Refused any further pain medication in cluding PRN bentyl. Nauseous and vomitting x1. Zofran given x1. Repeat CT abd ordered and completed. [place] enema x1 per md order. Suppository x1. Few small pieces hard stool during enema but otherwise no full bm this shift. During administration of enema a large hard stool can be seen in the rectum. Room air. Sat at side of bed following waking with PT/OT. IV cardizem titrating down. HR/BP so far tolerating. IV potassium replacement given. Safety and comfort maintained. Will continue to monitor. Problem: CARDIOVASCULAR - ADULT  Goal: Maintains optimal cardiac output and hemodynamic stability  Description: INTERVENTIONS:  - Monitor vital signs, rhythm, and trends  - Monitor for bleeding, hypotension and signs of decreased cardiac output  - Evaluate effectiveness of vasoactive medications to optimize hemodynamic stability  - Monitor arterial and/or venous puncture sites for bleeding and/or hematoma  - Assess quality of pulses, skin color and temperature  - Assess for signs of decreased coronary artery perfusion - ex.  Angina  - Evaluate fluid balance, assess for edema, trend weights  1/9/2023 1951 by Ava Weiner, RN  Outcome: Progressing  1/9/2023 1951 by Ava Weiner, RN  Outcome: Progressing  Goal: Absence of cardiac arrhythmias or at baseline  Description: INTERVENTIONS:  - Continuous cardiac monitoring, monitor vital signs, obtain 12 lead EKG if indicated  - Evaluate effectiveness of antiarrhythmic and heart rate control medications as ordered  - Initiate emergency measures for life threatening arrhythmias  - Monitor electrolytes and administer replacement therapy as ordered  1/9/2023 1951 by Ava Weiner, RN  Outcome: Progressing  1/9/2023 1951 by Ava Weiner, RN  Outcome: Progressing Problem: GASTROINTESTINAL - ADULT  Goal: Minimal or absence of nausea and vomiting  Description: INTERVENTIONS:  - Maintain adequate hydration with IV or PO as ordered and tolerated  - Nasogastric tube to low intermittent suction as ordered  - Evaluate effectiveness of ordered antiemetic medications  - Provide nonpharmacologic comfort measures as appropriate  - Advance diet as tolerated, if ordered  - Obtain nutritional consult as needed  - Evaluate fluid balance  Outcome: Progressing  Goal: Maintains or returns to baseline bowel function  Description: INTERVENTIONS:  - Assess bowel function  - Maintain adequate hydration with IV or PO as ordered and tolerated  - Evaluate effectiveness of GI medications  - Encourage mobilization and activity  - Obtain nutritional consult as needed  - Establish a toileting routine/schedule  - Consider collaborating with pharmacy to review patient's medication profile  Outcome: Progressing

## 2023-01-10 NOTE — PLAN OF CARE
Assumed care of pt at [facility] x4, R/A  A fib with rates around 100s, no cardiac symptoms  Cardizem gtt stopped, PO cardizem  Pt describes all over pain 10/10, only uses tylenol for pain  Incontinent of bladder, purewick in place, new mepilex on sacrum 1/10  Continued constipation, enema and suppository given yesterday, stool softeners given, small BM 1/10  Fall precautions in place, bed alarm on  Pt updated on plan of care, all needs met at this time          Problem: Patient/Family Goals  Goal: Patient/Family Long Term Goal  Description: Patient's Long Term Goal: \"Go home\"    Interventions:  - MD consults as needed  - Diagnostics as needed  - See additional Care Plan goals for specific interventions  Outcome: Progressing  Goal: Patient/Family Short Term Goal  Description: Patient's Short Term Goal: \"No more constipation\"    Interventions:   - Stool softeners, laxatives as needed  - See additional Care Plan goals for specific interventions  Outcome: Progressing     Problem: CARDIOVASCULAR - ADULT  Goal: Maintains optimal cardiac output and hemodynamic stability  Description: INTERVENTIONS:  - Monitor vital signs, rhythm, and trends  - Monitor for bleeding, hypotension and signs of decreased cardiac output  - Evaluate effectiveness of vasoactive medications to optimize hemodynamic stability  - Monitor arterial and/or venous puncture sites for bleeding and/or hematoma  - Assess quality of pulses, skin color and temperature  - Assess for signs of decreased coronary artery perfusion - ex.  Angina  - Evaluate fluid balance, assess for edema, trend weights  Outcome: Progressing  Goal: Absence of cardiac arrhythmias or at baseline  Description: INTERVENTIONS:  - Continuous cardiac monitoring, monitor vital signs, obtain 12 lead EKG if indicated  - Evaluate effectiveness of antiarrhythmic and heart rate control medications as ordered  - Initiate emergency measures for life threatening arrhythmias  - Monitor electrolytes and administer replacement therapy as ordered  Outcome: Progressing     Problem: GASTROINTESTINAL - ADULT  Goal: Minimal or absence of nausea and vomiting  Description: INTERVENTIONS:  - Maintain adequate hydration with IV or PO as ordered and tolerated  - Nasogastric tube to low intermittent suction as ordered  - Evaluate effectiveness of ordered antiemetic medications  - Provide nonpharmacologic comfort measures as appropriate  - Advance diet as tolerated, if ordered  - Obtain nutritional consult as needed  - Evaluate fluid balance  Outcome: Progressing  Goal: Maintains or returns to baseline bowel function  Description: INTERVENTIONS:  - Assess bowel function  - Maintain adequate hydration with IV or PO as ordered and tolerated  - Evaluate effectiveness of GI medications  - Encourage mobilization and activity  - Obtain nutritional consult as needed  - Establish a toileting routine/schedule  - Consider collaborating with pharmacy to review patient's medication profile  Outcome: Progressing

## 2023-01-10 NOTE — DISCHARGE INSTRUCTIONS
Follow up w/ [facility]. Please get INR at any [facility] on 1/14 and have [facility] manage INR.

## 2023-01-10 NOTE — CONSULTS
[facility] Dosing Service  Warfarin (Coumadin) Subsequent Dosing    Rina Werner is a 69year old patient for whom pharmacy is dosing warfarin (Coumadin). Goal INR is 2.5-3.5    Recent Labs   Lab 01/07/23  2006 01/08/23  0846 01/09/23  0650 01/10/23  0601   INR 1.64* 1.31* 1.17* 1.56*     Consulted by:  Dr Herbert Maurice  Indication:  Afib  Potential Drug Interactions:  allopurinol, levothyroxine - can increase INR  Other Anticoagulants:  Lovenox 1mg/kg BID  Home regimen (if applicable):  Coumadin 3.1HL 5 days/week and 4mg 2 days/week    Inpatient Dosing History:     Date 1/8 1/9 1/10          INR 1.31 1.17 1.56          Coumadin dose 5 mg 7.5 mg                     Based on above -  1. For today, Give warfarin (COUMADIN) 7.5 mg at 2100 tonight  2   PT/INR ordered daily while on warfarin  3. Pharmacy will continue to follow. We appreciate the opportunity to assist in the care of this patient.     Sage Zamora, [facility]  1/10/2023  10:00 AM

## 2023-01-11 LAB
ANION GAP SERPL CALC-SCNC: 7 MMOL/L (ref 0–18)
BASOPHILS # BLD AUTO: 0.02 X10(3) UL (ref 0–0.2)
BASOPHILS NFR BLD AUTO: 0.3 %
BUN BLD-MCNC: 14 MG/DL (ref 7–18)
CALCIUM BLD-MCNC: 9.5 MG/DL (ref 8.5–10.1)
CHLORIDE SERPL-SCNC: 104 MMOL/L (ref 98–112)
CO2 SERPL-SCNC: 28 MMOL/L (ref 21–32)
CREAT BLD-MCNC: 0.62 MG/DL
EOSINOPHIL # BLD AUTO: 0.29 X10(3) UL (ref 0–0.7)
EOSINOPHIL NFR BLD AUTO: 4 %
ERYTHROCYTE [DISTWIDTH] IN BLOOD BY AUTOMATED COUNT: 18.6 %
GFR SERPLBLD BASED ON 1.73 SQ M-ARVRAT: 96 ML/MIN/1.73M2 (ref 60–?)
GLUCOSE BLD-MCNC: 94 MG/DL (ref 70–99)
HCT VFR BLD AUTO: 30.4 %
HGB BLD-MCNC: 9.4 G/DL
IMM GRANULOCYTES # BLD AUTO: 0.03 X10(3) UL (ref 0–1)
IMM GRANULOCYTES NFR BLD: 0.4 %
INR BLD: 2.15 (ref 0.85–1.16)
LYMPHOCYTES # BLD AUTO: 1.42 X10(3) UL (ref 1–4)
LYMPHOCYTES NFR BLD AUTO: 19.7 %
MAGNESIUM SERPL-MCNC: 2.2 MG/DL (ref 1.6–2.6)
MCH RBC QN AUTO: 25.3 PG (ref 26–34)
MCHC RBC AUTO-ENTMCNC: 30.9 G/DL (ref 31–37)
MCV RBC AUTO: 81.9 FL
MONOCYTES # BLD AUTO: 0.82 X10(3) UL (ref 0.1–1)
MONOCYTES NFR BLD AUTO: 11.4 %
NEUTROPHILS # BLD AUTO: 4.64 X10 (3) UL (ref 1.5–7.7)
NEUTROPHILS # BLD AUTO: 4.64 X10(3) UL (ref 1.5–7.7)
NEUTROPHILS NFR BLD AUTO: 64.2 %
OSMOLALITY SERPL CALC.SUM OF ELEC: 288 MOSM/KG (ref 275–295)
PLATELET # BLD AUTO: 392 10(3)UL (ref 150–450)
POTASSIUM SERPL-SCNC: 3.5 MMOL/L (ref 3.5–5.1)
POTASSIUM SERPL-SCNC: 3.6 MMOL/L (ref 3.5–5.1)
POTASSIUM SERPL-SCNC: 3.6 MMOL/L (ref 3.5–5.1)
PROTHROMBIN TIME: 23.8 SECONDS (ref 11.6–14.8)
RBC # BLD AUTO: 3.71 X10(6)UL
SODIUM SERPL-SCNC: 139 MMOL/L (ref 136–145)
WBC # BLD AUTO: 7.2 X10(3) UL (ref 4–11)

## 2023-01-11 PROCEDURE — 80048 BASIC METABOLIC PNL TOTAL CA: CPT | Performed by: INTERNAL MEDICINE

## 2023-01-11 PROCEDURE — 94799 UNLISTED PULMONARY SVC/PX: CPT

## 2023-01-11 PROCEDURE — 84132 ASSAY OF SERUM POTASSIUM: CPT | Performed by: INTERNAL MEDICINE

## 2023-01-11 PROCEDURE — 84132 ASSAY OF SERUM POTASSIUM: CPT | Performed by: HOSPITALIST

## 2023-01-11 PROCEDURE — 85610 PROTHROMBIN TIME: CPT | Performed by: INTERNAL MEDICINE

## 2023-01-11 PROCEDURE — 85025 COMPLETE CBC W/AUTO DIFF WBC: CPT | Performed by: HOSPITALIST

## 2023-01-11 PROCEDURE — 83735 ASSAY OF MAGNESIUM: CPT | Performed by: HOSPITALIST

## 2023-01-11 PROCEDURE — 94640 AIRWAY INHALATION TREATMENT: CPT

## 2023-01-11 RX ORDER — POTASSIUM CHLORIDE 29.8 MG/ML
40 INJECTION INTRAVENOUS ONCE
Status: DISCONTINUED | OUTPATIENT
Start: 2023-01-11 | End: 2023-01-11

## 2023-01-11 RX ORDER — POTASSIUM CHLORIDE 20 MEQ/1
40 TABLET, EXTENDED RELEASE ORAL EVERY 4 HOURS
Status: DISCONTINUED | OUTPATIENT
Start: 2023-01-11 | End: 2023-01-11

## 2023-01-11 RX ORDER — WARFARIN SODIUM 5 MG/1
5 TABLET ORAL
Status: COMPLETED | OUTPATIENT
Start: 2023-01-11 | End: 2023-01-11

## 2023-01-11 NOTE — CONSULTS
[facility] Dosing Service  Warfarin (Coumadin) Subsequent Dosing    Ezio Ying is a 69year old patient for whom pharmacy is dosing warfarin (Coumadin). Goal INR is 2.5-3.5    Recent Labs   Lab 01/07/23 2006 01/08/23  0846 01/09/23  0650 01/10/23  0601 01/11/23  0646   INR 1.64* 1.31* 1.17* 1.56* 2.15*       Consulted by: Dr. Ibeth Strauss  Indication: a-fib  Potential Drug Interactions: aspirin  Other Anticoagulants: lovenox  Home regimen (if applicable):  3.5 mg 5 days/week, 2 mg 2 days/week    Inpatient Dosing History:     Date 1/8 1/9 1/10   1/11       INR 1.31 1.17 1.56   2.15       Coumadin dose 5 mg 7.5 mg   7.5 mg  5 mg                Based on above -  1. For today, Give warfarin (COUMADIN) 5 mg at 2100 tonight  2   PT/INR ordered daily while on warfarin  3. Pharmacy will continue to follow. We appreciate the opportunity to assist in the care of this patient.     Palmira Winters, PharmD  1/11/2023  11:42 AM

## 2023-01-11 NOTE — PLAN OF CARE
Pt and VS remain stable thus far this shift. Denies CP. SOB with exertion, mild dyspnea at rest. Sat maintained in 90's on RA. Report from RN is that pt has emesis with meds and is  Not always able to keep meds down. Pt still has not had bowel movement c/o abd pain and tenderness. Attending notified essential meds to be given until pt seen by GI. GI consulted awaiting rounds. Pt unwilling to eat d/t fear of emesis. Declines pain meds and nausea meds at this time. Pt noted to have persistent cough wheezy cough RT notified will come to administer (first give) inhalers. Pt teaching and POC update given. Some redirection needed but pt receptive to interventions. Needs met call light in reach will cont to monitor PRN. TELE: JOSE    GI MD rounded per pt awaiting input any orders. Pt stable at time of the note. Breathing and cough appear better. No expiratory wheezing noted. No emesis nor c/o nausea in bed eating dinner will cont to monitor PRN.

## 2023-01-11 NOTE — PLAN OF CARE
Pt alert and oriented x 4. Continuous tele monitoring in place. Afib on the monitor. Reports continued spasmatic pain to abdomen. Bentyl x1 with no result. Tylenol x1 with no result. Enema x1 per MD order. Able to visualize large amount of hardened stool during enema. Small pieces dislodged during enema but did not fully move bowels. Productive cough wtih green sputum persists. Room air. Dyspnea with exertion. Poor appetite. Educated regarding medication changes made. Safety and comfort maintained. Will continue to monitor. Problem: CARDIOVASCULAR - ADULT  Goal: Maintains optimal cardiac output and hemodynamic stability  Description: INTERVENTIONS:  - Monitor vital signs, rhythm, and trends  - Monitor for bleeding, hypotension and signs of decreased cardiac output  - Evaluate effectiveness of vasoactive medications to optimize hemodynamic stability  - Monitor arterial and/or venous puncture sites for bleeding and/or hematoma  - Assess quality of pulses, skin color and temperature  - Assess for signs of decreased coronary artery perfusion - ex.  Angina  - Evaluate fluid balance, assess for edema, trend weights  Outcome: Progressing  Goal: Absence of cardiac arrhythmias or at baseline  Description: INTERVENTIONS:  - Continuous cardiac monitoring, monitor vital signs, obtain 12 lead EKG if indicated  - Evaluate effectiveness of antiarrhythmic and heart rate control medications as ordered  - Initiate emergency measures for life threatening arrhythmias  - Monitor electrolytes and administer replacement therapy as ordered  Outcome: Progressing     Problem: GASTROINTESTINAL - ADULT  Goal: Minimal or absence of nausea and vomiting  Description: INTERVENTIONS:  - Maintain adequate hydration with IV or PO as ordered and tolerated  - Nasogastric tube to low intermittent suction as ordered  - Evaluate effectiveness of ordered antiemetic medications  - Provide nonpharmacologic comfort measures as appropriate  - Advance diet as tolerated, if ordered  - Obtain nutritional consult as needed  - Evaluate fluid balance  Outcome: Progressing  Goal: Maintains or returns to baseline bowel function  Description: INTERVENTIONS:  - Assess bowel function  - Maintain adequate hydration with IV or PO as ordered and tolerated  - Evaluate effectiveness of GI medications  - Encourage mobilization and activity  - Obtain nutritional consult as needed  - Establish a toileting routine/schedule  - Consider collaborating with pharmacy to review patient's medication profile  Outcome: Progressing

## 2023-01-12 ENCOUNTER — APPOINTMENT (OUTPATIENT)
Dept: GENERAL RADIOLOGY | Facility: HOSPITAL | Age: 68
End: 2023-01-12
Attending: INTERNAL MEDICINE
Payer: MEDICARE

## 2023-01-12 LAB
ALBUMIN SERPL-MCNC: 2.4 G/DL (ref 3.4–5)
ALBUMIN/GLOB SERPL: 0.5 {RATIO} (ref 1–2)
ALP LIVER SERPL-CCNC: 100 U/L
ALT SERPL-CCNC: 11 U/L
ANION GAP SERPL CALC-SCNC: 7 MMOL/L (ref 0–18)
AST SERPL-CCNC: 23 U/L (ref 15–37)
BASOPHILS # BLD AUTO: 0.03 X10(3) UL (ref 0–0.2)
BASOPHILS NFR BLD AUTO: 0.5 %
BILIRUB SERPL-MCNC: 0.5 MG/DL (ref 0.1–2)
BUN BLD-MCNC: 13 MG/DL (ref 7–18)
CALCIUM BLD-MCNC: 9.3 MG/DL (ref 8.5–10.1)
CHLORIDE SERPL-SCNC: 106 MMOL/L (ref 98–112)
CO2 SERPL-SCNC: 28 MMOL/L (ref 21–32)
CREAT BLD-MCNC: 0.6 MG/DL
EOSINOPHIL # BLD AUTO: 0.32 X10(3) UL (ref 0–0.7)
EOSINOPHIL NFR BLD AUTO: 5.5 %
ERYTHROCYTE [DISTWIDTH] IN BLOOD BY AUTOMATED COUNT: 18.6 %
GFR SERPLBLD BASED ON 1.73 SQ M-ARVRAT: 97 ML/MIN/1.73M2 (ref 60–?)
GLOBULIN PLAS-MCNC: 4.8 G/DL (ref 2.8–4.4)
GLUCOSE BLD-MCNC: 90 MG/DL (ref 70–99)
HCT VFR BLD AUTO: 28.6 %
HGB BLD-MCNC: 8.8 G/DL
IMM GRANULOCYTES # BLD AUTO: 0.03 X10(3) UL (ref 0–1)
IMM GRANULOCYTES NFR BLD: 0.5 %
INR BLD: 2.99 (ref 0.85–1.16)
LYMPHOCYTES # BLD AUTO: 1.35 X10(3) UL (ref 1–4)
LYMPHOCYTES NFR BLD AUTO: 23.1 %
MAGNESIUM SERPL-MCNC: 2.2 MG/DL (ref 1.6–2.6)
MCH RBC QN AUTO: 25.4 PG (ref 26–34)
MCHC RBC AUTO-ENTMCNC: 30.8 G/DL (ref 31–37)
MCV RBC AUTO: 82.7 FL
MONOCYTES # BLD AUTO: 0.8 X10(3) UL (ref 0.1–1)
MONOCYTES NFR BLD AUTO: 13.7 %
NEUTROPHILS # BLD AUTO: 3.32 X10 (3) UL (ref 1.5–7.7)
NEUTROPHILS # BLD AUTO: 3.32 X10(3) UL (ref 1.5–7.7)
NEUTROPHILS NFR BLD AUTO: 56.7 %
OSMOLALITY SERPL CALC.SUM OF ELEC: 292 MOSM/KG (ref 275–295)
PLATELET # BLD AUTO: 376 10(3)UL (ref 150–450)
POTASSIUM SERPL-SCNC: 3.7 MMOL/L (ref 3.5–5.1)
PROT SERPL-MCNC: 7.2 G/DL (ref 6.4–8.2)
PROTHROMBIN TIME: 30.6 SECONDS (ref 11.6–14.8)
RBC # BLD AUTO: 3.46 X10(6)UL
SODIUM SERPL-SCNC: 141 MMOL/L (ref 136–145)
WBC # BLD AUTO: 5.9 X10(3) UL (ref 4–11)

## 2023-01-12 PROCEDURE — 80053 COMPREHEN METABOLIC PANEL: CPT | Performed by: HOSPITALIST

## 2023-01-12 PROCEDURE — 74270 X-RAY XM COLON 1CNTRST STD: CPT | Performed by: INTERNAL MEDICINE

## 2023-01-12 PROCEDURE — 85025 COMPLETE CBC W/AUTO DIFF WBC: CPT | Performed by: HOSPITALIST

## 2023-01-12 PROCEDURE — 85610 PROTHROMBIN TIME: CPT | Performed by: HOSPITALIST

## 2023-01-12 PROCEDURE — 83735 ASSAY OF MAGNESIUM: CPT | Performed by: HOSPITALIST

## 2023-01-12 RX ORDER — POTASSIUM CHLORIDE 20 MEQ/1
40 TABLET, EXTENDED RELEASE ORAL ONCE
Status: DISCONTINUED | OUTPATIENT
Start: 2023-01-12 | End: 2023-01-13

## 2023-01-12 NOTE — PHYSICAL THERAPY NOTE
IP PT attempted. Pt refusing 2/2 pain and discomfort. Pt also reporting fatigue. Encouraged pt in OOB mobility, and benefits of amb to prevent further deconditioning. Pt adamantly , although politely, refusing .

## 2023-01-12 NOTE — PLAN OF CARE
Encouraged patient to stand up at the side of the bed for 5 min. Refused to walk to the bathroom or hallway d/t concerns of having stomach spasms. Refused water enema. Warm packs placed on sacral are and patient stated that it hurt and RN removed them. Warm pack placed on abdomen. IV potassium rider infusing.

## 2023-01-12 NOTE — CONSULTS
[facility] Dosing Service  Warfarin (Coumadin) Subsequent Dosing    Logan Vincent is a 69year old patient for whom pharmacy is dosing warfarin (Coumadin). Goal INR is 2.5-3.5    Recent Labs   Lab 01/08/23  0846 01/09/23  0650 01/10/23  0601 01/11/23  0646 01/12/23  1006   INR 1.31* 1.17* 1.56* 2.15* 2.99*       Consulted by: Dr. Chavez Nichols  Indication: a-fib  Potential Drug Interactions: Other Anticoagulants: none  Home regimen (if applicable):  3.5 mg 5 days/week. 2 mg ROW    Inpatient Dosing History:     Date 1/8 1/9 1/10   1/11  1/12     INR 1.31 1.17 1.56   2.15  2.99     Coumadin dose 5 mg 7.5 mg   7.5 mg  5 mg  hold       Based on above -  1. For today, Hold warfarin (COUMADIN) dose  2   PT/INR ordered daily while on warfarin  3. Pharmacy will continue to follow. We appreciate the opportunity to assist in the care of this patient.     Raad Gomez, PharmD  1/12/2023  11:32 AM

## 2023-01-12 NOTE — OCCUPATIONAL THERAPY NOTE
Attempted to see patient for OT services this am, however patient politely but adamantly declining all offered activity at this time, reporting fatigue, constipation pain, and reporting wishes to rest before going for procedure later today. Encouraged patient to be up with nursing staff as able. Will reattempt as able. RN aware.

## 2023-01-12 NOTE — CM/SW NOTE
Spoke with patient regarding [facility] choice. She is still reviewing the list. SW/CM will remain available.      GEN New  Discharge Planner  [phone]

## 2023-01-12 NOTE — PLAN OF CARE
Problem: Patient/Family Goals  Goal: Patient/Family Long Term Goal  Description: Patient's Long Term Goal: \"Go home\"    Interventions:  - MD consults as needed  - Diagnostics as needed  - See additional Care Plan goals for specific interventions  Outcome: Progressing  Goal: Patient/Family Short Term Goal  Description: Patient's Short Term Goal: \"No more constipation\"    Interventions:   - Stool softeners, laxatives as needed  - See additional Care Plan goals for specific interventions  Outcome: Progressing     Problem: CARDIOVASCULAR - ADULT  Goal: Maintains optimal cardiac output and hemodynamic stability  Description: INTERVENTIONS:  - Monitor vital signs, rhythm, and trends  - Monitor for bleeding, hypotension and signs of decreased cardiac output  - Evaluate effectiveness of vasoactive medications to optimize hemodynamic stability  - Monitor arterial and/or venous puncture sites for bleeding and/or hematoma  - Assess quality of pulses, skin color and temperature  - Assess for signs of decreased coronary artery perfusion - ex.  Angina  - Evaluate fluid balance, assess for edema, trend weights  Outcome: Progressing  Goal: Absence of cardiac arrhythmias or at baseline  Description: INTERVENTIONS:  - Continuous cardiac monitoring, monitor vital signs, obtain 12 lead EKG if indicated  - Evaluate effectiveness of antiarrhythmic and heart rate control medications as ordered  - Initiate emergency measures for life threatening arrhythmias  - Monitor electrolytes and administer replacement therapy as ordered  Outcome: Progressing     Problem: GASTROINTESTINAL - ADULT  Goal: Minimal or absence of nausea and vomiting  Description: INTERVENTIONS:  - Maintain adequate hydration with IV or PO as ordered and tolerated  - Nasogastric tube to low intermittent suction as ordered  - Evaluate effectiveness of ordered antiemetic medications  - Provide nonpharmacologic comfort measures as appropriate  - Advance diet as tolerated, if ordered  - Obtain nutritional consult as needed  - Evaluate fluid balance  Outcome: Progressing  Goal: Maintains or returns to baseline bowel function  Description: INTERVENTIONS:  - Assess bowel function  - Maintain adequate hydration with IV or PO as ordered and tolerated  - Evaluate effectiveness of GI medications  - Encourage mobilization and activity  - Obtain nutritional consult as needed  - Establish a toileting routine/schedule  - Consider collaborating with pharmacy to review patient's medication profile  Outcome: Progressing

## 2023-01-13 VITALS
SYSTOLIC BLOOD PRESSURE: 118 MMHG | TEMPERATURE: 97 F | OXYGEN SATURATION: 95 % | RESPIRATION RATE: 18 BRPM | WEIGHT: 193.31 LBS | DIASTOLIC BLOOD PRESSURE: 79 MMHG | BODY MASS INDEX: 29 KG/M2 | HEART RATE: 97 BPM

## 2023-01-13 LAB
ANION GAP SERPL CALC-SCNC: 6 MMOL/L (ref 0–18)
BASOPHILS # BLD AUTO: 0.03 X10(3) UL (ref 0–0.2)
BASOPHILS NFR BLD AUTO: 0.4 %
BUN BLD-MCNC: 12 MG/DL (ref 7–18)
CALCIUM BLD-MCNC: 9.6 MG/DL (ref 8.5–10.1)
CHLORIDE SERPL-SCNC: 107 MMOL/L (ref 98–112)
CO2 SERPL-SCNC: 26 MMOL/L (ref 21–32)
CREAT BLD-MCNC: 0.52 MG/DL
EOSINOPHIL # BLD AUTO: 0.32 X10(3) UL (ref 0–0.7)
EOSINOPHIL NFR BLD AUTO: 4.4 %
ERYTHROCYTE [DISTWIDTH] IN BLOOD BY AUTOMATED COUNT: 18.9 %
GFR SERPLBLD BASED ON 1.73 SQ M-ARVRAT: 101 ML/MIN/1.73M2 (ref 60–?)
GLUCOSE BLD-MCNC: 94 MG/DL (ref 70–99)
HCT VFR BLD AUTO: 28.4 %
HGB BLD-MCNC: 8.8 G/DL
IMM GRANULOCYTES # BLD AUTO: 0.02 X10(3) UL (ref 0–1)
IMM GRANULOCYTES NFR BLD: 0.3 %
INR BLD: 2.99 (ref 0.85–1.16)
LYMPHOCYTES # BLD AUTO: 1.41 X10(3) UL (ref 1–4)
LYMPHOCYTES NFR BLD AUTO: 19.3 %
MAGNESIUM SERPL-MCNC: 2.1 MG/DL (ref 1.6–2.6)
MCH RBC QN AUTO: 25.8 PG (ref 26–34)
MCHC RBC AUTO-ENTMCNC: 31 G/DL (ref 31–37)
MCV RBC AUTO: 83.3 FL
MONOCYTES # BLD AUTO: 0.73 X10(3) UL (ref 0.1–1)
MONOCYTES NFR BLD AUTO: 10 %
NEUTROPHILS # BLD AUTO: 4.78 X10 (3) UL (ref 1.5–7.7)
NEUTROPHILS # BLD AUTO: 4.78 X10(3) UL (ref 1.5–7.7)
NEUTROPHILS NFR BLD AUTO: 65.6 %
OSMOLALITY SERPL CALC.SUM OF ELEC: 288 MOSM/KG (ref 275–295)
PLATELET # BLD AUTO: 416 10(3)UL (ref 150–450)
POTASSIUM SERPL-SCNC: 3.8 MMOL/L (ref 3.5–5.1)
PROTHROMBIN TIME: 30.7 SECONDS (ref 11.6–14.8)
RBC # BLD AUTO: 3.41 X10(6)UL
SODIUM SERPL-SCNC: 139 MMOL/L (ref 136–145)
WBC # BLD AUTO: 7.3 X10(3) UL (ref 4–11)

## 2023-01-13 PROCEDURE — 85610 PROTHROMBIN TIME: CPT | Performed by: HOSPITALIST

## 2023-01-13 PROCEDURE — 83735 ASSAY OF MAGNESIUM: CPT | Performed by: HOSPITALIST

## 2023-01-13 PROCEDURE — 80048 BASIC METABOLIC PNL TOTAL CA: CPT | Performed by: HOSPITALIST

## 2023-01-13 PROCEDURE — 84132 ASSAY OF SERUM POTASSIUM: CPT | Performed by: HOSPITALIST

## 2023-01-13 PROCEDURE — 85025 COMPLETE CBC W/AUTO DIFF WBC: CPT | Performed by: HOSPITALIST

## 2023-01-13 RX ORDER — DILTIAZEM HYDROCHLORIDE 240 MG/1
240 CAPSULE, COATED, EXTENDED RELEASE ORAL DAILY
Qty: 90 CAPSULE | Refills: 3 | Status: SHIPPED | OUTPATIENT
Start: 2023-01-13 | End: 2024-01-08

## 2023-01-13 RX ORDER — POLYETHYLENE GLYCOL 3350 17 G/17G
17 POWDER, FOR SOLUTION ORAL 2 TIMES DAILY
Qty: 1 EACH | Refills: 1 | Status: SHIPPED | OUTPATIENT
Start: 2023-01-13

## 2023-01-13 RX ORDER — ASPIRIN 81 MG/1
81 TABLET ORAL DAILY
Qty: 90 TABLET | Refills: 3 | Status: SHIPPED | OUTPATIENT
Start: 2023-01-13 | End: 2024-01-08

## 2023-01-13 RX ORDER — BISACODYL 10 MG
10 SUPPOSITORY, RECTAL RECTAL
Refills: 0 | Status: SHIPPED | COMMUNITY
Start: 2023-01-13 | End: 2023-01-13

## 2023-01-13 RX ORDER — MELATONIN 10 MG
10 CAPSULE ORAL NIGHTLY PRN
Qty: 30 CAPSULE | Refills: 0 | Status: SHIPPED | COMMUNITY
Start: 2023-01-13

## 2023-01-13 RX ORDER — FUROSEMIDE 20 MG/1
20 TABLET ORAL DAILY
Qty: 90 TABLET | Refills: 1 | Status: SHIPPED | OUTPATIENT
Start: 2023-01-13 | End: 2023-07-12

## 2023-01-13 RX ORDER — BISACODYL 10 MG
10 SUPPOSITORY, RECTAL RECTAL
Qty: 10 SUPPOSITORY | Refills: 0 | Status: SHIPPED | OUTPATIENT
Start: 2023-01-13

## 2023-01-13 RX ORDER — WARFARIN SODIUM 2 MG/1
2 TABLET ORAL
Status: DISCONTINUED | OUTPATIENT
Start: 2023-01-13 | End: 2023-01-13

## 2023-01-13 RX ORDER — POLYETHYLENE GLYCOL 3350 17 G/17G
17 POWDER, FOR SOLUTION ORAL 2 TIMES DAILY
Qty: 1020 G | Refills: 1 | Status: SHIPPED | OUTPATIENT
Start: 2023-01-13 | End: 2023-01-13

## 2023-01-13 RX ORDER — POLYETHYLENE GLYCOL 3350 17 G/17G
17 POWDER, FOR SOLUTION ORAL 2 TIMES DAILY
Qty: 1 EACH | Refills: 1 | Status: SHIPPED | OUTPATIENT
Start: 2023-01-13 | End: 2023-01-13

## 2023-01-13 RX ORDER — ATORVASTATIN CALCIUM 40 MG/1
40 TABLET, FILM COATED ORAL NIGHTLY
Qty: 90 TABLET | Refills: 3 | Status: SHIPPED | OUTPATIENT
Start: 2023-01-13 | End: 2024-01-08

## 2023-01-13 NOTE — PLAN OF CARE
Problem: Patient/Family Goals  Goal: Patient/Family Long Term Goal  Description: Patient's Long Term Goal: \"Go home\"    Interventions:  - MD consults as needed  - Diagnostics as needed  - See additional Care Plan goals for specific interventions  Outcome: Adequate for Discharge  Goal: Patient/Family Short Term Goal  Description: Patient's Short Term Goal: \"No more constipation\"    Interventions:   - Stool softeners, laxatives as needed  - See additional Care Plan goals for specific interventions  Outcome: Adequate for Discharge     Problem: CARDIOVASCULAR - ADULT  Goal: Maintains optimal cardiac output and hemodynamic stability  Description: INTERVENTIONS:  - Monitor vital signs, rhythm, and trends  - Monitor for bleeding, hypotension and signs of decreased cardiac output  - Evaluate effectiveness of vasoactive medications to optimize hemodynamic stability  - Monitor arterial and/or venous puncture sites for bleeding and/or hematoma  - Assess quality of pulses, skin color and temperature  - Assess for signs of decreased coronary artery perfusion - ex.  Angina  - Evaluate fluid balance, assess for edema, trend weights  Outcome: Adequate for Discharge  Goal: Absence of cardiac arrhythmias or at baseline  Description: INTERVENTIONS:  - Continuous cardiac monitoring, monitor vital signs, obtain 12 lead EKG if indicated  - Evaluate effectiveness of antiarrhythmic and heart rate control medications as ordered  - Initiate emergency measures for life threatening arrhythmias  - Monitor electrolytes and administer replacement therapy as ordered  Outcome: Adequate for Discharge     Problem: GASTROINTESTINAL - ADULT  Goal: Minimal or absence of nausea and vomiting  Description: INTERVENTIONS:  - Maintain adequate hydration with IV or PO as ordered and tolerated  - Nasogastric tube to low intermittent suction as ordered  - Evaluate effectiveness of ordered antiemetic medications  - Provide nonpharmacologic comfort measures as appropriate  - Advance diet as tolerated, if ordered  - Obtain nutritional consult as needed  - Evaluate fluid balance  Outcome: Adequate for Discharge  Goal: Maintains or returns to baseline bowel function  Description: INTERVENTIONS:  - Assess bowel function  - Maintain adequate hydration with IV or PO as ordered and tolerated  - Evaluate effectiveness of GI medications  - Encourage mobilization and activity  - Obtain nutritional consult as needed  - Establish a toileting routine/schedule  - Consider collaborating with pharmacy to review patient's medication profile  Outcome: Adequate for Discharge

## 2023-01-13 NOTE — PROGRESS NOTES
[facility] Dosing Service  Warfarin (Coumadin) Subsequent Dosing    Alyson Rodrigues is a 69year old patient for whom pharmacy is dosing warfarin (Coumadin). Goal INR is 2.5-3.5    Recent Labs   Lab 01/09/23  0650 01/10/23  0601 01/11/23  0646 01/12/23  1006 01/13/23  0615   INR 1.17* 1.56* 2.15* 2.99* 2.99*       Consulted by:  Dr Darren Meyers  Indication:  afib, AVR, h/o PE  Potential Drug Interactions:  aspirin - can increase risk of bleeding; allopurinol, levothyroxine - can increase INR  Other Anticoagulants:  none  Home regimen (if applicable):  Coumadin 6.0BL 5 days a week and 2mg twice a week    Inpatient Dosing History:    Date 1/8 1/9 1/10 1/11 1/12 1/13   INR 1.31 1.17 1.56 2.15 2.99 2.99   Coumadin dose 5mg 7.5mg 7.5mg 5mg held             Based on above -  1. For today, Give warfarin (COUMADIN) 2 mg at 2100 tonight  2   PT/INR ordered daily while on warfarin  3. Pharmacy will continue to follow. We appreciate the opportunity to assist in the care of this patient.     Marjorie Vargas, PharmD  1/13/2023  11:26 AM

## 2023-01-13 NOTE — PLAN OF CARE
Pt alert and oriented x4. Denies cp, sob. Incontinent of bowel and bladder. Bowel movement today. Purewick in place. Afib on tele controlled w/ warfarin. Pt updated on current POC. All needs met. Bed alarm on,call light with in reach. Problem: Patient/Family Goals  Goal: Patient/Family Long Term Goal  Description: Patient's Long Term Goal: \"Go home\"    Interventions:  - MD consults as needed  - Diagnostics as needed  - See additional Care Plan goals for specific interventions  Outcome: Progressing  Goal: Patient/Family Short Term Goal  Description: Patient's Short Term Goal: \"No more constipation\"    Interventions:   - Stool softeners, laxatives as needed  - See additional Care Plan goals for specific interventions  Outcome: Progressing     Problem: CARDIOVASCULAR - ADULT  Goal: Maintains optimal cardiac output and hemodynamic stability  Description: INTERVENTIONS:  - Monitor vital signs, rhythm, and trends  - Monitor for bleeding, hypotension and signs of decreased cardiac output  - Evaluate effectiveness of vasoactive medications to optimize hemodynamic stability  - Monitor arterial and/or venous puncture sites for bleeding and/or hematoma  - Assess quality of pulses, skin color and temperature  - Assess for signs of decreased coronary artery perfusion - ex.  Angina  - Evaluate fluid balance, assess for edema, trend weights  Outcome: Progressing  Goal: Absence of cardiac arrhythmias or at baseline  Description: INTERVENTIONS:  - Continuous cardiac monitoring, monitor vital signs, obtain 12 lead EKG if indicated  - Evaluate effectiveness of antiarrhythmic and heart rate control medications as ordered  - Initiate emergency measures for life threatening arrhythmias  - Monitor electrolytes and administer replacement therapy as ordered  Outcome: Progressing     Problem: GASTROINTESTINAL - ADULT  Goal: Minimal or absence of nausea and vomiting  Description: INTERVENTIONS:  - Maintain adequate hydration with IV or PO as ordered and tolerated  - Nasogastric tube to low intermittent suction as ordered  - Evaluate effectiveness of ordered antiemetic medications  - Provide nonpharmacologic comfort measures as appropriate  - Advance diet as tolerated, if ordered  - Obtain nutritional consult as needed  - Evaluate fluid balance  Outcome: Progressing  Goal: Maintains or returns to baseline bowel function  Description: INTERVENTIONS:  - Assess bowel function  - Maintain adequate hydration with IV or PO as ordered and tolerated  - Evaluate effectiveness of GI medications  - Encourage mobilization and activity  - Obtain nutritional consult as needed  - Establish a toileting routine/schedule  - Consider collaborating with pharmacy to review patient's medication profile  Outcome: Progressing

## 2023-01-13 NOTE — PLAN OF CARE
A/o x4  RA day   2L @ night. Refuses cpap  A fib controlled on warfarin  Incontinent. Purwick and briefed. Fecal impact. Large BM today. X1 walker. Problem: Patient/Family Goals  Goal: Patient/Family Long Term Goal  Description: Patient's Long Term Goal: \"Go home\"    Interventions:  - MD consults as needed  - Diagnostics as needed  - See additional Care Plan goals for specific interventions  1/13/2023 0640 by Any Godoy RN  Outcome: Progressing  1/13/2023 0640 by Any Godoy RN  Outcome: Progressing  Goal: Patient/Family Short Term Goal  Description: Patient's Short Term Goal: \"No more constipation\"    Interventions:   - Stool softeners, laxatives as needed  - See additional Care Plan goals for specific interventions  1/13/2023 0640 by Any Godoy RN  Outcome: Progressing  1/13/2023 0640 by Any Godoy RN  Outcome: Progressing     Problem: CARDIOVASCULAR - ADULT  Goal: Maintains optimal cardiac output and hemodynamic stability  Description: INTERVENTIONS:  - Monitor vital signs, rhythm, and trends  - Monitor for bleeding, hypotension and signs of decreased cardiac output  - Evaluate effectiveness of vasoactive medications to optimize hemodynamic stability  - Monitor arterial and/or venous puncture sites for bleeding and/or hematoma  - Assess quality of pulses, skin color and temperature  - Assess for signs of decreased coronary artery perfusion - ex.  Angina  - Evaluate fluid balance, assess for edema, trend weights  1/13/2023 0640 by nAy Godoy RN  Outcome: Progressing  1/13/2023 0640 by Any Godoy RN  Outcome: Progressing  Goal: Absence of cardiac arrhythmias or at baseline  Description: INTERVENTIONS:  - Continuous cardiac monitoring, monitor vital signs, obtain 12 lead EKG if indicated  - Evaluate effectiveness of antiarrhythmic and heart rate control medications as ordered  - Initiate emergency measures for life threatening arrhythmias  - Monitor electrolytes and administer replacement therapy as ordered  1/13/2023 0640 by Haroon Donnelly RN  Outcome: Progressing  1/13/2023 0640 by Haroon Donnelly RN  Outcome: Progressing     Problem: GASTROINTESTINAL - ADULT  Goal: Minimal or absence of nausea and vomiting  Description: INTERVENTIONS:  - Maintain adequate hydration with IV or PO as ordered and tolerated  - Nasogastric tube to low intermittent suction as ordered  - Evaluate effectiveness of ordered antiemetic medications  - Provide nonpharmacologic comfort measures as appropriate  - Advance diet as tolerated, if ordered  - Obtain nutritional consult as needed  - Evaluate fluid balance  1/13/2023 0640 by Haroon Donnelly RN  Outcome: Progressing  1/13/2023 0640 by Haroon Donnelly RN  Outcome: Progressing  Goal: Maintains or returns to baseline bowel function  Description: INTERVENTIONS:  - Assess bowel function  - Maintain adequate hydration with IV or PO as ordered and tolerated  - Evaluate effectiveness of GI medications  - Encourage mobilization and activity  - Obtain nutritional consult as needed  - Establish a toileting routine/schedule  - Consider collaborating with pharmacy to review patient's medication profile  1/13/2023 0640 by Haroon Donnelly RN  Outcome: Progressing  1/13/2023 0640 by Haroon Donnelly RN  Outcome: Progressing

## 2023-01-14 NOTE — CM/SW NOTE
Pt discussed during interdisciplinary rounds. CM notified of pt's medical clearance for discharge today. Met with pt to obtain NAIF Choice - pt selected [facility]. Confirmed with Tierra from [facility] [place] that pt can be accepted today. Ambulance arranged for 4 pm. PCS updated and available for RN to print. Pt's friend plans to  her belongings from [place] and bring them to [facility]. RN updated.     [facility]: [phone]  [facility]: [address], BSN, RN-BC    [id number]

## 2023-01-14 NOTE — PROGRESS NOTES
NURSING DISCHARGE NOTE    Discharged Home via Ambulance. Accompanied by Support staff  Belongings Taken by patient/family. Pt is assessed and ready for discharge. Instructions and follow ups gone over with patient and report called to RN at [facility]. IV christina'd. CDI. To [facility] NAIF via ambulance. All DC paperwork faxed to [facility] and sent with patient.

## 2023-01-17 ENCOUNTER — INITIAL APN SNF VISIT (OUTPATIENT)
Dept: INTERNAL MEDICINE CLINIC | Age: 68
End: 2023-01-17

## 2023-01-17 VITALS
HEART RATE: 80 BPM | SYSTOLIC BLOOD PRESSURE: 122 MMHG | DIASTOLIC BLOOD PRESSURE: 62 MMHG | TEMPERATURE: 98 F | RESPIRATION RATE: 18 BRPM | WEIGHT: 191.13 LBS | OXYGEN SATURATION: 95 % | BODY MASS INDEX: 28 KG/M2

## 2023-01-17 DIAGNOSIS — F32.5 MAJOR DEPRESSIVE DISORDER IN FULL REMISSION, UNSPECIFIED WHETHER RECURRENT (HCC): ICD-10-CM

## 2023-01-17 DIAGNOSIS — J45.909 UNCOMPLICATED ASTHMA, UNSPECIFIED ASTHMA SEVERITY, UNSPECIFIED WHETHER PERSISTENT: ICD-10-CM

## 2023-01-17 DIAGNOSIS — I50.32 CHRONIC DIASTOLIC CHF (CONGESTIVE HEART FAILURE) (HCC): ICD-10-CM

## 2023-01-17 DIAGNOSIS — E66.01 CLASS 2 SEVERE OBESITY DUE TO EXCESS CALORIES WITH SERIOUS COMORBIDITY IN ADULT, UNSPECIFIED BMI (HCC): ICD-10-CM

## 2023-01-17 DIAGNOSIS — R53.1 WEAKNESS: Primary | ICD-10-CM

## 2023-01-17 DIAGNOSIS — Z87.39 H/O: GOUT: ICD-10-CM

## 2023-01-17 DIAGNOSIS — E07.9 THYROID DISEASE: ICD-10-CM

## 2023-01-17 DIAGNOSIS — L89.159 DECUBITUS ULCER OF COCCYX, UNSPECIFIED PRESSURE ULCER STAGE: ICD-10-CM

## 2023-01-17 DIAGNOSIS — R05.1 ACUTE COUGH: ICD-10-CM

## 2023-01-17 DIAGNOSIS — I10 ESSENTIAL HYPERTENSION: ICD-10-CM

## 2023-01-17 DIAGNOSIS — E78.49 OTHER HYPERLIPIDEMIA: ICD-10-CM

## 2023-01-17 DIAGNOSIS — I48.0 PAF (PAROXYSMAL ATRIAL FIBRILLATION) (HCC): ICD-10-CM

## 2023-01-17 DIAGNOSIS — F32.4 MAJOR DEPRESSIVE DISORDER, SINGLE EPISODE, IN PARTIAL REMISSION (HCC): ICD-10-CM

## 2023-01-17 DIAGNOSIS — J44.9 CHRONIC OBSTRUCTIVE PULMONARY DISEASE, UNSPECIFIED COPD TYPE (HCC): ICD-10-CM

## 2023-01-17 DIAGNOSIS — D64.9 ANEMIA, UNSPECIFIED TYPE: ICD-10-CM

## 2023-01-17 DIAGNOSIS — J21.0 RSV (ACUTE BRONCHIOLITIS DUE TO RESPIRATORY SYNCYTIAL VIRUS): ICD-10-CM

## 2023-01-17 PROCEDURE — 99310 SBSQ NF CARE HIGH MDM 45: CPT | Performed by: NURSE PRACTITIONER

## 2023-01-17 PROCEDURE — 1111F DSCHRG MED/CURRENT MED MERGE: CPT | Performed by: NURSE PRACTITIONER

## 2023-01-17 RX ORDER — GUAIFENESIN 600 MG/1
600 TABLET, EXTENDED RELEASE ORAL 2 TIMES DAILY
COMMUNITY
End: 2023-01-21

## 2023-01-17 RX ORDER — PREDNISONE 20 MG/1
40 TABLET ORAL DAILY
COMMUNITY
Start: 2023-01-17 | End: 2023-01-21

## 2023-01-20 ENCOUNTER — SNF VISIT (OUTPATIENT)
Dept: INTERNAL MEDICINE CLINIC | Age: 68
End: 2023-01-20

## 2023-01-20 DIAGNOSIS — Z79.01 ANTICOAGULATED ON COUMADIN: ICD-10-CM

## 2023-01-20 DIAGNOSIS — I50.32 CHRONIC DIASTOLIC CHF (CONGESTIVE HEART FAILURE) (HCC): ICD-10-CM

## 2023-01-20 DIAGNOSIS — J45.909 UNCOMPLICATED ASTHMA, UNSPECIFIED ASTHMA SEVERITY, UNSPECIFIED WHETHER PERSISTENT: ICD-10-CM

## 2023-01-20 DIAGNOSIS — J21.0 RSV (ACUTE BRONCHIOLITIS DUE TO RESPIRATORY SYNCYTIAL VIRUS): ICD-10-CM

## 2023-01-20 DIAGNOSIS — R05.1 ACUTE COUGH: ICD-10-CM

## 2023-01-20 DIAGNOSIS — R53.1 WEAKNESS: Primary | ICD-10-CM

## 2023-01-20 PROCEDURE — 1111F DSCHRG MED/CURRENT MED MERGE: CPT | Performed by: NURSE PRACTITIONER

## 2023-01-20 PROCEDURE — 99309 SBSQ NF CARE MODERATE MDM 30: CPT | Performed by: NURSE PRACTITIONER

## 2023-01-21 VITALS
BODY MASS INDEX: 29 KG/M2 | HEART RATE: 81 BPM | SYSTOLIC BLOOD PRESSURE: 113 MMHG | OXYGEN SATURATION: 97 % | WEIGHT: 193.5 LBS | TEMPERATURE: 97 F | RESPIRATION RATE: 20 BRPM | DIASTOLIC BLOOD PRESSURE: 86 MMHG

## 2023-01-21 RX ORDER — FLUTICASONE PROPIONATE AND SALMETEROL 100; 50 UG/1; UG/1
1 POWDER RESPIRATORY (INHALATION) 2 TIMES DAILY
COMMUNITY

## 2023-01-21 RX ORDER — AZITHROMYCIN 500 MG/1
500 TABLET, FILM COATED ORAL DAILY
COMMUNITY
Start: 2023-01-20

## 2023-01-24 ENCOUNTER — SNF VISIT (OUTPATIENT)
Dept: INTERNAL MEDICINE CLINIC | Age: 68
End: 2023-01-24

## 2023-01-24 DIAGNOSIS — R05.1 ACUTE COUGH: ICD-10-CM

## 2023-01-24 DIAGNOSIS — R53.1 WEAKNESS: Primary | ICD-10-CM

## 2023-01-24 DIAGNOSIS — J45.909 UNCOMPLICATED ASTHMA, UNSPECIFIED ASTHMA SEVERITY, UNSPECIFIED WHETHER PERSISTENT: ICD-10-CM

## 2023-01-24 DIAGNOSIS — I50.32 CHRONIC DIASTOLIC CHF (CONGESTIVE HEART FAILURE) (HCC): ICD-10-CM

## 2023-01-24 DIAGNOSIS — Z79.01 ANTICOAGULATED ON COUMADIN: ICD-10-CM

## 2023-01-24 PROCEDURE — 1111F DSCHRG MED/CURRENT MED MERGE: CPT | Performed by: NURSE PRACTITIONER

## 2023-01-24 PROCEDURE — 99309 SBSQ NF CARE MODERATE MDM 30: CPT | Performed by: NURSE PRACTITIONER

## 2023-01-26 VITALS
TEMPERATURE: 97 F | HEART RATE: 96 BPM | SYSTOLIC BLOOD PRESSURE: 109 MMHG | BODY MASS INDEX: 28 KG/M2 | WEIGHT: 191 LBS | RESPIRATION RATE: 20 BRPM | DIASTOLIC BLOOD PRESSURE: 69 MMHG | OXYGEN SATURATION: 97 %

## 2023-01-26 RX ORDER — POTASSIUM CITRATE 10 MEQ/1
20 TABLET, EXTENDED RELEASE ORAL 2 TIMES DAILY
COMMUNITY

## 2023-02-03 ENCOUNTER — SNF DISCHARGE (OUTPATIENT)
Dept: INTERNAL MEDICINE CLINIC | Age: 68
End: 2023-02-03

## 2023-02-03 VITALS
OXYGEN SATURATION: 95 % | WEIGHT: 186.5 LBS | DIASTOLIC BLOOD PRESSURE: 71 MMHG | TEMPERATURE: 98 F | SYSTOLIC BLOOD PRESSURE: 113 MMHG | RESPIRATION RATE: 20 BRPM | HEART RATE: 73 BPM | BODY MASS INDEX: 28 KG/M2

## 2023-02-03 DIAGNOSIS — J21.0 RSV (ACUTE BRONCHIOLITIS DUE TO RESPIRATORY SYNCYTIAL VIRUS): Primary | ICD-10-CM

## 2023-02-03 DIAGNOSIS — E66.01 CLASS 2 SEVERE OBESITY DUE TO EXCESS CALORIES WITH SERIOUS COMORBIDITY IN ADULT, UNSPECIFIED BMI (HCC): ICD-10-CM

## 2023-02-03 DIAGNOSIS — D64.9 ANEMIA, UNSPECIFIED TYPE: ICD-10-CM

## 2023-02-03 DIAGNOSIS — R05.1 ACUTE COUGH: ICD-10-CM

## 2023-02-03 DIAGNOSIS — R53.1 WEAKNESS: ICD-10-CM

## 2023-02-03 DIAGNOSIS — Z79.01 ANTICOAGULATED ON COUMADIN: ICD-10-CM

## 2023-02-03 PROCEDURE — 99316 NF DSCHRG MGMT 30 MIN+: CPT | Performed by: NURSE PRACTITIONER

## 2023-02-03 PROCEDURE — 1111F DSCHRG MED/CURRENT MED MERGE: CPT | Performed by: NURSE PRACTITIONER

## 2023-02-03 RX ORDER — MELATONIN
325
COMMUNITY

## 2023-02-08 ENCOUNTER — SNF VISIT (OUTPATIENT)
Dept: INTERNAL MEDICINE CLINIC | Age: 68
End: 2023-02-08

## 2023-02-08 ENCOUNTER — HOSPITAL ENCOUNTER (EMERGENCY)
Facility: HOSPITAL | Age: 68
Discharge: HOME | End: 2023-02-08
Attending: STUDENT IN AN ORGANIZED HEALTH CARE EDUCATION/TRAINING PROGRAM
Payer: MEDICARE

## 2023-02-08 ENCOUNTER — APPOINTMENT (OUTPATIENT)
Dept: GENERAL RADIOLOGY | Facility: HOSPITAL | Age: 68
End: 2023-02-08
Attending: STUDENT IN AN ORGANIZED HEALTH CARE EDUCATION/TRAINING PROGRAM
Payer: MEDICARE

## 2023-02-08 ENCOUNTER — TELEPHONE (OUTPATIENT)
Dept: INTERNAL MEDICINE CLINIC | Age: 68
End: 2023-02-08

## 2023-02-08 VITALS
BODY MASS INDEX: 28.14 KG/M2 | SYSTOLIC BLOOD PRESSURE: 109 MMHG | TEMPERATURE: 98 F | WEIGHT: 190 LBS | HEIGHT: 69 IN | OXYGEN SATURATION: 100 % | RESPIRATION RATE: 22 BRPM | DIASTOLIC BLOOD PRESSURE: 73 MMHG | HEART RATE: 111 BPM

## 2023-02-08 DIAGNOSIS — K59.00 CONSTIPATION, UNSPECIFIED CONSTIPATION TYPE: ICD-10-CM

## 2023-02-08 DIAGNOSIS — M25.512 CHRONIC LEFT SHOULDER PAIN: Primary | ICD-10-CM

## 2023-02-08 DIAGNOSIS — G89.29 CHRONIC LEFT SHOULDER PAIN: Primary | ICD-10-CM

## 2023-02-08 DIAGNOSIS — M62.838 TRAPEZIUS MUSCLE SPASM: ICD-10-CM

## 2023-02-08 DIAGNOSIS — K56.49 IMPACTION OF THE BOWELS (HCC): Primary | ICD-10-CM

## 2023-02-08 LAB
ALBUMIN SERPL-MCNC: 2.7 G/DL (ref 3.4–5)
ALBUMIN/GLOB SERPL: 0.6 {RATIO} (ref 1–2)
ALP LIVER SERPL-CCNC: 125 U/L
ALT SERPL-CCNC: 16 U/L
ANION GAP SERPL CALC-SCNC: 7 MMOL/L (ref 0–18)
AST SERPL-CCNC: 22 U/L (ref 15–37)
BASOPHILS # BLD AUTO: 0.03 X10(3) UL (ref 0–0.2)
BASOPHILS NFR BLD AUTO: 0.4 %
BILIRUB SERPL-MCNC: 0.5 MG/DL (ref 0.1–2)
BUN BLD-MCNC: 19 MG/DL (ref 7–18)
CALCIUM BLD-MCNC: 9.7 MG/DL (ref 8.5–10.1)
CHLORIDE SERPL-SCNC: 104 MMOL/L (ref 98–112)
CO2 SERPL-SCNC: 27 MMOL/L (ref 21–32)
CREAT BLD-MCNC: 0.61 MG/DL
EOSINOPHIL # BLD AUTO: 0.27 X10(3) UL (ref 0–0.7)
EOSINOPHIL NFR BLD AUTO: 3.2 %
ERYTHROCYTE [DISTWIDTH] IN BLOOD BY AUTOMATED COUNT: 17.5 %
GFR SERPLBLD BASED ON 1.73 SQ M-ARVRAT: 97 ML/MIN/1.73M2 (ref 60–?)
GLOBULIN PLAS-MCNC: 4.5 G/DL (ref 2.8–4.4)
GLUCOSE BLD-MCNC: 84 MG/DL (ref 70–99)
HCT VFR BLD AUTO: 29 %
HGB BLD-MCNC: 9.2 G/DL
IMM GRANULOCYTES # BLD AUTO: 0.03 X10(3) UL (ref 0–1)
IMM GRANULOCYTES NFR BLD: 0.4 %
INR BLD: 2.77 (ref 0.85–1.16)
LYMPHOCYTES # BLD AUTO: 1.91 X10(3) UL (ref 1–4)
LYMPHOCYTES NFR BLD AUTO: 22.6 %
MCH RBC QN AUTO: 25 PG (ref 26–34)
MCHC RBC AUTO-ENTMCNC: 31.7 G/DL (ref 31–37)
MCV RBC AUTO: 78.8 FL
MONOCYTES # BLD AUTO: 0.89 X10(3) UL (ref 0.1–1)
MONOCYTES NFR BLD AUTO: 10.5 %
NEUTROPHILS # BLD AUTO: 5.34 X10 (3) UL (ref 1.5–7.7)
NEUTROPHILS # BLD AUTO: 5.34 X10(3) UL (ref 1.5–7.7)
NEUTROPHILS NFR BLD AUTO: 62.9 %
OSMOLALITY SERPL CALC.SUM OF ELEC: 287 MOSM/KG (ref 275–295)
PLATELET # BLD AUTO: 461 10(3)UL (ref 150–450)
POTASSIUM SERPL-SCNC: 3.5 MMOL/L (ref 3.5–5.1)
PROT SERPL-MCNC: 7.2 G/DL (ref 6.4–8.2)
PROTHROMBIN TIME: 28.9 SECONDS (ref 11.6–14.8)
RBC # BLD AUTO: 3.68 X10(6)UL
SODIUM SERPL-SCNC: 138 MMOL/L (ref 136–145)
TROPONIN I HIGH SENSITIVITY: 28 NG/L
WBC # BLD AUTO: 8.5 X10(3) UL (ref 4–11)

## 2023-02-08 PROCEDURE — 80053 COMPREHEN METABOLIC PANEL: CPT

## 2023-02-08 PROCEDURE — 99284 EMERGENCY DEPT VISIT MOD MDM: CPT

## 2023-02-08 PROCEDURE — 84484 ASSAY OF TROPONIN QUANT: CPT

## 2023-02-08 PROCEDURE — 93005 ELECTROCARDIOGRAM TRACING: CPT

## 2023-02-08 PROCEDURE — 73030 X-RAY EXAM OF SHOULDER: CPT | Performed by: STUDENT IN AN ORGANIZED HEALTH CARE EDUCATION/TRAINING PROGRAM

## 2023-02-08 PROCEDURE — 85025 COMPLETE CBC W/AUTO DIFF WBC: CPT | Performed by: STUDENT IN AN ORGANIZED HEALTH CARE EDUCATION/TRAINING PROGRAM

## 2023-02-08 PROCEDURE — 85610 PROTHROMBIN TIME: CPT | Performed by: STUDENT IN AN ORGANIZED HEALTH CARE EDUCATION/TRAINING PROGRAM

## 2023-02-08 PROCEDURE — 36415 COLL VENOUS BLD VENIPUNCTURE: CPT

## 2023-02-08 PROCEDURE — 85610 PROTHROMBIN TIME: CPT

## 2023-02-08 PROCEDURE — 93010 ELECTROCARDIOGRAM REPORT: CPT

## 2023-02-08 PROCEDURE — 80053 COMPREHEN METABOLIC PANEL: CPT | Performed by: STUDENT IN AN ORGANIZED HEALTH CARE EDUCATION/TRAINING PROGRAM

## 2023-02-08 PROCEDURE — 85025 COMPLETE CBC W/AUTO DIFF WBC: CPT

## 2023-02-08 PROCEDURE — 1111F DSCHRG MED/CURRENT MED MERGE: CPT | Performed by: NURSE PRACTITIONER

## 2023-02-08 PROCEDURE — 84484 ASSAY OF TROPONIN QUANT: CPT | Performed by: STUDENT IN AN ORGANIZED HEALTH CARE EDUCATION/TRAINING PROGRAM

## 2023-02-08 RX ORDER — POLYETHYLENE GLYCOL 3350 17 G/17G
17 POWDER, FOR SOLUTION ORAL DAILY PRN
Qty: 12 EACH | Refills: 0 | Status: ON HOLD | OUTPATIENT
Start: 2023-02-08 | End: 2023-03-10

## 2023-02-08 RX ORDER — DIAZEPAM 5 MG/1
5 TABLET ORAL EVERY 8 HOURS PRN
Qty: 20 TABLET | Refills: 0 | Status: ON HOLD | OUTPATIENT
Start: 2023-02-08 | End: 2023-02-15

## 2023-02-08 RX ORDER — DIAZEPAM 5 MG/1
5 TABLET ORAL ONCE
Status: COMPLETED | OUTPATIENT
Start: 2023-02-08 | End: 2023-02-08

## 2023-02-08 RX ORDER — LACTULOSE 20 G/30ML
20 SOLUTION ORAL 2 TIMES DAILY
Qty: 180 ML | Refills: 0 | Status: ON HOLD | OUTPATIENT
Start: 2023-02-08 | End: 2023-02-11

## 2023-02-09 LAB
ATRIAL RATE: 258 BPM
Q-T INTERVAL: 382 MS
QRS DURATION: 96 MS
QTC CALCULATION (BEZET): 477 MS
R AXIS: 47 DEGREES
T AXIS: 46 DEGREES
VENTRICULAR RATE: 94 BPM

## 2023-02-09 NOTE — ED INITIAL ASSESSMENT (HPI)
Arrived via EMS from [facility] for c/o L shoulder pain, chronic per pt, \"arthritis pain for over 20 yrs. \" States pain not relieved with Tylenol, last dose 2 hrs PTA. Pt denies CP, she denies recent injury. Also reports feeling constipated, last BM 2 days ago. Pt denies n/v.  Pt A/Ox3

## 2023-02-10 ENCOUNTER — APPOINTMENT (OUTPATIENT)
Dept: GENERAL RADIOLOGY | Facility: HOSPITAL | Age: 68
End: 2023-02-10
Attending: EMERGENCY MEDICINE
Payer: MEDICARE

## 2023-02-10 ENCOUNTER — HOSPITAL ENCOUNTER (INPATIENT)
Facility: HOSPITAL | Age: 68
LOS: 4 days | Discharge: HOME HEALTH | End: 2023-02-14
Attending: EMERGENCY MEDICINE | Admitting: INTERNAL MEDICINE
Payer: MEDICARE

## 2023-02-10 ENCOUNTER — HOSPITAL ENCOUNTER (INPATIENT)
Facility: HOSPITAL | Age: 68
LOS: 4 days | Discharge: HOME | End: 2023-02-14
Attending: EMERGENCY MEDICINE | Admitting: INTERNAL MEDICINE
Payer: MEDICARE

## 2023-02-10 DIAGNOSIS — E86.0 DEHYDRATION: ICD-10-CM

## 2023-02-10 DIAGNOSIS — R53.1 WEAKNESS GENERALIZED: Primary | ICD-10-CM

## 2023-02-10 DIAGNOSIS — I48.91 ATRIAL FIBRILLATION WITH RAPID VENTRICULAR RESPONSE (HCC): ICD-10-CM

## 2023-02-10 LAB
ALBUMIN SERPL-MCNC: 3 G/DL (ref 3.4–5)
ALBUMIN/GLOB SERPL: 0.6 {RATIO} (ref 1–2)
ALP LIVER SERPL-CCNC: 139 U/L
ALT SERPL-CCNC: 21 U/L
ANION GAP SERPL CALC-SCNC: 9 MMOL/L (ref 0–18)
AST SERPL-CCNC: 36 U/L (ref 15–37)
ATRIAL RATE: 131 BPM
BASOPHILS # BLD AUTO: 0.07 X10(3) UL (ref 0–0.2)
BASOPHILS NFR BLD AUTO: 0.6 %
BILIRUB SERPL-MCNC: 0.7 MG/DL (ref 0.1–2)
BILIRUB UR QL STRIP.AUTO: NEGATIVE
BUN BLD-MCNC: 30 MG/DL (ref 7–18)
CALCIUM BLD-MCNC: 11 MG/DL (ref 8.5–10.1)
CHLORIDE SERPL-SCNC: 100 MMOL/L (ref 98–112)
CK SERPL-CCNC: 320 U/L
CO2 SERPL-SCNC: 25 MMOL/L (ref 21–32)
COLOR UR AUTO: YELLOW
CREAT BLD-MCNC: 1.28 MG/DL
DIGOXIN SERPL-MCNC: 0.96 NG/ML (ref 0.8–2)
EOSINOPHIL # BLD AUTO: 0.03 X10(3) UL (ref 0–0.7)
EOSINOPHIL NFR BLD AUTO: 0.3 %
ERYTHROCYTE [DISTWIDTH] IN BLOOD BY AUTOMATED COUNT: 18.1 %
GFR SERPLBLD BASED ON 1.73 SQ M-ARVRAT: 45 ML/MIN/1.73M2 (ref 60–?)
GLOBULIN PLAS-MCNC: 5 G/DL (ref 2.8–4.4)
GLUCOSE BLD-MCNC: 119 MG/DL (ref 70–99)
GLUCOSE UR STRIP.AUTO-MCNC: NEGATIVE MG/DL
HCT VFR BLD AUTO: 32.9 %
HGB BLD-MCNC: 10.4 G/DL
HYALINE CASTS #/AREA URNS AUTO: PRESENT /LPF
IMM GRANULOCYTES # BLD AUTO: 0.04 X10(3) UL (ref 0–1)
IMM GRANULOCYTES NFR BLD: 0.3 %
INR BLD: 2.14 (ref 0.85–1.16)
LACTATE SERPL-SCNC: 1.1 MMOL/L (ref 0.4–2)
LACTATE SERPL-SCNC: 3.1 MMOL/L (ref 0.4–2)
LYMPHOCYTES # BLD AUTO: 1.73 X10(3) UL (ref 1–4)
LYMPHOCYTES NFR BLD AUTO: 15.1 %
MCH RBC QN AUTO: 25 PG (ref 26–34)
MCHC RBC AUTO-ENTMCNC: 31.6 G/DL (ref 31–37)
MCV RBC AUTO: 79.1 FL
MONOCYTES # BLD AUTO: 1.19 X10(3) UL (ref 0.1–1)
MONOCYTES NFR BLD AUTO: 10.4 %
NEUTROPHILS # BLD AUTO: 8.42 X10 (3) UL (ref 1.5–7.7)
NEUTROPHILS # BLD AUTO: 8.42 X10(3) UL (ref 1.5–7.7)
NEUTROPHILS NFR BLD AUTO: 73.3 %
NITRITE UR QL STRIP.AUTO: NEGATIVE
OSMOLALITY SERPL CALC.SUM OF ELEC: 285 MOSM/KG (ref 275–295)
PH UR STRIP.AUTO: 7 [PH] (ref 5–8)
PLATELET # BLD AUTO: 579 10(3)UL (ref 150–450)
POTASSIUM SERPL-SCNC: 4.1 MMOL/L (ref 3.5–5.1)
PROT SERPL-MCNC: 8 G/DL (ref 6.4–8.2)
PROT UR STRIP.AUTO-MCNC: 30 MG/DL
PROTHROMBIN TIME: 23.7 SECONDS (ref 11.6–14.8)
Q-T INTERVAL: 338 MS
QRS DURATION: 98 MS
QTC CALCULATION (BEZET): 477 MS
R AXIS: 57 DEGREES
RBC # BLD AUTO: 4.16 X10(6)UL
RBC UR QL AUTO: NEGATIVE
SARS-COV-2 RNA RESP QL NAA+PROBE: NOT DETECTED
SODIUM SERPL-SCNC: 134 MMOL/L (ref 136–145)
SP GR UR STRIP.AUTO: 1.02 (ref 1–1.03)
T AXIS: -4 DEGREES
TROPONIN I HIGH SENSITIVITY: 34 NG/L
UROBILINOGEN UR STRIP.AUTO-MCNC: <2 MG/DL
VENTRICULAR RATE: 120 BPM
WBC # BLD AUTO: 11.5 X10(3) UL (ref 4–11)

## 2023-02-10 PROCEDURE — 84484 ASSAY OF TROPONIN QUANT: CPT | Performed by: EMERGENCY MEDICINE

## 2023-02-10 PROCEDURE — 83605 ASSAY OF LACTIC ACID: CPT | Performed by: EMERGENCY MEDICINE

## 2023-02-10 PROCEDURE — 80053 COMPREHEN METABOLIC PANEL: CPT | Performed by: EMERGENCY MEDICINE

## 2023-02-10 PROCEDURE — 96366 THER/PROPH/DIAG IV INF ADDON: CPT

## 2023-02-10 PROCEDURE — 85610 PROTHROMBIN TIME: CPT | Performed by: EMERGENCY MEDICINE

## 2023-02-10 PROCEDURE — 99285 EMERGENCY DEPT VISIT HI MDM: CPT

## 2023-02-10 PROCEDURE — 36415 COLL VENOUS BLD VENIPUNCTURE: CPT

## 2023-02-10 PROCEDURE — 81001 URINALYSIS AUTO W/SCOPE: CPT | Performed by: EMERGENCY MEDICINE

## 2023-02-10 PROCEDURE — 87040 BLOOD CULTURE FOR BACTERIA: CPT | Performed by: EMERGENCY MEDICINE

## 2023-02-10 PROCEDURE — 96361 HYDRATE IV INFUSION ADD-ON: CPT

## 2023-02-10 PROCEDURE — 83010 ASSAY OF HAPTOGLOBIN QUANT: CPT | Performed by: INTERNAL MEDICINE

## 2023-02-10 PROCEDURE — 80162 ASSAY OF DIGOXIN TOTAL: CPT | Performed by: EMERGENCY MEDICINE

## 2023-02-10 PROCEDURE — 71045 X-RAY EXAM CHEST 1 VIEW: CPT | Performed by: EMERGENCY MEDICINE

## 2023-02-10 PROCEDURE — 96365 THER/PROPH/DIAG IV INF INIT: CPT

## 2023-02-10 PROCEDURE — 87086 URINE CULTURE/COLONY COUNT: CPT | Performed by: EMERGENCY MEDICINE

## 2023-02-10 PROCEDURE — 82550 ASSAY OF CK (CPK): CPT | Performed by: EMERGENCY MEDICINE

## 2023-02-10 PROCEDURE — 93005 ELECTROCARDIOGRAM TRACING: CPT

## 2023-02-10 PROCEDURE — 85025 COMPLETE CBC W/AUTO DIFF WBC: CPT | Performed by: EMERGENCY MEDICINE

## 2023-02-10 PROCEDURE — 96375 TX/PRO/DX INJ NEW DRUG ADDON: CPT

## 2023-02-10 PROCEDURE — 93010 ELECTROCARDIOGRAM REPORT: CPT

## 2023-02-10 RX ORDER — DIGOXIN 125 MCG
125 TABLET ORAL DAILY
Status: DISCONTINUED | OUTPATIENT
Start: 2023-02-11 | End: 2023-02-11

## 2023-02-10 RX ORDER — ACETAMINOPHEN 325 MG/1
650 TABLET ORAL EVERY 6 HOURS PRN
Status: DISCONTINUED | OUTPATIENT
Start: 2023-02-10 | End: 2023-02-15

## 2023-02-10 RX ORDER — FLUOXETINE HYDROCHLORIDE 20 MG/1
20 CAPSULE ORAL 2 TIMES DAILY
Status: DISCONTINUED | OUTPATIENT
Start: 2023-02-10 | End: 2023-02-15

## 2023-02-10 RX ORDER — BISACODYL 10 MG
10 SUPPOSITORY, RECTAL RECTAL
Status: DISCONTINUED | OUTPATIENT
Start: 2023-02-10 | End: 2023-02-15

## 2023-02-10 RX ORDER — DOCUSATE SODIUM 100 MG/1
100 CAPSULE, LIQUID FILLED ORAL 2 TIMES DAILY
Status: DISCONTINUED | OUTPATIENT
Start: 2023-02-10 | End: 2023-02-15

## 2023-02-10 RX ORDER — CARVEDILOL 3.12 MG/1
3.12 TABLET ORAL 2 TIMES DAILY WITH MEALS
Status: DISCONTINUED | OUTPATIENT
Start: 2023-02-10 | End: 2023-02-11

## 2023-02-10 RX ORDER — SODIUM CHLORIDE 9 MG/ML
INJECTION, SOLUTION INTRAVENOUS CONTINUOUS
Status: DISCONTINUED | OUTPATIENT
Start: 2023-02-10 | End: 2023-02-12

## 2023-02-10 RX ORDER — MELATONIN
5000 DAILY
Status: DISCONTINUED | OUTPATIENT
Start: 2023-02-11 | End: 2023-02-15

## 2023-02-10 RX ORDER — MELATONIN
325
Status: DISCONTINUED | OUTPATIENT
Start: 2023-02-11 | End: 2023-02-15

## 2023-02-10 RX ORDER — SODIUM CHLORIDE 9 MG/ML
INJECTION, SOLUTION INTRAVENOUS CONTINUOUS
Status: ACTIVE | OUTPATIENT
Start: 2023-02-10 | End: 2023-02-10

## 2023-02-10 RX ORDER — CYPROHEPTADINE HYDROCHLORIDE 4 MG/1
4 TABLET ORAL DAILY
Status: DISCONTINUED | OUTPATIENT
Start: 2023-02-11 | End: 2023-02-15

## 2023-02-10 RX ORDER — POLYETHYLENE GLYCOL 3350 17 G/17G
17 POWDER, FOR SOLUTION ORAL DAILY PRN
Status: DISCONTINUED | OUTPATIENT
Start: 2023-02-10 | End: 2023-02-15

## 2023-02-10 RX ORDER — DILTIAZEM HYDROCHLORIDE 5 MG/ML
20 INJECTION INTRAVENOUS ONCE
Status: COMPLETED | OUTPATIENT
Start: 2023-02-10 | End: 2023-02-10

## 2023-02-10 RX ORDER — OMEPRAZOLE 20 MG/1
20 CAPSULE, DELAYED RELEASE ORAL
COMMUNITY

## 2023-02-10 RX ORDER — BUPROPION HYDROCHLORIDE 150 MG/1
150 TABLET, EXTENDED RELEASE ORAL 2 TIMES DAILY
Status: DISCONTINUED | OUTPATIENT
Start: 2023-02-10 | End: 2023-02-15

## 2023-02-10 RX ORDER — ASPIRIN 81 MG/1
81 TABLET ORAL DAILY
Status: DISCONTINUED | OUTPATIENT
Start: 2023-02-11 | End: 2023-02-15

## 2023-02-10 RX ORDER — FLUTICASONE FUROATE AND VILANTEROL 100; 25 UG/1; UG/1
1 POWDER RESPIRATORY (INHALATION) DAILY
Status: DISCONTINUED | OUTPATIENT
Start: 2023-02-11 | End: 2023-02-15

## 2023-02-10 RX ORDER — ONDANSETRON 2 MG/ML
4 INJECTION INTRAMUSCULAR; INTRAVENOUS EVERY 6 HOURS PRN
Status: DISCONTINUED | OUTPATIENT
Start: 2023-02-10 | End: 2023-02-15

## 2023-02-10 RX ORDER — LEVOTHYROXINE SODIUM 0.12 MG/1
125 TABLET ORAL
Status: DISCONTINUED | OUTPATIENT
Start: 2023-02-11 | End: 2023-02-15

## 2023-02-10 RX ORDER — NORTRIPTYLINE HYDROCHLORIDE 25 MG/1
25 CAPSULE ORAL NIGHTLY
Status: DISCONTINUED | OUTPATIENT
Start: 2023-02-10 | End: 2023-02-15

## 2023-02-10 RX ORDER — ATORVASTATIN CALCIUM 40 MG/1
40 TABLET, FILM COATED ORAL NIGHTLY
Status: DISCONTINUED | OUTPATIENT
Start: 2023-02-10 | End: 2023-02-15

## 2023-02-10 RX ORDER — DILTIAZEM HYDROCHLORIDE 240 MG/1
240 CAPSULE, COATED, EXTENDED RELEASE ORAL DAILY
Status: DISCONTINUED | OUTPATIENT
Start: 2023-02-11 | End: 2023-02-11

## 2023-02-10 RX ORDER — ALLOPURINOL 100 MG/1
100 TABLET ORAL DAILY
Status: DISCONTINUED | OUTPATIENT
Start: 2023-02-11 | End: 2023-02-15

## 2023-02-10 RX ORDER — ALBUTEROL SULFATE 90 UG/1
2 AEROSOL, METERED RESPIRATORY (INHALATION) EVERY 6 HOURS PRN
Status: DISCONTINUED | OUTPATIENT
Start: 2023-02-10 | End: 2023-02-15

## 2023-02-10 RX ORDER — LACTULOSE 20 G/30ML
20 SOLUTION ORAL 2 TIMES DAILY
Status: DISCONTINUED | OUTPATIENT
Start: 2023-02-10 | End: 2023-02-15

## 2023-02-10 NOTE — ED QUICK NOTES
Orders for admission, patient is aware of plan and ready to go upstairs. Any questions, please call ED FAUSTO Vegas at extension [phone]    Vaccinated? Yes  Type of COVID test sent: RApid  COVID Suspicion level: Low      Titratable drug(s) infusing:   Rate: 0.9% 500 ml/hr.     LOC at time of transport: A&O x3    Other pertinent information:     CIWA score=  NIH score=

## 2023-02-10 NOTE — ED INITIAL ASSESSMENT (HPI)
Arrived via EMS where patient spent 6-8 hours on bathroom floor. Pt was on toilet and was unable to get self off toilet due to generalized weakness. Pt scooted self to bathroom floor laying on right side for many hours.  Dc/d from [facility] yesterday

## 2023-02-11 LAB
ANION GAP SERPL CALC-SCNC: 6 MMOL/L (ref 0–18)
BASOPHILS # BLD AUTO: 0.02 X10(3) UL (ref 0–0.2)
BASOPHILS NFR BLD AUTO: 0.3 %
BUN BLD-MCNC: 20 MG/DL (ref 7–18)
CALCIUM BLD-MCNC: 8.9 MG/DL (ref 8.5–10.1)
CHLORIDE SERPL-SCNC: 109 MMOL/L (ref 98–112)
CO2 SERPL-SCNC: 26 MMOL/L (ref 21–32)
CREAT BLD-MCNC: 0.73 MG/DL
EOSINOPHIL # BLD AUTO: 0.18 X10(3) UL (ref 0–0.7)
EOSINOPHIL NFR BLD AUTO: 3 %
ERYTHROCYTE [DISTWIDTH] IN BLOOD BY AUTOMATED COUNT: 18.2 %
GFR SERPLBLD BASED ON 1.73 SQ M-ARVRAT: 89 ML/MIN/1.73M2 (ref 60–?)
GLUCOSE BLD-MCNC: 93 MG/DL (ref 70–99)
HCT VFR BLD AUTO: 25.4 %
HGB BLD-MCNC: 7.7 G/DL
IMM GRANULOCYTES # BLD AUTO: 0.02 X10(3) UL (ref 0–1)
IMM GRANULOCYTES NFR BLD: 0.3 %
INR BLD: 2.64 (ref 0.85–1.16)
LYMPHOCYTES # BLD AUTO: 1.41 X10(3) UL (ref 1–4)
LYMPHOCYTES NFR BLD AUTO: 23.2 %
MCH RBC QN AUTO: 24.9 PG (ref 26–34)
MCHC RBC AUTO-ENTMCNC: 30.3 G/DL (ref 31–37)
MCV RBC AUTO: 82.2 FL
MONOCYTES # BLD AUTO: 1.01 X10(3) UL (ref 0.1–1)
MONOCYTES NFR BLD AUTO: 16.6 %
NEUTROPHILS # BLD AUTO: 3.44 X10 (3) UL (ref 1.5–7.7)
NEUTROPHILS # BLD AUTO: 3.44 X10(3) UL (ref 1.5–7.7)
NEUTROPHILS NFR BLD AUTO: 56.6 %
OSMOLALITY SERPL CALC.SUM OF ELEC: 294 MOSM/KG (ref 275–295)
PLATELET # BLD AUTO: 380 10(3)UL (ref 150–450)
POTASSIUM SERPL-SCNC: 3.5 MMOL/L (ref 3.5–5.1)
PROTHROMBIN TIME: 27.9 SECONDS (ref 11.6–14.8)
RBC # BLD AUTO: 3.09 X10(6)UL
SODIUM SERPL-SCNC: 141 MMOL/L (ref 136–145)
WBC # BLD AUTO: 6.1 X10(3) UL (ref 4–11)

## 2023-02-11 PROCEDURE — 85025 COMPLETE CBC W/AUTO DIFF WBC: CPT | Performed by: INTERNAL MEDICINE

## 2023-02-11 PROCEDURE — 85610 PROTHROMBIN TIME: CPT | Performed by: INTERNAL MEDICINE

## 2023-02-11 PROCEDURE — 97162 PT EVAL MOD COMPLEX 30 MIN: CPT

## 2023-02-11 PROCEDURE — 97530 THERAPEUTIC ACTIVITIES: CPT

## 2023-02-11 PROCEDURE — 80048 BASIC METABOLIC PNL TOTAL CA: CPT | Performed by: INTERNAL MEDICINE

## 2023-02-11 PROCEDURE — 94640 AIRWAY INHALATION TREATMENT: CPT

## 2023-02-11 RX ORDER — DIGOXIN 125 MCG
250 TABLET ORAL DAILY
Status: DISCONTINUED | OUTPATIENT
Start: 2023-02-11 | End: 2023-02-12

## 2023-02-11 RX ORDER — POTASSIUM CHLORIDE 20 MEQ/1
40 TABLET, EXTENDED RELEASE ORAL EVERY 4 HOURS
Status: DISCONTINUED | OUTPATIENT
Start: 2023-02-11 | End: 2023-02-11

## 2023-02-11 RX ORDER — DILTIAZEM HYDROCHLORIDE 180 MG/1
180 CAPSULE, EXTENDED RELEASE ORAL 2 TIMES DAILY
Status: DISCONTINUED | OUTPATIENT
Start: 2023-02-11 | End: 2023-02-12

## 2023-02-11 RX ORDER — ACETAMINOPHEN 325 MG/1
1300 TABLET ORAL 2 TIMES DAILY
Status: DISCONTINUED | OUTPATIENT
Start: 2023-02-11 | End: 2023-02-15

## 2023-02-11 NOTE — PLAN OF CARE
Assumed care of patient @ [phone] patient resting in bed, AOX4, reports chronic pain, non-pharmacological interventions taken. Lung sounds clear but diminished bilaterally, O2 saturation maintained on room air. No complaints of shortness of breath or chest pain. AFIB on tele, rates controlled. Bowel sounds present and active in all quadrants. Patient voiding per external catheter, changed this afternoon. Plan of Care: IV rocephin for UTI. K+ rider. Coumadin. Contacted cardiology this afternoon, pt hesitant to take metoprolol as \"it did not work for me in the past\". After medication education she was agreeable to try the new regimen as prescribed. Discussed plan of care with patient, verbalized understanding. Call light within reach.

## 2023-02-11 NOTE — PROGRESS NOTES
[facility] Dosing Service  Warfarin (Coumadin) Initial Dosing    Nevaeh Delacruz is a 69year old patient for whom pharmacy has been consulted to dose warfarin (COUMADIN) for Prevention of systemic embolism by Dr. Lawrence Franco. Based on this indication, goal INR is 2.5-3.5 per outpatient Coumadin clinic notes. Pertinent Patient Medical History:  afib, AVR, h/o PE  Potential Drug Interactions:  aspirin, allopurinol, levothyroxine     Latest INR:   Recent Labs     02/10/23  1542   INR 2.14*       Other Anticoagulants: none  Home regimen (if applicable):  warfarin 4.5TX nightly    Based on above -  1. For today, Give warfarin (COUMADIN) 3.5 mg at 2100 tonight    2. PT/INR ordered daily while on warfarin    3. Pharmacy will continue to follow. We appreciate the opportunity to assist in the care of this patient.     Vicente Scott, PharmD  2/10/2023  7:33 PM

## 2023-02-11 NOTE — CM/SW NOTE
PT recommending NAIF. Patient has history with [place] [id number], and [facility] (most recently). Sent NAIF referrals in aidin. Will need PASRR.     GEN Velasquez  Discharge Planner  [phone]

## 2023-02-11 NOTE — PLAN OF CARE
Admission navigator completed. Sepsis fluid bolus infusing as ordered. Pt tolerating fluids well, denies shortness of breath, lungs clear bilaterally. No edema noted in BLE. A&Ox4  O2 sat WNL on RA  Afib on tele. Rates 90s-1-teens. On coumadin for hx of afib   Pt c/o of chronic pain in back and shoulder. Tylenol given for pain   Up with walker. Problem: Patient/Family Goals  Goal: Patient/Family Long Term Goal  Description: Patient's Long Term Goal: to stay out of the hospital     Interventions:  - follow up with pcp   - take medications as ordered     - See additional Care Plan goals for specific interventions  Outcome: Progressing  Goal: Patient/Family Short Term Goal  Description: Patient's Short Term Goal: to feel better    Interventions:   - monitor on tele  - daily labs   - medications     - See additional Care Plan goals for specific interventions  Outcome: Progressing     Problem: SAFETY ADULT - FALL  Goal: Free from fall injury  Description: INTERVENTIONS:  - Assess pt frequently for physical needs  - Identify cognitive and physical deficits and behaviors that affect risk of falls.   - [place] fall precautions as indicated by assessment.  - Educate pt/family on patient safety including physical limitations  - Instruct pt to call for assistance with activity based on assessment  - Modify environment to reduce risk of injury  - Provide assistive devices as appropriate  - Consider OT/PT consult to assist with strengthening/mobility  - Encourage toileting schedule  Outcome: Progressing

## 2023-02-11 NOTE — PLAN OF CARE
Patient is aox3,  in chronic pain from fibromyalgia and Left shoulder pain. Received tylenol 1300 mg in the am which she takes at home. Per patient the last time she was at home was 4 months ago. PT evaluated patient and was not able to get out of bed and stated \" I cannot sit in the chair, my neck doesn't bed and legs dont bend. \" PT recommending subacute rehab. Cardiology to eval meds. Afib controlled, continue coumadin. Waiting in INR results for today. K replaced per protocol. K level in am. Patient refused oral K and will order K rider. Continue 0.9ns at 83 ml/hr. Rocephin for UTI. Picture of stage 1 on sacrum taken.    Problem: Patient/Family Goals  Goal: Patient/Family Long Term Goal  Description: Patient's Long Term Goal: to stay out of the hospital     Interventions:  - follow up with pcp   - take medications as ordered     - See additional Care Plan goals for specific interventions  Outcome: Progressing  Goal: Patient/Family Short Term Goal  Description: Patient's Short Term Goal: to feel better    Interventions:   - monitor on tele  - daily labs   - medications     - See additional Care Plan goals for specific interventions  Outcome: Progressing

## 2023-02-11 NOTE — CONSULTS
[facility] Dosing Service  Warfarin (Coumadin) Subsequent Dosing    Joss Christianson is a 69year old patient for whom pharmacy is dosing warfarin (Coumadin). Goal INR is 2.5-3.5    Recent Labs   Lab 02/08/23  1856 02/10/23  1542 02/11/23  1352   INR 2.77* 2.14* 2.64*       Consulted by:  Dr Fina Holt ( consulted again today); Dr Gloria Camacho had already consulted yesterday  Indication:  Prevention of systemic embolism   Potential Drug Interactions:  Allopurinol, Levothyroxine, ASA  Other Anticoagulants:  None  Home regimen (if applicable):   warfarin 3.5mg nightly       Inpatient Dosing History:    Date 2/10 2/11       INR 2.14 2.64       Coumadin dose 3.5 mg                 Based on above -  1. For today, Give warfarin (COUMADIN) 3.5 mg at 2100 tonight. Dr Fina Holt also had ordered the same for tonight and then pharmacy to continue dosing. 2   PT/INR ordered daily while on warfarin  3. Pharmacy will continue to follow. We appreciate the opportunity to assist in the care of this patient.     Layne Cagle, PharmD  2/11/2023  3:26 PM

## 2023-02-12 LAB
ANION GAP SERPL CALC-SCNC: 6 MMOL/L (ref 0–18)
BASOPHILS # BLD AUTO: 0.03 X10(3) UL (ref 0–0.2)
BASOPHILS NFR BLD AUTO: 0.6 %
BUN BLD-MCNC: 16 MG/DL (ref 7–18)
CALCIUM BLD-MCNC: 9 MG/DL (ref 8.5–10.1)
CHLORIDE SERPL-SCNC: 111 MMOL/L (ref 98–112)
CO2 SERPL-SCNC: 24 MMOL/L (ref 21–32)
CREAT BLD-MCNC: 0.6 MG/DL
DEPRECATED HBV CORE AB SER IA-ACNC: 153.4 NG/ML
EOSINOPHIL # BLD AUTO: 0.34 X10(3) UL (ref 0–0.7)
EOSINOPHIL NFR BLD AUTO: 7.3 %
ERYTHROCYTE [DISTWIDTH] IN BLOOD BY AUTOMATED COUNT: 18.7 %
GFR SERPLBLD BASED ON 1.73 SQ M-ARVRAT: 97 ML/MIN/1.73M2 (ref 60–?)
GLUCOSE BLD-MCNC: 97 MG/DL (ref 70–99)
HAPTOGLOB SERPL-MCNC: 250 MG/DL (ref 30–200)
HCT VFR BLD AUTO: 26.3 %
HGB BLD-MCNC: 7.6 G/DL
HGB RETIC QN AUTO: 33.8 PG (ref 28.2–36.6)
IMM GRANULOCYTES # BLD AUTO: 0.02 X10(3) UL (ref 0–1)
IMM GRANULOCYTES NFR BLD: 0.4 %
IMM RETICS NFR: 0.28 RATIO (ref 0.1–0.3)
INR BLD: 3.05 (ref 0.85–1.16)
IRON SATN MFR SERPL: 6 %
IRON SERPL-MCNC: 15 UG/DL
LDH SERPL L TO P-CCNC: 199 U/L
LYMPHOCYTES # BLD AUTO: 1.6 X10(3) UL (ref 1–4)
LYMPHOCYTES NFR BLD AUTO: 34.5 %
MCH RBC QN AUTO: 24.4 PG (ref 26–34)
MCHC RBC AUTO-ENTMCNC: 28.9 G/DL (ref 31–37)
MCV RBC AUTO: 84.6 FL
MONOCYTES # BLD AUTO: 0.78 X10(3) UL (ref 0.1–1)
MONOCYTES NFR BLD AUTO: 16.8 %
NEUTROPHILS # BLD AUTO: 1.87 X10 (3) UL (ref 1.5–7.7)
NEUTROPHILS # BLD AUTO: 1.87 X10(3) UL (ref 1.5–7.7)
NEUTROPHILS NFR BLD AUTO: 40.4 %
OSMOLALITY SERPL CALC.SUM OF ELEC: 293 MOSM/KG (ref 275–295)
PLATELET # BLD AUTO: 332 10(3)UL (ref 150–450)
POTASSIUM SERPL-SCNC: 3.3 MMOL/L (ref 3.5–5.1)
POTASSIUM SERPL-SCNC: 3.3 MMOL/L (ref 3.5–5.1)
PROTHROMBIN TIME: 31.1 SECONDS (ref 11.6–14.8)
RBC # BLD AUTO: 3.11 X10(6)UL
RETICS # AUTO: 65.8 X10(3) UL (ref 22.5–147.5)
RETICS/RBC NFR AUTO: 2.1 %
SODIUM SERPL-SCNC: 141 MMOL/L (ref 136–145)
TIBC SERPL-MCNC: 250 UG/DL (ref 240–450)
TRANSFERRIN SERPL-MCNC: 168 MG/DL (ref 200–360)
VIT B12 SERPL-MCNC: 354 PG/ML (ref 193–986)
WBC # BLD AUTO: 4.6 X10(3) UL (ref 4–11)

## 2023-02-12 PROCEDURE — 85610 PROTHROMBIN TIME: CPT

## 2023-02-12 PROCEDURE — 85025 COMPLETE CBC W/AUTO DIFF WBC: CPT | Performed by: INTERNAL MEDICINE

## 2023-02-12 PROCEDURE — 84132 ASSAY OF SERUM POTASSIUM: CPT | Performed by: INTERNAL MEDICINE

## 2023-02-12 PROCEDURE — 83615 LACTATE (LD) (LDH) ENZYME: CPT | Performed by: INTERNAL MEDICINE

## 2023-02-12 PROCEDURE — 97166 OT EVAL MOD COMPLEX 45 MIN: CPT

## 2023-02-12 PROCEDURE — 82728 ASSAY OF FERRITIN: CPT | Performed by: INTERNAL MEDICINE

## 2023-02-12 PROCEDURE — 83540 ASSAY OF IRON: CPT | Performed by: INTERNAL MEDICINE

## 2023-02-12 PROCEDURE — 80048 BASIC METABOLIC PNL TOTAL CA: CPT | Performed by: INTERNAL MEDICINE

## 2023-02-12 PROCEDURE — 83550 IRON BINDING TEST: CPT | Performed by: INTERNAL MEDICINE

## 2023-02-12 PROCEDURE — 97530 THERAPEUTIC ACTIVITIES: CPT

## 2023-02-12 PROCEDURE — 82607 VITAMIN B-12: CPT | Performed by: INTERNAL MEDICINE

## 2023-02-12 PROCEDURE — 85045 AUTOMATED RETICULOCYTE COUNT: CPT | Performed by: INTERNAL MEDICINE

## 2023-02-12 RX ORDER — DIGOXIN 125 MCG
125 TABLET ORAL DAILY
Status: DISCONTINUED | OUTPATIENT
Start: 2023-02-12 | End: 2023-02-15

## 2023-02-12 RX ORDER — DILTIAZEM HYDROCHLORIDE 240 MG/1
240 CAPSULE, COATED, EXTENDED RELEASE ORAL DAILY
Status: DISCONTINUED | OUTPATIENT
Start: 2023-02-12 | End: 2023-02-15

## 2023-02-12 RX ORDER — FUROSEMIDE 20 MG/1
20 TABLET ORAL DAILY
Status: DISCONTINUED | OUTPATIENT
Start: 2023-02-12 | End: 2023-02-15

## 2023-02-12 RX ORDER — POTASSIUM CHLORIDE 750 MG/1
10 TABLET, EXTENDED RELEASE ORAL DAILY
Status: DISCONTINUED | OUTPATIENT
Start: 2023-02-12 | End: 2023-02-12

## 2023-02-12 NOTE — CM/SW NOTE
02/12/23 1400   CLIFFORD/SENDY Referral Data   Referral Source Physician   Reason for Referral Discharge planning   Informant Patient   Readmission Assessment   Pt's living situation prior to admission? Subacute rehab   Was full assessment completed by SENDY/CLIFFORD on prior admission? Yes   Patient [address]   Number of Levels in Home 2   Patient lives with Alone   Patient Status Prior to Admission   Services in place prior to admission DME/Supplies at home   Type of DME/Supplies Commode; Shower Chair;Grab Bars;Hospital Bed; Other;Rollator Walker;Ramp   Discharge Needs   Anticipated D/C needs To be determined   Choice of Post-Acute Provider   Informed patient of right to choose their preferred provider Yes     Received message from PT and recommendation is for NAIF  Reviewed NAIF referral and noted message from [facility] indicates that she is out of Medicare days and she would have to pay $400/day. Called pt in her room and she reports she can't afford to pay OOP for NAIF. She also states she has 2 brothers but can't stay with them. She feels her safest plan would be to return home with [facility]. She states she lives in a raised ranch with a ramp to get to the main level. She has a large walk in shower, shower chair, commode, bed with ability to raise head/feet (not an actual hospital bed), has a wheelchair and a rollator. She reports she's had HH in the past with [facility] and wants to use again. She also reports she was approved for Homemaker support through her [facility] services for 5 hours per week. She is planning to request more hours. Services never started, though, as she's been either hospitalized or in rehab since 10/2022.   Discussed possible Medicaid application, she feels she is over income/assets but was agreeable to referral to [facility] [employer] message left    Aidin referral for [facility] sent, F2F entered    / to remain available for support and/or discharge planning.      Leisa Morton MBA MSN, RN CTL/  [id number]

## 2023-02-12 NOTE — PLAN OF CARE
A&Ox4  O2 sat WNL on RA  Afib on tele. HR in the 70s-80s  On coumadin   Purewick in place. IVF infusing per order. Tolerating well. Lungs clear bilaterally, no edema noted in BLE  Up with walker. Refusing to get up d/t pain. Scheduled tylenol given as ordered       Problem: Patient/Family Goals  Goal: Patient/Family Long Term Goal  Description: Patient's Long Term Goal: to stay out of the hospital     Interventions:  - follow up with pcp   - take medications as ordered     - See additional Care Plan goals for specific interventions  Outcome: Progressing  Goal: Patient/Family Short Term Goal  Description: Patient's Short Term Goal: to feel better    Interventions:   - monitor on tele  - daily labs   - medications     - See additional Care Plan goals for specific interventions  Outcome: Progressing     Problem: SAFETY ADULT - FALL  Goal: Free from fall injury  Description: INTERVENTIONS:  - Assess pt frequently for physical needs  - Identify cognitive and physical deficits and behaviors that affect risk of falls.   - [place] fall precautions as indicated by assessment.  - Educate pt/family on patient safety including physical limitations  - Instruct pt to call for assistance with activity based on assessment  - Modify environment to reduce risk of injury  - Provide assistive devices as appropriate  - Consider OT/PT consult to assist with strengthening/mobility  - Encourage toileting schedule  Outcome: Progressing

## 2023-02-12 NOTE — PLAN OF CARE
Pt alert and oriented x 4..  Continuous tele monitoring in place. Afib on the monitor with controlled rate. Reports chronic  pain to left shoulder with movement. Scheduled tylenol with good result. Room air. No dyspnea and dizziness. Room air. Potassium replaced per protocol. Medications adjusted per cards this am.   Safety and comfort maintained. Will continue to monitor. Problem: SAFETY ADULT - FALL  Goal: Free from fall injury  Description: INTERVENTIONS:  - Assess pt frequently for physical needs  - Identify cognitive and physical deficits and behaviors that affect risk of falls. - [place] fall precautions as indicated by assessment.  - Educate pt/family on patient safety including physical limitations  - Instruct pt to call for assistance with activity based on assessment  - Modify environment to reduce risk of injury  - Provide assistive devices as appropriate  - Consider OT/PT consult to assist with strengthening/mobility  - Encourage toileting schedule  2/12/2023 1452 by [place] Button, RN  Outcome: Progressing  2/12/2023 1452 by [place] Button, RN  Outcome: Progressing     Problem: CARDIOVASCULAR - ADULT  Goal: Maintains optimal cardiac output and hemodynamic stability  Description: INTERVENTIONS:  - Monitor vital signs, rhythm, and trends  - Monitor for bleeding, hypotension and signs of decreased cardiac output  - Evaluate effectiveness of vasoactive medications to optimize hemodynamic stability  - Monitor arterial and/or venous puncture sites for bleeding and/or hematoma  - Assess quality of pulses, skin color and temperature  - Assess for signs of decreased coronary artery perfusion - ex.  Angina  - Evaluate fluid balance, assess for edema, trend weights  Outcome: Progressing  Goal: Absence of cardiac arrhythmias or at baseline  Description: INTERVENTIONS:  - Continuous cardiac monitoring, monitor vital signs, obtain 12 lead EKG if indicated  - Evaluate effectiveness of antiarrhythmic and heart rate control medications as ordered  - Initiate emergency measures for life threatening arrhythmias  - Monitor electrolytes and administer replacement therapy as ordered  Outcome: Progressing

## 2023-02-12 NOTE — CONSULTS
[facility] Dosing Service  Warfarin (Coumadin) Subsequent Dosing    Nishant Fam is a 69year old patient for whom pharmacy is dosing warfarin (Coumadin). Goal INR is 2.5-3.5    Recent Labs   Lab 02/08/23  1856 02/10/23  1542 02/11/23  1352 02/12/23  0626   INR 2.77* 2.14* 2.64* 3.05*       Consulted by:  Dr Jenna Hauser  Indication:  Prevention of systemic emboli  Potential Drug Interactions:  ASA/levothyroxine/allopurinol  Other Anticoagulants:  None  Home regimen (if applicable):  Coumadin 8.2CG nightly    Inpatient Dosing History:    Date 2/10 2/11 2/12      INR 2.14 2.64 3.05      Coumadin dose 3.5 mg 3.5 mg                Based on above -  1. For today, Give warfarin (COUMADIN) 3.5 mg at 2100 tonight  2   PT/INR ordered daily while on warfarin  3. Pharmacy will continue to follow. We appreciate the opportunity to assist in the care of this patient.     Csota Caal, PharmD  2/12/2023  8:21 AM

## 2023-02-13 ENCOUNTER — APPOINTMENT (OUTPATIENT)
Dept: CV DIAGNOSTICS | Facility: HOSPITAL | Age: 68
End: 2023-02-13
Attending: INTERNAL MEDICINE
Payer: MEDICARE

## 2023-02-13 LAB
ANION GAP SERPL CALC-SCNC: 5 MMOL/L (ref 0–18)
BASOPHILS # BLD AUTO: 0.03 X10(3) UL (ref 0–0.2)
BASOPHILS NFR BLD AUTO: 0.5 %
BUN BLD-MCNC: 21 MG/DL (ref 7–18)
CALCIUM BLD-MCNC: 8.8 MG/DL (ref 8.5–10.1)
CHLORIDE SERPL-SCNC: 111 MMOL/L (ref 98–112)
CO2 SERPL-SCNC: 23 MMOL/L (ref 21–32)
CREAT BLD-MCNC: 0.69 MG/DL
EOSINOPHIL # BLD AUTO: 0.39 X10(3) UL (ref 0–0.7)
EOSINOPHIL NFR BLD AUTO: 7.1 %
ERYTHROCYTE [DISTWIDTH] IN BLOOD BY AUTOMATED COUNT: 18.7 %
GFR SERPLBLD BASED ON 1.73 SQ M-ARVRAT: 94 ML/MIN/1.73M2 (ref 60–?)
GLUCOSE BLD-MCNC: 98 MG/DL (ref 70–99)
HCT VFR BLD AUTO: 28.1 %
HGB BLD-MCNC: 8.4 G/DL
IMM GRANULOCYTES # BLD AUTO: 0.02 X10(3) UL (ref 0–1)
IMM GRANULOCYTES NFR BLD: 0.4 %
INR BLD: 2.89 (ref 0.85–1.16)
LYMPHOCYTES # BLD AUTO: 1.41 X10(3) UL (ref 1–4)
LYMPHOCYTES NFR BLD AUTO: 25.5 %
MCH RBC QN AUTO: 25.2 PG (ref 26–34)
MCHC RBC AUTO-ENTMCNC: 29.9 G/DL (ref 31–37)
MCV RBC AUTO: 84.4 FL
MONOCYTES # BLD AUTO: 0.86 X10(3) UL (ref 0.1–1)
MONOCYTES NFR BLD AUTO: 15.6 %
NEUTROPHILS # BLD AUTO: 2.82 X10 (3) UL (ref 1.5–7.7)
NEUTROPHILS # BLD AUTO: 2.82 X10(3) UL (ref 1.5–7.7)
NEUTROPHILS NFR BLD AUTO: 50.9 %
OSMOLALITY SERPL CALC.SUM OF ELEC: 291 MOSM/KG (ref 275–295)
PLATELET # BLD AUTO: 394 10(3)UL (ref 150–450)
POTASSIUM SERPL-SCNC: 4.1 MMOL/L (ref 3.5–5.1)
POTASSIUM SERPL-SCNC: 4.1 MMOL/L (ref 3.5–5.1)
PROTHROMBIN TIME: 29.9 SECONDS (ref 11.6–14.8)
RBC # BLD AUTO: 3.33 X10(6)UL
SODIUM SERPL-SCNC: 139 MMOL/L (ref 136–145)
WBC # BLD AUTO: 5.5 X10(3) UL (ref 4–11)

## 2023-02-13 PROCEDURE — 97110 THERAPEUTIC EXERCISES: CPT

## 2023-02-13 PROCEDURE — 84132 ASSAY OF SERUM POTASSIUM: CPT | Performed by: INTERNAL MEDICINE

## 2023-02-13 PROCEDURE — 80048 BASIC METABOLIC PNL TOTAL CA: CPT | Performed by: INTERNAL MEDICINE

## 2023-02-13 PROCEDURE — 85610 PROTHROMBIN TIME: CPT

## 2023-02-13 PROCEDURE — 97116 GAIT TRAINING THERAPY: CPT

## 2023-02-13 PROCEDURE — 93306 TTE W/DOPPLER COMPLETE: CPT | Performed by: INTERNAL MEDICINE

## 2023-02-13 PROCEDURE — 85025 COMPLETE CBC W/AUTO DIFF WBC: CPT | Performed by: INTERNAL MEDICINE

## 2023-02-13 PROCEDURE — 97530 THERAPEUTIC ACTIVITIES: CPT

## 2023-02-13 NOTE — PLAN OF CARE
Up in the hallway with PT today,ambulates with walker x1 assist  Pt awake,alert and oriented  C/o generalized pain and left shoulder pain  Scheduled Tylenol dose helping  Per pt still slightly sob,room air O2 sats 97%  Scattered bruising noted in her arms,legs, and back  High fall risk precautions,call light within reach,bed alarms on  Will continue to monitor    Problem: SAFETY ADULT - FALL  Goal: Free from fall injury  Description: INTERVENTIONS:  - Assess pt frequently for physical needs  - Identify cognitive and physical deficits and behaviors that affect risk of falls. - [place] fall precautions as indicated by assessment.  - Educate pt/family on patient safety including physical limitations  - Instruct pt to call for assistance with activity based on assessment  - Modify environment to reduce risk of injury  - Provide assistive devices as appropriate  - Consider OT/PT consult to assist with strengthening/mobility  - Encourage toileting schedule  Outcome: Progressing     Problem: CARDIOVASCULAR - ADULT  Goal: Maintains optimal cardiac output and hemodynamic stability  Description: INTERVENTIONS:  - Monitor vital signs, rhythm, and trends  - Monitor for bleeding, hypotension and signs of decreased cardiac output  - Evaluate effectiveness of vasoactive medications to optimize hemodynamic stability  - Monitor arterial and/or venous puncture sites for bleeding and/or hematoma  - Assess quality of pulses, skin color and temperature  - Assess for signs of decreased coronary artery perfusion - ex.  Angina  - Evaluate fluid balance, assess for edema, trend weights  Outcome: Progressing  Goal: Absence of cardiac arrhythmias or at baseline  Description: INTERVENTIONS:  - Continuous cardiac monitoring, monitor vital signs, obtain 12 lead EKG if indicated  - Evaluate effectiveness of antiarrhythmic and heart rate control medications as ordered  - Initiate emergency measures for life threatening arrhythmias  - Monitor electrolytes and administer replacement therapy as ordered  Outcome: Progressing

## 2023-02-13 NOTE — CM/SW NOTE
30-Day READMISSION Note:    Patient recently hospitalized from 1/7 to 1/13/2023 for AF with RVR, new CM (EF of 45%), severe constipation with fecal impaction--found to have +RSV--URI. Patient has a history of fibromyalgia and major depression. Evaluated by therapies--discharged to [facility]. On 2/10/2023--patient had been discharged from [place] [id number] on 2/9--unable to get to her home in [facility] [place] @ a hotel--presented from [facility] with weakness--had been stuck on commode and unable to get off.

## 2023-02-13 NOTE — CONSULTS
[facility] Dosing Service  Warfarin (Coumadin) Subsequent Dosing    Fito Grace is a 69year old patient for whom pharmacy is dosing warfarin (Coumadin). Goal INR is 2.5-3.5    Recent Labs   Lab 02/08/23  1856 02/10/23  1542 02/11/23  1352 02/12/23  0626 02/13/23  0501   INR 2.77* 2.14* 2.64* 3.05* 2.89*       Consulted by: Dr. Jorge Rodrigues  Indication: prevention of systemic embolism  Potential Drug Interactions: aspirin, allopurinol and synthroid  Other Anticoagulants:    Home regimen (if applicable): 3.5 mg qhs    IInpatient Dosing History:     Date 2/10 2/11 2/12  2/13       INR 2.14 2.64 3.05 2.89        Coumadin dose 3.5 mg 3.5 mg  3.5 mg  3.5 mg                Based on above -  1. For today, Give warfarin (COUMADIN) 3.5 mg at 2100 tonight  2   PT/INR ordered daily while on warfarin  3. Pharmacy will continue to follow. We appreciate the opportunity to assist in the care of this patient.     Veronica Shepherd, PharmD  2/13/2023  8:41 AM

## 2023-02-13 NOTE — CDS QUERY
Heart Failure  CLINICAL DOCUMENTATION CLARIFICATION FORM  Dear Doctor:  Clinical information (provided below) includes a diagnosis of Heart Failure. For accurate ICD-10-CM code assignment to reflect severity of illness and risk of mortality,  PLEASE (X) ALL DIAGNOSES THAT APPLY. SELECTION BY PROVIDER ONLY  HFpEF      (  x) Chronic       (  ) Acute                     (  ) Acute on Chronic  (  ) Other (please specify):   (  ) Unable to Determine (please comment)       CLINICAL INFORMATION FROM THE MEDICAL RECORD    2/11 Cardio consult:  sees Dr. Lolita Scruggs for AoS, s/p TAVR, 1/2021, CAD, s/p KATHLEEN LCx, 3/2021, Persistent Atrial Fibrillation., HFpEF.    2/12 Echo:   Left ventricle: The cavity size was at the upper limits of normal. Wall thickness was normal. Systolic function was at the lower limits of normal. The estimated ejection fraction was 50-55%. No diagnostic evidence for regional wall motion abnormalities. Unable to assess LV diastolic function. Left atrium: The atrium was markedly dilated. The left atrial volume was markedly increased. Right ventricle: The cavity size was at the upper limits of normal. Systolic function was normal.  Right atrium: The atrium was mildly dilated. furosemide (Lasix) tab 20 mg  Dose: 20 mg  Freq: Daily Route: OR  Start: 02/12/23 0930  If you have any questions, please contact Clinical  Chantell Goldstein RN, [address]  at #[phone]; Cell: [phone]. Thank You!     THIS FORM IS A PERMANENT PART OF THE MEDICAL RECORD

## 2023-02-13 NOTE — PLAN OF CARE
A&Ox4  O2 sat WNL on RA  afib on tele. HR in the 60s-70s   Continent of bladder and bowel. Odon Jamsin in place d/t pain when ambulating and per pt request  Up x1 and walker. Pt has increased pain when ambulating. Scheduled tylenol given as ordered . Plan: NAIF? SW to see     Problem: Patient/Family Goals  Goal: Patient/Family Long Term Goal  Description: Patient's Long Term Goal: to stay out of the hospital     Interventions:  - follow up with pcp   - take medications as ordered     - See additional Care Plan goals for specific interventions  Outcome: Progressing  Goal: Patient/Family Short Term Goal  Description: Patient's Short Term Goal: to feel better    Interventions:   - monitor on tele  - daily labs   - medications     - See additional Care Plan goals for specific interventions  Outcome: Progressing     Problem: SAFETY ADULT - FALL  Goal: Free from fall injury  Description: INTERVENTIONS:  - Assess pt frequently for physical needs  - Identify cognitive and physical deficits and behaviors that affect risk of falls.   - [place] fall precautions as indicated by assessment.  - Educate pt/family on patient safety including physical limitations  - Instruct pt to call for assistance with activity based on assessment  - Modify environment to reduce risk of injury  - Provide assistive devices as appropriate  - Consider OT/PT consult to assist with strengthening/mobility  - Encourage toileting schedule  Outcome: Progressing     Problem: CARDIOVASCULAR - ADULT  Goal: Maintains optimal cardiac output and hemodynamic stability  Description: INTERVENTIONS:  - Monitor vital signs, rhythm, and trends  - Monitor for bleeding, hypotension and signs of decreased cardiac output  - Evaluate effectiveness of vasoactive medications to optimize hemodynamic stability  - Monitor arterial and/or venous puncture sites for bleeding and/or hematoma  - Assess quality of pulses, skin color and temperature  - Assess for signs of decreased coronary artery perfusion - ex.  Angina  - Evaluate fluid balance, assess for edema, trend weights  Outcome: Progressing  Goal: Absence of cardiac arrhythmias or at baseline  Description: INTERVENTIONS:  - Continuous cardiac monitoring, monitor vital signs, obtain 12 lead EKG if indicated  - Evaluate effectiveness of antiarrhythmic and heart rate control medications as ordered  - Initiate emergency measures for life threatening arrhythmias  - Monitor electrolytes and administer replacement therapy as ordered  Outcome: Progressing

## 2023-02-13 NOTE — CM/SW NOTE
SENDY met with pt at bedside to discuss discharge planning. The pt is alert and oriented x4. Chart reviewed, pt recently discharged from [facility] to [facility] for [address]. Pt just discharged from [facility] because she exhausted her Medicare days. Pt reported that she had her car at [facility] but with the rain, cold, and it being dark when pt was going to discharge home, she agreed to going to [employer] Pt stated that [facility] transported her to [place], and pt's car is still at [facility]. Pt is fully aware that she is out of Medicare/NAIF days and pt aware that pt could have the opportunity to privately pay at [address], but pt does not have the financial means to do so. Upon discussion, [address] called pt to screen for Medicaid. Pt reported that her monthly income from [employer] is about $1900 and she gets a small pension from her former employer (a [employer] company) of about $700. [address] informed pt that she is over the income to qualify for Medicaid. Pt stated that she was approved for 5 hours per week through [facility]. SW encouraged pt to call SS to see if she could get increased caregiving services. Pt open to doing so. Pt states that she gets all of her meals delivered to her house, does not drive to grocery stores anymore. Pt has 2 brothers that live out this way, states that they know she has been hospitalized, but does not want to live with them, nor ask for help from them as they have health needs of their own. Pt agreed to having HH through [facility] at discharge, but fully aware that she is unable to drive to go places. Pt reports that she plans on driving home after hospital discharge and needs her car from [facility]. SENDY provided pt with Nena from [address] [place] [address] information to have as an extra resource. SENDY discussed that pt has to stay out of a hospital and SNF for 60 consecutive days in order for NAIF benefits to reset. Pt aware. Emotional support provided. Explained that pt is ultimately going to have to return home. All questions addressed with pt at bedside. RN updated of above. SW will secure HH with Natalie [facility]AN[facility] today. ERROL Talley, [facility]  Discharge Planner  [id number]    Addendum: PT worked with pt today and pt ambulated 125 feet.

## 2023-02-14 VITALS
HEIGHT: 69 IN | TEMPERATURE: 98 F | WEIGHT: 202 LBS | HEART RATE: 61 BPM | RESPIRATION RATE: 19 BRPM | DIASTOLIC BLOOD PRESSURE: 62 MMHG | SYSTOLIC BLOOD PRESSURE: 111 MMHG | BODY MASS INDEX: 29.92 KG/M2 | OXYGEN SATURATION: 90 %

## 2023-02-14 LAB
INR BLD: 2.65 (ref 0.85–1.16)
PROTHROMBIN TIME: 27.9 SECONDS (ref 11.6–14.8)

## 2023-02-14 PROCEDURE — 85610 PROTHROMBIN TIME: CPT

## 2023-02-14 RX ORDER — WARFARIN SODIUM 7.5 MG/1
3.5 TABLET ORAL NIGHTLY
Qty: 7 TABLET | Refills: 0 | Status: SHIPPED | OUTPATIENT
Start: 2023-02-14

## 2023-02-14 NOTE — CM/SW NOTE
SENDY was notified by MD that pt cleared for discharge. Per MD, recommending not driving home. Pt lives in [address] [place] [address] DrJeffry KABA met with pt at bedside to discuss this further. Priced an Uber for about $87, called the [employer] for a quote of $166, and offered pt to go home by taxi. Pt refused all options. SENDY asked if family could come  pt and bring home. Pt refused this option. Pt reports \"this isn't the first time i've been hospitalized and driven home after. \" Support provided, explained that hospital wants pt to be safe after hospital discharge. SENDY inquired how pt would get her car at [facility], she stated that she was going to call the facility and see if they could come pick her up and bring her to her car. Again, discussed with pt the above opportunities and if someone could bring her her car later on. Pt again declined. Pt became easily frustrated by SENDY providing options for pt's discharge. SENDY updated Dr Kathy Bell of this. RN was also at bedside. SENDY notified Kelton ARANDA of pt's discharge for today. Will send AVS once available.      ERROL Kruse, [facility]  Discharge [facility].

## 2023-02-14 NOTE — PROGRESS NOTES
[facility] Dosing Service  Warfarin (Coumadin) Subsequent Dosing    Louis Gomez is a 69year old patient for whom pharmacy is dosing warfarin (Coumadin). Goal INR is 2.5-3.5(verified goal with cardiology)    Recent Labs   Lab 02/10/23  1542 02/11/23  1352 02/12/23  0626 02/13/23  0501 02/14/23  0542   INR 2.14* 2.64* 3.05* 2.89* 2.65*       Consulted by:  Dr Irene Guzmán  Indication:  h/o PE/PAF/s/p TAVR  Potential Drug Interactions:  aspirin, allopurinol and levothyroxine  Other Anticoagulants:  none  Home regimen (if applicable):  3.5 mg qhs    Inpatient Dosing History:  Date 2/10 2/11 2/12  2/13 5/14      INR 2.14 2.64 3.05 2.89  2.65      Coumadin dose 3.5 mg 3.5 mg  3.5 mg  3.5 mg           Based on above -  1. For today, Give warfarin (COUMADIN) 3.5 mg at 2100 tonight  2   PT/INR ordered daily while on warfarin  3. Pharmacy will continue to follow. We appreciate the opportunity to assist in the care of this patient.     Jeyson Coburn PharmD  2/14/2023  10:42 AM

## 2023-02-14 NOTE — PLAN OF CARE
Assumed care of patient at 1. Patient alert and oriented x4. Afib on telemetry, on coumadin. On room air, continuous pulse oximetry maintained. Scheduled Tylenol administered for generalized pain. Incontinent to urine, Purewick in place. Redness noted to sacrum, skin tear with intact steri-strips noted to R lower arm. Patient updated on plan of care. Call light within reach, bed alarm in place. Problem: Patient/Family Goals  Goal: Patient/Family Long Term Goal  Description: Patient's Long Term Goal: to stay out of the hospital     Interventions:  - follow up with pcp   - take medications as ordered     - See additional Care Plan goals for specific interventions  Outcome: Progressing  Goal: Patient/Family Short Term Goal  Description: Patient's Short Term Goal: to feel better    Interventions:   - monitor on tele  - daily labs   - medications     - See additional Care Plan goals for specific interventions  Outcome: Progressing     Problem: SAFETY ADULT - FALL  Goal: Free from fall injury  Description: INTERVENTIONS:  - Assess pt frequently for physical needs  - Identify cognitive and physical deficits and behaviors that affect risk of falls.   - [place] fall precautions as indicated by assessment.  - Educate pt/family on patient safety including physical limitations  - Instruct pt to call for assistance with activity based on assessment  - Modify environment to reduce risk of injury  - Provide assistive devices as appropriate  - Consider OT/PT consult to assist with strengthening/mobility  - Encourage toileting schedule  Outcome: Progressing     Problem: CARDIOVASCULAR - ADULT  Goal: Maintains optimal cardiac output and hemodynamic stability  Description: INTERVENTIONS:  - Monitor vital signs, rhythm, and trends  - Monitor for bleeding, hypotension and signs of decreased cardiac output  - Evaluate effectiveness of vasoactive medications to optimize hemodynamic stability  - Monitor arterial and/or venous puncture sites for bleeding and/or hematoma  - Assess quality of pulses, skin color and temperature  - Assess for signs of decreased coronary artery perfusion - ex.  Angina  - Evaluate fluid balance, assess for edema, trend weights  Outcome: Progressing  Goal: Absence of cardiac arrhythmias or at baseline  Description: INTERVENTIONS:  - Continuous cardiac monitoring, monitor vital signs, obtain 12 lead EKG if indicated  - Evaluate effectiveness of antiarrhythmic and heart rate control medications as ordered  - Initiate emergency measures for life threatening arrhythmias  - Monitor electrolytes and administer replacement therapy as ordered  Outcome: Progressing

## 2023-02-14 NOTE — CM/SW NOTE
SW met with pt at bedside to follow up on earlier discussion of transportation. Pt stated that she left a message for Cassidy at [facility]. Pt confirmed \"Felecia said that she would help me get back to [facility] for my car. \" Again, reiterated that it is not advised that pt drives. All options provided, including having an Rosa Maria Frisk ride be paid for by the unit to have her go home. Pt then stated that she has to get into a specific kind of car because of her shoulder needs. Pt again inquiring why pt is not safe to drive home, requested Hospitalist to call pt. Will follow up. SW also left message for Cassidy at [facility] and requested a call back. Informed pt that it is not likely that Cassidy may call back in a timely fashion.      ERROL Fitch, [facility]  Discharge [address] [facility].

## 2023-02-14 NOTE — DISCHARGE INSTRUCTIONS
[place] [address]  Phone: [phone]  Fax: [phone]    Sometimes managing your health at home requires assistance. The [place] team has recognized your preference to use [address] home health: [phone]. A representative from the home health agency will contact you or your family to schedule your first visit.

## 2023-02-14 NOTE — CM/SW NOTE
SENDY and CLIFFORD met with pt at bedside. Hospitalist spoke with pt - gave ok to drive. Pt walked the unit with RN, SENDY, and CLIFFORD. Was able to ambulate with no assist, was able to get in/out of wheelchair. Again encouraged [facility] home, but pt stated \"you are doing more harm to me without letting me drive home. I need my car. \" pt hasn't driven since rehab stay, still wanted to ensure that this plan was safe. Pt did agree it was a safe plan. SW also offered pt to get her medications filled at [facility] to beds, stated that she is requesting paper rx's and wants to use her good rx coupons and get them filled at [facility]. SENDY notified [facility] that pt is coming to [facility] to get her car. CLIFFORD spoke with Fanny Seat at [address]- confirmed that pt can discharge to the [facility] parking lot. [facility] set up for 4:00pm. PCS form completed. SENDY spoke with pt regarding the cost of transport. Informed that it will be about $50-60 and that it will be billed in the mail. Pt frustrated by the cost but was understanding. Hospitalist updated of the above.      ERROL Drew, [facility]  Discharge [address] [facility].

## 2023-02-14 NOTE — PLAN OF CARE
Pt awake,alert,and oriented. wants to go home today  No sob,on room air  Up in bedside commode minimal 1 assist  Ambulated with walker x1 minimal standby assist,able to transfer to walker standby assistance  C/o left shoulder pain,chronic given Tylenol orally 1300 mg bid with help  Pt on Coumadin,INR 2.65 therapeutic   Pt decline offered to use [facility] for transport  to go home. Will continue to monitor    Problem: SAFETY ADULT - FALL  Goal: Free from fall injury  Description: INTERVENTIONS:  - Assess pt frequently for physical needs  - Identify cognitive and physical deficits and behaviors that affect risk of falls. - [place] fall precautions as indicated by assessment.  - Educate pt/family on patient safety including physical limitations  - Instruct pt to call for assistance with activity based on assessment  - Modify environment to reduce risk of injury  - Provide assistive devices as appropriate  - Consider OT/PT consult to assist with strengthening/mobility  - Encourage toileting schedule  Outcome: Progressing     Problem: CARDIOVASCULAR - ADULT  Goal: Maintains optimal cardiac output and hemodynamic stability  Description: INTERVENTIONS:  - Monitor vital signs, rhythm, and trends  - Monitor for bleeding, hypotension and signs of decreased cardiac output  - Evaluate effectiveness of vasoactive medications to optimize hemodynamic stability  - Monitor arterial and/or venous puncture sites for bleeding and/or hematoma  - Assess quality of pulses, skin color and temperature  - Assess for signs of decreased coronary artery perfusion - ex.  Angina  - Evaluate fluid balance, assess for edema, trend weights  Outcome: Progressing  Goal: Absence of cardiac arrhythmias or at baseline  Description: INTERVENTIONS:  - Continuous cardiac monitoring, monitor vital signs, obtain 12 lead EKG if indicated  - Evaluate effectiveness of antiarrhythmic and heart rate control medications as ordered  - Initiate emergency measures for life threatening arrhythmias  - Monitor electrolytes and administer replacement therapy as ordered  Outcome: Progressing

## 2023-02-15 NOTE — PROGRESS NOTES
Pt to [facility] parking lot as scheduled via [employer]. Discharge instructions provided to pt; pt verbalizes understanding of instructions. Tele monitor off and returned. Iv dc'd prior to discharge home.

## 2023-03-28 ENCOUNTER — OFFICE VISIT (OUTPATIENT)
Dept: ORTHOPEDICS CLINIC | Facility: CLINIC | Age: 68
End: 2023-03-28
Payer: MEDICARE

## 2023-03-28 VITALS — BODY MASS INDEX: 29.92 KG/M2 | WEIGHT: 202 LBS | HEIGHT: 69 IN

## 2023-03-28 DIAGNOSIS — M75.41 IMPINGEMENT SYNDROME OF RIGHT SHOULDER: ICD-10-CM

## 2023-03-28 DIAGNOSIS — Z96.611 HISTORY OF ARTHROPLASTY OF RIGHT SHOULDER: ICD-10-CM

## 2023-03-28 DIAGNOSIS — M75.42 IMPINGEMENT SYNDROME OF LEFT SHOULDER REGION: Primary | ICD-10-CM

## 2023-03-28 DIAGNOSIS — M19.012 PRIMARY OSTEOARTHRITIS OF LEFT SHOULDER: ICD-10-CM

## 2023-03-28 RX ORDER — TRIAMCINOLONE ACETONIDE 40 MG/ML
40 INJECTION, SUSPENSION INTRA-ARTICULAR; INTRAMUSCULAR ONCE
Status: COMPLETED | OUTPATIENT
Start: 2023-03-28 | End: 2023-03-28

## 2023-03-28 RX ADMIN — TRIAMCINOLONE ACETONIDE 40 MG: 40 INJECTION, SUSPENSION INTRA-ARTICULAR; INTRAMUSCULAR at 15:20:00

## 2023-04-01 NOTE — PROGRESS NOTES
Patient is a 64-year-old white female here for follow-up on her shoulders. Patient has had a diagnosis of impingement and degenerative arthritis of the left shoulder as well as probable rotator cuff tear. She also has had a right total shoulder arthroplasty performed in the past but continues to have pain of the shoulder. Patient is here for consideration of injections of both shoulders. She has had these in the past which have helped her. Patient's exam shows a positive impingement sign of both shoulders. Some weakness of abduction of both shoulders. Range of motion shows about 110 degrees of forward elevation of the right shoulder. 140 degrees of the left shoulder. Impression is that of rotator cuff tears and impingement of the left shoulder. Second impression is that a stable arthroplasty of the right shoulder with impingement. Recommendations to go ahead with injections of both shoulders which were done under sterile technique through a lateral approach as. Patient tolerated the injections which were done with 4 cc of Xylocaine and Marcaine 40 mg of Kenalog. Patient was advised to follow-up with us as needed. I would like x-rays of her shoulders at her next visit 2 views of both shoulders at that time.

## 2023-04-28 ENCOUNTER — TELEPHONE (OUTPATIENT)
Dept: ORTHOPEDICS CLINIC | Facility: CLINIC | Age: 68
End: 2023-04-28

## 2023-04-28 DIAGNOSIS — Z01.89 ENCOUNTER FOR LOWER EXTREMITY COMPARISON IMAGING STUDY: ICD-10-CM

## 2023-04-28 DIAGNOSIS — R52 PAIN: Primary | ICD-10-CM

## 2023-04-28 NOTE — TELEPHONE ENCOUNTER
Patient is coming in for RT HIP . At this time patient has not had any imaging done. Please place X-ray order accordingly, I have notified the patient to  come in earlier prior to appointment to have imaging done. Please forward to Julio Jacob and help schedule xrays. Thank you.     Future Appointments   Date Time Provider Yanique Rosy   5/23/2023  1:00 PM Sue Norris MD [facility] [id number]

## 2023-05-23 ENCOUNTER — HOSPITAL ENCOUNTER (OUTPATIENT)
Dept: GENERAL RADIOLOGY | Age: 68
Discharge: HOME | End: 2023-05-23
Attending: ORTHOPAEDIC SURGERY
Payer: MEDICARE

## 2023-05-23 ENCOUNTER — OFFICE VISIT (OUTPATIENT)
Dept: ORTHOPEDICS CLINIC | Facility: CLINIC | Age: 68
End: 2023-05-23
Payer: MEDICARE

## 2023-05-23 ENCOUNTER — TELEPHONE (OUTPATIENT)
Dept: ORTHOPEDICS CLINIC | Facility: CLINIC | Age: 68
End: 2023-05-23

## 2023-05-23 DIAGNOSIS — R52 PAIN: ICD-10-CM

## 2023-05-23 DIAGNOSIS — Z96.643 H/O TOTAL HIP ARTHROPLASTY, BILATERAL: Primary | ICD-10-CM

## 2023-05-23 DIAGNOSIS — M19.012 PRIMARY OSTEOARTHRITIS OF LEFT SHOULDER: Primary | ICD-10-CM

## 2023-05-23 DIAGNOSIS — M70.61 TROCHANTERIC BURSITIS OF RIGHT HIP: ICD-10-CM

## 2023-05-23 DIAGNOSIS — M19.012 PRIMARY OSTEOARTHRITIS OF LEFT SHOULDER: ICD-10-CM

## 2023-05-23 PROCEDURE — 73502 X-RAY EXAM HIP UNI 2-3 VIEWS: CPT | Performed by: ORTHOPAEDIC SURGERY

## 2023-05-23 PROCEDURE — 20610 DRAIN/INJ JOINT/BURSA W/O US: CPT | Performed by: ORTHOPAEDIC SURGERY

## 2023-05-23 PROCEDURE — 99213 OFFICE O/P EST LOW 20 MIN: CPT | Performed by: ORTHOPAEDIC SURGERY

## 2023-05-23 RX ORDER — LEVOTHYROXINE SODIUM 0.15 MG/1
TABLET ORAL
COMMUNITY
Start: 2023-03-25

## 2023-05-23 RX ORDER — TRIAMCINOLONE ACETONIDE 40 MG/ML
40 INJECTION, SUSPENSION INTRA-ARTICULAR; INTRAMUSCULAR ONCE
Status: COMPLETED | OUTPATIENT
Start: 2023-05-23 | End: 2023-05-23

## 2023-05-23 RX ORDER — ROSUVASTATIN CALCIUM 5 MG/1
TABLET, COATED ORAL
COMMUNITY
Start: 2023-03-24

## 2023-05-23 RX ORDER — SPIRONOLACTONE 25 MG/1
TABLET ORAL
COMMUNITY
Start: 2023-03-25

## 2023-05-23 RX ORDER — ZINC GLUCONATE 50 MG
TABLET ORAL
COMMUNITY
Start: 2023-01-07

## 2023-05-23 RX ORDER — FLUOXETINE 10 MG/1
CAPSULE ORAL
COMMUNITY
Start: 2023-01-16

## 2023-05-23 RX ORDER — POTASSIUM CHLORIDE 750 MG/1
CAPSULE, EXTENDED RELEASE ORAL
COMMUNITY
Start: 2023-01-11

## 2023-05-23 RX ORDER — WARFARIN SODIUM 5 MG/1
TABLET ORAL
COMMUNITY
Start: 2023-05-09

## 2023-05-23 RX ORDER — FUROSEMIDE 40 MG/1
TABLET ORAL
COMMUNITY
Start: 2023-03-28

## 2023-05-23 RX ADMIN — TRIAMCINOLONE ACETONIDE 40 MG: 40 INJECTION, SUSPENSION INTRA-ARTICULAR; INTRAMUSCULAR at 13:25:00

## 2023-05-23 NOTE — PROGRESS NOTES
Patient is a 64-year-old white female here for follow-up on both her left shoulder and her right hip. Patient has had a diagnosis of degenerative arthritis of the left shoulder. Also rotator cuff tear. Additionally regarding the right hip she has had a diagnosis in the past with trochanteric bursitis. She has had bilateral total hip arthroplasties performed. Patient states that overall those are functioning fairly well but it is the lateral hip pain that she feels. Patient's exam shows that moderate tenderness over the trochanteric bursal region of the right hip. She does have a nonantalgic gait. Passive range of motion of her hips without pain. Exam of her left shoulder shows pain with passive range of motion. She also has weakness of abduction left shoulder. Moderate crepitation left shoulder as well. X-rays of her hips shows the prostheses to be in good position. No evidence of loosening. Previous x-ray of her left shoulder shows her to have subacromial space narrowing and degenerative changes of the glenohumeral joint. Impression is that of stable total hip arthroplasties bilaterally. Second impression is that of trochanteric bursitis of the right hip. Third impression is that of degenerative arthritis and rotator cuff tear left shoulder. Recommendations are to go ahead with a cortisone injection of the right hip which was done under sterile technique with 2 cc of Xylocaine and Marcaine and 40 mg of Kenalog into the bursa. She tolerated the injection well. Regarding the left shoulder the patient is going to need to consider a reverse total shoulder arthroplasty at some point in time. I can always go ahead with cortisone injections per her request.  Patient is thinking more along the lines of going ahead with a reverse total shoulder arthroplasty.   We will find a date to tentatively schedule her since she still has a few other considerations though they should not interfere with her scheduling the surgery.

## 2023-05-24 ENCOUNTER — TELEPHONE (OUTPATIENT)
Dept: ORTHOPEDICS CLINIC | Facility: CLINIC | Age: 68
End: 2023-05-24

## 2023-05-25 NOTE — TELEPHONE ENCOUNTER
SURGERY SCHEDULING SHEET    Nevaeh Delacruz  4/26/1955  [id number]    Procedure: Left  Reverse Shoulder Arthroplasty (62233)      Diagnosis:    Primary osteoarthritis of left shoulder M19.012      Anesthesia: General    Length of Surgery: 2 hrs    Disposition: Overnight for observation and pain control    Special Equipment: Elxus    Assist: Yes PABLO SIMMONS-JULIANA and 1 ANC    Pre-op Testing: PER ANESTHESIA GUIDELINES    Clearance: HISTORY AND PHYSICAL    Post op: 2 weeks post op    Cyn Vieira MD  [facility] Orthopedic Surgery  Phone: [phone]  Fax: [phone]

## 2023-06-27 ENCOUNTER — OFFICE VISIT (OUTPATIENT)
Dept: WOUND CARE | Facility: HOSPITAL | Age: 68
End: 2023-06-27
Attending: NURSE PRACTITIONER
Payer: MEDICARE

## 2023-06-27 VITALS
DIASTOLIC BLOOD PRESSURE: 78 MMHG | SYSTOLIC BLOOD PRESSURE: 120 MMHG | HEIGHT: 69 IN | BODY MASS INDEX: 32.44 KG/M2 | WEIGHT: 219 LBS | TEMPERATURE: 98 F | RESPIRATION RATE: 16 BRPM | HEART RATE: 72 BPM

## 2023-06-27 DIAGNOSIS — L97.812 NON-PRESSURE CHRONIC ULCER OF OTHER PART OF RIGHT LOWER LEG WITH FAT LAYER EXPOSED (HCC): ICD-10-CM

## 2023-06-27 DIAGNOSIS — I89.0 LYMPHEDEMA: ICD-10-CM

## 2023-06-27 DIAGNOSIS — I87.333 CHRONIC VENOUS HYPERTENSION WITH ULCER AND INFLAMMATION, BILATERAL (HCC): ICD-10-CM

## 2023-06-27 DIAGNOSIS — L97.922 NON-PRESSURE CHRONIC ULCER OF LEFT LOWER LEG WITH FAT LAYER EXPOSED (HCC): Primary | ICD-10-CM

## 2023-06-27 PROCEDURE — 99215 OFFICE O/P EST HI 40 MIN: CPT | Performed by: NURSE PRACTITIONER

## 2023-07-10 ENCOUNTER — PATIENT MESSAGE (OUTPATIENT)
Dept: ORTHOPEDICS CLINIC | Facility: CLINIC | Age: 68
End: 2023-07-10

## 2023-07-11 ENCOUNTER — OFFICE VISIT (OUTPATIENT)
Dept: WOUND CARE | Facility: HOSPITAL | Age: 68
End: 2023-07-11
Attending: NURSE PRACTITIONER
Payer: MEDICARE

## 2023-07-11 VITALS
TEMPERATURE: 98 F | SYSTOLIC BLOOD PRESSURE: 126 MMHG | RESPIRATION RATE: 16 BRPM | HEART RATE: 70 BPM | DIASTOLIC BLOOD PRESSURE: 76 MMHG

## 2023-07-11 DIAGNOSIS — E87.70 LOCALIZED EDEMA DUE TO FLUID OVERLOAD: ICD-10-CM

## 2023-07-11 DIAGNOSIS — L97.922 NON-PRESSURE CHRONIC ULCER OF LEFT LOWER LEG WITH FAT LAYER EXPOSED (HCC): Primary | ICD-10-CM

## 2023-07-11 DIAGNOSIS — I89.8 LYMPHORRHEA: ICD-10-CM

## 2023-07-11 DIAGNOSIS — I87.333 CHRONIC VENOUS HYPERTENSION WITH ULCER AND INFLAMMATION, BILATERAL (HCC): ICD-10-CM

## 2023-07-11 DIAGNOSIS — I89.0 LYMPHEDEMA: ICD-10-CM

## 2023-07-11 DIAGNOSIS — L97.812 NON-PRESSURE CHRONIC ULCER OF OTHER PART OF RIGHT LOWER LEG WITH FAT LAYER EXPOSED (HCC): ICD-10-CM

## 2023-07-11 PROCEDURE — 99214 OFFICE O/P EST MOD 30 MIN: CPT | Performed by: NURSE PRACTITIONER

## 2023-07-11 NOTE — PATIENT INSTRUCTIONS
Please return:  2 weeks-bring your velcro compression wraps    Please notify your cardiologist of your weight gain and edema    Remove the wraps we place today in a week or before      Patient discharge and wound care instructions  Keyanna Gordillo  7/11/2023      You may shower and cleanse area with mild soap and water, rinse wound with saline or wound cleanser, dab dry with gauze and apply your dressings as directed. Lower legs:  Changing your dressing:            daily/every other day  Wash your hands with soap and water. Remove old dressing, discard into plastic bag and place into trash. Cleanse the wound with Normal Saline or specified wound cleanser prior to applying a clean dressing using gauze sponges, not tissues or cotton balls. Pat dry using gauze sponges, not tissue or cotton balls. Bleeding is ok. APPLY Dressing:   silver alginate> superabsorbant    Compression:    30-40 calamine compression  Continue to use your pumps    Managing your edema:  Avoid prolonged standing in one place. It is better to have your calf muscles moving to pump fluid out of the legs      Elevate leg(s) above the level of the heart when sitting or as much as possible. Take you diuretics as directed by your physician. Do not skip doses or change doses unless instructed to do so by your physician. Decrease salt intake, follow recommended 2 grams daily.     Nutrition and blood sugar control:  Focus on the following:  Protein: Meats, beans, eggs, milk and yogurt particularly [place] yogurt), tofu, soy nuts, soy protein products (Follow the protein handout in your welcome folder)  Vitamin C: Citrus fruits and juices, strawberries, tomatoes, tomato juice, peppers, baked potatoes, spinach, broccoli, cauliflower, [place] sprouts, cabbage  Vitamin A: Dark green, leafy vegetables, orange or yellow vegetables, cantaloupe, fortified dairy products, liver, fortified cereals  Zinc: Fortified cereals, red meats, seafood  Consider supplementing with Alvino by [employer] OR DPP dipeptic power by [employer]. both products can be purchased on [employer] (These are essential branch chain amino acids that help with tissue building and wound healing). When your blood sugar is consistently elevated greater than 180 your body can't heal or fight infection. Concerns:  Signs of infection may include the following:  Increase in redness  Red \"streaks\" from wound  Increase in swelling  Fever  Unusual odor  Change in the amount of wound drainage     Should you experience any significant changes in your wound(s) or have any questions regarding your home care instructions please contact the wound center [facility] @ [phone] If after regular business hours, please call your family doctor or local emergency room. The treatment plan has been discussed at length between you and your provider. Follow all instructions carefully, it is very important. If you do not follow all instructions you are at risk of your wound not healing, infection, possible loss of limb and even loss of life.

## 2023-07-11 NOTE — PROGRESS NOTES
.Weekly Wound Education Note    Teaching Provided To: Patient  Training Topics: Dressing;Edema control;Cleasing and general instructions; Compression; Discharge instructions  Training Method: Explain/Verbal;Written  Training Response: Patient responds and understands        Notes: Wounds stable. Continue silver alginate, kerramax.  Compression wraps applied for first time calamine unna boot 30-40mmHg

## 2023-07-25 ENCOUNTER — APPOINTMENT (OUTPATIENT)
Dept: WOUND CARE | Facility: HOSPITAL | Age: 68
End: 2023-07-25
Attending: NURSE PRACTITIONER
Payer: MEDICARE

## 2023-07-25 DIAGNOSIS — L97.812 NON-PRESSURE CHRONIC ULCER OF OTHER PART OF RIGHT LOWER LEG WITH FAT LAYER EXPOSED (HCC): ICD-10-CM

## 2023-07-25 DIAGNOSIS — I89.0 LYMPHEDEMA: ICD-10-CM

## 2023-07-25 DIAGNOSIS — I89.8 LYMPHORRHEA: ICD-10-CM

## 2023-07-25 DIAGNOSIS — I87.333 CHRONIC VENOUS HYPERTENSION WITH ULCER AND INFLAMMATION, BILATERAL (HCC): ICD-10-CM

## 2023-07-25 DIAGNOSIS — L97.922 NON-PRESSURE CHRONIC ULCER OF LEFT LOWER LEG WITH FAT LAYER EXPOSED (HCC): Primary | ICD-10-CM

## 2023-07-25 DIAGNOSIS — E87.70 LOCALIZED EDEMA DUE TO FLUID OVERLOAD: ICD-10-CM

## 2023-07-28 ENCOUNTER — OFFICE VISIT (OUTPATIENT)
Dept: WOUND CARE | Facility: HOSPITAL | Age: 68
End: 2023-07-28
Attending: NURSE PRACTITIONER
Payer: MEDICARE

## 2023-07-28 VITALS
DIASTOLIC BLOOD PRESSURE: 82 MMHG | RESPIRATION RATE: 16 BRPM | TEMPERATURE: 98 F | HEART RATE: 90 BPM | SYSTOLIC BLOOD PRESSURE: 109 MMHG

## 2023-07-28 DIAGNOSIS — I87.333 CHRONIC VENOUS HYPERTENSION WITH ULCER AND INFLAMMATION, BILATERAL (HCC): ICD-10-CM

## 2023-07-28 DIAGNOSIS — L97.922 NON-PRESSURE CHRONIC ULCER OF LEFT LOWER LEG WITH FAT LAYER EXPOSED (HCC): Primary | ICD-10-CM

## 2023-07-28 DIAGNOSIS — I89.0 LYMPHEDEMA: ICD-10-CM

## 2023-07-28 DIAGNOSIS — L97.812 NON-PRESSURE CHRONIC ULCER OF OTHER PART OF RIGHT LOWER LEG WITH FAT LAYER EXPOSED (HCC): ICD-10-CM

## 2023-07-28 PROCEDURE — 99214 OFFICE O/P EST MOD 30 MIN: CPT | Performed by: NURSE PRACTITIONER

## 2023-07-28 RX ORDER — PREDNISONE 10 MG/1
10 TABLET ORAL DAILY
COMMUNITY
Start: 2023-07-26

## 2023-07-28 RX ORDER — AMOXICILLIN AND CLAVULANATE POTASSIUM 875; 125 MG/1; MG/1
1 TABLET, FILM COATED ORAL 2 TIMES DAILY
Qty: 10 TABLET | Refills: 0 | COMMUNITY
Start: 2023-07-26 | End: 2023-07-31

## 2023-07-28 NOTE — PROGRESS NOTES
Weekly Wound Education Note    Teaching Provided To: Patient  Training Topics: Edema control; Compression; Discharge instructions;Cleasing and general instructions  Training Method: Explain/Verbal;Written  Training Response: Patient responds and understands            All wounds are healed, patient encouraged to moisturize legs and feet with aquaphor daily. Patient will wear her compression socks and monitor her weights. Discharged from clinic.

## 2023-07-28 NOTE — PATIENT INSTRUCTIONS
Patient discharge and wound care instructions  Carmine Khan  7/28/2023     You may shower and cleanse area with mild soap and water. Moisturize and use triamcinolone as needed    Continue with spandagrip compression.     Weigh self daily    Return as needed to clinic

## 2023-08-08 ENCOUNTER — OFFICE VISIT (OUTPATIENT)
Dept: ORTHOPEDICS CLINIC | Facility: CLINIC | Age: 68
End: 2023-08-08
Payer: MEDICARE

## 2023-08-08 ENCOUNTER — HOSPITAL ENCOUNTER (OUTPATIENT)
Dept: GENERAL RADIOLOGY | Age: 68
Discharge: HOME | End: 2023-08-08
Attending: ORTHOPAEDIC SURGERY
Payer: MEDICARE

## 2023-08-08 VITALS — BODY MASS INDEX: 32.44 KG/M2 | HEIGHT: 69 IN | WEIGHT: 219 LBS

## 2023-08-08 DIAGNOSIS — R52 PAIN: ICD-10-CM

## 2023-08-08 DIAGNOSIS — M54.16 LUMBAR RADICULOPATHY: ICD-10-CM

## 2023-08-08 DIAGNOSIS — M54.16 LUMBAR RADICULOPATHY: Primary | ICD-10-CM

## 2023-08-08 PROCEDURE — 72110 X-RAY EXAM L-2 SPINE 4/>VWS: CPT | Performed by: ORTHOPAEDIC SURGERY

## 2023-08-08 PROCEDURE — 99213 OFFICE O/P EST LOW 20 MIN: CPT | Performed by: ORTHOPAEDIC SURGERY

## 2023-08-08 NOTE — PROGRESS NOTES
Patient is a 78-year-old white female here for evaluation of her right hip. Patient is describing sharp pains that radiate down the leg. She states that they are very substantial.  Not so much pain in the groin. Patient states however they can be debilitating when they do occur. Patient's exam shows her to have minimal pain associate with passive range of motion of her right hip. Patient's gait is minimally antalgic. X-rays of her lumbar spine shows her to have severe degenerative changes at multilevels. Impression is that of degenerative disc disease lumbar spine with radiculopathy of the right leg. Second impression is that of stable right total hip arthroplasty. And stable left total hip arthroplasty. Recommendations are for her to treat this with conservative means including over-the-counter medications for pain and avoiding any extended walking. I explained to the patient that her hips are stable and that this is not the likely source of her pain. Cortisone injections of her back might be a consideration. Patient does have allergies to narcotic medications. Patient will follow-up with me as needed at this time.

## 2023-09-13 ENCOUNTER — PATIENT MESSAGE (OUTPATIENT)
Dept: ORTHOPEDICS CLINIC | Facility: CLINIC | Age: 68
End: 2023-09-13

## 2023-09-13 DIAGNOSIS — M54.16 LUMBAR RADICULOPATHY: Primary | ICD-10-CM

## 2023-10-15 ENCOUNTER — TELEPHONE (OUTPATIENT)
Dept: CASE MANAGEMENT | Facility: HOSPITAL | Age: 68
End: 2023-10-15

## 2023-10-16 ENCOUNTER — TELEPHONE (OUTPATIENT)
Dept: ORTHOPEDICS CLINIC | Facility: CLINIC | Age: 68
End: 2023-10-16

## 2023-10-16 NOTE — CM/SW NOTE
Incoming Information  Referring Facility: [facility] in [place], [place]  Source Encounter  Admission Date: 10/15/23  Current Level of Care: pt is currently in er at [facility]  Transfer Information  Transfer Reason: Higher level of care  Bed Requested: unable to accept d/t no bed availability per Nurse Supervisor/ Lillian     Declination Information  Declination Reason: No bed available     Brandee Galvez from [facility] requesting for a transfer for higher level of care (no vasc service at [facility]). Pt came in w/ redness, pain, swelling to lt leg and toes w/ bluish discoloration.  EDCM spoke to the Nurse supervisor/Lillian and unfortunately we do not have any beds available. Brandee Galvez from [facility] made aware.

## 2023-10-16 NOTE — TELEPHONE ENCOUNTER
Patient scheduled with Dr. Lesley Wilson for lumbar spurs/sciatic nerve. Imaging in epic from August, former patient of Dr. Mathieu Valdez. Please advise if additional imaging needed.      Future Appointments   Date Time Provider Yanique Gallegos   10/19/2023  1:40 PM Nely Mak MD [facility]

## 2024-01-02 ENCOUNTER — HOSPITAL ENCOUNTER (INPATIENT)
Facility: HOSPITAL | Age: 69
LOS: 9 days | Discharge: HOME HEALTH | End: 2024-01-11
Attending: EMERGENCY MEDICINE | Admitting: INTERNAL MEDICINE
Payer: MEDICARE

## 2024-01-02 ENCOUNTER — APPOINTMENT (OUTPATIENT)
Dept: GENERAL RADIOLOGY | Facility: HOSPITAL | Age: 69
End: 2024-01-02
Attending: EMERGENCY MEDICINE
Payer: MEDICARE

## 2024-01-02 DIAGNOSIS — I50.9 ACUTE ON CHRONIC CONGESTIVE HEART FAILURE, UNSPECIFIED HEART FAILURE TYPE (HCC): ICD-10-CM

## 2024-01-02 DIAGNOSIS — I48.20 ATRIAL FIBRILLATION, CHRONIC (HCC): ICD-10-CM

## 2024-01-02 DIAGNOSIS — J18.9 COMMUNITY ACQUIRED PNEUMONIA, UNSPECIFIED LATERALITY: Primary | ICD-10-CM

## 2024-01-02 DIAGNOSIS — J98.01 ACUTE BRONCHOSPASM: ICD-10-CM

## 2024-01-02 LAB
ALBUMIN SERPL-MCNC: 3.1 G/DL (ref 3.4–5)
ALBUMIN/GLOB SERPL: 0.9 {RATIO} (ref 1–2)
ALP LIVER SERPL-CCNC: 106 U/L
ALT SERPL-CCNC: 36 U/L
ANION GAP SERPL CALC-SCNC: 6 MMOL/L (ref 0–18)
AST SERPL-CCNC: 29 U/L (ref 15–37)
BASOPHILS # BLD AUTO: 0.03 X10(3) UL (ref 0–0.2)
BASOPHILS NFR BLD AUTO: 0.3 %
BILIRUB SERPL-MCNC: 0.5 MG/DL (ref 0.1–2)
BUN BLD-MCNC: 22 MG/DL (ref 9–23)
CALCIUM BLD-MCNC: 9.2 MG/DL (ref 8.5–10.1)
CHLORIDE SERPL-SCNC: 111 MMOL/L (ref 98–112)
CO2 SERPL-SCNC: 25 MMOL/L (ref 21–32)
CREAT BLD-MCNC: 0.63 MG/DL
EGFRCR SERPLBLD CKD-EPI 2021: 95 ML/MIN/1.73M2 (ref 60–?)
EOSINOPHIL # BLD AUTO: 0.1 X10(3) UL (ref 0–0.7)
EOSINOPHIL NFR BLD AUTO: 1 %
ERYTHROCYTE [DISTWIDTH] IN BLOOD BY AUTOMATED COUNT: 13.3 %
FLUAV + FLUBV RNA SPEC NAA+PROBE: NEGATIVE
FLUAV + FLUBV RNA SPEC NAA+PROBE: NEGATIVE
GLOBULIN PLAS-MCNC: 3.6 G/DL (ref 2.8–4.4)
GLUCOSE BLD-MCNC: 134 MG/DL (ref 70–99)
HCT VFR BLD AUTO: 34.2 %
HGB BLD-MCNC: 11.4 G/DL
IMM GRANULOCYTES # BLD AUTO: 0.05 X10(3) UL (ref 0–1)
IMM GRANULOCYTES NFR BLD: 0.5 %
INR BLD: 2.24 (ref 0.8–1.2)
LYMPHOCYTES # BLD AUTO: 1.37 X10(3) UL (ref 1–4)
LYMPHOCYTES NFR BLD AUTO: 13.5 %
MCH RBC QN AUTO: 31.9 PG (ref 26–34)
MCHC RBC AUTO-ENTMCNC: 33.3 G/DL (ref 31–37)
MCV RBC AUTO: 95.8 FL
MONOCYTES # BLD AUTO: 0.99 X10(3) UL (ref 0.1–1)
MONOCYTES NFR BLD AUTO: 9.7 %
NEUTROPHILS # BLD AUTO: 7.62 X10 (3) UL (ref 1.5–7.7)
NEUTROPHILS # BLD AUTO: 7.62 X10(3) UL (ref 1.5–7.7)
NEUTROPHILS NFR BLD AUTO: 75 %
NT-PROBNP SERPL-MCNC: 2680 PG/ML (ref ?–125)
OSMOLALITY SERPL CALC.SUM OF ELEC: 299 MOSM/KG (ref 275–295)
PLATELET # BLD AUTO: 268 10(3)UL (ref 150–450)
POTASSIUM SERPL-SCNC: 4 MMOL/L (ref 3.5–5.1)
PROCALCITONIN SERPL-MCNC: <0.05 NG/ML (ref ?–0.16)
PROT SERPL-MCNC: 6.7 G/DL (ref 6.4–8.2)
PROTHROMBIN TIME: 25 SECONDS (ref 11.6–14.8)
Q-T INTERVAL: 338 MS
QRS DURATION: 96 MS
QTC CALCULATION (BEZET): 467 MS
R AXIS: 70 DEGREES
RBC # BLD AUTO: 3.57 X10(6)UL
RSV RNA SPEC NAA+PROBE: NEGATIVE
SARS-COV-2 RNA RESP QL NAA+PROBE: NOT DETECTED
SODIUM SERPL-SCNC: 142 MMOL/L (ref 136–145)
T AXIS: 8 DEGREES
TROPONIN I SERPL HS-MCNC: 26 NG/L
VENTRICULAR RATE: 115 BPM
WBC # BLD AUTO: 10.2 X10(3) UL (ref 4–11)

## 2024-01-02 PROCEDURE — 93005 ELECTROCARDIOGRAM TRACING: CPT

## 2024-01-02 PROCEDURE — 99285 EMERGENCY DEPT VISIT HI MDM: CPT

## 2024-01-02 PROCEDURE — 84145 PROCALCITONIN (PCT): CPT | Performed by: EMERGENCY MEDICINE

## 2024-01-02 PROCEDURE — 94660 CPAP INITIATION&MGMT: CPT

## 2024-01-02 PROCEDURE — 84484 ASSAY OF TROPONIN QUANT: CPT | Performed by: EMERGENCY MEDICINE

## 2024-01-02 PROCEDURE — 71045 X-RAY EXAM CHEST 1 VIEW: CPT | Performed by: EMERGENCY MEDICINE

## 2024-01-02 PROCEDURE — 93010 ELECTROCARDIOGRAM REPORT: CPT

## 2024-01-02 PROCEDURE — 85610 PROTHROMBIN TIME: CPT | Performed by: EMERGENCY MEDICINE

## 2024-01-02 PROCEDURE — 96365 THER/PROPH/DIAG IV INF INIT: CPT

## 2024-01-02 PROCEDURE — 85025 COMPLETE CBC W/AUTO DIFF WBC: CPT | Performed by: EMERGENCY MEDICINE

## 2024-01-02 PROCEDURE — 0241U SARS-COV-2/FLU A AND B/RSV BY PCR (GENEXPERT): CPT | Performed by: EMERGENCY MEDICINE

## 2024-01-02 PROCEDURE — 87040 BLOOD CULTURE FOR BACTERIA: CPT | Performed by: EMERGENCY MEDICINE

## 2024-01-02 PROCEDURE — 83880 ASSAY OF NATRIURETIC PEPTIDE: CPT | Performed by: EMERGENCY MEDICINE

## 2024-01-02 PROCEDURE — 80053 COMPREHEN METABOLIC PANEL: CPT | Performed by: EMERGENCY MEDICINE

## 2024-01-02 PROCEDURE — 94640 AIRWAY INHALATION TREATMENT: CPT

## 2024-01-02 PROCEDURE — 96375 TX/PRO/DX INJ NEW DRUG ADDON: CPT

## 2024-01-02 RX ORDER — CLOPIDOGREL BISULFATE 75 MG/1
75 TABLET ORAL DAILY
COMMUNITY

## 2024-01-02 RX ORDER — ACETAMINOPHEN 325 MG/1
650 TABLET ORAL EVERY 6 HOURS PRN
Status: DISCONTINUED | OUTPATIENT
Start: 2024-01-02 | End: 2024-01-11

## 2024-01-02 RX ORDER — LEVOTHYROXINE SODIUM 0.15 MG/1
150 TABLET ORAL
Status: DISCONTINUED | OUTPATIENT
Start: 2024-01-03 | End: 2024-01-11

## 2024-01-02 RX ORDER — METOPROLOL SUCCINATE 25 MG/1
25 TABLET, EXTENDED RELEASE ORAL
Status: DISCONTINUED | OUTPATIENT
Start: 2024-01-02 | End: 2024-01-03

## 2024-01-02 RX ORDER — FLUTICASONE FUROATE AND VILANTEROL 100; 25 UG/1; UG/1
1 POWDER RESPIRATORY (INHALATION) DAILY
Status: DISCONTINUED | OUTPATIENT
Start: 2024-01-02 | End: 2024-01-02

## 2024-01-02 RX ORDER — DILTIAZEM HYDROCHLORIDE 240 MG/1
240 CAPSULE, COATED, EXTENDED RELEASE ORAL DAILY
Status: DISCONTINUED | OUTPATIENT
Start: 2024-01-03 | End: 2024-01-11

## 2024-01-02 RX ORDER — FUROSEMIDE 10 MG/ML
40 INJECTION INTRAMUSCULAR; INTRAVENOUS ONCE
Status: COMPLETED | OUTPATIENT
Start: 2024-01-02 | End: 2024-01-02

## 2024-01-02 RX ORDER — BUMETANIDE 2 MG/1
2 TABLET ORAL DAILY
COMMUNITY

## 2024-01-02 RX ORDER — FLUOXETINE HYDROCHLORIDE 20 MG/1
20 CAPSULE ORAL 2 TIMES DAILY
Status: DISCONTINUED | OUTPATIENT
Start: 2024-01-02 | End: 2024-01-11

## 2024-01-02 RX ORDER — ALLOPURINOL 100 MG/1
100 TABLET ORAL DAILY
Status: DISCONTINUED | OUTPATIENT
Start: 2024-01-03 | End: 2024-01-11

## 2024-01-02 RX ORDER — IPRATROPIUM BROMIDE AND ALBUTEROL SULFATE 2.5; .5 MG/3ML; MG/3ML
3 SOLUTION RESPIRATORY (INHALATION) ONCE
Status: COMPLETED | OUTPATIENT
Start: 2024-01-02 | End: 2024-01-02

## 2024-01-02 RX ORDER — ROSUVASTATIN CALCIUM 5 MG/1
5 TABLET, COATED ORAL NIGHTLY
Status: DISCONTINUED | OUTPATIENT
Start: 2024-01-02 | End: 2024-01-11

## 2024-01-02 RX ORDER — WARFARIN SODIUM 5 MG/1
5 TABLET ORAL NIGHTLY
Status: DISCONTINUED | OUTPATIENT
Start: 2024-01-02 | End: 2024-01-04

## 2024-01-02 RX ORDER — PANTOPRAZOLE SODIUM 20 MG/1
20 TABLET, DELAYED RELEASE ORAL
Status: DISCONTINUED | OUTPATIENT
Start: 2024-01-03 | End: 2024-01-02

## 2024-01-02 RX ORDER — FUROSEMIDE 10 MG/ML
40 INJECTION INTRAMUSCULAR; INTRAVENOUS
Status: DISCONTINUED | OUTPATIENT
Start: 2024-01-03 | End: 2024-01-02

## 2024-01-02 RX ORDER — PREDNISONE 10 MG/1
10 TABLET ORAL DAILY
Status: DISCONTINUED | OUTPATIENT
Start: 2024-01-02 | End: 2024-01-02

## 2024-01-02 RX ORDER — METOPROLOL SUCCINATE 25 MG/1
25 TABLET, EXTENDED RELEASE ORAL 2 TIMES DAILY
COMMUNITY

## 2024-01-02 RX ORDER — ATORVASTATIN CALCIUM 40 MG/1
40 TABLET, FILM COATED ORAL NIGHTLY
Status: DISCONTINUED | OUTPATIENT
Start: 2024-01-02 | End: 2024-01-02

## 2024-01-02 RX ORDER — METOLAZONE 2.5 MG/1
2.5 TABLET ORAL DAILY
COMMUNITY
End: 2024-01-11

## 2024-01-02 RX ORDER — BUPROPION HYDROCHLORIDE 150 MG/1
150 TABLET, EXTENDED RELEASE ORAL 2 TIMES DAILY
Status: DISCONTINUED | OUTPATIENT
Start: 2024-01-02 | End: 2024-01-11

## 2024-01-02 RX ORDER — FLUOXETINE 10 MG/1
10 CAPSULE ORAL DAILY
Status: DISCONTINUED | OUTPATIENT
Start: 2024-01-03 | End: 2024-01-02

## 2024-01-02 RX ORDER — LEVOTHYROXINE SODIUM 175 UG/1
175 TABLET ORAL
COMMUNITY
End: 2024-01-11

## 2024-01-02 RX ORDER — METHYLPREDNISOLONE SODIUM SUCCINATE 125 MG/2ML
125 INJECTION, POWDER, LYOPHILIZED, FOR SOLUTION INTRAMUSCULAR; INTRAVENOUS ONCE
Status: COMPLETED | OUTPATIENT
Start: 2024-01-02 | End: 2024-01-02

## 2024-01-02 RX ORDER — BUMETANIDE 0.25 MG/ML
1 INJECTION INTRAMUSCULAR; INTRAVENOUS
Status: DISCONTINUED | OUTPATIENT
Start: 2024-01-03 | End: 2024-01-03

## 2024-01-02 NOTE — ED QUICK NOTES
Pt arrives to ED room with two forearm crutches. Pt needed assistance to get from wheelchair to bed.

## 2024-01-02 NOTE — H&P
General Medicine H&P     Chief Complaint   Patient presents with    Difficulty Breathing        PCP: Viktoriya Garcias DO    History of Present Illness: Patient is a 68 year old female with PMH including but not limited to asthma, COPD, Depression, HTN, HTN, PVD who p/t [facility] c SOB and cough.    Pt coming from home, reports having SOB and cough since 3d prior to Nahun rosas, 12/21. Has been progressing. No f/c. Usually takes bumex BID but hasn't been taking as should at times. Denies sick contacts. Taking PO ok.    Quit tob 1989 and uses albuterol rarely; quit etoh before that.     Follows c Dr. Walton and a pulmonologist Dr. Sharp from [facility] for pulm.       Past Medical History:   Diagnosis Date    Aortic stenosis     S/P TAVR    ASTHMA     Asthma     Back problem     CANCER     thyroid    Cancer (HCC)     thyroid     COPD (chronic obstructive pulmonary disease) (HCC)     DEPRESSION     Disorder of thyroid     Essential hypertension     Fibromyalgia     High blood pressure     High cholesterol     HYPERTENSION     HYPOTHYROIDISM     Muscle weakness     OBESITY     OSTEOARTHRITIS     Osteoarthritis     OTHER DISEASES     Fibromyalgia    OTHER DISEASES     Pulmonary emboli    OTHER DISEASES     Lymph edema    OTHER DISEASES     Pulmonary hypertension    Peripheral vascular disease (HCC)     Pulmonary embolism (HCC)     Shortness of breath     ON EXERTION    SLEEP APNEA     Sleep apnea     CPAP      Past Surgical History:   Procedure Laterality Date    ANESTH,SHOULDER REPLACEMENT Right 2014    hemiathroplasty    BACK SURGERY  2000    BACK SURGERY  2004    complete c-3 -7 ectomy    BIOPSY Left 07/20/2023    TEMPORAL ARTERY    CATH TRANSCATHETER AORTIC VALVE REPLACEMENT      COLONOSCOPY N/A 05/10/2018    Procedure: COLONOSCOPY, POSSIBLE BIOPSY, POSSIBLE POLYPECTOMY 01482;  Surgeon: Kendell Valentin MD;  [place] [facility] [place] [facility] [place]    COLONOSCOPY      D & C  09/2009    DILATION/CURETTAGE,DIAGNOSTIC      HIP  REPLACEMENT SURGERY  2009    Rt hip    KNEE REPLACEMENT SURGERY      Both knees    OTHER SURGICAL HISTORY      Thyroid removal    OTHER SURGICAL HISTORY      LT foot spur removal    TOTAL HIP REPLACEMENT      TOTAL KNEE REPLACEMENT          ALL:  Allergies   Allergen Reactions    Adhesive Tape HIVES     ALL ADHESIVES    Codeine HIVES    Doxycycline SWELLING    Hydrocodone UNKNOWN    I.V. Dye HIVES     Patient does not remember name of certain IV dye. Has had scans since then with no side effect.- kidney IVP dye    Lisinopril TONGUE SWELLING    Metoprolol HIVES    Morphine Sulfate HIVES    Opioid Analgesics UNKNOWN    Oxycontin ITCHING    Oxytocin HIVES    Radiology Contrast Iodinated Dyes UNKNOWN    Vicodin [Hydrocodone-Acetaminophen] ITCHING    Skin Adhesives OTHER (SEE COMMENTS)        cefTRIAXone, 2 g, Once  azithromycin, 500 mg, Once        Social History     Tobacco Use    Smoking status: Former     Packs/day: 0.50     Years: 30.00     Additional pack years: 0.00     Total pack years: 15.00     Types: Cigarettes     Quit date: 1991     Years since quittin.9    Smokeless tobacco: Never   Substance Use Topics    Alcohol use: No        Fam Hx  Family History   Problem Relation Age of Onset    Hypertension Father     Lipids Father     Diabetes Mother     Hypertension Mother     Lipids Mother     Cancer Brother     Hypertension Brother        Review of Systems  Comprehensive ROS reviewed and negative except for what's stated above. \    OBJECTIVE:  BP (!) 116/94   Pulse 89   Temp 98.8 °F (37.1 °C) (Temporal)   Resp 24   Ht 5' 9\" (1.753 m)   Wt 235 lb (106.6 kg)   LMP  (LMP Unknown)   SpO2 97%   BMI 34.70 kg/m²     General:  Alert, no distress, appears stated age.    Head:  Normocephalic, without obvious abnormality, atraumatic.   Eyes:  Sclera anicteric, EOMs intact.    Nose: Nares normal,  Mucosa normal    Throat: Lips normal   Neck: Supple, symmetrical, trachea midline   Lungs:    Mod WOB, faint wheezing, dec BS bases    Chest wall:  No tenderness or deformity   Heart:  Tachy, , S1, S2 normal, no murmur, rub or gallop appreciated   Abdomen:   Soft, NT/ND, Bowel sounds normal. No masses,  No organomegaly.    Extremities/MSK: Extremities normal/normal movement, atraumatic, no cyanosis  or edema. +venous stasis    Skin: Skin color, texture, turgor normal. No rashes or lesions.    Neurologic: Moving all extremities spontaneously, no focal deficit appreciated          LABS:   Lab Results   Component Value Date    WBC 10.2 01/02/2024    HGB 11.4 01/02/2024    HCT 34.2 01/02/2024    .0 01/02/2024    CREATSERUM 0.63 01/02/2024    BUN 22 01/02/2024     01/02/2024    K 4.0 01/02/2024     01/02/2024    CO2 25.0 01/02/2024     01/02/2024    CA 9.2 01/02/2024    ALB 3.1 01/02/2024    ALKPHO 106 01/02/2024    BILT 0.5 01/02/2024    TP 6.7 01/02/2024    AST 29 01/02/2024    ALT 36 01/02/2024    INR 2.24 01/02/2024    PTP 25.0 01/02/2024       Radiology: XR CHEST AP PORTABLE  (CPT=71045)    Result Date: 1/2/2024  PROCEDURE:  XR CHEST AP PORTABLE  (CPT=71045)  TECHNIQUE:  AP chest radiograph was obtained.  COMPARISON:  [facility] , XR, XR CHEST AP PORTABLE  (CPT=71045), 2/10/2023, 1:04 PM.  [facility] , XR, XR CHEST AP PORTABLE  (CPT=71045), 1/07/2023, 8:58 PM.  INDICATIONS:  sob  PATIENT STATED HISTORY: (As transcribed by Technologist)  Patient offered no additional history at this time.    FINDINGS:  Stable right shoulder arthroplasty.  Cardiomegaly.  No pleural effusions.  Mild interstitial opacities.  No pneumothorax.  Minimal left basilar opacity.            CONCLUSION:  1. Stable cardiomegaly with mild interstitial opacities suggestive of mild edema. 2. Minimal left basilar opacity representing atelectasis versus infiltrates..    LOCATION:  [facility]      Dictated by (CST): Vasireddy, Syam, MD on 1/02/2024 at 4:16 PM     Finalized by (CST): Chanel Gomez MD on 1/02/2024 at 4:17 PM             ASSESSMENT / PLAN:    68 year old female with PMH including but not limited to asthma, COPD, Depression, HTN, HTN, PVD who p/t [facility] ED c SOB and cough.    Acute respiratory distress  - likely multifactorial from PNA, volume overload  - supp o2 as needed   - coivd, flu, rsv neg     PNA  - CXR reviewed, pt c cough and sputum  - IV CTX/aizthro  - blood cxs  - check PCT, LA, urine strep/legionella, sputum cx  - see pulmonologist Dr. Sharp from [facility]? Will notify pulm here    Asthma/COPD  - trace wheezing noted, follow  - home inhalers, duonebs if needed    Acute on Chronic CHF  - tele  - daily wts, I/O  - given IV lasix in ED, takes bumex at home, may need IV bumex  - cards c/s  - follows c Dr. Walton, notify as needed  - BNP 2680    HTN  Tachycarida  - CCB    aortic stenosis s/p TAVR  - on coumadin, INR 2.24  - clarify dose and resume     # Hypothyroidism  -cont home levothyroxine     # Major Depression/ Generalized anxiety d/o   -reviewed home meds, continue wellbutrin, currently appears stable     # HL  -statin    # Gout  - allupurinol    On prednisone, clarify      Outpatient records or previous hospital records reviewed. [facility] hospitalist to continue to follow patient while in house. A total of 75 minutes taken.  Greater than 50% face to face encounter.  D/w Pablo Mathis DO Charles Yohannan, MD  [facility] Hospitalist  Pager: [phone]  1/2/2024  5:20 PM

## 2024-01-02 NOTE — PROGRESS NOTES
ED Antibiotic Dose Adjustment by Pharmacy    ceftriaxone (ROCEPHIN) 1 g x1 dose has been ordered.  Pharmacy has adjusted the dose to ceftriaxone (ROCEPHIN) 2 g x1 per hospital protocol.    Tommy Reddy, PharmD  Clinical Pharmacist Specialist- Emergency Medicine  [facility]  [phone]

## 2024-01-03 LAB
ADENOVIRUS PCR:: NOT DETECTED
ANION GAP SERPL CALC-SCNC: 7 MMOL/L (ref 0–18)
B PARAPERT DNA SPEC QL NAA+PROBE: NOT DETECTED
B PERT DNA SPEC QL NAA+PROBE: NOT DETECTED
BUN BLD-MCNC: 21 MG/DL (ref 9–23)
C PNEUM DNA SPEC QL NAA+PROBE: NOT DETECTED
CALCIUM BLD-MCNC: 9.3 MG/DL (ref 8.5–10.1)
CHLORIDE SERPL-SCNC: 110 MMOL/L (ref 98–112)
CO2 SERPL-SCNC: 24 MMOL/L (ref 21–32)
CORONAVIRUS 229E PCR:: NOT DETECTED
CORONAVIRUS HKU1 PCR:: NOT DETECTED
CORONAVIRUS NL63 PCR:: NOT DETECTED
CORONAVIRUS OC43 PCR:: NOT DETECTED
CREAT BLD-MCNC: 0.67 MG/DL
EGFRCR SERPLBLD CKD-EPI 2021: 94 ML/MIN/1.73M2 (ref 60–?)
FLUAV RNA SPEC QL NAA+PROBE: NOT DETECTED
FLUBV RNA SPEC QL NAA+PROBE: NOT DETECTED
GLUCOSE BLD-MCNC: 192 MG/DL (ref 70–99)
INR BLD: 1.95 (ref 0.8–1.2)
L PNEUMO AG UR QL: NEGATIVE
LACTATE SERPL-SCNC: 0.8 MMOL/L (ref 0.4–2)
METAPNEUMOVIRUS PCR:: NOT DETECTED
MYCOPLASMA PNEUMONIA PCR:: NOT DETECTED
OSMOLALITY SERPL CALC.SUM OF ELEC: 300 MOSM/KG (ref 275–295)
PARAINFLUENZA 1 PCR:: NOT DETECTED
PARAINFLUENZA 2 PCR:: NOT DETECTED
PARAINFLUENZA 3 PCR:: NOT DETECTED
PARAINFLUENZA 4 PCR:: NOT DETECTED
POTASSIUM SERPL-SCNC: 3.9 MMOL/L (ref 3.5–5.1)
PROCALCITONIN SERPL-MCNC: <0.05 NG/ML (ref ?–0.16)
PROTHROMBIN TIME: 22.4 SECONDS (ref 11.6–14.8)
RHINOVIRUS/ENTERO PCR:: NOT DETECTED
RSV RNA SPEC QL NAA+PROBE: NOT DETECTED
SARS-COV-2 RNA NPH QL NAA+NON-PROBE: NOT DETECTED
SODIUM SERPL-SCNC: 141 MMOL/L (ref 136–145)
STREP PNEUMO ANTIGEN, URINE: NEGATIVE

## 2024-01-03 PROCEDURE — 97116 GAIT TRAINING THERAPY: CPT

## 2024-01-03 PROCEDURE — 94799 UNLISTED PULMONARY SVC/PX: CPT

## 2024-01-03 PROCEDURE — 85610 PROTHROMBIN TIME: CPT | Performed by: INTERNAL MEDICINE

## 2024-01-03 PROCEDURE — 87205 SMEAR GRAM STAIN: CPT | Performed by: INTERNAL MEDICINE

## 2024-01-03 PROCEDURE — 87449 NOS EACH ORGANISM AG IA: CPT | Performed by: INTERNAL MEDICINE

## 2024-01-03 PROCEDURE — 80048 BASIC METABOLIC PNL TOTAL CA: CPT | Performed by: INTERNAL MEDICINE

## 2024-01-03 PROCEDURE — S0171 BUMETANIDE 0.5 MG: HCPCS | Performed by: INTERNAL MEDICINE

## 2024-01-03 PROCEDURE — 87070 CULTURE OTHR SPECIMN AEROBIC: CPT | Performed by: INTERNAL MEDICINE

## 2024-01-03 PROCEDURE — 97165 OT EVAL LOW COMPLEX 30 MIN: CPT

## 2024-01-03 PROCEDURE — 84145 PROCALCITONIN (PCT): CPT | Performed by: INTERNAL MEDICINE

## 2024-01-03 PROCEDURE — 97161 PT EVAL LOW COMPLEX 20 MIN: CPT

## 2024-01-03 PROCEDURE — 97530 THERAPEUTIC ACTIVITIES: CPT

## 2024-01-03 PROCEDURE — 0202U NFCT DS 22 TRGT SARS-COV-2: CPT | Performed by: INTERNAL MEDICINE

## 2024-01-03 PROCEDURE — 83605 ASSAY OF LACTIC ACID: CPT | Performed by: INTERNAL MEDICINE

## 2024-01-03 RX ORDER — SPIRONOLACTONE 25 MG/1
25 TABLET ORAL DAILY
Status: DISCONTINUED | OUTPATIENT
Start: 2024-01-04 | End: 2024-01-11

## 2024-01-03 RX ORDER — BENZONATATE 100 MG/1
100 CAPSULE ORAL 3 TIMES DAILY PRN
Status: DISCONTINUED | OUTPATIENT
Start: 2024-01-03 | End: 2024-01-11

## 2024-01-03 RX ORDER — BUMETANIDE 0.25 MG/ML
2 INJECTION INTRAMUSCULAR; INTRAVENOUS
Status: DISCONTINUED | OUTPATIENT
Start: 2024-01-03 | End: 2024-01-08

## 2024-01-03 RX ORDER — DIGOXIN 125 MCG
125 TABLET ORAL DAILY
Status: DISCONTINUED | OUTPATIENT
Start: 2024-01-03 | End: 2024-01-11

## 2024-01-03 RX ORDER — IPRATROPIUM BROMIDE AND ALBUTEROL SULFATE 2.5; .5 MG/3ML; MG/3ML
3 SOLUTION RESPIRATORY (INHALATION) EVERY 6 HOURS PRN
Status: DISCONTINUED | OUTPATIENT
Start: 2024-01-03 | End: 2024-01-11

## 2024-01-03 RX ORDER — AZITHROMYCIN 250 MG/1
500 TABLET, FILM COATED ORAL
Status: COMPLETED | OUTPATIENT
Start: 2024-01-03 | End: 2024-01-04

## 2024-01-03 RX ORDER — BUMETANIDE 0.25 MG/ML
1 INJECTION INTRAMUSCULAR; INTRAVENOUS ONCE
Status: DISCONTINUED | OUTPATIENT
Start: 2024-01-03 | End: 2024-01-11

## 2024-01-03 RX ORDER — METOPROLOL SUCCINATE 25 MG/1
25 TABLET, EXTENDED RELEASE ORAL
Status: DISCONTINUED | OUTPATIENT
Start: 2024-01-04 | End: 2024-01-11

## 2024-01-03 RX ORDER — BUMETANIDE 0.25 MG/ML
2 INJECTION INTRAMUSCULAR; INTRAVENOUS
Status: CANCELLED | OUTPATIENT
Start: 2024-01-03

## 2024-01-03 RX ORDER — BUMETANIDE 0.25 MG/ML
1 INJECTION INTRAMUSCULAR; INTRAVENOUS ONCE
Status: DISCONTINUED | OUTPATIENT
Start: 2024-01-03 | End: 2024-01-03

## 2024-01-03 RX ORDER — BUMETANIDE 0.25 MG/ML
2 INJECTION INTRAMUSCULAR; INTRAVENOUS
Status: DISCONTINUED | OUTPATIENT
Start: 2024-01-04 | End: 2024-01-03

## 2024-01-03 NOTE — PROGRESS NOTES
[facility] Hospitalist Progress Note     PCP: Viktoriya Garcias DO    Chief Complaint: follow-up   Follow up for: The primary encounter diagnosis was Community acquired pneumonia, unspecified laterality. Diagnoses of Acute on chronic congestive heart failure, unspecified heart failure type (HCC), Acute bronchospasm, and Atrial fibrillation, chronic (HCC) were also pertinent to this visit.    Overnight/Interim Events:      SUBJECTIVE:  Sat tested neg for viral stuff as well. Less mucous, still green/slimy but less so. Walked c PT. On RA    OBJECTIVE:  Temp:  [97.3 °F (36.3 °C)-98.1 °F (36.7 °C)] 97.4 °F (36.3 °C)  Pulse:  [] 132  Resp:  [19-24] 19  BP: (104-159)/() 120/97  SpO2:  [91 %-98 %] 93 %    Intake/Output:    Intake/Output Summary (Last 24 hours) at 1/3/2024 1255  Last data filed at 1/3/2024 1130  Gross per 24 hour   Intake 120 ml   Output 2700 ml   Net -2580 ml       Last 3 Weights   01/02/24 1816 235 lb 0.2 oz (106.6 kg)   01/02/24 1238 235 lb (106.6 kg)   08/08/23 1335 219 lb (99.3 kg)   06/27/23 1100 219 lb (99.3 kg)       Exam    General: Alert, no distress, appears stated age.     Head:  Normocephalic, without obvious abnormality, atraumatic.   Eyes:  Sclera anicteric, EOMs intact.    Nose: Nares normal,  Mucosa normal    Throat: Lips normal   Neck: Supple, symmetrical, trachea midline   Lungs:   Clear to auscultation bilaterally. Normal effort   Chest wall:  No tenderness or deformity   Heart:  Regular rate and rhythm, S1, S2 normal, no murmur, rub or gallop appreciated   Abdomen:   Soft, NT/ND, Bowel sounds normal. No masses,  No organomegaly.    Extremities: Extremities normal, atraumatic, no cyanosis or LE edema.   Skin: Skin color, texture, turgor normal. No rashes or lesions.    Neurologic: Moving all extremities spontaneously, no focal deficit appreciated      Data Review:       Labs:     Recent Labs   Lab 01/02/24  1454 01/03/24  0834   WBC 10.2  --    HGB 11.4*  --    MCV 95.8  --    PLT  268.0  --    INR 2.24* 1.95*       Recent Labs   Lab 01/02/24  1454 01/03/24  0544    141   K 4.0 3.9    110   CO2 25.0 24.0   BUN 22 21   CREATSERUM 0.63 0.67   CA 9.2 9.3   * 192*       Recent Labs   Lab 01/02/24  1454   ALT 36   AST 29   ALB 3.1*       No results for input(s): \"PGLU\" in the last 168 hours.    No results for input(s): \"TROP\" in the last 168 hours.      Meds:      azithromycin  500 mg Oral Daily    allopurinol  100 mg Oral Daily    buPROPion SR  150 mg Oral BID    FLUoxetine  20 mg Oral BID    dilTIAZem ER  240 mg Oral Daily    levothyroxine  150 mcg Oral Daily @ 0700    rosuvastatin  5 mg Oral Nightly    warfarin  5 mg Oral Nightly    cefTRIAXone  2 g Intravenous Q24H    metoprolol succinate  25 mg Oral 2x Daily(Beta Blocker)    bumetanide  1 mg Intravenous BID (Diuretic)       benzonatate, ipratropium-albuterol, acetaminophen       Assessment/Plan:     68 year old female with PMH including but not limited to asthma, COPD, Depression, HTN, HTN, PVD who p/t [facility] ED c SOB and cough.     Acute respiratory distress  - likely multifactorial from PNA, volume overload  - supp o2 as needed, now on RA   - coivd, flu, rsv neg; expaneded panel neg       PNA  - CXR reviewed, pt c cough and sputum  - IV CTX/aizthro  - blood cxs P  - PCT & LA neg, urine strep/legionella neg, sputum cx  - sees pulmonologist Dr. Sharp from [facility]  - apprec pulm recs     Asthma/COPD  - trace wheezing noted, follow  - home inhalers, duonebs if needed  - was given methylprednisolone x1     Acute on Chronic CHF  - tele  - daily wts, I/O  - given IV lasix in ED, takes bumex at home, switched to IV bumex 1mg BID   - cards c/s apprec   - follows c Dr. Walton, notify as needed; Cr ok currently, pt states has been working on it (renal fxn)  - BNP 2680     HTN  Tachycarida  - CCB     aortic stenosis s/p TAVR  Hx PE, IVCF  - on coumadin, INR 2.24 to 1.95  - clarified home dose and resumed  - pt reports bruising easily      #  Hypothyroidism  -cont home levothyroxine      # Major Depression/ Generalized anxiety d/o   -reviewed home meds, continue wellbutrin, currently appears stable      # HL  -statin     # Gout  - allupurinol    # SHELDON  -protocol, on cpap     Was on prednisone ~2m ago, now complete, placed by neurologist for elevated ESR/CRP per pt      Dispo: CTU  - lives by self     Questions/concerns were discussed with patient by bedside. D/w RN     Total Time spent with patient and coordinating care:  55 minutes    Omi Blackman MD  [facility] Hospitalist  [phone]  1/3/2024  12:55 PM

## 2024-01-03 NOTE — OCCUPATIONAL THERAPY NOTE
OCCUPATIONAL THERAPY EVALUATION - INPATIENT    Room Number: 2602/2602-A  Evaluation Date: 1/3/2024     Type of Evaluation: Initial  Presenting Problem: CAP    Physician Order: IP Consult to Occupational Therapy  Reason for Therapy:  ADL/IADL Dysfunction and Discharge Planning    History: Patient is a 68 year old female admitted on 1/2/2024 with Presenting Problem: CAP.  Co-Morbidities : gout, chronic Afib, aortic stenosis s/p TAVR 2019, COPD, HTN, HLD, obesity, fibromyalgia, hx PE, IVC filter, pulmonary HTN, chronic diastolic HF, SHELDON, acute LLE ichemia s/p angiojet and POBA and aborted L pop cutdown at [facility] 10/2023    ASSESSMENT   Patient met all OT goals at or close to baseline level. Patient reports no further questions/concerns at this time.     The AM-PAC ' '6-Clicks' Inpatient Daily Activity Short Form was completed and this patient is demonstrating an Approx Degree of Impairment: 0% in activities of daily living. Research supports that patients with this level of impairment often benefit from discharge home.       OT Discharge Recommendations: Home  OT Device Recommendations: None    WEIGHT BEARING RESTRICTION  Weight Bearing Restriction: None                Recommendations for nursing staff:   Transfers: MOD I with RW  Toileting location: Toilet    EVALUATION SESSION:  Patient at start of session: semi-supine in bed  FUNCTIONAL TRANSFER ASSESSMENT  Sit to Stand: Edge of Bed  Edge of Bed: Modified Independent    BED MOBILITY  Supine to Sit : Modified Independent  Sit to Supine (OT): Modified Independent  Scooting: MOD I    BALANCE ASSESSMENT  Static Sitting: Independent  Sitting Unilateral: Modified Independent  Static Standing: Modified Independent (with RW and loftstrand crutches)  Standing Unilateral: Modified Independent    FUNCTIONAL ADL ASSESSMENT  LB Dressing Seated: Modified Independent      ACTIVITY TOLERANCE: WFL                         O2 SATURATIONS       COGNITION  Overall Cognitive Status:   WFL - within functional limits  COGNITION ASSESSMENTS       Upper Extremity:   ROM: within functional limits, except L UE baseline rotator cuff injury = limited shoulder flexion   Strength: is within functional limits   Coordination:  Gross motor: WFL  Fine motor: WFL  Sensation: Light touch:  intact    EDUCATION PROVIDED  Patient : Role of Occupational Therapy; Plan of Care; Discharge Recommendations; Functional Transfer Techniques  Patient's Response to Education: Verbalized Understanding; Returned Demonstration    Equipment used: RW and pt's own loftstrand crutches  Demonstrates functional use    Therapist comments:  Pt is pleasant and cooperative throughout session.     Patient End of Session: In bed;Needs met;Call light within reach;RN aware of session/findings;All patient questions and concerns addressed;Alarm set    OCCUPATIONAL PROFILE    HOME SITUATION  Type of Home: House  Home Layout: One level;Ramped entrance  Lives With: Alone    Toilet and Equipment: Standard height toilet;Toilet riser;Other (Comment) (safety frame)  Shower/Tub and Equipment: Walk-in shower;Shower chair;Grab bar  Other Equipment:  (rollator, lofstrand crutches, RW)          Drives: Yes  Patient Regularly Uses: None    Prior Level of Function: Pt is MOD I with BADLs, IADLs, driving.      SUBJECTIVE  \"You will probably feel better if I use the RW.\"    PAIN ASSESSMENT  Ratin  Location: denies       OBJECTIVE  Precautions: None  Fall Risk: Standard fall risk    WEIGHT BEARING RESTRICTION  Weight Bearing Restriction: None                AM-PAC ‘6-Clicks’ Inpatient Daily Activity Short Form  -   Putting on and taking off regular lower body clothing?: None  -   Bathing (including washing, rinsing, drying)?: None  -   Toileting, which includes using toilet, bedpan or urinal? : None  -   Putting on and taking off regular upper body clothing?: None  -   Taking care of personal grooming such as brushing teeth?: None  -   Eating meals?:  None    AM-PAC Score:  Score: 24  Approx Degree of Impairment: 0%  Standardized Score (AM-PAC Scale): 57.54      ADDITIONAL TESTS     NEUROLOGICAL FINDINGS        PLAN   Patient has been evaluated and presents with no skilled Occupational Therapy needs at this time.  Patient discharged from Occupational Therapy services.  Please re-order if a new functional limitation presents during this admission.      Patient Evaluation Complexity Level:   Occupational Profile/Medical History LOW - Brief history including review of medical or therapy records    Specific performance deficits impacting engagement in ADL/IADL LOW  1 - 3 performance deficits    Client Assessment/Performance Deficits MODERATE - Comorbidities and min to mod modifications of tasks    Clinical Decision Making LOW - Analysis of occupational profile, problem-focused assessments, limited treatment options    Overall Complexity LOW     OT Session Time: 14 minutes  Therapeutic Activity: 11 minutes

## 2024-01-03 NOTE — PROGRESS NOTES
Received pt at 1800. Admitted via cart. AxOx4. Room air. Denies pain/SOB. Vitals stable. Afib w controlled rates on tele. Admission navigator complete. PTA meds updated. Pt updated on POC. Call light within reach. All needs met at this time.

## 2024-01-03 NOTE — CONSULTS
Pulmonary Consult     Assessment / Plan:  Acute respiratory failure - due to pulmonary edema +/- PNA  - wean supplemental O2 as able  HFpEF exacerbation  - Bumex  - per cardiology  Possible PNA - PCT negative  - empiric ceftriaxone and azithromycin (1/2- )  - RPP  - follow-up cultures  Asthma-COPD - no wheezing on exam  - BD protocol  - follows with Dr. Hernandez at [facility]  SHELDON  - CPAP  Dispo  - inpatient    Crow Mckeon MD  Pulmonary and Critical Care Medicine      History of Present Illness:   Ms. Weiss is a 68 year old with history of asthma-COPD and SHELDON on CPAP who we are asked to see for dyspnea. Noted to have progressive dyspnea and productive cough for the last 1-2 weeks. No fever, chills, chest pain, wheezing. She stopped taking her diuretic because she could not tolerate the frequent urination. She notes increased LE edema.     12 point ROS negative except per HPI.    Past Medical History:   Diagnosis Date    Aortic stenosis     S/P TAVR    ASTHMA     Asthma     Back problem     CANCER     thyroid    Cancer (HCC)     thyroid     COPD (chronic obstructive pulmonary disease) (HCC)     DEPRESSION     Disorder of thyroid     Essential hypertension     Fibromyalgia     High blood pressure     High cholesterol     HYPERTENSION     HYPOTHYROIDISM     Muscle weakness     OBESITY     OSTEOARTHRITIS     Osteoarthritis     OTHER DISEASES     Fibromyalgia    OTHER DISEASES     Pulmonary emboli    OTHER DISEASES     Lymph edema    OTHER DISEASES     Pulmonary hypertension    Peripheral vascular disease (HCC)     Pulmonary embolism (HCC)     Shortness of breath     ON EXERTION    SLEEP APNEA     Sleep apnea     CPAP       Past Surgical History:   Procedure Laterality Date    ANESTH,SHOULDER REPLACEMENT Right 2014    hemiathroplasty    BACK SURGERY  2000    BACK SURGERY  2004    complete c-3 -7 ectomy    BIOPSY Left 07/20/2023    TEMPORAL ARTERY    CATH TRANSCATHETER AORTIC VALVE REPLACEMENT      COLONOSCOPY N/A 05/10/2018     Procedure: COLONOSCOPY, POSSIBLE BIOPSY, POSSIBLE POLYPECTOMY 85793;  Surgeon: Kendell Valentin MD;  Location: [facility]    COLONOSCOPY      D & C  09/2009    DILATION/CURETTAGE,DIAGNOSTIC      HIP REPLACEMENT SURGERY  09/2009    Rt hip    KNEE REPLACEMENT SURGERY  2003    Both knees    OTHER SURGICAL HISTORY  1989    Thyroid removal    OTHER SURGICAL HISTORY  2004    LT foot spur removal    TOTAL HIP REPLACEMENT      TOTAL KNEE REPLACEMENT         Medications Prior to Admission   Medication Sig Dispense Refill Last Dose    levothyroxine 175 MCG Oral Tab Take 1 tablet (175 mcg total) by mouth before breakfast.   1/2/2024    bumetanide 2 MG Oral Tab Take 1 tablet (2 mg total) by mouth 2 (two) times daily.   Past Week    clopidogrel 75 MG Oral Tab Take 1 tablet (75 mg total) by mouth daily.   1/2/2024    metoprolol succinate ER 25 MG Oral Tablet 24 Hr Take 1 tablet (25 mg total) by mouth 2 (two) times daily.   1/2/2024    Potassium Chloride ER 10 MEQ Oral Cap CR    1/2/2024    rosuvastatin 5 MG Oral Tab Take 1 tablet (5 mg total) by mouth every other day.   Past Week    spironolactone 25 MG Oral Tab    1/2/2024    Zinc Gluconate 50 MG Oral Tab    1/2/2024    warfarin 5 MG Oral Tab    1/2/2024    ferrous sulfate 325 (65 FE) MG Oral Tab EC Take 1 tablet (325 mg total) by mouth daily with breakfast.   1/2/2024    dilTIAZem  MG Oral Capsule SR 24 Hr Take 1 capsule (240 mg total) by mouth daily. 90 capsule 3 1/2/2024    Vitamin C 500 MG Oral Tab Take 1 tablet (500 mg total) by mouth in the morning and 1 tablet (500 mg total) before bedtime.   Past Week    Zinc 50 MG Oral Cap Take 50 mg by mouth daily.   1/2/2024    Cholecalciferol 125 MCG (5000 UT) Oral Tab Take 1 tablet (5,000 Units total) by mouth daily.   1/2/2024    buPROPion  MG Oral Tablet 12 Hr Take 1 tablet (150 mg total) by mouth 2 (two) times daily.   1/2/2024    cyproheptadine 4 MG Oral Tab Take 1 tablet (4 mg total) by mouth  daily.   1/1/2024    FLUoxetine 20 MG Oral Cap Take 1 capsule (20 mg total) by mouth 2 (two) times daily.   1/2/2024    digoxin 0.125 MG Oral Tab Take 1 tablet (125 mcg total) by mouth daily. 30 tablet 0 1/2/2024    allopurinol 100 MG Oral Tab Take 1 tablet (100 mg total) by mouth daily. 90 tablet 3 1/2/2024    Nortriptyline HCl 25 MG Oral Cap Take 1 capsule (25 mg total) by mouth nightly.   1/1/2024    Multiple Vitamin (MULTIVITAMIN OR) Take 1 tablet by mouth daily.     1/2/2024    metOLazone 2.5 MG Oral Tab Take 1 tablet (2.5 mg total) by mouth daily.   More than a month    calcium carbonate 500 MG Oral Chew Tab Chew 1 tablet (500 mg total) by mouth every 8 (eight) hours as needed (upset stomach).   Unknown    acetaminophen 325 MG Oral Tab Take 2 tablets (650 mg total) by mouth every 8 (eight) hours as needed for Pain.   Unknown    triamcinolone 0.1 % External Ointment Apply topically 2 (two) times daily.   Unknown     Outpatient Medications Marked as Taking for the 1/2/24 encounter (Hospital Encounter)   Medication Sig Dispense Refill    levothyroxine 175 MCG Oral Tab Take 1 tablet (175 mcg total) by mouth before breakfast.      bumetanide 2 MG Oral Tab Take 1 tablet (2 mg total) by mouth 2 (two) times daily.      clopidogrel 75 MG Oral Tab Take 1 tablet (75 mg total) by mouth daily.      metoprolol succinate ER 25 MG Oral Tablet 24 Hr Take 1 tablet (25 mg total) by mouth 2 (two) times daily.      Potassium Chloride ER 10 MEQ Oral Cap CR       rosuvastatin 5 MG Oral Tab Take 1 tablet (5 mg total) by mouth every other day.      spironolactone 25 MG Oral Tab       Zinc Gluconate 50 MG Oral Tab       warfarin 5 MG Oral Tab       ferrous sulfate 325 (65 FE) MG Oral Tab EC Take 1 tablet (325 mg total) by mouth daily with breakfast.      dilTIAZem  MG Oral Capsule SR 24 Hr Take 1 capsule (240 mg total) by mouth daily. 90 capsule 3    Vitamin C 500 MG Oral Tab Take 1 tablet (500 mg total) by mouth in the morning  and 1 tablet (500 mg total) before bedtime.      Zinc 50 MG Oral Cap Take 50 mg by mouth daily.      Cholecalciferol 125 MCG (5000 UT) Oral Tab Take 1 tablet (5,000 Units total) by mouth daily.      buPROPion  MG Oral Tablet 12 Hr Take 1 tablet (150 mg total) by mouth 2 (two) times daily.      cyproheptadine 4 MG Oral Tab Take 1 tablet (4 mg total) by mouth daily.      FLUoxetine 20 MG Oral Cap Take 1 capsule (20 mg total) by mouth 2 (two) times daily.      digoxin 0.125 MG Oral Tab Take 1 tablet (125 mcg total) by mouth daily. 30 tablet 0    allopurinol 100 MG Oral Tab Take 1 tablet (100 mg total) by mouth daily. 90 tablet 3    Nortriptyline HCl 25 MG Oral Cap Take 1 capsule (25 mg total) by mouth nightly.      Multiple Vitamin (MULTIVITAMIN OR) Take 1 tablet by mouth daily.          Allergies   Allergen Reactions    Adhesive Tape HIVES     ALL ADHESIVES    Codeine HIVES    Doxycycline SWELLING    Hydrocodone UNKNOWN    Lisinopril TONGUE SWELLING    Metoprolol HIVES    Morphine Sulfate HIVES    Opioid Analgesics UNKNOWN    Oxycontin ITCHING    Oxytocin HIVES    Vicodin [Hydrocodone-Acetaminophen] ITCHING    Skin Adhesives OTHER (SEE COMMENTS)      Social History     Socioeconomic History    Marital status: Single   Tobacco Use    Smoking status: Former     Packs/day: 0.50     Years: 30.00     Additional pack years: 0.00     Total pack years: 15.00     Types: Cigarettes     Quit date: 1991     Years since quittin.9    Smokeless tobacco: Never   Vaping Use    Vaping Use: Never used   Substance and Sexual Activity    Alcohol use: No    Drug use: No   Other Topics Concern    Caffeine Concern No    Exercise Yes    Seat Belt Yes    Special Diet Yes     Comment: low sodium    Stress Concern Yes    Weight Concern No     Social Determinants of Health     Food Insecurity: No Food Insecurity (2024)    Food Insecurity     Food Insecurity: Never true   Transportation Needs: No Transportation Needs (2024)     Transportation Needs     Lack of Transportation: No   Housing Stability: Low Risk  (1/2/2024)    Housing Stability     Housing Instability: No       Family History   Problem Relation Age of Onset    Hypertension Father     Lipids Father     Diabetes Mother     Hypertension Mother     Lipids Mother     Cancer Brother     Hypertension Brother          Exam:  Vitals:    01/02/24 2330 01/03/24 0109 01/03/24 0415 01/03/24 0833   BP: 155/80  (!) 120/97    BP Location: Right arm  Radial    Pulse: 106 106 93    Resp: 20 20 19    Temp: 98 °F (36.7 °C)  98.1 °F (36.7 °C) 97.3 °F (36.3 °C)   TempSrc: Oral  Oral Oral   SpO2: 95% 91% 93%    Weight:       Height:         General: no apparent distress, conversant  Skin: no rash, ulcers or subcutaneous nodules  Eyes: anicteric sclerae, moist conjunctivae  Head, ears, nose, throat: atraumatic, oropharynx clear with moist mucous membranes  Neck: trachea midline with no thyromegaly  Heart: regular rate and rhythm, no murmurs / rubs / gallops  Lungs: clear bilaterally, normal respiratory effort, no accessory muscle use  Extremities: +LE edema  Psych: interactive, answering questions appropriately, appropriate affect    Labs:  Reviewed in EMR    Inpatient Medications:  Reviewed in EMR    Imaging:   Chest imaging reviewed

## 2024-01-03 NOTE — CM/SW NOTE
Met with patient to discuss discharge planning.  Patient, a former  of [employer] who Is currently retired, resides in a raised ranh home in [place].  Patient gets around with her crutches which se has used for the past 28 years--pt states independent with ADL's--has many 'assistive devices; no use of respiratory DME.  Patient does have help for cleaning her residence.  Patient is current with [facility] for a nurse.  PCP is Dr Viktoriya Garcias--uses [facility] for ST scripts--mail order with [facility]--has and uses her [employer] card.  CM/SW to be available for additional needs.  Will sent resumption [facility] in [place]   01/03/24 1400   CM/SW Referral Data   Referral Source    Reason for Referral Discharge planning   Informant Patient   Medical Hx   Does patient have an established PCP? Yes   Patient Info   Patient's Current Mental Status at Time of Assessment Alert;Oriented   Patient's Home Environment House   Number of Levels in Home Other (comment)   Number of Stair in Home [place]   Patient lives with Alone   Patient Status Prior to Admission   Independent with ADLs and Mobility Yes   Discharge Needs   Anticipated D/C needs Home health care

## 2024-01-03 NOTE — PHYSICAL THERAPY NOTE
PHYSICAL THERAPY EVALUATION - INPATIENT     Room Number: 2602/2602-A  Evaluation Date: 1/3/2024  Type of Evaluation: Initial  Physician Order: PT Eval and Treat    Presenting Problem: community acquired pneumonia  Co-Morbidities : gout, chronic Afib, aortic stenosis s/p TAVR 2019, COPD, HTN, HLD, obesity, fibromyalgia, hx PE, IVC filter, pulmonary HTN, chronic diastolic HF, SHELDON, acute LLE ichemia s/p angiojet and POBA and aborted L pop cutdown at [facility] 10/2023  Reason for Therapy: Mobility Dysfunction and Discharge Planning      ASSESSMENT   Pt is a 68 year old female admitted on 1/2/2024 for difficulty breathing. Per chart, patient with medication noncompliance (Bumex). Functional outcome measures completed include AMPA.  The AM-PAC '6-Clicks' Inpatient Basic Mobility Short Form was completed and this patient is demonstrating a Approx Degree of Impairment: 41.77%  degree of impairment in mobility. Research supports that patients with this level of impairment may benefit from home.  PT Discharge Recommendations: Home      PLAN  Patient has been evaluated and presents with no skilled Physical Therapy needs at this time.  Patient discharged from Physical Therapy services.  Please re-order if a new functional limitation presents during this admission.    GOALS  Patient was able to achieve the following goals ...    Patient was able to transfer At previous, functional level   Patient able to ambulate on level surfaces At previous, functional level         HOME SITUATION  Type of Home: House   Home Layout: One level;Ramped entrance                Lives With: Alone  Drives: Yes  Patient Owned Equipment: Rolling walker;Other (Comment) (B L[employer]strand crutches)  Patient Regularly Uses: None    Prior Level of [place]: Patient is ambulatory at at baseline with B Lofstrand crutches. Access in/out of the home via ramp. Has assistance with housework and grocery shopping. Reports longstanding chronic issues -  previous B knee replacements, L rotator cuff, CHF, Afib and spinal stenosis. Admits to history of falls, although none recently.    SUBJECTIVE  \"I do things my own way.\"      OBJECTIVE  Precautions: None  Fall Risk: Standard fall risk    WEIGHT BEARING RESTRICTION  Weight Bearing Restriction: None                PAIN ASSESSMENT  Ratin  Location: patient denies       COGNITION  Overall Cognitive Status:  WFL - within functional limits  Following Commands:  follows all commands and directions without difficulty    RANGE OF MOTION AND STRENGTH ASSESSMENT  Upper extremity ROM and strength - defer to OT assessment    Lower extremity ROM is functional, however patient demonstrates decreased B active knee flexion    Lower extremity strength is within functional limits      BALANCE  Static Sitting: Good  Dynamic Sitting: Fair +  Static Standing: Fair  Dynamic Standing: Fair    ADDITIONAL TESTS                                    ACTIVITY TOLERANCE  Pulse: (!) 132 (with activity)  Heart Rate Source: Monitor                   O2 WALK       NEUROLOGICAL FINDINGS                        AM-PAC '6-Clicks' INPATIENT SHORT FORM - BASIC MOBILITY  How much difficulty does the patient currently have...  Patient Difficulty: Turning over in bed (including adjusting bedclothes, sheets and blankets)?: None   Patient Difficulty: Sitting down on and standing up from a chair with arms (e.g., wheelchair, bedside commode, etc.): A Little   Patient Difficulty: Moving from lying on back to sitting on the side of the bed?: A Little   How much help from another person does the patient currently need...   Help from Another: Moving to and from a bed to a chair (including a wheelchair)?: A Little   Help from Another: Need to walk in hospital room?: A Little   Help from Another: Climbing 3-5 steps with a railing?: A Little       AM-PAC Score:  Raw Score: 19   Approx Degree of Impairment: 41.77%   Standardized Score (AM-PAC Scale): 45.44   CMS  Modifier (G-Code): CK    FUNCTIONAL ABILITY STATUS  Gait Assessment   Functional Mobility/Gait Assessment  Gait Assistance: Supervision  Distance (ft): 15 ft. with RW; 15 ft. with B Lofstrand crutches  Pattern: Comment  Stairs:  (4- point gait)    Skilled Therapy Provided     Bed Mobility:  Rolling: independent  Supine to sit: supervision with HOB elevated    Sit to supine: supervision with HOB elevated     Transfer Mobility:  Sit to stand: supervision with RW or B Lofstrand crutches; bed height elevated   Stand to sit: supervision with RW or B Lofstrand crutches  Gait = x15 ft. Uisng RW within the room, progressing to additional 15 ft. Using B Lofstrand crutches    Therapist's comments: Patient presents to PT supine in bed, HOB elevated and agreeable to working with therapy. Patient progressed well with therapy as she was able to verbalize bed modifications needed to optimize her function. She was ambulatory within her room using a RW initially, but able to progress to use of B Lofstrand crutches which is her baseline. No further skilled IP PT needs warranted at time of PT evaluation, however patient to be placed on restorative/mobility team to maintain her strength, mobility and activity tolerance while admitted in the hospital. Recommend dc home. Patient reports she will reach out to her home health agency as needed for therapies, if needed. Patient returned to supine in bed with bed alarm donned and all needs placed within reach at the end of the session. RN updated.     Exercise/Education Provided:  Bed mobility  Functional activity tolerated  Gait training  Strengthening  Transfer training    Patient End of Session: In bed;Needs met;Call light within reach;RN aware of session/findings;All patient questions and concerns addressed;Alarm set    Patient Evaluation Complexity Level:  History Low - no personal factors and/or co-morbidities   Examination of body systems Low - addressing 1-2 elements   Clinical  Presentation Low - Stable   Clinical Decision Making Low Complexity       PT Session Time: 38 minutes  Gait Trainin minutes  Therapeutic Activity: 7 minutes  Neuromuscular Re-education: 0 minutes  Therapeutic Exercise: 0 minutes

## 2024-01-03 NOTE — CONSULTS
[facility] Cardiology  Consultation Note      Arleth Weiss Patient Status:  Inpatient    1955 MRN [id number]   Location [facility] 2NE-A Attending Sachin Delacruz MD   Hosp Day # 0 PCP Viktoriya Garcias DO     Reason for consult: CHF    Primary cardiologist: Armand Case MD     History of Present Illness:  Arleht Weiss is a 68 year old female with chronic atrial fibrillation on coumadin, aortic stenosis s/p TAVR , COPD, htn, hld, obesity, fibromyalgia, hx PE, IVC filter, hx Pulm htn, chronic diastolic HF, SHELDON, acute LLE ischemia s/p angiojet and POBA and aborted left pop cutdown at [facility] 10/2023d (follows Dr. Andrade),  who presented to [facility] on 2024 with worsening SOB and edema. Admits to nonadherence with her daily bumex. Did not like the urinary urgency/frequency.  Wt was 235 lbs today. At cardio office visit 1 month ago, was 215 lbs after a recent hospitalization -23 at [facility] with acute on chronic diastolic CHF, had 25 lb weight gain and edema up to thighs. Had fluid overload likely due to dietary indiscretions, and medication noncompliance. Improved with IV Bumex, metoprolol dose increased to BID for better rate control. Weight 47 lbs down, discharged on po bumex 1 mg bid. Denies CP, palps LH or syncope.     Medications:  Current Facility-Administered Medications   Medication Dose Route Frequency    azithromycin (Zithromax) 500 mg in sodium chloride 0.9% 250mL IVPB premix  500 mg Intravenous Once       Past Medical History:   Diagnosis Date    Aortic stenosis     S/P TAVR    ASTHMA     Asthma     Back problem     CANCER     thyroid    Cancer (HCC)     thyroid     COPD (chronic obstructive pulmonary disease) (HCC)     DEPRESSION     Disorder of thyroid     Essential hypertension     Fibromyalgia     High blood pressure     High cholesterol     HYPERTENSION     HYPOTHYROIDISM     Muscle weakness     OBESITY     OSTEOARTHRITIS     Osteoarthritis     OTHER  DISEASES     Fibromyalgia    OTHER DISEASES     Pulmonary emboli    OTHER DISEASES     Lymph edema    OTHER DISEASES     Pulmonary hypertension    Peripheral vascular disease (HCC)     Pulmonary embolism (HCC)     Shortness of breath     ON EXERTION    SLEEP APNEA     Sleep apnea     CPAP       Past Surgical History:   Procedure Laterality Date    ANESTH,SHOULDER REPLACEMENT Right 2014    hemiathroplasty    BACK SURGERY  2000    BACK SURGERY  2004    complete c-3 -7 ectomy    BIOPSY Left 07/20/2023    TEMPORAL ARTERY    CATH TRANSCATHETER AORTIC VALVE REPLACEMENT      COLONOSCOPY N/A 05/10/2018    Procedure: COLONOSCOPY, POSSIBLE BIOPSY, POSSIBLE POLYPECTOMY 32465;  Surgeon: Kendell Valentin MD;  Location: [facility], [facility]    COLONOSCOPY      D & C  09/2009    DILATION/CURETTAGE,DIAGNOSTIC      HIP REPLACEMENT SURGERY  09/2009    Rt hip    KNEE REPLACEMENT SURGERY  2003    Both knees    OTHER SURGICAL HISTORY  1989    Thyroid removal    OTHER SURGICAL HISTORY  2004    LT foot spur removal    TOTAL HIP REPLACEMENT      TOTAL KNEE REPLACEMENT         Family History  family history includes Cancer in her brother; Diabetes in her mother; Hypertension in her brother, father, and mother; Lipids in her father and mother.    Social History   reports that she quit smoking about 32 years ago. Her smoking use included cigarettes. She has a 15.00 pack-year smoking history. She has never used smokeless tobacco. She reports that she does not drink alcohol and does not use drugs.     Allergies  Allergies   Allergen Reactions    Adhesive Tape HIVES     ALL ADHESIVES    Codeine HIVES    Doxycycline SWELLING    Hydrocodone UNKNOWN    Lisinopril TONGUE SWELLING    Metoprolol HIVES    Morphine Sulfate HIVES    Opioid Analgesics UNKNOWN    Oxycontin ITCHING    Oxytocin HIVES    Vicodin [Hydrocodone-Acetaminophen] ITCHING    Skin Adhesives OTHER (SEE COMMENTS)       Review of Systems:  As per HPI, otherwise 10 point ROS is  negative in detail.    Physical Exam:  Blood pressure 134/90, pulse (!) 128, temperature 98.8 °F (37.1 °C), temperature source Temporal, resp. rate 19, height 69\", weight 235 lb 0.2 oz (106.6 kg), SpO2 98%, not currently breastfeeding.  Temp (24hrs), Av.8 °F (37.1 °C), Min:98.8 °F (37.1 °C), Max:98.8 °F (37.1 °C)    Wt Readings from Last 3 Encounters:   24 235 lb 0.2 oz (106.6 kg)   23 219 lb (99.3 kg)   23 219 lb (99.3 kg)       General: Awake and alert; in no acute distress  HEENT: Extraocular movements are intact; sclerae are anicteric; scalp is atraumatic  Neck: Supple; no JVD; no carotid bruits  Cardiac: Irreg ireg, variable S1, nl S2, no murmurs, rubs, or gallops are appreciated  Lungs: Clear to auscultation bilaterally; no accessory muscle use is noted, no wheezes, rhonci or rales  Abdomen: Soft, non-distended, non-tender; bowel sounds are normoactive  Extremities: Warm, + b/l LE edema, clubbing or cyanosis  Psychiatric: Normal mood and affect; answers questions appropriately  Dermatologic: No rashes; normal skin turgor    Diagnostic testing:    Labs:   Lab Results   Component Value Date    PT 23.7 (H) 10/09/2011    INR 2.24 (H) 2024    INR 2.65 (H) 2023        Lab Results   Component Value Date    WBC 10.2 2024    HGB 11.4 2024    HCT 34.2 2024    .0 2024    CREATSERUM 0.63 2024    BUN 22 2024     2024    K 4.0 2024     2024    CO2 25.0 2024     2024    CA 9.2 2024    ALB 3.1 2024    ALKPHO 106 2024    BILT 0.5 2024    TP 6.7 2024    AST 29 2024    ALT 36 2024    INR 2.24 2024    PTP 25.0 2024       Cardiac diagnostics:    EKG 2024: Atrial fibrillation with rapid ventricular response 115 bpm    Echocardiogram 2023  CONCLUSIONS:   --Study quality: fair.   -Two-dimensional transthoracic echocardiography was performed using  standard views & projections with M-mode and Doppler (continuous, pulsed wave, spectral & color flow).   -The left ventricle is mildly dilated. There is no left ventricular hypertrophy. Left ventricular systolic function is mildly decreased. EF = 45% (visual est.) Left ventricular diastolic function was not evaluated due to atrial fibrillation.   -The right ventricle is normal in size. The estimated right ventricular systolic pressure is 29.2 mmHg plus the right atrial pressure. The estimated right ventricular systolic pressure is 32 mmHg. The right atrial pressure is 3 mmHg. Finding is consistent with normal pulmonary artery pressures.   -The left atrial size is severely enlarged.   -There is moderate mitral stenosis. There is trivial mitral valve regurgitation. The peak gradient is 15 mmHg and the mean gradient is 6 mmHg.   -There is trivial tricuspid valve regurgitation.   -There is a bioprosthetic valve present in the aortic valve position. There is no aortic valve regurgitation. The peak gradient is 27 mmHg and the mean gradient is 17 mmHg.   -There is trivial pulmonic valve regurgitation.   -The visualized aorta is normal in size.     TAVR 1/5/2021  Postprocedural imaging after TAVR shows:  LVEF: 65%  Mean aortic valve gradient: 9 mm Hg  Aortic valve area (continuity method): 2.5 cm2  Aortic regurgitation: Trace perivalvular  No pericardial effusion     Impression:  68 year old female admitted with SOB and edema from decompensated HFpEF  PNA  Medication nonadherence  Recurrent hospitalizations for HF  Chronic AF - mildly high rates. On warfarin.   Severe AS s/p TAVR 2021  Moderate mitral stenosis   CAD s/p PCI LCX 3/21   PVD with acute limb ischemia and was treated with angiojet and POBA and aborted left pop cutdown 10/23 - sees [facility]  COPD  HTN  HLD    Recommendations:  IV lasix  Med compliance stressed  Abx per hospitalist  Continue home GDMT including Toprol, spironolactone. Add Entresto next and  eventually SGLT2i.   Continue plavix, statin  Continue warfarin    Thank you for allowing our practice to participate in the care of your patient. Please do not hesitate to contact me if you have any questions.    Khoa Adkins MD  Interventional Cardiology  [facility]  Office: [phone]    1/2/2024  6:24 PM    Total encounter time 75 minutes.

## 2024-01-03 NOTE — PROGRESS NOTES
01/02/24 1817 01/02/24 1820 01/02/24 1823   Vital Signs   /79 (!) 147/107 159/86   MAP (mmHg) 87 (!) 120 (!) 105   BP Location Radial Radial Radial   BP Method Automatic Automatic Automatic   Patient Position Lying Sitting Standing     Orthostatics upon admission

## 2024-01-03 NOTE — PLAN OF CARE
Rec'd pt at 0730. A&O x 4. Tele shows a fib. O2 sats adequate on RA. Pt continent, purewick in place d/t urgency. Up w/ SBA and walker. No C/O pain or SOB. Strong, non-productive cough present. Bed locked and in low position, call light and personal items within reach. Will continue to monitor. POC - IV Bumex BID, IV abx for PNA.    Problem: PAIN - ADULT  Goal: Verbalizes/displays adequate comfort level or patient's stated pain goal  Description: INTERVENTIONS:  - Encourage pt to monitor pain and request assistance  - Assess pain using appropriate pain scale  - Administer analgesics based on type and severity of pain and evaluate response  - Implement non-pharmacological measures as appropriate and evaluate response  - Consider cultural and social influences on pain and pain management  - Manage/alleviate anxiety  - Utilize distraction and/or relaxation techniques  - Monitor for opioid side effects  - Notify MD/LIP if interventions unsuccessful or patient reports new pain  - Anticipate increased pain with activity and pre-medicate as appropriate  Outcome: Progressing     Problem: RISK FOR INFECTION - ADULT  Goal: Absence of fever/infection during anticipated neutropenic period  Description: INTERVENTIONS  - Monitor WBC  - Administer growth factors as ordered  - Implement neutropenic guidelines  Outcome: Progressing     Problem: SAFETY ADULT - FALL  Goal: Free from fall injury  Description: INTERVENTIONS:  - Assess pt frequently for physical needs  - Identify cognitive and physical deficits and behaviors that affect risk of falls.  - [place] fall precautions as indicated by assessment.  - Educate pt/family on patient safety including physical limitations  - Instruct pt to call for assistance with activity based on assessment  - Modify environment to reduce risk of injury  - Provide assistive devices as appropriate  - Consider OT/PT consult to assist with strengthening/mobility  - Encourage toileting  schedule  Outcome: Progressing     Problem: RESPIRATORY - ADULT  Goal: Achieves optimal ventilation and oxygenation  Description: INTERVENTIONS:  - Assess for changes in respiratory status  - Assess for changes in mentation and behavior  - Position to facilitate oxygenation and minimize respiratory effort  - Oxygen supplementation based on oxygen saturation or ABGs  - Provide Smoking Cessation handout, if applicable  - Encourage broncho-pulmonary hygiene including cough, deep breathe, Incentive Spirometry  - Assess the need for suctioning and perform as needed  - Assess and instruct to report SOB or any respiratory difficulty  - Respiratory Therapy support as indicated  - Manage/alleviate anxiety  - Monitor for signs/symptoms of CO2 retention  Outcome: Progressing

## 2024-01-03 NOTE — PLAN OF CARE
Patient is alert and oriented x 4. Maintaining O2 saturation WNL on room air, CPAP at night, pt has her own mask and tubing. Afib on tele monitor. Complaining of pain to the lt lower leg, PRN tylenol given. External catheter in place, draining clear, yellow urine. Per patient she's ambulating with crutches at home, PT/OT to see patient. Safety precautions in place, call light within reach, bed alarm on. All needs met at this time. Patient aware of plan of care.       Problem: PAIN - ADULT  Goal: Verbalizes/displays adequate comfort level or patient's stated pain goal  Description: INTERVENTIONS:  - Encourage pt to monitor pain and request assistance  - Assess pain using appropriate pain scale  - Administer analgesics based on type and severity of pain and evaluate response  - Implement non-pharmacological measures as appropriate and evaluate response  - Consider cultural and social influences on pain and pain management  - Manage/alleviate anxiety  - Utilize distraction and/or relaxation techniques  - Monitor for opioid side effects  - Notify MD/LIP if interventions unsuccessful or patient reports new pain  - Anticipate increased pain with activity and pre-medicate as appropriate  Outcome: Progressing

## 2024-01-04 LAB
ANION GAP SERPL CALC-SCNC: 4 MMOL/L (ref 0–18)
BUN BLD-MCNC: 26 MG/DL (ref 9–23)
CALCIUM BLD-MCNC: 8.5 MG/DL (ref 8.5–10.1)
CHLORIDE SERPL-SCNC: 105 MMOL/L (ref 98–112)
CO2 SERPL-SCNC: 31 MMOL/L (ref 21–32)
CREAT BLD-MCNC: 0.7 MG/DL
DIGOXIN SERPL-MCNC: 0.62 NG/ML (ref 0.8–2)
EGFRCR SERPLBLD CKD-EPI 2021: 93 ML/MIN/1.73M2 (ref 60–?)
ERYTHROCYTE [DISTWIDTH] IN BLOOD BY AUTOMATED COUNT: 13.3 %
GLUCOSE BLD-MCNC: 131 MG/DL (ref 70–99)
HCT VFR BLD AUTO: 35.8 %
HGB BLD-MCNC: 11.5 G/DL
INR BLD: 1.71 (ref 0.8–1.2)
MAGNESIUM SERPL-MCNC: 2.2 MG/DL (ref 1.6–2.6)
MCH RBC QN AUTO: 31.6 PG (ref 26–34)
MCHC RBC AUTO-ENTMCNC: 32.1 G/DL (ref 31–37)
MCV RBC AUTO: 98.4 FL
OSMOLALITY SERPL CALC.SUM OF ELEC: 297 MOSM/KG (ref 275–295)
PLATELET # BLD AUTO: 267 10(3)UL (ref 150–450)
POTASSIUM SERPL-SCNC: 3.3 MMOL/L (ref 3.5–5.1)
POTASSIUM SERPL-SCNC: 3.9 MMOL/L (ref 3.5–5.1)
PROTHROMBIN TIME: 20.2 SECONDS (ref 11.6–14.8)
RBC # BLD AUTO: 3.64 X10(6)UL
SODIUM SERPL-SCNC: 140 MMOL/L (ref 136–145)
WBC # BLD AUTO: 7.6 X10(3) UL (ref 4–11)

## 2024-01-04 PROCEDURE — 83735 ASSAY OF MAGNESIUM: CPT | Performed by: INTERNAL MEDICINE

## 2024-01-04 PROCEDURE — 80048 BASIC METABOLIC PNL TOTAL CA: CPT | Performed by: INTERNAL MEDICINE

## 2024-01-04 PROCEDURE — 94799 UNLISTED PULMONARY SVC/PX: CPT

## 2024-01-04 PROCEDURE — 80162 ASSAY OF DIGOXIN TOTAL: CPT | Performed by: INTERNAL MEDICINE

## 2024-01-04 PROCEDURE — 85610 PROTHROMBIN TIME: CPT | Performed by: INTERNAL MEDICINE

## 2024-01-04 PROCEDURE — 84132 ASSAY OF SERUM POTASSIUM: CPT | Performed by: INTERNAL MEDICINE

## 2024-01-04 PROCEDURE — S0171 BUMETANIDE 0.5 MG: HCPCS | Performed by: INTERNAL MEDICINE

## 2024-01-04 PROCEDURE — 85027 COMPLETE CBC AUTOMATED: CPT | Performed by: INTERNAL MEDICINE

## 2024-01-04 RX ORDER — WARFARIN SODIUM 2 MG/1
8 TABLET ORAL
Status: COMPLETED | OUTPATIENT
Start: 2024-01-04 | End: 2024-01-04

## 2024-01-04 RX ORDER — POTASSIUM CHLORIDE 750 MG/1
40 TABLET, EXTENDED RELEASE ORAL EVERY 4 HOURS
Status: COMPLETED | OUTPATIENT
Start: 2024-01-04 | End: 2024-01-04

## 2024-01-04 NOTE — PROGRESS NOTES
[facility]    Arleth Weiss Patient Status:  Inpatient    1955 MRN [id number]   Location [facility] 2NE-A Attending Omi Blackman MD   Hosp Day # 2 PCP Viktoriya Garcias DO     SUBJECTIVE: no new events.  Feels better. Still has occasional cough     OBJECTIVE:  /81 (BP Location: Left arm)   Pulse 85   Temp 98.4 °F (36.9 °C) (Oral)   Resp 20   Ht 175.3 cm (5' 9\")   Wt 235 lb 14.3 oz (107 kg)   LMP  (LMP Unknown)   SpO2 91%   BMI 34.84 kg/m²   O2 requirement: on 2l nc     I/O last 3 completed shifts:  In: 220 [P.O.:220]  Out: 5100 [Urine:5100]  I/O this shift:  In: -   Out: 2350 [Urine:2350]     Current Medications:   Current Facility-Administered Medications:     potassium chloride (K-Dur) tab 40 mEq, 40 mEq, Oral, Q4H    benzonatate (Tessalon) cap 100 mg, 100 mg, Oral, TID PRN    ipratropium-albuterol (Duoneb) 0.5-2.5 (3) MG/3ML inhalation solution 3 mL, 3 mL, Nebulization, Q6H PRN    metoprolol succinate ER (Toprol XL) 24 hr tab 25 mg, 25 mg, Oral, 2x Daily(Beta Blocker)    digoxin (Lanoxin) tab 125 mcg, 125 mcg, Oral, Daily    spironolactone (Aldactone) tab 25 mg, 25 mg, Oral, Daily    bumetanide (Bumex) 0.25 MG/ML injection 2 mg, 2 mg, Intravenous, BID (Diuretic)    bumetanide (Bumex) 0.25 MG/ML injection 1 mg, 1 mg, Intravenous, Once    allopurinol (Zyloprim) tab 100 mg, 100 mg, Oral, Daily    buPROPion SR (WELLBUTRIN SR) 12 hr tab 150 mg, 150 mg, Oral, BID    FLUoxetine (PROzac) cap 20 mg, 20 mg, Oral, BID    dilTIAZem ER (CardIZEM CD) 24 hr cap 240 mg, 240 mg, Oral, Daily    levothyroxine (Synthroid) tab 150 mcg, 150 mcg, Oral, Daily @ 0700    rosuvastatin (Crestor) tab 5 mg, 5 mg, Oral, Nightly    warfarin (Coumadin) tab 5 mg, 5 mg, Oral, Nightly    cefTRIAXone (Rocephin) 2 g in D5W 100 mL IVPB-ADD, 2 g, Intravenous, Q24H    acetaminophen (Tylenol) tab 650 mg, 650 mg, Oral, Q6H PRN     General appearance: alert, appears stated age, and cooperative  Lungs: clear to  auscultation bilaterally  Heart: regular rate and rhythm  Abdomen: soft, non-tender; bowel sounds normal; no masses,  no organomegaly  Extremities: extremities normal, atraumatic, no cyanosis or edema     Lab Results   Component Value Date    WBC 7.6 01/04/2024    RBC 3.64 01/04/2024    HGB 11.5 01/04/2024    HCT 35.8 01/04/2024    MCV 98.4 01/04/2024    MCH 31.6 01/04/2024    MCHC 32.1 01/04/2024    RDW 13.3 01/04/2024    .0 01/04/2024     Lab Results   Component Value Date     01/04/2024    K 3.3 01/04/2024     01/04/2024    CO2 31.0 01/04/2024    BUN 26 01/04/2024    CREATSERUM 0.70 01/04/2024     01/04/2024    CA 8.5 01/04/2024     Lab Results   Component Value Date    PT 23.7 (H) 10/09/2011    INR 1.71 (H) 01/04/2024    INR 1.95 (H) 01/03/2024    INR 2.24 (H) 01/02/2024          Imaging: reviewed. Per EMR.     Assessment / Plan:  Acute hypoxic respiratory failure - due to pulmonary edema; less likely PNA in absence of infectious symptoms  - wean supplemental O2 as able  - bronchial hygiene- IS to bedside  HFpEF exacerbation  - Bumex  - per cardiology  Possible PNA - PCT negative  - empiric ceftriaxone and azithromycin (1/2- ); completed azithro.  Stop ceft after 5d course  - RVP negative  - sputum cx with normal mckay  Asthma-COPD - no wheezing on exam  - BD protocol  - follows with Dr. Hernandez at [facility]  SHELDON - CPAP  Dispo- Full code  - will follow    William Herring MD  1/4/2024  1:50 PM

## 2024-01-04 NOTE — PROGRESS NOTES
[facility] Cardiology  Consultation Note      Arleth Weiss Patient Status:  Inpatient    1955 MRN [id number]   Location [facility] 2NE-A Attending Sachin Delacruz MD   Hosp Day # 2 PCP Viktoriya Garcias DO     Reason for consult: CHF    Subjective: No CP. SOB and cough improved. Still c/o edema b/l thighs.    Impression:  68 year old female admitted with SOB and edema from decompensated HFpEF  PNA  Medication nonadherence  Recurrent hospitalizations for HF  Chronic AF - mildly high rates. On warfarin.   Severe AS s/p TAVR   Moderate mitral stenosis   CAD s/p PCI LCX 3/21   PVD with acute limb ischemia and was treated with angiojet and POBA and aborted left pop cutdown 10/23 - sees Raymond  COPD  HTN  HLD    Recommendations:  Continue bumex 2mg IV BID. Weight 235 lbs today, dry wt ~ 215 lbs. Monitor I/O and renal function  Continue home digoxin - HR controlled. Monitor levels while on azithromycin.   Med compliance stressed  Abx per hospitalist  Continue home GDMT including Toprol, spironolactone. Add Entresto next, once adequately diuresed and renal functions stable. Eventually SGLT2i.   Continue plavix, statin  Continue warfarin, INR goal 2-3. Consider DOAC.     Primary cardiologist: Armand Case MD     History of Present Illness:  Arleth Weiss is a 68 year old female with chronic atrial fibrillation on coumadin, aortic stenosis s/p TAVR , COPD, htn, hld, obesity, fibromyalgia, hx PE, IVC filter, hx Pulm htn, chronic diastolic HF, SHELDON, acute LLE ischemia s/p angiojet and POBA and aborted left pop cutdown at [facility] 10/2023d (follows Dr. Andrade),  who presented to [facility] on 2024 with worsening SOB and edema. Admits to nonadherence with her daily bumex. Did not like the urinary urgency/frequency.  Wt was 235 lbs today. At cardio office visit 1 month ago, was 215 lbs after a recent hospitalization -23 at [facility] with acute on chronic diastolic CHF, had 25 lb  weight gain and edema up to thighs. Had fluid overload likely due to dietary indiscretions, and medication noncompliance. Improved with IV Bumex, metoprolol dose increased to BID for better rate control. Weight 47 lbs down, discharged on po bumex 1 mg bid. Denies CP, palps LH or syncope.     Medications:        Past Medical History:   Diagnosis Date    Aortic stenosis     S/P TAVR    ASTHMA     Asthma     Back problem     CANCER     thyroid    Cancer (HCC)     thyroid     COPD (chronic obstructive pulmonary disease) (HCC)     DEPRESSION     Disorder of thyroid     Essential hypertension     Fibromyalgia     High blood pressure     High cholesterol     HYPERTENSION     HYPOTHYROIDISM     Muscle weakness     OBESITY     OSTEOARTHRITIS     Osteoarthritis     OTHER DISEASES     Fibromyalgia    OTHER DISEASES     Pulmonary emboli    OTHER DISEASES     Lymph edema    OTHER DISEASES     Pulmonary hypertension    Peripheral vascular disease (HCC)     Pulmonary embolism (HCC)     Shortness of breath     ON EXERTION    SLEEP APNEA     Sleep apnea     CPAP       Past Surgical History:   Procedure Laterality Date    ANESTH,SHOULDER REPLACEMENT Right 2014    hemiathroplasty    BACK SURGERY  2000    BACK SURGERY  2004    complete c-3 -7 ectomy    BIOPSY Left 07/20/2023    TEMPORAL ARTERY    CATH TRANSCATHETER AORTIC VALVE REPLACEMENT      COLONOSCOPY N/A 05/10/2018    Procedure: COLONOSCOPY, POSSIBLE BIOPSY, POSSIBLE POLYPECTOMY 76571;  Surgeon: Kendell Valentin MD;  Location: [facility] SURGICAL [facility]    COLONOSCOPY      D & C  09/2009    DILATION/CURETTAGE,DIAGNOSTIC      HIP REPLACEMENT SURGERY  09/2009    Rt hip    KNEE REPLACEMENT SURGERY  2003    Both knees    OTHER SURGICAL HISTORY  1989    Thyroid removal    OTHER SURGICAL HISTORY  2004    LT foot spur removal    TOTAL HIP REPLACEMENT      TOTAL KNEE REPLACEMENT         Family History  family history includes Cancer in her brother; Diabetes in her mother; Hypertension in  her brother, father, and mother; Lipids in her father and mother.    Social History   reports that she quit smoking about 32 years ago. Her smoking use included cigarettes. She has a 15.00 pack-year smoking history. She has never used smokeless tobacco. She reports that she does not drink alcohol and does not use drugs.     Allergies  Allergies   Allergen Reactions    Adhesive Tape HIVES     ALL ADHESIVES    Codeine HIVES    Doxycycline SWELLING    Hydrocodone UNKNOWN    Lisinopril TONGUE SWELLING    Metoprolol HIVES    Morphine Sulfate HIVES    Opioid Analgesics UNKNOWN    Oxycontin ITCHING    Oxytocin HIVES    Vicodin [Hydrocodone-Acetaminophen] ITCHING    Skin Adhesives OTHER (SEE COMMENTS)       Review of Systems:  As per HPI, otherwise 10 point ROS is negative in detail.    Physical Exam:  Blood pressure 138/81, pulse 85, temperature 98.4 °F (36.9 °C), temperature source Oral, resp. rate 20, height 69\", weight 235 lb 14.3 oz (107 kg), SpO2 91%, not currently breastfeeding.  Temp (24hrs), Av.3 °F (36.8 °C), Min:98 °F (36.7 °C), Max:98.8 °F (37.1 °C)    Wt Readings from Last 3 Encounters:   24 235 lb 14.3 oz (107 kg)   23 219 lb (99.3 kg)   23 219 lb (99.3 kg)       General: Awake and alert; in no acute distress  HEENT: Extraocular movements are intact; sclerae are anicteric; scalp is atraumatic  Neck: Supple; no JVD; no carotid bruits  Cardiac: Irreg ireg, variable S1, nl S2, no murmurs, rubs, or gallops are appreciated  Lungs: Clear to auscultation bilaterally; no accessory muscle use is noted, no wheezes, rhonci or rales  Abdomen: Soft, non-distended, non-tender; bowel sounds are normoactive  Extremities: Warm, + b/l LE edema, clubbing or cyanosis  Psychiatric: Normal mood and affect; answers questions appropriately  Dermatologic: No rashes; normal skin turgor    Diagnostic testing:    Labs:   Lab Results   Component Value Date    PT 23.7 (H) 10/09/2011    INR 1.71 (H) 2024    INR  1.95 (H) 01/03/2024        Lab Results   Component Value Date    WBC 7.6 01/04/2024    HGB 11.5 01/04/2024    HCT 35.8 01/04/2024    .0 01/04/2024    CREATSERUM 0.70 01/04/2024    BUN 26 01/04/2024     01/04/2024    K 3.3 01/04/2024     01/04/2024    CO2 31.0 01/04/2024     01/04/2024    CA 8.5 01/04/2024    INR 1.71 01/04/2024    PTP 20.2 01/04/2024    MG 2.2 01/04/2024       Cardiac diagnostics:    EKG 1/2/2024: Atrial fibrillation with rapid ventricular response 115 bpm    Echocardiogram 07/2023  CONCLUSIONS:   --Study quality: fair.   -Two-dimensional transthoracic echocardiography was performed using standard views & projections with M-mode and Doppler (continuous, pulsed wave, spectral & color flow).   -The left ventricle is mildly dilated. There is no left ventricular hypertrophy. Left ventricular systolic function is mildly decreased. EF = 45% (visual est.) Left ventricular diastolic function was not evaluated due to atrial fibrillation.   -The right ventricle is normal in size. The estimated right ventricular systolic pressure is 29.2 mmHg plus the right atrial pressure. The estimated right ventricular systolic pressure is 32 mmHg. The right atrial pressure is 3 mmHg. Finding is consistent with normal pulmonary artery pressures.   -The left atrial size is severely enlarged.   -There is moderate mitral stenosis. There is trivial mitral valve regurgitation. The peak gradient is 15 mmHg and the mean gradient is 6 mmHg.   -There is trivial tricuspid valve regurgitation.   -There is a bioprosthetic valve present in the aortic valve position. There is no aortic valve regurgitation. The peak gradient is 27 mmHg and the mean gradient is 17 mmHg.   -There is trivial pulmonic valve regurgitation.   -The visualized aorta is normal in size.     TAVR 1/5/2021  Postprocedural imaging after TAVR shows:  LVEF: 65%  Mean aortic valve gradient: 9 mm Hg  Aortic valve area (continuity method): 2.5  cm2  Aortic regurgitation: Trace perivalvular  No pericardial effusion     Thank you for allowing our practice to participate in the care of your patient. Please do not hesitate to contact me if you have any questions.    Khoa Adkins MD  Interventional Cardiology  [facility]  Office: [phone]

## 2024-01-04 NOTE — PLAN OF CARE
Patient is alert and oriented x 4. Maintaining O2 saturation WNL on room air, Using CPAP at when sleeping. Afib on tele monitor, HR improved this morning. Patient continent, purewick in place due to urgency. Up with SBA and the walker. No complains of pian or SOB. Safety precautions place, call light within reach, bed alarm on. Patient aware of plan of care.       Problem: PAIN - ADULT  Goal: Verbalizes/displays adequate comfort level or patient's stated pain goal  Description: INTERVENTIONS:  - Encourage pt to monitor pain and request assistance  - Assess pain using appropriate pain scale  - Administer analgesics based on type and severity of pain and evaluate response  - Implement non-pharmacological measures as appropriate and evaluate response  - Consider cultural and social influences on pain and pain management  - Manage/alleviate anxiety  - Utilize distraction and/or relaxation techniques  - Monitor for opioid side effects  - Notify MD/LIP if interventions unsuccessful or patient reports new pain  - Anticipate increased pain with activity and pre-medicate as appropriate  Outcome: Progressing     Problem: RISK FOR INFECTION - ADULT  Goal: Absence of fever/infection during anticipated neutropenic period  Description: INTERVENTIONS  - Monitor WBC  - Administer growth factors as ordered  - Implement neutropenic guidelines  Outcome: Progressing     Problem: SAFETY ADULT - FALL  Goal: Free from fall injury  Description: INTERVENTIONS:  - Assess pt frequently for physical needs  - Identify cognitive and physical deficits and behaviors that affect risk of falls.  - [place] fall precautions as indicated by assessment.  - Educate pt/family on patient safety including physical limitations  - Instruct pt to call for assistance with activity based on assessment  - Modify environment to reduce risk of injury  - Provide assistive devices as appropriate  - Consider OT/PT consult to assist with strengthening/mobility  -  Encourage toileting schedule  Outcome: Progressing

## 2024-01-04 NOTE — PLAN OF CARE
Rec'd pt at 0730. A&O x 4. Tele shows a fib. O2 sats adequate on RA. Pt continent, purewick in place d/t urgency. Up w/ 1 and walker. No C/O pain or SOB. Bed locked and in low position, call light and personal items within reach. Will continue to monitor. POC - IV abxx for PNA, IV bumex BID, DW/I/O's.    Problem: PAIN - ADULT  Goal: Verbalizes/displays adequate comfort level or patient's stated pain goal  Description: INTERVENTIONS:  - Encourage pt to monitor pain and request assistance  - Assess pain using appropriate pain scale  - Administer analgesics based on type and severity of pain and evaluate response  - Implement non-pharmacological measures as appropriate and evaluate response  - Consider cultural and social influences on pain and pain management  - Manage/alleviate anxiety  - Utilize distraction and/or relaxation techniques  - Monitor for opioid side effects  - Notify MD/LIP if interventions unsuccessful or patient reports new pain  - Anticipate increased pain with activity and pre-medicate as appropriate  Outcome: Progressing     Problem: RISK FOR INFECTION - ADULT  Goal: Absence of fever/infection during anticipated neutropenic period  Description: INTERVENTIONS  - Monitor WBC  - Administer growth factors as ordered  - Implement neutropenic guidelines  Outcome: Progressing     Problem: SAFETY ADULT - FALL  Goal: Free from fall injury  Description: INTERVENTIONS:  - Assess pt frequently for physical needs  - Identify cognitive and physical deficits and behaviors that affect risk of falls.  - [place] fall precautions as indicated by assessment.  - Educate pt/family on patient safety including physical limitations  - Instruct pt to call for assistance with activity based on assessment  - Modify environment to reduce risk of injury  - Provide assistive devices as appropriate  - Consider OT/PT consult to assist with strengthening/mobility  - Encourage toileting schedule  Outcome: Progressing     Problem:  RESPIRATORY - ADULT  Goal: Achieves optimal ventilation and oxygenation  Description: INTERVENTIONS:  - Assess for changes in respiratory status  - Assess for changes in mentation and behavior  - Position to facilitate oxygenation and minimize respiratory effort  - Oxygen supplementation based on oxygen saturation or ABGs  - Provide Smoking Cessation handout, if applicable  - Encourage broncho-pulmonary hygiene including cough, deep breathe, Incentive Spirometry  - Assess the need for suctioning and perform as needed  - Assess and instruct to report SOB or any respiratory difficulty  - Respiratory Therapy support as indicated  - Manage/alleviate anxiety  - Monitor for signs/symptoms of CO2 retention  Outcome: Progressing

## 2024-01-04 NOTE — CONSULTS
[facility] Pharmacy Dosing Service  Warfarin (Coumadin) Subsequent Dosing    Arleth Weiss is a 68 year old patient for whom pharmacy is dosing warfarin (Coumadin). Goal INR is 2.5-3.5    Recent Labs   Lab 01/02/24  1454 01/03/24  0834 01/04/24  0649   INR 2.24* 1.95* 1.71*     Consulted by:  Dr. Blackman  Indication:  AFib, hx PE, s/p TAVR  Potential Drug Interactions: Allopurinol, Levothyroxine  Other Anticoagulants:  None  Home regimen (if applicable):  5 mg daily    Inpatient Dosing History:    Date 1/2 1/3 1/4*      INR 2.24 1.95 1.71      Coumadin dose 5 mg 5 mg       **Rx to dose starting 1/4         Based on above -  1.  For today, Give warfarin (COUMADIN) 8 mg at 2100 tonight  2   PT/INR ordered daily while on warfarin  3.  Pharmacy will continue to follow.  We appreciate the opportunity to assist in the care of this patient.    Kathie Roberts, PharmD  1/4/2024  5:41 PM

## 2024-01-04 NOTE — PROGRESS NOTES
[facility] Hospitalist Progress Note     PCP: Viktoriya Garcias DO    Chief Complaint: follow-up   Follow up for: The primary encounter diagnosis was Community acquired pneumonia, unspecified laterality. Diagnoses of Acute on chronic congestive heart failure, unspecified heart failure type (HCC), Acute bronchospasm, and Atrial fibrillation, chronic (HCC) were also pertinent to this visit.    Overnight/Interim Events:      SUBJECTIVE:  Better, cough less green sputum. No f/c.     OBJECTIVE:  Temp:  [97.9 °F (36.6 °C)-98.8 °F (37.1 °C)] 97.9 °F (36.6 °C)  Pulse:  [] 67  Resp:  [14-23] 19  BP: (113-146)/() 132/74  SpO2:  [91 %-96 %] 94 %    Intake/Output:    Intake/Output Summary (Last 24 hours) at 1/4/2024 1702  Last data filed at 1/4/2024 1556  Gross per 24 hour   Intake --   Output 5050 ml   Net -5050 ml       Last 3 Weights   01/04/24 0611 235 lb 14.3 oz (107 kg)   01/02/24 1816 235 lb 0.2 oz (106.6 kg)   01/02/24 1238 235 lb (106.6 kg)   08/08/23 1335 219 lb (99.3 kg)   06/27/23 1100 219 lb (99.3 kg)       Exam    General: Alert, no distress, appears stated age.     Head:  Normocephalic, without obvious abnormality, atraumatic.   Eyes:  Sclera anicteric, EOMs intact.    Nose: Nares normal,  Mucosa normal    Throat: Lips normal   Neck: Supple, symmetrical, trachea midline   Lungs:   Clear to auscultation bilaterally. Normal effort   Chest wall:  No tenderness or deformity   Heart:  Regular rate and rhythm, S1, S2 normal, no murmur, rub or gallop appreciated   Abdomen:   Soft, NT/ND, Bowel sounds normal. No masses,  No organomegaly.    Extremities: Extremities normal, atraumatic, no cyanosis or LE edema.   Skin: Skin color, texture, turgor normal. No rashes or lesions.    Neurologic: Moving all extremities spontaneously, no focal deficit appreciated      Data Review:       Labs:     Recent Labs   Lab 01/02/24  1454 01/03/24  0834 01/04/24  0649   WBC 10.2  --  7.6   HGB 11.4*  --  11.5*   MCV 95.8  --  98.4    .0  --  267.0   INR 2.24* 1.95* 1.71*       Recent Labs   Lab 01/02/24  1454 01/03/24  0544 01/04/24  0649    141 140   K 4.0 3.9 3.3*    110 105   CO2 25.0 24.0 31.0   BUN 22 21 26*   CREATSERUM 0.63 0.67 0.70   CA 9.2 9.3 8.5   MG  --   --  2.2   * 192* 131*       Recent Labs   Lab 01/02/24  1454   ALT 36   AST 29   ALB 3.1*       No results for input(s): \"PGLU\" in the last 168 hours.    No results for input(s): \"TROP\" in the last 168 hours.      Meds:      metoprolol succinate  25 mg Oral 2x Daily(Beta Blocker)    digoxin  125 mcg Oral Daily    spironolactone  25 mg Oral Daily    bumetanide  2 mg Intravenous BID (Diuretic)    bumetanide  1 mg Intravenous Once    allopurinol  100 mg Oral Daily    buPROPion SR  150 mg Oral BID    FLUoxetine  20 mg Oral BID    dilTIAZem ER  240 mg Oral Daily    levothyroxine  150 mcg Oral Daily @ 0700    rosuvastatin  5 mg Oral Nightly    warfarin  5 mg Oral Nightly    cefTRIAXone  2 g Intravenous Q24H       benzonatate, ipratropium-albuterol, acetaminophen       Assessment/Plan:     68 year old female with PMH including but not limited to asthma, COPD, Depression, HTN, HTN, PVD who p/t [facility] ED c SOB and cough.     Acute respiratory distress  - likely multifactorial from PNA, volume overload  - supp o2 as needed, now on RA   - coivd, flu, rsv neg; expaneded panel neg       PNA  - CXR reviewed, pt c cough and sputum  - IV CTX (1/2-)/aizthro compelted 3d  - blood cxs NG x2d  - PCT & LA neg, urine strep/legionella neg, sputum cx  - sees pulmonologist Dr. Sharp from [facility]  - apprec pulm recs  - tessalon prn cough     Asthma/COPD  - trace wheezing noted, follow  - home inhalers, duonebs if needed  - was given methylprednisolone x1     Acute on Chronic CHF  - tele  - daily wts, I/O  - given IV lasix in ED, takes bumex at home, switched to IV bumex 1mg-->2mg BID   - spironolactone, plan for entresto soon  - dig  - cards c/s apprec   - follows c Dr. Walton, notify as  needed; Cr ok currently, pt states has been working on it (renal fxn)  - BNP 2680     HTN  Tachycarida  - CCB     Chronic AF  aortic stenosis s/p TAVR  Hx PE, IVCF  - on coumadin, INR 2.24 to 1.95 to 1.71, will have pharm dose  - consider NOAC  - clarified home dose and resumed  - pt reports bruising easily    - CCB    # Hypothyroidism  -cont home levothyroxine      # Major Depression/ Generalized anxiety d/o   -reviewed home meds, continue wellbutrin, currently appears stable      # HL  -statin     # Gout  - allupurinol    # SHELDON  -protocol, on cpap     Was on prednisone ~2m ago, now complete, placed by neurologist for elevated ESR/CRP per pt      Dispo: CTU  - lives by self     Questions/concerns were discussed with patient by bedside. D/w RN     Total Time spent with patient and coordinating care:  55 minutes    Omi Blackman MD  [facility] Hospitalist  [phone]  1/4/2024  5:16 PM

## 2024-01-04 NOTE — PROGRESS NOTES
[facility] Cardiology  Consultation Note      Arleth Weiss Patient Status:  Inpatient    1955 MRN [id number]   Location [facility] 2NE-A Attending Sachin Delacruz MD   Hosp Day # 1 PCP Viktoriya Garcias DO     Reason for consult: CHF    Subjective: No CP. SOB and cough improved. Still c/o edema b/l thighs.    Impression:  68 year old female admitted with SOB and edema from decompensated HFpEF  PNA  Medication nonadherence  Recurrent hospitalizations for HF  Chronic AF - mildly high rates. On warfarin.   Severe AS s/p TAVR   Moderate mitral stenosis   CAD s/p PCI LCX 3/21   PVD with acute limb ischemia and was treated with angiojet and POBA and aborted left pop cutdown 10/23 - sees Raymond  COPD  HTN  HLD    Recommendations:  IV bumex, she states she has always responded better to 2mg IV BID, will increase and monitor I/O and renal function  Resume home digoxin - HR mildly tachycardic  Med compliance stressed  Abx per hospitalist  Continue home GDMT including Toprol, spironolactone. Add Entresto next and eventually SGLT2i.   Continue plavix, statin  Continue warfarin    Primary cardiologist: Armand Case MD     History of Present Illness:  Arleth Weiss is a 68 year old female with chronic atrial fibrillation on coumadin, aortic stenosis s/p TAVR , COPD, htn, hld, obesity, fibromyalgia, hx PE, IVC filter, hx Pulm htn, chronic diastolic HF, SHELDON, acute LLE ischemia s/p angiojet and POBA and aborted left pop cutdown at [facility] 10/2023d (follows Dr. Andrade),  who presented to [facility] on 2024 with worsening SOB and edema. Admits to nonadherence with her daily bumex. Did not like the urinary urgency/frequency.  Wt was 235 lbs today. At cardio office visit 1 month ago, was 215 lbs after a recent hospitalization -23 at [facility] with acute on chronic diastolic CHF, had 25 lb weight gain and edema up to thighs. Had fluid overload likely due to dietary indiscretions, and  medication noncompliance. Improved with IV Bumex, metoprolol dose increased to BID for better rate control. Weight 47 lbs down, discharged on po bumex 1 mg bid. Denies CP, palps LH or syncope.     Medications:        Past Medical History:   Diagnosis Date    Aortic stenosis     S/P TAVR    ASTHMA     Asthma     Back problem     CANCER     thyroid    Cancer (HCC)     thyroid     COPD (chronic obstructive pulmonary disease) (HCC)     DEPRESSION     Disorder of thyroid     Essential hypertension     Fibromyalgia     High blood pressure     High cholesterol     HYPERTENSION     HYPOTHYROIDISM     Muscle weakness     OBESITY     OSTEOARTHRITIS     Osteoarthritis     OTHER DISEASES     Fibromyalgia    OTHER DISEASES     Pulmonary emboli    OTHER DISEASES     Lymph edema    OTHER DISEASES     Pulmonary hypertension    Peripheral vascular disease (HCC)     Pulmonary embolism (HCC)     Shortness of breath     ON EXERTION    SLEEP APNEA     Sleep apnea     CPAP       Past Surgical History:   Procedure Laterality Date    ANESTH,SHOULDER REPLACEMENT Right 2014    hemiathroplasty    BACK SURGERY  2000    BACK SURGERY  2004    complete c-3 -7 ectomy    BIOPSY Left 07/20/2023    TEMPORAL ARTERY    CATH TRANSCATHETER AORTIC VALVE REPLACEMENT      COLONOSCOPY N/A 05/10/2018    Procedure: COLONOSCOPY, POSSIBLE BIOPSY, POSSIBLE POLYPECTOMY 51104;  Surgeon: Kendell Valentin MD;  [place] [facility] [place] [facility]    COLONOSCOPY      D & C  09/2009    DILATION/CURETTAGE,DIAGNOSTIC      HIP REPLACEMENT SURGERY  09/2009    Rt hip    KNEE REPLACEMENT SURGERY  2003    Both knees    OTHER SURGICAL HISTORY  1989    Thyroid removal    OTHER SURGICAL HISTORY  2004    LT foot spur removal    TOTAL HIP REPLACEMENT      TOTAL KNEE REPLACEMENT         Family History  family history includes Cancer in her brother; Diabetes in her mother; Hypertension in her brother, father, and mother; Lipids in her father and mother.    Social History   reports  that she quit smoking about 32 years ago. Her smoking use included cigarettes. She has a 15.00 pack-year smoking history. She has never used smokeless tobacco. She reports that she does not drink alcohol and does not use drugs.     Allergies  Allergies   Allergen Reactions    Adhesive Tape HIVES     ALL ADHESIVES    Codeine HIVES    Doxycycline SWELLING    Hydrocodone UNKNOWN    Lisinopril TONGUE SWELLING    Metoprolol HIVES    Morphine Sulfate HIVES    Opioid Analgesics UNKNOWN    Oxycontin ITCHING    Oxytocin HIVES    Vicodin [Hydrocodone-Acetaminophen] ITCHING    Skin Adhesives OTHER (SEE COMMENTS)       Review of Systems:  As per HPI, otherwise 10 point ROS is negative in detail.    Physical Exam:  Blood pressure 112/69, pulse 102, temperature 98.1 °F (36.7 °C), temperature source Oral, resp. rate 16, height 69\", weight 235 lb 0.2 oz (106.6 kg), SpO2 95%, not currently breastfeeding.  Temp (24hrs), Av.8 °F (36.6 °C), Min:97.3 °F (36.3 °C), Max:98.1 °F (36.7 °C)    Wt Readings from Last 3 Encounters:   24 235 lb 0.2 oz (106.6 kg)   23 219 lb (99.3 kg)   23 219 lb (99.3 kg)       General: Awake and alert; in no acute distress  HEENT: Extraocular movements are intact; sclerae are anicteric; scalp is atraumatic  Neck: Supple; no JVD; no carotid bruits  Cardiac: Irreg ireg, variable S1, nl S2, no murmurs, rubs, or gallops are appreciated  Lungs: Clear to auscultation bilaterally; no accessory muscle use is noted, no wheezes, rhonci or rales  Abdomen: Soft, non-distended, non-tender; bowel sounds are normoactive  Extremities: Warm, + b/l LE edema, clubbing or cyanosis  Psychiatric: Normal mood and affect; answers questions appropriately  Dermatologic: No rashes; normal skin turgor    Diagnostic testing:    Labs:   Lab Results   Component Value Date    PT 23.7 (H) 10/09/2011    INR 1.95 (H) 2024    INR 2.24 (H) 2024        Lab Results   Component Value Date    CREATSERUM 0.67  01/03/2024    BUN 21 01/03/2024     01/03/2024    K 3.9 01/03/2024     01/03/2024    CO2 24.0 01/03/2024     01/03/2024    CA 9.3 01/03/2024    INR 1.95 01/03/2024    PTP 22.4 01/03/2024       Cardiac diagnostics:    EKG 1/2/2024: Atrial fibrillation with rapid ventricular response 115 bpm    Echocardiogram 07/2023  CONCLUSIONS:   --Study quality: fair.   -Two-dimensional transthoracic echocardiography was performed using standard views & projections with M-mode and Doppler (continuous, pulsed wave, spectral & color flow).   -The left ventricle is mildly dilated. There is no left ventricular hypertrophy. Left ventricular systolic function is mildly decreased. EF = 45% (visual est.) Left ventricular diastolic function was not evaluated due to atrial fibrillation.   -The right ventricle is normal in size. The estimated right ventricular systolic pressure is 29.2 mmHg plus the right atrial pressure. The estimated right ventricular systolic pressure is 32 mmHg. The right atrial pressure is 3 mmHg. Finding is consistent with normal pulmonary artery pressures.   -The left atrial size is severely enlarged.   -There is moderate mitral stenosis. There is trivial mitral valve regurgitation. The peak gradient is 15 mmHg and the mean gradient is 6 mmHg.   -There is trivial tricuspid valve regurgitation.   -There is a bioprosthetic valve present in the aortic valve position. There is no aortic valve regurgitation. The peak gradient is 27 mmHg and the mean gradient is 17 mmHg.   -There is trivial pulmonic valve regurgitation.   -The visualized aorta is normal in size.     TAVR 1/5/2021  Postprocedural imaging after TAVR shows:  LVEF: 65%  Mean aortic valve gradient: 9 mm Hg  Aortic valve area (continuity method): 2.5 cm2  Aortic regurgitation: Trace perivalvular  No pericardial effusion     Thank you for allowing our practice to participate in the care of your patient. Please do not hesitate to contact me if  you have any questions.    Khoa Adkins MD  Interventional Cardiology  [facility]  Office: [phone]

## 2024-01-05 LAB
ANION GAP SERPL CALC-SCNC: 3 MMOL/L (ref 0–18)
BUN BLD-MCNC: 25 MG/DL (ref 9–23)
CALCIUM BLD-MCNC: 9 MG/DL (ref 8.5–10.1)
CHLORIDE SERPL-SCNC: 103 MMOL/L (ref 98–112)
CO2 SERPL-SCNC: 34 MMOL/L (ref 21–32)
CREAT BLD-MCNC: 0.75 MG/DL
EGFRCR SERPLBLD CKD-EPI 2021: 86 ML/MIN/1.73M2 (ref 60–?)
ERYTHROCYTE [DISTWIDTH] IN BLOOD BY AUTOMATED COUNT: 13.1 %
GLUCOSE BLD-MCNC: 130 MG/DL (ref 70–99)
HCT VFR BLD AUTO: 40 %
HGB BLD-MCNC: 12.7 G/DL
INR BLD: 1.63 (ref 0.8–1.2)
MCH RBC QN AUTO: 31.1 PG (ref 26–34)
MCHC RBC AUTO-ENTMCNC: 31.8 G/DL (ref 31–37)
MCV RBC AUTO: 97.8 FL
OSMOLALITY SERPL CALC.SUM OF ELEC: 296 MOSM/KG (ref 275–295)
PLATELET # BLD AUTO: 290 10(3)UL (ref 150–450)
POTASSIUM SERPL-SCNC: 3.9 MMOL/L (ref 3.5–5.1)
PROTHROMBIN TIME: 19.4 SECONDS (ref 11.6–14.8)
RBC # BLD AUTO: 4.09 X10(6)UL
SODIUM SERPL-SCNC: 140 MMOL/L (ref 136–145)
WBC # BLD AUTO: 7.5 X10(3) UL (ref 4–11)

## 2024-01-05 PROCEDURE — 85027 COMPLETE CBC AUTOMATED: CPT | Performed by: INTERNAL MEDICINE

## 2024-01-05 PROCEDURE — 80048 BASIC METABOLIC PNL TOTAL CA: CPT | Performed by: INTERNAL MEDICINE

## 2024-01-05 PROCEDURE — 94799 UNLISTED PULMONARY SVC/PX: CPT

## 2024-01-05 PROCEDURE — S0171 BUMETANIDE 0.5 MG: HCPCS | Performed by: INTERNAL MEDICINE

## 2024-01-05 PROCEDURE — 85610 PROTHROMBIN TIME: CPT | Performed by: INTERNAL MEDICINE

## 2024-01-05 NOTE — CM/SW NOTE
Script for Eliquis electronically sent to patient's [facility] pharmacy--cost with insurance $94.oo--able to provide 30-day free savings coupon--pt's pharmacy open sat 9 am to 5 pm and Sunday 10 am to 4 pm--updated [facility]--updated  patient states 'can't take due to her artifical valve--updated patricia--script cancelled @ [facility]    [facility] phone  [phone]

## 2024-01-05 NOTE — PLAN OF CARE
Assumed care of pt around 1930. Pt alert and oriented x4, denies any pain or shortness of breath. Lung sounds diminished bilaterally, afib on tele. Abdomen soft, non tender, LBM 1/4. Instructed on how to properly use IS. Bed locked and in lowest position, call light within reach.  POC: continue IV antibiotics, continue IV bumex, daily weight, strict I/Os, 2L fluid restriction. Pt updated, all questions answered, verbalized understanding.    Problem: PAIN - ADULT  Goal: Verbalizes/displays adequate comfort level or patient's stated pain goal  Description: INTERVENTIONS:  - Encourage pt to monitor pain and request assistance  - Assess pain using appropriate pain scale  - Administer analgesics based on type and severity of pain and evaluate response  - Implement non-pharmacological measures as appropriate and evaluate response  - Consider cultural and social influences on pain and pain management  - Manage/alleviate anxiety  - Utilize distraction and/or relaxation techniques  - Monitor for opioid side effects  - Notify MD/LIP if interventions unsuccessful or patient reports new pain  - Anticipate increased pain with activity and pre-medicate as appropriate  Outcome: Progressing     Problem: RISK FOR INFECTION - ADULT  Goal: Absence of fever/infection during anticipated neutropenic period  Description: INTERVENTIONS  - Monitor WBC  - Administer growth factors as ordered  - Implement neutropenic guidelines  Outcome: Progressing     Problem: SAFETY ADULT - FALL  Goal: Free from fall injury  Description: INTERVENTIONS:  - Assess pt frequently for physical needs  - Identify cognitive and physical deficits and behaviors that affect risk of falls.  - [place] fall precautions as indicated by assessment.  - Educate pt/family on patient safety including physical limitations  - Instruct pt to call for assistance with activity based on assessment  - Modify environment to reduce risk of injury  - Provide assistive devices as  appropriate  - Consider OT/PT consult to assist with strengthening/mobility  - Encourage toileting schedule  Outcome: Progressing     Problem: RESPIRATORY - ADULT  Goal: Achieves optimal ventilation and oxygenation  Description: INTERVENTIONS:  - Assess for changes in respiratory status  - Assess for changes in mentation and behavior  - Position to facilitate oxygenation and minimize respiratory effort  - Oxygen supplementation based on oxygen saturation or ABGs  - Provide Smoking Cessation handout, if applicable  - Encourage broncho-pulmonary hygiene including cough, deep breathe, Incentive Spirometry  - Assess the need for suctioning and perform as needed  - Assess and instruct to report SOB or any respiratory difficulty  - Respiratory Therapy support as indicated  - Manage/alleviate anxiety  - Monitor for signs/symptoms of CO2 retention  Outcome: Progressing

## 2024-01-05 NOTE — PROGRESS NOTES
[facility]    Arleth Weiss Patient Status:  Inpatient    1955 MRN [id number]   Location [facility] 2NE-A Attending Omi Blackman MD   Hosp Day # 3 PCP Viktoriya Garcias,      SUBJECTIVE: doing better overall.     OBJECTIVE:  /83 (BP Location: Left arm)   Pulse 90   Temp 97.9 °F (36.6 °C) (Oral)   Resp 21   Ht 175.3 cm (5' 9\")   Wt 225 lb 5 oz (102.2 kg)   LMP  (LMP Unknown)   SpO2 95%   BMI 33.27 kg/m²   O2 requirement: on room air     I/O last 3 completed shifts:  In: 240 [P.O.:240]  Out: 6450 [Urine:6450]  I/O this shift:  In: 30 [P.O.:30]  Out: 850 [Urine:850]     Current Medications:   Current Facility-Administered Medications:     benzonatate (Tessalon) cap 100 mg, 100 mg, Oral, TID PRN    ipratropium-albuterol (Duoneb) 0.5-2.5 (3) MG/3ML inhalation solution 3 mL, 3 mL, Nebulization, Q6H PRN    metoprolol succinate ER (Toprol XL) 24 hr tab 25 mg, 25 mg, Oral, 2x Daily(Beta Blocker)    digoxin (Lanoxin) tab 125 mcg, 125 mcg, Oral, Daily    spironolactone (Aldactone) tab 25 mg, 25 mg, Oral, Daily    bumetanide (Bumex) 0.25 MG/ML injection 2 mg, 2 mg, Intravenous, BID (Diuretic)    bumetanide (Bumex) 0.25 MG/ML injection 1 mg, 1 mg, Intravenous, Once    allopurinol (Zyloprim) tab 100 mg, 100 mg, Oral, Daily    buPROPion SR (WELLBUTRIN SR) 12 hr tab 150 mg, 150 mg, Oral, BID    FLUoxetine (PROzac) cap 20 mg, 20 mg, Oral, BID    dilTIAZem ER (CardIZEM CD) 24 hr cap 240 mg, 240 mg, Oral, Daily    levothyroxine (Synthroid) tab 150 mcg, 150 mcg, Oral, Daily @ 0700    rosuvastatin (Crestor) tab 5 mg, 5 mg, Oral, Nightly    cefTRIAXone (Rocephin) 2 g in D5W 100 mL IVPB-ADD, 2 g, Intravenous, Q24H    acetaminophen (Tylenol) tab 650 mg, 650 mg, Oral, Q6H PRN     General appearance: alert, appears stated age, and cooperative  Lungs: clear to auscultation bilaterally  Heart: S1, S2 normal, no murmur, click, rub or gallop, regular rate and rhythm  Abdomen: soft, non-tender; bowel sounds  normal; no masses,  no organomegaly  Extremities: extremities normal, atraumatic, no cyanosis or edema     Lab Results   Component Value Date    WBC 7.5 01/05/2024    RBC 4.09 01/05/2024    HGB 12.7 01/05/2024    HCT 40.0 01/05/2024    MCV 97.8 01/05/2024    MCH 31.1 01/05/2024    MCHC 31.8 01/05/2024    RDW 13.1 01/05/2024    .0 01/05/2024     Lab Results   Component Value Date     01/05/2024    K 3.9 01/05/2024     01/05/2024    CO2 34.0 01/05/2024    BUN 25 01/05/2024    CREATSERUM 0.75 01/05/2024     01/05/2024    CA 9.0 01/05/2024     Lab Results   Component Value Date    PT 23.7 (H) 10/09/2011    INR 1.63 (H) 01/05/2024    INR 1.71 (H) 01/04/2024    INR 1.95 (H) 01/03/2024          Imaging: reviewed. Per EMR     Assessment / Plan:  Acute hypoxic respiratory failure - due to pulmonary edema; less likely PNA in absence of infectious symptoms.  Improving with diuresis.  - wean supplemental O2 as able- down to room air  - bronchial hygiene- IS to bedside  HFpEF exacerbation  - Bumex  - per cardiology  Possible PNA - PCT negative; heavy phlegm but cx's NGTD  - empiric ceftriaxone x5d and azithromycin (1/2- ) completed  - RVP negative  - sputum cx with normal mckay  Asthma-COPD - no wheezing on exam  - BD protocol  - follows with Dr. Hernandez at [facility]  SHELDON - CPAP  Dispo- Full code  - stable for dc from pulm perspective  - will follow peripherally, please call with questions    William Herring MD  1/5/2024  12:30 PM

## 2024-01-05 NOTE — CONSULTS
[facility] Pharmacy Dosing Service  Warfarin (Coumadin) Subsequent Dosing    Arleth Weiss is a 68 year old patient for whom pharmacy is dosing warfarin (Coumadin). Goal INR is 2-3    Recent Labs   Lab 01/02/24  1454 01/03/24  0834 01/04/24  0649 01/05/24  0624   INR 2.24* 1.95* 1.71* 1.63*     Consulted by:  Dr. Blackman  Indication:  AFib, hx PE, s/p TAVR  Potential Drug Interactions: Allopurinol, Levothyroxine  Other Anticoagulants:  None  Home regimen (if applicable):  5 mg daily     Inpatient Dosing History:     Date 1/2 1/3 1/4* 1/5        INR 2.24 1.95 1.71 1.63        Coumadin dose 5 mg 5 mg 8 mg                   Based on above -  1.  For today, Give warfarin (COUMADIN) 9 mg at 2100 tonight  2   PT/INR ordered daily while on warfarin  3.  Pharmacy will continue to follow.  We appreciate the opportunity to assist in the care of this patient.    Fanny Carroll STEPHEN  1/5/2024  2:39 PM

## 2024-01-05 NOTE — PROGRESS NOTES
[facility]  Consultation Note      Arleth Weiss Patient Status:  Inpatient    1955 MRN [id number]   Location [facility] 2NE-A Attending Sachin Delacruz MD   Hosp Day # 3 PCP Viktoriya Garcias DO     Reason for consult: CHF    Subjective: No CP. SOB and cough improved. Still c/o edema b/l thighs, but improving.    Impression:  68 year old female admitted with SOB and edema from decompensated HFpEF  PNA  Medication nonadherence  Recurrent hospitalizations for HF  Chronic AF - mildly high rates. On warfarin.   Severe AS s/p TAVR   Moderate mitral stenosis   CAD s/p PCI LCX 3/21   PVD with acute limb ischemia and was treated with angiojet and POBA and aborted left pop cutdown 10/23 - sees Raymond  COPD  HTN  HLD    Recommendations:  Continue bumex 2mg IV BID. Weight 235 -> 225 lbs today, dry wt ~ 215 lbs. Monitor I/O and renal function  Continue home digoxin - HR controlled. Monitor levels while on azithromycin.   Med compliance stressed  Abx per hospitalist  Continue home GDMT including Toprol, spironolactone. Add Entresto next, once adequately diuresed and renal functions stable. Eventually SGLT2i.   Continue plavix, statin  Continue warfarin, INR goal 2-3. Consider DOAC.     Primary cardiologist: Armand Case MD     History of Present Illness:  Arleth Weiss is a 68 year old female with chronic atrial fibrillation on coumadin, aortic stenosis s/p TAVR , COPD, htn, hld, obesity, fibromyalgia, hx PE, IVC filter, hx Pulm htn, chronic diastolic HF, SHELDON, acute LLE ischemia s/p angiojet and POBA and aborted left pop cutdown at [facility] 10/2023d (follows Dr. Andrade),  who presented to [facility] on 2024 with worsening SOB and edema. Admits to nonadherence with her daily bumex. Did not like the urinary urgency/frequency.  Wt was 235 lbs today. At cardio office visit 1 month ago, was 215 lbs after a recent hospitalization -23 at [facility] with acute on chronic  diastolic CHF, had 25 lb weight gain and edema up to thighs. Had fluid overload likely due to dietary indiscretions, and medication noncompliance. Improved with IV Bumex, metoprolol dose increased to BID for better rate control. Weight 47 lbs down, discharged on po bumex 1 mg bid. Denies CP, palps LH or syncope.     Medications:        Past Medical History:   Diagnosis Date    Aortic stenosis     S/P TAVR    ASTHMA     Asthma     Back problem     CANCER     thyroid    Cancer (HCC)     thyroid     COPD (chronic obstructive pulmonary disease) (HCC)     DEPRESSION     Disorder of thyroid     Essential hypertension     Fibromyalgia     High blood pressure     High cholesterol     HYPERTENSION     HYPOTHYROIDISM     Muscle weakness     OBESITY     OSTEOARTHRITIS     Osteoarthritis     OTHER DISEASES     Fibromyalgia    OTHER DISEASES     Pulmonary emboli    OTHER DISEASES     Lymph edema    OTHER DISEASES     Pulmonary hypertension    Peripheral vascular disease (HCC)     Pulmonary embolism (HCC)     Shortness of breath     ON EXERTION    SLEEP APNEA     Sleep apnea     CPAP       Past Surgical History:   Procedure Laterality Date    ANESTH,SHOULDER REPLACEMENT Right 2014    hemiathroplasty    BACK SURGERY  2000    BACK SURGERY  2004    complete c-3 -7 ectomy    BIOPSY Left 07/20/2023    TEMPORAL ARTERY    CATH TRANSCATHETER AORTIC VALVE REPLACEMENT      COLONOSCOPY N/A 05/10/2018    Procedure: COLONOSCOPY, POSSIBLE BIOPSY, POSSIBLE POLYPECTOMY 65796;  Surgeon: Kendell Valentin MD;  [place] [facility] [place] [facility]    COLONOSCOPY      D & C  09/2009    DILATION/CURETTAGE,DIAGNOSTIC      HIP REPLACEMENT SURGERY  09/2009    Rt hip    KNEE REPLACEMENT SURGERY  2003    Both knees    OTHER SURGICAL HISTORY  1989    Thyroid removal    OTHER SURGICAL HISTORY  2004    LT foot spur removal    TOTAL HIP REPLACEMENT      TOTAL KNEE REPLACEMENT         Family History  family history includes Cancer in her brother; Diabetes in  her mother; Hypertension in her brother, father, and mother; Lipids in her father and mother.    Social History   reports that she quit smoking about 32 years ago. Her smoking use included cigarettes. She has a 15.00 pack-year smoking history. She has never used smokeless tobacco. She reports that she does not drink alcohol and does not use drugs.     Allergies  Allergies   Allergen Reactions    Adhesive Tape HIVES     ALL ADHESIVES    Codeine HIVES    Doxycycline SWELLING    Hydrocodone UNKNOWN    Lisinopril TONGUE SWELLING    Metoprolol HIVES    Morphine Sulfate HIVES    Opioid Analgesics UNKNOWN    Oxycontin ITCHING    Oxytocin HIVES    Vicodin [Hydrocodone-Acetaminophen] ITCHING    Skin Adhesives OTHER (SEE COMMENTS)       Review of Systems:  As per HPI, otherwise 10 point ROS is negative in detail.    Physical Exam:  Blood pressure 102/83, pulse 98, temperature 97.9 °F (36.6 °C), temperature source Oral, resp. rate 17, height 69\", weight 225 lb 5 oz (102.2 kg), SpO2 93%, not currently breastfeeding.  Temp (24hrs), Av.8 °F (36.6 °C), Min:97.5 °F (36.4 °C), Max:98.1 °F (36.7 °C)    Wt Readings from Last 3 Encounters:   24 225 lb 5 oz (102.2 kg)   23 219 lb (99.3 kg)   23 219 lb (99.3 kg)       General: Awake and alert; in no acute distress  HEENT: Extraocular movements are intact; sclerae are anicteric; scalp is atraumatic  Neck: Supple; no JVD; no carotid bruits  Cardiac: Irreg ireg, variable S1, nl S2, no murmurs, rubs, or gallops are appreciated  Lungs: Clear to auscultation bilaterally; no accessory muscle use is noted, no wheezes, rhonci or rales  Abdomen: Soft, non-distended, non-tender; bowel sounds are normoactive  Extremities: Warm, + b/l LE edema, clubbing or cyanosis  Psychiatric: Normal mood and affect; answers questions appropriately  Dermatologic: No rashes; normal skin turgor    Diagnostic testing:    Labs:   Lab Results   Component Value Date    PT 23.7 (H) 10/09/2011    INR  1.63 (H) 01/05/2024    INR 1.71 (H) 01/04/2024        Lab Results   Component Value Date    WBC 7.5 01/05/2024    HGB 12.7 01/05/2024    HCT 40.0 01/05/2024    .0 01/05/2024    CREATSERUM 0.75 01/05/2024    BUN 25 01/05/2024     01/05/2024    K 3.9 01/05/2024     01/05/2024    CO2 34.0 01/05/2024     01/05/2024    CA 9.0 01/05/2024    INR 1.63 01/05/2024    PTP 19.4 01/05/2024       Cardiac diagnostics:    EKG 1/2/2024: Atrial fibrillation with rapid ventricular response 115 bpm    Echocardiogram 07/2023  CONCLUSIONS:   --Study quality: fair.   -Two-dimensional transthoracic echocardiography was performed using standard views & projections with M-mode and Doppler (continuous, pulsed wave, spectral & color flow).   -The left ventricle is mildly dilated. There is no left ventricular hypertrophy. Left ventricular systolic function is mildly decreased. EF = 45% (visual est.) Left ventricular diastolic function was not evaluated due to atrial fibrillation.   -The right ventricle is normal in size. The estimated right ventricular systolic pressure is 29.2 mmHg plus the right atrial pressure. The estimated right ventricular systolic pressure is 32 mmHg. The right atrial pressure is 3 mmHg. Finding is consistent with normal pulmonary artery pressures.   -The left atrial size is severely enlarged.   -There is moderate mitral stenosis. There is trivial mitral valve regurgitation. The peak gradient is 15 mmHg and the mean gradient is 6 mmHg.   -There is trivial tricuspid valve regurgitation.   -There is a bioprosthetic valve present in the aortic valve position. There is no aortic valve regurgitation. The peak gradient is 27 mmHg and the mean gradient is 17 mmHg.   -There is trivial pulmonic valve regurgitation.   -The visualized aorta is normal in size.     TAVR 1/5/2021  Postprocedural imaging after TAVR shows:  LVEF: 65%  Mean aortic valve gradient: 9 mm Hg  Aortic valve area (continuity method):  2.5 cm2  Aortic regurgitation: Trace perivalvular  No pericardial effusion     Thank you for allowing our practice to participate in the care of your patient. Please do not hesitate to contact me if you have any questions.    Khoa Adkins MD  Interventional Cardiology  [facility]  Office: [phone]

## 2024-01-06 LAB
ANION GAP SERPL CALC-SCNC: 3 MMOL/L (ref 0–18)
BUN BLD-MCNC: 24 MG/DL (ref 9–23)
CALCIUM BLD-MCNC: 9.4 MG/DL (ref 8.5–10.1)
CHLORIDE SERPL-SCNC: 101 MMOL/L (ref 98–112)
CO2 SERPL-SCNC: 36 MMOL/L (ref 21–32)
CREAT BLD-MCNC: 0.84 MG/DL
EGFRCR SERPLBLD CKD-EPI 2021: 75 ML/MIN/1.73M2 (ref 60–?)
GLUCOSE BLD-MCNC: 128 MG/DL (ref 70–99)
INR BLD: 1.79 (ref 0.8–1.2)
OSMOLALITY SERPL CALC.SUM OF ELEC: 296 MOSM/KG (ref 275–295)
POTASSIUM SERPL-SCNC: 3.5 MMOL/L (ref 3.5–5.1)
POTASSIUM SERPL-SCNC: 5.2 MMOL/L (ref 3.5–5.1)
PROTHROMBIN TIME: 21 SECONDS (ref 11.6–14.8)
SODIUM SERPL-SCNC: 140 MMOL/L (ref 136–145)

## 2024-01-06 PROCEDURE — 84132 ASSAY OF SERUM POTASSIUM: CPT | Performed by: INTERNAL MEDICINE

## 2024-01-06 PROCEDURE — S0171 BUMETANIDE 0.5 MG: HCPCS | Performed by: INTERNAL MEDICINE

## 2024-01-06 PROCEDURE — 85610 PROTHROMBIN TIME: CPT | Performed by: INTERNAL MEDICINE

## 2024-01-06 PROCEDURE — 80048 BASIC METABOLIC PNL TOTAL CA: CPT | Performed by: INTERNAL MEDICINE

## 2024-01-06 PROCEDURE — 94799 UNLISTED PULMONARY SVC/PX: CPT

## 2024-01-06 RX ORDER — POTASSIUM CHLORIDE 750 MG/1
10 TABLET, EXTENDED RELEASE ORAL DAILY
Status: DISCONTINUED | OUTPATIENT
Start: 2024-01-06 | End: 2024-01-06

## 2024-01-06 RX ORDER — WARFARIN SODIUM 5 MG/1
5 TABLET ORAL
Status: COMPLETED | OUTPATIENT
Start: 2024-01-06 | End: 2024-01-06

## 2024-01-06 RX ORDER — POTASSIUM CHLORIDE 20 MEQ/1
40 TABLET, EXTENDED RELEASE ORAL EVERY 4 HOURS
Status: DISCONTINUED | OUTPATIENT
Start: 2024-01-06 | End: 2024-01-06

## 2024-01-06 RX ORDER — POTASSIUM CHLORIDE 750 MG/1
40 TABLET, EXTENDED RELEASE ORAL DAILY
Status: DISCONTINUED | OUTPATIENT
Start: 2024-01-06 | End: 2024-01-11

## 2024-01-06 NOTE — PROGRESS NOTES
[facility]    Cardiology Progress Note    Arleth Weiss Patient Status:  Inpatient    1955 MRN [id number]   Location [facility] 2NE-A Attending Omi Blackman MD   Hosp Day # 4 PCP Viktoriya Garcias,        Impression/Plan:  68 year old female admitted with    SOB and edema from decompensated HFpEF (EF 50-55% 2023)  PNA  Medication nonadherence  Recurrent hospitalizations for HF  Chronic AF - mildly high rates. On warfarin.   Severe AS s/p TAVR   Moderate mitral stenosis   CAD s/p PCI LCX 3/21   PVD with acute limb ischemia and was treated with angiojet and POBA and aborted left pop cutdown 10/23 - sees Gaffud  COPD  HTN  HLD    - Cont IV diuresis with bumex 2mg bid; strict I/Os, monitoring of renal function and electrolytes  - Cont metoprolol, diltiazem, digoxin for rate control  - Cont home dose statin, spironolactone  - Warfarin for stroke prevention in AF with goal INR 2-3  - Abx as per primary service  - Monitor on tele  - Will follow     Subjective: No events overnight.  Today patient notes ongoing cough but states breathing and edema improved.  Denies chest pain or palpitations.    Patient Active Problem List   Diagnosis    Essential hypertension    Hypothyroidism    Fibromyalgia    Ankylosing spondylitis (HCC)    SHELDON on CPAP    Malaise and fatigue    High blood pressure    Degenerative arthritis of finger    Hypercholesteremia    GERD (gastroesophageal reflux disease)    Lumbar degenerative disc disease    PE (pulmonary thromboembolism) ([facility])    Status post evacuation of subdural hematoma    PAF (paroxysmal atrial fibrillation) ([facility])    Vitamin deficiency    Neuropathy    Presence of IVC filter    Severe aortic stenosis    Depression, unspecified depression type    Aortic valve disease    Long term (current) use of anticoagulants [Z79.01]    Carotid artery plaque, bilateral    Bilateral carotid artery disease (HCC)    Rheumatoid arthritis (HCC)    Cervical stenosis of spinal canal     Renal insufficiency syndrome    ERINN (acute kidney injury) ([facility])    COPD (chronic obstructive pulmonary disease) ([facility])    CKD (chronic kidney disease) stage 3, GFR 30-59 ml/min ([facility])    Morbid obesity with BMI of 40.0-44.9, adult ([facility])    Binge eating disorder    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, right hand    Pre-op testing    Chronic diastolic CHF (congestive heart failure) ([facility])    S/P TAVR (transcatheter aortic valve replacement)    Coronary artery disease involving native coronary artery of native heart with other form of angina pectoris ([facility])    S/P KATHLEEN to LCx 3/15/21    Hospital discharge follow-up    Major depressive disorder, single episode, in partial remission ([facility])    Hyponatremia    Hypokalemia    Anemia, unspecified type    Osteomyelitis of toe of right foot ([facility])    Cellulitis of right lower extremity    Hypervolemia    Hypervolemia, unspecified hypervolemia type    Exertional dyspnea    Weakness generalized    Hip pain    Acute pain of left knee    Gross hematuria    Atrial fibrillation with RVR ([facility])    Acute on chronic congestive heart failure, unspecified heart failure type ([facility])    Respiratory syncytial virus (RSV)    Constipation, unspecified constipation type    Dehydration    Atrial fibrillation with rapid ventricular response ([facility])    Community acquired pneumonia, unspecified laterality    Acute bronchospasm    Atrial fibrillation, chronic ([facility])       Objective:   Temp: 97.8 °F (36.6 °C)  Pulse: 97  Resp: 20  BP: 149/83    Intake/Output:     Intake/Output Summary (Last 24 hours) at 1/6/2024 1048  Last data filed at 1/6/2024 0841  Gross per 24 hour   Intake --   Output 2550 ml   Net -2550 ml       Last 3 Weights   01/05/24 0450 225 lb 5 oz (102.2 kg)   01/04/24 0611 235 lb 14.3 oz (107 kg)   01/02/24 1816 235 lb 0.2 oz (106.6 kg)   01/02/24 1238 235 lb (106.6 kg)   08/08/23 1335 219 lb (99.3 kg)   06/27/23 1100 219 lb (99.3 kg)       Tele: AF    Physical Exam:    General: Alert and oriented x 3.  No apparent distress. No respiratory or constitutional distress.  HEENT: Normocephalic, anicteric sclera, neck supple, no thyromegaly or adenopathy.  Neck: No JVD, carotids 2+, no bruits.  Cardiac: Irregularly irregular  Lungs: Clear without wheezes, rales, rhonchi or dullness.  Normal excursions and effort.  Abdomen: Soft, non-tender. No organosplenomegally, mass or rebound, BS-present.  Extremities: Without clubbing, cyanosis or edema.  Peripheral pulses are 2+.  Neurologic: Alert and oriented, normal affect. No motor or coordinational deficit.  Skin: Warm and dry.     Laboratory/Data:    Labs:         Recent Labs   Lab 01/02/24  1454 01/03/24  0834 01/04/24  0649 01/05/24  0624 01/06/24  0855   WBC 10.2  --  7.6 7.5  --    HGB 11.4*  --  11.5* 12.7  --    MCV 95.8  --  98.4 97.8  --    .0  --  267.0 290.0  --    INR 2.24* 1.95* 1.71* 1.63* 1.79*       Recent Labs   Lab 01/02/24  1454 01/03/24  0544 01/04/24  0649 01/04/24  1850 01/05/24  0624 01/06/24  0855    141 140  --  140 140   K 4.0 3.9 3.3* 3.9 3.9 3.5    110 105  --  103 101   CO2 25.0 24.0 31.0  --  34.0* 36.0*   BUN 22 21 26*  --  25* 24*   CREATSERUM 0.63 0.67 0.70  --  0.75 0.84   CA 9.2 9.3 8.5  --  9.0 9.4   MG  --   --  2.2  --   --   --    * 192* 131*  --  130* 128*       Recent Labs   Lab 01/02/24  1454   ALT 36   AST 29   ALB 3.1*       No results for input(s): \"TROP\" in the last 168 hours.    Allergies:   Allergies   Allergen Reactions    Adhesive Tape HIVES     ALL ADHESIVES    Codeine HIVES    Doxycycline SWELLING    Hydrocodone UNKNOWN    Lisinopril TONGUE SWELLING    Metoprolol HIVES    Morphine Sulfate HIVES    Opioid Analgesics UNKNOWN    Oxycontin ITCHING    Oxytocin HIVES    Vicodin [Hydrocodone-Acetaminophen] ITCHING    Skin Adhesives OTHER (SEE COMMENTS)       Medications:  Current Facility-Administered Medications   Medication Dose Route Frequency    potassium chloride (K-Dur) tab 40 mEq  40 mEq Oral Q4H     benzonatate (Tessalon) cap 100 mg  100 mg Oral TID PRN    ipratropium-albuterol (Duoneb) 0.5-2.5 (3) MG/3ML inhalation solution 3 mL  3 mL Nebulization Q6H PRN    metoprolol succinate ER (Toprol XL) 24 hr tab 25 mg  25 mg Oral 2x Daily(Beta Blocker)    digoxin (Lanoxin) tab 125 mcg  125 mcg Oral Daily    spironolactone (Aldactone) tab 25 mg  25 mg Oral Daily    bumetanide (Bumex) 0.25 MG/ML injection 2 mg  2 mg Intravenous BID (Diuretic)    bumetanide (Bumex) 0.25 MG/ML injection 1 mg  1 mg Intravenous Once    allopurinol (Zyloprim) tab 100 mg  100 mg Oral Daily    buPROPion SR (WELLBUTRIN SR) 12 hr tab 150 mg  150 mg Oral BID    FLUoxetine (PROzac) cap 20 mg  20 mg Oral BID    dilTIAZem ER (CardIZEM CD) 24 hr cap 240 mg  240 mg Oral Daily    levothyroxine (Synthroid) tab 150 mcg  150 mcg Oral Daily @ 0700    rosuvastatin (Crestor) tab 5 mg  5 mg Oral Nightly    cefTRIAXone (Rocephin) 2 g in D5W 100 mL IVPB-ADD  2 g Intravenous Q24H    acetaminophen (Tylenol) tab 650 mg  650 mg Oral Q6H PRN       Sherwin Crespo MD  1/6/2024  10:48 AM

## 2024-01-06 NOTE — PLAN OF CARE
Assumed care of pt around 1930. Pt alert and oriented x4, denies any pain or shortness of breath. Lung sounds diminished bilaterally with productive cough, afib on tele. Abdomen soft, non tender, LBM 1/4. Left great toe wound dressing C/D/I. Bed locked and in lowest position, call light within reach.  POC: continue antibiotics, continue IV diuretics, daily weight, fluid restriction w/ strict I/Os. Pt updated, all questions answered, verbalized understanding.    Problem: PAIN - ADULT  Goal: Verbalizes/displays adequate comfort level or patient's stated pain goal  Description: INTERVENTIONS:  - Encourage pt to monitor pain and request assistance  - Assess pain using appropriate pain scale  - Administer analgesics based on type and severity of pain and evaluate response  - Implement non-pharmacological measures as appropriate and evaluate response  - Consider cultural and social influences on pain and pain management  - Manage/alleviate anxiety  - Utilize distraction and/or relaxation techniques  - Monitor for opioid side effects  - Notify MD/LIP if interventions unsuccessful or patient reports new pain  - Anticipate increased pain with activity and pre-medicate as appropriate  Outcome: Progressing     Problem: RISK FOR INFECTION - ADULT  Goal: Absence of fever/infection during anticipated neutropenic period  Description: INTERVENTIONS  - Monitor WBC  - Administer growth factors as ordered  - Implement neutropenic guidelines  Outcome: Progressing     Problem: SAFETY ADULT - FALL  Goal: Free from fall injury  Description: INTERVENTIONS:  - Assess pt frequently for physical needs  - Identify cognitive and physical deficits and behaviors that affect risk of falls.  - Winnie fall precautions as indicated by assessment.  - Educate pt/family on patient safety including physical limitations  - Instruct pt to call for assistance with activity based on assessment  - Modify environment to reduce risk of injury  - Provide  assistive devices as appropriate  - Consider OT/PT consult to assist with strengthening/mobility  - Encourage toileting schedule  Outcome: Progressing     Problem: RESPIRATORY - ADULT  Goal: Achieves optimal ventilation and oxygenation  Description: INTERVENTIONS:  - Assess for changes in respiratory status  - Assess for changes in mentation and behavior  - Position to facilitate oxygenation and minimize respiratory effort  - Oxygen supplementation based on oxygen saturation or ABGs  - Provide Smoking Cessation handout, if applicable  - Encourage broncho-pulmonary hygiene including cough, deep breathe, Incentive Spirometry  - Assess the need for suctioning and perform as needed  - Assess and instruct to report SOB or any respiratory difficulty  - Respiratory Therapy support as indicated  - Manage/alleviate anxiety  - Monitor for signs/symptoms of CO2 retention  Outcome: Progressing     Problem: Patient/Family Goals  Goal: Patient/Family Long Term Goal  Description: Patient's Long Term Goal: to go home    Interventions:  - MD rounding, medication management, monitor labs, IV bumex  - See additional Care Plan goals for specific interventions  Outcome: Progressing  Goal: Patient/Family Short Term Goal  Description: Patient's Short Term Goal: remain pain free    Interventions:   - pain assessments q4, pain meds PRN  - See additional Care Plan goals for specific interventions  Outcome: Progressing

## 2024-01-06 NOTE — PLAN OF CARE
Pt and VS have remained stable thus far this shift. Denies CP. SOB noted. Sats maintained in 90's on oxygen. Lasix BID per MD appear to be tolerating well. Electrolytes replaced per protocol. Pt has declined to walk twice this shift. POC updated. Resting in bed will con't to monitor PRN.

## 2024-01-06 NOTE — PROGRESS NOTES
[facility] Dosing Service  Warfarin (Coumadin) Subsequent Dosing    Arleth Weiss is a 68 year old patient for whom pharmacy is dosing warfarin (Coumadin). Goal INR is 2-3-confirmed goal as 2-3 while admitted here with cards    Recent Labs   Lab 01/02/24  1454 01/03/24  0834 01/04/24  0649 01/05/24  0624 01/06/24  0855   INR 2.24* 1.95* 1.71* 1.63* 1.79*       Consulted by:  Dr Blackman  Indication:  AFib, hx PE, s/p TAVR  Potential Drug Interactions:  Allopurinol, Levothyroxine  Other Anticoagulants:  none  Home regimen (if applicable):  5 mg daily    Inpatient Dosing History:    Date 1/2 1/3 1/4* 1/5 1/6     INR 2.24 1.95 1.71 1.63  1.79      Coumadin dose 5 mg 5 mg 8 mg  9 mg                 Based on above -  1.  For today, Give warfarin (COUMADIN) 5 mg at 2100 tonight-anticipate INR being therapeutic tomorrow  2   PT/INR ordered daily while on warfarin  3.  Pharmacy will continue to follow.  We appreciate the opportunity to assist in the care of this patient.    Patito Good PharmD  1/6/2024  2:40 PM

## 2024-01-06 NOTE — PROGRESS NOTES
[facility] Hospitalist Progress Note     PCP: Viktoriya Garcias DO    Chief Complaint: follow-up   Follow up for: The primary encounter diagnosis was Community acquired pneumonia, unspecified laterality. Diagnoses of Acute on chronic congestive heart failure, unspecified heart failure type (HCC), Acute bronchospasm, and Atrial fibrillation, chronic (HCC) were also pertinent to this visit.    Overnight/Interim Events:      SUBJECTIVE:  Lying in bed, feels tried. Hasn't walked (uses crutches at baseline). No pain. L sided discomfort c cough.      OBJECTIVE:  Temp:  [97.7 °F (36.5 °C)-98.9 °F (37.2 °C)] 97.8 °F (36.6 °C)  Pulse:  [73-99] 87  Resp:  [18-22] 20  BP: (111-149)/(62-92) 120/70  SpO2:  [81 %-96 %] 96 %    Intake/Output:    Intake/Output Summary (Last 24 hours) at 1/6/2024 1715  Last data filed at 1/6/2024 1637  Gross per 24 hour   Intake 300 ml   Output 3600 ml   Net -3300 ml       Last 3 Weights   01/05/24 0450 225 lb 5 oz (102.2 kg)   01/04/24 0611 235 lb 14.3 oz (107 kg)   01/02/24 1816 235 lb 0.2 oz (106.6 kg)   01/02/24 1238 235 lb (106.6 kg)   08/08/23 1335 219 lb (99.3 kg)   06/27/23 1100 219 lb (99.3 kg)       Exam    General: Alert, no distress, appears stated age.     Head:  Normocephalic, without obvious abnormality, atraumatic.   Eyes:  Sclera anicteric, EOMs intact.    Nose: Nares normal,  Mucosa normal    Throat: Lips normal   Neck: Supple, symmetrical, trachea midline   Lungs:   Clear to auscultation bilaterally. Normal effort   Chest wall:  No tenderness or deformity   Heart:  Regular rate and rhythm, S1, S2 normal, no murmur, rub or gallop appreciated   Abdomen:   Soft, NT/ND, Bowel sounds normal. No masses,  No organomegaly.    Extremities: Extremities normal, atraumatic, no cyanosis or LE edema.   Skin: Skin color, texture, turgor normal. No rashes or lesions.    Neurologic: Moving all extremities spontaneously, no focal deficit appreciated      Data Review:       Labs:     Recent Labs   Lab  01/02/24  1454 01/03/24  0834 01/04/24  0649 01/05/24  0624 01/06/24  0855   WBC 10.2  --  7.6 7.5  --    HGB 11.4*  --  11.5* 12.7  --    MCV 95.8  --  98.4 97.8  --    .0  --  267.0 290.0  --    INR 2.24* 1.95* 1.71* 1.63* 1.79*       Recent Labs   Lab 01/02/24  1454 01/03/24  0544 01/04/24  0649 01/04/24  1850 01/05/24  0624 01/06/24  0855    141 140  --  140 140   K 4.0 3.9 3.3* 3.9 3.9 3.5    110 105  --  103 101   CO2 25.0 24.0 31.0  --  34.0* 36.0*   BUN 22 21 26*  --  25* 24*   CREATSERUM 0.63 0.67 0.70  --  0.75 0.84   CA 9.2 9.3 8.5  --  9.0 9.4   MG  --   --  2.2  --   --   --    * 192* 131*  --  130* 128*       Recent Labs   Lab 01/02/24  1454   ALT 36   AST 29   ALB 3.1*       No results for input(s): \"PGLU\" in the last 168 hours.    No results for input(s): \"TROP\" in the last 168 hours.      Meds:      warfarin  5 mg Oral Once at night    potassium chloride  40 mEq Oral Daily    metoprolol succinate  25 mg Oral 2x Daily(Beta Blocker)    digoxin  125 mcg Oral Daily    spironolactone  25 mg Oral Daily    bumetanide  2 mg Intravenous BID (Diuretic)    bumetanide  1 mg Intravenous Once    allopurinol  100 mg Oral Daily    buPROPion SR  150 mg Oral BID    FLUoxetine  20 mg Oral BID    dilTIAZem ER  240 mg Oral Daily    levothyroxine  150 mcg Oral Daily @ 0700    rosuvastatin  5 mg Oral Nightly    cefTRIAXone  2 g Intravenous Q24H       benzonatate, ipratropium-albuterol, acetaminophen       Assessment/Plan:     68 year old female with PMH including but not limited to asthma, COPD, Depression, HTN, HTN, PVD who p/t [facility] ED c SOB and cough.     Acute respiratory distress  - likely multifactorial from PNA, volume overload  - supp o2 as needed, now on RA   - coivd, flu, rsv neg; expaneded panel neg       PNA  - CXR reviewed, pt c cough and sputum  - IV CTX (1/2-1/6)/aizthro compelted 3d  - blood cxs NG x2d  - PCT & LA neg, urine strep/legionella neg, sputum cx  - sees pulmonologist   Lila from [facility]  - apprec pulm recs  - tessalon prn cough     Asthma/COPD  - trace wheezing noted, follow  - home inhalers, duonebs if needed  - was given methylprednisolone x1     Acute on Chronic CHF  - tele  - daily wts, I/O  - given IV lasix in ED, takes bumex at home, switched to IV bumex 1mg-->2mg BID   - spironolactone, plan for entresto soon  - dig  - cards c/s apprec   - follows c Dr. Walton, notify as needed; Cr ok currently, pt states has been working on it (renal fxn)  - BNP 2680  - wt down 10#     HTN  Tachycarida  - CCB     Chronic AF  aortic stenosis s/p TAVR  Hx PE, IVCF  - on coumadin, INR 2.24 to 1.95 to 1.71 to 1.63 to 1.79, pharm dose, d/w them  - consider NOAC if able, pt reports INR often fluctuates   - clarified home dose and resumed  - pt reports bruising easily    - CCB  - consider heparin gtt if needed if INR remains low   - pt notes goal INR 2.5-3.5, will clarify c cards along c valve status     # Hypothyroidism  -cont home levothyroxine      # Major Depression/ Generalized anxiety d/o   -reviewed home meds, continue wellbutrin, currently appears stable      # HL  -statin     # Gout  - allupurinol    # SHELDON  -protocol, on cpap     Was on prednisone ~2m ago, now complete, placed by neurologist for elevated ESR/CRP per pt      Dispo: CTU  - lives by self     Questions/concerns were discussed with patient by bedside.     Total Time spent with patient and coordinating care:  35 minutes    Omi Blackman MD  [facility] Hospitalist  [phone]  1/6/2024  5:18 PM

## 2024-01-06 NOTE — PROGRESS NOTES
[facility] Hospitalist Progress Note     PCP: Viktoriya Garcias DO    Chief Complaint: follow-up   Follow up for: The primary encounter diagnosis was Community acquired pneumonia, unspecified laterality. Diagnoses of Acute on chronic congestive heart failure, unspecified heart failure type (HCC), Acute bronchospasm, and Atrial fibrillation, chronic (HCC) were also pertinent to this visit.    Overnight/Interim Events:      SUBJECTIVE:  Lying in bed, cough, green sticky sputum, little blood, but improving. Doesn't hurt to take a deep breath. Reports 10# wt loss    OBJECTIVE:  Temp:  [97.5 °F (36.4 °C)-98.1 °F (36.7 °C)] 97.9 °F (36.6 °C)  Pulse:  [] 80  Resp:  [16-22] 21  BP: (102-133)/(64-86) 124/86  SpO2:  [88 %-99 %] 93 %    Intake/Output:    Intake/Output Summary (Last 24 hours) at 1/5/2024 1853  Last data filed at 1/5/2024 1513  Gross per 24 hour   Intake 270 ml   Output 2800 ml   Net -2530 ml       Last 3 Weights   01/05/24 0450 225 lb 5 oz (102.2 kg)   01/04/24 0611 235 lb 14.3 oz (107 kg)   01/02/24 1816 235 lb 0.2 oz (106.6 kg)   01/02/24 1238 235 lb (106.6 kg)   08/08/23 1335 219 lb (99.3 kg)   06/27/23 1100 219 lb (99.3 kg)       Exam    General: Alert, no distress, appears stated age.     Head:  Normocephalic, without obvious abnormality, atraumatic.   Eyes:  Sclera anicteric, EOMs intact.    Nose: Nares normal,  Mucosa normal    Throat: Lips normal   Neck: Supple, symmetrical, trachea midline   Lungs:   Clear to auscultation bilaterally. Normal effort   Chest wall:  No tenderness or deformity   Heart:  Regular rate and rhythm, S1, S2 normal, no murmur, rub or gallop appreciated   Abdomen:   Soft, NT/ND, Bowel sounds normal. No masses,  No organomegaly.    Extremities: Extremities normal, atraumatic, no cyanosis or LE edema.   Skin: Skin color, texture, turgor normal. No rashes or lesions.    Neurologic: Moving all extremities spontaneously, no focal deficit appreciated      Data Review:       Labs:      Recent Labs   Lab 01/02/24  1454 01/03/24  0834 01/04/24  0649 01/05/24  0624   WBC 10.2  --  7.6 7.5   HGB 11.4*  --  11.5* 12.7   MCV 95.8  --  98.4 97.8   .0  --  267.0 290.0   INR 2.24* 1.95* 1.71* 1.63*       Recent Labs   Lab 01/02/24  1454 01/03/24  0544 01/04/24  0649 01/04/24  1850 01/05/24  0624    141 140  --  140   K 4.0 3.9 3.3* 3.9 3.9    110 105  --  103   CO2 25.0 24.0 31.0  --  34.0*   BUN 22 21 26*  --  25*   CREATSERUM 0.63 0.67 0.70  --  0.75   CA 9.2 9.3 8.5  --  9.0   MG  --   --  2.2  --   --    * 192* 131*  --  130*       Recent Labs   Lab 01/02/24  1454   ALT 36   AST 29   ALB 3.1*       No results for input(s): \"PGLU\" in the last 168 hours.    No results for input(s): \"TROP\" in the last 168 hours.      Meds:      warfarin  9 mg Oral Once at night    metoprolol succinate  25 mg Oral 2x Daily(Beta Blocker)    digoxin  125 mcg Oral Daily    spironolactone  25 mg Oral Daily    bumetanide  2 mg Intravenous BID (Diuretic)    bumetanide  1 mg Intravenous Once    allopurinol  100 mg Oral Daily    buPROPion SR  150 mg Oral BID    FLUoxetine  20 mg Oral BID    dilTIAZem ER  240 mg Oral Daily    levothyroxine  150 mcg Oral Daily @ 0700    rosuvastatin  5 mg Oral Nightly    cefTRIAXone  2 g Intravenous Q24H       benzonatate, ipratropium-albuterol, acetaminophen       Assessment/Plan:     68 year old female with PMH including but not limited to asthma, COPD, Depression, HTN, HTN, PVD who p/t [facility] ED c SOB and cough.     Acute respiratory distress  - likely multifactorial from PNA, volume overload  - supp o2 as needed, now on RA   - coivd, flu, rsv neg; expaneded panel neg       PNA  - CXR reviewed, pt c cough and sputum  - IV CTX (1/2-1/6)/aizthro compelted 3d  - blood cxs NG x2d  - PCT & LA neg, urine strep/legionella neg, sputum cx  - sees pulmonologist Dr. Sharp from [facility]  - apprec pulm recs  - tessalon prn cough     Asthma/COPD  - trace wheezing noted, follow  - home  inhalers, duonebs if needed  - was given methylprednisolone x1     Acute on Chronic CHF  - tele  - daily wts, I/O  - given IV lasix in ED, takes bumex at home, switched to IV bumex 1mg-->2mg BID   - spironolactone, plan for entresto soon  - dig  - cards c/s apprec   - follows c Dr. Walton, notify as needed; Cr ok currently, pt states has been working on it (renal fxn)  - BNP 2680  - wt down 10#     HTN  Tachycarida  - CCB     Chronic AF  aortic stenosis s/p TAVR  Hx PE, IVCF  - on coumadin, INR 2.24 to 1.95 to 1.71 to 1.63, have pharm dose  - consider NOAC if able, pt reports INR often fluctuates   - clarified home dose and resumed  - pt reports bruising easily    - CCB    # Hypothyroidism  -cont home levothyroxine      # Major Depression/ Generalized anxiety d/o   -reviewed home meds, continue wellbutrin, currently appears stable      # HL  -statin     # Gout  - allupurinol    # SHELDON  -protocol, on cpap     Was on prednisone ~2m ago, now complete, placed by neurologist for elevated ESR/CRP per pt      Dispo: CTU  - lives by self     Questions/concerns were discussed with patient by bedside. D/w RN     Total Time spent with patient and coordinating care:  55 minutes    Omi Blackman MD  [facility] Hospitalist  [phone]  1/5/2024  6:57 PM

## 2024-01-07 LAB
ANION GAP SERPL CALC-SCNC: 5 MMOL/L (ref 0–18)
BUN BLD-MCNC: 29 MG/DL (ref 9–23)
CALCIUM BLD-MCNC: 9.5 MG/DL (ref 8.5–10.1)
CHLORIDE SERPL-SCNC: 101 MMOL/L (ref 98–112)
CO2 SERPL-SCNC: 32 MMOL/L (ref 21–32)
CREAT BLD-MCNC: 0.94 MG/DL
EGFRCR SERPLBLD CKD-EPI 2021: 65 ML/MIN/1.73M2 (ref 60–?)
ERYTHROCYTE [DISTWIDTH] IN BLOOD BY AUTOMATED COUNT: 12.9 %
GLUCOSE BLD-MCNC: 136 MG/DL (ref 70–99)
HCT VFR BLD AUTO: 45.9 %
HGB BLD-MCNC: 15 G/DL
INR BLD: 2.12 (ref 0.8–1.2)
MAGNESIUM SERPL-MCNC: 2.4 MG/DL (ref 1.6–2.6)
MCH RBC QN AUTO: 31.7 PG (ref 26–34)
MCHC RBC AUTO-ENTMCNC: 32.7 G/DL (ref 31–37)
MCV RBC AUTO: 97 FL
OSMOLALITY SERPL CALC.SUM OF ELEC: 294 MOSM/KG (ref 275–295)
PLATELET # BLD AUTO: 342 10(3)UL (ref 150–450)
POTASSIUM SERPL-SCNC: 4.4 MMOL/L (ref 3.5–5.1)
PROTHROMBIN TIME: 24 SECONDS (ref 11.6–14.8)
RBC # BLD AUTO: 4.73 X10(6)UL
SODIUM SERPL-SCNC: 138 MMOL/L (ref 136–145)
WBC # BLD AUTO: 8.6 X10(3) UL (ref 4–11)

## 2024-01-07 PROCEDURE — 80048 BASIC METABOLIC PNL TOTAL CA: CPT | Performed by: INTERNAL MEDICINE

## 2024-01-07 PROCEDURE — 85610 PROTHROMBIN TIME: CPT | Performed by: INTERNAL MEDICINE

## 2024-01-07 PROCEDURE — 83735 ASSAY OF MAGNESIUM: CPT | Performed by: INTERNAL MEDICINE

## 2024-01-07 PROCEDURE — 85027 COMPLETE CBC AUTOMATED: CPT | Performed by: INTERNAL MEDICINE

## 2024-01-07 PROCEDURE — 94799 UNLISTED PULMONARY SVC/PX: CPT

## 2024-01-07 PROCEDURE — S0171 BUMETANIDE 0.5 MG: HCPCS | Performed by: INTERNAL MEDICINE

## 2024-01-07 RX ORDER — WARFARIN SODIUM 5 MG/1
5 TABLET ORAL
Status: COMPLETED | OUTPATIENT
Start: 2024-01-07 | End: 2024-01-07

## 2024-01-07 NOTE — PLAN OF CARE
Received pt at shift change. AxOx4. Room air. Denies pain/SOB. Vitals stable. Afib w controlled rates on tele. See flowsheets for additional assessments. Pt updated on POC. Call light within reach. All needs met at this time.     Plan:  -IV bumex BID  -IV rocephin daily  -Daily weight // I&O  -Encourage ambulation    Problem: PAIN - ADULT  Goal: Verbalizes/displays adequate comfort level or patient's stated pain goal  Description: INTERVENTIONS:  - Encourage pt to monitor pain and request assistance  - Assess pain using appropriate pain scale  - Administer analgesics based on type and severity of pain and evaluate response  - Implement non-pharmacological measures as appropriate and evaluate response  - Consider cultural and social influences on pain and pain management  - Manage/alleviate anxiety  - Utilize distraction and/or relaxation techniques  - Monitor for opioid side effects  - Notify MD/LIP if interventions unsuccessful or patient reports new pain  - Anticipate increased pain with activity and pre-medicate as appropriate  Outcome: Progressing     Problem: RISK FOR INFECTION - ADULT  Goal: Absence of fever/infection during anticipated neutropenic period  Description: INTERVENTIONS  - Monitor WBC  - Administer growth factors as ordered  - Implement neutropenic guidelines  Outcome: Progressing     Problem: SAFETY ADULT - FALL  Goal: Free from fall injury  Description: INTERVENTIONS:  - Assess pt frequently for physical needs  - Identify cognitive and physical deficits and behaviors that affect risk of falls.  - [place] fall precautions as indicated by assessment.  - Educate pt/family on patient safety including physical limitations  - Instruct pt to call for assistance with activity based on assessment  - Modify environment to reduce risk of injury  - Provide assistive devices as appropriate  - Consider OT/PT consult to assist with strengthening/mobility  - Encourage toileting schedule  Outcome:  Progressing     Problem: RESPIRATORY - ADULT  Goal: Achieves optimal ventilation and oxygenation  Description: INTERVENTIONS:  - Assess for changes in respiratory status  - Assess for changes in mentation and behavior  - Position to facilitate oxygenation and minimize respiratory effort  - Oxygen supplementation based on oxygen saturation or ABGs  - Provide Smoking Cessation handout, if applicable  - Encourage broncho-pulmonary hygiene including cough, deep breathe, Incentive Spirometry  - Assess the need for suctioning and perform as needed  - Assess and instruct to report SOB or any respiratory difficulty  - Respiratory Therapy support as indicated  - Manage/alleviate anxiety  - Monitor for signs/symptoms of CO2 retention  Outcome: Progressing     Problem: Patient/Family Goals  Goal: Patient/Family Long Term Goal  Description: Patient's Long Term Goal: to go home    Interventions:  - MD rounding, medication management, monitor labs, IV bumex  - See additional Care Plan goals for specific interventions  Outcome: Progressing  Goal: Patient/Family Short Term Goal  Description: Patient's Short Term Goal: remain pain free    Interventions:   - pain assessments q4, pain meds PRN  - See additional Care Plan goals for specific interventions  Outcome: Progressing

## 2024-01-07 NOTE — PROGRESS NOTES
01/07/24 1559 01/07/24 1601 01/07/24 1605   Vital Signs   /69 (!) 126/97 (!) 139/104   MAP (mmHg) 83 (!) 108 (!) 114   BP Location Left arm Left arm Left arm   BP Method Automatic Automatic Automatic   Patient Position Lying Sitting Standing     Daytime orthostatics 1/7

## 2024-01-07 NOTE — PROGRESS NOTES
[facility]    Cardiology Progress Note    Arleth Weiss Patient Status:  Inpatient    1955 MRN [id number]   Location [facility] 2NE-A Attending Omi Blackman MD   Hosp Day # 5 PCP Viktoriya Garcias DO         Impression/Plan:  68 year old female admitted with     SOB and edema from decompensated HFpEF (EF 50-55% 2023)  PNA  Medication nonadherence  Recurrent hospitalizations for HF  Chronic AF - mildly high rates. On warfarin.   Severe AS s/p TAVR   Moderate mitral stenosis   CAD s/p PCI LCX 3/21   PVD with acute limb ischemia and was treated with angiojet and POBA and aborted left pop cutdown 10/23 - sees Gaffud  COPD  HTN  HLD     - Cont IV diuresis with bumex 2mg bid; strict I/Os, monitoring of renal function and electrolytes; anticipate transition to po in next 24-48 hours  - Cont metoprolol, diltiazem, digoxin for rate control  - Cont home dose statin, spironolactone  - Warfarin for stroke prevention in AF with goal INR 2-3  - Abx as per primary service  - Monitor on tele  - Will follow     Subjective: No events overnight.  Today patient states breathing and edema continue to improve.  Denies chest pain or palpitations.  Feeling fatigued.  No other concerns or complaints.    Patient Active Problem List   Diagnosis    Essential hypertension    Hypothyroidism    Fibromyalgia    Ankylosing spondylitis (HCC)    SHELDON on CPAP    Malaise and fatigue    High blood pressure    Degenerative arthritis of finger    Hypercholesteremia    GERD (gastroesophageal reflux disease)    Lumbar degenerative disc disease    PE (pulmonary thromboembolism) ([facility])    Status post evacuation of subdural hematoma    PAF (paroxysmal atrial fibrillation) ([facility])    Vitamin deficiency    Neuropathy    Presence of IVC filter    Severe aortic stenosis    Depression, unspecified depression type    Aortic valve disease    Long term (current) use of anticoagulants [Z79.01]    Carotid artery plaque, bilateral    Bilateral  carotid artery disease ([facility])    Rheumatoid arthritis ([facility])    Cervical stenosis of spinal canal    Renal insufficiency syndrome    ERINN (acute kidney injury) (formerly [facility])    COPD (chronic obstructive pulmonary disease) ([facility])    CKD (chronic kidney disease) stage 3, GFR 30-59 ml/min ([facility])    Morbid obesity with BMI of 40.0-44.9, adult ([facility])    Binge eating disorder    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, right hand    Pre-op testing    Chronic diastolic CHF (congestive heart failure) ([facility])    S/P TAVR (transcatheter aortic valve replacement)    Coronary artery disease involving native coronary artery of native heart with other form of angina pectoris ([facility])    S/P KATHLEEN to LCx 3/15/21    Hospital discharge follow-up    Major depressive disorder, single episode, in partial remission ([facility])    Hyponatremia    Hypokalemia    Anemia, unspecified type    Osteomyelitis of toe of right foot ([facility])    Cellulitis of right lower extremity    Hypervolemia    Hypervolemia, unspecified hypervolemia type    Exertional dyspnea    Weakness generalized    Hip pain    Acute pain of left knee    Gross hematuria    Atrial fibrillation with RVR ([facility])    Acute on chronic congestive heart failure, unspecified heart failure type ([facility])    Respiratory syncytial virus (RSV)    Constipation, unspecified constipation type    Dehydration    Atrial fibrillation with rapid ventricular response ([facility])    Community acquired pneumonia, unspecified laterality    Acute bronchospasm    Atrial fibrillation, chronic ([facility])       Objective:   Temp: 97.8 °F (36.6 °C)  Pulse: 90  Resp: 20  BP: 128/91    Intake/Output:     Intake/Output Summary (Last 24 hours) at 1/7/2024 0859  Last data filed at 1/7/2024 0824  Gross per 24 hour   Intake 1200 ml   Output 2575 ml   Net -1375 ml       Last 3 Weights   01/07/24 0645 215 lb 2.7 oz (97.6 kg)   01/05/24 0450 225 lb 5 oz (102.2 kg)   01/04/24 0611 235 lb 14.3 oz (107 kg)   01/02/24 1816 235 lb 0.2 oz (106.6 kg)   01/02/24 1238 235 lb  (106.6 kg)   08/08/23 1335 219 lb (99.3 kg)   06/27/23 1100 219 lb (99.3 kg)       Tele: AF    Physical Exam:    General: Alert and oriented x 3. No apparent distress. No respiratory or constitutional distress.  HEENT: Normocephalic, anicteric sclera, neck supple, no thyromegaly or adenopathy.  Neck: No JVD, carotids 2+, no bruits.  Cardiac: Irregularly irregular  Lungs: Clear without wheezes, rales, rhonchi or dullness.  Normal excursions and effort.  Abdomen: Soft, non-tender. No organosplenomegally, mass or rebound, BS-present.  Extremities: Without clubbing, cyanosis or edema.  Peripheral pulses are 2+.  Neurologic: Alert and oriented, normal affect. No motor or coordinational deficit.  Skin: Warm and dry.     Laboratory/Data:    Labs:         Recent Labs   Lab 01/02/24  1454 01/03/24  0834 01/04/24  0649 01/05/24  0624 01/06/24  0855 01/07/24  0659   WBC 10.2  --  7.6 7.5  --  8.6   HGB 11.4*  --  11.5* 12.7  --  15.0   MCV 95.8  --  98.4 97.8  --  97.0   .0  --  267.0 290.0  --  342.0   INR 2.24* 1.95* 1.71* 1.63* 1.79* 2.12*       Recent Labs   Lab 01/03/24  0544 01/04/24  0649 01/04/24  1850 01/05/24  0624 01/06/24  0855 01/06/24  2259 01/07/24  0659    140  --  140 140  --  138   K 3.9 3.3* 3.9 3.9 3.5 5.2* 4.4    105  --  103 101  --  101   CO2 24.0 31.0  --  34.0* 36.0*  --  32.0   BUN 21 26*  --  25* 24*  --  29*   CREATSERUM 0.67 0.70  --  0.75 0.84  --  0.94   CA 9.3 8.5  --  9.0 9.4  --  9.5   MG  --  2.2  --   --   --   --  2.4   * 131*  --  130* 128*  --  136*       Recent Labs   Lab 01/02/24  1454   ALT 36   AST 29   ALB 3.1*       No results for input(s): \"TROP\" in the last 168 hours.    Allergies:   Allergies   Allergen Reactions    Adhesive Tape HIVES     ALL ADHESIVES    Codeine HIVES    Doxycycline SWELLING    Hydrocodone UNKNOWN    Lisinopril TONGUE SWELLING    Metoprolol HIVES    Morphine Sulfate HIVES    Opioid Analgesics UNKNOWN    Oxycontin ITCHING    Oxytocin  HIVES    Vicodin [Hydrocodone-Acetaminophen] ITCHING    Skin Adhesives OTHER (SEE COMMENTS)       Medications:  Current Facility-Administered Medications   Medication Dose Route Frequency    warfarin (Coumadin) tab 5 mg  5 mg Oral Once at night    potassium chloride (K-Dur) tab 40 mEq  40 mEq Oral Daily    benzonatate (Tessalon) cap 100 mg  100 mg Oral TID PRN    ipratropium-albuterol (Duoneb) 0.5-2.5 (3) MG/3ML inhalation solution 3 mL  3 mL Nebulization Q6H PRN    metoprolol succinate ER (Toprol XL) 24 hr tab 25 mg  25 mg Oral 2x Daily(Beta Blocker)    digoxin (Lanoxin) tab 125 mcg  125 mcg Oral Daily    spironolactone (Aldactone) tab 25 mg  25 mg Oral Daily    bumetanide (Bumex) 0.25 MG/ML injection 2 mg  2 mg Intravenous BID (Diuretic)    bumetanide (Bumex) 0.25 MG/ML injection 1 mg  1 mg Intravenous Once    allopurinol (Zyloprim) tab 100 mg  100 mg Oral Daily    buPROPion SR (WELLBUTRIN SR) 12 hr tab 150 mg  150 mg Oral BID    FLUoxetine (PROzac) cap 20 mg  20 mg Oral BID    dilTIAZem ER (CardIZEM CD) 24 hr cap 240 mg  240 mg Oral Daily    levothyroxine (Synthroid) tab 150 mcg  150 mcg Oral Daily @ 0700    rosuvastatin (Crestor) tab 5 mg  5 mg Oral Nightly    cefTRIAXone (Rocephin) 2 g in D5W 100 mL IVPB-ADD  2 g Intravenous Q24H    acetaminophen (Tylenol) tab 650 mg  650 mg Oral Q6H PRN       Sherwin Crespo MD  1/7/2024  8:59 AM

## 2024-01-07 NOTE — PROGRESS NOTES
[facility] Pharmacy Dosing Service  Warfarin (Coumadin) Subsequent Dosing    Arleth Weiss is a 68 year old patient for whom pharmacy is dosing warfarin (Coumadin). Goal INR is 2-3-confirmed goal as 2-3 while admitted here per cards    Recent Labs   Lab 01/03/24  0834 01/04/24  0649 01/05/24  0624 01/06/24  0855 01/07/24  0659   INR 1.95* 1.71* 1.63* 1.79* 2.12*       Consulted by:  Dr Blackman  Indication:  AFib, hx PE, s/p TAVR   Potential Drug Interactions:   Allopurinol, Levothyroxine   Other Anticoagulants:  none  Home regimen (if applicable):  warfarin 5 mg daily    Inpatient Dosing History:  Date 1/2 1/3 1/4* 1/5 1/6 1/7   INR 2.24 1.95 1.71 1.63  1.79  2.12    Coumadin dose 5 mg 5 mg 8 mg  9 mg  5 mg          Based on above -  1.  For today, Give warfarin (COUMADIN) 5 mg at 2100 tonight  2   PT/INR ordered daily while on warfarin  3.  Pharmacy will continue to follow.  We appreciate the opportunity to assist in the care of this patient.    Patito Good PharmD  1/7/2024  8:35 AM

## 2024-01-07 NOTE — PROGRESS NOTES
[facility] Hospitalist Progress Note     PCP: Viktoriya Garcias DO    Chief Complaint: follow-up   Follow up for: The primary encounter diagnosis was Community acquired pneumonia, unspecified laterality. Diagnoses of Acute on chronic congestive heart failure, unspecified heart failure type (HCC), Acute bronchospasm, and Atrial fibrillation, chronic (HCC) were also pertinent to this visit.    Overnight/Interim Events:      SUBJECTIVE:  Lying in bed, feels tried. Breathing better.     OBJECTIVE:  Temp:  [97.8 °F (36.6 °C)-98 °F (36.7 °C)] 97.8 °F (36.6 °C)  Pulse:  [] 95  Resp:  [18-20] 20  BP: (104-128)/(70-91) 128/91  SpO2:  [83 %-98 %] 95 %    Intake/Output:    Intake/Output Summary (Last 24 hours) at 1/7/2024 1159  Last data filed at 1/7/2024 0900  Gross per 24 hour   Intake 1140 ml   Output 1475 ml   Net -335 ml       Last 3 Weights   01/07/24 0645 215 lb 2.7 oz (97.6 kg)   01/05/24 0450 225 lb 5 oz (102.2 kg)   01/04/24 0611 235 lb 14.3 oz (107 kg)   01/02/24 1816 235 lb 0.2 oz (106.6 kg)   01/02/24 1238 235 lb (106.6 kg)   08/08/23 1335 219 lb (99.3 kg)   06/27/23 1100 219 lb (99.3 kg)       Exam    General: Alert, no distress, appears stated age.     Head:  Normocephalic, without obvious abnormality, atraumatic.   Eyes:  Sclera anicteric, EOMs intact.    Nose: Nares normal,  Mucosa normal    Throat: Lips normal   Neck: Supple, symmetrical, trachea midline   Lungs:   Clear to auscultation bilaterally. Normal effort   Chest wall:  No tenderness or deformity   Heart:  Regular rate and rhythm, S1, S2 normal, no murmur, rub or gallop appreciated   Abdomen:   Soft, NT/ND, Bowel sounds normal. No masses,  No organomegaly.    Extremities: Extremities normal, atraumatic, no cyanosis or LE edema.   Skin: Skin color, texture, turgor normal. No rashes or lesions.    Neurologic: Moving all extremities spontaneously, no focal deficit appreciated      Data Review:       Labs:     Recent Labs   Lab 01/02/24  1454 01/03/24  0869  01/04/24  0649 01/05/24  0624 01/06/24  0855 01/07/24  0659   WBC 10.2  --  7.6 7.5  --  8.6   HGB 11.4*  --  11.5* 12.7  --  15.0   MCV 95.8  --  98.4 97.8  --  97.0   .0  --  267.0 290.0  --  342.0   INR 2.24* 1.95* 1.71* 1.63* 1.79* 2.12*       Recent Labs   Lab 01/03/24  0544 01/04/24  0649 01/04/24  1850 01/05/24  0624 01/06/24  0855 01/06/24 2259 01/07/24 0659    140  --  140 140  --  138   K 3.9 3.3* 3.9 3.9 3.5 5.2* 4.4    105  --  103 101  --  101   CO2 24.0 31.0  --  34.0* 36.0*  --  32.0   BUN 21 26*  --  25* 24*  --  29*   CREATSERUM 0.67 0.70  --  0.75 0.84  --  0.94   CA 9.3 8.5  --  9.0 9.4  --  9.5   MG  --  2.2  --   --   --   --  2.4   * 131*  --  130* 128*  --  136*       Recent Labs   Lab 01/02/24  1454   ALT 36   AST 29   ALB 3.1*       No results for input(s): \"PGLU\" in the last 168 hours.    No results for input(s): \"TROP\" in the last 168 hours.      Meds:      warfarin  5 mg Oral Once at night    potassium chloride  40 mEq Oral Daily    metoprolol succinate  25 mg Oral 2x Daily(Beta Blocker)    digoxin  125 mcg Oral Daily    spironolactone  25 mg Oral Daily    bumetanide  2 mg Intravenous BID (Diuretic)    bumetanide  1 mg Intravenous Once    allopurinol  100 mg Oral Daily    buPROPion SR  150 mg Oral BID    FLUoxetine  20 mg Oral BID    dilTIAZem ER  240 mg Oral Daily    levothyroxine  150 mcg Oral Daily @ 0700    rosuvastatin  5 mg Oral Nightly    cefTRIAXone  2 g Intravenous Q24H       benzonatate, ipratropium-albuterol, acetaminophen       Assessment/Plan:     68 year old female with PMH including but not limited to asthma, COPD, Depression, HTN, HTN, PVD who p/t [facility] ED c SOB and cough.     Acute respiratory distress  - likely multifactorial from PNA, volume overload  - supp o2 as needed, now on RA   - coivd, flu, rsv neg; expaneded panel neg       PNA  - CXR reviewed, pt c cough and sputum  - IV CTX (1/2-1/6)/aizthro completed 3d  - blood cxs NG x 5d  - PCT &  LA neg, urine strep/legionella neg, sputum cx  - sees pulmonologist Dr. Sharp [facility]  - apprec pulm recs  - tessalon prn cough     Asthma/COPD  - trace wheezing noted, follow  - home inhalers, duonebs if needed  - was given methylprednisolone x1     Acute on Chronic CHF  - tele  - daily wts, I/O  - given IV lasix in ED, takes bumex at home, switched to IV bumex 1mg-->2mg BID   - hope to transition to PO next 24-48 hrs  - spironolactone, plan for entresto soon  - dig  - cards c/s apprec   - follows c Dr. Walton, notify as needed; Cr ok currently - BNP 2680  - wt down 10#     HTN  Tachycarida  - CCB  - follow orthostatic BP as reports sometimes has weakness     Chronic AF  aortic stenosis s/p TAVR  Hx PE, IVCF  - on coumadin, INR 2.24 to 1.95 to 1.71 to 1.63 to 1.79 to 2.12, pharm dose  - consider NOAC if able, pt reports INR often fluctuates   - clarified home dose and resumed  - pt reports bruising easily    - CCB  - consider heparin gtt if needed if INR remains low   - pt notes goal INR 2.5-3.5, can clarify c cards along c valve status     # Hypothyroidism  -cont home levothyroxine      # Major Depression/ Generalized anxiety d/o   -reviewed home meds, continue wellbutrin, currently appears stable      # HL  -statin     # Gout  - allupurinol    # SHELDON  -protocol, on cpap     Was on prednisone ~2m ago, now complete, placed by neurologist for elevated ESR/CRP per pt      Dispo: CTU  - lives by self     Questions/concerns were discussed with patient by bedside. D/w RN. [facility] hospitalist to resume care in AM, will discuss plan with them    Omi Blakcman MD  [facility] Hospitalist  [phone]  1/7/2024  11:55 PM

## 2024-01-07 NOTE — PLAN OF CARE
Problem: PAIN - ADULT  Denies pain at this time  Goal: Verbalizes/displays adequate comfort level or patient's stated pain goal  Description: INTERVENTIONS:  - Encourage pt to monitor pain and request assistance  - Assess pain using appropriate pain scale  - Administer analgesics based on type and severity of pain and evaluate response  - Implement non-pharmacological measures as appropriate and evaluate response  - Consider cultural and social influences on pain and pain management  - Manage/alleviate anxiety  - Utilize distraction and/or relaxation techniques  - Monitor for opioid side effects  - Notify MD/LIP if interventions unsuccessful or patient reports new pain  - Anticipate increased pain with activity and pre-medicate as appropriate  Outcome: Progressing     Problem: RISK FOR INFECTION - ADULT  afebrile  Goal: Absence of fever/infection during anticipated neutropenic period  Description: INTERVENTIONS  - Monitor WBC  - Administer growth factors as ordered  - Implement neutropenic guidelines  Outcome: Progressing     Problem: SAFETY ADULT - FALL  Fall precautions in progress  Reports history of fall with injuries  Crutches at bedside  Goal: Free from fall injury  Description: INTERVENTIONS:  - Assess pt frequently for physical needs  - Identify cognitive and physical deficits and behaviors that affect risk of falls.  - [place] fall precautions as indicated by assessment.  - Educate pt/family on patient safety including physical limitations  - Instruct pt to call for assistance with activity based on assessment  - Modify environment to reduce risk of injury  - Provide assistive devices as appropriate  - Consider OT/PT consult to assist with strengthening/mobility  - Encourage toileting schedule  Outcome: Progressing     Problem: RESPIRATORY - ADULT  Denies shortness of breath  Remains on room air  Goal: Achieves optimal ventilation and oxygenation  Description: INTERVENTIONS:  - Assess for changes in  respiratory status  - Assess for changes in mentation and behavior  - Position to facilitate oxygenation and minimize respiratory effort  - Oxygen supplementation based on oxygen saturation or ABGs  - Provide Smoking Cessation handout, if applicable  - Encourage broncho-pulmonary hygiene including cough, deep breathe, Incentive Spirometry  - Assess the need for suctioning and perform as needed  - Assess and instruct to report SOB or any respiratory difficulty  - Respiratory Therapy support as indicated  - Manage/alleviate anxiety  - Monitor for signs/symptoms of CO2 retention  Outcome: Progressing     Problem: Patient/Family Goals  Goal: Patient/Family Long Term Goal  Description: Patient's Long Term Goal: to go home    Interventions:  - MD rounding, medication management, monitor labs, IV bumex  - See additional Care Plan goals for specific interventions  Outcome: Progressing  Goal: Patient/Family Short Term Goal  Description: Patient's Short Term Goal: remain pain free    Interventions:   - pain assessments q4, pain meds PRN  - See additional Care Plan goals for specific interventions  Outcome: Progressing

## 2024-01-08 LAB
INR BLD: 2.22 (ref 0.8–1.2)
PROTHROMBIN TIME: 24.9 SECONDS (ref 11.6–14.8)

## 2024-01-08 PROCEDURE — 94799 UNLISTED PULMONARY SVC/PX: CPT

## 2024-01-08 PROCEDURE — S0171 BUMETANIDE 0.5 MG: HCPCS | Performed by: INTERNAL MEDICINE

## 2024-01-08 PROCEDURE — 85610 PROTHROMBIN TIME: CPT | Performed by: INTERNAL MEDICINE

## 2024-01-08 RX ORDER — WARFARIN SODIUM 5 MG/1
5 TABLET ORAL
Status: COMPLETED | OUTPATIENT
Start: 2024-01-08 | End: 2024-01-08

## 2024-01-08 RX ORDER — CLOPIDOGREL BISULFATE 75 MG/1
75 TABLET ORAL DAILY
Status: DISCONTINUED | OUTPATIENT
Start: 2024-01-08 | End: 2024-01-11

## 2024-01-08 RX ORDER — BUMETANIDE 2 MG/1
2 TABLET ORAL
Status: DISCONTINUED | OUTPATIENT
Start: 2024-01-08 | End: 2024-01-09

## 2024-01-08 NOTE — PROGRESS NOTES
[facility] Hospitalist Progress Note     PCP: Viktoriya Garcias DO    Chief Complaint: follow-up   Follow up for: The primary encounter diagnosis was Community acquired pneumonia, unspecified laterality. Diagnoses of Acute on chronic congestive heart failure, unspecified heart failure type (HCC), Acute bronchospasm, and Atrial fibrillation, chronic (HCC) were also pertinent to this visit.    Overnight/Interim Events:      SUBJECTIVE:  No chest pain, palpitations, nausea, vomiting, abdominal pain. SOB improving. Cough improving.     OBJECTIVE:  Temp:  [97 °F (36.1 °C)-97.8 °F (36.6 °C)] 97.8 °F (36.6 °C)  Pulse:  [] 100  Resp:  [19-20] 20  BP: (107-159)/() 135/96  SpO2:  [85 %-97 %] 94 %    Intake/Output:    Intake/Output Summary (Last 24 hours) at 1/8/2024 1246  Last data filed at 1/8/2024 1231  Gross per 24 hour   Intake 840 ml   Output 1800 ml   Net -960 ml       Last 3 Weights   01/08/24 0540 213 lb 10 oz (96.9 kg)   01/07/24 0645 215 lb 2.7 oz (97.6 kg)   01/05/24 0450 225 lb 5 oz (102.2 kg)   01/04/24 0611 235 lb 14.3 oz (107 kg)   01/02/24 1816 235 lb 0.2 oz (106.6 kg)   01/02/24 1238 235 lb (106.6 kg)   08/08/23 1335 219 lb (99.3 kg)   06/27/23 1100 219 lb (99.3 kg)       Exam  Gen: alert and oriented, NAD  Cardiac: RRR, S1S2 normal, no gross murmurs  Pulm: cta bilaterally, no crackles, no wheezing  Abd: no tenderness of the abd, non distended. No rebound or guarding.   Ext: no significant pitting edema or tenderness of the LE  Data Review:       Labs:     Recent Labs   Lab 01/02/24  1454 01/03/24  0834 01/04/24  0649 01/05/24  0624 01/06/24  0855 01/07/24  0659 01/08/24  0555   WBC 10.2  --  7.6 7.5  --  8.6  --    HGB 11.4*  --  11.5* 12.7  --  15.0  --    MCV 95.8  --  98.4 97.8  --  97.0  --    .0  --  267.0 290.0  --  342.0  --    INR 2.24*   < > 1.71* 1.63* 1.79* 2.12* 2.22*    < > = values in this interval not displayed.       Recent Labs   Lab 01/03/24  0544 01/04/24  0649 01/04/24  1855  01/05/24  0624 01/06/24  0855 01/06/24  2259 01/07/24  0659    140  --  140 140  --  138   K 3.9 3.3* 3.9 3.9 3.5 5.2* 4.4    105  --  103 101  --  101   CO2 24.0 31.0  --  34.0* 36.0*  --  32.0   BUN 21 26*  --  25* 24*  --  29*   CREATSERUM 0.67 0.70  --  0.75 0.84  --  0.94   CA 9.3 8.5  --  9.0 9.4  --  9.5   MG  --  2.2  --   --   --   --  2.4   * 131*  --  130* 128*  --  136*       Recent Labs   Lab 01/02/24  1454   ALT 36   AST 29   ALB 3.1*       No results for input(s): \"PGLU\" in the last 168 hours.    No results for input(s): \"TROP\" in the last 168 hours.      Meds:      warfarin  5 mg Oral Once at night    bumetanide  2 mg Oral BID (Diuretic)    clopidogrel  75 mg Oral Daily    potassium chloride  40 mEq Oral Daily    metoprolol succinate  25 mg Oral 2x Daily(Beta Blocker)    digoxin  125 mcg Oral Daily    spironolactone  25 mg Oral Daily    bumetanide  1 mg Intravenous Once    allopurinol  100 mg Oral Daily    buPROPion SR  150 mg Oral BID    FLUoxetine  20 mg Oral BID    dilTIAZem ER  240 mg Oral Daily    levothyroxine  150 mcg Oral Daily @ 0700    rosuvastatin  5 mg Oral Nightly       benzonatate, ipratropium-albuterol, acetaminophen       Assessment/Plan:     68 year old female with PMH including but not limited to asthma, COPD, Depression, HTN, HTN, PVD who p/t [facility] c SOB and cough.     Acute respiratory distress  - likely multifactorial from PNA, volume overload  - supp o2 as needed, now on RA   - coivd, flu, rsv neg; expaneded panel neg       PNA  - CXR reviewed, pt c cough and sputum  - IV CTX (1/2-1/6)/aizthro completed 3d  - blood cxs NG x 5d  - PCT & LA neg, urine strep/legionella neg, sputum cx  - sees pulmonologist Dr. Sharp from [facility]  - apprec pulm recs  - tessalon prn cough     Asthma/COPD  - trace wheezing noted, follow  - home inhalers, duonebs if needed  - was given methylprednisolone x1     Acute on Chronic CHF  - tele  - daily wts, I/O  - given IV lasix in ED, takes  bumex at home, switched to IV bumex 1mg-->2mg BID --> po for tomorrow and monitor how she does  - spironolactone, plan for entresto soon  - dig  - cards following  - follows c Dr. Walton, notify as needed; Cr ok currently - BNP 2680    HTN  Tachycarida  - CCB  - follow orthostatic BP as reports sometimes has weakness     Chronic AF  aortic stenosis s/p TAVR  Hx PE, IVCF  - on coumadin, INR 2.24 to 1.95 to 1.71 to 1.63 to 1.79 to 2.12, pharm dose  - consider NOAC if able, pt reports INR often fluctuates   - clarified home dose and resumed  - pt reports bruising easily    - CCB  - consider heparin gtt if needed if INR remains low   - pt notes goal INR 2.5-3.5, can clarify c cards along c valve status     # Hypothyroidism  -cont home levothyroxine      # Major Depression/ Generalized anxiety d/o   -reviewed home meds, continue wellbutrin, currently appears stable      # HL  -statin     # Gout  - allupurinol    # SHELDON  -protocol, on cpap     Was on prednisone ~2m ago, now complete, placed by neurologist for elevated ESR/CRP per pt      Dispo: no discharge    Luis Tidwell MD  [facility] Hospitalist  [phone]

## 2024-01-08 NOTE — CDS QUERY
CLINICAL DOCUMENTATION CLARIFICATION FORM  Dear Dr. Tidwell:   Clinical information (provided below) includes a diagnosis of Acute Hypoxic Respiratory Failure. Based on your independent clinical judgement and the clinical indicators below, please clarify the diagnosis of Acute Hypoxic Respiratory Failure:  (   ) Acute Hypoxic Respiratory Failure ruled out/not confirmed.   ( x  ) Acute Hypoxic respiratory failure confirmed.  (   ) Other (please specify): __________________  __________________________________________________________________________________  Documentation from the Medical Record:  Clinical Indicators/Risk:  ___01/02 68F to ER with SOB and LE edema. CXR Stable cardiomegaly with mild interstitial opacities suggestive of mild edema. Minimal left basilar opacity representing atelectasis versus infiltrates. Respiratory rate 28.   ___01/05 Pulmonary Consult Note: Assessment / Plan: Acute hypoxic respiratory failure - due to pulmonary edema; less likely PNA in absence of infectious symptoms. Improving with diuresis. - wean supplemental O2 as able- down to room air - bronchial hygiene- IS to bedside HFpEF exacerbation - Bumex - per cardiology Possible PNA  ___01/08 Hospitalist's Note: Assessment and Plan: Acute respiratory distress - likely multifactorial from PNA, volume overload - supp o2 as needed, now on RA - covid, flu, rsv neg; expanded panel neg. PNA, Asthma/COPD, Acute on Chronic CHF  Treatment: Pulmonary consult. CXR, Supplemental oxygen at 2L, Bipap/cpap          For questions regarding this query, please contact Clinical : Etta Wells RN [phone] Thank you.   THIS FORM IS A PERMANENT PART OF THE MEDICAL RECORD

## 2024-01-08 NOTE — PROGRESS NOTES
[facility] Pharmacy Dosing Service  Warfarin (Coumadin) Subsequent Dosing    Arleth Weiss is a 68 year old patient for whom pharmacy is dosing warfarin (Coumadin). Goal INR is 2-3 - confirmed with Cardiology while patient is admitted.    Recent Labs   Lab 01/04/24  0649 01/05/24  0624 01/06/24  0855 01/07/24  0659 01/08/24  0555   INR 1.71* 1.63* 1.79* 2.12* 2.22*       Consulted by:  Dr Blackman  Indication:  afib, h/o PE, s/p TAVR  Potential Drug Interactions:  allopurinol, levothyroxine (stable home meds) - can increase INR  Other Anticoagulants:  none  Home regimen (if applicable):  Warfarin 5mg daily    Inpatient Dosing History:    Date 1/2 1/3 1/4 1/5 1/6 1/7 1/8   INR 2.24 1.95 1.71 1.63 1.79 2.12 2.22   Coumadin dose 5mg 5mg 8mg 9mg 5mg 5mg             Based on above -  1.  For today, Give warfarin (COUMADIN) 5 mg at 2100 tonight  2   PT/INR ordered daily while on warfarin  3.  Pharmacy will continue to follow.  We appreciate the opportunity to assist in the care of this patient.    Aishwarya Spain, PharmD  1/8/2024  9:21 AM

## 2024-01-08 NOTE — PLAN OF CARE
Patient AXOX4.On room air.A fib on tele.No c/o pain/SOB.Remains on IV Diuretics.Incontinent of  bladder,peurvick intact and draining.Plan of care updated,I/O,daily wt,Encourage ambulation.  Problem: PAIN - ADULT  Goal: Verbalizes/displays adequate comfort level or patient's stated pain goal  Description: INTERVENTIONS:  - Encourage pt to monitor pain and request assistance  - Assess pain using appropriate pain scale  - Administer analgesics based on type and severity of pain and evaluate response  - Implement non-pharmacological measures as appropriate and evaluate response  - Consider cultural and social influences on pain and pain management  - Manage/alleviate anxiety  - Utilize distraction and/or relaxation techniques  - Monitor for opioid side effects  - Notify MD/LIP if interventions unsuccessful or patient reports new pain  - Anticipate increased pain with activity and pre-medicate as appropriate  Outcome: Progressing     Problem: RISK FOR INFECTION - ADULT  Goal: Absence of fever/infection during anticipated neutropenic period  Description: INTERVENTIONS  - Monitor WBC  - Administer growth factors as ordered  - Implement neutropenic guidelines  Outcome: Progressing     Problem: SAFETY ADULT - FALL  Goal: Free from fall injury  Description: INTERVENTIONS:  - Assess pt frequently for physical needs  - Identify cognitive and physical deficits and behaviors that affect risk of falls.  - [place] fall precautions as indicated by assessment.  - Educate pt/family on patient safety including physical limitations  - Instruct pt to call for assistance with activity based on assessment  - Modify environment to reduce risk of injury  - Provide assistive devices as appropriate  - Consider OT/PT consult to assist with strengthening/mobility  - Encourage toileting schedule  Outcome: Progressing     Problem: RESPIRATORY - ADULT  Goal: Achieves optimal ventilation and oxygenation  Description: INTERVENTIONS:  - Assess for  changes in respiratory status  - Assess for changes in mentation and behavior  - Position to facilitate oxygenation and minimize respiratory effort  - Oxygen supplementation based on oxygen saturation or ABGs  - Provide Smoking Cessation handout, if applicable  - Encourage broncho-pulmonary hygiene including cough, deep breathe, Incentive Spirometry  - Assess the need for suctioning and perform as needed  - Assess and instruct to report SOB or any respiratory difficulty  - Respiratory Therapy support as indicated  - Manage/alleviate anxiety  - Monitor for signs/symptoms of CO2 retention  Outcome: Progressing     Problem: Patient/Family Goals  Goal: Patient/Family Long Term Goal  Description: Patient's Long Term Goal: to go home    Interventions:  - MD rounding, medication management, monitor labs, IV bumex  - See additional Care Plan goals for specific interventions  Outcome: Progressing  Goal: Patient/Family Short Term Goal  Description: Patient's Short Term Goal: remain pain free    Interventions:   - pain assessments q4, pain meds PRN  - See additional Care Plan goals for specific interventions  Outcome: Progressing

## 2024-01-08 NOTE — PLAN OF CARE
Problem: PAIN - ADULT  Denies pain at this time  Goal: Verbalizes/displays adequate comfort level or patient's stated pain goal  Description: INTERVENTIONS:  - Encourage pt to monitor pain and request assistance  - Assess pain using appropriate pain scale  - Administer analgesics based on type and severity of pain and evaluate response  - Implement non-pharmacological measures as appropriate and evaluate response  - Consider cultural and social influences on pain and pain management  - Manage/alleviate anxiety  - Utilize distraction and/or relaxation techniques  - Monitor for opioid side effects  - Notify MD/LIP if interventions unsuccessful or patient reports new pain  - Anticipate increased pain with activity and pre-medicate as appropriate  Outcome: Progressing     Problem: RISK FOR INFECTION - ADULT  afebrile  Goal: Absence of fever/infection during anticipated neutropenic period  Description: INTERVENTIONS  - Monitor WBC  - Administer growth factors as ordered  - Implement neutropenic guidelines  Outcome: Progressing     Problem: SAFETY ADULT - FALL  Fall precautions in progress  Reports history of fall with injuries  Crutches at bedside  Goal: Free from fall injury  Description: INTERVENTIONS:  - Assess pt frequently for physical needs  - Identify cognitive and physical deficits and behaviors that affect risk of falls.  - [place] fall precautions as indicated by assessment.  - Educate pt/family on patient safety including physical limitations  - Instruct pt to call for assistance with activity based on assessment  - Modify environment to reduce risk of injury  - Provide assistive devices as appropriate  - Consider OT/PT consult to assist with strengthening/mobility  - Encourage toileting schedule  Outcome: Progressing     Problem: RESPIRATORY - ADULT  Denies shortness of breath at rest reports mild dyspnea with exertion as ambulation to bathroom.  Remains on room air  Intermittent cough, occasionally  productive  Goal: Achieves optimal ventilation and oxygenation  Continuous pulse oximetry in progress, oxygen saturation 94%  Description: INTERVENTIONS:  - Assess for changes in respiratory status  - Assess for changes in mentation and behavior  - Position to facilitate oxygenation and minimize respiratory effort  - Oxygen supplementation based on oxygen saturation or ABGs  - Provide Smoking Cessation handout, if applicable  - Encourage broncho-pulmonary hygiene including cough, deep breathe, Incentive Spirometry  - Assess the need for suctioning and perform as needed  - Assess and instruct to report SOB or any respiratory difficulty  - Respiratory Therapy support as indicated  - Manage/alleviate anxiety  - Monitor for signs/symptoms of CO2 retention  Outcome: Progressing     Problem: Patient/Family Goals  Discharge planning in progress, reports she does not feel ready to go home tomorrow.   Goal: Patient/Family Long Term Goal  Description: Patient's Long Term Goal: to go home    Interventions:  - MD rounding, medication management, monitor labs, IV Bumex. To transition to oral diuretics in 24-48 HOURS.  - See additional Care Plan goals for specific interventions  Outcome: Progressing  Goal: Patient/Family Short Term Goal  Denies pain  Description: Patient's Short Term Goal: remain pain free    Interventions:   - pain assessments q4, pain meds PRN  - See additional Care Plan goals for specific interventions  Outcome: Progressing

## 2024-01-08 NOTE — PROGRESS NOTES
[facility]    Cardiology Progress Note    Arleth Weiss Patient Status:  Inpatient    1955 MRN [id number]   Location [facility] 2NE-A Attending Omi Blackman MD   Hosp Day # 6 PCP Viktoriya Garcias DO         Impression/Plan:  68 year old female admitted with     SOB and edema from decompensated HFpEF (EF 50-55% 2023)  PNA  Medication nonadherence  Recurrent hospitalizations for HF  Chronic AF - mildly high rates. On warfarin.   Severe AS s/p TAVR   Moderate mitral stenosis   CAD s/p PCI LCX 3/21   PVD with acute limb ischemia and was treated with angiojet and POBA and aborted left pop cutdown 10/23 - sees Gaffud  COPD  HTN  HLD     - Change IV bumex to PO bumex 2mg BID   - Cont metoprolol, diltiazem, digoxin for rate control  - Cont home dose statin, spironolactone, plavix  - Warfarin for stroke prevention in AF with goal INR 2-3  - Abx as per primary service  - anticipate home 1-2 days       Subjective: No CP or SOB. Edema improved.     Patient Active Problem List   Diagnosis    Essential hypertension    Hypothyroidism    Fibromyalgia    Ankylosing spondylitis ([facility])    SHELDON on CPAP    Malaise and fatigue    High blood pressure    Degenerative arthritis of finger    Hypercholesteremia    GERD (gastroesophageal reflux disease)    Lumbar degenerative disc disease    PE (pulmonary thromboembolism) ([facility])    Status post evacuation of subdural hematoma    PAF (paroxysmal atrial fibrillation) ([facility])    Vitamin deficiency    Neuropathy    Presence of IVC filter    Severe aortic stenosis    Depression, unspecified depression type    Aortic valve disease    Long term (current) use of anticoagulants [Z79.01]    Carotid artery plaque, bilateral    Bilateral carotid artery disease ([facility])    Rheumatoid arthritis ([facility])    Cervical stenosis of spinal canal    Renal insufficiency syndrome    ERINN (acute kidney injury) ([facility])    COPD (chronic obstructive pulmonary disease) ([facility])    CKD (chronic kidney disease)  stage 3, GFR 30-59 ml/min ([facility])    Morbid obesity with BMI of 40.0-44.9, adult ([facility])    Binge eating disorder    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, right hand    Pre-op testing    Chronic diastolic CHF (congestive heart failure) ([facility])    S/P TAVR (transcatheter aortic valve replacement)    Coronary artery disease involving native coronary artery of native heart with other form of angina pectoris ([facility])    S/P KATHLEEN to LCx 3/15/21    Hospital discharge follow-up    Major depressive disorder, single episode, in partial remission ([facility])    Hyponatremia    Hypokalemia    Anemia, unspecified type    Osteomyelitis of toe of right foot ([facility])    Cellulitis of right lower extremity    Hypervolemia    Hypervolemia, unspecified hypervolemia type    Exertional dyspnea    Weakness generalized    Hip pain    Acute pain of left knee    Gross hematuria    Atrial fibrillation with RVR ([facility])    Acute on chronic congestive heart failure, unspecified heart failure type ([facility])    Respiratory syncytial virus (RSV)    Constipation, unspecified constipation type    Dehydration    Atrial fibrillation with rapid ventricular response ([facility])    Community acquired pneumonia, unspecified laterality    Acute bronchospasm    Atrial fibrillation, chronic ([facility])       Objective:   Temp: 97.9 °F (36.6 °C)  Pulse: 105  Resp: 17  BP: 132/82    Intake/Output:     Intake/Output Summary (Last 24 hours) at 1/8/2024 1741  Last data filed at 1/8/2024 1632  Gross per 24 hour   Intake 60 ml   Output 1700 ml   Net -1640 ml       Last 3 Weights   01/08/24 0540 213 lb 10 oz (96.9 kg)   01/07/24 0645 215 lb 2.7 oz (97.6 kg)   01/05/24 0450 225 lb 5 oz (102.2 kg)   01/04/24 0611 235 lb 14.3 oz (107 kg)   01/02/24 1816 235 lb 0.2 oz (106.6 kg)   01/02/24 1238 235 lb (106.6 kg)   08/08/23 1335 219 lb (99.3 kg)   06/27/23 1100 219 lb (99.3 kg)       Tele: AF    Physical Exam:    General: Alert and oriented x 3. No apparent distress. No respiratory or constitutional  distress.  HEENT: Normocephalic, anicteric sclera, neck supple, no thyromegaly or adenopathy.  Neck: No JVD, carotids 2+, no bruits.  Cardiac: Irregularly irregular  Lungs: Clear without wheezes, rales, rhonchi or dullness.  Normal excursions and effort.  Abdomen: Soft, non-tender. No organosplenomegally, mass or rebound, BS-present.  Extremities: Without clubbing, cyanosis or edema.  Peripheral pulses are 2+.  Neurologic: Alert and oriented, normal affect. No motor or coordinational deficit.  Skin: Warm and dry.     Laboratory/Data:    Labs:         Recent Labs   Lab 01/02/24  1454 01/03/24  0834 01/04/24  0649 01/05/24  0624 01/06/24  0855 01/07/24  0659 01/08/24  0555   WBC 10.2  --  7.6 7.5  --  8.6  --    HGB 11.4*  --  11.5* 12.7  --  15.0  --    MCV 95.8  --  98.4 97.8  --  97.0  --    .0  --  267.0 290.0  --  342.0  --    INR 2.24*   < > 1.71* 1.63* 1.79* 2.12* 2.22*    < > = values in this interval not displayed.       Recent Labs   Lab 01/03/24  0544 01/04/24  0649 01/04/24  1850 01/05/24  0624 01/06/24  0855 01/06/24  2259 01/07/24  0659    140  --  140 140  --  138   K 3.9 3.3* 3.9 3.9 3.5 5.2* 4.4    105  --  103 101  --  101   CO2 24.0 31.0  --  34.0* 36.0*  --  32.0   BUN 21 26*  --  25* 24*  --  29*   CREATSERUM 0.67 0.70  --  0.75 0.84  --  0.94   CA 9.3 8.5  --  9.0 9.4  --  9.5   MG  --  2.2  --   --   --   --  2.4   * 131*  --  130* 128*  --  136*       Recent Labs   Lab 01/02/24  1454   ALT 36   AST 29   ALB 3.1*       No results for input(s): \"TROP\" in the last 168 hours.    Allergies:   Allergies   Allergen Reactions    Adhesive Tape HIVES     ALL ADHESIVES    Codeine HIVES    Doxycycline SWELLING    Hydrocodone UNKNOWN    Lisinopril TONGUE SWELLING    Metoprolol HIVES    Morphine Sulfate HIVES    Opioid Analgesics UNKNOWN    Oxycontin ITCHING    Oxytocin HIVES    Vicodin [Hydrocodone-Acetaminophen] ITCHING    Skin Adhesives OTHER (SEE COMMENTS)        Medications:

## 2024-01-08 NOTE — PROGRESS NOTES
01/08/24 0532 01/08/24 0534 01/08/24 0540   Vital Signs   /89 119/86 (!) 121/96   MAP (mmHg) (!) 101 97 (!) 104   BP Location Left arm  --   --    BP Method Automatic  --   --    Patient Position Lying Sitting Standing   Oxygen Therapy   SpO2 94 % 95 % (!) 85 %

## 2024-01-09 LAB
ANION GAP SERPL CALC-SCNC: 4 MMOL/L (ref 0–18)
BASOPHILS # BLD AUTO: 0.07 X10(3) UL (ref 0–0.2)
BASOPHILS NFR BLD AUTO: 0.6 %
BUN BLD-MCNC: 36 MG/DL (ref 9–23)
CALCIUM BLD-MCNC: 9.6 MG/DL (ref 8.5–10.1)
CHLORIDE SERPL-SCNC: 101 MMOL/L (ref 98–112)
CO2 SERPL-SCNC: 32 MMOL/L (ref 21–32)
CREAT BLD-MCNC: 0.99 MG/DL
EGFRCR SERPLBLD CKD-EPI 2021: 61 ML/MIN/1.73M2 (ref 60–?)
EOSINOPHIL # BLD AUTO: 0.12 X10(3) UL (ref 0–0.7)
EOSINOPHIL NFR BLD AUTO: 1.1 %
ERYTHROCYTE [DISTWIDTH] IN BLOOD BY AUTOMATED COUNT: 12.7 %
GLUCOSE BLD-MCNC: 159 MG/DL (ref 70–99)
HCT VFR BLD AUTO: 46.2 %
HGB BLD-MCNC: 15.3 G/DL
IMM GRANULOCYTES # BLD AUTO: 0.11 X10(3) UL (ref 0–1)
IMM GRANULOCYTES NFR BLD: 1 %
INR BLD: 2.44 (ref 0.8–1.2)
LYMPHOCYTES # BLD AUTO: 2.39 X10(3) UL (ref 1–4)
LYMPHOCYTES NFR BLD AUTO: 21.2 %
MAGNESIUM SERPL-MCNC: 2.4 MG/DL (ref 1.6–2.6)
MCH RBC QN AUTO: 31.3 PG (ref 26–34)
MCHC RBC AUTO-ENTMCNC: 33.1 G/DL (ref 31–37)
MCV RBC AUTO: 94.5 FL
MONOCYTES # BLD AUTO: 1.04 X10(3) UL (ref 0.1–1)
MONOCYTES NFR BLD AUTO: 9.2 %
NEUTROPHILS # BLD AUTO: 7.56 X10 (3) UL (ref 1.5–7.7)
NEUTROPHILS # BLD AUTO: 7.56 X10(3) UL (ref 1.5–7.7)
NEUTROPHILS NFR BLD AUTO: 66.9 %
OSMOLALITY SERPL CALC.SUM OF ELEC: 296 MOSM/KG (ref 275–295)
PLATELET # BLD AUTO: 361 10(3)UL (ref 150–450)
POTASSIUM SERPL-SCNC: 3.8 MMOL/L (ref 3.5–5.1)
PROTHROMBIN TIME: 26.8 SECONDS (ref 11.6–14.8)
RBC # BLD AUTO: 4.89 X10(6)UL
SODIUM SERPL-SCNC: 137 MMOL/L (ref 136–145)
WBC # BLD AUTO: 11.3 X10(3) UL (ref 4–11)

## 2024-01-09 PROCEDURE — 83735 ASSAY OF MAGNESIUM: CPT | Performed by: HOSPITALIST

## 2024-01-09 PROCEDURE — 85610 PROTHROMBIN TIME: CPT | Performed by: INTERNAL MEDICINE

## 2024-01-09 PROCEDURE — 85025 COMPLETE CBC W/AUTO DIFF WBC: CPT | Performed by: HOSPITALIST

## 2024-01-09 PROCEDURE — 94799 UNLISTED PULMONARY SVC/PX: CPT

## 2024-01-09 PROCEDURE — 80048 BASIC METABOLIC PNL TOTAL CA: CPT | Performed by: HOSPITALIST

## 2024-01-09 RX ORDER — BUMETANIDE 2 MG/1
2 TABLET ORAL DAILY
Status: DISCONTINUED | OUTPATIENT
Start: 2024-01-09 | End: 2024-01-11

## 2024-01-09 RX ORDER — WARFARIN SODIUM 2.5 MG/1
2.5 TABLET ORAL
Status: COMPLETED | OUTPATIENT
Start: 2024-01-09 | End: 2024-01-09

## 2024-01-09 RX ORDER — POTASSIUM CHLORIDE 20 MEQ/1
40 TABLET, EXTENDED RELEASE ORAL ONCE
Status: COMPLETED | OUTPATIENT
Start: 2024-01-09 | End: 2024-01-09

## 2024-01-09 NOTE — PLAN OF CARE
Problem: PAIN - ADULT  Goal: Verbalizes/displays adequate comfort level or patient's stated pain goal  Description: INTERVENTIONS:  - Encourage pt to monitor pain and request assistance  - Assess pain using appropriate pain scale  - Administer analgesics based on type and severity of pain and evaluate response  - Implement non-pharmacological measures as appropriate and evaluate response  - Consider cultural and social influences on pain and pain management  - Manage/alleviate anxiety  - Utilize distraction and/or relaxation techniques  - Monitor for opioid side effects  - Notify MD/LIP if interventions unsuccessful or patient reports new pain  - Anticipate increased pain with activity and pre-medicate as appropriate  Outcome: Progressing     Problem: RISK FOR INFECTION - ADULT  Goal: Absence of fever/infection during anticipated neutropenic period  Description: INTERVENTIONS  - Monitor WBC  - Administer growth factors as ordered  - Implement neutropenic guidelines  Outcome: Progressing     Problem: SAFETY ADULT - FALL  Goal: Free from fall injury  Description: INTERVENTIONS:  - Assess pt frequently for physical needs  - Identify cognitive and physical deficits and behaviors that affect risk of falls.  - [place] fall precautions as indicated by assessment.  - Educate pt/family on patient safety including physical limitations  - Instruct pt to call for assistance with activity based on assessment  - Modify environment to reduce risk of injury  - Provide assistive devices as appropriate  - Consider OT/PT consult to assist with strengthening/mobility  - Encourage toileting schedule  Outcome: Progressing     Problem: RESPIRATORY - ADULT  Goal: Achieves optimal ventilation and oxygenation  Description: INTERVENTIONS:  - Assess for changes in respiratory status  - Assess for changes in mentation and behavior  - Position to facilitate oxygenation and minimize respiratory effort  - Oxygen supplementation based on oxygen  saturation or ABGs  - Provide Smoking Cessation handout, if applicable  - Encourage broncho-pulmonary hygiene including cough, deep breathe, Incentive Spirometry  - Assess the need for suctioning and perform as needed  - Assess and instruct to report SOB or any respiratory difficulty  - Respiratory Therapy support as indicated  - Manage/alleviate anxiety  - Monitor for signs/symptoms of CO2 retention  Outcome: Progressing     Problem: Patient/Family Goals  Goal: Patient/Family Long Term Goal  Description: Patient's Long Term Goal: to go home    Interventions:  - MD rounding, medication management, monitor labs, IV bumex  - See additional Care Plan goals for specific interventions  Outcome: Progressing  Goal: Patient/Family Short Term Goal  Description: Patient's Short Term Goal: remain pain free    Interventions:   - pain assessments q4, pain meds PRN  - See additional Care Plan goals for specific interventions  Outcome: Progressing

## 2024-01-09 NOTE — PLAN OF CARE
Patient AXOX4.On room air during day,CPAP at night.A fib on tele.On PO diuretics.Ortho Q Shift.Continent of bowel and incontinent of bladder.Dressing to the right great toe clean,dry,intact.Call light within reach.Plan of care updated.Possible discharge home tomorrow.  Problem: PAIN - ADULT  Goal: Verbalizes/displays adequate comfort level or patient's stated pain goal  Description: INTERVENTIONS:  - Encourage pt to monitor pain and request assistance  - Assess pain using appropriate pain scale  - Administer analgesics based on type and severity of pain and evaluate response  - Implement non-pharmacological measures as appropriate and evaluate response  - Consider cultural and social influences on pain and pain management  - Manage/alleviate anxiety  - Utilize distraction and/or relaxation techniques  - Monitor for opioid side effects  - Notify MD/LIP if interventions unsuccessful or patient reports new pain  - Anticipate increased pain with activity and pre-medicate as appropriate  Outcome: Progressing     Problem: RISK FOR INFECTION - ADULT  Goal: Absence of fever/infection during anticipated neutropenic period  Description: INTERVENTIONS  - Monitor WBC  - Administer growth factors as ordered  - Implement neutropenic guidelines  Outcome: Progressing     Problem: SAFETY ADULT - FALL  Goal: Free from fall injury  Description: INTERVENTIONS:  - Assess pt frequently for physical needs  - Identify cognitive and physical deficits and behaviors that affect risk of falls.  - [place] fall precautions as indicated by assessment.  - Educate pt/family on patient safety including physical limitations  - Instruct pt to call for assistance with activity based on assessment  - Modify environment to reduce risk of injury  - Provide assistive devices as appropriate  - Consider OT/PT consult to assist with strengthening/mobility  - Encourage toileting schedule  Outcome: Progressing     Problem: RESPIRATORY - ADULT  Goal: Achieves  optimal ventilation and oxygenation  Description: INTERVENTIONS:  - Assess for changes in respiratory status  - Assess for changes in mentation and behavior  - Position to facilitate oxygenation and minimize respiratory effort  - Oxygen supplementation based on oxygen saturation or ABGs  - Provide Smoking Cessation handout, if applicable  - Encourage broncho-pulmonary hygiene including cough, deep breathe, Incentive Spirometry  - Assess the need for suctioning and perform as needed  - Assess and instruct to report SOB or any respiratory difficulty  - Respiratory Therapy support as indicated  - Manage/alleviate anxiety  - Monitor for signs/symptoms of CO2 retention  Outcome: Progressing     Problem: Patient/Family Goals  Goal: Patient/Family Long Term Goal  Description: Patient's Long Term Goal: to go home    Interventions:  - MD rounding, medication management, monitor labs, IV bumex  - See additional Care Plan goals for specific interventions  Outcome: Progressing  Goal: Patient/Family Short Term Goal  Description: Patient's Short Term Goal: remain pain free    Interventions:   - pain assessments q4, pain meds PRN  - See additional Care Plan goals for specific interventions  Outcome: Progressing

## 2024-01-09 NOTE — PROGRESS NOTES
[facility]    Cardiology Progress Note    Arleth Weiss Patient Status:  Inpatient    1955 MRN [id number]   Location [facility] 2NE-A Attending Omi Blackman MD   Hosp Day # 7 PCP Viktoriya Garcias DO         Impression/Plan:  68 year old female admitted with     SOB and edema from decompensated HFpEF (EF 50-55% 2023)  PNA  Medication nonadherence  Recurrent hospitalizations for HF  Chronic AF - mildly high rates. On warfarin.   Severe AS s/p TAVR   Moderate mitral stenosis   CAD s/p PCI LCX 3/21   PVD with acute limb ischemia and was treated with angiojet and POBA and aborted left pop cutdown 10/23 - sees Gaffud  COPD  HTN  HLD     - Change to PO bumex 2mg QAM  - Cont metoprolol, diltiazem, digoxin for rate control  - Cont home dose statin, spironolactone, plavix  - Warfarin for stroke prevention in AF with goal INR 2-3  - Abx as per primary service  - anticipate home 1-2 days       Subjective: No CP or SOB. Edema improved. Wants to stay another night.     Patient Active Problem List   Diagnosis    Essential hypertension    Hypothyroidism    Fibromyalgia    Ankylosing spondylitis ([facility])    SHELDON on CPAP    Malaise and fatigue    High blood pressure    Degenerative arthritis of finger    Hypercholesteremia    GERD (gastroesophageal reflux disease)    Lumbar degenerative disc disease    PE (pulmonary thromboembolism) ([facility])    Status post evacuation of subdural hematoma    PAF (paroxysmal atrial fibrillation) ([facility])    Vitamin deficiency    Neuropathy    Presence of IVC filter    Severe aortic stenosis    Depression, unspecified depression type    Aortic valve disease    Long term (current) use of anticoagulants [Z79.01]    Carotid artery plaque, bilateral    Bilateral carotid artery disease ([facility])    Rheumatoid arthritis ([facility])    Cervical stenosis of spinal canal    Renal insufficiency syndrome    ERINN (acute kidney injury) ([facility])    COPD (chronic obstructive pulmonary disease) ([facility])    CKD  (chronic kidney disease) stage 3, GFR 30-59 ml/min ([facility])    Morbid obesity with BMI of 40.0-44.9, adult ([facility])    Binge eating disorder    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, right hand    Pre-op testing    Chronic diastolic CHF (congestive heart failure) ([facility])    S/P TAVR (transcatheter aortic valve replacement)    Coronary artery disease involving native coronary artery of native heart with other form of angina pectoris ([facility])    S/P KATHLEEN to LCx 3/15/21    Hospital discharge follow-up    Major depressive disorder, single episode, in partial remission ([facility])    Hyponatremia    Hypokalemia    Anemia, unspecified type    Osteomyelitis of toe of right foot ([facility])    Cellulitis of right lower extremity    Hypervolemia    Hypervolemia, unspecified hypervolemia type    Exertional dyspnea    Weakness generalized    Hip pain    Acute pain of left knee    Gross hematuria    Atrial fibrillation with RVR ([facility])    Acute on chronic congestive heart failure, unspecified heart failure type ([facility])    Respiratory syncytial virus (RSV)    Constipation, unspecified constipation type    Dehydration    Atrial fibrillation with rapid ventricular response ([facility])    Community acquired pneumonia, unspecified laterality    Acute bronchospasm    Atrial fibrillation, chronic ([facility])       Objective:   Temp: 97.8 °F (36.6 °C)  Pulse: 98  Resp: 18  BP: 114/78    Intake/Output:     Intake/Output Summary (Last 24 hours) at 1/9/2024 1102  Last data filed at 1/9/2024 0600  Gross per 24 hour   Intake 190 ml   Output 1550 ml   Net -1360 ml       Last 3 Weights   01/09/24 0540 212 lb 15.4 oz (96.6 kg)   01/08/24 0540 213 lb 10 oz (96.9 kg)   01/07/24 0645 215 lb 2.7 oz (97.6 kg)   01/05/24 0450 225 lb 5 oz (102.2 kg)   01/04/24 0611 235 lb 14.3 oz (107 kg)   01/02/24 1816 235 lb 0.2 oz (106.6 kg)   01/02/24 1238 235 lb (106.6 kg)   08/08/23 1335 219 lb (99.3 kg)   06/27/23 1100 219 lb (99.3 kg)       Tele: AF    Physical Exam:    General: Alert and oriented x 3. No  apparent distress. No respiratory or constitutional distress.  HEENT: Normocephalic, anicteric sclera, neck supple, no thyromegaly or adenopathy.  Neck: No JVD, carotids 2+, no bruits.  Cardiac: Irregularly irregular  Lungs: Clear without wheezes, rales, rhonchi or dullness.  Normal excursions and effort.  Abdomen: Soft, non-tender. No organosplenomegally, mass or rebound, BS-present.  Extremities: Without clubbing, cyanosis or edema.  Peripheral pulses are 2+.  Neurologic: Alert and oriented, normal affect. No motor or coordinational deficit.  Skin: Warm and dry.     Laboratory/Data:    Labs:         Recent Labs   Lab 01/02/24  1454 01/03/24  0834 01/04/24  0649 01/05/24  0624 01/06/24  0855 01/07/24  0659 01/08/24  0555 01/09/24  0612   WBC 10.2  --  7.6 7.5  --  8.6  --  11.3*   HGB 11.4*  --  11.5* 12.7  --  15.0  --  15.3   MCV 95.8  --  98.4 97.8  --  97.0  --  94.5   .0  --  267.0 290.0  --  342.0  --  361.0   INR 2.24*   < > 1.71* 1.63* 1.79* 2.12* 2.22* 2.44*    < > = values in this interval not displayed.       Recent Labs   Lab 01/04/24  0649 01/04/24  1850 01/05/24  0624 01/06/24  0855 01/06/24  2259 01/07/24  0659 01/09/24  0612     --  140 140  --  138 137   K 3.3*   < > 3.9 3.5 5.2* 4.4 3.8     --  103 101  --  101 101   CO2 31.0  --  34.0* 36.0*  --  32.0 32.0   BUN 26*  --  25* 24*  --  29* 36*   CREATSERUM 0.70  --  0.75 0.84  --  0.94 0.99   CA 8.5  --  9.0 9.4  --  9.5 9.6   MG 2.2  --   --   --   --  2.4 2.4   *  --  130* 128*  --  136* 159*    < > = values in this interval not displayed.       Recent Labs   Lab 01/02/24  1454   ALT 36   AST 29   ALB 3.1*       No results for input(s): \"TROP\" in the last 168 hours.    Allergies:   Allergies   Allergen Reactions    Adhesive Tape HIVES     ALL ADHESIVES    Codeine HIVES    Doxycycline SWELLING    Hydrocodone UNKNOWN    Lisinopril TONGUE SWELLING    Metoprolol HIVES    Morphine Sulfate HIVES    Opioid Analgesics UNKNOWN     Oxycontin ITCHING    Oxytocin HIVES    Vicodin [Hydrocodone-Acetaminophen] ITCHING    Skin Adhesives OTHER (SEE COMMENTS)       Medications:

## 2024-01-09 NOTE — PROGRESS NOTES
01/09/24 1526   Clinical Encounter Type   Visited With Patient   Routine Visit Introduction   Continue Visiting No   Referral From Other (Comment)   Referral To    Taxonomy   Intended Effects Build relationship of care and support   Methods Encourage self care;Encourage story-telling;Offer spiritual/Zoroastrian support;Encourage sharing of feelings   Interventions Ask guided questions;Active listening;Ask questions to bring forth feelings     The  responded to the length of stay consult for patient support.  Patient laying bed.   explained the reason for the visit.   provided active listening as the patient discussed her family dynamics and health care concerns.  Patient is spiritual - do not attend a [employer].  Patient expressed sadness due to the loss of her best friend.  The spiritual care department will remain available for support as needed.     Sophia Boucher    EXAMINATION TYPE: XR foot complete RT

 

DATE OF EXAM: 11/17/2017

 

COMPARISON: NONE

 

HISTORY: Pain

 

TECHNIQUE: Three views are submitted.

 

FINDINGS:

The osseous structures are intact and the joint spaces are preserved.  There is no acute fracture or 
dislocation.  

 

IMPRESSION:

1. No acute fracture or dislocation.  If symptoms persist, follow-up exam in 7 to 10 days could be ob
tained.

## 2024-01-09 NOTE — PROGRESS NOTES
[facility] Pharmacy Dosing Service  Warfarin (Coumadin) Subsequent Dosing    Arleth Weiss is a 68 year old patient for whom pharmacy is dosing warfarin (Coumadin). Goal INR is 2-3 - confirmed with Cardiology while patient is admitted.    Recent Labs   Lab 01/05/24  0624 01/06/24  0855 01/07/24  0659 01/08/24  0555 01/09/24  0612   INR 1.63* 1.79* 2.12* 2.22* 2.44*       Consulted by:  Dr Blackman  Indication:  afib, h/o PE, s/p TAVR  Potential Drug Interactions:  allopurinol, levothyroxine (stable home meds) - can increase INR; clopidogrel - can increase risk of bleeding  Other Anticoagulants:  none  Home regimen (if applicable):  Warfarin 5mg daily    Inpatient Dosing History:       Date 1/2 1/3 1/4 1/5 1/6 1/7 1/8 1/9   INR 2.24 1.95 1.71 1.63 1.79 2.12 2.22 2.44   Coumadin dose 5mg 5mg 8mg 9mg 5mg 5mg  5mg         Based on above -  1.  For today, Give warfarin (COUMADIN) 2.5 mg at 2100 tonight. Dose decreased due to steadily rising INR.  2   PT/INR ordered daily while on warfarin  3.  Pharmacy will continue to follow.  We appreciate the opportunity to assist in the care of this patient.    Aishwarya Spain, PharmD  1/9/2024  9:33 AM

## 2024-01-09 NOTE — PROGRESS NOTES
[facility] Hospitalist Progress Note     PCP: Viktoriya Garcias DO    Chief Complaint: follow-up   Follow up for: The primary encounter diagnosis was Community acquired pneumonia, unspecified laterality. Diagnoses of Acute on chronic congestive heart failure, unspecified heart failure type (HCC), Acute bronchospasm, and Atrial fibrillation, chronic (HCC) were also pertinent to this visit.    Overnight/Interim Events:      SUBJECTIVE:  No chest pain, palpitations, nausea, vomiting, abdominal pain. SOB improving and at baseline. Cough resolved    OBJECTIVE:  Temp:  [97.6 °F (36.4 °C)-97.9 °F (36.6 °C)] 97.8 °F (36.6 °C)  Pulse:  [] 98  Resp:  [17-20] 18  BP: (113-135)/(78-96) 114/78  SpO2:  [94 %-97 %] 96 %    Intake/Output:    Intake/Output Summary (Last 24 hours) at 1/9/2024 0945  Last data filed at 1/9/2024 0600  Gross per 24 hour   Intake 190 ml   Output 1550 ml   Net -1360 ml       Last 3 Weights   01/09/24 0540 212 lb 15.4 oz (96.6 kg)   01/08/24 0540 213 lb 10 oz (96.9 kg)   01/07/24 0645 215 lb 2.7 oz (97.6 kg)   01/05/24 0450 225 lb 5 oz (102.2 kg)   01/04/24 0611 235 lb 14.3 oz (107 kg)   01/02/24 1816 235 lb 0.2 oz (106.6 kg)   01/02/24 1238 235 lb (106.6 kg)   08/08/23 1335 219 lb (99.3 kg)   06/27/23 1100 219 lb (99.3 kg)       Exam  Gen: alert and oriented, NAD  Cardiac: RRR, S1S2 normal, no gross murmurs  Pulm: cta bilaterally, no crackles, no wheezing  Abd: no tenderness of the abd, non distended. No rebound or guarding.   Ext: no significant pitting edema below knees some above knees and no tenderness of the LE  Data Review:       Labs:     Recent Labs   Lab 01/02/24  1454 01/03/24  0834 01/04/24  0649 01/05/24  0624 01/06/24  0855 01/07/24  0659 01/08/24  0555 01/09/24  0612   WBC 10.2  --  7.6 7.5  --  8.6  --  11.3*   HGB 11.4*  --  11.5* 12.7  --  15.0  --  15.3   MCV 95.8  --  98.4 97.8  --  97.0  --  94.5   .0  --  267.0 290.0  --  342.0  --  361.0   INR 2.24*   < > 1.71* 1.63* 1.79* 2.12*  2.22* 2.44*    < > = values in this interval not displayed.       Recent Labs   Lab 01/04/24  0649 01/04/24  1850 01/05/24  0624 01/06/24  0855 01/06/24  2259 01/07/24  0659 01/09/24  0612     --  140 140  --  138 137   K 3.3*   < > 3.9 3.5 5.2* 4.4 3.8     --  103 101  --  101 101   CO2 31.0  --  34.0* 36.0*  --  32.0 32.0   BUN 26*  --  25* 24*  --  29* 36*   CREATSERUM 0.70  --  0.75 0.84  --  0.94 0.99   CA 8.5  --  9.0 9.4  --  9.5 9.6   MG 2.2  --   --   --   --  2.4 2.4   *  --  130* 128*  --  136* 159*    < > = values in this interval not displayed.       Recent Labs   Lab 01/02/24  1454   ALT 36   AST 29   ALB 3.1*       No results for input(s): \"PGLU\" in the last 168 hours.    No results for input(s): \"TROP\" in the last 168 hours.      Meds:      bumetanide  2 mg Oral Daily    warfarin  2.5 mg Oral Once at night    clopidogrel  75 mg Oral Daily    potassium chloride  40 mEq Oral Daily    metoprolol succinate  25 mg Oral 2x Daily(Beta Blocker)    digoxin  125 mcg Oral Daily    spironolactone  25 mg Oral Daily    bumetanide  1 mg Intravenous Once    allopurinol  100 mg Oral Daily    buPROPion SR  150 mg Oral BID    FLUoxetine  20 mg Oral BID    dilTIAZem ER  240 mg Oral Daily    levothyroxine  150 mcg Oral Daily @ 0700    rosuvastatin  5 mg Oral Nightly       benzonatate, ipratropium-albuterol, acetaminophen       Assessment/Plan:     68 year old female with PMH including but not limited to asthma, COPD, Depression, HTN, HTN, PVD who p/t [facility] ED c SOB and cough.     Acute respiratory distress  - likely multifactorial from PNA, volume overload  - supp o2 as needed, now on RA   - coivd, flu, rsv neg; expaneded panel neg       PNA  - CXR reviewed, pt c cough and sputum  - IV CTX (1/2-1/6)/aizthro completed 3d  - blood cxs NG x 5d  - PCT & LA neg, urine strep/legionella neg, sputum cx  - sees pulmonologist Dr. Sharp from [facility]  - apprec pulm recs  - tessalon prn cough     Asthma/COPD  - trace  wheezing noted, follow  - home inhalers, duonebs if needed  - was given methylprednisolone x1     Acute on Chronic CHF  - tele  - daily wts, I/O  - given IV lasix in ED, takes bumex at home, switched to IV bumex 1mg-->2mg BID --> po for today and monitor how she does  - spironolactone, plan for entresto soon  - dig  - cards following  - follows c Dr. Walton, notify as needed; Cr ok currently - BNP 2680    HTN  Tachycarida  - CCB  - follow orthostatic BP as reports sometimes has weakness     Chronic AF  aortic stenosis s/p TAVR  Hx PE, IVCF  - on coumadin, INR 2.24 to 1.95 to 1.71 to 1.63 to 1.79 to 2.12, pharm dose  - consider NOAC if able, pt reports INR often fluctuates   - clarified home dose and resumed  - pt reports bruising easily    - CCB  - consider heparin gtt if needed if INR remains low   - pt notes goal INR 2.5-3.5, can clarify c cards along c valve status     # Hypothyroidism  -cont home levothyroxine      # Major Depression/ Generalized anxiety d/o   -reviewed home meds, continue wellbutrin, currently appears stable      # HL  -statin     # Gout  - allupurinol    # SHELDON  -protocol, on cpap     Was on prednisone ~2m ago, now complete, placed by neurologist for elevated ESR/CRP per pt      Dispo: discharge today or tomorrow pending cardiology    MD Mariel Lynn Hospitalist  [phone]

## 2024-01-10 LAB
ANION GAP SERPL CALC-SCNC: 7 MMOL/L (ref 0–18)
BASOPHILS # BLD AUTO: 0.08 X10(3) UL (ref 0–0.2)
BASOPHILS NFR BLD AUTO: 0.8 %
BUN BLD-MCNC: 36 MG/DL (ref 9–23)
CALCIUM BLD-MCNC: 9.6 MG/DL (ref 8.5–10.1)
CHLORIDE SERPL-SCNC: 101 MMOL/L (ref 98–112)
CO2 SERPL-SCNC: 30 MMOL/L (ref 21–32)
CREAT BLD-MCNC: 1.01 MG/DL
EGFRCR SERPLBLD CKD-EPI 2021: 60 ML/MIN/1.73M2 (ref 60–?)
EOSINOPHIL # BLD AUTO: 0.11 X10(3) UL (ref 0–0.7)
EOSINOPHIL NFR BLD AUTO: 1.2 %
ERYTHROCYTE [DISTWIDTH] IN BLOOD BY AUTOMATED COUNT: 12.6 %
GLUCOSE BLD-MCNC: 161 MG/DL (ref 70–99)
HCT VFR BLD AUTO: 46.9 %
HGB BLD-MCNC: 15.2 G/DL
IMM GRANULOCYTES # BLD AUTO: 0.08 X10(3) UL (ref 0–1)
IMM GRANULOCYTES NFR BLD: 0.8 %
INR BLD: 2.48 (ref 0.8–1.2)
LYMPHOCYTES # BLD AUTO: 2.03 X10(3) UL (ref 1–4)
LYMPHOCYTES NFR BLD AUTO: 21.4 %
MAGNESIUM SERPL-MCNC: 2.3 MG/DL (ref 1.6–2.6)
MCH RBC QN AUTO: 31.1 PG (ref 26–34)
MCHC RBC AUTO-ENTMCNC: 32.4 G/DL (ref 31–37)
MCV RBC AUTO: 95.9 FL
MONOCYTES # BLD AUTO: 0.91 X10(3) UL (ref 0.1–1)
MONOCYTES NFR BLD AUTO: 9.6 %
NEUTROPHILS # BLD AUTO: 6.26 X10 (3) UL (ref 1.5–7.7)
NEUTROPHILS # BLD AUTO: 6.26 X10(3) UL (ref 1.5–7.7)
NEUTROPHILS NFR BLD AUTO: 66.2 %
OSMOLALITY SERPL CALC.SUM OF ELEC: 298 MOSM/KG (ref 275–295)
PLATELET # BLD AUTO: 340 10(3)UL (ref 150–450)
POTASSIUM SERPL-SCNC: 3.5 MMOL/L (ref 3.5–5.1)
POTASSIUM SERPL-SCNC: 3.5 MMOL/L (ref 3.5–5.1)
POTASSIUM SERPL-SCNC: 3.8 MMOL/L (ref 3.5–5.1)
PROTHROMBIN TIME: 27.1 SECONDS (ref 11.6–14.8)
RBC # BLD AUTO: 4.89 X10(6)UL
SODIUM SERPL-SCNC: 138 MMOL/L (ref 136–145)
WBC # BLD AUTO: 9.5 X10(3) UL (ref 4–11)

## 2024-01-10 PROCEDURE — 85025 COMPLETE CBC W/AUTO DIFF WBC: CPT | Performed by: HOSPITALIST

## 2024-01-10 PROCEDURE — 84132 ASSAY OF SERUM POTASSIUM: CPT | Performed by: HOSPITALIST

## 2024-01-10 PROCEDURE — 85610 PROTHROMBIN TIME: CPT | Performed by: INTERNAL MEDICINE

## 2024-01-10 PROCEDURE — 80048 BASIC METABOLIC PNL TOTAL CA: CPT | Performed by: HOSPITALIST

## 2024-01-10 PROCEDURE — 83735 ASSAY OF MAGNESIUM: CPT | Performed by: HOSPITALIST

## 2024-01-10 PROCEDURE — 94799 UNLISTED PULMONARY SVC/PX: CPT

## 2024-01-10 RX ORDER — DILTIAZEM HYDROCHLORIDE 240 MG/1
240 CAPSULE, COATED, EXTENDED RELEASE ORAL DAILY
Qty: 90 CAPSULE | Refills: 3 | Status: SHIPPED | OUTPATIENT
Start: 2024-01-10 | End: 2025-01-04

## 2024-01-10 RX ORDER — POTASSIUM CHLORIDE 750 MG/1
40 TABLET, EXTENDED RELEASE ORAL EVERY 4 HOURS
Status: DISPENSED | OUTPATIENT
Start: 2024-01-10 | End: 2024-01-10

## 2024-01-10 RX ORDER — POTASSIUM CHLORIDE 20 MEQ/1
40 TABLET, EXTENDED RELEASE ORAL EVERY 4 HOURS
Status: DISCONTINUED | OUTPATIENT
Start: 2024-01-10 | End: 2024-01-10 | Stop reason: SDUPTHER

## 2024-01-10 RX ORDER — WARFARIN SODIUM 5 MG/1
5 TABLET ORAL
Status: COMPLETED | OUTPATIENT
Start: 2024-01-10 | End: 2024-01-10

## 2024-01-10 RX ORDER — LEVOTHYROXINE SODIUM 0.15 MG/1
150 TABLET ORAL
Qty: 30 TABLET | Refills: 0 | Status: SHIPPED | OUTPATIENT
Start: 2024-01-10 | End: 2024-02-09

## 2024-01-10 NOTE — PROGRESS NOTES
[facility]    Cardiology Progress Note    Arleth Weiss Patient Status:  Inpatient    1955 MRN [id number]   Location [facility] 2NE-A Attending Omi Blackman MD   Hosp Day # 8 PCP Viktoriya Garcias,          Impression/Plan:  68 year old female admitted with     SOB and edema from decompensated HFpEF (EF 50-55% 2023)  PNA  Medication nonadherence  Recurrent hospitalizations for HF  Chronic AF - mildly high rates. On warfarin.   Severe AS s/p TAVR   Moderate mitral stenosis   CAD s/p PCI LCX 3/21   PVD with acute limb ischemia and was treated with angiojet and POBA and aborted left pop cutdown 10/23 - sees Gaffud  COPD  HTN  HLD     - Continue PO bumex 2mg QAM  - Cont metoprolol, diltiazem, digoxin for rate control  - Cont home dose statin, spironolactone, plavix  - Warfarin for stroke prevention in AF with goal INR 2-3  - s/p Abx as per primary service  - Not comfortable driving home tonight (2 hours). Home tmrw morning.     Subjective: No CP or SOB. Edema improved. Wants to stay another night.     Patient Active Problem List   Diagnosis    Essential hypertension    Hypothyroidism    Fibromyalgia    Ankylosing spondylitis ([facility])    SHELDON on CPAP    Malaise and fatigue    High blood pressure    Degenerative arthritis of finger    Hypercholesteremia    GERD (gastroesophageal reflux disease)    Lumbar degenerative disc disease    PE (pulmonary thromboembolism) ([facility])    Status post evacuation of subdural hematoma    PAF (paroxysmal atrial fibrillation) ([facility])    Vitamin deficiency    Neuropathy    Presence of IVC filter    Severe aortic stenosis    Depression, unspecified depression type    Aortic valve disease    Long term (current) use of anticoagulants [Z79.01]    Carotid artery plaque, bilateral    Bilateral carotid artery disease ([facility])    Rheumatoid arthritis ([facility])    Cervical stenosis of spinal canal    Renal insufficiency syndrome    ERINN (acute kidney injury) ([facility])    COPD (chronic  obstructive pulmonary disease) ([facility])    CKD (chronic kidney disease) stage 3, GFR 30-59 ml/min ([facility])    Morbid obesity with BMI of 40.0-44.9, adult ([facility])    Binge eating disorder    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, right hand    Pre-op testing    Chronic diastolic CHF (congestive heart failure) ([facility])    S/P TAVR (transcatheter aortic valve replacement)    Coronary artery disease involving native coronary artery of native heart with other form of angina pectoris ([facility])    S/P KATHLEEN to LCx 3/15/21    Hospital discharge follow-up    Major depressive disorder, single episode, in partial remission ([facility])    Hyponatremia    Hypokalemia    Anemia, unspecified type    Osteomyelitis of toe of right foot ([facility])    Cellulitis of right lower extremity    Hypervolemia    Hypervolemia, unspecified hypervolemia type    Exertional dyspnea    Weakness generalized    Hip pain    Acute pain of left knee    Gross hematuria    Atrial fibrillation with RVR ([facility])    Acute on chronic congestive heart failure, unspecified heart failure type ([facility])    Respiratory syncytial virus (RSV)    Constipation, unspecified constipation type    Dehydration    Atrial fibrillation with rapid ventricular response ([facility])    Community acquired pneumonia, unspecified laterality    Acute bronchospasm    Atrial fibrillation, chronic ([facility])       Objective:   Temp: 97.8 °F (36.6 °C)  Pulse: 85  Resp: 14  BP: 128/88    Intake/Output:     Intake/Output Summary (Last 24 hours) at 1/10/2024 1734  Last data filed at 1/10/2024 1241  Gross per 24 hour   Intake 700 ml   Output 1000 ml   Net -300 ml       Last 3 Weights   01/10/24 0555 211 lb 13.8 oz (96.1 kg)   01/09/24 0540 212 lb 15.4 oz (96.6 kg)   01/08/24 0540 213 lb 10 oz (96.9 kg)   01/07/24 0645 215 lb 2.7 oz (97.6 kg)   01/05/24 0450 225 lb 5 oz (102.2 kg)   01/04/24 0611 235 lb 14.3 oz (107 kg)   01/02/24 1816 235 lb 0.2 oz (106.6 kg)   01/02/24 1238 235 lb (106.6 kg)   08/08/23 1335 219 lb (99.3 kg)   06/27/23 1100 219  lb (99.3 kg)       Tele: AF    Physical Exam:    General: Alert and oriented x 3. No apparent distress. No respiratory or constitutional distress.  HEENT: Normocephalic, anicteric sclera, neck supple, no thyromegaly or adenopathy.  Neck: No JVD, carotids 2+, no bruits.  Cardiac: Irregularly irregular  Lungs: Clear without wheezes, rales, rhonchi or dullness.  Normal excursions and effort.  Abdomen: Soft, non-tender. No organosplenomegally, mass or rebound, BS-present.  Extremities: Without clubbing, cyanosis or edema.  Peripheral pulses are 2+.  Neurologic: Alert and oriented, normal affect. No motor or coordinational deficit.  Skin: Warm and dry.     Laboratory/Data:    Labs:         Recent Labs   Lab 01/04/24  0649 01/05/24  0624 01/06/24  0855 01/07/24  0659 01/08/24  0555 01/09/24  0612 01/10/24  0642   WBC 7.6 7.5  --  8.6  --  11.3* 9.5   HGB 11.5* 12.7  --  15.0  --  15.3 15.2   MCV 98.4 97.8  --  97.0  --  94.5 95.9   .0 290.0  --  342.0  --  361.0 340.0   INR 1.71* 1.63* 1.79* 2.12* 2.22* 2.44* 2.48*       Recent Labs   Lab 01/04/24  0649 01/04/24  1850 01/05/24  0624 01/06/24  0855 01/06/24  2259 01/07/24  0659 01/09/24  0612 01/10/24  0642     --  140 140  --  138 137 138   K 3.3*   < > 3.9 3.5 5.2* 4.4 3.8 3.5  3.5     --  103 101  --  101 101 101   CO2 31.0  --  34.0* 36.0*  --  32.0 32.0 30.0   BUN 26*  --  25* 24*  --  29* 36* 36*   CREATSERUM 0.70  --  0.75 0.84  --  0.94 0.99 1.01   CA 8.5  --  9.0 9.4  --  9.5 9.6 9.6   MG 2.2  --   --   --   --  2.4 2.4 2.3   *  --  130* 128*  --  136* 159* 161*    < > = values in this interval not displayed.       No results for input(s): \"ALT\", \"AST\", \"ALB\", \"AMYLASE\", \"LIPASE\", \"LDH\" in the last 168 hours.    Invalid input(s): \"ALPHOS\", \"TBIL\", \"DBIL\", \"TPROT\"      No results for input(s): \"TROP\" in the last 168 hours.    Allergies:   Allergies   Allergen Reactions    Adhesive Tape HIVES     ALL ADHESIVES    Codeine HIVES     Doxycycline SWELLING    Hydrocodone UNKNOWN    Lisinopril TONGUE SWELLING    Metoprolol HIVES    Morphine Sulfate HIVES    Opioid Analgesics UNKNOWN    Oxycontin ITCHING    Oxytocin HIVES    Vicodin [Hydrocodone-Acetaminophen] ITCHING    Skin Adhesives OTHER (SEE COMMENTS)       Medications:

## 2024-01-10 NOTE — PROGRESS NOTES
[facility] Hospitalist Progress Note     PCP: Viktoriya Garcias DO    Chief Complaint: follow-up   Follow up for: The primary encounter diagnosis was Community acquired pneumonia, unspecified laterality. Diagnoses of Acute on chronic congestive heart failure, unspecified heart failure type (HCC), Acute bronchospasm, and Atrial fibrillation, chronic (HCC) were also pertinent to this visit.    Overnight/Interim Events:      SUBJECTIVE:  No chest pain, palpitations, nausea, vomiting, abdominal pain. SOB improving and at baseline. Cough resolved. Patient does feel like she has been feeling her afib more recently     OBJECTIVE:  Temp:  [97.3 °F (36.3 °C)-97.8 °F (36.6 °C)] 97.3 °F (36.3 °C)  Pulse:  [] 88  Resp:  [14-18] 14  BP: ()/() 122/79  SpO2:  [93 %-98 %] 97 %    Intake/Output:    Intake/Output Summary (Last 24 hours) at 1/10/2024 0922  Last data filed at 1/10/2024 0545  Gross per 24 hour   Intake 480 ml   Output 950 ml   Net -470 ml       Last 3 Weights   01/10/24 0555 211 lb 13.8 oz (96.1 kg)   01/09/24 0540 212 lb 15.4 oz (96.6 kg)   01/08/24 0540 213 lb 10 oz (96.9 kg)   01/07/24 0645 215 lb 2.7 oz (97.6 kg)   01/05/24 0450 225 lb 5 oz (102.2 kg)   01/04/24 0611 235 lb 14.3 oz (107 kg)   01/02/24 1816 235 lb 0.2 oz (106.6 kg)   01/02/24 1238 235 lb (106.6 kg)   08/08/23 1335 219 lb (99.3 kg)   06/27/23 1100 219 lb (99.3 kg)       Exam  Gen: alert and oriented, NAD  Cardiac: Irregular rate, no gross murmurs  Pulm: cta bilaterally, no crackles, no wheezing  Abd: no tenderness of the abd, non distended. No rebound or guarding.   Ext: no significant pitting edema below knees some above knees and no tenderness of the LE  Data Review:       Labs:     Recent Labs   Lab 01/04/24  0649 01/05/24  0624 01/06/24  0855 01/07/24  0659 01/08/24  0555 01/09/24  0612 01/10/24  0642   WBC 7.6 7.5  --  8.6  --  11.3* 9.5   HGB 11.5* 12.7  --  15.0  --  15.3 15.2   MCV 98.4 97.8  --  97.0  --  94.5 95.9   .0 290.0   --  342.0  --  361.0 340.0   INR 1.71* 1.63* 1.79* 2.12* 2.22* 2.44* 2.48*       Recent Labs   Lab 01/04/24  0649 01/04/24  1850 01/05/24  0624 01/06/24  0855 01/06/24  2259 01/07/24  0659 01/09/24  0612 01/10/24  0642     --  140 140  --  138 137 138   K 3.3*   < > 3.9 3.5 5.2* 4.4 3.8 3.5  3.5     --  103 101  --  101 101 101   CO2 31.0  --  34.0* 36.0*  --  32.0 32.0 30.0   BUN 26*  --  25* 24*  --  29* 36* 36*   CREATSERUM 0.70  --  0.75 0.84  --  0.94 0.99 1.01   CA 8.5  --  9.0 9.4  --  9.5 9.6 9.6   MG 2.2  --   --   --   --  2.4 2.4 2.3   *  --  130* 128*  --  136* 159* 161*    < > = values in this interval not displayed.       No results for input(s): \"ALT\", \"AST\", \"ALB\", \"AMYLASE\", \"LIPASE\", \"LDH\" in the last 168 hours.    Invalid input(s): \"ALPHOS\", \"TBIL\", \"DBIL\", \"TPROT\"      No results for input(s): \"PGLU\" in the last 168 hours.    No results for input(s): \"TROP\" in the last 168 hours.      Meds:      potassium chloride  40 mEq Oral Q4H    bumetanide  2 mg Oral Daily    clopidogrel  75 mg Oral Daily    potassium chloride  40 mEq Oral Daily    metoprolol succinate  25 mg Oral 2x Daily(Beta Blocker)    digoxin  125 mcg Oral Daily    spironolactone  25 mg Oral Daily    bumetanide  1 mg Intravenous Once    allopurinol  100 mg Oral Daily    buPROPion SR  150 mg Oral BID    FLUoxetine  20 mg Oral BID    dilTIAZem ER  240 mg Oral Daily    levothyroxine  150 mcg Oral Daily @ 0700    rosuvastatin  5 mg Oral Nightly       benzonatate, ipratropium-albuterol, acetaminophen       Assessment/Plan:     68 year old female with PMH including but not limited to asthma, COPD, Depression, HTN, HTN, PVD who p/t [facility] ED c SOB and cough.     Acute respiratory distress  - likely multifactorial from PNA, volume overload  - supp o2 as needed, now on RA   - coivd, flu, rsv neg; expaneded panel neg       PNA  - CXR reviewed, pt c cough and sputum  - IV CTX (1/2-1/6)/aizthro completed 3d  - blood cxs NG x 5d  -  PCT & LA neg, urine strep/legionella neg, sputum cx  - sees pulmonologist Dr. Sharp from [facility]  - apprec pulm recs  - tessalon prn cough     Asthma/COPD  - trace wheezing noted, follow  - home inhalers, duonebs if needed  - was given methylprednisolone x1     Acute on Chronic CHF  - tele  - daily wts, I/O  - given IV lasix in ED, takes bumex at home, switched to IV bumex 1mg-->2mg BID --> po for today and monitor how she does  - spironolactone, plan for entresto soon  - dig  - cards following  - follows c Dr. Walton, notify as needed; Cr ok currently - BNP 2680    HTN  Tachycarida  - CCB  - follow orthostatic BP as reports sometimes has weakness     Chronic AF--> rate controlled the majority of times  aortic stenosis s/p TAVR  Hx PE, IVCF  - on coumadin, INR 2.24 to 1.95 to 1.71 to 1.63 to 1.79 to 2.12, pharm dose  - consider NOAC if able, pt reports INR often fluctuates   - clarified home dose and resumed  - pt reports bruising easily    - CCB  - consider heparin gtt if needed if INR remains low   - pt notes goal INR 2.5-3.5, can clarify c cards along c valve status     # Hypothyroidism  -cont home levothyroxine      # Major Depression/ Generalized anxiety d/o   -reviewed home meds, continue wellbutrin, currently appears stable      # HL  -statin     # Gout  - allupurinol    # SHELDON  -protocol, on cpap     Was on prednisone ~2m ago, now complete, placed by neurologist for elevated ESR/CRP per pt      Dispo: discharge today if ok with cardiology     MD Mariel Lynn Hospitalist  [phone]

## 2024-01-10 NOTE — PLAN OF CARE
Patient alert and oriented x 4. On room air. Cpap during sleep tolerating ,Afib on cardiac monitor, coumadin continued  . Patient denies any chest pain or chest discomfort at this time. Patient voids, no  BM  during this shift , refused stool softener . Dry skin , skin care provided .discharge plan today , Plan of care updated, all questions answered. Safety precautions in place. Bed alarm on. Will continue to monitor tele/labs/vital signs closely.       Problem: CARDIOVASCULAR - ADULT  Goal: Maintains optimal cardiac output and hemodynamic stability  Description: INTERVENTIONS:  - Monitor vital signs, rhythm, and trends  - Monitor for bleeding, hypotension and signs of decreased cardiac output  - Evaluate effectiveness of vasoactive medications to optimize hemodynamic stability  - Monitor arterial and/or venous puncture sites for bleeding and/or hematoma  - Assess quality of pulses, skin color and temperature  - Assess for signs of decreased coronary artery perfusion - ex. Angina  - Evaluate fluid balance, assess for edema, trend weights  Outcome: Progressing     Problem: Patient/Family Goals  Goal: Patient/Family Long Term Goal  Description: Patient's Long Term Goal: to go home    Interventions:  - MD rounding, medication management, monitor labs, IV bumex  - See additional Care Plan goals for specific interventions  1/10/2024 0445 by Latonya Castro RN  Outcome: Progressing  1/10/2024 0445 by Latonya Castro RN  Outcome: Progressing     Problem: RESPIRATORY - ADULT  Goal: Achieves optimal ventilation and oxygenation  Description: INTERVENTIONS:  - Assess for changes in respiratory status  - Assess for changes in mentation and behavior  - Position to facilitate oxygenation and minimize respiratory effort  - Oxygen supplementation based on oxygen saturation or ABGs  - Provide Smoking Cessation handout, if applicable  - Encourage broncho-pulmonary hygiene including cough, deep breathe, Incentive Spirometry  -  Assess the need for suctioning and perform as needed  - Assess and instruct to report SOB or any respiratory difficulty  - Respiratory Therapy support as indicated  - Manage/alleviate anxiety  - Monitor for signs/symptoms of CO2 retention  1/10/2024 0445 by Latonya Castro RN  Outcome: Progressing  1/10/2024 0445 by Latonya Catsro RN  Outcome: Progressing     Problem: SAFETY ADULT - FALL  Goal: Free from fall injury  Description: INTERVENTIONS:  - Assess pt frequently for physical needs  - Identify cognitive and physical deficits and behaviors that affect risk of falls.  - [place] fall precautions as indicated by assessment.  - Educate pt/family on patient safety including physical limitations  - Instruct pt to call for assistance with activity based on assessment  - Modify environment to reduce risk of injury  - Provide assistive devices as appropriate  - Consider OT/PT consult to assist with strengthening/mobility  - Encourage toileting schedule  1/10/2024 0445 by Latonya Castro RN  Outcome: Progressing  1/10/2024 0445 by Latonya Castro RN  Outcome: Progressing     Problem: RISK FOR INFECTION - ADULT  Goal: Absence of fever/infection during anticipated neutropenic period  Description: INTERVENTIONS  - Monitor WBC  - Administer growth factors as ordered  - Implement neutropenic guidelines  1/10/2024 0445 by Latonya Castro RN  Outcome: Progressing  1/10/2024 0445 by Latonya Castro RN  Outcome: Progressing     Problem: PAIN - ADULT  Goal: Verbalizes/displays adequate comfort level or patient's stated pain goal  Description: INTERVENTIONS:  - Encourage pt to monitor pain and request assistance  - Assess pain using appropriate pain scale  - Administer analgesics based on type and severity of pain and evaluate response  - Implement non-pharmacological measures as appropriate and evaluate response  - Consider cultural and social influences on pain and pain management  - Manage/alleviate  anxiety  - Utilize distraction and/or relaxation techniques  - Monitor for opioid side effects  - Notify MD/LIP if interventions unsuccessful or patient reports new pain  - Anticipate increased pain with activity and pre-medicate as appropriate  1/10/2024 0445 by Latonya Castro RN  Outcome: Progressing  1/10/2024 0445 by Latonya Castro, RN  Outcome: Progressing

## 2024-01-10 NOTE — PLAN OF CARE
Assumed care of patient @ 0730 patient resting in bed, AOX4, reports no pain. Lung sounds clear, but diminished bilaterally, O2 saturation adequate on room air. No complaints of shortness of breath or chest pain at this time. Afib on tele. Bowel sounds present and active in all quadrants, last bowel movement 1/06/2024. Pt declining chair, will dangle at bedside. Non-pitting edema to BLE.     Plan of Care: Bumex. Daily weights / I/O. Orthos per shift. Home health at discharge.     Discussed plan of care with patient, verbalized understanding. Call light within reach.     Problem: PAIN - ADULT  Goal: Verbalizes/displays adequate comfort level or patient's stated pain goal  Description: INTERVENTIONS:  - Encourage pt to monitor pain and request assistance  - Assess pain using appropriate pain scale  - Administer analgesics based on type and severity of pain and evaluate response  - Implement non-pharmacological measures as appropriate and evaluate response  - Consider cultural and social influences on pain and pain management  - Manage/alleviate anxiety  - Utilize distraction and/or relaxation techniques  - Monitor for opioid side effects  - Notify MD/LIP if interventions unsuccessful or patient reports new pain  - Anticipate increased pain with activity and pre-medicate as appropriate  Outcome: Progressing     Problem: RISK FOR INFECTION - ADULT  Goal: Absence of fever/infection during anticipated neutropenic period  Description: INTERVENTIONS  - Monitor WBC  - Administer growth factors as ordered  - Implement neutropenic guidelines  Outcome: Progressing     Problem: SAFETY ADULT - FALL  Goal: Free from fall injury  Description: INTERVENTIONS:  - Assess pt frequently for physical needs  - Identify cognitive and physical deficits and behaviors that affect risk of falls.  - [place] fall precautions as indicated by assessment.  - Educate pt/family on patient safety including physical limitations  - Instruct pt to call  for assistance with activity based on assessment  - Modify environment to reduce risk of injury  - Provide assistive devices as appropriate  - Consider OT/PT consult to assist with strengthening/mobility  - Encourage toileting schedule  Outcome: Progressing     Problem: RESPIRATORY - ADULT  Goal: Achieves optimal ventilation and oxygenation  Description: INTERVENTIONS:  - Assess for changes in respiratory status  - Assess for changes in mentation and behavior  - Position to facilitate oxygenation and minimize respiratory effort  - Oxygen supplementation based on oxygen saturation or ABGs  - Provide Smoking Cessation handout, if applicable  - Encourage broncho-pulmonary hygiene including cough, deep breathe, Incentive Spirometry  - Assess the need for suctioning and perform as needed  - Assess and instruct to report SOB or any respiratory difficulty  - Respiratory Therapy support as indicated  - Manage/alleviate anxiety  - Monitor for signs/symptoms of CO2 retention  Outcome: Progressing     Problem: Patient/Family Goals  Goal: Patient/Family Long Term Goal  Description: Patient's Long Term Goal: to go home    Interventions:  - MD rounding, medication management, monitor labs, IV bumex  - See additional Care Plan goals for specific interventions  Outcome: Progressing  Goal: Patient/Family Short Term Goal  Description: Patient's Short Term Goal: remain pain free    Interventions:   - pain assessments q4, pain meds PRN  - See additional Care Plan goals for specific interventions  Outcome: Progressing     Problem: CARDIOVASCULAR - ADULT  Goal: Maintains optimal cardiac output and hemodynamic stability  Description: INTERVENTIONS:  - Monitor vital signs, rhythm, and trends  - Monitor for bleeding, hypotension and signs of decreased cardiac output  - Evaluate effectiveness of vasoactive medications to optimize hemodynamic stability  - Monitor arterial and/or venous puncture sites for bleeding and/or hematoma  - Assess  quality of pulses, skin color and temperature  - Assess for signs of decreased coronary artery perfusion - ex. Angina  - Evaluate fluid balance, assess for edema, trend weights  Outcome: Progressing  Goal: Absence of cardiac arrhythmias or at baseline  Description: INTERVENTIONS:  - Continuous cardiac monitoring, monitor vital signs, obtain 12 lead EKG if indicated  - Evaluate effectiveness of antiarrhythmic and heart rate control medications as ordered  - Initiate emergency measures for life threatening arrhythmias  - Monitor electrolytes and administer replacement therapy as ordered  Outcome: Progressing

## 2024-01-10 NOTE — PROGRESS NOTES
[facility] Pharm[facility] Dosing Service  Warfarin (Coumadin) Subsequent Dosing    Arleth Weiss is a 68 year old patient for whom pharmacy is dosing warfarin (Coumadin). Goal INR is 2-3--confirmed with cardiology while pt is admitted    Recent Labs   Lab 01/06/24  0855 01/07/24  0659 01/08/24  0555 01/09/24  0612 01/10/24  0642   INR 1.79* 2.12* 2.22* 2.44* 2.48*       Consulted by:  Dr Blackman  Indication:  afib, h/o PE, s/p TAVR   Potential Drug Interactions:  allopurinol, levothyroxine (stable home meds) - can increase INR; clopidogrel - can increase risk of bleeding   Other Anticoagulants:  none  Home regimen (if applicable):  Warfarin 5mg daily     Inpatient Dosing History:    Date 1/2 1/3 1/4 1/5 1/6 1/7 1/8 1/9   INR 2.24 1.95 1.71 1.63 1.79 2.12 2.22 2.44   Coumadin dose 5mg 5mg 8mg 9mg 5mg 5mg  5mg 2.5        Date 1/10        INR 2.48        Coumadin dose                  Based on above -  1.  For today, Give warfarin (COUMADIN) 5 mg at 2100 tonight; lower dose given yesterday; will continue home dose tonight  2   PT/INR ordered daily while on warfarin  3.  Pharmacy will continue to follow.  We appreciate the opportunity to assist in the care of this patient.    Patito Good PharmD  1/10/2024  12:41 PM

## 2024-01-10 NOTE — PROGRESS NOTES
01/09/24 2221   BiPAP   $ RT Standby Charge (per 15 min) 1  (patient to place on self)   BiPAP/CPAP Monitored Parameters   Pulse 98   SpO2 94 %   Toleration   (Pt to place on self)     Odalys Lobo, RRT

## 2024-01-11 VITALS
RESPIRATION RATE: 20 BRPM | WEIGHT: 212.31 LBS | DIASTOLIC BLOOD PRESSURE: 79 MMHG | SYSTOLIC BLOOD PRESSURE: 117 MMHG | HEIGHT: 69 IN | TEMPERATURE: 98 F | HEART RATE: 90 BPM | OXYGEN SATURATION: 92 % | BODY MASS INDEX: 31.44 KG/M2

## 2024-01-11 LAB
ANION GAP SERPL CALC-SCNC: 4 MMOL/L (ref 0–18)
BASOPHILS # BLD AUTO: 0.05 X10(3) UL (ref 0–0.2)
BASOPHILS NFR BLD AUTO: 0.6 %
BUN BLD-MCNC: 42 MG/DL (ref 9–23)
CALCIUM BLD-MCNC: 9.6 MG/DL (ref 8.5–10.1)
CHLORIDE SERPL-SCNC: 105 MMOL/L (ref 98–112)
CO2 SERPL-SCNC: 27 MMOL/L (ref 21–32)
CREAT BLD-MCNC: 1.03 MG/DL
EGFRCR SERPLBLD CKD-EPI 2021: 58 ML/MIN/1.73M2 (ref 60–?)
EOSINOPHIL # BLD AUTO: 0.09 X10(3) UL (ref 0–0.7)
EOSINOPHIL NFR BLD AUTO: 1.1 %
ERYTHROCYTE [DISTWIDTH] IN BLOOD BY AUTOMATED COUNT: 12.7 %
GLUCOSE BLD-MCNC: 148 MG/DL (ref 70–99)
HCT VFR BLD AUTO: 48.4 %
HGB BLD-MCNC: 16 G/DL
IMM GRANULOCYTES # BLD AUTO: 0.07 X10(3) UL (ref 0–1)
IMM GRANULOCYTES NFR BLD: 0.8 %
INR BLD: 2.2 (ref 0.8–1.2)
LYMPHOCYTES # BLD AUTO: 2.07 X10(3) UL (ref 1–4)
LYMPHOCYTES NFR BLD AUTO: 24.2 %
MAGNESIUM SERPL-MCNC: 2.6 MG/DL (ref 1.6–2.6)
MCH RBC QN AUTO: 31.1 PG (ref 26–34)
MCHC RBC AUTO-ENTMCNC: 33.1 G/DL (ref 31–37)
MCV RBC AUTO: 94.2 FL
MONOCYTES # BLD AUTO: 0.95 X10(3) UL (ref 0.1–1)
MONOCYTES NFR BLD AUTO: 11.1 %
NEUTROPHILS # BLD AUTO: 5.34 X10 (3) UL (ref 1.5–7.7)
NEUTROPHILS # BLD AUTO: 5.34 X10(3) UL (ref 1.5–7.7)
NEUTROPHILS NFR BLD AUTO: 62.2 %
OSMOLALITY SERPL CALC.SUM OF ELEC: 295 MOSM/KG (ref 275–295)
PLATELET # BLD AUTO: 343 10(3)UL (ref 150–450)
POTASSIUM SERPL-SCNC: 4.7 MMOL/L (ref 3.5–5.1)
PROTHROMBIN TIME: 24.7 SECONDS (ref 11.6–14.8)
RBC # BLD AUTO: 5.14 X10(6)UL
SODIUM SERPL-SCNC: 136 MMOL/L (ref 136–145)
WBC # BLD AUTO: 8.6 X10(3) UL (ref 4–11)

## 2024-01-11 PROCEDURE — 85025 COMPLETE CBC W/AUTO DIFF WBC: CPT | Performed by: HOSPITALIST

## 2024-01-11 PROCEDURE — 80048 BASIC METABOLIC PNL TOTAL CA: CPT | Performed by: HOSPITALIST

## 2024-01-11 PROCEDURE — 94799 UNLISTED PULMONARY SVC/PX: CPT

## 2024-01-11 PROCEDURE — 83735 ASSAY OF MAGNESIUM: CPT | Performed by: HOSPITALIST

## 2024-01-11 PROCEDURE — 85610 PROTHROMBIN TIME: CPT | Performed by: INTERNAL MEDICINE

## 2024-01-11 RX ORDER — WARFARIN SODIUM 5 MG/1
5 TABLET ORAL
Status: DISCONTINUED | OUTPATIENT
Start: 2024-01-11 | End: 2024-01-11

## 2024-01-11 NOTE — CM/SW NOTE
01/09/24 0900   Discharge disposition   Expected discharge disposition Home-Health   Post Acute Care Provider  [facility]  ([facility])     AVS sent to [facility].    Mariya Martinez LCSW  /Discharge Planner

## 2024-01-11 NOTE — PROGRESS NOTES
[facility]    Cardiology Progress Note    Arleth Weiss Patient Status:  Inpatient    1955 MRN [id number]   Location [facility] 2NE-A Attending Omi Blackman MD   Hosp Day # 9 PCP Viktoriya Garcias DO         Impression/Plan:  68 year old female admitted with     SOB and edema from decompensated HFpEF (EF 50-55% 2023)  PNA  Medication nonadherence  Recurrent hospitalizations for HF  Chronic AF - mildly high rates. On warfarin.   Severe AS s/p TAVR   Moderate mitral stenosis   CAD s/p PCI LCX 3/21   PVD with acute limb ischemia and was treated with angiojet and POBA and aborted left pop cutdown 10/23 - sees Gaffud  COPD  HTN  HLD     - Continue PO bumex 2mg QAM  - Cont metoprolol, diltiazem, digoxin for rate control  - Cont home dose statin, spironolactone, plavix  - Warfarin for stroke prevention in AF with goal INR 2-3  - s/p Abx as per primary service  - home today     Subjective: No CP or SOB. Edema resolved.    Patient Active Problem List   Diagnosis    Essential hypertension    Hypothyroidism    Fibromyalgia    Ankylosing spondylitis ([facility])    SHELDON on CPAP    Malaise and fatigue    High blood pressure    Degenerative arthritis of finger    Hypercholesteremia    GERD (gastroesophageal reflux disease)    Lumbar degenerative disc disease    PE (pulmonary thromboembolism) ([facility])    Status post evacuation of subdural hematoma    PAF (paroxysmal atrial fibrillation) ([facility])    Vitamin deficiency    Neuropathy    Presence of IVC filter    Severe aortic stenosis    Depression, unspecified depression type    Aortic valve disease    Long term (current) use of anticoagulants [Z79.01]    Carotid artery plaque, bilateral    Bilateral carotid artery disease ([facility])    Rheumatoid arthritis ([facility])    Cervical stenosis of spinal canal    Renal insufficiency syndrome    ERINN (acute kidney injury) ([facility])    COPD (chronic obstructive pulmonary disease) ([facility])    CKD (chronic kidney disease) stage 3, GFR 30-59  ml/min ([facility])    Morbid obesity with BMI of 40.0-44.9, adult ([facility])    Binge eating disorder    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, right hand    Pre-op testing    Chronic diastolic CHF (congestive heart failure) ([facility])    S/P TAVR (transcatheter aortic valve replacement)    Coronary artery disease involving native coronary artery of native heart with other form of angina pectoris ([facility])    S/P KATHLEEN to LCx 3/15/21    Hospital discharge follow-up    Major depressive disorder, single episode, in partial remission ([facility])    Hyponatremia    Hypokalemia    Anemia, unspecified type    Osteomyelitis of toe of right foot ([facility])    Cellulitis of right lower extremity    Hypervolemia    Hypervolemia, unspecified hypervolemia type    Exertional dyspnea    Weakness generalized    Hip pain    Acute pain of left knee    Gross hematuria    Atrial fibrillation with RVR ([facility])    Acute on chronic congestive heart failure, unspecified heart failure type ([facility])    Respiratory syncytial virus (RSV)    Constipation, unspecified constipation type    Dehydration    Atrial fibrillation with rapid ventricular response ([facility])    Community acquired pneumonia, unspecified laterality    Acute bronchospasm    Atrial fibrillation, chronic ([facility])       Objective:   Temp: 97.9 °F (36.6 °C)  Pulse: 97  Resp: 20  BP: 127/93    Intake/Output:     Intake/Output Summary (Last 24 hours) at 1/11/2024 1133  Last data filed at 1/11/2024 0737  Gross per 24 hour   Intake 730 ml   Output 1200 ml   Net -470 ml       Last 3 Weights   01/11/24 0555 212 lb 4.9 oz (96.3 kg)   01/10/24 0555 211 lb 13.8 oz (96.1 kg)   01/09/24 0540 212 lb 15.4 oz (96.6 kg)   01/08/24 0540 213 lb 10 oz (96.9 kg)   01/07/24 0645 215 lb 2.7 oz (97.6 kg)   01/05/24 0450 225 lb 5 oz (102.2 kg)   01/04/24 0611 235 lb 14.3 oz (107 kg)   01/02/24 1816 235 lb 0.2 oz (106.6 kg)   01/02/24 1238 235 lb (106.6 kg)   08/08/23 1335 219 lb (99.3 kg)   06/27/23 1100 219 lb (99.3 kg)       Tele: AF    Physical  Exam:    General: Alert and oriented x 3. No apparent distress. No respiratory or constitutional distress.  HEENT: Normocephalic, anicteric sclera, neck supple, no thyromegaly or adenopathy.  Neck: No JVD, carotids 2+, no bruits.  Cardiac: Irregularly irregular  Lungs: Clear without wheezes, rales, rhonchi or dullness.  Normal excursions and effort.  Abdomen: Soft, non-tender. No organosplenomegally, mass or rebound, BS-present.  Extremities: Without clubbing, cyanosis or edema.  Peripheral pulses are 2+.  Neurologic: Alert and oriented, normal affect. No motor or coordinational deficit.  Skin: Warm and dry.     Laboratory/Data:    Labs:         Recent Labs   Lab 01/05/24  0624 01/06/24  0855 01/07/24  0659 01/08/24  0555 01/09/24  0612 01/10/24  0642 01/11/24  0650   WBC 7.5  --  8.6  --  11.3* 9.5 8.6   HGB 12.7  --  15.0  --  15.3 15.2 16.0   MCV 97.8  --  97.0  --  94.5 95.9 94.2   .0  --  342.0  --  361.0 340.0 343.0   INR 1.63*   < > 2.12* 2.22* 2.44* 2.48* 2.20*    < > = values in this interval not displayed.       Recent Labs   Lab 01/06/24  0855 01/06/24  2259 01/07/24  0659 01/09/24  0612 01/10/24  0642 01/10/24  1629 01/11/24  0650     --  138 137 138  --  136   K 3.5   < > 4.4 3.8 3.5  3.5 3.8 4.7     --  101 101 101  --  105   CO2 36.0*  --  32.0 32.0 30.0  --  27.0   BUN 24*  --  29* 36* 36*  --  42*   CREATSERUM 0.84  --  0.94 0.99 1.01  --  1.03*   CA 9.4  --  9.5 9.6 9.6  --  9.6   MG  --   --  2.4 2.4 2.3  --  2.6   *  --  136* 159* 161*  --  148*    < > = values in this interval not displayed.       No results for input(s): \"ALT\", \"AST\", \"ALB\", \"AMYLASE\", \"LIPASE\", \"LDH\" in the last 168 hours.    Invalid input(s): \"ALPHOS\", \"TBIL\", \"DBIL\", \"TPROT\"      No results for input(s): \"TROP\" in the last 168 hours.    Allergies:   Allergies   Allergen Reactions    Adhesive Tape HIVES     ALL ADHESIVES    Codeine HIVES    Doxycycline SWELLING    Hydrocodone UNKNOWN    Lisinopril  TONGUE SWELLING    Metoprolol HIVES    Morphine Sulfate HIVES    Opioid Analgesics UNKNOWN    Oxycontin ITCHING    Oxytocin HIVES    Vicodin [Hydrocodone-Acetaminophen] ITCHING    Skin Adhesives OTHER (SEE COMMENTS)       Medications:

## 2024-01-11 NOTE — PROGRESS NOTES
[facility]    DISCHARGE SUMMARY     Arleth Weiss Patient Status:  Inpatient    1955 MRN [id number]   Location [facility] 2NE-A Attending No att. providers found   Hosp Day # 9 PCP Viktoriya Garcias DO     Date of Admission: 2024  Date of Discharge: 2024  Discharge Disposition: Home Health Care Services    Discharge Diagnosis: Acute Respiratory distress, PNA, Acute on chronc HFpEF    History of Present Illness: 68 year old female with PMH including but not limited to asthma, COPD, Depression, HTN, HTN, PVD who p/t [facility] ED c SOB and cough. Patient was seen by pulmonary and cardiology while hospitalized. Patient was treated for pna with ib abx while hospitalized. Patient was treated for acute on chronic CHF with iv bumex. Patient clinically improved and was cleared by cardiology and pulmonary for discharge. Patient was clinically stable, labs stable and vital stable for discharge. Patient agreeable with discharge.     Discharge Medication List:     Discharge Medications        CHANGE how you take these medications        Instructions Prescription details   levothyroxine 150 MCG Tabs  Commonly known as: Synthroid  What changed:   how much to take  Another medication with the same name was removed. Continue taking this medication, and follow the directions you see here.      Take 1 tablet (150 mcg total) by mouth before breakfast for 30 doses.   Stop taking on: 2024  Quantity: 30 tablet  Refills: 0            CONTINUE taking these medications        Instructions Prescription details   acetaminophen 325 MG Tabs  Commonly known as: Tylenol      Take 2 tablets (650 mg total) by mouth every 8 (eight) hours as needed for Pain.   Refills: 0     allopurinol 100 MG Tabs  Commonly known as: Zyloprim      Take 1 tablet (100 mg total) by mouth daily.   Quantity: 90 tablet  Refills: 3     bumetanide 2 MG Tabs  Commonly known as: Bumex      Take 1 tablet (2 mg total) by mouth daily.   Refills: 0      buPROPion  MG Tb12  Commonly known as: WELLBUTRIN SR      Take 1 tablet (150 mg total) by mouth 2 (two) times daily.   Refills: 0     calcium carbonate 500 MG Chew  Commonly known as: Tums      Chew 1 tablet (500 mg total) by mouth every 8 (eight) hours as needed (upset stomach).   Refills: 0     Cholecalciferol 125 MCG (5000 UT) Tabs  Commonly known as: VITAMIN D-3      Take 1 tablet (5,000 Units total) by mouth daily.   Refills: 0     clopidogrel 75 MG Tabs  Commonly known as: Plavix      Take 1 tablet (75 mg total) by mouth daily.   Refills: 0     cyproheptadine 4 MG Tabs  Commonly known as: Periactin      Take 1 tablet (4 mg total) by mouth daily.   Refills: 0     digoxin 0.125 MG Tabs  Commonly known as: Lanoxin      Take 1 tablet (125 mcg total) by mouth daily.   Quantity: 30 tablet  Refills: 0     dilTIAZem  MG Cp24  Commonly known as: CardIZEM CD      Take 1 capsule (240 mg total) by mouth daily.   Quantity: 90 capsule  Refills: 3     ferrous sulfate 325 (65 FE) MG Tbec      Take 1 tablet (325 mg total) by mouth daily with breakfast.   Refills: 0     FLUoxetine 20 MG Caps  Commonly known as: PROzac      Take 1 capsule (20 mg total) by mouth 2 (two) times daily.   Refills: 0     metoprolol succinate ER 25 MG Tb24  Commonly known as: Toprol XL      Take 1 tablet (25 mg total) by mouth 2 (two) times daily.   Refills: 0     MULTIVITAMIN OR      Take 1 tablet by mouth daily.   Refills: 0     nortriptyline 25 MG Caps  Commonly known as: Pamelor      Take 1 capsule (25 mg total) by mouth nightly.   Refills: 0     Potassium Chloride ER 10 MEQ Cpcr  Commonly known as: MICRO-K      4 capsules (40 mEq total) daily.   Refills: 0     rosuvastatin 5 MG Tabs  Commonly known as: Crestor      Take 1 tablet (5 mg total) by mouth every other day.   Refills: 0     spironolactone 25 MG Tabs  Commonly known as: Aldactone      Take 1 tablet (25 mg total) by mouth daily.   Refills: 0     triamcinolone 0.1 %  Oint  Commonly known as: Kenalog      Apply topically 2 (two) times daily.   Refills: 0     Vitamin C 500 MG Tabs  Commonly known as: VITAMIN C      Take 1 tablet (500 mg total) by mouth in the morning and 1 tablet (500 mg total) before bedtime.   Refills: 0     warfarin 5 MG Tabs  Commonly known as: Coumadin  Notes to patient: Resume INR labs at normal schedule       Take as directed. If you are unsure how to take this medication, talk to your nurse or doctor.  Original instructions: Take 1 tablet (5 mg total) by mouth nightly.   Refills: 0     Zinc 50 MG Caps      Take 50 mg by mouth daily.   Refills: 0            STOP taking these medications      metOLazone 2.5 MG Tabs  Commonly known as: Zaroxolyn        Zinc Gluconate 50 MG Tabs                  Where to Get Your Medications        These medications were sent to [facility] [address] [place] [address] [phone], [phone]  [address] [place] [id number]      Phone: [phone]   dilTIAZem  MG Cp24  levothyroxine 150 MCG Tabs       Follow-up appointment:   Viktoriya Garcias DO  [address] [facility] [id number] [phone]    Follow up in 1 week(s)      Trey Hernandez DO  [address]  [address]  [facility] [address]  [phone]    Schedule an appointment as soon as possible for a visit      Armand Case MD  [address] [facility] [id number] [phone]    Schedule an appointment as soon as possible for a visit in 1 week(s)      Appointments for Next 30 Days 1/13/2024 - 2/12/2024      None            OBJECTIVE:  Temp:  [97.6 °F (36.4 °C)-97.9 °F (36.6 °C)] 97.9 °F (36.6 °C)  Pulse:  [] 97  Resp:  [14-20] 20  BP: ()/(59-93) 127/93  SpO2:  [79 %-100 %] 96 %    Intake/Output:    Intake/Output Summary (Last 24 hours) at 1/11/2024 0949  Last data filed at 1/11/2024 0737  Gross per 24 hour   Intake 730 ml   Output 1200 ml   Net -470 ml       Last 3 Weights   01/11/24 0555 212 lb 4.9 oz (96.3 kg)    01/10/24 0555 211 lb 13.8 oz (96.1 kg)   01/09/24 0540 212 lb 15.4 oz (96.6 kg)   01/08/24 0540 213 lb 10 oz (96.9 kg)   01/07/24 0645 215 lb 2.7 oz (97.6 kg)   01/05/24 0450 225 lb 5 oz (102.2 kg)   01/04/24 0611 235 lb 14.3 oz (107 kg)   01/02/24 1816 235 lb 0.2 oz (106.6 kg)   01/02/24 1238 235 lb (106.6 kg)   08/08/23 1335 219 lb (99.3 kg)   06/27/23 1100 219 lb (99.3 kg)       Exam  Gen: alert and oriented, NAD  Cardiac: Irregular rate, no gross murmurs  Pulm: cta bilaterally, no crackles, no wheezing  Abd: no tenderness of the abd, non distended. No rebound or guarding.   Ext: no significant pitting edema below knees some above knees and no tenderness of the LE  -----------------------------------------------------------------------------------------------  PATIENT DISCHARGE INSTRUCTIONS: See electronic chart     Assessment/Plan:     68 year old female with PMH including but not limited to asthma, COPD, Depression, HTN, HTN, PVD who p/t [facility] ED c SOB and cough.     Acute respiratory distress  - likely multifactorial from PNA, volume overload  - supp o2 as needed, now on RA   - coivd, flu, rsv neg; expaneded panel neg       PNA  - CXR reviewed, pt c cough and sputum  - IV CTX (1/2-1/6)/aizthro completed 3d--> completed abx per pulm   - blood cxs NG x 5d  - PCT & LA neg, urine strep/legionella neg, sputum cx  - sees pulmonologist Dr. Sharp from [facility]  - apprec pulm recs  - tessalon prn cough     Asthma/COPD  - trace wheezing noted, follow  - home inhalers, duonebs if needed  - was given methylprednisolone x1     Acute on Chronic CHF  - tele  - daily wts, I/O  - given IV lasix in ED, takes bumex at home, switched to IV bumex 1mg-->2mg BID --> po for today and monitor how she does--> tolerated  - spironolactone, plan for entresto soon  - dig  - cards following  - follows c Dr. Walton, notify as needed; Cr ok currently - BNP 2680    HTN  Tachycarida  - CCB  - follow orthostatic BP as reports sometimes has weakness      Chronic AF--> rate controlled the majority of times  aortic stenosis s/p TAVR  Hx PE, IVCF  - on coumadin, INR 2.24 to 1.95 to 1.71 to 1.63 to 1.79 to 2.12, pharm dose  - consider NOAC if able, pt reports INR often fluctuates   - clarified home dose and resumed  - pt reports bruising easily    - CCB  - consider heparin gtt if needed if INR remains low   - pt notes goal INR 2.5-3.5, can clarify c cards along c valve status     # Hypothyroidism  -cont home levothyroxine      # Major Depression/ Generalized anxiety d/o   -reviewed home meds, continue wellbutrin, currently appears stable      # HL  -statin     # Gout  - allupurinol    # SHELDON  -protocol, on cpap     Was on prednisone ~2m ago, now complete, placed by neurologist for elevated ESR/CRP per pt      Dispo: discharge     Luis Tidwell MD  [facility] Hospitalist  [phone]    Time spent:  > 35 minutes

## 2024-01-11 NOTE — PLAN OF CARE
Assumed care of patient @ 0730 patient resting in bed, AOX4, reports no pain. Lung sounds clear, but diminished bilaterally, O2 saturation adequate on room air. No complaints of shortness of breath or chest pain at this time. Afib on tele. Bowel sounds present and active in all quadrants, last bowel movement 1/10/2024. Pt declining chair, will dangle at bedside. Non-pitting edema to BLE.      Plan of Care: Bumex. Daily weights / I/O. Orthos per shift. Home health at discharge. Discharge home today.     Discussed plan of care with patient, verbalized understanding. Call light within reach.    Problem: PAIN - ADULT  Goal: Verbalizes/displays adequate comfort level or patient's stated pain goal  Description: INTERVENTIONS:  - Encourage pt to monitor pain and request assistance  - Assess pain using appropriate pain scale  - Administer analgesics based on type and severity of pain and evaluate response  - Implement non-pharmacological measures as appropriate and evaluate response  - Consider cultural and social influences on pain and pain management  - Manage/alleviate anxiety  - Utilize distraction and/or relaxation techniques  - Monitor for opioid side effects  - Notify MD/LIP if interventions unsuccessful or patient reports new pain  - Anticipate increased pain with activity and pre-medicate as appropriate  Outcome: Progressing     Problem: RISK FOR INFECTION - ADULT  Goal: Absence of fever/infection during anticipated neutropenic period  Description: INTERVENTIONS  - Monitor WBC  - Administer growth factors as ordered  - Implement neutropenic guidelines  Outcome: Progressing     Problem: SAFETY ADULT - FALL  Goal: Free from fall injury  Description: INTERVENTIONS:  - Assess pt frequently for physical needs  - Identify cognitive and physical deficits and behaviors that affect risk of falls.  - Cyril fall precautions as indicated by assessment.  - Educate pt/family on patient safety including physical  limitations  - Instruct pt to call for assistance with activity based on assessment  - Modify environment to reduce risk of injury  - Provide assistive devices as appropriate  - Consider OT/PT consult to assist with strengthening/mobility  - Encourage toileting schedule  Outcome: Progressing     Problem: RESPIRATORY - ADULT  Goal: Achieves optimal ventilation and oxygenation  Description: INTERVENTIONS:  - Assess for changes in respiratory status  - Assess for changes in mentation and behavior  - Position to facilitate oxygenation and minimize respiratory effort  - Oxygen supplementation based on oxygen saturation or ABGs  - Provide Smoking Cessation handout, if applicable  - Encourage broncho-pulmonary hygiene including cough, deep breathe, Incentive Spirometry  - Assess the need for suctioning and perform as needed  - Assess and instruct to report SOB or any respiratory difficulty  - Respiratory Therapy support as indicated  - Manage/alleviate anxiety  - Monitor for signs/symptoms of CO2 retention  Outcome: Progressing     Problem: Patient/Family Goals  Goal: Patient/Family Long Term Goal  Description: Patient's Long Term Goal: to go home    Interventions:  - MD rounding, medication management, monitor labs, IV bumex  - See additional Care Plan goals for specific interventions  Outcome: Progressing  Goal: Patient/Family Short Term Goal  Description: Patient's Short Term Goal: remain pain free    Interventions:   - pain assessments q4, pain meds PRN  - See additional Care Plan goals for specific interventions  Outcome: Progressing     Problem: CARDIOVASCULAR - ADULT  Goal: Maintains optimal cardiac output and hemodynamic stability  Description: INTERVENTIONS:  - Monitor vital signs, rhythm, and trends  - Monitor for bleeding, hypotension and signs of decreased cardiac output  - Evaluate effectiveness of vasoactive medications to optimize hemodynamic stability  - Monitor arterial and/or venous puncture sites for  bleeding and/or hematoma  - Assess quality of pulses, skin color and temperature  - Assess for signs of decreased coronary artery perfusion - ex. Angina  - Evaluate fluid balance, assess for edema, trend weights  Outcome: Progressing  Goal: Absence of cardiac arrhythmias or at baseline  Description: INTERVENTIONS:  - Continuous cardiac monitoring, monitor vital signs, obtain 12 lead EKG if indicated  - Evaluate effectiveness of antiarrhythmic and heart rate control medications as ordered  - Initiate emergency measures for life threatening arrhythmias  - Monitor electrolytes and administer replacement therapy as ordered  Outcome: Progressing     Problem: Discharge Barriers  Goal: Patient's discharge needs are met  Description: Collaborate with interdisciplinary team and initiate plans and interventions as needed.   Outcome: Progressing

## 2024-01-11 NOTE — PLAN OF CARE
Pt cleared for discharge per hospitalist and consults. Discharge AVS including medication information, follow-ups, and prescriptions given. Patient verbalized understanding and is agreeable with discharge. IV removed, telemetry discontinued. All belongings taken with patient. Transported off unit via wheelchair.    Problem: PAIN - ADULT  Goal: Verbalizes/displays adequate comfort level or patient's stated pain goal  Description: INTERVENTIONS:  - Encourage pt to monitor pain and request assistance  - Assess pain using appropriate pain scale  - Administer analgesics based on type and severity of pain and evaluate response  - Implement non-pharmacological measures as appropriate and evaluate response  - Consider cultural and social influences on pain and pain management  - Manage/alleviate anxiety  - Utilize distraction and/or relaxation techniques  - Monitor for opioid side effects  - Notify MD/LIP if interventions unsuccessful or patient reports new pain  - Anticipate increased pain with activity and pre-medicate as appropriate  1/11/2024 1136 by Barbie Puga RN  Outcome: Adequate for Discharge  1/11/2024 1048 by Barbie Puga RN  Outcome: Progressing     Problem: RISK FOR INFECTION - ADULT  Goal: Absence of fever/infection during anticipated neutropenic period  Description: INTERVENTIONS  - Monitor WBC  - Administer growth factors as ordered  - Implement neutropenic guidelines  1/11/2024 1136 by Barbie Puga RN  Outcome: Adequate for Discharge  1/11/2024 1048 by Barbie Puga RN  Outcome: Progressing     Problem: SAFETY ADULT - FALL  Goal: Free from fall injury  Description: INTERVENTIONS:  - Assess pt frequently for physical needs  - Identify cognitive and physical deficits and behaviors that affect risk of falls.  - [place] fall precautions as indicated by assessment.  - Educate pt/family on patient safety including physical limitations  - Instruct pt to call for assistance with activity based  on assessment  - Modify environment to reduce risk of injury  - Provide assistive devices as appropriate  - Consider OT/PT consult to assist with strengthening/mobility  - Encourage toileting schedule  1/11/2024 1136 by Barbie Puga RN  Outcome: Adequate for Discharge  1/11/2024 1048 by Barbie Puga RN  Outcome: Progressing     Problem: RESPIRATORY - ADULT  Goal: Achieves optimal ventilation and oxygenation  Description: INTERVENTIONS:  - Assess for changes in respiratory status  - Assess for changes in mentation and behavior  - Position to facilitate oxygenation and minimize respiratory effort  - Oxygen supplementation based on oxygen saturation or ABGs  - Provide Smoking Cessation handout, if applicable  - Encourage broncho-pulmonary hygiene including cough, deep breathe, Incentive Spirometry  - Assess the need for suctioning and perform as needed  - Assess and instruct to report SOB or any respiratory difficulty  - Respiratory Therapy support as indicated  - Manage/alleviate anxiety  - Monitor for signs/symptoms of CO2 retention  1/11/2024 1136 by Barbie Puga RN  Outcome: Adequate for Discharge  1/11/2024 1048 by Barbie Puga RN  Outcome: Progressing     Problem: Patient/Family Goals  Goal: Patient/Family Long Term Goal  Description: Patient's Long Term Goal: to go home    Interventions:  - MD rounding, medication management, monitor labs, IV bumex  - See additional Care Plan goals for specific interventions  1/11/2024 1136 by Barbie Puga RN  Outcome: Adequate for Discharge  1/11/2024 1048 by Barbie Puga RN  Outcome: Progressing  Goal: Patient/Family Short Term Goal  Description: Patient's Short Term Goal: remain pain free    Interventions:   - pain assessments q4, pain meds PRN  - See additional Care Plan goals for specific interventions  1/11/2024 1136 by Barbie Puga RN  Outcome: Adequate for Discharge  1/11/2024 1048 by Barbie Puga RN  Outcome: Progressing      Problem: CARDIOVASCULAR - ADULT  Goal: Maintains optimal cardiac output and hemodynamic stability  Description: INTERVENTIONS:  - Monitor vital signs, rhythm, and trends  - Monitor for bleeding, hypotension and signs of decreased cardiac output  - Evaluate effectiveness of vasoactive medications to optimize hemodynamic stability  - Monitor arterial and/or venous puncture sites for bleeding and/or hematoma  - Assess quality of pulses, skin color and temperature  - Assess for signs of decreased coronary artery perfusion - ex. Angina  - Evaluate fluid balance, assess for edema, trend weights  1/11/2024 1136 by Barbie Puga RN  Outcome: Adequate for Discharge  1/11/2024 1048 by Barbie Puga RN  Outcome: Progressing  Goal: Absence of cardiac arrhythmias or at baseline  Description: INTERVENTIONS:  - Continuous cardiac monitoring, monitor vital signs, obtain 12 lead EKG if indicated  - Evaluate effectiveness of antiarrhythmic and heart rate control medications as ordered  - Initiate emergency measures for life threatening arrhythmias  - Monitor electrolytes and administer replacement therapy as ordered  1/11/2024 1136 by Barbie Puga RN  Outcome: Adequate for Discharge  1/11/2024 1048 by Barbie Puga RN  Outcome: Progressing     Problem: Discharge Barriers  Goal: Patient's discharge needs are met  Description: Collaborate with interdisciplinary team and initiate plans and interventions as needed.   1/11/2024 1136 by Barbie Puga RN  Outcome: Adequate for Discharge  1/11/2024 1048 by Barbie Puga RN  Outcome: Progressing

## 2024-01-11 NOTE — PROGRESS NOTES
[facility] Pharmacy Dosing Service  Warfarin (Coumadin) Subsequent Dosing    Arleth Weiss is a 68 year old patient for whom pharmacy is dosing warfarin (Coumadin). Goal INR is 2-3 - confirmed with Cardiology while patient is admitted.     Recent Labs   Lab 01/07/24  0659 01/08/24  0555 01/09/24  0612 01/10/24  0642 01/11/24  0650   INR 2.12* 2.22* 2.44* 2.48* 2.20*     Consulted by: Dr. Blackman  Indication: Afib, hx PE, s/p TAVR  Potential Drug Interactions:  Allopurinol, Levothyroxine, Clopidogrel  Other Anticoagulants:  None  Home regimen (if applicable):  Warfarin 5 mg daily    Inpatient Dosing History:  Date 1/2 1/3 1/4 1/5 1/6 1/7   INR 2.24 1.95 1.71 1.63 1.79 2.12   Coumadin dose 5mg 5mg 8mg 9mg 5mg 5mg     Date 1/8 1/9 1/10 1/11     INR 2.22 2.44 2.48 2.20     Coumadin dose 5mg 2.5mg 5mg             Based on above -  1.  For today, Give warfarin (COUMADIN) 5 mg at 2100 tonight  2   PT/INR ordered daily while on warfarin  3.  Pharmacy will continue to follow.  We appreciate the opportunity to assist in the care of this patient.    Kathie Roberts, PharmD  1/11/2024  8:01 AM

## 2024-01-11 NOTE — PLAN OF CARE
Patient alert and oriented x 4. On room air. Cpap tolerating during sleep,controlled Afib  on cardiac monitor.coumadin 5 mg administered, INR 2.44,lung sounds diminished and clear,, No edema on lower legs,skin  very dry sluggish , elevated pillow , skin barrier cream  applied  , encouraged to move around and assisted to brp with walker , tolerated, no acute distress noted,Patient denies any chest pain or chest discomfort at this time. Patient voids, last BM tonight  1/10 .discharge plan today . Plan of care updated, all questions answered. Safety precautions in place. Bed alarm on. Will continue to monitor tele/labs/vital signs closely.     Plan:   Discharge plan today morning    Problem: Patient/Family Goals  Goal: Patient/Family Long Term Goal  Description: Patient's Long Term Goal: to go home    Interventions:  - MD rounding, medication management, monitor labs, IV bumex  - See additional Care Plan goals for specific interventions  Outcome: Progressing     Problem: Patient/Family Goals  Goal: Patient/Family Short Term Goal  Description: Patient's Short Term Goal: remain pain free    Interventions:   - pain assessments q4, pain meds PRN  - See additional Care Plan goals for specific interventions  Outcome: Progressing     Problem: SAFETY ADULT - FALL  Goal: Free from fall injury  Description: INTERVENTIONS:  - Assess pt frequently for physical needs  - Identify cognitive and physical deficits and behaviors that affect risk of falls.  - [place] fall precautions as indicated by assessment.  - Educate pt/family on patient safety including physical limitations  - Instruct pt to call for assistance with activity based on assessment  - Modify environment to reduce risk of injury  - Provide assistive devices as appropriate  - Consider OT/PT consult to assist with strengthening/mobility  - Encourage toileting schedule  Outcome: Progressing     Problem: RESPIRATORY - ADULT  Goal: Achieves optimal ventilation and  oxygenation  Description: INTERVENTIONS:  - Assess for changes in respiratory status  - Assess for changes in mentation and behavior  - Position to facilitate oxygenation and minimize respiratory effort  - Oxygen supplementation based on oxygen saturation or ABGs  - Provide Smoking Cessation handout, if applicable  - Encourage broncho-pulmonary hygiene including cough, deep breathe, Incentive Spirometry  - Assess the need for suctioning and perform as needed  - Assess and instruct to report SOB or any respiratory difficulty  - Respiratory Therapy support as indicated  - Manage/alleviate anxiety  - Monitor for signs/symptoms of CO2 retention  Outcome: Progressing     Problem: RISK FOR INFECTION - ADULT  Goal: Absence of fever/infection during anticipated neutropenic period  Description: INTERVENTIONS  - Monitor WBC  - Administer growth factors as ordered  - Implement neutropenic guidelines  Outcome: Progressing     Problem: PAIN - ADULT  Goal: Verbalizes/displays adequate comfort level or patient's stated pain goal  Description: INTERVENTIONS:  - Encourage pt to monitor pain and request assistance  - Assess pain using appropriate pain scale  - Administer analgesics based on type and severity of pain and evaluate response  - Implement non-pharmacological measures as appropriate and evaluate response  - Consider cultural and social influences on pain and pain management  - Manage/alleviate anxiety  - Utilize distraction and/or relaxation techniques  - Monitor for opioid side effects  - Notify MD/LIP if interventions unsuccessful or patient reports new pain  - Anticipate increased pain with activity and pre-medicate as appropriate  Outcome: Progressing     Problem: RISK FOR INFECTION - ADULT  Goal: Absence of fever/infection during anticipated neutropenic period  Description: INTERVENTIONS  - Monitor WBC  - Administer growth factors as ordered  - Implement neutropenic guidelines  Outcome: Progressing     Problem: SAFETY  ADULT - FALL  Goal: Free from fall injury  Description: INTERVENTIONS:  - Assess pt frequently for physical needs  - Identify cognitive and physical deficits and behaviors that affect risk of falls.  - [place] fall precautions as indicated by assessment.  - Educate pt/family on patient safety including physical limitations  - Instruct pt to call for assistance with activity based on assessment  - Modify environment to reduce risk of injury  - Provide assistive devices as appropriate  - Consider OT/PT consult to assist with strengthening/mobility  - Encourage toileting schedule  Outcome: Progressing     Problem: RESPIRATORY - ADULT  Goal: Achieves optimal ventilation and oxygenation  Description: INTERVENTIONS:  - Assess for changes in respiratory status  - Assess for changes in mentation and behavior  - Position to facilitate oxygenation and minimize respiratory effort  - Oxygen supplementation based on oxygen saturation or ABGs  - Provide Smoking Cessation handout, if applicable  - Encourage broncho-pulmonary hygiene including cough, deep breathe, Incentive Spirometry  - Assess the need for suctioning and perform as needed  - Assess and instruct to report SOB or any respiratory difficulty  - Respiratory Therapy support as indicated  - Manage/alleviate anxiety  - Monitor for signs/symptoms of CO2 retention  Outcome: Progressing     Problem: Patient/Family Goals  Goal: Patient/Family Long Term Goal  Description: Patient's Long Term Goal: to go home    Interventions:  - MD rounding, medication management, monitor labs, IV bumex  - See additional Care Plan goals for specific interventions  Outcome: Progressing  Goal: Patient/Family Short Term Goal  Description: Patient's Short Term Goal: remain pain free    Interventions:   - pain assessments q4, pain meds PRN  - See additional Care Plan goals for specific interventions  Outcome: Progressing     Problem: CARDIOVASCULAR - ADULT  Goal: Maintains optimal cardiac output  and hemodynamic stability  Description: INTERVENTIONS:  - Monitor vital signs, rhythm, and trends  - Monitor for bleeding, hypotension and signs of decreased cardiac output  - Evaluate effectiveness of vasoactive medications to optimize hemodynamic stability  - Monitor arterial and/or venous puncture sites for bleeding and/or hematoma  - Assess quality of pulses, skin color and temperature  - Assess for signs of decreased coronary artery perfusion - ex. Angina  - Evaluate fluid balance, assess for edema, trend weights  Outcome: Progressing  Goal: Absence of cardiac arrhythmias or at baseline  Description: INTERVENTIONS:  - Continuous cardiac monitoring, monitor vital signs, obtain 12 lead EKG if indicated  - Evaluate effectiveness of antiarrhythmic and heart rate control medications as ordered  - Initiate emergency measures for life threatening arrhythmias  - Monitor electrolytes and administer replacement therapy as ordered  Outcome: Progressing     Problem: Discharge Barriers  Goal: Patient's discharge needs are met  Description: Collaborate with interdisciplinary team and initiate plans and interventions as needed.   Outcome: Progressing     Problem: Discharge Barriers  Goal: Patient's discharge needs are met  Description: Collaborate with interdisciplinary team and initiate plans and interventions as needed.   Outcome: Progressing     Problem: CARDIOVASCULAR - ADULT  Goal: Maintains optimal cardiac output and hemodynamic stability  Description: INTERVENTIONS:  - Monitor vital signs, rhythm, and trends  - Monitor for bleeding, hypotension and signs of decreased cardiac output  - Evaluate effectiveness of vasoactive medications to optimize hemodynamic stability  - Monitor arterial and/or venous puncture sites for bleeding and/or hematoma  - Assess quality of pulses, skin color and temperature  - Assess for signs of decreased coronary artery perfusion - ex. Angina  - Evaluate fluid balance, assess for edema, trend  weights  Outcome: Progressing     Problem: Patient/Family Goals  Goal: Patient/Family Long Term Goal  Description: Patient's Long Term Goal: to go home    Interventions:  - MD rounding, medication management, monitor labs, IV bumex  - See additional Care Plan goals for specific interventions  Outcome: Progressing

## 2024-01-12 ENCOUNTER — PATIENT OUTREACH (OUTPATIENT)
Dept: CASE MANAGEMENT | Age: 69
End: 2024-01-12

## 2024-01-12 ENCOUNTER — TELEPHONE (OUTPATIENT)
Dept: CARDIOLOGY UNIT | Facility: HOSPITAL | Age: 69
End: 2024-01-12

## 2024-01-12 NOTE — PROGRESS NOTES
Hospital follow up.    Armand Case MD  Cardiology  [address]  [address]  [place], [place] [id number] [phone]  1 week    No answer, left a voicemail with callback information.

## 2024-01-15 NOTE — PROGRESS NOTES
2nd attempt [facility] apt request  PCP -existing apt (1/18)  CARDS -decline, pt stated she will make own apt  PULM -decline, pt stated she will make own apt  Closing encounter

## 2024-05-24 ENCOUNTER — HOSPITAL ENCOUNTER (INPATIENT)
Facility: HOSPITAL | Age: 69
LOS: 7 days | Discharge: HOME | DRG: 291 | End: 2024-05-31
Attending: EMERGENCY MEDICINE | Admitting: HOSPITALIST
Payer: MEDICARE

## 2024-05-24 ENCOUNTER — APPOINTMENT (OUTPATIENT)
Dept: GENERAL RADIOLOGY | Facility: HOSPITAL | Age: 69
DRG: 291 | End: 2024-05-24
Attending: EMERGENCY MEDICINE
Payer: MEDICARE

## 2024-05-24 DIAGNOSIS — I50.33 ACUTE ON CHRONIC DIASTOLIC CONGESTIVE HEART FAILURE (HCC): Primary | ICD-10-CM

## 2024-05-24 LAB
ALBUMIN SERPL-MCNC: 3.4 G/DL (ref 3.4–5)
ALBUMIN/GLOB SERPL: 0.7 {RATIO} (ref 1–2)
ALP LIVER SERPL-CCNC: 135 U/L
ALT SERPL-CCNC: 36 U/L
ANION GAP SERPL CALC-SCNC: 6 MMOL/L (ref 0–18)
AST SERPL-CCNC: 47 U/L (ref 15–37)
BASOPHILS # BLD AUTO: 0.03 X10(3) UL (ref 0–0.2)
BASOPHILS NFR BLD AUTO: 0.3 %
BILIRUB SERPL-MCNC: 0.7 MG/DL (ref 0.1–2)
BILIRUB UR QL STRIP.AUTO: NEGATIVE
BUN BLD-MCNC: 17 MG/DL (ref 9–23)
CALCIUM BLD-MCNC: 8.9 MG/DL (ref 8.5–10.1)
CHLORIDE SERPL-SCNC: 106 MMOL/L (ref 98–112)
CLARITY UR REFRACT.AUTO: CLEAR
CO2 SERPL-SCNC: 29 MMOL/L (ref 21–32)
COLOR UR AUTO: YELLOW
CREAT BLD-MCNC: 0.9 MG/DL
EGFRCR SERPLBLD CKD-EPI 2021: 68 ML/MIN/1.73M2 (ref 60–?)
EOSINOPHIL # BLD AUTO: 0.13 X10(3) UL (ref 0–0.7)
EOSINOPHIL NFR BLD AUTO: 1.5 %
ERYTHROCYTE [DISTWIDTH] IN BLOOD BY AUTOMATED COUNT: 13.7 %
GLOBULIN PLAS-MCNC: 4.6 G/DL (ref 2.8–4.4)
GLUCOSE BLD-MCNC: 110 MG/DL (ref 70–99)
GLUCOSE UR STRIP.AUTO-MCNC: >1000 MG/DL
HCT VFR BLD AUTO: 40.7 %
HGB BLD-MCNC: 13.7 G/DL
IMM GRANULOCYTES # BLD AUTO: 0.04 X10(3) UL (ref 0–1)
IMM GRANULOCYTES NFR BLD: 0.5 %
INR BLD: 2.05 (ref 0.8–1.2)
KETONES UR STRIP.AUTO-MCNC: NEGATIVE MG/DL
LEUKOCYTE ESTERASE UR QL STRIP.AUTO: NEGATIVE
LYMPHOCYTES # BLD AUTO: 1.38 X10(3) UL (ref 1–4)
LYMPHOCYTES NFR BLD AUTO: 15.9 %
MCH RBC QN AUTO: 32.3 PG (ref 26–34)
MCHC RBC AUTO-ENTMCNC: 33.7 G/DL (ref 31–37)
MCV RBC AUTO: 96 FL
MONOCYTES # BLD AUTO: 0.69 X10(3) UL (ref 0.1–1)
MONOCYTES NFR BLD AUTO: 7.9 %
NEUTROPHILS # BLD AUTO: 6.41 X10 (3) UL (ref 1.5–7.7)
NEUTROPHILS # BLD AUTO: 6.41 X10(3) UL (ref 1.5–7.7)
NEUTROPHILS NFR BLD AUTO: 73.9 %
NITRITE UR QL STRIP.AUTO: NEGATIVE
NT-PROBNP SERPL-MCNC: 1586 PG/ML (ref ?–125)
OSMOLALITY SERPL CALC.SUM OF ELEC: 294 MOSM/KG (ref 275–295)
PH UR STRIP.AUTO: 7 [PH] (ref 5–8)
PLATELET # BLD AUTO: 285 10(3)UL (ref 150–450)
POTASSIUM SERPL-SCNC: 4.1 MMOL/L (ref 3.5–5.1)
PROT SERPL-MCNC: 8 G/DL (ref 6.4–8.2)
PROT UR STRIP.AUTO-MCNC: NEGATIVE MG/DL
PROTHROMBIN TIME: 23.4 SECONDS (ref 11.6–14.8)
RBC # BLD AUTO: 4.24 X10(6)UL
RBC UR QL AUTO: NEGATIVE
SODIUM SERPL-SCNC: 141 MMOL/L (ref 136–145)
SP GR UR STRIP.AUTO: 1.02 (ref 1–1.03)
TROPONIN I SERPL HS-MCNC: 14 NG/L
UROBILINOGEN UR STRIP.AUTO-MCNC: NORMAL MG/DL
WBC # BLD AUTO: 8.7 X10(3) UL (ref 4–11)

## 2024-05-24 PROCEDURE — 84484 ASSAY OF TROPONIN QUANT: CPT | Performed by: EMERGENCY MEDICINE

## 2024-05-24 PROCEDURE — 93010 ELECTROCARDIOGRAM REPORT: CPT

## 2024-05-24 PROCEDURE — 96374 THER/PROPH/DIAG INJ IV PUSH: CPT

## 2024-05-24 PROCEDURE — 99285 EMERGENCY DEPT VISIT HI MDM: CPT

## 2024-05-24 PROCEDURE — 93005 ELECTROCARDIOGRAM TRACING: CPT

## 2024-05-24 PROCEDURE — 71045 X-RAY EXAM CHEST 1 VIEW: CPT | Performed by: EMERGENCY MEDICINE

## 2024-05-24 PROCEDURE — 81003 URINALYSIS AUTO W/O SCOPE: CPT | Performed by: EMERGENCY MEDICINE

## 2024-05-24 PROCEDURE — 85025 COMPLETE CBC W/AUTO DIFF WBC: CPT | Performed by: EMERGENCY MEDICINE

## 2024-05-24 PROCEDURE — 83880 ASSAY OF NATRIURETIC PEPTIDE: CPT | Performed by: EMERGENCY MEDICINE

## 2024-05-24 PROCEDURE — 80053 COMPREHEN METABOLIC PANEL: CPT | Performed by: EMERGENCY MEDICINE

## 2024-05-24 PROCEDURE — 94660 CPAP INITIATION&MGMT: CPT

## 2024-05-24 PROCEDURE — 85610 PROTHROMBIN TIME: CPT | Performed by: EMERGENCY MEDICINE

## 2024-05-24 RX ORDER — LEVOTHYROXINE SODIUM 175 UG/1
175 TABLET ORAL
COMMUNITY

## 2024-05-24 RX ORDER — CLOPIDOGREL BISULFATE 75 MG/1
75 TABLET ORAL NIGHTLY
Status: DISCONTINUED | OUTPATIENT
Start: 2024-05-24 | End: 2024-05-31

## 2024-05-24 RX ORDER — ROSUVASTATIN CALCIUM 5 MG/1
5 TABLET, COATED ORAL EVERY OTHER DAY
Status: DISCONTINUED | OUTPATIENT
Start: 2024-05-25 | End: 2024-05-31

## 2024-05-24 RX ORDER — DILTIAZEM HYDROCHLORIDE 240 MG/1
240 CAPSULE, COATED, EXTENDED RELEASE ORAL NIGHTLY
Status: DISCONTINUED | OUTPATIENT
Start: 2024-05-24 | End: 2024-05-31

## 2024-05-24 RX ORDER — FUROSEMIDE 10 MG/ML
40 INJECTION INTRAMUSCULAR; INTRAVENOUS
Status: DISCONTINUED | OUTPATIENT
Start: 2024-05-25 | End: 2024-05-27

## 2024-05-24 RX ORDER — DIGOXIN 125 MCG
125 TABLET ORAL NIGHTLY
Status: DISCONTINUED | OUTPATIENT
Start: 2024-05-25 | End: 2024-05-31

## 2024-05-24 RX ORDER — WARFARIN SODIUM 2.5 MG/1
2.5 TABLET ORAL
COMMUNITY

## 2024-05-24 RX ORDER — FLUOXETINE HYDROCHLORIDE 20 MG/1
20 CAPSULE ORAL 2 TIMES DAILY
Status: DISCONTINUED | OUTPATIENT
Start: 2024-05-24 | End: 2024-05-31

## 2024-05-24 RX ORDER — ACETAMINOPHEN 500 MG
500 TABLET ORAL EVERY 4 HOURS PRN
Status: DISCONTINUED | OUTPATIENT
Start: 2024-05-24 | End: 2024-05-31

## 2024-05-24 RX ORDER — FUROSEMIDE 10 MG/ML
40 INJECTION INTRAMUSCULAR; INTRAVENOUS ONCE
Status: COMPLETED | OUTPATIENT
Start: 2024-05-24 | End: 2024-05-24

## 2024-05-24 RX ORDER — FUROSEMIDE 10 MG/ML
40 INJECTION INTRAMUSCULAR; INTRAVENOUS ONCE
Status: DISCONTINUED | OUTPATIENT
Start: 2024-05-24 | End: 2024-05-27

## 2024-05-24 RX ORDER — METOLAZONE 2.5 MG/1
2.5 TABLET ORAL EVERY OTHER DAY
COMMUNITY

## 2024-05-24 RX ORDER — METOPROLOL SUCCINATE 25 MG/1
25 TABLET, EXTENDED RELEASE ORAL NIGHTLY
Status: DISCONTINUED | OUTPATIENT
Start: 2024-05-24 | End: 2024-05-31

## 2024-05-24 RX ORDER — ALLOPURINOL 100 MG/1
100 TABLET ORAL NIGHTLY
Status: DISCONTINUED | OUTPATIENT
Start: 2024-05-24 | End: 2024-05-31

## 2024-05-24 RX ORDER — WARFARIN SODIUM 2.5 MG/1
2.5 TABLET ORAL
Status: DISCONTINUED | OUTPATIENT
Start: 2024-05-24 | End: 2024-05-24 | Stop reason: SDUPTHER

## 2024-05-24 RX ORDER — WARFARIN SODIUM 5 MG/1
5 TABLET ORAL
Status: COMPLETED | OUTPATIENT
Start: 2024-05-24 | End: 2024-05-24

## 2024-05-24 RX ORDER — NORTRIPTYLINE HYDROCHLORIDE 25 MG/1
25 CAPSULE ORAL NIGHTLY
Status: DISCONTINUED | OUTPATIENT
Start: 2024-05-24 | End: 2024-05-31

## 2024-05-24 RX ORDER — BUPROPION HYDROCHLORIDE 150 MG/1
150 TABLET, EXTENDED RELEASE ORAL 2 TIMES DAILY
Status: DISCONTINUED | OUTPATIENT
Start: 2024-05-24 | End: 2024-05-31

## 2024-05-24 NOTE — ED PROVIDER NOTES
Patient Seen in: [facility] Emergency Department      History     Chief Complaint   Patient presents with    Difficulty Breathing     Stated Complaint: SOB, fluid overload. SpO2 86%    Subjective:   HPI    69-year-old female with history of CHF and A-fib on warfarin presents for evaluation of fluid overload.  Patient has been short of breath with minimal exertion and laying flat for several weeks.  She has gained about 20 pounds over 3 weeks.  She is taking Bumex, spironolactone and metolazone as diuretics.    Objective:   Past Medical History:    Aortic stenosis    S/P TAVR    ASTHMA    Asthma (HCC)    Back problem    CANCER    thyroid    Cancer (HCC)    thyroid     COPD (chronic obstructive pulmonary disease) (HCC)    DEPRESSION    Disorder of thyroid    Essential hypertension    Fibromyalgia    High blood pressure    High cholesterol    HYPERTENSION    HYPOTHYROIDISM    Muscle weakness    OBESITY    OSTEOARTHRITIS    Osteoarthritis    OTHER DISEASES    Fibromyalgia    OTHER DISEASES    Pulmonary emboli    OTHER DISEASES    Lymph edema    OTHER DISEASES    Pulmonary hypertension    Peripheral vascular disease (HCC)    Pulmonary embolism (HCC)    Shortness of breath    ON EXERTION    SLEEP APNEA    Sleep apnea    CPAP              Past Surgical History:   Procedure Laterality Date    Anesth,shoulder replacement Right 2014    hemiathroplasty    Back surgery  2000    Back surgery  2004    complete c-3 -7 ectomy    Biopsy Left 07/20/2023    TEMPORAL ARTERY    Cath transcatheter aortic valve replacement      Colonoscopy N/A 05/10/2018    Procedure: COLONOSCOPY, POSSIBLE BIOPSY, POSSIBLE POLYPECTOMY 82435;  Surgeon: Kendell Valentin MD;  [facility] [facility] [place]    Colonoscopy      D & c  09/2009    Dilation/curettage,diagnostic      Hip replacement surgery  09/2009    Rt hip    Knee replacement surgery  2003    Both knees    Other surgical history  1989    Thyroid removal    Other surgical history       LT foot spur removal    Total hip replacement      Total knee replacement                  Social History     Socioeconomic History    Marital status: Single   Tobacco Use    Smoking status: Former     Current packs/day: 0.00     Average packs/day: 0.5 packs/day for 30.0 years (15.0 ttl pk-yrs)     Types: Cigarettes     Start date: 1961     Quit date: 1991     Years since quittin.3    Smokeless tobacco: Never   Vaping Use    Vaping status: Never Used   Substance and Sexual Activity    Alcohol use: No    Drug use: No   Other Topics Concern    Caffeine Concern No    Exercise Yes    Seat Belt Yes    Special Diet Yes     Comment: low sodium    Stress Concern Yes    Weight Concern No     Social Determinants of Health     Food Insecurity: No Food Insecurity (2024)    Food Insecurity     Food Insecurity: Never true   Transportation Needs: No Transportation Needs (2024)    Transportation Needs     Lack of Transportation: No   Housing Stability: Low Risk  (2024)    Housing Stability     Housing Instability: No              Review of Systems    Positive for stated complaint: SOB, fluid overload. SpO2 86%  Other systems are as noted in HPI.  Constitutional and vital signs reviewed.      All other systems reviewed and negative except as noted above.    Physical Exam     ED Triage Vitals   BP 24 1744 (!) 146/93   Pulse 24 1744 86   Resp 24 1744 20   Temp 24 1744 98.1 °F (36.7 °C)   Temp src 24 1744 Temporal   SpO2 24 1744 99 %   O2 Device 24 1747 None (Room air)       Current Vitals:   Vital Signs  BP: (!) 146/93  Pulse: 86  Resp: 20  Temp: 98.1 °F (36.7 °C)  Temp src: Temporal    Oxygen Therapy  SpO2: 99 %  O2 Device: None (Room air)            Physical Exam    General: Alert, oriented, no apparent distress  Neck: Supple  Lungs: Decreased breath sounds at the base  Heart: Regular rate and rhythm.  Abdomen: Soft, nontender.   Skin: No rash.  2+ pitting  edema bilaterally up to the groin  Neurologic: No focal neurologic deficits.  Normal speech pattern  Musculoskeletal: No tenderness or deformity noted.  Full range of motion throughout.        ED Course     Labs Reviewed   PRO BETA NATRIURETIC PEPTIDE - Abnormal; Notable for the following components:       Result Value    Pro-Beta Natriuretic Peptide 1,586 (*)     All other components within normal limits   PROTHROMBIN TIME (PT) - Abnormal; Notable for the following components:    PT 23.4 (*)     INR 2.05 (*)     All other components within normal limits   COMP METABOLIC PANEL (14) - Abnormal; Notable for the following components:    Glucose 110 (*)     AST 47 (*)     Globulin  4.6 (*)     A/G Ratio 0.7 (*)     All other components within normal limits   URINALYSIS WITH CULTURE REFLEX - Abnormal; Notable for the following components:    Glucose Urine >1000 (*)     All other components within normal limits   TROPONIN I HIGH SENSITIVITY - Normal   CBC WITH DIFFERENTIAL WITH PLATELET    Narrative:     The following orders were created for panel order CBC With Differential With Platelet.  Procedure                               Abnormality         Status                     ---------                               -----------         ------                     CBC W/ DIFFERENTIAL[[id number]]                              Final result                 Please view results for these tests on the individual orders.   RAINBOW DRAW LAVENDER   RAINBOW DRAW LIGHT GREEN   RAINBOW DRAW BLUE   RAINBOW DRAW GOLD   CBC W/ DIFFERENTIAL     EKG    Rate, intervals and axes as noted on EKG Report.  Rate: 79  Rhythm: Atrial Fibrillation  Reading: No acute appearing ST elevation or depression                          MDM      Differential diagnosis includes CHF exacerbation, renal insufficiency, symptomatic anemia, pneumonia  Admission disposition: 5/24/2024  7:02 PM           INR therapeutic 2.05.    BNP elevated 1586.    Troponin  normal.    Chemistry with normal electrolytes and renal function.    CBC with normal hemoglobin        I have independently visualized the radiology images, my focus/limited interpretation: Mild edema    Defer to radiologist for other/incidental findings         XR CHEST AP PORTABLE  (CPT=71045)    Result Date: 5/24/2024  PROCEDURE:  XR CHEST AP PORTABLE  (CPT=71045)  TECHNIQUE:  AP chest radiograph was obtained.  COMPARISON:  [facility] , XR, XR CHEST AP PORTABLE  (CPT=71045), 1/02/2024, 3:03 PM.  [facility] , XR, XR CHEST AP PORTABLE  (CPT=71045), 2/10/2023, 1:04 PM.  INDICATIONS:  SOB, fluid overload. SpO2 86%  PATIENT STATED HISTORY: (As transcribed by Technologist)     FINDINGS:  No focal consolidation.  No pleural effusion.  No pneumothorax.  Mild bilateral interstitial opacities.  Cardiac silhouette is mildly prominent, stable from prior.  Degenerative changes of the spine.  TAVR changes.  Right shoulder arthroplasty  and cervical fusion, partially visualized.            CONCLUSION:  Stable mild cardiomegaly.  Interstitial opacities bilaterally, suggestive of edema, correlate clinically.   LOCATION:  [facility]      Dictated by (CST): Richard Amin MD on 5/24/2024 at 6:40 PM     Finalized by (CST): Richard Amin MD on 5/24/2024 at 6:41 PM         Spoke with Dr. Ryan.  Recommends Lasix 40 mg IV twice daily     Spoke with Dr Patel for admission    Medical Decision Making  Amount and/or Complexity of Data Reviewed  External Data Reviewed: notes.     Details: Cardiology note from Dr. Case 3/11/24        Disposition and Plan     Clinical Impression:  1. Acute on chronic diastolic congestive heart failure (HCC)         Disposition:  Admit  5/24/2024  7:02 pm    Follow-up:  No follow-up provider specified.        Medications Prescribed:  Current Discharge Medication List                            Hospital Problems       Present on Admission  Date Reviewed: 8/8/2023            ICD-10-CM Noted POA    * (Principal) Acute  on chronic diastolic congestive heart failure (HCC) I50.33 5/24/2024 Unknown

## 2024-05-24 NOTE — ED INITIAL ASSESSMENT (HPI)
Pt arrives to ED for evaluation of SOB and weight gain, pt has CHF. Pt states her doctor called and sent her to the ED. Pt placed on O2 in triage, pt is 99% on RA.

## 2024-05-25 LAB
ANION GAP SERPL CALC-SCNC: 6 MMOL/L (ref 0–18)
BUN BLD-MCNC: 18 MG/DL (ref 9–23)
CALCIUM BLD-MCNC: 8.7 MG/DL (ref 8.5–10.1)
CHLORIDE SERPL-SCNC: 106 MMOL/L (ref 98–112)
CO2 SERPL-SCNC: 30 MMOL/L (ref 21–32)
CREAT BLD-MCNC: 0.81 MG/DL
EGFRCR SERPLBLD CKD-EPI 2021: 78 ML/MIN/1.73M2 (ref 60–?)
ERYTHROCYTE [DISTWIDTH] IN BLOOD BY AUTOMATED COUNT: 13.7 %
GLUCOSE BLD-MCNC: 123 MG/DL (ref 70–99)
HCT VFR BLD AUTO: 41.3 %
HGB BLD-MCNC: 13.4 G/DL
INR BLD: 2.22 (ref 0.8–1.2)
MAGNESIUM SERPL-MCNC: 2.3 MG/DL (ref 1.6–2.6)
MCH RBC QN AUTO: 31.8 PG (ref 26–34)
MCHC RBC AUTO-ENTMCNC: 32.4 G/DL (ref 31–37)
MCV RBC AUTO: 98.1 FL
OSMOLALITY SERPL CALC.SUM OF ELEC: 297 MOSM/KG (ref 275–295)
PLATELET # BLD AUTO: 250 10(3)UL (ref 150–450)
POTASSIUM SERPL-SCNC: 3.5 MMOL/L (ref 3.5–5.1)
POTASSIUM SERPL-SCNC: 3.7 MMOL/L (ref 3.5–5.1)
PROTHROMBIN TIME: 24.9 SECONDS (ref 11.6–14.8)
Q-T INTERVAL: 404 MS
QRS DURATION: 106 MS
QTC CALCULATION (BEZET): 463 MS
R AXIS: 54 DEGREES
RBC # BLD AUTO: 4.21 X10(6)UL
SODIUM SERPL-SCNC: 142 MMOL/L (ref 136–145)
T AXIS: 17 DEGREES
VENTRICULAR RATE: 79 BPM
WBC # BLD AUTO: 7.8 X10(3) UL (ref 4–11)

## 2024-05-25 PROCEDURE — 83735 ASSAY OF MAGNESIUM: CPT | Performed by: HOSPITALIST

## 2024-05-25 PROCEDURE — 85610 PROTHROMBIN TIME: CPT | Performed by: HOSPITALIST

## 2024-05-25 PROCEDURE — 84132 ASSAY OF SERUM POTASSIUM: CPT | Performed by: INTERNAL MEDICINE

## 2024-05-25 PROCEDURE — 85027 COMPLETE CBC AUTOMATED: CPT | Performed by: HOSPITALIST

## 2024-05-25 PROCEDURE — 80048 BASIC METABOLIC PNL TOTAL CA: CPT | Performed by: HOSPITALIST

## 2024-05-25 RX ORDER — POTASSIUM CHLORIDE 750 MG/1
10 TABLET, EXTENDED RELEASE ORAL DAILY
Status: DISCONTINUED | OUTPATIENT
Start: 2024-05-25 | End: 2024-05-25

## 2024-05-25 RX ORDER — ONDANSETRON 2 MG/ML
4 INJECTION INTRAMUSCULAR; INTRAVENOUS EVERY 6 HOURS PRN
Status: DISCONTINUED | OUTPATIENT
Start: 2024-05-25 | End: 2024-05-31

## 2024-05-25 RX ORDER — WARFARIN SODIUM 2.5 MG/1
2.5 TABLET ORAL
Status: COMPLETED | OUTPATIENT
Start: 2024-05-25 | End: 2024-05-25

## 2024-05-25 RX ORDER — POTASSIUM CHLORIDE 750 MG/1
40 TABLET, EXTENDED RELEASE ORAL EVERY 4 HOURS
Qty: 8 TABLET | Refills: 0 | Status: COMPLETED | OUTPATIENT
Start: 2024-05-25 | End: 2024-05-25

## 2024-05-25 RX ORDER — POTASSIUM CHLORIDE 20 MEQ/1
40 TABLET, EXTENDED RELEASE ORAL EVERY 4 HOURS
Status: DISCONTINUED | OUTPATIENT
Start: 2024-05-25 | End: 2024-05-25

## 2024-05-25 NOTE — H&P
[facility]      Chief Complaint   Patient presents with    Difficulty Breathing        PCP: Viktoriya Garcias DO      History of Present Illness: Patient is a 69 year old female with PMH sig for chronic diastolic HF, a-fib, severe aortic stenosis s/p TAVR, CAD s/p stents, HTN, HLD, COPD, and PAD who presented to the ED for evaluation of SOB and weight gain.  She has gained about 20 lbs over 2 weeks despite taking bumex, aldactone, and metolazone.  No chest pain.  No F/C, N/V, or diarrhea.  No sick contacts.  Lives alone, uses crutches.     In the ED, BNP 1586, trop neg.  INR 2.05.  CXR suggestive of interstitial edema.  Lasix 40 mg  IV given.      On my evaluation, pt states her breathing is somewhat improved but not back to baseline.      Past Medical History:    Aortic stenosis    S/P TAVR    ASTHMA    Asthma (HCC)    Back problem    CANCER    thyroid    Cancer (HCC)    thyroid     COPD (chronic obstructive pulmonary disease) (HCC)    DEPRESSION    Disorder of thyroid    Essential hypertension    Fibromyalgia    High blood pressure    High cholesterol    HYPERTENSION    HYPOTHYROIDISM    Muscle weakness    OBESITY    OSTEOARTHRITIS    Osteoarthritis    OTHER DISEASES    Fibromyalgia    OTHER DISEASES    Pulmonary emboli    OTHER DISEASES    Lymph edema    OTHER DISEASES    Pulmonary hypertension    Peripheral vascular disease (HCC)    Pulmonary embolism (HCC)    Shortness of breath    ON EXERTION    SLEEP APNEA    Sleep apnea    CPAP      Past Surgical History:   Procedure Laterality Date    Anesth,shoulder replacement Right 2014    hemiathroplasty    Back surgery  2000    Back surgery  2004    complete c-3 -7 ectomy    Biopsy Left 07/20/2023    TEMPORAL ARTERY    Cath transcatheter aortic valve replacement      Colonoscopy N/A 05/10/2018    Procedure: COLONOSCOPY, POSSIBLE BIOPSY, POSSIBLE POLYPECTOMY 00999;  Surgeon: Kendell Valentin MD;  [facility]    Colonoscopy      D & c  09/2009    Dilation/curettage,diagnostic      Hip replacement surgery  09/2009    Rt hip    Knee replacement surgery  2003    Both knees    Other surgical history  1989    Thyroid removal    Other surgical history  2004    LT foot spur removal    Total hip replacement      Total knee replacement          ALL:  Allergies   Allergen Reactions    Adhesive Tape HIVES     ALL ADHESIVES    Codeine HIVES    Doxycycline SWELLING    Hydrocodone UNKNOWN    Lisinopril TONGUE SWELLING    Metoprolol HIVES    Morphine Sulfate HIVES    Opioid Analgesics UNKNOWN    Oxycontin ITCHING    Oxytocin HIVES    Vicodin [Hydrocodone-Acetaminophen] ITCHING    Skin Adhesives OTHER (SEE COMMENTS)      No current facility-administered medications on file prior to encounter.     Current Outpatient Medications on File Prior to Encounter   Medication Sig Dispense Refill    warfarin 2.5 MG Oral Tab Take 1 tablet (2.5 mg total) by mouth 3 (three) times a week. Q Mon., Wed., and Saturday per pt.      empagliflozin (JARDIANCE) 10 MG Oral Tab Take 1 tablet (10 mg total) by mouth at bedtime. Per pt.      levothyroxine 175 MCG Oral Tab Take 1 tablet (175 mcg total) by mouth before breakfast.      metOLazone 2.5 MG Oral Tab Take 1 tablet (2.5 mg total) by mouth every other day. Per pt.      dilTIAZem  MG Oral Capsule SR 24 Hr Take 1 capsule (240 mg total) by mouth daily. (Patient taking differently: Take 1 capsule (240 mg total) by mouth at bedtime.) 90 capsule 3    bumetanide 2 MG Oral Tab Take 1.5 tablets (3 mg total) by mouth daily. Per pt.      clopidogrel 75 MG Oral Tab Take 1 tablet (75 mg total) by mouth at bedtime. Per pt.      metoprolol succinate ER 25 MG Oral Tablet 24 Hr Take 1 tablet (25 mg total) by mouth at bedtime. Per pt.      Potassium Chloride ER 10 MEQ Oral Cap CR Take 4 capsules (40 mEq total) by mouth at bedtime. Per pt.      rosuvastatin 5 MG Oral Tab Take 1 tablet (5 mg total) by mouth every  other day.      spironolactone 25 MG Oral Tab Take 1 tablet (25 mg total) by mouth daily.      warfarin 5 MG Oral Tab Take 1 tablet (5 mg total) by mouth nightly. Q Tues., Thurs., Friday and Sunday per pt.      ferrous sulfate 325 (65 FE) MG Oral Tab EC Take 1 tablet (325 mg total) by mouth at bedtime. Per pt.      Vitamin C 500 MG Oral Tab Take 1 tablet (500 mg total) by mouth at bedtime. Per pt.      Zinc 50 MG Oral Cap Take 50 mg by mouth at bedtime. Per pt.      Cholecalciferol 125 MCG (5000 UT) Oral Tab Take 1 tablet (5,000 Units total) by mouth at bedtime. Per pt.      acetaminophen 325 MG Oral Tab Take 2 tablets (650 mg total) by mouth every 8 (eight) hours as needed for Pain.      buPROPion  MG Oral Tablet 12 Hr Take 1 tablet (150 mg total) by mouth 2 (two) times daily.      cyproheptadine 4 MG Oral Tab Take 1 tablet (4 mg total) by mouth nightly.      FLUoxetine 20 MG Oral Cap Take 1 capsule (20 mg total) by mouth 2 (two) times daily.      triamcinolone 0.1 % External Ointment Apply 1 Application topically daily as needed (Neuro dermatitis per pt.).      digoxin 0.125 MG Oral Tab Take 1 tablet (125 mcg total) by mouth daily. (Patient taking differently: Take 1 tablet (125 mcg total) by mouth at bedtime.) 30 tablet 0    allopurinol 100 MG Oral Tab Take 1 tablet (100 mg total) by mouth daily. (Patient taking differently: Take 1 tablet (100 mg total) by mouth at bedtime. Per pt.) 90 tablet 3    Nortriptyline HCl 25 MG Oral Cap Take 1 capsule (25 mg total) by mouth nightly.      Multiple Vitamin (MULTIVITAMIN OR) Take 1 tablet by mouth at bedtime. Per pt.      calcium carbonate 500 MG Oral Chew Tab Chew 1 tablet (500 mg total) by mouth every 8 (eight) hours as needed (upset stomach).           Social History     Tobacco Use    Smoking status: Former     Current packs/day: 0.00     Average packs/day: 0.5 packs/day for 30.0 years (15.0 ttl pk-yrs)     Types: Cigarettes     Start date: 1/22/1961     Quit  date: 1991     Years since quittin.3    Smokeless tobacco: Never   Substance Use Topics    Alcohol use: No        Fam Hx  Family History   Problem Relation Age of Onset    Hypertension Father     Lipids Father     Diabetes Mother     Hypertension Mother     Lipids Mother     Cancer Brother     Hypertension Brother        Review of Systems  Comprehensive ROS reviewed and negative except for what is stated in HPI.      OBJECTIVE:  BP (!) 106/93 (BP Location: Left arm)   Pulse 97   Temp 98 °F (36.7 °C) (Oral)   Resp 20   Ht 5' 9\" (1.753 m)   Wt 235 lb 0.2 oz (106.6 kg)   LMP  (LMP Unknown)   SpO2 98%   BMI 34.71 kg/m²   Gen: No acute distress, alert and oriented x3, no focal neurologic deficits  HEENT:  EOMI, PERRLA, OP clear, MMM  Pulm: +bibasilar crackles, normal respiratory effort  CV:  Tachy, irregular, no murmur.  Normal PMI.    Abd: Abdomen soft, nontender, nondistended, no organomegaly, bowel sounds present  MSK: Full range of motion in extremities, no clubbing, no cyanosis. 2+ LE edema.   Skin: no rashes or lesions  Neuro:  Grossly intact, no focal deficits      Data Review:    LABS:   Lab Results   Component Value Date    WBC 8.7 2024    HGB 13.7 2024    HCT 40.7 2024    .0 2024    CREATSERUM 0.90 2024    BUN 17 2024     2024    K 4.1 2024     2024    CO2 29.0 2024     2024    CA 8.9 2024    ALB 3.4 2024    ALKPHO 135 2024    BILT 0.7 2024    TP 8.0 2024    AST 47 2024    ALT 36 2024    INR 2.05 2024    PTP 23.4 2024       CXR: image personally reviewed.      Radiology: XR CHEST AP PORTABLE  (CPT=71045)    Result Date: 2024  PROCEDURE:  XR CHEST AP PORTABLE  (CPT=71045)  TECHNIQUE:  AP chest radiograph was obtained.  COMPARISON:  [facility] , XR, XR CHEST AP PORTABLE  (CPT=71045), 2024, 3:03 PM.  [facility] , XR, XR CHEST AP PORTABLE  (CPT=71045),  2/10/2023, 1:04 PM.  INDICATIONS:  SOB, fluid overload. SpO2 86%  PATIENT STATED HISTORY: (As transcribed by Technologist)     FINDINGS:  No focal consolidation.  No pleural effusion.  No pneumothorax.  Mild bilateral interstitial opacities.  Cardiac silhouette is mildly prominent, stable from prior.  Degenerative changes of the spine.  TAVR changes.  Right shoulder arthroplasty  and cervical fusion, partially visualized.            CONCLUSION:  Stable mild cardiomegaly.  Interstitial opacities bilaterally, suggestive of edema, correlate clinically.   LOCATION:  [facility]      Dictated by (CST): Richard Amin MD on 5/24/2024 at 6:40 PM     Finalized by (CST): Richard Amin MD on 5/24/2024 at 6:41 PM          Assessment/Plan:     69 yr old female with PMH sig for chronic diastolic HF, a-fib, severe aortic stenosis s/p TAVR, CAD s/p stents, HTN, HLD, COPD, and PAD who presented to the ED for evaluation of SOB and weight gain.    # Acute on chronic diastolic HF  - pt appears to be failing oral diuretics   - s/p lasix 40 mg IV x 1 in ED  - cont IV diuresis, pt requesting IV bumex, will d/w cardilogy   - strict I/O's, daily weights   - cards c/s    # Chronic a-fib  # Secondary hypercoag state  - tachycardic  - cont diltiazem, digoxin, and metoprolol   - cont warfarin     # CAD s/p stents  - no chest pain  - cont plavix and statin    # Essential HTN  - controlled  - cont diltiazem and metoprolol     # HLD  - cont statin    # COPD  - stable  - encourage IS use    # PAD  - cont statin and plavix    # Hypothyroidism   - cont levothyroxine     DVT prophy - warfarin   Dispo: inpt care.  POC d/w pt who agrees.     Outpatient records or previous hospital records reviewed.   [facility] hospitalist to continue to follow patient while in house  A total of 76 minutes taken with patient and coordinating care.  Greater than 50% face to face encounter.      Advanced Care Planning  While discussing goals of care with ptArleth voluntarily  participated in an advanced care planning discussion. The following was discussed: POA and code status.  Pt state her brother Sean has her healthcare POA.  She confirms FULL CODE status.  17 minutes were spent discussing advanced care planning.  This time was exclusive of the documented time for this visit.     Sanjiv Zuniga DO  [facility]

## 2024-05-25 NOTE — PROGRESS NOTES
[facility] Dosing Service  Warfarin (Coumadin) Subsequent Dosing    Arleth Weiss is a 69 year old patient for whom pharmacy is dosing warfarin (Coumadin). Goal INR is 2-3    Recent Labs   Lab 05/24/24  1751 05/25/24  0705   INR 2.05* 2.22*       Consulted by:  Dr Patel  Indication:  Afib, hx PE  Potential Drug Interactions:  allopurinol, clopidogreal, levothyroxine  Other Anticoagulants:  none  Home regimen (if applicable):  warfarin 5 mg su/tu/th/fr; 2.5 mg mo/we/sat    Inpatient Dosing History:    Date 5/24 5/25       INR 2.05 2.22       Coumadin dose 5 mg                 Based on above -  1.  For today, Give warfarin (COUMADIN) 2.5 mg at 2100 tonight  2   PT/INR ordered daily while on warfarin  3.  Pharmacy will continue to follow.  We appreciate the opportunity to assist in the care of this patient.    Patito Good PharmD  5/25/2024  11:27 AM

## 2024-05-25 NOTE — PLAN OF CARE
Patient alert and oriented x 4. On room air. Cpap during sleep ,Afib  on cardiac monitor. Patient denies any chest pain or chest discomfort at this time. Patient voids,puirwick applied due  to incontinent and patient request , good output 1200 ml urin emptied around midnight , received diuretic from ER , last BM 5/24.  skin assessment done with staff jose. Bilateral lower Leg fragile skin  with wounds  on LT Leg -picture taken , wound care/ skin care provided, optifoam dressing applied ACE  wrap applied both lower extremities legs edematous +1 ,PP+, abdomen soft +BS ,Lung sounds crackles and diminished , Plan of care updated, all questions answered. Safety precautions in place. Bed alarm on. Will continue to monitor tele/labs/vital signs closely  Problem: Diabetes/Glucose Control  Goal: Glucose maintained within prescribed range  Description: INTERVENTIONS:  - Monitor Blood Glucose as ordered  - Assess for signs and symptoms of hyperglycemia and hypoglycemia  - Administer ordered medications to maintain glucose within target range  - Assess barriers to adequate nutritional intake and initiate nutrition consult as needed  - Instruct patient on self management of diabetes  Outcome: Progressing     Problem: Patient/Family Goals  Goal: Patient/Family Long Term Goal  Description: Patient's Long Term Goal:discharge    Interventions:  -cardiology consult and follow up -orders ,labs, v/s and procedures ,telemetry monitoring, Activity as ordered meds compliance o2 support  if necessary , CPAP adjustment, iv diuretics ,update plan of care, ,daily weight , strict intake and output   - See additional Care Plan goals for specific interventions  Outcome: Progressing  Goal: Patient/Family Short Term Goal  Description: Patient's Short Term Goal: breath comfortable , safety    Interventions:   - PAIN MGT , BREATH COMFORTABLE WITH CPAP / O2 SUPPORT , Adhere to medication regimen, follow up appointments after discharge, ,education  to weight control, cardiac life style, heart healthy diet   - See additional Care Plan goals for specific interventions  Outcome: Progressing     Problem: CARDIOVASCULAR - ADULT  Goal: Absence of cardiac arrhythmias or at baseline  Description: INTERVENTIONS:  - Continuous cardiac monitoring, monitor vital signs, obtain 12 lead EKG if indicated  - Evaluate effectiveness of antiarrhythmic and heart rate control medications as ordered  - Initiate emergency measures for life threatening arrhythmias  - Monitor electrolytes and administer replacement therapy as ordered  Outcome: Progressing  Goal: Maintains optimal cardiac output and hemodynamic stability  Description: INTERVENTIONS:  - Monitor vital signs, rhythm, and trends  - Monitor for bleeding, hypotension and signs of decreased cardiac output  - Evaluate effectiveness of vasoactive medications to optimize hemodynamic stability  - Monitor arterial and/or venous puncture sites for bleeding and/or hematoma  - Assess quality of pulses, skin color and temperature  - Assess for signs of decreased coronary artery perfusion - ex. Angina  - Evaluate fluid balance, assess for edema, trend weights  Outcome: Progressing     Problem: RESPIRATORY - ADULT  Goal: Achieves optimal ventilation and oxygenation  Description: INTERVENTIONS:  - Assess for changes in respiratory status  - Assess for changes in mentation and behavior  - Position to facilitate oxygenation and minimize respiratory effort  - Oxygen supplementation based on oxygen saturation or ABGs  - Provide Smoking Cessation handout, if applicable  - Encourage broncho-pulmonary hygiene including cough, deep breathe, Incentive Spirometry  - Assess the need for suctioning and perform as needed  - Assess and instruct to report SOB or any respiratory difficulty  - Respiratory Therapy support as indicated  - Manage/alleviate anxiety  - Monitor for signs/symptoms of CO2 retention  Outcome: Progressing     Problem:  GASTROINTESTINAL - ADULT  Goal: Minimal or absence of nausea and vomiting  Description: INTERVENTIONS:  - Maintain adequate hydration with IV or PO as ordered and tolerated  - Nasogastric tube to low intermittent suction as ordered  - Evaluate effectiveness of ordered antiemetic medications  - Provide nonpharmacologic comfort measures as appropriate  - Advance diet as tolerated, if ordered  - Obtain nutritional consult as needed  - Evaluate fluid balance  Outcome: Progressing  Goal: Maintains or returns to baseline bowel function  Description: INTERVENTIONS:  - Assess bowel function  - Maintain adequate hydration with IV or PO as ordered and tolerated  - Evaluate effectiveness of GI medications  - Encourage mobilization and activity  - Obtain nutritional consult as needed  - Establish a toileting routine/schedule  - Consider collaborating with pharmacy to review patient's medication profile  Outcome: Progressing  Goal: Achieves appropriate nutritional intake (bariatric)  Description: INTERVENTIONS:  - Monitor for over-consumption  - Identify factors contributing to increased intake, treat as appropriate  - Monitor I&O, WT and lab values  - Obtain nutritional consult as needed  - Evaluate psychosocial factors contributing to over-consumption  Outcome: Progressing     Problem: METABOLIC/FLUID AND ELECTROLYTES - ADULT  Goal: Glucose maintained within prescribed range  Description: INTERVENTIONS:  - Monitor Blood Glucose as ordered  - Assess for signs and symptoms of hyperglycemia and hypoglycemia  - Administer ordered medications to maintain glucose within target range  - Assess barriers to adequate nutritional intake and initiate nutrition consult as needed  - Instruct patient on self management of diabetes  Outcome: Progressing  Goal: Electrolytes maintained within normal limits  Description: INTERVENTIONS:  - Monitor labs and rhythm and assess patient for signs and symptoms of electrolyte imbalances  - Administer  electrolyte replacement as ordered  - Monitor response to electrolyte replacements, including rhythm and repeat lab results as appropriate  - Fluid restriction as ordered  - Instruct patient on fluid and nutrition restrictions as appropriate  Outcome: Progressing  Goal: Hemodynamic stability and optimal renal function maintained  Description: INTERVENTIONS:  - Monitor labs and assess for signs and symptoms of volume excess or deficit  - Monitor intake, output and patient weight  - Monitor urine specific gravity, serum osmolarity and serum sodium as indicated or ordered  - Monitor response to interventions for patient's volume status, including labs, urine output, blood pressure (other measures as available)  - Encourage oral intake as appropriate  - Instruct patient on fluid and nutrition restrictions as appropriate  Outcome: Progressing     Problem: SKIN/TISSUE INTEGRITY - ADULT  Goal: Skin integrity remains intact  Description: INTERVENTIONS  - Assess and document risk factors for pressure ulcer development  - Assess and document skin integrity  - Monitor for areas of redness and/or skin breakdown  - Initiate interventions, skin care algorithm/standards of care as needed  Outcome: Progressing  Goal: Incision(s), wounds(s) or drain site(s) healing without S/S of infection  Description: INTERVENTIONS:  - Assess and document risk factors for pressure ulcer development  - Assess and document skin integrity  - Assess and document dressing/incision, wound bed, drain sites and surrounding tissue  - Implement wound care per orders  - Initiate isolation precautions as appropriate  - Initiate Pressure Ulcer prevention bundle as indicated  Outcome: Progressing  Goal: Oral mucous membranes remain intact  Description: INTERVENTIONS  - Assess oral mucosa and hygiene practices  - Implement preventative oral hygiene regimen  - Implement oral medicated treatments as ordered  Outcome: Progressing     Problem: HEMATOLOGIC -  ADULT  Goal: Maintains hematologic stability  Description: INTERVENTIONS  - Assess for signs and symptoms of bleeding or hemorrhage  - Monitor labs and vital signs for trends  - Administer supportive blood products/factors, fluids and medications as ordered and appropriate  - Administer supportive blood products/factors as ordered and appropriate  Outcome: Progressing  Goal: Free from bleeding injury  Description: (Example usage: patient with low platelets)  INTERVENTIONS:  - Avoid intramuscular injections, enemas and rectal medication administration  - Ensure safe mobilization of patient  - Hold pressure on venipuncture sites to achieve adequate hemostasis  - Assess for signs and symptoms of internal bleeding  - Monitor lab trends  - Patient is to report abnormal signs of bleeding to staff  - Avoid use of toothpicks and dental floss  - Use electric shaver for shaving  - Use soft bristle tooth brush  - Limit straining and forceful nose blowing  Outcome: Progressing     Problem: MUSCULOSKELETAL - ADULT  Goal: Return mobility to safest level of function  Description: INTERVENTIONS:  - Assess patient stability and activity tolerance for standing, transferring and ambulating w/ or w/o assistive devices  - Assist with transfers and ambulation using safe patient handling equipment as needed  - Ensure adequate protection for wounds/incisions during mobilization  - Obtain PT/OT consults as needed  - Advance activity as appropriate  - Communicate ordered activity level and limitations with patient/family  Outcome: Progressing  Goal: Maintain proper alignment of affected body part  Description: INTERVENTIONS:  - Support and protect limb and body alignment per provider's orders  - Instruct and reinforce with patient and family use of appropriate assistive device and precautions (e.g. spinal or hip dislocation precautions)  Outcome: Progressing     Problem: Impaired Functional Mobility  Goal: Achieve highest/safest level of  mobility/gait  Description: Interventions:  - Assess patient's functional ability and stability  - Promote increasing activity/tolerance for mobility and gait  - Educate and engage patient/family in tolerated activity level and precautions    Outcome: Progressing     Problem: SAFETY ADULT - FALL  Goal: Free from fall injury  Description: INTERVENTIONS:  - Assess pt frequently for physical needs  - Identify cognitive and physical deficits and behaviors that affect risk of falls.  - [place] fall precautions as indicated by assessment.  - Educate pt/family on patient safety including physical limitations  - Instruct pt to call for assistance with activity based on assessment  - Modify environment to reduce risk of injury  - Provide assistive devices as appropriate  - Consider OT/PT consult to assist with strengthening/mobility  - Encourage toileting schedule  Outcome: Progressing

## 2024-05-25 NOTE — PLAN OF CARE
Problem: Diabetes/Glucose Control  Goal: Glucose maintained within prescribed range  Description: INTERVENTIONS:  - Monitor Blood Glucose as ordered  - Assess for signs and symptoms of hyperglycemia and hypoglycemia  - Administer ordered medications to maintain glucose within target range  - Assess barriers to adequate nutritional intake and initiate nutrition consult as needed  - Instruct patient on self management of diabetes  5/25/2024 1647 by Shital Jurado, RN  Outcome: Progressing  5/25/2024 1250 by Shital Jurado, RN  Outcome: Progressing

## 2024-05-25 NOTE — ED QUICK NOTES
Orders for admission, patient is aware of plan and ready to go upstairs. Any questions, please call ED RN Rose at extension [phone].     Patient Covid vaccination status: Fully vaccinated     COVID Test Ordered in ED: None    COVID Suspicion at Admission: N/A    Running Infusions:  None    Mental Status/LOC at time of transport: Alert x 4    Other pertinent information:   CIWA score: N/A   NIH score:  N/A

## 2024-05-25 NOTE — CONSULTS
[facility]  Report of Consultation    Arleth Weiss Patient Status:  Inpatient    1955 MRN [id number]   [facility] 2NE-A Attending Sanjiv Zuniga,    Hosp Day # 1 PCP Viktoriya Garcias DO     Reason for Consultation:   CHF    History of Present Illness:   Arleth Weiss is a(n) 69 year old female  a PMH including asthma, thyroid cancer s/p thyroidectomy with hypothyroidism, atrial fibrillation on coumadin, aortic stenosis s/p TAVR, copd, depression, htn, hld, obesity, fibromyalgia, hx PE, IVC filter, hx Pulm htn, chronic diastolic HF, SHELDON, acute LLE ischemia s/p angiojet and POBA and aborted left pop cutdown at [facility]'s 10/2023d (follows Dr. Andrade), left eye subconjunctival hemorrhage.  Pt has been on longstanding diuretic Rx.  States that she adjusts her diuretics with her nephrologist \" depending on what is going on\".  She was supposed to be taking Bumex 2mg PO BID, however on her own changed to 3mg PO qAM only.  She reports that weight increased from 215 in  to 250lbs roughly 1-2 weeks ago.  In response she started taking Metolazone daily.  She reports 15lb weight reduction, yet despite this progressive dyspnea.  No chest pain, pressure, heaviness, or tightness.  Palpitations c/w her chronic AF noted.  Mild orthopnea.  Given persisting dyspnea, pt proceeded to ER.  She has had incontinence issues and avoids excessive diuretic to minimize urinary inconveniences.        Past Medical History:   Past Medical History:    Aortic stenosis    S/P TAVR    ASTHMA    Asthma (HCC)    Back problem    CANCER    thyroid    Cancer (HCC)    thyroid     COPD (chronic obstructive pulmonary disease) (HCC)    DEPRESSION    Disorder of thyroid    Essential hypertension    Fibromyalgia    High blood pressure    High cholesterol    HYPERTENSION    HYPOTHYROIDISM    Muscle weakness    OBESITY    OSTEOARTHRITIS    Osteoarthritis    OTHER DISEASES    Fibromyalgia    OTHER DISEASES    Pulmonary emboli     OTHER DISEASES    Lymph edema    OTHER DISEASES    Pulmonary hypertension    Peripheral vascular disease (HCC)    Pulmonary embolism (HCC)    Shortness of breath    ON EXERTION    SLEEP APNEA    Sleep apnea    CPAP       Social History:   Smoking:  Quit 1998.  20 years X 1/2 ppd.  Alcohol:  None    Family History:   No family history of premature arthrosclerotic heart disease     Medications:   Scheduled:    potassium chloride  40 mEq Oral Q4H    furosemide  40 mg Intravenous Once    furosemide  40 mg Intravenous BID (Diuretic)    allopurinol  100 mg Oral Nightly    buPROPion SR  150 mg Oral BID    clopidogrel  75 mg Oral Nightly    digoxin  125 mcg Oral Nightly    dilTIAZem ER  240 mg Oral Nightly    FLUoxetine  20 mg Oral BID    levothyroxine  175 mcg Oral Daily @ 0700    metoprolol succinate ER  25 mg Oral Nightly    nortriptyline  25 mg Oral Nightly    rosuvastatin  5 mg Oral QOD       Continuous Infusion:       PRN Medications:     ondansetron    acetaminophen    Outpatient Medications:   No current facility-administered medications on file prior to encounter.     Current Outpatient Medications on File Prior to Encounter   Medication Sig Dispense Refill    warfarin 2.5 MG Oral Tab Take 1 tablet (2.5 mg total) by mouth 3 (three) times a week. Q Mon., Wed., and Saturday per pt.      empagliflozin (JARDIANCE) 10 MG Oral Tab Take 1 tablet (10 mg total) by mouth at bedtime. Per pt.      levothyroxine 175 MCG Oral Tab Take 1 tablet (175 mcg total) by mouth before breakfast.      metOLazone 2.5 MG Oral Tab Take 1 tablet (2.5 mg total) by mouth every other day. Per pt.      dilTIAZem  MG Oral Capsule SR 24 Hr Take 1 capsule (240 mg total) by mouth daily. (Patient taking differently: Take 1 capsule (240 mg total) by mouth at bedtime.) 90 capsule 3    bumetanide 2 MG Oral Tab Take 1.5 tablets (3 mg total) by mouth daily. Per pt.      clopidogrel 75 MG Oral Tab Take 1 tablet (75 mg total) by mouth at bedtime. Per  pt.      metoprolol succinate ER 25 MG Oral Tablet 24 Hr Take 1 tablet (25 mg total) by mouth at bedtime. Per pt.      Potassium Chloride ER 10 MEQ Oral Cap CR Take 4 capsules (40 mEq total) by mouth at bedtime. Per pt.      rosuvastatin 5 MG Oral Tab Take 1 tablet (5 mg total) by mouth every other day.      spironolactone 25 MG Oral Tab Take 1 tablet (25 mg total) by mouth daily.      warfarin 5 MG Oral Tab Take 1 tablet (5 mg total) by mouth nightly. Q Tues., Thurs., Friday and Sunday per pt.      ferrous sulfate 325 (65 FE) MG Oral Tab EC Take 1 tablet (325 mg total) by mouth at bedtime. Per pt.      Vitamin C 500 MG Oral Tab Take 1 tablet (500 mg total) by mouth at bedtime. Per pt.      Zinc 50 MG Oral Cap Take 50 mg by mouth at bedtime. Per pt.      Cholecalciferol 125 MCG (5000 UT) Oral Tab Take 1 tablet (5,000 Units total) by mouth at bedtime. Per pt.      acetaminophen 325 MG Oral Tab Take 2 tablets (650 mg total) by mouth every 8 (eight) hours as needed for Pain.      buPROPion  MG Oral Tablet 12 Hr Take 1 tablet (150 mg total) by mouth 2 (two) times daily.      cyproheptadine 4 MG Oral Tab Take 1 tablet (4 mg total) by mouth nightly.      FLUoxetine 20 MG Oral Cap Take 1 capsule (20 mg total) by mouth 2 (two) times daily.      triamcinolone 0.1 % External Ointment Apply 1 Application topically daily as needed (Neuro dermatitis per pt.).      digoxin 0.125 MG Oral Tab Take 1 tablet (125 mcg total) by mouth daily. (Patient taking differently: Take 1 tablet (125 mcg total) by mouth at bedtime.) 30 tablet 0    allopurinol 100 MG Oral Tab Take 1 tablet (100 mg total) by mouth daily. (Patient taking differently: Take 1 tablet (100 mg total) by mouth at bedtime. Per pt.) 90 tablet 3    Nortriptyline HCl 25 MG Oral Cap Take 1 capsule (25 mg total) by mouth nightly.      Multiple Vitamin (MULTIVITAMIN OR) Take 1 tablet by mouth at bedtime. Per pt.      calcium carbonate 500 MG Oral Chew Tab Chew 1 tablet (500  mg total) by mouth every 8 (eight) hours as needed (upset stomach).         Allergies:   Allergies   Allergen Reactions    Adhesive Tape HIVES     ALL ADHESIVES    Codeine HIVES    Doxycycline SWELLING    Hydrocodone UNKNOWN    Lisinopril TONGUE SWELLING    Metoprolol HIVES    Morphine Sulfate HIVES    Opioid Analgesics UNKNOWN    Oxycontin ITCHING    Oxytocin HIVES    Vicodin [Hydrocodone-Acetaminophen] ITCHING    Skin Adhesives OTHER (SEE COMMENTS)       Review of Systems:   No fevers, chills, change in weight or bowel habits.  Ten point review of systems is otherwise negative or unremarkable.    Physical Exam:   Vitals:    05/25/24 0734   BP: 115/76   Pulse: 74   Resp: 16   Temp: 98 °F (36.7 °C)     Wt Readings from Last 3 Encounters:   05/24/24 235 lb 0.2 oz (106.6 kg)   01/11/24 212 lb 4.9 oz (96.3 kg)   08/08/23 219 lb (99.3 kg)           General: Well developed, well nourished female.  Pt is in no acute distress.  HEENT:   Normocephalic.  Atraumatic.  Eyes with no scleral icterus.  Neck: Supple.  No JVD.  Carotids 2+ and equal in symmetric fashion.  No bruits are noted.  Cardiac: Irregular rate and rhythm.   There is a normal S1 and S2.  No S3 or S4.  Grade 1/6 ALVINO LSB.  No rubs or gallops.  PMI is non-displaced with a normal apical impulse.  Lungs: Clear to ascultation bilaterally.  No focal rales, rhonchi, or wheezes.  Good air movement is noted throughout all lung fields.  Abdomen: Soft.  Obese.  Non-distended.  Non-tender.  Bowel sounds are present and normoactive.  No guarding or rebound.   Extremities: Extremities demonstrate trace-1+ peripheral edema.   No cyanosis or clubbing of the digits is appreciated.  Femoral, Dorsalis Pedis, and Posterior Tibialis  pulses are 2+ and equal in a symmetric fashion.  Neurologic: Alert and oriented, normal affect.  No gross deficit appreciated.  Integument:  No visible rashes are appreciated.      Laboratories and Data:   Labs:    Recent Labs   Lab 05/24/24  8067  05/25/24  0705   * 123*   BUN 17 18   CREATSERUM 0.90 0.81   CA 8.9 8.7   ALB 3.4  --     142   K 4.1 3.5    106   CO2 29.0 30.0   ALKPHO 135  --    AST 47*  --    ALT 36  --    BILT 0.7  --    TP 8.0  --        Recent Labs   Lab 05/24/24  1751 05/25/24  0705   RBC 4.24 4.21   HGB 13.7 13.4   HCT 40.7 41.3   MCV 96.0 98.1   MCH 32.3 31.8   MCHC 33.7 32.4   RDW 13.7 13.7   NEPRELIM 6.41  --    WBC 8.7 7.8   .0 250.0       Recent Labs   Lab 05/24/24 1751 05/25/24  0705   PTP 23.4* 24.9*   INR 2.05* 2.22*       No results for input(s): \"TROP\", \"CK\" in the last 168 hours.    Diagnostics:   Tele: AFib - rate controlled  EKG: AFib.  IRBBB  Echo: Pending        Assessment:   Acute on chronic HFpEF  -Pt modifies her Rx independently.  Suspect decompensation reflects compliance issues   Aortic stenosis s/p TAVR    AFib  -Rate controlled  -INR therapeutic  4.    HTN  5.    HL  6.    Hypothyroidism  7.    COPD  8.    Thyroid CA s/p RsSxn   -On supplementation  9.    H/O PE   -S/P IVC filter  10.  PVD  -S/P PTA LLE   11.  Fibromyalgia         12.  CAD  -S/P stent 3/21                             Plan:   Coumadin   Continue Plavix   IV Lasix   Daily weights   F/U BMP / INR   Tele monitor   Statin    Jose Antonio Ryan MD  5/25/2024  10:19 AM

## 2024-05-25 NOTE — PLAN OF CARE
Patient is aox3, vss, ra, lungs diminished in all quadrants. Afib on coumadin (2.5mg tonight). INR 2.22. voids, purewick intact. Bilateral leg dressing covered d/t edema.   Patient states she has bad knees and cannot sit in our chairs because they are to low. High toilet sit in bathroom.  Plan: Continue to diurese. Daily weight when patient agrees to stand up. BMP and PT/INR in am.  Problem: Diabetes/Glucose Control  Goal: Glucose maintained within prescribed range  Description: INTERVENTIONS:  - Monitor Blood Glucose as ordered  - Assess for signs and symptoms of hyperglycemia and hypoglycemia  - Administer ordered medications to maintain glucose within target range  - Assess barriers to adequate nutritional intake and initiate nutrition consult as needed  - Instruct patient on self management of diabetes  Outcome: Progressing

## 2024-05-26 LAB
ANION GAP SERPL CALC-SCNC: 2 MMOL/L (ref 0–18)
BUN BLD-MCNC: 21 MG/DL (ref 9–23)
CALCIUM BLD-MCNC: 9.2 MG/DL (ref 8.5–10.1)
CHLORIDE SERPL-SCNC: 105 MMOL/L (ref 98–112)
CO2 SERPL-SCNC: 33 MMOL/L (ref 21–32)
CREAT BLD-MCNC: 0.83 MG/DL
EGFRCR SERPLBLD CKD-EPI 2021: 75 ML/MIN/1.73M2 (ref 60–?)
ERYTHROCYTE [DISTWIDTH] IN BLOOD BY AUTOMATED COUNT: 13.6 %
GLUCOSE BLD-MCNC: 122 MG/DL (ref 70–99)
HCT VFR BLD AUTO: 41.6 %
HGB BLD-MCNC: 13.4 G/DL
INR BLD: 2.09 (ref 0.8–1.2)
MAGNESIUM SERPL-MCNC: 2.2 MG/DL (ref 1.6–2.6)
MCH RBC QN AUTO: 32.1 PG (ref 26–34)
MCHC RBC AUTO-ENTMCNC: 32.2 G/DL (ref 31–37)
MCV RBC AUTO: 99.5 FL
OSMOLALITY SERPL CALC.SUM OF ELEC: 294 MOSM/KG (ref 275–295)
PLATELET # BLD AUTO: 261 10(3)UL (ref 150–450)
POTASSIUM SERPL-SCNC: 4.2 MMOL/L (ref 3.5–5.1)
PROTHROMBIN TIME: 23.7 SECONDS (ref 11.6–14.8)
RBC # BLD AUTO: 4.18 X10(6)UL
SODIUM SERPL-SCNC: 140 MMOL/L (ref 136–145)
WBC # BLD AUTO: 6.9 X10(3) UL (ref 4–11)

## 2024-05-26 PROCEDURE — 94799 UNLISTED PULMONARY SVC/PX: CPT

## 2024-05-26 PROCEDURE — 83735 ASSAY OF MAGNESIUM: CPT | Performed by: HOSPITALIST

## 2024-05-26 PROCEDURE — 85610 PROTHROMBIN TIME: CPT | Performed by: INTERNAL MEDICINE

## 2024-05-26 PROCEDURE — 80048 BASIC METABOLIC PNL TOTAL CA: CPT | Performed by: HOSPITALIST

## 2024-05-26 PROCEDURE — 85027 COMPLETE CBC AUTOMATED: CPT | Performed by: HOSPITALIST

## 2024-05-26 RX ORDER — WARFARIN SODIUM 5 MG/1
5 TABLET ORAL
Status: COMPLETED | OUTPATIENT
Start: 2024-05-26 | End: 2024-05-26

## 2024-05-26 RX ORDER — MAGNESIUM OXIDE 400 MG (241.3 MG MAGNESIUM) TABLET
325 TABLET NIGHTLY
Status: DISCONTINUED | OUTPATIENT
Start: 2024-05-26 | End: 2024-05-31

## 2024-05-26 RX ORDER — CALCIUM CARBONATE 500 MG/1
500 TABLET, CHEWABLE ORAL EVERY 8 HOURS PRN
Status: DISCONTINUED | OUTPATIENT
Start: 2024-05-26 | End: 2024-05-31

## 2024-05-26 RX ORDER — ASCORBIC ACID 500 MG
500 TABLET ORAL NIGHTLY
Status: DISCONTINUED | OUTPATIENT
Start: 2024-05-26 | End: 2024-05-31

## 2024-05-26 RX ORDER — TRIAMCINOLONE ACETONIDE 1 MG/G
1 OINTMENT TOPICAL DAILY PRN
Status: DISCONTINUED | OUTPATIENT
Start: 2024-05-26 | End: 2024-05-31

## 2024-05-26 RX ORDER — CYPROHEPTADINE HYDROCHLORIDE 4 MG/1
4 TABLET ORAL NIGHTLY
Status: DISCONTINUED | OUTPATIENT
Start: 2024-05-26 | End: 2024-05-31

## 2024-05-26 RX ORDER — MELATONIN
5000 NIGHTLY
Status: DISCONTINUED | OUTPATIENT
Start: 2024-05-26 | End: 2024-05-31

## 2024-05-26 NOTE — PLAN OF CARE
Received patient at 1930.  Alert and oriented x 4. Tele Rhythm A.fib rates controlled, oxygen maintained on room air. Breath sounds are clear, diminished. Skin-open wounds-kerlix in place and Pt refused treatment PM shift-ordered wound care consult. Last bowel movement 05/24. Patient voiding with no issues-purwick in place overnight Patient reports no cardiac symptoms, No chest pain or shortness of breath. Bed is locked and in low position. Call light and personal items within reach. Pt up stand by with walker. Reviewed plan of care and patient verbalizes understanding.     Daily weight completed standing.        Problem: Diabetes/Glucose Control  Goal: Glucose maintained within prescribed range  Description: INTERVENTIONS:  - Monitor Blood Glucose as ordered  - Assess for signs and symptoms of hyperglycemia and hypoglycemia  - Administer ordered medications to maintain glucose within target range  - Assess barriers to adequate nutritional intake and initiate nutrition consult as needed  - Instruct patient on self management of diabetes  Outcome: Progressing     Problem: Patient/Family Goals  Goal: Patient/Family Long Term Goal  Description: Patient's Long Term Goal:discharge    Interventions:  -cardiology consult and follow up -orders ,labs, v/s and procedures ,telemetry monitoring, Activity as ordered meds compliance o2 support  if necessary , CPAP adjustment, iv diuretics ,update plan of care, ,daily weight , strict intake and output   - See additional Care Plan goals for specific interventions  Outcome: Progressing  Goal: Patient/Family Short Term Goal  Description: Patient's Short Term Goal: breath comfortable , safety    Interventions:   - PAIN MGT , BREATH COMFORTABLE WITH CPAP / O2 SUPPORT , Adhere to medication regimen, follow up appointments after discharge, ,education to weight control, cardiac life style, heart healthy diet   - See additional Care Plan goals for specific interventions  Outcome:  Progressing     Problem: CARDIOVASCULAR - ADULT  Goal: Absence of cardiac arrhythmias or at baseline  Description: INTERVENTIONS:  - Continuous cardiac monitoring, monitor vital signs, obtain 12 lead EKG if indicated  - Evaluate effectiveness of antiarrhythmic and heart rate control medications as ordered  - Initiate emergency measures for life threatening arrhythmias  - Monitor electrolytes and administer replacement therapy as ordered  Outcome: Progressing  Goal: Maintains optimal cardiac output and hemodynamic stability  Description: INTERVENTIONS:  - Monitor vital signs, rhythm, and trends  - Monitor for bleeding, hypotension and signs of decreased cardiac output  - Evaluate effectiveness of vasoactive medications to optimize hemodynamic stability  - Monitor arterial and/or venous puncture sites for bleeding and/or hematoma  - Assess quality of pulses, skin color and temperature  - Assess for signs of decreased coronary artery perfusion - ex. Angina  - Evaluate fluid balance, assess for edema, trend weights  Outcome: Progressing     Problem: RESPIRATORY - ADULT  Goal: Achieves optimal ventilation and oxygenation  Description: INTERVENTIONS:  - Assess for changes in respiratory status  - Assess for changes in mentation and behavior  - Position to facilitate oxygenation and minimize respiratory effort  - Oxygen supplementation based on oxygen saturation or ABGs  - Provide Smoking Cessation handout, if applicable  - Encourage broncho-pulmonary hygiene including cough, deep breathe, Incentive Spirometry  - Assess the need for suctioning and perform as needed  - Assess and instruct to report SOB or any respiratory difficulty  - Respiratory Therapy support as indicated  - Manage/alleviate anxiety  - Monitor for signs/symptoms of CO2 retention  Outcome: Progressing     Problem: GASTROINTESTINAL - ADULT  Goal: Minimal or absence of nausea and vomiting  Description: INTERVENTIONS:  - Maintain adequate hydration with IV  or PO as ordered and tolerated  - Nasogastric tube to low intermittent suction as ordered  - Evaluate effectiveness of ordered antiemetic medications  - Provide nonpharmacologic comfort measures as appropriate  - Advance diet as tolerated, if ordered  - Obtain nutritional consult as needed  - Evaluate fluid balance  Outcome: Progressing  Goal: Maintains or returns to baseline bowel function  Description: INTERVENTIONS:  - Assess bowel function  - Maintain adequate hydration with IV or PO as ordered and tolerated  - Evaluate effectiveness of GI medications  - Encourage mobilization and activity  - Obtain nutritional consult as needed  - Establish a toileting routine/schedule  - Consider collaborating with pharmacy to review patient's medication profile  Outcome: Progressing  Goal: Achieves appropriate nutritional intake (bariatric)  Description: INTERVENTIONS:  - Monitor for over-consumption  - Identify factors contributing to increased intake, treat as appropriate  - Monitor I&O, WT and lab values  - Obtain nutritional consult as needed  - Evaluate psychosocial factors contributing to over-consumption  Outcome: Progressing     Problem: METABOLIC/FLUID AND ELECTROLYTES - ADULT  Goal: Glucose maintained within prescribed range  Description: INTERVENTIONS:  - Monitor Blood Glucose as ordered  - Assess for signs and symptoms of hyperglycemia and hypoglycemia  - Administer ordered medications to maintain glucose within target range  - Assess barriers to adequate nutritional intake and initiate nutrition consult as needed  - Instruct patient on self management of diabetes  Outcome: Progressing  Goal: Electrolytes maintained within normal limits  Description: INTERVENTIONS:  - Monitor labs and rhythm and assess patient for signs and symptoms of electrolyte imbalances  - Administer electrolyte replacement as ordered  - Monitor response to electrolyte replacements, including rhythm and repeat lab results as appropriate  -  Fluid restriction as ordered  - Instruct patient on fluid and nutrition restrictions as appropriate  Outcome: Progressing  Goal: Hemodynamic stability and optimal renal function maintained  Description: INTERVENTIONS:  - Monitor labs and assess for signs and symptoms of volume excess or deficit  - Monitor intake, output and patient weight  - Monitor urine specific gravity, serum osmolarity and serum sodium as indicated or ordered  - Monitor response to interventions for patient's volume status, including labs, urine output, blood pressure (other measures as available)  - Encourage oral intake as appropriate  - Instruct patient on fluid and nutrition restrictions as appropriate  Outcome: Progressing     Problem: SKIN/TISSUE INTEGRITY - ADULT  Goal: Skin integrity remains intact  Description: INTERVENTIONS  - Assess and document risk factors for pressure ulcer development  - Assess and document skin integrity  - Monitor for areas of redness and/or skin breakdown  - Initiate interventions, skin care algorithm/standards of care as needed  Outcome: Progressing  Goal: Incision(s), wounds(s) or drain site(s) healing without S/S of infection  Description: INTERVENTIONS:  - Assess and document risk factors for pressure ulcer development  - Assess and document skin integrity  - Assess and document dressing/incision, wound bed, drain sites and surrounding tissue  - Implement wound care per orders  - Initiate isolation precautions as appropriate  - Initiate Pressure Ulcer prevention bundle as indicated  Outcome: Progressing  Goal: Oral mucous membranes remain intact  Description: INTERVENTIONS  - Assess oral mucosa and hygiene practices  - Implement preventative oral hygiene regimen  - Implement oral medicated treatments as ordered  Outcome: Progressing     Problem: HEMATOLOGIC - ADULT  Goal: Maintains hematologic stability  Description: INTERVENTIONS  - Assess for signs and symptoms of bleeding or hemorrhage  - Monitor labs  and vital signs for trends  - Administer supportive blood products/factors, fluids and medications as ordered and appropriate  - Administer supportive blood products/factors as ordered and appropriate  Outcome: Progressing  Goal: Free from bleeding injury  Description: (Example usage: patient with low platelets)  INTERVENTIONS:  - Avoid intramuscular injections, enemas and rectal medication administration  - Ensure safe mobilization of patient  - Hold pressure on venipuncture sites to achieve adequate hemostasis  - Assess for signs and symptoms of internal bleeding  - Monitor lab trends  - Patient is to report abnormal signs of bleeding to staff  - Avoid use of toothpicks and dental floss  - Use electric shaver for shaving  - Use soft bristle tooth brush  - Limit straining and forceful nose blowing  Outcome: Progressing     Problem: MUSCULOSKELETAL - ADULT  Goal: Return mobility to safest level of function  Description: INTERVENTIONS:  - Assess patient stability and activity tolerance for standing, transferring and ambulating w/ or w/o assistive devices  - Assist with transfers and ambulation using safe patient handling equipment as needed  - Ensure adequate protection for wounds/incisions during mobilization  - Obtain PT/OT consults as needed  - Advance activity as appropriate  - Communicate ordered activity level and limitations with patient/family  Outcome: Progressing  Goal: Maintain proper alignment of affected body part  Description: INTERVENTIONS:  - Support and protect limb and body alignment per provider's orders  - Instruct and reinforce with patient and family use of appropriate assistive device and precautions (e.g. spinal or hip dislocation precautions)  Outcome: Progressing       Problem: SAFETY ADULT - FALL  Goal: Free from fall injury  Description: INTERVENTIONS:  - Assess pt frequently for physical needs  - Identify cognitive and physical deficits and behaviors that affect risk of falls.  -  Section fall precautions as indicated by assessment.  - Educate pt/family on patient safety including physical limitations  - Instruct pt to call for assistance with activity based on assessment  - Modify environment to reduce risk of injury  - Provide assistive devices as appropriate  - Consider OT/PT consult to assist with strengthening/mobility  - Encourage toileting schedule  Outcome: Progressing

## 2024-05-26 NOTE — PROGRESS NOTES
[facility] Pharmacy Dosing Service  Warfarin (Coumadin) Subsequent Dosing    Arleth Weiss is a 69 year old patient for whom pharmacy is dosing warfarin (Coumadin). Goal INR is 2-3    Recent Labs   Lab 05/24/24  1751 05/25/24  0705 05/26/24  0748   INR 2.05* 2.22* 2.09*       Consulted by:  Dr Patel  Indication:  Afib, H/O PE  Potential Drug Interactions:  allopurinol, clopidogreal, levothyroxine   Other Anticoagulants:  none  Home regimen (if applicable):  warfarin 5 mg su/tu/th/fr; 2.5 mg mo/we/sat     Inpatient Dosing History:    Date 5/24 5/25 5/26      INR 2.05 2.22 2.09      Coumadin dose 5 mg 2.5 mg                Based on above -  1.  For today, Give warfarin (COUMADIN) 5 mg at 2100 tonight  2   PT/INR ordered daily while on warfarin  3.  Pharmacy will continue to follow.  We appreciate the opportunity to assist in the care of this patient.    Patito Good PharmD  5/26/2024  11:04 AM

## 2024-05-26 NOTE — PROGRESS NOTES
[facility]  Progress Note    Arleth Weiss Patient Status:  Inpatient    1955 MRN [id number]   Location [facility] 2NE-A Attending Sanjiv Zuniga DO   Hosp Day # 2 PCP Viktoriya Garcias DO     Subjective:   Feeling better.  Good diuretic response.  Weight decreasing with diuresis.  No chest pain.  No shortness of breath at rest.  No focal cardiovascular complaints reported.    Objective:  Vitals:    24 2053 24 2253 24 0425 24 0836   BP: 106/73 97/73 102/71 135/86   BP Location:  Left arm Right arm Left arm   Pulse: 99 90 68 74   Resp:  18 19 18   Temp:  98.2 °F (36.8 °C) 98.2 °F (36.8 °C) 97.8 °F (36.6 °C)   TempSrc:  Oral Oral Oral   SpO2: 94% 98% 100% 98%   Weight:   222 lb 3.6 oz (100.8 kg)    Height:           Temp (24hrs), Av °F (36.7 °C), Min:97.7 °F (36.5 °C), Max:98.2 °F (36.8 °C)      Medications:   Scheduled:    furosemide  40 mg Intravenous Once    furosemide  40 mg Intravenous BID (Diuretic)    allopurinol  100 mg Oral Nightly    buPROPion SR  150 mg Oral BID    clopidogrel  75 mg Oral Nightly    digoxin  125 mcg Oral Nightly    dilTIAZem ER  240 mg Oral Nightly    FLUoxetine  20 mg Oral BID    levothyroxine  175 mcg Oral Daily @ 0700    metoprolol succinate ER  25 mg Oral Nightly    nortriptyline  25 mg Oral Nightly    rosuvastatin  5 mg Oral QOD       Continuous Infusion:       PRN Medications:     ondansetron    acetaminophen    Intake/Output:     Intake/Output Summary (Last 24 hours) at 2024 1026  Last data filed at 2024 0425  Gross per 24 hour   Intake 800 ml   Output 3500 ml   Net -2700 ml       Wt Readings from Last 3 Encounters:   24 222 lb 3.6 oz (100.8 kg)   24 212 lb 4.9 oz (96.3 kg)   23 219 lb (99.3 kg)       Allergies:  Allergies   Allergen Reactions    Adhesive Tape HIVES     ALL ADHESIVES    Codeine HIVES    Doxycycline SWELLING    Hydrocodone UNKNOWN    Lisinopril TONGUE SWELLING    Metoprolol HIVES    Morphine  Sulfate HIVES    Opioid Analgesics UNKNOWN    Oxycontin ITCHING    Oxytocin HIVES    Vicodin [Hydrocodone-Acetaminophen] ITCHING    Skin Adhesives OTHER (SEE COMMENTS)       Physical Exam:   General:  Well-developed / Well-nourished.  No acute distress.  HEENT:  Normocephalic.  Atraumatic.  No icterus.  Neck:  There is no jugular venous distention.   Cardiovascular:  Cardiovascular examination demonstrates an irregular rate and rhythm.  There is normal S1, S2.  There is no S3 or S4.  There are no rubs or gallops.  Grade 1-2/6 SEMLSB.  No click is appreciated.  PMI is nondisplaced with a normal apical impulse.    Pulmonary:  Lungs are clear to auscultation bilaterally.  There are no focal rales, rhonchi, or wheezes.  Good air movement is noted throughout both lung fields.   Abdomen:  The abdomen is soft, non-distended, and non-tender.  Bowel sounds are present and normoactive.  No organomegaly is appreciated.  Extremities:  Extremities demonstrate trace peripheral edema.   No cyanosis or clubbing of the digits is appreciated.  Neurologic:  Alert and oriented.  Normal affect.  Integument:  No visible rashes are appreciated.      Laboratory/Data:   Recent Labs   Lab 05/24/24 1751 05/25/24  0705 05/25/24  1556 05/26/24  0748   * 123*  --  122*   BUN 17 18  --  21   CREATSERUM 0.90 0.81  --  0.83   CA 8.9 8.7  --  9.2   ALB 3.4  --   --   --     142  --  140   K 4.1 3.5 3.7 4.2    106  --  105   CO2 29.0 30.0  --  33.0*   ALKPHO 135  --   --   --    AST 47*  --   --   --    ALT 36  --   --   --    BILT 0.7  --   --   --    TP 8.0  --   --   --        Recent Labs   Lab 05/24/24 1751 05/25/24  0705 05/26/24  0748   RBC 4.24 4.21 4.18   HGB 13.7 13.4 13.4   HCT 40.7 41.3 41.6   MCV 96.0 98.1 99.5   MCH 32.3 31.8 32.1   MCHC 33.7 32.4 32.2   RDW 13.7 13.7 13.6   NEPRELIM 6.41  --   --    WBC 8.7 7.8 6.9   .0 250.0 261.0       Recent Labs   Lab 05/24/24  1751 05/25/24  0705 05/26/24  0748   PTP  23.4* 24.9* 23.7*   INR 2.05* 2.22* 2.09*       No results for input(s): \"TROP\", \"CK\" in the last 168 hours.      Tele: AF - rate controlled    Diagnostics:    Echo: Pending    Assessment:     Acute on chronic HFpEF  -Pt modifies her Rx independently.  Suspect decompensation reflects compliance issues   Aortic stenosis s/p TAVR    AFib  -Rate controlled  -INR therapeutic  4.    HTN  5.    HL  6.    Hypothyroidism  7.    COPD  8.    Thyroid CA s/p RsSxn                -On supplementation  9.    H/O PE                -S/P IVC filter  10.  PVD  -S/P PTA LLE   11.  Fibromyalgia         12.  CAD  -S/P stent 3/21       Plan:    Coumadin   Plavix at this time   Continue IV Lasix   Daily weights   F/U BMP / INR.  Coumadin per pharmacy dosing.   Tele monitor   Statin    Jose Antonio Ryan MD  5/26/2024

## 2024-05-26 NOTE — PROGRESS NOTES
[facility]   part of [facility] Hospitalist Progress Note     Arleth Weiss Patient Status:  Inpatient    1955 MRN [id number]   Location [facility] 2NE-A Attending Sanjiv Zuniga,    Hosp Day # 2 PCP Viktoriya Garcias DO     Follow Up:  The encounter diagnosis was Acute on chronic diastolic congestive heart failure (HCC).    Subjective:     Patient seen and examined.  Denies chest pain or SOB.  Feels her LE's are less swollen.       Objective:    Review of Systems:   10 point ROS completed and was negative, except for pertinent positive and negatives stated in subjective.    Vital signs:  Temp:  [97.7 °F (36.5 °C)-98.2 °F (36.8 °C)] 97.8 °F (36.6 °C)  Pulse:  [65-99] 74  Resp:  [18-20] 18  BP: ()/(54-89) 135/86  SpO2:  [89 %-100 %] 98 %    Physical Exam:    Gen: No acute distress, alert and oriented x3, no focal neurologic deficits  HEENT:  EOMI, PERRLA, OP clear, MMM  Pulm: +bibasilar crackles, normal respiratory effort  CV:  Tachy, irregular, no murmur.  Normal PMI.    Abd: Abdomen soft, nontender, nondistended, no organomegaly, bowel sounds present  MSK: Full range of motion in extremities, no clubbing, no cyanosis. 1-2+ LE edema.   Skin: no rashes or lesions  Neuro:  Grossly intact, no focal deficits         Diagnostic Data:    Labs:  Recent Labs   Lab 24  17524  0705 24  0748   WBC 8.7 7.8 6.9   HGB 13.7 13.4 13.4   MCV 96.0 98.1 99.5   .0 250.0 261.0   INR 2.05* 2.22* 2.09*       Recent Labs   Lab 24  1751 24  0705 24  1556 24  0748   * 123*  --  122*   BUN 17 18  --  21   CREATSERUM 0.90 0.81  --  0.83   CA 8.9 8.7  --  9.2   ALB 3.4  --   --   --     142  --  140   K 4.1 3.5 3.7 4.2    106  --  105   CO2 29.0 30.0  --  33.0*   ALKPHO 135  --   --   --    AST 47*  --   --   --    ALT 36  --   --   --    BILT 0.7  --   --   --    TP 8.0  --   --   --        Estimated Creatinine  Clearance: 65 mL/min (based on SCr of 0.83 mg/dL).    Recent Labs   Lab 05/24/24  1751 05/25/24  0705 05/26/24  0748   PTP 23.4* 24.9* 23.7*   INR 2.05* 2.22* 2.09*            COVID-19 Lab Results    COVID-19  Lab Results   Component Value Date    COVID19 Not Detected 01/03/2024    COVID19 Not Detected 01/02/2024    COVID19 Not Detected 02/10/2023       Pro-Calcitonin  No results for input(s): \"PCT\" in the last 168 hours.    Cardiac  Recent Labs   Lab 05/24/24  1751   PBNP 1,586*       Creatinine Kinase  No results for input(s): \"CK\" in the last 168 hours.    Inflammatory Markers  No results for input(s): \"CRP\", \"SAKINA\", \"LDH\", \"DDIMER\" in the last 168 hours.    Imaging: Imaging data reviewed in Epic.    Medications:    furosemide  40 mg Intravenous Once    furosemide  40 mg Intravenous BID (Diuretic)    allopurinol  100 mg Oral Nightly    buPROPion SR  150 mg Oral BID    clopidogrel  75 mg Oral Nightly    digoxin  125 mcg Oral Nightly    dilTIAZem ER  240 mg Oral Nightly    FLUoxetine  20 mg Oral BID    levothyroxine  175 mcg Oral Daily @ 0700    metoprolol succinate ER  25 mg Oral Nightly    nortriptyline  25 mg Oral Nightly    rosuvastatin  5 mg Oral QOD       Assessment & Plan:      69 yr old female with PMH sig for chronic diastolic HF, a-fib, severe aortic stenosis s/p TAVR, CAD s/p stents, HTN, HLD, COPD, and PAD who presented to the ED for evaluation of SOB and weight gain.     # Acute on chronic diastolic HF  - suspect due to complicance   - s/p lasix 40 mg IV x 1 in ED  - cont IV lasix per cardiology   - strict I/O's, daily weights   - cards c/s appreciated      # Chronic a-fib  # Secondary hypercoag state  - tachycardic  - cont diltiazem, digoxin, and metoprolol   - cont warfarin      # CAD s/p stents  - no chest pain  - cont plavix and statin     # Essential HTN  - controlled  - cont diltiazem and metoprolol      # HLD  - cont statin     # COPD  - stable  - encourage IS use     # PAD  - cont statin and  plavix     # Hypothyroidism   - cont levothyroxine      Plan of care: inpt care.      Plan of care discussed with patient or family at bedside.    Sanjiv Zuniga, DO    Supplementary Documentation:     Quality:  DVT Prophylaxis: warfarin   CODE status: FULL  Calvin: no  Central line: no  If COVID testing is negative, may discontinue isolation: yes     Estimated date of discharge: TBD  Discharge is dependent on: clinical course   At this point Ms. Weiss is expected to be discharge to: home    Plan of care discussed with pt

## 2024-05-26 NOTE — PLAN OF CARE
Patient is aox3, vss,ra, lungs clear, afib on coumadin. INR 2.  Positive bowels sounds, fragile skin. Dressing changes to bilateral legs.  Walked in hallway with crutches and tolerated well.   2D Echo ordered.  Problem: Diabetes/Glucose Control  Goal: Glucose maintained within prescribed range  Description: INTERVENTIONS:  - Monitor Blood Glucose as ordered  - Assess for signs and symptoms of hyperglycemia and hypoglycemia  - Administer ordered medications to maintain glucose within target range  - Assess barriers to adequate nutritional intake and initiate nutrition consult as needed  - Instruct patient on self management of diabetes  Outcome: Progressing

## 2024-05-27 LAB
ANION GAP SERPL CALC-SCNC: 6 MMOL/L (ref 0–18)
BUN BLD-MCNC: 26 MG/DL (ref 9–23)
CALCIUM BLD-MCNC: 9.4 MG/DL (ref 8.5–10.1)
CHLORIDE SERPL-SCNC: 100 MMOL/L (ref 98–112)
CO2 SERPL-SCNC: 32 MMOL/L (ref 21–32)
CREAT BLD-MCNC: 0.87 MG/DL
EGFRCR SERPLBLD CKD-EPI 2021: 71 ML/MIN/1.73M2 (ref 60–?)
ERYTHROCYTE [DISTWIDTH] IN BLOOD BY AUTOMATED COUNT: 13.2 %
GLUCOSE BLD-MCNC: 114 MG/DL (ref 70–99)
HCT VFR BLD AUTO: 45.4 %
HGB BLD-MCNC: 14.6 G/DL
INR BLD: 1.8 (ref 0.8–1.2)
MAGNESIUM SERPL-MCNC: 2.4 MG/DL (ref 1.6–2.6)
MCH RBC QN AUTO: 31.6 PG (ref 26–34)
MCHC RBC AUTO-ENTMCNC: 32.2 G/DL (ref 31–37)
MCV RBC AUTO: 98.3 FL
OSMOLALITY SERPL CALC.SUM OF ELEC: 292 MOSM/KG (ref 275–295)
PLATELET # BLD AUTO: 279 10(3)UL (ref 150–450)
POTASSIUM SERPL-SCNC: 3.7 MMOL/L (ref 3.5–5.1)
PROTHROMBIN TIME: 21.1 SECONDS (ref 11.6–14.8)
RBC # BLD AUTO: 4.62 X10(6)UL
SODIUM SERPL-SCNC: 138 MMOL/L (ref 136–145)
WBC # BLD AUTO: 8.3 X10(3) UL (ref 4–11)

## 2024-05-27 PROCEDURE — 97116 GAIT TRAINING THERAPY: CPT

## 2024-05-27 PROCEDURE — 97161 PT EVAL LOW COMPLEX 20 MIN: CPT

## 2024-05-27 PROCEDURE — 85027 COMPLETE CBC AUTOMATED: CPT | Performed by: HOSPITALIST

## 2024-05-27 PROCEDURE — 85610 PROTHROMBIN TIME: CPT | Performed by: HOSPITALIST

## 2024-05-27 PROCEDURE — 83735 ASSAY OF MAGNESIUM: CPT | Performed by: HOSPITALIST

## 2024-05-27 PROCEDURE — 80048 BASIC METABOLIC PNL TOTAL CA: CPT | Performed by: HOSPITALIST

## 2024-05-27 PROCEDURE — 94799 UNLISTED PULMONARY SVC/PX: CPT

## 2024-05-27 RX ORDER — POTASSIUM CHLORIDE 750 MG/1
40 TABLET, EXTENDED RELEASE ORAL ONCE
Status: COMPLETED | OUTPATIENT
Start: 2024-05-27 | End: 2024-05-27

## 2024-05-27 RX ORDER — WARFARIN SODIUM 5 MG/1
5 TABLET ORAL
Status: COMPLETED | OUTPATIENT
Start: 2024-05-27 | End: 2024-05-27

## 2024-05-27 RX ORDER — BUMETANIDE 2 MG/1
2 TABLET ORAL
Status: DISCONTINUED | OUTPATIENT
Start: 2024-05-27 | End: 2024-05-30

## 2024-05-27 NOTE — PLAN OF CARE
Problem: Diabetes/Glucose Control  Goal: Glucose maintained within prescribed range  Description: INTERVENTIONS:  - Monitor Blood Glucose as ordered  - Assess for signs and symptoms of hyperglycemia and hypoglycemia  - Administer ordered medications to maintain glucose within target range  - Assess barriers to adequate nutritional intake and initiate nutrition consult as needed  - Instruct patient on self management of diabetes  5/26/2024 1944 by Shital Jurado, RN  Outcome: Progressing  5/26/2024 1007 by Shital Jurado, RN  Outcome: Progressing

## 2024-05-27 NOTE — PLAN OF CARE
Patient is aox4, vss,ra, lungs clear, afib on coumadin.  Walked in hallway with crutches and tolerated well.   2D Echo ordered.  Appetite is good.    Problem: Diabetes/Glucose Control  Goal: Glucose maintained within prescribed range  Description: INTERVENTIONS:  - Monitor Blood Glucose as ordered  - Assess for signs and symptoms of hyperglycemia and hypoglycemia  - Administer ordered medications to maintain glucose within target range  - Assess barriers to adequate nutritional intake and initiate nutrition consult as needed  - Instruct patient on self management of diabetes  Outcome: Progressing

## 2024-05-27 NOTE — PROGRESS NOTES
[facility]   part of [facility] Hospitalist Progress Note     Arleth Weiss Patient Status:  Inpatient    1955 MRN [id number]   Location [facility] 2NE-A Attending Sanjiv Zuniga,    Hosp Day # 3 PCP Viktoriya Garcias DO     Follow Up:  The encounter diagnosis was Acute on chronic diastolic congestive heart failure (HCC).    Subjective:     Patient seen and examined.  States she is feeling better, LE's less swollen.  No F/C, N/V.    Objective:    Review of Systems:   10 point ROS completed and was negative, except for pertinent positive and negatives stated in subjective.    Vital signs:  Temp:  [97.3 °F (36.3 °C)-98.3 °F (36.8 °C)] 98 °F (36.7 °C)  Pulse:  [69-91] 69  Resp:  [16-18] 17  BP: (103-140)/(61-91) 103/65  SpO2:  [94 %-97 %] 95 %    Physical Exam:    Gen: No acute distress, alert and oriented x3, no focal neurologic deficits  HEENT:  EOMI, PERRLA, OP clear, MMM  Pulm: +bibasilar crackles, normal respiratory effort  CV:  Tachy, irregular, no murmur.  Normal PMI.    Abd: Abdomen soft, nontender, nondistended, no organomegaly, bowel sounds present  MSK: Full range of motion in extremities, no clubbing, no cyanosis. 1-2+ LE edema improving   Skin: no rashes or lesions  Neuro:  Grossly intact, no focal deficits         Diagnostic Data:    Labs:  Recent Labs   Lab 24  1751 24  0705 24  0748 24  0738   WBC 8.7 7.8 6.9 8.3   HGB 13.7 13.4 13.4 14.6   MCV 96.0 98.1 99.5 98.3   .0 250.0 261.0 279.0   INR 2.05* 2.22* 2.09* 1.80*       Recent Labs   Lab 24  1751 24  0705 24  1556 24  0748 24  0738   * 123*  --  122* 114*   BUN 17 18  --  21 26*   CREATSERUM 0.90 0.81  --  0.83 0.87   CA 8.9 8.7  --  9.2 9.4   ALB 3.4  --   --   --   --     142  --  140 138   K 4.1 3.5 3.7 4.2 3.7    106  --  105 100   CO2 29.0 30.0  --  33.0* 32.0   ALKPHO 135  --   --   --   --    AST 47*  --   --   --    --    ALT 36  --   --   --   --    BILT 0.7  --   --   --   --    TP 8.0  --   --   --   --        Estimated Creatinine Clearance: 62 mL/min (based on SCr of 0.87 mg/dL).    Recent Labs   Lab 05/25/24  0705 05/26/24  0748 05/27/24  0738   PTP 24.9* 23.7* 21.1*   INR 2.22* 2.09* 1.80*            COVID-19 Lab Results    COVID-19  Lab Results   Component Value Date    COVID19 Not Detected 01/03/2024    COVID19 Not Detected 01/02/2024    COVID19 Not Detected 02/10/2023       Pro-Calcitonin  No results for input(s): \"PCT\" in the last 168 hours.    Cardiac  Recent Labs   Lab 05/24/24  1751   PBNP 1,586*       Creatinine Kinase  No results for input(s): \"CK\" in the last 168 hours.    Inflammatory Markers  No results for input(s): \"CRP\", \"SAKINA\", \"LDH\", \"DDIMER\" in the last 168 hours.    Imaging: Imaging data reviewed in Epic.    Medications:    cholecalciferol  5,000 Units Oral Nightly    cyproheptadine  4 mg Oral Nightly    ferrous sulfate  325 mg Oral Nightly    Vitamin C  500 mg Oral Nightly    furosemide  40 mg Intravenous Once    furosemide  40 mg Intravenous BID (Diuretic)    allopurinol  100 mg Oral Nightly    buPROPion SR  150 mg Oral BID    clopidogrel  75 mg Oral Nightly    digoxin  125 mcg Oral Nightly    dilTIAZem ER  240 mg Oral Nightly    FLUoxetine  20 mg Oral BID    levothyroxine  175 mcg Oral Daily @ 0700    metoprolol succinate ER  25 mg Oral Nightly    nortriptyline  25 mg Oral Nightly    rosuvastatin  5 mg Oral QOD       Assessment & Plan:      69 yr old female with PMH sig for chronic diastolic HF, a-fib, severe aortic stenosis s/p TAVR, CAD s/p stents, HTN, HLD, COPD, and PAD who presented to the ED for evaluation of SOB and weight gain.     # Acute on chronic diastolic HF  - suspect due to complicance   - s/p lasix 40 mg IV x 1 in ED  - cont IV lasix per cardiology   - strict I/O's, daily weights   - cards c/s appreciated      # Chronic a-fib  # Secondary hypercoag state  - rate controlled   - cont  diltiazem, digoxin, and metoprolol   - cont warfarin      # CAD s/p stents  - no chest pain  - cont plavix and statin     # Essential HTN  - controlled  - cont diltiazem and metoprolol      # HLD  - cont statin     # COPD  - stable  - encourage IS use     # PAD  - cont statin and plavix     # Hypothyroidism   - cont levothyroxine      Plan of care: inpt care.      Plan of care discussed with patient or family at bedside.    Sanjiv Zuniga, DO    Supplementary Documentation:     Quality:  DVT Prophylaxis: warfarin   CODE status: FULL  Calvin: no  Central line: no  If COVID testing is negative, may discontinue isolation: yes     Estimated date of discharge: TBD  Discharge is dependent on: clinical course   At this point Ms. Weiss is expected to be discharge to: home    Plan of care discussed with pt

## 2024-05-27 NOTE — PROGRESS NOTES
[facility]  Progress Note    Arleth Weiss Patient Status:  Inpatient    1955 MRN [id number]   Former[facility] 2NE-A Attending Sanjiv Zuniga DO   Hosp Day # 3 PCP Viktoriya Garcias DO     Subjective:   Feeling better.  Good diuretic response.  Weight decreasing with diuresis - down to 217 lbs today.  No chest pain.  No shortness of breath at rest.  Still mild dyspnea with activity.  No focal cardiovascular complaints reported.    Objective:  Vitals:    24 2250 24 2336 24 0357 24 0806   BP:  112/82 (!) 140/91 103/65   BP Location:  Right arm Right arm Right arm   Pulse: 89 91 77 69   Resp:  16 18 17   Temp:  98.1 °F (36.7 °C) 97.4 °F (36.3 °C) 98 °F (36.7 °C)   TempSrc:  Oral Oral Oral   SpO2: 96% 96% 96% 95%   Weight:   217 lb 9.5 oz (98.7 kg)    Height:           Temp (24hrs), Av.8 °F (36.6 °C), Min:97.3 °F (36.3 °C), Max:98.3 °F (36.8 °C)      Medications:   Scheduled:    cholecalciferol  5,000 Units Oral Nightly    cyproheptadine  4 mg Oral Nightly    ferrous sulfate  325 mg Oral Nightly    Vitamin C  500 mg Oral Nightly    furosemide  40 mg Intravenous Once    furosemide  40 mg Intravenous BID (Diuretic)    allopurinol  100 mg Oral Nightly    buPROPion SR  150 mg Oral BID    clopidogrel  75 mg Oral Nightly    digoxin  125 mcg Oral Nightly    dilTIAZem ER  240 mg Oral Nightly    FLUoxetine  20 mg Oral BID    levothyroxine  175 mcg Oral Daily @ 0700    metoprolol succinate ER  25 mg Oral Nightly    nortriptyline  25 mg Oral Nightly    rosuvastatin  5 mg Oral QOD       Continuous Infusion:       PRN Medications:     calcium carbonate    triamcinolone    ondansetron    acetaminophen    Intake/Output:     Intake/Output Summary (Last 24 hours) at 2024 0942  Last data filed at 2024 0357  Gross per 24 hour   Intake 480 ml   Output 3350 ml   Net -2870 ml       Wt Readings from Last 3 Encounters:   24 217 lb 9.5 oz (98.7 kg)   24 212 lb 4.9 oz (96.3  kg)   08/08/23 219 lb (99.3 kg)       Allergies:  Allergies   Allergen Reactions    Adhesive Tape HIVES     ALL ADHESIVES    Codeine HIVES    Doxycycline SWELLING    Hydrocodone UNKNOWN    Lisinopril TONGUE SWELLING    Metoprolol HIVES    Morphine Sulfate HIVES    Opioid Analgesics UNKNOWN    Oxycontin ITCHING    Oxytocin HIVES    Vicodin [Hydrocodone-Acetaminophen] ITCHING    Skin Adhesives OTHER (SEE COMMENTS)       Physical Exam:   General:  Well-developed / Well-nourished.  No acute distress.  HEENT:  Normocephalic.  Atraumatic.  No icterus.  Neck:  There is no jugular venous distention.   Cardiovascular:  Cardiovascular examination demonstrates an irregular rate and rhythm.  There is normal S1, S2.  There is no S3 or S4.  There are no rubs or gallops.  Grade 1-2/6 SEMLSB.  No click is appreciated.  PMI is nondisplaced with a normal apical impulse.    Pulmonary:  Lungs are clear to auscultation bilaterally.  There are no focal rales, rhonchi, or wheezes.  Good air movement is noted throughout both lung fields.   Abdomen:  The abdomen is soft, non-distended, and non-tender.  Bowel sounds are present and normoactive.  No organomegaly is appreciated.  Extremities:  Extremities demonstrate trace peripheral edema.   No cyanosis or clubbing of the digits is appreciated.  Neurologic:  Alert and oriented.  Normal affect.  Integument:  No visible rashes are appreciated.      Laboratory/Data:   Recent Labs   Lab 05/24/24  1751 05/25/24  0705 05/25/24  1556 05/26/24  0748 05/27/24  0738   * 123*  --  122* 114*   BUN 17 18  --  21 26*   CREATSERUM 0.90 0.81  --  0.83 0.87   CA 8.9 8.7  --  9.2 9.4   ALB 3.4  --   --   --   --     142  --  140 138   K 4.1 3.5 3.7 4.2 3.7    106  --  105 100   CO2 29.0 30.0  --  33.0* 32.0   ALKPHO 135  --   --   --   --    AST 47*  --   --   --   --    ALT 36  --   --   --   --    BILT 0.7  --   --   --   --    TP 8.0  --   --   --   --        Recent Labs   Lab  05/24/24  1751 05/25/24  0705 05/26/24  0748 05/27/24  0738   RBC 4.24 4.21 4.18 4.62   HGB 13.7 13.4 13.4 14.6   HCT 40.7 41.3 41.6 45.4   MCV 96.0 98.1 99.5 98.3   MCH 32.3 31.8 32.1 31.6   MCHC 33.7 32.4 32.2 32.2   RDW 13.7 13.7 13.6 13.2   NEPRELIM 6.41  --   --   --    WBC 8.7 7.8 6.9 8.3   .0 250.0 261.0 279.0       Recent Labs   Lab 05/25/24  0705 05/26/24  0748 05/27/24  0738   PTP 24.9* 23.7* 21.1*   INR 2.22* 2.09* 1.80*       No results for input(s): \"TROP\", \"CK\" in the last 168 hours.      Tele: AF - rate controlled    Diagnostics:    Echo: Pending    Assessment:     Acute on chronic HFpEF  -Pt modifies her Rx independently.  Suspect decompensation reflects compliance issues   Aortic stenosis s/p TAVR    AFib  -Rate controlled  -INR therapeutic  4.    HTN  5.    HL  6.    Hypothyroidism  7.    COPD  8.    Thyroid CA s/p RsSxn                -On supplementation  9.    H/O PE                -S/P IVC filter  10.  PVD  -S/P PTA LLE   11.  Fibromyalgia         12.  CAD  -S/P stent 3/21       Plan:    Coumadin per pharmacy dosing   Plavix at this time   Transition IV Lasix to PO Bumex 2mg BID   Daily weights   F/U BMP / INR.     Tele monitor   Statin   Await Echo   Discharge planning if stable on PO diuretic and Echo stable - potentially tomorrow    Jose Antonio Ryan MD  5/27/2024

## 2024-05-27 NOTE — PLAN OF CARE
PT A/O, 96% ON RA W/A, USING CPAP AT NIGHT, DENES SOB WITH REST, STATES WINDED WITH EXERTION, DRESSINGS TO LE D/I, AFIB, LE EDEMA, REFUSES SCDS, VOIDING PER PURE WICK, UP WITH ASSIST/CRUTCHES TO BATHROOM, HAD BM, PLAN FOR ECHO, LABS IN AM, INSTRUCTED PT ON POC    Problem: RESPIRATORY - ADULT  Goal: Achieves optimal ventilation and oxygenation  Description: INTERVENTIONS:  - Assess for changes in respiratory status  - Assess for changes in mentation and behavior  - Position to facilitate oxygenation and minimize respiratory effort  - Oxygen supplementation based on oxygen saturation or ABGs  - Provide Smoking Cessation handout, if applicable  - Encourage broncho-pulmonary hygiene including cough, deep breathe, Incentive Spirometry  - Assess the need for suctioning and perform as needed  - Assess and instruct to report SOB or any respiratory difficulty  - Respiratory Therapy support as indicated  - Manage/alleviate anxiety  - Monitor for signs/symptoms of CO2 retention  Outcome: Progressing

## 2024-05-27 NOTE — PHYSICAL THERAPY NOTE
PHYSICAL THERAPY EVALUATION - INPATIENT     Room Number: 2617/2617-A  Evaluation Date: 5/27/2024  Type of Evaluation: Initial  Physician Order: PT Eval and Treat    Presenting Problem: difficulty breathing, weight gain of 20 lbs x 2 wks  Co-Morbidities : CHF, a-fib, Chronic diastolic HF, aortic stenosis, TAVR, CAD, stents, HTN, COPD, PAD, hypothyroidism, depression, obesity, fibromyalgia, PE, IVC filter  Reason for Therapy: Mobility Dysfunction and Discharge Planning    PHYSICAL THERAPY ASSESSMENT   Patient is a 69 year old female admitted 5/24/2024 for difficulty breathing and weight gain.   Patient is currently functioning at baseline with bed mobility, transfers, gait, and standing prolonged periods. Prior to admission, patient's baseline is modified independent gait using Loftstrand crutches.          PLAN  Patient has been evaluated and presents with no skilled Physical Therapy needs at this time.  Patient discharged from Physical Therapy services.  Please re-order if a new functional limitation presents during this admission.    GOALS  Patient was able to achieve the following goals ...    Patient was able to transfer At previous, functional level  Safely and independently   Patient able to ambulate on level surfaces At previous, functional level  Safely and independently         HOME SITUATION  Type of Home: House   Home Layout: One level;Ramped entrance                Lives With: Alone  Drives: Yes  Patient Owned Equipment: Crutches;Rolling walker (Loftstrand crutches)       Prior Level of [place]: Patient is modified independent gait using Loftstrand crutches, is able to drive.  Patient reports home is set-up for her with bed at higher surface, patient reports she cannot sit in the recliner chair here due to it being too low.      SUBJECTIVE  \"I can do it\" referring to gait      OBJECTIVE  Precautions: Bed/chair alarm  Fall Risk: Standard fall risk    WEIGHT BEARING RESTRICTION  Weight Bearing  Restriction: None                PAIN ASSESSMENT  Ratin          COGNITION  Overall Cognitive Status:  WFL - within functional limits    RANGE OF MOTION AND STRENGTH ASSESSMENT  Upper extremity ROM and strength are within functional limits     Lower extremity ROM is within functional limits     Lower extremity strength is within functional limits       BALANCE  Static Sitting: Good  Dynamic Sitting: Good  Static Standing: Fair + (with loftstrand crutches)  Dynamic Standing: Fair + (with loftstrand crutches)    ADDITIONAL TESTS                                    ACTIVITY TOLERANCE                         O2 WALK       NEUROLOGICAL FINDINGS                        AM-PAC '6-Clicks' INPATIENT SHORT FORM - BASIC MOBILITY  How much difficulty does the patient currently have...  Patient Difficulty: Turning over in bed (including adjusting bedclothes, sheets and blankets)?: None   Patient Difficulty: Sitting down on and standing up from a chair with arms (e.g., wheelchair, bedside commode, etc.): None   Patient Difficulty: Moving from lying on back to sitting on the side of the bed?: None   How much help from another person does the patient currently need...   Help from Another: Moving to and from a bed to a chair (including a wheelchair)?: None   Help from Another: Need to walk in hospital room?: None   Help from Another: Climbing 3-5 steps with a railing?: A Lot       AM-PAC Score:  Raw Score: 22   Approx Degree of Impairment: 20.91%   Standardized Score (AM-PAC Scale): 53.28   CMS Modifier (G-Code): CJ    FUNCTIONAL ABILITY STATUS  Gait Assessment   Functional Mobility/Gait Assessment  Gait Assistance: Modified independent  Distance (ft): 20  Assistive Device: Crutches (Loftstrand crutches)  Pattern:  (Decr caleb but at baseline)    Skilled Therapy Provided     Bed Mobility:  Rolling: Independent towards R with HOB elevated  Supine to sit: Independent to EOB   Sit to supine: NT     Transfer Mobility:  Sit to stand:  Modified Independent with height of bed elevated to simulate bed at home without Loftstrand crutches due to patient has IV and cannot place them on forearms per patient request  Stand to sit: Modified independent  Gait = Modified Independent using the Loftstrand crutches as walking sticks    Therapist's comments:RN approved session, patient is in bed with HOB elevated in supine.  Patient performed bed mobility supine->sit, transfers and gait with the Loftstrand crutches without difficulty observed but needs some increase in time to complete tasks but patient reports she is at baseline level, (-)LOB, ambulates slow but safe and steady.  Patient reports she is getting a recumbent exercise equipment at home and will start doing that, patient is encouraged to ambulate as able while in-house to prevent decline in strength, endurance, functional mobility.  Nursing staff came to change wet diaper and purewick.     Exercise/Education Provided:  Discussed role of PT, goals for the session.  Patient is in agreement to no PT needs at this time. Patient is aware to not be wet for periods of time to prevent skin breakdown.    Patient End of Session: In bed;With  staff;Needs met;Call light within reach;RN aware of session/findings;All patient questions and concerns addressed (Sitting at the EOB with tray table/lunch in front)    Patient Evaluation Complexity Level:  History Moderate - 1 or 2 personal factors and/or co-morbidities   Examination of body systems Low - addressing 1-2 elements   Clinical Presentation Low - Stable   Clinical Decision Making Low Complexity       PT Session Time: 15 minutes  Gait Trainin minutes  Therapeutic Activity:  minutes  Neuromuscular Re-education:  minutes  Therapeutic Exercise:  minutes

## 2024-05-27 NOTE — OCCUPATIONAL THERAPY NOTE
OT orders received and pt chart reviewed. Per EMR and discussion with evaluating PT, pt is at baseline and presents with no acute OT needs at this time. OT will sign off.

## 2024-05-27 NOTE — PROGRESS NOTES
[facility] Pharmacy Dosing Service  Warfarin (Coumadin) Subsequent Dosing    Arleth Weiss is a 69 year old patient for whom pharmacy is dosing warfarin (Coumadin). Goal INR is 2-3    Recent Labs   Lab 05/24/24  1751 05/25/24  0705 05/26/24  0748 05/27/24  0738   INR 2.05* 2.22* 2.09* 1.80*       Consulted by:  Dr Patel  Indication:  afib, h/o PE  Potential Drug Interactions:  clopidogrel (increased risk of bleed), allopurinol, levothyroxine  Other Anticoagulants:  none  Home regimen (if applicable):  2.5 mg Mon/Wed/Sat, 5 mg ROW    Inpatient Dosing History:    Date 5/24 5/25 5/26 5/27     INR 2.05 2.22 2.09 1.80     Coumadin dose 5 mg 2.5 mg 5 mg               Based on above -  1.  For today, Give warfarin (COUMADIN) 5 mg at 2100 tonight  2   PT/INR ordered daily while on warfarin  3.  Pharmacy will continue to follow.  We appreciate the opportunity to assist in the care of this patient.    Madeleine Felton, [facility]  5/27/2024  2:16 PM

## 2024-05-28 ENCOUNTER — APPOINTMENT (OUTPATIENT)
Dept: CV DIAGNOSTICS | Facility: HOSPITAL | Age: 69
DRG: 291 | End: 2024-05-28
Attending: INTERNAL MEDICINE
Payer: MEDICARE

## 2024-05-28 LAB
ANION GAP SERPL CALC-SCNC: 5 MMOL/L (ref 0–18)
BUN BLD-MCNC: 32 MG/DL (ref 9–23)
CALCIUM BLD-MCNC: 9.2 MG/DL (ref 8.5–10.1)
CHLORIDE SERPL-SCNC: 101 MMOL/L (ref 98–112)
CO2 SERPL-SCNC: 32 MMOL/L (ref 21–32)
CREAT BLD-MCNC: 1.08 MG/DL
EGFRCR SERPLBLD CKD-EPI 2021: 55 ML/MIN/1.73M2 (ref 60–?)
ERYTHROCYTE [DISTWIDTH] IN BLOOD BY AUTOMATED COUNT: 13.2 %
GLUCOSE BLD-MCNC: 124 MG/DL (ref 70–99)
HCT VFR BLD AUTO: 46.1 %
HGB BLD-MCNC: 15.3 G/DL
INR BLD: 1.8 (ref 0.8–1.2)
MAGNESIUM SERPL-MCNC: 2.4 MG/DL (ref 1.6–2.6)
MCH RBC QN AUTO: 32.1 PG (ref 26–34)
MCHC RBC AUTO-ENTMCNC: 33.2 G/DL (ref 31–37)
MCV RBC AUTO: 96.8 FL
OSMOLALITY SERPL CALC.SUM OF ELEC: 294 MOSM/KG (ref 275–295)
PLATELET # BLD AUTO: 304 10(3)UL (ref 150–450)
POTASSIUM SERPL-SCNC: 3.5 MMOL/L (ref 3.5–5.1)
POTASSIUM SERPL-SCNC: 3.5 MMOL/L (ref 3.5–5.1)
POTASSIUM SERPL-SCNC: 4 MMOL/L (ref 3.5–5.1)
PROTHROMBIN TIME: 21.1 SECONDS (ref 11.6–14.8)
RBC # BLD AUTO: 4.76 X10(6)UL
SODIUM SERPL-SCNC: 138 MMOL/L (ref 136–145)
WBC # BLD AUTO: 8.7 X10(3) UL (ref 4–11)

## 2024-05-28 PROCEDURE — 84132 ASSAY OF SERUM POTASSIUM: CPT | Performed by: HOSPITALIST

## 2024-05-28 PROCEDURE — 87040 BLOOD CULTURE FOR BACTERIA: CPT | Performed by: INTERNAL MEDICINE

## 2024-05-28 PROCEDURE — 80048 BASIC METABOLIC PNL TOTAL CA: CPT | Performed by: HOSPITALIST

## 2024-05-28 PROCEDURE — 93306 TTE W/DOPPLER COMPLETE: CPT | Performed by: INTERNAL MEDICINE

## 2024-05-28 PROCEDURE — 94799 UNLISTED PULMONARY SVC/PX: CPT

## 2024-05-28 PROCEDURE — 99213 OFFICE O/P EST LOW 20 MIN: CPT

## 2024-05-28 PROCEDURE — 84132 ASSAY OF SERUM POTASSIUM: CPT | Performed by: INTERNAL MEDICINE

## 2024-05-28 PROCEDURE — 85610 PROTHROMBIN TIME: CPT | Performed by: INTERNAL MEDICINE

## 2024-05-28 PROCEDURE — 85027 COMPLETE CBC AUTOMATED: CPT | Performed by: HOSPITALIST

## 2024-05-28 PROCEDURE — 83735 ASSAY OF MAGNESIUM: CPT | Performed by: HOSPITALIST

## 2024-05-28 RX ORDER — BUMETANIDE 2 MG/1
2 TABLET ORAL
Qty: 60 TABLET | Refills: 0 | Status: SHIPPED | OUTPATIENT
Start: 2024-05-28

## 2024-05-28 RX ORDER — POTASSIUM CHLORIDE 1.5 G/1.58G
40 POWDER, FOR SOLUTION ORAL EVERY 4 HOURS
Status: COMPLETED | OUTPATIENT
Start: 2024-05-28 | End: 2024-05-28

## 2024-05-28 RX ORDER — WARFARIN SODIUM 5 MG/1
5 TABLET ORAL
Status: DISCONTINUED | OUTPATIENT
Start: 2024-05-28 | End: 2024-05-28

## 2024-05-28 NOTE — PROGRESS NOTES
Pharmacy Dosing Service  Warfarin (Coumadin) Subsequent Dosing         Arleth Weiss is a 69 year old female for whom pharmacy has been dosing warfarin.   Goal INR is 2-3 .        Recent Labs   Lab 05/24/24  1751 05/25/24  0705 05/26/24  0748 05/27/24  0738 05/28/24  0714   INR 2.05* 2.22* 2.09* 1.80* 1.80*       Consulted by:  Dr Patel  Indication:  Afib, Hx of PE  Significant drug interactions:  clopidogrel, allopurinol  Other Anticoagulants:  none  Home regimen (if applicable):  2.5 mg Mon/Wed/Sat & 5 mg ROW     Inpatient Dosing History:     Date 5/24 5/25 5/26 5/27 5/28    INR 2.05 2.22 2.09 1.80 1.80    Warfarin dose 5 mg 2.5 mg 5 mg 5mg                A/P:  1.  The INR is subtherapeutic.    2.  Ordered warfarin 5mg for tonight at 2100.  DC planning - ordered in the event pt is not DC today.    3.  PT/INR ordered daily while on warfarin.      Pharmacy will continue to follow.  We appreciate the opportunity to assist in her care.      Espinoza Luis, PharmD, BCPS  5/28/2024  1:43 PM

## 2024-05-28 NOTE — PLAN OF CARE
Pt alert and oriented x 4.  Continuous tele monitoring in place.  Afib on the monitor.  Denies pain, dyspnea and dizziness.  Room air.  Resting in bed comfortably.  Pt declined to walk or sit in the chair at this time.  Pt seen by wound care RN and recommendations received.  Echo completed.  Results pending.  Potassium replaced per protocol.  Safety and comfort maintained.  Will continue to monitor.      Problem: CARDIOVASCULAR - ADULT  Goal: Absence of cardiac arrhythmias or at baseline  Description: INTERVENTIONS:  - Continuous cardiac monitoring, monitor vital signs, obtain 12 lead EKG if indicated  - Evaluate effectiveness of antiarrhythmic and heart rate control medications as ordered  - Initiate emergency measures for life threatening arrhythmias  - Monitor electrolytes and administer replacement therapy as ordered  Outcome: Progressing  Goal: Maintains optimal cardiac output and hemodynamic stability  Description: INTERVENTIONS:  - Monitor vital signs, rhythm, and trends  - Monitor for bleeding, hypotension and signs of decreased cardiac output  - Evaluate effectiveness of vasoactive medications to optimize hemodynamic stability  - Monitor arterial and/or venous puncture sites for bleeding and/or hematoma  - Assess quality of pulses, skin color and temperature  - Assess for signs of decreased coronary artery perfusion - ex. Angina  - Evaluate fluid balance, assess for edema, trend weights  Outcome: Progressing     Problem: RESPIRATORY - ADULT  Goal: Achieves optimal ventilation and oxygenation  Description: INTERVENTIONS:  - Assess for changes in respiratory status  - Assess for changes in mentation and behavior  - Position to facilitate oxygenation and minimize respiratory effort  - Oxygen supplementation based on oxygen saturation or ABGs  - Provide Smoking Cessation handout, if applicable  - Encourage broncho-pulmonary hygiene including cough, deep breathe, Incentive Spirometry  - Assess the need for  suctioning and perform as needed  - Assess and instruct to report SOB or any respiratory difficulty  - Respiratory Therapy support as indicated  - Manage/alleviate anxiety  - Monitor for signs/symptoms of CO2 retention  Outcome: Progressing     Problem: GASTROINTESTINAL - ADULT  Goal: Minimal or absence of nausea and vomiting  Description: INTERVENTIONS:  - Maintain adequate hydration with IV or PO as ordered and tolerated  - Nasogastric tube to low intermittent suction as ordered  - Evaluate effectiveness of ordered antiemetic medications  - Provide nonpharmacologic comfort measures as appropriate  - Advance diet as tolerated, if ordered  - Obtain nutritional consult as needed  - Evaluate fluid balance  Outcome: Progressing  Goal: Maintains or returns to baseline bowel function  Description: INTERVENTIONS:  - Assess bowel function  - Maintain adequate hydration with IV or PO as ordered and tolerated  - Evaluate effectiveness of GI medications  - Encourage mobilization and activity  - Obtain nutritional consult as needed  - Establish a toileting routine/schedule  - Consider collaborating with pharmacy to review patient's medication profile  Outcome: Progressing  Goal: Achieves appropriate nutritional intake (bariatric)  Description: INTERVENTIONS:  - Monitor for over-consumption  - Identify factors contributing to increased intake, treat as appropriate  - Monitor I&O, WT and lab values  - Obtain nutritional consult as needed  - Evaluate psychosocial factors contributing to over-consumption  Outcome: Progressing     Problem: METABOLIC/FLUID AND ELECTROLYTES - ADULT  Goal: Electrolytes maintained within normal limits  Description: INTERVENTIONS:  - Monitor labs and rhythm and assess patient for signs and symptoms of electrolyte imbalances  - Administer electrolyte replacement as ordered  - Monitor response to electrolyte replacements, including rhythm and repeat lab results as appropriate  - Fluid restriction as  ordered  - Instruct patient on fluid and nutrition restrictions as appropriate  Outcome: Progressing  Goal: Hemodynamic stability and optimal renal function maintained  Description: INTERVENTIONS:  - Monitor labs and assess for signs and symptoms of volume excess or deficit  - Monitor intake, output and patient weight  - Monitor urine specific gravity, serum osmolarity and serum sodium as indicated or ordered  - Monitor response to interventions for patient's volume status, including labs, urine output, blood pressure (other measures as available)  - Encourage oral intake as appropriate  - Instruct patient on fluid and nutrition restrictions as appropriate  Outcome: Progressing     Problem: SKIN/TISSUE INTEGRITY - ADULT  Goal: Skin integrity remains intact  Description: INTERVENTIONS  - Assess and document risk factors for pressure ulcer development  - Assess and document skin integrity  - Monitor for areas of redness and/or skin breakdown  - Initiate interventions, skin care algorithm/standards of care as needed  Outcome: Progressing     Problem: HEMATOLOGIC - ADULT  Goal: Maintains hematologic stability  Description: INTERVENTIONS  - Assess for signs and symptoms of bleeding or hemorrhage  - Monitor labs and vital signs for trends  - Administer supportive blood products/factors, fluids and medications as ordered and appropriate  - Administer supportive blood products/factors as ordered and appropriate  Outcome: Progressing  Goal: Free from bleeding injury  Description: (Example usage: patient with low platelets)  INTERVENTIONS:  - Avoid intramuscular injections, enemas and rectal medication administration  - Ensure safe mobilization of patient  - Hold pressure on venipuncture sites to achieve adequate hemostasis  - Assess for signs and symptoms of internal bleeding  - Monitor lab trends  - Patient is to report abnormal signs of bleeding to staff  - Avoid use of toothpicks and dental floss  - Use electric shaver  for shaving  - Use soft bristle tooth brush  - Limit straining and forceful nose blowing  Outcome: Progressing     Problem: MUSCULOSKELETAL - ADULT  Goal: Return mobility to safest level of function  Description: INTERVENTIONS:  - Assess patient stability and activity tolerance for standing, transferring and ambulating w/ or w/o assistive devices  - Assist with transfers and ambulation using safe patient handling equipment as needed  - Ensure adequate protection for wounds/incisions during mobilization  - Obtain PT/OT consults as needed  - Advance activity as appropriate  - Communicate ordered activity level and limitations with patient/family  Outcome: Progressing  Goal: Maintain proper alignment of affected body part  Description: INTERVENTIONS:  - Support and protect limb and body alignment per provider's orders  - Instruct and reinforce with patient and family use of appropriate assistive device and precautions (e.g. spinal or hip dislocation precautions)  Outcome: Progressing     Problem: Impaired Functional Mobility  Goal: Achieve highest/safest level of mobility/gait  Description: Interventions:  - Assess patient's functional ability and stability  - Promote increasing activity/tolerance for mobility and gait  - Educate and engage patient/family in tolerated activity level and precautions  Outcome: Progressing     Problem: SAFETY ADULT - FALL  Goal: Free from fall injury  Description: INTERVENTIONS:  - Assess pt frequently for physical needs  - Identify cognitive and physical deficits and behaviors that affect risk of falls.  - [place] fall precautions as indicated by assessment.  - Educate pt/family on patient safety including physical limitations  - Instruct pt to call for assistance with activity based on assessment  - Modify environment to reduce risk of injury  - Provide assistive devices as appropriate  - Consider OT/PT consult to assist with strengthening/mobility  - Encourage toileting  schedule  Outcome: Progressing

## 2024-05-28 NOTE — DISCHARGE SUMMARY
[facility] Hospitalist Discharge Summary     Patient ID:  Arleth Weiss  69 year old  4/26/1955    Admit date: 5/24/2024    Discharge date and time: 05/31/24     Attending Physician: Palmira Vogt*     Primary Care Physician: Viktoriya Garcias DO     Discharge Diagnoses: Acute on chronic diastolic congestive heart failure (HCC) [I50.33]    Please note that only IHP DMG and EMG patients enrolled in the Medicare ACO, BCBS ACO and [facility] HMOs will be handled by the [facility] Care Management team.  For all other patients, please follow usual protocol for discharge care transition.    Discharge Condition: stable    Disposition:  home    Important Follow up:  - PCP within 2 weeks       Follow-up Information       Viktoriya Garcias DO. Schedule an appointment as soon as possible for a visit in 1 week(s).    Specialty: Family Practice  Contact information:  [address] [facility] [address] [phone]               Jose Antonio Ryan MD. Schedule an appointment as soon as possible for a visit in 2 week(s).    Specialty: CARDIOLOGY  Contact information:  [address]  [address]  [facility] [id number] [phone]                                 Hospital Course:        69 year old female with PMH sig for chronic diastolic HF, a-fib, severe aortic stenosis s/p TAVR, CAD s/p stents, HTN, HLD, COPD, and PAD who presented to the ED for evaluation of SOB and weight gain.  She has gained about 20 lbs over 2 weeks despite taking bumex, aldactone, and metolazone.  No chest pain.  No F/C, N/V, or diarrhea.  No sick contacts.  Lives alone, uses crutches.      In the ED, BNP 1586, trop neg.  INR 2.05.  CXR suggestive of interstitial edema.  Lasix 40 mg  IV given.      # Acute on chronic diastolic HF - resolved  - suspect due to complicance   - s/p lasix 40 mg IV x 1 in ED  - cont IV lasix per cardiology - dc on 2mg po bumex BID  - strict I/O's, daily weights   - cards c/s  appreciated - OP f/u   - Echo with mobile echogenecity, LOYD confirming MV mobile echogenicity  - ID consulted, low suspicion for endocarditis as infectious w/u has been negative- clear to dc off abx, OP ID f/u  - OP cardiology f/u      # Chronic a-fib  # Secondary hypercoag state  - rate controlled   - cont diltiazem, digoxin, and metoprolol   - cont warfarin      # CAD s/p stents  - no chest pain  - cont plavix and statin     # Essential HTN  - controlled  - cont diltiazem and metoprolol      # HLD  - cont statin     # COPD  - stable  - encourage IS use     # PAD  - cont statin and plavix     # Hypothyroidism   - cont levothyroxine     Consults: IP CONSULT TO CARDIOLOGY  IP CONSULT TO HOSPITALIST  IP CONSULT TO PHARMACY  CONSULT TO WOUND OSTOMY    Operative Procedures:        Patient instructions:      I as the attending physician reconciled the current and discharge medications on day of discharge.     Current Discharge Medication List        CONTINUE these medications which have CHANGED    Details   bumetanide 2 MG Oral Tab Take 1 tablet (2 mg total) by mouth BID (Diuretic).           CONTINUE these medications which have NOT CHANGED    Details   !! warfarin 2.5 MG Oral Tab Take 1 tablet (2.5 mg total) by mouth 3 (three) times a week. Q Mon., Wed., and Saturday per pt.      empagliflozin (JARDIANCE) 10 MG Oral Tab Take 1 tablet (10 mg total) by mouth at bedtime. Per pt.      levothyroxine 175 MCG Oral Tab Take 1 tablet (175 mcg total) by mouth before breakfast.      metOLazone 2.5 MG Oral Tab Take 1 tablet (2.5 mg total) by mouth every other day. Per pt.      dilTIAZem  MG Oral Capsule SR 24 Hr Take 1 capsule (240 mg total) by mouth daily.      clopidogrel 75 MG Oral Tab Take 1 tablet (75 mg total) by mouth at bedtime. Per pt.      metoprolol succinate ER 25 MG Oral Tablet 24 Hr Take 1 tablet (25 mg total) by mouth at bedtime. Per pt.      Potassium Chloride ER 10 MEQ Oral Cap CR Take 4 capsules (40 mEq  total) by mouth at bedtime. Per pt.      rosuvastatin 5 MG Oral Tab Take 1 tablet (5 mg total) by mouth every other day.      spironolactone 25 MG Oral Tab Take 1 tablet (25 mg total) by mouth daily.      !! warfarin 5 MG Oral Tab Take 1 tablet (5 mg total) by mouth nightly. Q Tues., Thurs., Friday and Sunday per pt.      ferrous sulfate 325 (65 FE) MG Oral Tab EC Take 1 tablet (325 mg total) by mouth at bedtime. Per pt.      Vitamin C 500 MG Oral Tab Take 1 tablet (500 mg total) by mouth at bedtime. Per pt.      Zinc 50 MG Oral Cap Take 50 mg by mouth at bedtime. Per pt.      Cholecalciferol 125 MCG (5000 UT) Oral Tab Take 1 tablet (5,000 Units total) by mouth at bedtime. Per pt.      acetaminophen 325 MG Oral Tab Take 2 tablets (650 mg total) by mouth every 8 (eight) hours as needed for Pain.      buPROPion  MG Oral Tablet 12 Hr Take 1 tablet (150 mg total) by mouth 2 (two) times daily.      cyproheptadine 4 MG Oral Tab Take 1 tablet (4 mg total) by mouth nightly.      FLUoxetine 20 MG Oral Cap Take 1 capsule (20 mg total) by mouth 2 (two) times daily.      triamcinolone 0.1 % External Ointment Apply 1 Application topically daily as needed (Neuro dermatitis per pt.).      digoxin 0.125 MG Oral Tab Take 1 tablet (125 mcg total) by mouth daily.      allopurinol 100 MG Oral Tab Take 1 tablet (100 mg total) by mouth daily.      Nortriptyline HCl 25 MG Oral Cap Take 1 capsule (25 mg total) by mouth nightly.      Multiple Vitamin (MULTIVITAMIN OR) Take 1 tablet by mouth at bedtime. Per pt.      calcium carbonate 500 MG Oral Chew Tab Chew 1 tablet (500 mg total) by mouth every 8 (eight) hours as needed (upset stomach).       !! - Potential duplicate medications found. Please discuss with provider.          Activity: activity as tolerated  Diet: regular diet  Wound Care: as directed  Code Status: Full Code      Discharge Exam:     General: no acute distress, alert and oriented x 3  Heart: RRR  Lungs: clear  bilaterally, no active wheezing  Abdomen: nontender, nondistended, intact BS  Extremities: no pedal edema   Neuro: CN inact, no focal deficits      Total time coordinating care for discharge: Greater than 30 minutes    Tita Vogt MD  [facility]

## 2024-05-28 NOTE — PROGRESS NOTES
[facility]  Progress Note    Arleth Weiss Patient Status:  Inpatient    1955 MRN [id number]   Location [facility] 2NE-A Attending Sanjiv Zuniga DO   Hosp Day # 4 PCP Viktoriya Garcias DO     Subjective:   Feeling better.  Some positional lightheadedness.  No chest pain.  No shortness of breath at rest.  No focal cardiovascular complaints reported.    Objective:  Vitals:    24 0946 24 1225 24 1700 24 1803   BP:  109/57 133/80 132/83   BP Location:  Radial Radial    Pulse: 75 95 82 117   Resp:  20 18    Temp:  98.4 °F (36.9 °C) 97.9 °F (36.6 °C)    TempSrc:  Oral Oral    SpO2: 96% 97% 98% 91%   Weight:       Height:           Temp (24hrs), Av °F (36.7 °C), Min:97.6 °F (36.4 °C), Max:98.4 °F (36.9 °C)      Medications:   Scheduled:    warfarin  5 mg Oral Once at night    bumetanide  2 mg Oral BID (Diuretic)    cholecalciferol  5,000 Units Oral Nightly    cyproheptadine  4 mg Oral Nightly    ferrous sulfate  325 mg Oral Nightly    Vitamin C  500 mg Oral Nightly    allopurinol  100 mg Oral Nightly    buPROPion SR  150 mg Oral BID    clopidogrel  75 mg Oral Nightly    digoxin  125 mcg Oral Nightly    dilTIAZem ER  240 mg Oral Nightly    FLUoxetine  20 mg Oral BID    levothyroxine  175 mcg Oral Daily @ 0700    metoprolol succinate ER  25 mg Oral Nightly    nortriptyline  25 mg Oral Nightly    rosuvastatin  5 mg Oral QOD       Continuous Infusion:       PRN Medications:     calcium carbonate    triamcinolone    ondansetron    acetaminophen    Intake/Output:     Intake/Output Summary (Last 24 hours) at 2024 1822  Last data filed at 2024 1803  Gross per 24 hour   Intake 720 ml   Output 1595 ml   Net -875 ml       Wt Readings from Last 3 Encounters:   24 213 lb 10 oz (96.9 kg)   24 212 lb 4.9 oz (96.3 kg)   23 219 lb (99.3 kg)       Allergies:  Allergies   Allergen Reactions    Adhesive Tape HIVES     ALL ADHESIVES    Codeine HIVES    Doxycycline  SWELLING    Hydrocodone UNKNOWN    Lisinopril TONGUE SWELLING    Metoprolol HIVES    Morphine Sulfate HIVES    Opioid Analgesics UNKNOWN    Oxycontin ITCHING    Oxytocin HIVES    Vicodin [Hydrocodone-Acetaminophen] ITCHING    Skin Adhesives OTHER (SEE COMMENTS)       Physical Exam:   General:  Well-developed / Well-nourished.  No acute distress.  HEENT:  Normocephalic.  Atraumatic.  No icterus.  Neck:  There is no jugular venous distention.   Cardiovascular:  Cardiovascular examination demonstrates an irregular rate and rhythm.  There is normal S1, S2.  There is no S3 or S4.  There are no rubs or gallops.  Grade 1-2/6 SEMLSB.  No click is appreciated.  PMI is nondisplaced with a normal apical impulse.    Pulmonary:  Lungs are clear to auscultation bilaterally.  There are no focal rales, rhonchi, or wheezes.  Good air movement is noted throughout both lung fields.   Abdomen:  The abdomen is soft, non-distended, and non-tender.  Bowel sounds are present and normoactive.  No organomegaly is appreciated.  Extremities:  Extremities demonstrate trace peripheral edema.   No cyanosis or clubbing of the digits is appreciated.  Neurologic:  Alert and oriented.  Normal affect.  Integument:  No visible rashes are appreciated.      Laboratory/Data:   Recent Labs   Lab 05/24/24 1751 05/25/24  0705 05/26/24  0748 05/27/24  0738 05/28/24  0714   *   < > 122* 114* 124*   BUN 17   < > 21 26* 32*   CREATSERUM 0.90   < > 0.83 0.87 1.08*   CA 8.9   < > 9.2 9.4 9.2   ALB 3.4  --   --   --   --       < > 140 138 138   K 4.1   < > 4.2 3.7 3.5  3.5      < > 105 100 101   CO2 29.0   < > 33.0* 32.0 32.0   ALKPHO 135  --   --   --   --    AST 47*  --   --   --   --    ALT 36  --   --   --   --    BILT 0.7  --   --   --   --    TP 8.0  --   --   --   --     < > = values in this interval not displayed.       Recent Labs   Lab 05/24/24 1751 05/25/24  0705 05/26/24  0748 05/27/24  0738 05/28/24  0714   RBC 4.24   < > 4.18  4.62 4.76   HGB 13.7   < > 13.4 14.6 15.3   HCT 40.7   < > 41.6 45.4 46.1   MCV 96.0   < > 99.5 98.3 96.8   MCH 32.3   < > 32.1 31.6 32.1   MCHC 33.7   < > 32.2 32.2 33.2   RDW 13.7   < > 13.6 13.2 13.2   NEPRELIM 6.41  --   --   --   --    WBC 8.7   < > 6.9 8.3 8.7   .0   < > 261.0 279.0 304.0    < > = values in this interval not displayed.       Recent Labs   Lab 05/26/24  0748 05/27/24  0738 05/28/24  0714   PTP 23.7* 21.1* 21.1*   INR 2.09* 1.80* 1.80*       No results for input(s): \"TROP\", \"CK\" in the last 168 hours.      Tele: AF - rate controlled    Diagnostics:    Echo:  Conclusions:     1. Left ventricle: The cavity size was normal. Wall thickness was normal.      Systolic function was mildly reduced. The estimated ejection fraction was      45-50%, by visual assessment. Unable to assess LV diastolic function due      to heart rhythm.   2. Left atrium: The left atrial volume was moderately increased.   3. Aortic valve: Elizondo MINOR S3 23mm (TAVR) bioprosthesis was present.      There was no significant regurgitation. The peak systolic velocity was      2.28m/sec. The mean systolic gradient was 10mm Hg. The valve area (VTI)      was 1.38cm^2. The valve area (VTI) index was 0.65cm^2/m^2.   4. Mitral valve: The annulus was moderately to markedly calcified. The      leaflets were moderately calcified. Mobile echogenic structure visualized      on the posterior leaflet versus [place] (image 15). The mean diastolic      gradient was 3mm Hg at 65 bpm.   5. Pulmonary arteries: Systolic pressure was at the upper limits of normal,      in the range of 30mm Hg to 35mm Hg.   Impressions:  Moble echogenic structure of the posteior mitral valve whch   likely represents calcifified chordae/mitral valve annulus. However,   endocarditis can not be ruled out. Would reccommend clinical correlation. No   images to compare with     Assessment:     Acute on chronic HFrEF  -Pt modifies her Rx independently.  Suspect  decompensation reflects compliance issues   Aortic stenosis s/p TAVR    AFib  -Rate controlled  -INR therapeutic  4.    HTN  5.    HL  6.    Hypothyroidism  7.    COPD  8.    Thyroid CA s/p RsSxn                -On supplementation  9.    H/O PE                -S/P IVC filter  10.  PVD  -S/P PTA LLE   11.  Fibromyalgia         12.  CAD  -S/P stent 3/21     13.  Mild LV systolic dysfunction   -EF 45%   -Stable since 7/23  14.  Echo with mobile echogenicity near abn MV.  DDx includes vegetation, thrombus, fibrin.  No Sx of infectious process at present.    Plan:    Coumadin   Plavix at this time   Hold Bumex - bit dry   Daily weights   F/U BMP / INR.     Tele monitor   Statin   Check orthostatics   Blood Cx   ESR / CRP   Discussed LOYD - pt defers and will monitor clinically with sequential TTE if above unremarkable.    Jose Antonio Ryan MD  5/28/2024

## 2024-05-28 NOTE — CONSULTS
[facility]  Report of Inpatient Wound Care Consultation    Arleth Wesis Patient Status:  Inpatient    1955 MRN [id number]   Location [facility] 2NE-A Attending Palmira Vogt*   Hosp Day # 4 PCP Viktoriya Garcias DO     Reason for Consultation:  Lower extremity wounds    History of Present Illness:  Arleth Weiss is a a(n) 69 year old female. Patient presents with left lower extremity wounds,hx of venous ulcers.Denies pain in the wounds at this time.    History:  Past Medical History:    Aortic stenosis    S/P TAVR    ASTHMA    Asthma (HCC)    Back problem    CANCER    thyroid    Cancer (HCC)    thyroid     COPD (chronic obstructive pulmonary disease) (HCC)    DEPRESSION    Disorder of thyroid    Essential hypertension    Fibromyalgia    High blood pressure    High cholesterol    HYPERTENSION    HYPOTHYROIDISM    Muscle weakness    OBESITY    OSTEOARTHRITIS    Osteoarthritis    OTHER DISEASES    Fibromyalgia    OTHER DISEASES    Pulmonary emboli    OTHER DISEASES    Lymph edema    OTHER DISEASES    Pulmonary hypertension    Peripheral vascular disease (HCC)    Pulmonary embolism (HCC)    Shortness of breath    ON EXERTION    SLEEP APNEA    Sleep apnea    CPAP     Past Surgical History:   Procedure Laterality Date    Anesth,shoulder replacement Right     hemiathroplasty    Back surgery      Back surgery      complete c-3 -7 ectomy    Biopsy Left 2023    TEMPORAL ARTERY    Cath transcatheter aortic valve replacement      Colonoscopy N/A 05/10/2018    Procedure: COLONOSCOPY, POSSIBLE BIOPSY, POSSIBLE POLYPECTOMY 47844;  Surgeon: Kendell Valentin MD;  Location: [facility]    Colonoscopy      D & c  2009    Dilation/curettage,diagnostic      Hip replacement surgery  2009    Rt hip    Knee replacement surgery      Both knees    Other surgical history      Thyroid removal    Other surgical history      LT foot spur removal    Total hip replacement       Total knee replacement        reports that she quit smoking about 33 years ago. Her smoking use included cigarettes. She started smoking about 63 years ago. She has a 15 pack-year smoking history. She has never used smokeless tobacco. She reports that she does not drink alcohol and does not use drugs.    Allergies:  @ALLERGY    Laboratory Data:  Lab Results   Component Value Date    WBC 8.7 05/28/2024    HGB 15.3 05/28/2024    HCT 46.1 05/28/2024    .0 05/28/2024    CREATSERUM 1.08 05/28/2024    BUN 32 05/28/2024     05/28/2024    K 3.5 05/28/2024    K 3.5 05/28/2024     05/28/2024    CO2 32.0 05/28/2024     05/28/2024    CA 9.2 05/28/2024    INR 1.80 05/28/2024    PTP 21.1 05/28/2024    MG 2.4 05/28/2024         Impression:  Wound 05/24/24 Leg Left;Lateral (Active)   Date First Assessed/Time First Assessed: 05/24/24 2331   Primary Wound Type: Venous Ulcer  Location: Leg  Wound Location Orientation: Left;Lateral      Assessments 5/28/2024 12:01 PM   Wound Image     Drainage Amount Scant   Drainage Description Serosanguineous   Wound Length (cm) 0.1 cm   Wound Width (cm) 0.1 cm   Wound Surface Area (cm^2) 0.01 cm^2   Wound Depth (cm) 0 cm   Wound Volume (cm^3) 0 cm^3   Margins Flat and Intact   Non-staged Wound Description Partial thickness   Mira-wound Assessment Dry;Hyperpigmented;Edema   Wound Granulation Tissue Pink;Firm   Wound Odor None   Shape 100 % pink non granular tissue       Wound 05/24/24 Leg Left;Anterior (Active)   Date First Assessed/Time First Assessed: 05/24/24 2332   Primary Wound Type: Venous Ulcer  Location: Leg  Wound Location Orientation: Left;Anterior      Assessments 5/28/2024 11:59 AM   Wound Image     Drainage Amount Small   Drainage Description Serosanguineous   Wound Length (cm) 2.5 cm   Wound Width (cm) 1.5 cm   Wound Surface Area (cm^2) 3.75 cm^2   Wound Depth (cm) 0.1 cm   Wound Volume (cm^3) 0.375 cm^3   Margins Well-defined edges   Non-staged Wound  Description Full thickness   Mira-wound Assessment Dry;Edema;Hyperpigmented   Wound Granulation Tissue Red;Pink;Firm   Wound Bed Granulation (%) 100 %   Wound Odor None       Wound 05/24/24 Knee Anterior;Left (Active)   Date First Assessed/Time First Assessed: 05/24/24 4103   Primary Wound Type: Edema  Location: Knee  Wound Location Orientation: Anterior;Left      Assessments 5/28/2024 11:57 AM   Wound Image     Drainage Amount None   Margins Attached edges   Mira-wound Assessment Dry;Edema   Wound Odor None   Shape Scabs   Tunneling? No   Undermining? No   Sinus Tracts? No   Local pulse :faint, pedal pulses  Capillary refill< 3 seconds  Temperature : within normal limits      Wound Cleaning and Dressings:  1.Left anterior lower leg  Showering directions: May shower with protection- use cast cover or shower boot at home   Wound cleansing:  Cleanse with saline  Wound product: Silver foam/silver bordered foam  Dressing change frequency:  Change dressing every other day and/or as needed    2.Left lateral lower leg  Showering directions: May shower with protection- use cast cover or shower boot at home   Wound cleansing:  Cleanse with saline  Wound product: bordered foam  Dressing change frequency:  Change dressing every other day and/or as needed      Compression Therapy: Pt states she has compression stockings and velcro compression wraps at home    Spandagrip and Elevate leg(s) as much as possible- apply spandagrip E at the base of the toes to below the knee area    Left ankle marker  11: 19.4 cm  Left calf marker 32: 31 cm        Miscellaneous/Additional Orders:  Offloading: turn every 2 hours and as needed as tolerated to prevent skin breakdown    Miscellaneous orders: Increase dietary protein intake to promote healing.Dietitian/Attending physician to evaluate amount of protein intake/supplements    Care Summary: Discussed Plan of Care at beside with patient. Patient verbally acknowledges understanding of all  instructions and all questions were answered.      Recommendations:  Continue home health care if discharged to home    Thank you for allowing me to participate in the care of your patient.  Time Spent 30 minutes , Thank you.    Massiel Gill RN, BSN [facility]   Wound Care pager [phone]  5/28/2024  12:51 PM

## 2024-05-28 NOTE — DISCHARGE INSTRUCTIONS
Resume [facility] @ discharge  Phone    Fax  [phone]     Going Home Instructions    In this section you will find the tools which will guide you through the first few days after you leave the hospital. Continued use of these tools will help you develop the skills necessary to keep your heart failure under control.       Home Care Instructions Following Heart Failure - the most important things to do every day include:     Weigh yourself  Take your medicines as prescribed  Limit your sodium (salt) and fluid intake  Know when to call your cardiologist, primary doctor, or nurse  Know when to seek emergency care    Things for You to Remember:   1. See your doctor or healthcare provider.  It is important that you attend this appointment to make sure your symptoms are under control.     2. Your recommended sodium intake is 7587-5152 mg daily    3. Limit your fluid intake to no more than 2 liters or 64 ounces per day    4. Some exercise and activity is important to help keep your heart functioning and strong. Unless instructed not to exercise, you may walk at a slow to moderate pace for 10-15 minutes 2-3 days per week to start. Pace your activity to prevent shortness of breath or fatigue. Stop exercise if you develop chest pain, lightheadedness, or significant shortness of breath.       Call Your Cardiologist If:   You gain 2 pounds overnight or 3-4 pounds in 3-5 days  You have more difficulty breathing  You are getting more tired with normal activity  You are more short of breath lying down, or awaken at night short of breath  You have swelling of your feet or legs  You urinate less often during the day and more often at night  You have cramps in your legs  You have blurred vision or see yellowish-green halos around objects of lights    Go to the Emergency Room If:   You have pain or tightness in your chest  You are extremely short of breath  You are coughing up pink-frothy mucus  You are  traveling and develop symptoms of worsening heart failure        Wound Cleaning and Dressings:  1.Left anterior lower leg  Showering directions: May shower with protection- use cast cover or shower boot at home   Wound cleansing:  Cleanse with saline  Wound product: Silver foam/silver bordered foam  Dressing change frequency:  Change dressing every other day and/or as needed     2.Left lateral lower leg  Showering directions: May shower with protection- use cast cover or shower boot at home   Wound cleansing:  Cleanse with saline  Wound product: bordered foam  Dressing change frequency:  Change dressing every other day and/or as needed

## 2024-05-28 NOTE — PLAN OF CARE
Assumed patient care at 1930. Patient is alert and oriented x 4. Maintaining O2 saturation NWL on room air, pt unable to wear the CPAP mask at night. Afib on tele monitor. External cath in place. Ambulating with crutches. Safety precautions in place, call light within reach, bed alarm. Patient aware of plan of care. All needs at this time.       Problem: Diabetes/Glucose Control  Goal: Glucose maintained within prescribed range  Description: INTERVENTIONS:  - Monitor Blood Glucose as ordered  - Assess for signs and symptoms of hyperglycemia and hypoglycemia  - Administer ordered medications to maintain glucose within target range  - Assess barriers to adequate nutritional intake and initiate nutrition consult as needed  - Instruct patient on self management of diabetes  Outcome: Progressing     Problem: Patient/Family Goals  Goal: Patient/Family Long Term Goal  Description: Patient's Long Term Goal:discharge    Interventions:  -cardiology consult and follow up -orders ,labs, v/s and procedures ,telemetry monitoring, Activity as ordered meds compliance o2 support  if necessary , CPAP adjustment, iv diuretics ,update plan of care, ,daily weight , strict intake and output   - See additional Care Plan goals for specific interventions  Outcome: Progressing  Goal: Patient/Family Short Term Goal  Description: Patient's Short Term Goal: breath comfortable , safety    Interventions:   - PAIN MGT , BREATH COMFORTABLE WITH CPAP / O2 SUPPORT , Adhere to medication regimen, follow up appointments after discharge, ,education to weight control, cardiac life style, heart healthy diet   - See additional Care Plan goals for specific interventions  Outcome: Progressing     Problem: CARDIOVASCULAR - ADULT  Goal: Absence of cardiac arrhythmias or at baseline  Description: INTERVENTIONS:  - Continuous cardiac monitoring, monitor vital signs, obtain 12 lead EKG if indicated  - Evaluate effectiveness of antiarrhythmic and heart rate  control medications as ordered  - Initiate emergency measures for life threatening arrhythmias  - Monitor electrolytes and administer replacement therapy as ordered  Outcome: Progressing  Goal: Maintains optimal cardiac output and hemodynamic stability  Description: INTERVENTIONS:  - Monitor vital signs, rhythm, and trends  - Monitor for bleeding, hypotension and signs of decreased cardiac output  - Evaluate effectiveness of vasoactive medications to optimize hemodynamic stability  - Monitor arterial and/or venous puncture sites for bleeding and/or hematoma  - Assess quality of pulses, skin color and temperature  - Assess for signs of decreased coronary artery perfusion - ex. Angina  - Evaluate fluid balance, assess for edema, trend weights  Outcome: Progressing

## 2024-05-29 LAB
ANION GAP SERPL CALC-SCNC: 6 MMOL/L (ref 0–18)
ANION GAP SERPL CALC-SCNC: 9 MMOL/L (ref 0–18)
APTT PPP: 35.9 SECONDS (ref 23–36)
APTT PPP: 73.4 SECONDS (ref 23–36)
BASOPHILS # BLD AUTO: 0.03 X10(3) UL (ref 0–0.2)
BASOPHILS NFR BLD AUTO: 0.3 %
BUN BLD-MCNC: 34 MG/DL (ref 9–23)
BUN BLD-MCNC: 36 MG/DL (ref 9–23)
CALCIUM BLD-MCNC: 9.5 MG/DL (ref 8.5–10.1)
CALCIUM BLD-MCNC: 9.6 MG/DL (ref 8.5–10.1)
CHLORIDE SERPL-SCNC: 100 MMOL/L (ref 98–112)
CHLORIDE SERPL-SCNC: 99 MMOL/L (ref 98–112)
CO2 SERPL-SCNC: 29 MMOL/L (ref 21–32)
CO2 SERPL-SCNC: 30 MMOL/L (ref 21–32)
CREAT BLD-MCNC: 0.92 MG/DL
CREAT BLD-MCNC: 0.97 MG/DL
CRP SERPL-MCNC: 0.37 MG/DL (ref ?–0.3)
EGFRCR SERPLBLD CKD-EPI 2021: 62 ML/MIN/1.73M2 (ref 60–?)
EGFRCR SERPLBLD CKD-EPI 2021: 67 ML/MIN/1.73M2 (ref 60–?)
EOSINOPHIL # BLD AUTO: 0.16 X10(3) UL (ref 0–0.7)
EOSINOPHIL NFR BLD AUTO: 1.4 %
ERYTHROCYTE [DISTWIDTH] IN BLOOD BY AUTOMATED COUNT: 13.2 %
ERYTHROCYTE [DISTWIDTH] IN BLOOD BY AUTOMATED COUNT: 13.2 %
ERYTHROCYTE [SEDIMENTATION RATE] IN BLOOD: 72 MM/HR
GLUCOSE BLD-MCNC: 118 MG/DL (ref 70–99)
GLUCOSE BLD-MCNC: 128 MG/DL (ref 70–99)
HCT VFR BLD AUTO: 48.2 %
HCT VFR BLD AUTO: 48.4 %
HGB BLD-MCNC: 15.8 G/DL
HGB BLD-MCNC: 16.4 G/DL
IMM GRANULOCYTES # BLD AUTO: 0.09 X10(3) UL (ref 0–1)
IMM GRANULOCYTES NFR BLD: 0.8 %
INR BLD: 1.84 (ref 0.8–1.2)
LYMPHOCYTES # BLD AUTO: 1.39 X10(3) UL (ref 1–4)
LYMPHOCYTES NFR BLD AUTO: 12.4 %
MAGNESIUM SERPL-MCNC: 2.5 MG/DL (ref 1.6–2.6)
MCH RBC QN AUTO: 31.9 PG (ref 26–34)
MCH RBC QN AUTO: 32 PG (ref 26–34)
MCHC RBC AUTO-ENTMCNC: 32.8 G/DL (ref 31–37)
MCHC RBC AUTO-ENTMCNC: 33.9 G/DL (ref 31–37)
MCV RBC AUTO: 94.5 FL
MCV RBC AUTO: 97.4 FL
MONOCYTES # BLD AUTO: 1.02 X10(3) UL (ref 0.1–1)
MONOCYTES NFR BLD AUTO: 9.1 %
NEUTROPHILS # BLD AUTO: 8.48 X10 (3) UL (ref 1.5–7.7)
NEUTROPHILS # BLD AUTO: 8.48 X10(3) UL (ref 1.5–7.7)
NEUTROPHILS NFR BLD AUTO: 76 %
OSMOLALITY SERPL CALC.SUM OF ELEC: 289 MOSM/KG (ref 275–295)
OSMOLALITY SERPL CALC.SUM OF ELEC: 296 MOSM/KG (ref 275–295)
PLATELET # BLD AUTO: 303 10(3)UL (ref 150–450)
PLATELET # BLD AUTO: 307 10(3)UL (ref 150–450)
POTASSIUM SERPL-SCNC: 3.5 MMOL/L (ref 3.5–5.1)
POTASSIUM SERPL-SCNC: 3.6 MMOL/L (ref 3.5–5.1)
POTASSIUM SERPL-SCNC: 4.1 MMOL/L (ref 3.5–5.1)
PROTHROMBIN TIME: 21.4 SECONDS (ref 11.6–14.8)
RBC # BLD AUTO: 4.95 X10(6)UL
RBC # BLD AUTO: 5.12 X10(6)UL
SODIUM SERPL-SCNC: 135 MMOL/L (ref 136–145)
SODIUM SERPL-SCNC: 138 MMOL/L (ref 136–145)
WBC # BLD AUTO: 11.2 X10(3) UL (ref 4–11)
WBC # BLD AUTO: 13.4 X10(3) UL (ref 4–11)

## 2024-05-29 PROCEDURE — 83735 ASSAY OF MAGNESIUM: CPT | Performed by: HOSPITALIST

## 2024-05-29 PROCEDURE — 85652 RBC SED RATE AUTOMATED: CPT | Performed by: INTERNAL MEDICINE

## 2024-05-29 PROCEDURE — 94799 UNLISTED PULMONARY SVC/PX: CPT

## 2024-05-29 PROCEDURE — 80048 BASIC METABOLIC PNL TOTAL CA: CPT | Performed by: HOSPITALIST

## 2024-05-29 PROCEDURE — 80048 BASIC METABOLIC PNL TOTAL CA: CPT | Performed by: INTERNAL MEDICINE

## 2024-05-29 PROCEDURE — 85025 COMPLETE CBC W/AUTO DIFF WBC: CPT | Performed by: INTERNAL MEDICINE

## 2024-05-29 PROCEDURE — 86140 C-REACTIVE PROTEIN: CPT | Performed by: INTERNAL MEDICINE

## 2024-05-29 PROCEDURE — 85610 PROTHROMBIN TIME: CPT | Performed by: HOSPITALIST

## 2024-05-29 PROCEDURE — 84132 ASSAY OF SERUM POTASSIUM: CPT | Performed by: HOSPITALIST

## 2024-05-29 PROCEDURE — 85027 COMPLETE CBC AUTOMATED: CPT | Performed by: INTERNAL MEDICINE

## 2024-05-29 PROCEDURE — 85730 THROMBOPLASTIN TIME PARTIAL: CPT | Performed by: INTERNAL MEDICINE

## 2024-05-29 RX ORDER — HEPARIN SODIUM 1000 [USP'U]/ML
5000 INJECTION, SOLUTION INTRAVENOUS; SUBCUTANEOUS ONCE
Status: COMPLETED | OUTPATIENT
Start: 2024-05-29 | End: 2024-05-29

## 2024-05-29 RX ORDER — SODIUM CHLORIDE 9 MG/ML
INJECTION, SOLUTION INTRAVENOUS CONTINUOUS
Status: DISCONTINUED | OUTPATIENT
Start: 2024-05-29 | End: 2024-05-30

## 2024-05-29 RX ORDER — HEPARIN SODIUM AND DEXTROSE 10000; 5 [USP'U]/100ML; G/100ML
1000 INJECTION INTRAVENOUS ONCE
Status: COMPLETED | OUTPATIENT
Start: 2024-05-29 | End: 2024-05-29

## 2024-05-29 RX ORDER — HEPARIN SODIUM AND DEXTROSE 10000; 5 [USP'U]/100ML; G/100ML
INJECTION INTRAVENOUS CONTINUOUS
Status: DISCONTINUED | OUTPATIENT
Start: 2024-05-29 | End: 2024-05-30

## 2024-05-29 RX ORDER — POTASSIUM CHLORIDE 750 MG/1
40 TABLET, EXTENDED RELEASE ORAL EVERY 4 HOURS
Status: COMPLETED | OUTPATIENT
Start: 2024-05-29 | End: 2024-05-29

## 2024-05-29 NOTE — PROGRESS NOTES
[facility]   part of [facility] Hospitalist Progress Note     Arleth Weiss Patient Status:  Inpatient    1955 MRN [id number]   Location [facility] 2NE-A Attending Sanjiv Zuniga,    Hosp Day # 5 PCP Viktoriya Garcias DO     Follow Up:  The encounter diagnosis was Acute on chronic diastolic congestive heart failure (HCC).    Subjective:     Patient seen and examined.  States she is feeling better, LE's less swollen.  No F/C, N/V.    Objective:    Review of Systems:   10 point ROS completed and was negative, except for pertinent positive and negatives stated in subjective.    Vital signs:  Temp:  [97.4 °F (36.3 °C)-98.4 °F (36.9 °C)] 98.2 °F (36.8 °C)  Pulse:  [] 68  Resp:  [16-20] 18  BP: (101-145)/(57-93) 109/93  SpO2:  [91 %-100 %] 97 %    Physical Exam:    Gen: No acute distress, alert and oriented x3, no focal neurologic deficits  HEENT:  EOMI, PERRLA, OP clear, MMM  Pulm: +bibasilar crackles, normal respiratory effort  CV:  Tachy, irregular, no murmur.  Normal PMI.    Abd: Abdomen soft, nontender, nondistended, no organomegaly, bowel sounds present  MSK: Full range of motion in extremities, no clubbing, no cyanosis. 1-2+ LE edema improving   Skin: no rashes or lesions  Neuro:  Grossly intact, no focal deficits         Diagnostic Data:    Labs:  Recent Labs   Lab 24  0705 24  0748 24  0738 24  0714 24  0605 24  1049   WBC 7.8 6.9 8.3 8.7 11.2* 13.4*   HGB 13.4 13.4 14.6 15.3 16.4* 15.8   MCV 98.1 99.5 98.3 96.8 94.5 97.4   .0 261.0 279.0 304.0 307.0 303.0   INR 2.22* 2.09* 1.80* 1.80* 1.84*  --        Recent Labs   Lab 24  1751 24  0705 24  0714 24  2101 24  0605 24  1049   *   < > 124*  --  128* 118*   BUN 17   < > 32*  --  36* 34*   CREATSERUM 0.90   < > 1.08*  --  0.97 0.92   CA 8.9   < > 9.2  --  9.6 9.5   ALB 3.4  --   --   --   --   --       < > 138  --  138  135*   K 4.1   < > 3.5  3.5 4.0 3.5 3.6      < > 101  --  99 100   CO2 29.0   < > 32.0  --  30.0 29.0   ALKPHO 135  --   --   --   --   --    AST 47*  --   --   --   --   --    ALT 36  --   --   --   --   --    BILT 0.7  --   --   --   --   --    TP 8.0  --   --   --   --   --     < > = values in this interval not displayed.       Estimated Creatinine Clearance: 58.6 mL/min (based on SCr of 0.92 mg/dL).    Recent Labs   Lab 05/27/24  0738 05/28/24  0714 05/29/24  0605   PTP 21.1* 21.1* 21.4*   INR 1.80* 1.80* 1.84*            COVID-19 Lab Results    COVID-19  Lab Results   Component Value Date    COVID19 Not Detected 01/03/2024    COVID19 Not Detected 01/02/2024    COVID19 Not Detected 02/10/2023       Pro-Calcitonin  No results for input(s): \"PCT\" in the last 168 hours.    Cardiac  Recent Labs   Lab 05/24/24  1751   PBNP 1,586*       Creatinine Kinase  No results for input(s): \"CK\" in the last 168 hours.    Inflammatory Markers  Recent Labs   Lab 05/29/24  0605   CRP 0.37*       Imaging: Imaging data reviewed in Epic.    Medications:    potassium chloride  40 mEq Oral Q4H    warfarin  7.5 mg Oral Once at night    [Held by provider] bumetanide  2 mg Oral BID (Diuretic)    cholecalciferol  5,000 Units Oral Nightly    cyproheptadine  4 mg Oral Nightly    ferrous sulfate  325 mg Oral Nightly    Vitamin C  500 mg Oral Nightly    allopurinol  100 mg Oral Nightly    buPROPion SR  150 mg Oral BID    clopidogrel  75 mg Oral Nightly    digoxin  125 mcg Oral Nightly    dilTIAZem ER  240 mg Oral Nightly    FLUoxetine  20 mg Oral BID    levothyroxine  175 mcg Oral Daily @ 0700    metoprolol succinate ER  25 mg Oral Nightly    nortriptyline  25 mg Oral Nightly    rosuvastatin  5 mg Oral QOD       Assessment & Plan:      69 yr old female with PMH sig for chronic diastolic HF, a-fib, severe aortic stenosis s/p TAVR, CAD s/p stents, HTN, HLD, COPD, and PAD who presented to the ED for evaluation of SOB and weight gain.     #  Acute on chronic diastolic HF - resolved  - suspect due to complicance   - s/p lasix 40 mg IV x 1 in ED  - cont IV lasix per cardiology - dc on 2mg po bumex BID  - strict I/O's, daily weights   - cards c/s appreciated - OP f/u   - Echo results pending     # Chronic a-fib  # Secondary hypercoag state  - rate controlled   - cont diltiazem, digoxin, and metoprolol   - cont warfarin      # CAD s/p stents  - no chest pain  - cont plavix and statin     # Essential HTN  - controlled  - cont diltiazem and metoprolol      # HLD  - cont statin     # COPD  - stable  - encourage IS use     # PAD  - cont statin and plavix     # Hypothyroidism   - cont levothyroxine         Plan of care: inpt care.      Plan of care discussed with patient or family at bedside.    Tita Vogt MD     Supplementary Documentation:     Quality:  DVT Prophylaxis: warfarin   CODE status: FULL  Calvin: no  Central line: no  If COVID testing is negative, may discontinue isolation: yes     Estimated date of discharge: TBD  Discharge is dependent on: clinical course   At this point Ms. Weiss is expected to be discharge to: home    Plan of care discussed with pt

## 2024-05-29 NOTE — PROGRESS NOTES
Orthostatic Blood pressure, patient denied symptoms     05/29/24 1120 05/29/24 1122 05/29/24 1130   Vital Signs   Temp 98.2 °F (36.8 °C)  --   --    Temp src Oral  --   --    Pulse 90 90 68   Heart Rate Source Monitor Monitor Monitor   Resp 18 18 18   Respiratory Quality Normal Normal Normal   /83 114/74 (!) 109/93   MAP (mmHg) 95 87 (!) 101   BP Location Right arm Right arm Right arm   BP Method Automatic Automatic Automatic   Patient Position Lying Sitting Sitting

## 2024-05-29 NOTE — PROGRESS NOTES
[facility]  Progress Note    Arleth Weiss Patient Status:  Inpatient    1955 MRN [id number]   Location [facility] 2NE-A Attending Sanjiv Zuniga,    Hosp Day # 5 PCP Viktoriya Garcias DO     Subjective:   Feeling better.  Positional lightheadedness has improved.  No chest pain.  No shortness of breath at rest.  No focal cardiovascular complaints reported.    Objective:  Vitals:    24 2300 24 0530 24 0608 24 0800   BP: 125/76 145/69  131/74   BP Location: Right arm Left arm  Right arm   Pulse: 91 63 80 74   Resp: 18 18  18   Temp: 97.8 °F (36.6 °C) 97.8 °F (36.6 °C)  97.4 °F (36.3 °C)   TempSrc: Oral Oral  Oral   SpO2: 95% 99% 99% 100%   Weight:   210 lb 8 oz (95.5 kg)    Height:           Temp (24hrs), Av.9 °F (36.6 °C), Min:97.4 °F (36.3 °C), Max:98.4 °F (36.9 °C)      Medications:   Scheduled:    potassium chloride  40 mEq Oral Q4H    [Held by provider] bumetanide  2 mg Oral BID (Diuretic)    cholecalciferol  5,000 Units Oral Nightly    cyproheptadine  4 mg Oral Nightly    ferrous sulfate  325 mg Oral Nightly    Vitamin C  500 mg Oral Nightly    allopurinol  100 mg Oral Nightly    buPROPion SR  150 mg Oral BID    clopidogrel  75 mg Oral Nightly    digoxin  125 mcg Oral Nightly    dilTIAZem ER  240 mg Oral Nightly    FLUoxetine  20 mg Oral BID    levothyroxine  175 mcg Oral Daily @ 0700    metoprolol succinate ER  25 mg Oral Nightly    nortriptyline  25 mg Oral Nightly    rosuvastatin  5 mg Oral QOD       Continuous Infusion:       PRN Medications:     calcium carbonate    triamcinolone    ondansetron    acetaminophen    Intake/Output:     Intake/Output Summary (Last 24 hours) at 2024 1001  Last data filed at 2024 0800  Gross per 24 hour   Intake 680 ml   Output 995 ml   Net -315 ml       Wt Readings from Last 3 Encounters:   24 210 lb 8 oz (95.5 kg)   24 212 lb 4.9 oz (96.3 kg)   23 219 lb (99.3 kg)       Allergies:  Allergies   Allergen  Reactions    Adhesive Tape HIVES     ALL ADHESIVES    Codeine HIVES    Doxycycline SWELLING    Hydrocodone UNKNOWN    Lisinopril TONGUE SWELLING    Metoprolol HIVES    Morphine Sulfate HIVES    Opioid Analgesics UNKNOWN    Oxycontin ITCHING    Oxytocin HIVES    Vicodin [Hydrocodone-Acetaminophen] ITCHING    Skin Adhesives OTHER (SEE COMMENTS)       Physical Exam:   General:  Well-developed / Well-nourished.  No acute distress.  HEENT:  Normocephalic.  Atraumatic.  No icterus.  Neck:  There is no jugular venous distention.   Cardiovascular:  Cardiovascular examination demonstrates an irregular rate and rhythm.  There is normal S1, S2.  There is no S3 or S4.  There are no rubs or gallops.  Grade 1-2/6 SEMLSB.  No click is appreciated.  PMI is nondisplaced with a normal apical impulse.    Pulmonary:  Lungs are clear to auscultation bilaterally.  There are no focal rales, rhonchi, or wheezes.  Good air movement is noted throughout both lung fields.   Abdomen:  The abdomen is soft, non-distended, and non-tender.  Bowel sounds are present and normoactive.  No organomegaly is appreciated.  Extremities:  Extremities demonstrate trace peripheral edema.   No cyanosis or clubbing of the digits is appreciated.  Neurologic:  Alert and oriented.  Normal affect.  Integument:  No visible rashes are appreciated.      Laboratory/Data:   Recent Labs   Lab 05/24/24  1751 05/25/24  0705 05/27/24  0738 05/28/24  0714 05/28/24  2101 05/29/24  0605   *   < > 114* 124*  --  128*   BUN 17   < > 26* 32*  --  36*   CREATSERUM 0.90   < > 0.87 1.08*  --  0.97   CA 8.9   < > 9.4 9.2  --  9.6   ALB 3.4  --   --   --   --   --       < > 138 138  --  138   K 4.1   < > 3.7 3.5  3.5 4.0 3.5      < > 100 101  --  99   CO2 29.0   < > 32.0 32.0  --  30.0   ALKPHO 135  --   --   --   --   --    AST 47*  --   --   --   --   --    ALT 36  --   --   --   --   --    BILT 0.7  --   --   --   --   --    TP 8.0  --   --   --   --   --     < >  = values in this interval not displayed.       Recent Labs   Lab 05/24/24  1751 05/25/24  0705 05/27/24  0738 05/28/24  0714 05/29/24  0605   RBC 4.24   < > 4.62 4.76 5.12   HGB 13.7   < > 14.6 15.3 16.4*   HCT 40.7   < > 45.4 46.1 48.4*   MCV 96.0   < > 98.3 96.8 94.5   MCH 32.3   < > 31.6 32.1 32.0   MCHC 33.7   < > 32.2 33.2 33.9   RDW 13.7   < > 13.2 13.2 13.2   NEPRELIM 6.41  --   --   --  8.48*   WBC 8.7   < > 8.3 8.7 11.2*   .0   < > 279.0 304.0 307.0    < > = values in this interval not displayed.       Recent Labs   Lab 05/27/24  0738 05/28/24  0714 05/29/24  0605   PTP 21.1* 21.1* 21.4*   INR 1.80* 1.80* 1.84*       No results for input(s): \"TROP\", \"CK\" in the last 168 hours.      Tele: AF - rate controlled    Diagnostics:    Echo:  Conclusions:     1. Left ventricle: The cavity size was normal. Wall thickness was normal.      Systolic function was mildly reduced. The estimated ejection fraction was      45-50%, by visual assessment. Unable to assess LV diastolic function due      to heart rhythm.   2. Left atrium: The left atrial volume was moderately increased.   3. Aortic valve: [place] MINOR S3 23mm (TAVR) bioprosthesis was present.      There was no significant regurgitation. The peak systolic velocity was      2.28m/sec. The mean systolic gradient was 10mm Hg. The valve area (VTI)      was 1.38cm^2. The valve area (VTI) index was 0.65cm^2/m^2.   4. Mitral valve: The annulus was moderately to markedly calcified. The      leaflets were moderately calcified. Mobile echogenic structure visualized      on the posterior leaflet versus [place] (image 15). The mean diastolic      gradient was 3mm Hg at 65 bpm.   5. Pulmonary arteries: Systolic pressure was at the upper limits of normal,      in the range of 30mm Hg to 35mm Hg.   Impressions:  Moble echogenic structure of the posteior mitral valve whch   likely represents calcifified chordae/mitral valve annulus. However,   endocarditis can not be  ruled out. Would reccommend clinical correlation. No   images to compare with     Assessment:     Acute on chronic HFrEF  -Pt modifies her Rx independently.  Suspect decompensation reflects compliance issues   Aortic stenosis s/p TAVR    AFib  -Rate controlled  -INR therapeutic  4.    HTN  5.    HL  6.    Hypothyroidism  7.    COPD  8.    Thyroid CA s/p RsSxn                -On supplementation  9.    H/O PE                -S/P IVC filter  10.  PVD  -S/P PTA LLE   11.  Fibromyalgia         12.  CAD  -S/P stent 3/21     13.  Mild LV systolic dysfunction   -EF 45%   -Stable since 7/23  14.  Echo with mobile echogenicity near abn MV.  DDx includes vegetation, thrombus, fibrin.  No Sx of infectious process at present.  ESR 72.  CRP mildly elevated.  Mild WBC.  Cx pending    Plan:    Coumadin.  IV Heparin given subtherapeutic INR and slow rise in INR.   Plavix at this time   Hold Bumex - bit dry   Daily weights   F/U BMP / INR.     Tele monitor   Statin   Check orthostatics   Blood Cxs pending   Discussed LOYD - pt now agreeable to LOYD.  The nature of transesophageal echocardiography has been reviewed with the patient.  Risks including, but not limited to the major risks of conscious sedation, esophageal trauma, aspiration, and perforation.  The patient acknowledges risks and agrees to proceed.  Will consider ID assessment pending LOYD data    Jose Antonio Ryan MD  5/29/2024

## 2024-05-29 NOTE — PLAN OF CARE
Pt alert and oriented x4.  Up standby with cane .  On room air/day , unable to wear CPAP ,requires 2L of oxygen at night .  AFIB on tele.  Continent of bowel and  bladder.( External cath in place)  No complaints of pain, sob, or chest pain/ discomfort.  Plan of care discussed with pt..  Falls precautions in place.  Call light within reach.    Problem: Diabetes/Glucose Control  Goal: Glucose maintained within prescribed range  Description: INTERVENTIONS:  - Monitor Blood Glucose as ordered  - Assess for signs and symptoms of hyperglycemia and hypoglycemia  - Administer ordered medications to maintain glucose within target range  - Assess barriers to adequate nutritional intake and initiate nutrition consult as needed  - Instruct patient on self management of diabetes  Outcome: Progressing     Problem: CARDIOVASCULAR - ADULT  Goal: Absence of cardiac arrhythmias or at baseline  Description: INTERVENTIONS:  - Continuous cardiac monitoring, monitor vital signs, obtain 12 lead EKG if indicated  - Evaluate effectiveness of antiarrhythmic and heart rate control medications as ordered  - Initiate emergency measures for life threatening arrhythmias  - Monitor electrolytes and administer replacement therapy as ordered  Outcome: Progressing  Goal: Maintains optimal cardiac output and hemodynamic stability  Description: INTERVENTIONS:  - Monitor vital signs, rhythm, and trends  - Monitor for bleeding, hypotension and signs of decreased cardiac output  - Evaluate effectiveness of vasoactive medications to optimize hemodynamic stability  - Monitor arterial and/or venous puncture sites for bleeding and/or hematoma  - Assess quality of pulses, skin color and temperature  - Assess for signs of decreased coronary artery perfusion - ex. Angina  - Evaluate fluid balance, assess for edema, trend weights  Outcome: Progressing     Problem: RESPIRATORY - ADULT  Goal: Achieves optimal ventilation and oxygenation  Description:  INTERVENTIONS:  - Assess for changes in respiratory status  - Assess for changes in mentation and behavior  - Position to facilitate oxygenation and minimize respiratory effort  - Oxygen supplementation based on oxygen saturation or ABGs  - Provide Smoking Cessation handout, if applicable  - Encourage broncho-pulmonary hygiene including cough, deep breathe, Incentive Spirometry  - Assess the need for suctioning and perform as needed  - Assess and instruct to report SOB or any respiratory difficulty  - Respiratory Therapy support as indicated  - Manage/alleviate anxiety  - Monitor for signs/symptoms of CO2 retention  Outcome: Progressing     Problem: GASTROINTESTINAL - ADULT  Goal: Minimal or absence of nausea and vomiting  Description: INTERVENTIONS:  - Maintain adequate hydration with IV or PO as ordered and tolerated  - Nasogastric tube to low intermittent suction as ordered  - Evaluate effectiveness of ordered antiemetic medications  - Provide nonpharmacologic comfort measures as appropriate  - Advance diet as tolerated, if ordered  - Obtain nutritional consult as needed  - Evaluate fluid balance  Outcome: Progressing  Goal: Maintains or returns to baseline bowel function  Description: INTERVENTIONS:  - Assess bowel function  - Maintain adequate hydration with IV or PO as ordered and tolerated  - Evaluate effectiveness of GI medications  - Encourage mobilization and activity  - Obtain nutritional consult as needed  - Establish a toileting routine/schedule  - Consider collaborating with pharmacy to review patient's medication profile  Outcome: Progressing  Goal: Achieves appropriate nutritional intake (bariatric)  Description: INTERVENTIONS:  - Monitor for over-consumption  - Identify factors contributing to increased intake, treat as appropriate  - Monitor I&O, WT and lab values  - Obtain nutritional consult as needed  - Evaluate psychosocial factors contributing to over-consumption  Outcome: Progressing

## 2024-05-29 NOTE — PLAN OF CARE
Patient alert and oriented; VSS- orthostatic blood pressure obtained, patient denied symptoms while getting blood pressure but did say she had some dizziness when up and walking but it is improved from yesterday; cardiac monitor showing atrial fib with controlled rates; lung sounds clear, on room air during the day and 2 liters of oxygen via nasal while sleeping as she cannot tolerate our cpap, no cough noted, able to do 1500 ml on the incentive spirometer; edema to BLE and dressing to LLE that is clean/dry/intact and due to changed tomorrow; incontinent of urine due to urge so purewick in place; continent of stool with LBM 5/26; heparin infusion started as ordered with next ptt at 1735; patient up to the bathroom with an assist x1 and own crutches, patient declines walk in hallway and to sit up in the chair.     Problem: Patient/Family Goals  Goal: Patient/Family Long Term Goal  Description: Patient's Long Term Goal:discharge    Interventions:  -cardiology consult and follow up -orders ,labs, v/s and procedures ,telemetry monitoring, Activity as ordered meds compliance o2 support  if necessary , CPAP adjustment, iv diuretics ,update plan of care, ,daily weight , strict intake and output   - See additional Care Plan goals for specific interventions  Outcome: Progressing  Goal: Patient/Family Short Term Goal  Description: Patient's Short Term Goal: breath comfortable , safety    Interventions:   - PAIN MGT , BREATH COMFORTABLE WITH CPAP / O2 SUPPORT , Adhere to medication regimen, follow up appointments after discharge, ,education to weight control, cardiac life style, heart healthy diet   - See additional Care Plan goals for specific interventions  Outcome: Progressing     Problem: CARDIOVASCULAR - ADULT  Goal: Absence of cardiac arrhythmias or at baseline  Description: INTERVENTIONS:  - Continuous cardiac monitoring, monitor vital signs, obtain 12 lead EKG if indicated  - Evaluate effectiveness of antiarrhythmic  and heart rate control medications as ordered  - Initiate emergency measures for life threatening arrhythmias  - Monitor electrolytes and administer replacement therapy as ordered  Outcome: Progressing  Goal: Maintains optimal cardiac output and hemodynamic stability  Description: INTERVENTIONS:  - Monitor vital signs, rhythm, and trends  - Monitor for bleeding, hypotension and signs of decreased cardiac output  - Evaluate effectiveness of vasoactive medications to optimize hemodynamic stability  - Monitor arterial and/or venous puncture sites for bleeding and/or hematoma  - Assess quality of pulses, skin color and temperature  - Assess for signs of decreased coronary artery perfusion - ex. Angina  - Evaluate fluid balance, assess for edema, trend weights  Outcome: Progressing     Problem: RESPIRATORY - ADULT  Goal: Achieves optimal ventilation and oxygenation  Description: INTERVENTIONS:  - Assess for changes in respiratory status  - Assess for changes in mentation and behavior  - Position to facilitate oxygenation and minimize respiratory effort  - Oxygen supplementation based on oxygen saturation or ABGs  - Provide Smoking Cessation handout, if applicable  - Encourage broncho-pulmonary hygiene including cough, deep breathe, Incentive Spirometry  - Assess the need for suctioning and perform as needed  - Assess and instruct to report SOB or any respiratory difficulty  - Respiratory Therapy support as indicated  - Manage/alleviate anxiety  - Monitor for signs/symptoms of CO2 retention  Outcome: Progressing     Problem: GASTROINTESTINAL - ADULT  Goal: Minimal or absence of nausea and vomiting  Description: INTERVENTIONS:  - Maintain adequate hydration with IV or PO as ordered and tolerated  - Nasogastric tube to low intermittent suction as ordered  - Evaluate effectiveness of ordered antiemetic medications  - Provide nonpharmacologic comfort measures as appropriate  - Advance diet as tolerated, if ordered  - Obtain  nutritional consult as needed  - Evaluate fluid balance  Outcome: Progressing  Goal: Maintains or returns to baseline bowel function  Description: INTERVENTIONS:  - Assess bowel function  - Maintain adequate hydration with IV or PO as ordered and tolerated  - Evaluate effectiveness of GI medications  - Encourage mobilization and activity  - Obtain nutritional consult as needed  - Establish a toileting routine/schedule  - Consider collaborating with pharmacy to review patient's medication profile  Outcome: Progressing  Goal: Achieves appropriate nutritional intake (bariatric)  Description: INTERVENTIONS:  - Monitor for over-consumption  - Identify factors contributing to increased intake, treat as appropriate  - Monitor I&O, WT and lab values  - Obtain nutritional consult as needed  - Evaluate psychosocial factors contributing to over-consumption  Outcome: Progressing     Problem: SKIN/TISSUE INTEGRITY - ADULT  Goal: Skin integrity remains intact  Description: INTERVENTIONS  - Assess and document risk factors for pressure ulcer development  - Assess and document skin integrity  - Monitor for areas of redness and/or skin breakdown  - Initiate interventions, skin care algorithm/standards of care as needed  Outcome: Progressing  Goal: Incision(s), wounds(s) or drain site(s) healing without S/S of infection  Description: INTERVENTIONS:  - Assess and document risk factors for pressure ulcer development  - Assess and document skin integrity  - Assess and document dressing/incision, wound bed, drain sites and surrounding tissue  - Implement wound care per orders  - Initiate isolation precautions as appropriate  - Initiate Pressure Ulcer prevention bundle as indicated  Outcome: Progressing  Goal: Oral mucous membranes remain intact  Description: INTERVENTIONS  - Assess oral mucosa and hygiene practices  - Implement preventative oral hygiene regimen  - Implement oral medicated treatments as ordered  Outcome: Progressing      Problem: SAFETY ADULT - FALL  Goal: Free from fall injury  Description: INTERVENTIONS:  - Assess pt frequently for physical needs  - Identify cognitive and physical deficits and behaviors that affect risk of falls.  - [place] fall precautions as indicated by assessment.  - Educate pt/family on patient safety including physical limitations  - Instruct pt to call for assistance with activity based on assessment  - Modify environment to reduce risk of injury  - Provide assistive devices as appropriate  - Consider OT/PT consult to assist with strengthening/mobility  - Encourage toileting schedule  Outcome: Progressing

## 2024-05-29 NOTE — PROGRESS NOTES
[facility]   part of [facility] Hospitalist Progress Note     Arleth Weiss Patient Status:  Inpatient    1955 MRN [id number]   Location [facility] 2NE-A Attending Sanjiv Zuniga,    Hosp Day # 5 PCP Viktoriya Garcias DO     Follow Up:  The encounter diagnosis was Acute on chronic diastolic congestive heart failure (HCC).    Subjective:     Patient seen and examined.    Plan for LOYD tomorrow.  Denies CP/SOB.  NAD.     Objective:    Review of Systems:   10 point ROS completed and was negative, except for pertinent positive and negatives stated in subjective.    Vital signs:  Temp:  [97.4 °F (36.3 °C)-98.4 °F (36.9 °C)] 98.2 °F (36.8 °C)  Pulse:  [] 68  Resp:  [16-20] 18  BP: (101-145)/(57-93) 109/93  SpO2:  [91 %-100 %] 97 %    Physical Exam:    Gen: No acute distress, alert and oriented x3, no focal neurologic deficits  HEENT:  EOMI, PERRLA, OP clear, MMM  Pulm: +bibasilar crackles, normal respiratory effort  CV:  Tachy, irregular, no murmur.  Normal PMI.    Abd: Abdomen soft, nontender, nondistended, no organomegaly, bowel sounds present  MSK: Full range of motion in extremities, no clubbing, no cyanosis. 1-2+ LE edema improving   Skin: no rashes or lesions  Neuro:  Grossly intact, no focal deficits         Diagnostic Data:    Labs:  Recent Labs   Lab 24  0705 24  0748 24  0738 24  0714 24  0605 24  1049   WBC 7.8 6.9 8.3 8.7 11.2* 13.4*   HGB 13.4 13.4 14.6 15.3 16.4* 15.8   MCV 98.1 99.5 98.3 96.8 94.5 97.4   .0 261.0 279.0 304.0 307.0 303.0   INR 2.22* 2.09* 1.80* 1.80* 1.84*  --        Recent Labs   Lab 24  1751 24  0705 24  0714 24  2101 24  0605 24  1049   *   < > 124*  --  128* 118*   BUN 17   < > 32*  --  36* 34*   CREATSERUM 0.90   < > 1.08*  --  0.97 0.92   CA 8.9   < > 9.2  --  9.6 9.5   ALB 3.4  --   --   --   --   --       < > 138  --  138 135*   K 4.1   <  > 3.5  3.5 4.0 3.5 3.6      < > 101  --  99 100   CO2 29.0   < > 32.0  --  30.0 29.0   ALKPHO 135  --   --   --   --   --    AST 47*  --   --   --   --   --    ALT 36  --   --   --   --   --    BILT 0.7  --   --   --   --   --    TP 8.0  --   --   --   --   --     < > = values in this interval not displayed.       Estimated Creatinine Clearance: 58.6 mL/min (based on SCr of 0.92 mg/dL).    Recent Labs   Lab 05/27/24  0738 05/28/24  0714 05/29/24  0605   PTP 21.1* 21.1* 21.4*   INR 1.80* 1.80* 1.84*            COVID-19 Lab Results    COVID-19  Lab Results   Component Value Date    COVID19 Not Detected 01/03/2024    COVID19 Not Detected 01/02/2024    COVID19 Not Detected 02/10/2023       Pro-Calcitonin  No results for input(s): \"PCT\" in the last 168 hours.    Cardiac  Recent Labs   Lab 05/24/24  1751   PBNP 1,586*       Creatinine Kinase  No results for input(s): \"CK\" in the last 168 hours.    Inflammatory Markers  Recent Labs   Lab 05/29/24  0605   CRP 0.37*       Imaging: Imaging data reviewed in Epic.    Medications:    potassium chloride  40 mEq Oral Q4H    warfarin  7.5 mg Oral Once at night    [Held by provider] bumetanide  2 mg Oral BID (Diuretic)    cholecalciferol  5,000 Units Oral Nightly    cyproheptadine  4 mg Oral Nightly    ferrous sulfate  325 mg Oral Nightly    Vitamin C  500 mg Oral Nightly    allopurinol  100 mg Oral Nightly    buPROPion SR  150 mg Oral BID    clopidogrel  75 mg Oral Nightly    digoxin  125 mcg Oral Nightly    dilTIAZem ER  240 mg Oral Nightly    FLUoxetine  20 mg Oral BID    levothyroxine  175 mcg Oral Daily @ 0700    metoprolol succinate ER  25 mg Oral Nightly    nortriptyline  25 mg Oral Nightly    rosuvastatin  5 mg Oral QOD       Assessment & Plan:      69 yr old female with PMH sig for chronic diastolic HF, a-fib, severe aortic stenosis s/p TAVR, CAD s/p stents, HTN, HLD, COPD, and PAD who presented to the ED for evaluation of SOB and weight gain.     # Acute on chronic  diastolic HF - resolved  - suspect due to complicance   - s/p lasix 40 mg IV x 1 in ED  - cont IV lasix per cardiology - dc on 2mg po bumex BID  - strict I/O's, daily weights   - cards c/s appreciated - OP f/u   - Echo with mobile echogenecity, will need LOYD, tentative plan for tomorrow, NPO after midnight      # Chronic a-fib  # Secondary hypercoag state  - rate controlled   - cont diltiazem, digoxin, and metoprolol   - cont warfarin      # CAD s/p stents  - no chest pain  - cont plavix and statin     # Essential HTN  - controlled  - cont diltiazem and metoprolol      # HLD  - cont statin     # COPD  - stable  - encourage IS use     # PAD  - cont statin and plavix     # Hypothyroidism   - cont levothyroxine         Plan of care: inpt care.      Plan of care discussed with patient or family at bedside.    Tita Vogt MD     Supplementary Documentation:     Quality:  DVT Prophylaxis: warfarin   CODE status: FULL  [place]vin: no  Central line: no  If COVID testing is negative, may discontinue isolation: yes     Estimated date of discharge: TBD  Discharge is dependent on: clinical course   At this point Ms. Weiss is expected to be discharge to: home    Plan of care discussed with pt

## 2024-05-30 ENCOUNTER — APPOINTMENT (OUTPATIENT)
Dept: CV DIAGNOSTICS | Facility: HOSPITAL | Age: 69
DRG: 291 | End: 2024-05-30
Attending: INTERNAL MEDICINE
Payer: MEDICARE

## 2024-05-30 ENCOUNTER — APPOINTMENT (OUTPATIENT)
Dept: INTERVENTIONAL RADIOLOGY/VASCULAR | Facility: HOSPITAL | Age: 69
DRG: 291 | End: 2024-05-30
Attending: INTERNAL MEDICINE
Payer: MEDICARE

## 2024-05-30 ENCOUNTER — APPOINTMENT (OUTPATIENT)
Facility: HOSPITAL | Age: 69
DRG: 291 | End: 2024-05-30
Attending: HOSPITALIST
Payer: MEDICARE

## 2024-05-30 ENCOUNTER — ANESTHESIA (OUTPATIENT)
Dept: INTERVENTIONAL RADIOLOGY/VASCULAR | Facility: HOSPITAL | Age: 69
DRG: 291 | End: 2024-05-30
Payer: MEDICARE

## 2024-05-30 LAB
ANION GAP SERPL CALC-SCNC: 2 MMOL/L (ref 0–18)
APTT PPP: 70.4 SECONDS (ref 23–36)
BUN BLD-MCNC: 37 MG/DL (ref 9–23)
CALCIUM BLD-MCNC: 9.8 MG/DL (ref 8.5–10.1)
CHLORIDE SERPL-SCNC: 105 MMOL/L (ref 98–112)
CO2 SERPL-SCNC: 31 MMOL/L (ref 21–32)
CREAT BLD-MCNC: 0.92 MG/DL
EGFRCR SERPLBLD CKD-EPI 2021: 67 ML/MIN/1.73M2 (ref 60–?)
ERYTHROCYTE [DISTWIDTH] IN BLOOD BY AUTOMATED COUNT: 13.2 %
GLUCOSE BLD-MCNC: 145 MG/DL (ref 70–99)
HCT VFR BLD AUTO: 48.3 %
HGB BLD-MCNC: 16 G/DL
INR BLD: 2.31 (ref 0.8–1.2)
MAGNESIUM SERPL-MCNC: 2.6 MG/DL (ref 1.6–2.6)
MCH RBC QN AUTO: 32.2 PG (ref 26–34)
MCHC RBC AUTO-ENTMCNC: 33.1 G/DL (ref 31–37)
MCV RBC AUTO: 97.2 FL
OSMOLALITY SERPL CALC.SUM OF ELEC: 297 MOSM/KG (ref 275–295)
PLATELET # BLD AUTO: 290 10(3)UL (ref 150–450)
POTASSIUM SERPL-SCNC: 4.7 MMOL/L (ref 3.5–5.1)
PROTHROMBIN TIME: 25.6 SECONDS (ref 11.6–14.8)
RBC # BLD AUTO: 4.97 X10(6)UL
SODIUM SERPL-SCNC: 138 MMOL/L (ref 136–145)
WBC # BLD AUTO: 7.5 X10(3) UL (ref 4–11)

## 2024-05-30 PROCEDURE — 87040 BLOOD CULTURE FOR BACTERIA: CPT | Performed by: INTERNAL MEDICINE

## 2024-05-30 PROCEDURE — 93312 ECHO TRANSESOPHAGEAL: CPT | Performed by: INTERNAL MEDICINE

## 2024-05-30 PROCEDURE — 86632 CHLAMYDIA IGM ANTIBODY: CPT | Performed by: INTERNAL MEDICINE

## 2024-05-30 PROCEDURE — B246ZZ4 ULTRASONOGRAPHY OF RIGHT AND LEFT HEART, TRANSESOPHAGEAL: ICD-10-PCS | Performed by: INTERNAL MEDICINE

## 2024-05-30 PROCEDURE — 94799 UNLISTED PULMONARY SVC/PX: CPT

## 2024-05-30 PROCEDURE — 86631 CHLAMYDIA ANTIBODY: CPT | Performed by: INTERNAL MEDICINE

## 2024-05-30 PROCEDURE — 85730 THROMBOPLASTIN TIME PARTIAL: CPT | Performed by: INTERNAL MEDICINE

## 2024-05-30 PROCEDURE — 86606 ASPERGILLUS ANTIBODY: CPT | Performed by: INTERNAL MEDICINE

## 2024-05-30 PROCEDURE — 86622 BRUCELLA ANTIBODY: CPT | Performed by: INTERNAL MEDICINE

## 2024-05-30 PROCEDURE — 86638 Q FEVER ANTIBODY: CPT | Performed by: INTERNAL MEDICINE

## 2024-05-30 PROCEDURE — 86635 COCCIDIOIDES ANTIBODY: CPT | Performed by: INTERNAL MEDICINE

## 2024-05-30 PROCEDURE — 86641 CRYPTOCOCCUS ANTIBODY: CPT | Performed by: INTERNAL MEDICINE

## 2024-05-30 PROCEDURE — 93325 DOPPLER ECHO COLOR FLOW MAPG: CPT | Performed by: INTERNAL MEDICINE

## 2024-05-30 PROCEDURE — 83735 ASSAY OF MAGNESIUM: CPT | Performed by: HOSPITALIST

## 2024-05-30 PROCEDURE — 85610 PROTHROMBIN TIME: CPT | Performed by: HOSPITALIST

## 2024-05-30 PROCEDURE — 87798 DETECT AGENT NOS DNA AMP: CPT | Performed by: INTERNAL MEDICINE

## 2024-05-30 PROCEDURE — 85027 COMPLETE CBC AUTOMATED: CPT | Performed by: HOSPITALIST

## 2024-05-30 PROCEDURE — 93320 DOPPLER ECHO COMPLETE: CPT | Performed by: INTERNAL MEDICINE

## 2024-05-30 PROCEDURE — 87471 BARTONELLA DNA AMP PROBE: CPT | Performed by: INTERNAL MEDICINE

## 2024-05-30 PROCEDURE — 86611 BARTONELLA ANTIBODY: CPT | Performed by: INTERNAL MEDICINE

## 2024-05-30 PROCEDURE — 86612 BLASTOMYCES ANTIBODY: CPT | Performed by: INTERNAL MEDICINE

## 2024-05-30 PROCEDURE — 86698 HISTOPLASMA ANTIBODY: CPT | Performed by: INTERNAL MEDICINE

## 2024-05-30 PROCEDURE — 80048 BASIC METABOLIC PNL TOTAL CA: CPT | Performed by: HOSPITALIST

## 2024-05-30 RX ORDER — BUMETANIDE 2 MG/1
2 TABLET ORAL DAILY
Status: DISCONTINUED | OUTPATIENT
Start: 2024-05-31 | End: 2024-05-31

## 2024-05-30 RX ORDER — WARFARIN SODIUM 5 MG/1
5 TABLET ORAL
Status: COMPLETED | OUTPATIENT
Start: 2024-05-30 | End: 2024-05-30

## 2024-05-30 RX ADMIN — SODIUM CHLORIDE: 9 INJECTION, SOLUTION INTRAVENOUS at 15:35:00

## 2024-05-30 NOTE — PLAN OF CARE
Assume care at 1930, alert and oriented. Denies pain. Room air with SPO2>94%, heparin infusing per protocol. Needs met. Kept clean and dry.     Problem: Diabetes/Glucose Control  Goal: Glucose maintained within prescribed range  Description: INTERVENTIONS:  - Monitor Blood Glucose as ordered  - Assess for signs and symptoms of hyperglycemia and hypoglycemia  - Administer ordered medications to maintain glucose within target range  - Assess barriers to adequate nutritional intake and initiate nutrition consult as needed  - Instruct patient on self management of diabetes  Outcome: Progressing     Problem: CARDIOVASCULAR - ADULT  Goal: Absence of cardiac arrhythmias or at baseline  Description: INTERVENTIONS:  - Continuous cardiac monitoring, monitor vital signs, obtain 12 lead EKG if indicated  - Evaluate effectiveness of antiarrhythmic and heart rate control medications as ordered  - Initiate emergency measures for life threatening arrhythmias  - Monitor electrolytes and administer replacement therapy as ordered  Outcome: Progressing  Goal: Maintains optimal cardiac output and hemodynamic stability  Description: INTERVENTIONS:  - Monitor vital signs, rhythm, and trends  - Monitor for bleeding, hypotension and signs of decreased cardiac output  - Evaluate effectiveness of vasoactive medications to optimize hemodynamic stability  - Monitor arterial and/or venous puncture sites for bleeding and/or hematoma  - Assess quality of pulses, skin color and temperature  - Assess for signs of decreased coronary artery perfusion - ex. Angina  - Evaluate fluid balance, assess for edema, trend weights  Outcome: Progressing     Problem: RESPIRATORY - ADULT  Goal: Achieves optimal ventilation and oxygenation  Description: INTERVENTIONS:  - Assess for changes in respiratory status  - Assess for changes in mentation and behavior  - Position to facilitate oxygenation and minimize respiratory effort  - Oxygen supplementation based on  oxygen saturation or ABGs  - Provide Smoking Cessation handout, if applicable  - Encourage broncho-pulmonary hygiene including cough, deep breathe, Incentive Spirometry  - Assess the need for suctioning and perform as needed  - Assess and instruct to report SOB or any respiratory difficulty  - Respiratory Therapy support as indicated  - Manage/alleviate anxiety  - Monitor for signs/symptoms of CO2 retention  Outcome: Progressing     Problem: GASTROINTESTINAL - ADULT  Goal: Minimal or absence of nausea and vomiting  Description: INTERVENTIONS:  - Maintain adequate hydration with IV or PO as ordered and tolerated  - Nasogastric tube to low intermittent suction as ordered  - Evaluate effectiveness of ordered antiemetic medications  - Provide nonpharmacologic comfort measures as appropriate  - Advance diet as tolerated, if ordered  - Obtain nutritional consult as needed  - Evaluate fluid balance  Outcome: Progressing  Goal: Maintains or returns to baseline bowel function  Description: INTERVENTIONS:  - Assess bowel function  - Maintain adequate hydration with IV or PO as ordered and tolerated  - Evaluate effectiveness of GI medications  - Encourage mobilization and activity  - Obtain nutritional consult as needed  - Establish a toileting routine/schedule  - Consider collaborating with pharmacy to review patient's medication profile  Outcome: Progressing  Goal: Achieves appropriate nutritional intake (bariatric)  Description: INTERVENTIONS:  - Monitor for over-consumption  - Identify factors contributing to increased intake, treat as appropriate  - Monitor I&O, WT and lab values  - Obtain nutritional consult as needed  - Evaluate psychosocial factors contributing to over-consumption  Outcome: Progressing     Problem: METABOLIC/FLUID AND ELECTROLYTES - ADULT  Goal: Glucose maintained within prescribed range  Description: INTERVENTIONS:  - Monitor Blood Glucose as ordered  - Assess for signs and symptoms of hyperglycemia  and hypoglycemia  - Administer ordered medications to maintain glucose within target range  - Assess barriers to adequate nutritional intake and initiate nutrition consult as needed  - Instruct patient on self management of diabetes  Outcome: Progressing  Goal: Electrolytes maintained within normal limits  Description: INTERVENTIONS:  - Monitor labs and rhythm and assess patient for signs and symptoms of electrolyte imbalances  - Administer electrolyte replacement as ordered  - Monitor response to electrolyte replacements, including rhythm and repeat lab results as appropriate  - Fluid restriction as ordered  - Instruct patient on fluid and nutrition restrictions as appropriate  Outcome: Progressing  Goal: Hemodynamic stability and optimal renal function maintained  Description: INTERVENTIONS:  - Monitor labs and assess for signs and symptoms of volume excess or deficit  - Monitor intake, output and patient weight  - Monitor urine specific gravity, serum osmolarity and serum sodium as indicated or ordered  - Monitor response to interventions for patient's volume status, including labs, urine output, blood pressure (other measures as available)  - Encourage oral intake as appropriate  - Instruct patient on fluid and nutrition restrictions as appropriate  Outcome: Progressing     Problem: SKIN/TISSUE INTEGRITY - ADULT  Goal: Skin integrity remains intact  Description: INTERVENTIONS  - Assess and document risk factors for pressure ulcer development  - Assess and document skin integrity  - Monitor for areas of redness and/or skin breakdown  - Initiate interventions, skin care algorithm/standards of care as needed  Outcome: Progressing  Goal: Incision(s), wounds(s) or drain site(s) healing without S/S of infection  Description: INTERVENTIONS:  - Assess and document risk factors for pressure ulcer development  - Assess and document skin integrity  - Assess and document dressing/incision, wound bed, drain sites and  surrounding tissue  - Implement wound care per orders  - Initiate isolation precautions as appropriate  - Initiate Pressure Ulcer prevention bundle as indicated  Outcome: Progressing  Goal: Oral mucous membranes remain intact  Description: INTERVENTIONS  - Assess oral mucosa and hygiene practices  - Implement preventative oral hygiene regimen  - Implement oral medicated treatments as ordered  Outcome: Progressing     Problem: HEMATOLOGIC - ADULT  Goal: Maintains hematologic stability  Description: INTERVENTIONS  - Assess for signs and symptoms of bleeding or hemorrhage  - Monitor labs and vital signs for trends  - Administer supportive blood products/factors, fluids and medications as ordered and appropriate  - Administer supportive blood products/factors as ordered and appropriate  Outcome: Progressing  Goal: Free from bleeding injury  Description: (Example usage: patient with low platelets)  INTERVENTIONS:  - Avoid intramuscular injections, enemas and rectal medication administration  - Ensure safe mobilization of patient  - Hold pressure on venipuncture sites to achieve adequate hemostasis  - Assess for signs and symptoms of internal bleeding  - Monitor lab trends  - Patient is to report abnormal signs of bleeding to staff  - Avoid use of toothpicks and dental floss  - Use electric shaver for shaving  - Use soft bristle tooth brush  - Limit straining and forceful nose blowing  Outcome: Progressing     Problem: MUSCULOSKELETAL - ADULT  Goal: Return mobility to safest level of function  Description: INTERVENTIONS:  - Assess patient stability and activity tolerance for standing, transferring and ambulating w/ or w/o assistive devices  - Assist with transfers and ambulation using safe patient handling equipment as needed  - Ensure adequate protection for wounds/incisions during mobilization  - Obtain PT/OT consults as needed  - Advance activity as appropriate  - Communicate ordered activity level and limitations with  patient/family  Outcome: Progressing  Goal: Maintain proper alignment of affected body part  Description: INTERVENTIONS:  - Support and protect limb and body alignment per provider's orders  - Instruct and reinforce with patient and family use of appropriate assistive device and precautions (e.g. spinal or hip dislocation precautions)  Outcome: Progressing     Problem: SAFETY ADULT - FALL  Goal: Free from fall injury  Description: INTERVENTIONS:  - Assess pt frequently for physical needs  - Identify cognitive and physical deficits and behaviors that affect risk of falls.  - [place] fall precautions as indicated by assessment.  - Educate pt/family on patient safety including physical limitations  - Instruct pt to call for assistance with activity based on assessment  - Modify environment to reduce risk of injury  - Provide assistive devices as appropriate  - Consider OT/PT consult to assist with strengthening/mobility  - Encourage toileting schedule  Outcome: Progressing     Problem: Impaired Functional Mobility  Goal: Achieve highest/safest level of mobility/gait  Description: Interventions:  - Assess patient's functional ability and stability  - Promote increasing activity/tolerance for mobility and gait  - Educate and engage patient/family in tolerated activity level and precautions    Outcome: Progressing

## 2024-05-30 NOTE — PROGRESS NOTES
[facility]  Progress Note    Arleth Weiss Patient Status:  Inpatient    1955 MRN [id number]   Location [facility] 2NE-A Attending Sanjiv Zuniga DO   Hosp Day # 6 PCP Viktoriya Garcias DO     Subjective:   Feeling better.  Positional lightheadedness has improved.  No chest pain.  No shortness of breath at rest.  No focal cardiovascular complaints reported.    Objective:  Vitals:    24 0754 24 0831 24 1300 24 1535   BP:  127/76     BP Location:  Radial     Pulse:  60  76   Resp:  16  16   Temp:  98 °F (36.7 °C)     TempSrc:  Oral     SpO2: 100% 100% 97% 99%   Weight:       Height:           Temp (24hrs), Av.9 °F (36.6 °C), Min:97.3 °F (36.3 °C), Max:98.3 °F (36.8 °C)      Medications:   Scheduled:    warfarin  5 mg Oral Once at night    benzocaine  1 spray Mouth/Throat See Admin Instructions    [Held by provider] bumetanide  2 mg Oral BID (Diuretic)    cholecalciferol  5,000 Units Oral Nightly    cyproheptadine  4 mg Oral Nightly    ferrous sulfate  325 mg Oral Nightly    Vitamin C  500 mg Oral Nightly    allopurinol  100 mg Oral Nightly    buPROPion SR  150 mg Oral BID    clopidogrel  75 mg Oral Nightly    digoxin  125 mcg Oral Nightly    dilTIAZem ER  240 mg Oral Nightly    FLUoxetine  20 mg Oral BID    levothyroxine  175 mcg Oral Daily @ 0700    metoprolol succinate ER  25 mg Oral Nightly    nortriptyline  25 mg Oral Nightly    rosuvastatin  5 mg Oral QOD       Continuous Infusion:    sodium chloride         PRN Medications:     calcium carbonate    triamcinolone    ondansetron    acetaminophen    Intake/Output:     Intake/Output Summary (Last 24 hours) at 2024 1541  Last data filed at 2024 1535  Gross per 24 hour   Intake 677 ml   Output 800 ml   Net -123 ml       Wt Readings from Last 3 Encounters:   24 213 lb 3.2 oz (96.7 kg)   24 212 lb 4.9 oz (96.3 kg)   23 219 lb (99.3 kg)       Allergies:  Allergies   Allergen Reactions    Adhesive  Tape HIVES     ALL ADHESIVES    Codeine HIVES    Doxycycline SWELLING    Hydrocodone UNKNOWN    Lisinopril TONGUE SWELLING    Metoprolol HIVES    Morphine Sulfate HIVES    Opioid Analgesics UNKNOWN    Oxycontin ITCHING    Oxytocin HIVES    Vicodin [Hydrocodone-Acetaminophen] ITCHING    Skin Adhesives OTHER (SEE COMMENTS)       Physical Exam:   General:  Well-developed / Well-nourished.  No acute distress.  HEENT:  Normocephalic.  Atraumatic.  No icterus.  Neck:  There is no jugular venous distention.   Cardiovascular:  Cardiovascular examination demonstrates an irregular rate and rhythm.  There is normal S1, S2.  There is no S3 or S4.  There are no rubs or gallops.  Grade 1-2/6 SEMLSB.  No click is appreciated.  PMI is nondisplaced with a normal apical impulse.    Pulmonary:  Lungs are clear to auscultation bilaterally.  There are no focal rales, rhonchi, or wheezes.  Good air movement is noted throughout both lung fields.   Abdomen:  The abdomen is soft, non-distended, and non-tender.  Bowel sounds are present and normoactive.  No organomegaly is appreciated.  Extremities:  Extremities demonstrate trace peripheral edema.   No cyanosis or clubbing of the digits is appreciated.  Neurologic:  Alert and oriented.  Normal affect.  Integument:  No visible rashes are appreciated.      Laboratory/Data:   Recent Labs   Lab 05/24/24  1751 05/25/24  0705 05/29/24  0605 05/29/24  1049 05/29/24  1733 05/30/24  0623   *   < > 128* 118*  --  145*   BUN 17   < > 36* 34*  --  37*   CREATSERUM 0.90   < > 0.97 0.92  --  0.92   CA 8.9   < > 9.6 9.5  --  9.8   ALB 3.4  --   --   --   --   --       < > 138 135*  --  138   K 4.1   < > 3.5 3.6 4.1 4.7      < > 99 100  --  105   CO2 29.0   < > 30.0 29.0  --  31.0   ALKPHO 135  --   --   --   --   --    AST 47*  --   --   --   --   --    ALT 36  --   --   --   --   --    BILT 0.7  --   --   --   --   --    TP 8.0  --   --   --   --   --     < > = values in this interval  not displayed.       Recent Labs   Lab 05/24/24  1751 05/25/24  0705 05/29/24  0605 05/29/24  1049 05/30/24  0623   RBC 4.24   < > 5.12 4.95 4.97   HGB 13.7   < > 16.4* 15.8 16.0   HCT 40.7   < > 48.4* 48.2* 48.3*   MCV 96.0   < > 94.5 97.4 97.2   MCH 32.3   < > 32.0 31.9 32.2   MCHC 33.7   < > 33.9 32.8 33.1   RDW 13.7   < > 13.2 13.2 13.2   NEPRELIM 6.41  --  8.48*  --   --    WBC 8.7   < > 11.2* 13.4* 7.5   .0   < > 307.0 303.0 290.0    < > = values in this interval not displayed.       Recent Labs   Lab 05/28/24  0714 05/29/24  0605 05/30/24  0619   PTP 21.1* 21.4* 25.6*   INR 1.80* 1.84* 2.31*       No results for input(s): \"TROP\", \"CK\" in the last 168 hours.      Tele: AF - rate controlled    Diagnostics:    Echo:  Conclusions:     1. Left ventricle: The cavity size was normal. Wall thickness was normal.      Systolic function was mildly reduced. The estimated ejection fraction was      45-50%, by visual assessment. Unable to assess LV diastolic function due      to heart rhythm.   2. Left atrium: The left atrial volume was moderately increased.   3. Aortic valve: [place] MINOR S3 23mm (TAVR) bioprosthesis was present.      There was no significant regurgitation. The peak systolic velocity was      2.28m/sec. The mean systolic gradient was 10mm Hg. The valve area (VTI)      was 1.38cm^2. The valve area (VTI) index was 0.65cm^2/m^2.   4. Mitral valve: The annulus was moderately to markedly calcified. The      leaflets were moderately calcified. Mobile echogenic structure visualized      on the posterior leaflet versus [place] (image 15). The mean diastolic      gradient was 3mm Hg at 65 bpm.   5. Pulmonary arteries: Systolic pressure was at the upper limits of normal,      in the range of 30mm Hg to 35mm Hg.   Impressions:  Moble echogenic structure of the posteior mitral valve whch   likely represents calcifified chordae/mitral valve annulus. However,   endocarditis can not be ruled out. Would  reccommend clinical correlation. No   images to compare with     LOYD:  :     Mod-Sev LAE   Smoke in LA / JOE   No LA / JOE thrombus   Mod ENA   Low NL EF - 50%   Thickened MV leaflets with reduced excursion   Mobile echogenicity associated with the mitral yumiko - appears associated with chordal apparatus of the posterior MV leaflet right near attachment to the mitral valve  Mild MR  Well seated TAVR prosthesis  Mild TR  PAP WNL  No doppler or echocontrast evidence of interatrial shunting  Mild atheromatous aortic plaquing    Assessment:     Acute on chronic HFrEF  -Pt modifies her Rx independently.  Suspect decompensation reflects compliance issues   Aortic stenosis s/p TAVR    AFib  -Rate controlled  -INR therapeutic  4.    HTN  5.    HL  6.    Hypothyroidism  7.    COPD  8.    Thyroid CA s/p RsSxn                -On supplementation  9.    H/O PE                -S/P IVC filter  10.  PVD  -S/P PTA LLE   11.  Fibromyalgia         12.  CAD  -S/P stent 3/21     13.  Mild LV systolic dysfunction   -EF 45%   -Stable since 7/23  14.  Echo with mobile echogenicity near abn MV.  DDx includes vegetation, thrombus, fibrin.  No Sx of infectious process at present.  ESR 72.  CRP mildly elevated.  Mild WBC.  Cx pending    Plan:    Coumadin.    Plavix at this time   Resume Bumex at 2mg PO qAM   Daily weights   F/U BMP / INR.     Tele monitor   Statin   ID to see   Blood Cxs pending   Discussed LOYD - pt now agreeable to LOYD.  The nature of    Jose Antonio Ryan MD  5/30/2024

## 2024-05-30 NOTE — PROGRESS NOTES
[facility]   part of [facility] Hospitalist Progress Note     Arleth Weiss Patient Status:  Inpatient    1955 MRN [id number]   Location [facility] 2NE-A Attending Sanjiv Zuniga,    Hosp Day # 6 PCP Viktoriya Garcias DO     Follow Up:  The encounter diagnosis was Acute on chronic diastolic congestive heart failure (HCC).    Subjective:     Patient seen and examined.    Plan for LOYD today.  Denies CP/SOB.  NAD.     Objective:    Review of Systems:   10 point ROS completed and was negative, except for pertinent positive and negatives stated in subjective.    Vital signs:  Temp:  [97.3 °F (36.3 °C)-98.3 °F (36.8 °C)] 98 °F (36.7 °C)  Pulse:  [60-98] 60  Resp:  [14-18] 16  BP: (102-136)/(62-95) 127/76  SpO2:  [96 %-100 %] 100 %    Physical Exam:    Gen: No acute distress, alert and oriented x3, no focal neurologic deficits  HEENT:  EOMI, PERRLA, OP clear, MMM  Pulm: +bibasilar crackles, normal respiratory effort  CV:  Tachy, irregular, no murmur.  Normal PMI.    Abd: Abdomen soft, nontender, nondistended, no organomegaly, bowel sounds present  MSK: Full range of motion in extremities, no clubbing, no cyanosis. 1-2+ LE edema improving   Skin: no rashes or lesions  Neuro:  Grossly intact, no focal deficits         Diagnostic Data:    Labs:  Recent Labs   Lab 24  0748 24  0738 24  0714 24  0605 24  1049 24  0619 24  0623   WBC 6.9 8.3 8.7 11.2* 13.4*  --  7.5   HGB 13.4 14.6 15.3 16.4* 15.8  --  16.0   MCV 99.5 98.3 96.8 94.5 97.4  --  97.2   .0 279.0 304.0 307.0 303.0  --  290.0   INR 2.09* 1.80* 1.80* 1.84*  --  2.31*  --        Recent Labs   Lab 24  1751 24  0705 24  0605 24  1049 24  1733 24  0623   *   < > 128* 118*  --  145*   BUN 17   < > 36* 34*  --  37*   CREATSERUM 0.90   < > 0.97 0.92  --  0.92   CA 8.9   < > 9.6 9.5  --  9.8   ALB 3.4  --   --   --   --   --        < > 138 135*  --  138   K 4.1   < > 3.5 3.6 4.1 4.7      < > 99 100  --  105   CO2 29.0   < > 30.0 29.0  --  31.0   ALKPHO 135  --   --   --   --   --    AST 47*  --   --   --   --   --    ALT 36  --   --   --   --   --    BILT 0.7  --   --   --   --   --    TP 8.0  --   --   --   --   --     < > = values in this interval not displayed.       Estimated Creatinine Clearance: 58.6 mL/min (based on SCr of 0.92 mg/dL).    Recent Labs   Lab 05/28/24  0714 05/29/24  0605 05/30/24  0619   PTP 21.1* 21.4* 25.6*   INR 1.80* 1.84* 2.31*            COVID-19 Lab Results    COVID-19  Lab Results   Component Value Date    COVID19 Not Detected 01/03/2024    COVID19 Not Detected 01/02/2024    COVID19 Not Detected 02/10/2023       Pro-Calcitonin  No results for input(s): \"PCT\" in the last 168 hours.    Cardiac  Recent Labs   Lab 05/24/24  1751   PBNP 1,586*       Creatinine Kinase  No results for input(s): \"CK\" in the last 168 hours.    Inflammatory Markers  Recent Labs   Lab 05/29/24  0605   CRP 0.37*       Imaging: Imaging data reviewed in Epic.    Medications:    benzocaine  1 spray Mouth/Throat See Admin Instructions    [Held by provider] bumetanide  2 mg Oral BID (Diuretic)    cholecalciferol  5,000 Units Oral Nightly    cyproheptadine  4 mg Oral Nightly    ferrous sulfate  325 mg Oral Nightly    Vitamin C  500 mg Oral Nightly    allopurinol  100 mg Oral Nightly    buPROPion SR  150 mg Oral BID    clopidogrel  75 mg Oral Nightly    digoxin  125 mcg Oral Nightly    dilTIAZem ER  240 mg Oral Nightly    FLUoxetine  20 mg Oral BID    levothyroxine  175 mcg Oral Daily @ 0700    metoprolol succinate ER  25 mg Oral Nightly    nortriptyline  25 mg Oral Nightly    rosuvastatin  5 mg Oral QOD       Assessment & Plan:      69 yr old female with PMH sig for chronic diastolic HF, a-fib, severe aortic stenosis s/p TAVR, CAD s/p stents, HTN, HLD, COPD, and PAD who presented to the ED for evaluation of SOB and weight gain.     # Acute on  chronic diastolic HF - resolved  - suspect due to complicance   - s/p lasix 40 mg IV x 1 in ED  - cont IV lasix per cardiology - dc on 2mg po bumex BID  - strict I/O's, daily weights   - cards c/s appreciated - OP f/u   - Echo with mobile echogenecity, LOYD today     # Chronic a-fib  # Secondary hypercoag state  - rate controlled   - cont diltiazem, digoxin, and metoprolol   - cont warfarin      # CAD s/p stents  - no chest pain  - cont plavix and statin     # Essential HTN  - controlled  - cont diltiazem and metoprolol      # HLD  - cont statin     # COPD  - stable  - encourage IS use     # PAD  - cont statin and plavix     # Hypothyroidism   - cont levothyroxine         Plan of care: inpt care.  Dc tomorrow pending LOYD results, she's driving herself home so difficult to dc today     Plan of care discussed with patient or family at bedside.    Tita Vogt MD     Supplementary Documentation:     Quality:  DVT Prophylaxis: warfarin   CODE status: FULL  Calvin: no  Central line: no  If COVID testing is negative, may discontinue isolation: yes     Estimated date of discharge: TBD  Discharge is dependent on: clinical course   At this point Ms. Weiss is expected to be discharge to: home    Plan of care discussed with pt

## 2024-05-30 NOTE — ANESTHESIA POSTPROCEDURE EVALUATION
[facility]    Arleth Weiss Patient Status:  Inpatient   Age/Gender 69 year old female MRN [id number]   Location [facility] 2NE-A Attending Palmira Vogt*   Hosp Day # 6 PCP Viktoriya Garcias DO       Anesthesia Post-op Note        Procedure Summary       Date: 05/30/24 Room / Location: [facility] Interventional Suites    Anesthesia Start: 1506 Anesthesia Stop: 1535    Procedure: CATH TRANSESOPHAGEAL ECHOCARDIOGRAM (LOYD) Diagnosis: (Abn MV)    Scheduled Providers: Phillip Roads MD Anesthesiologist:     Anesthesia Type: MAC ASA Status: 3            Anesthesia Type: MAC    Vitals Value Taken Time   BP  05/30/24 1535   Temp  05/30/24 1535   Pulse 76 05/30/24 1535   Resp 16 05/30/24 1535   SpO2 99 % 05/30/24 1535       Patient Location: Other    Anesthesia Type: MAC    Airway Patency: patent    Postop Pain Control: adequate    Mental Status: mildly sedated but able to meaningfully participate in the post-anesthesia evaluation    Nausea/Vomiting: none    Cardiopulmonary/Hydration status: stable euvolemic    Complications: no apparent anesthesia related complications    Postop vital signs: stable    Dental Exam: Unchanged from Preop    Patient to be discharged from PACU when criteria met.

## 2024-05-30 NOTE — PLAN OF CARE
Assumed care of patient at 0040. Patient is as sleep, no sign of distress or discomfort noted. Patient is on room air. Afib on tele with control HR. Patient continue Heparin gtt ACS/Afib protocol.    POC:  NPO at 5 am-->LOYD 5/30  DW  Ortho daily  Wound care q48-->due 5/30  2nd IV in am for the LOYD-->consent sign

## 2024-05-30 NOTE — PROGRESS NOTES
Pt s/p barry with Dr. Ryan and anesthesia. Pt recovered in holding. All vital signs during case recorded by anesthesia. Soon after recovery pt awake and talking to RN. Report called to floor RN. Pt transferred back to floor via bed by transport.

## 2024-05-30 NOTE — ANESTHESIA PREPROCEDURE EVALUATION
PRE-OP EVALUATION    Patient Name: Arleth Weiss    Admit Diagnosis: Acute on chronic diastolic congestive heart failure (HCC) [I50.33]    Pre-op Diagnosis: * No surgery found *        Anesthesia Procedure: CATH TRANSESOPHAGEAL ECHOCARDIOGRAM (LOYD)    * Surgery not found *    Pre-op vitals reviewed.  Temp: 98 °F (36.7 °C)  Pulse: 60  Resp: 16  BP: 127/76  SpO2: 97 %  Body mass index is 31.48 kg/m².    Current medications reviewed.  Hospital Medications:   warfarin (Coumadin) tab 5 mg  5 mg Oral Once at night    [COMPLETED] benzocaine (Hurricaine/Topex) 20 % mouth spray        [COMPLETED] potassium chloride (Klor-Con M10) tab 40 mEq  40 mEq Oral Q4H    sodium chloride 0.9% infusion   Intravenous Continuous    benzocaine (Hurricaine/Topex) 20 % mouth spray 1 spray  1 spray Mouth/Throat See Admin Instructions    [COMPLETED] heparin (Porcine) 1000 UNIT/ML injection - BOLUS IV 5,000 Units  5,000 Units Intravenous Once    [COMPLETED] heparin (Porcine) 63071 units/250 mL infusion (ACS/AFIB) INITIAL DOSE  1,000 Units/hr Intravenous Once    [COMPLETED] warfarin (Coumadin) tab 7.5 mg  7.5 mg Oral Once at night    [COMPLETED] Perflutren Lipid Microsphere (DEFINITY) 6.52 MG/ML injection 1.5 mL  1.5 mL Intravenous ONCE PRN    [COMPLETED] potassium chloride (Klor-Con) 20 MEQ oral powder 40 mEq  40 mEq Oral Q4H    [COMPLETED] warfarin (Coumadin) tab 7.5 mg  7.5 mg Oral Once at night    [Held by provider] bumetanide (Bumex) tab 2 mg  2 mg Oral BID (Diuretic)    [COMPLETED] warfarin (Coumadin) tab 5 mg  5 mg Oral Once at night    [COMPLETED] potassium chloride (Klor-Con M10) tab 40 mEq  40 mEq Oral Once    [COMPLETED] warfarin (Coumadin) tab 5 mg  5 mg Oral Once at night    calcium carbonate (Tums) chewable tab 500 mg  500 mg Oral Q8H PRN    cholecalciferol (Vitamin D3) tab 5,000 Units  5,000 Units Oral Nightly    cyproheptadine (Periactin) tab 4 mg  4 mg Oral Nightly    ferrous sulfate DR tab 325 mg  325 mg Oral Nightly     triamcinolone (Kenalog) 0.1 % ointment 1 Application  1 Application Topical Daily PRN    ascorbic acid (Vitamin C) tab 500 mg  500 mg Oral Nightly    ondansetron (Zofran) 4 MG/2ML injection 4 mg  4 mg Intravenous Q6H PRN    [COMPLETED] potassium chloride (Klor-Con M10) tab 40 mEq  40 mEq Oral Q4H    [COMPLETED] warfarin (Coumadin) tab 2.5 mg  2.5 mg Oral Once at night    [COMPLETED] furosemide (Lasix) 10 mg/mL injection 40 mg  40 mg Intravenous Once    allopurinol (Zyloprim) tab 100 mg  100 mg Oral Nightly    buPROPion SR (WELLBUTRIN SR) 12 hr tab 150 mg  150 mg Oral BID    clopidogrel (Plavix) tab 75 mg  75 mg Oral Nightly    digoxin (Lanoxin) tab 125 mcg  125 mcg Oral Nightly    dilTIAZem ER (CardIZEM CD) 24 hr cap 240 mg  240 mg Oral Nightly    FLUoxetine (PROzac) cap 20 mg  20 mg Oral BID    levothyroxine (Synthroid) tab 175 mcg  175 mcg Oral Daily @ 0700    metoprolol succinate ER (Toprol XL) 24 hr tab 25 mg  25 mg Oral Nightly    nortriptyline (Pamelor) cap 25 mg  25 mg Oral Nightly    rosuvastatin (Crestor) tab 5 mg  5 mg Oral QOD    acetaminophen (Tylenol Extra Strength) tab 500 mg  500 mg Oral Q4H PRN    [COMPLETED] warfarin (Coumadin) tab 5 mg  5 mg Oral Once at night       Outpatient Medications:     Medications Prior to Admission   Medication Sig Dispense Refill Last Dose    warfarin 2.5 MG Oral Tab Take 1 tablet (2.5 mg total) by mouth 3 (three) times a week. Q Mon., Wed., and Saturday per pt.   5/22/2024 at hs    empagliflozin (JARDIANCE) 10 MG Oral Tab Take 1 tablet (10 mg total) by mouth at bedtime. Per pt.   5/23/2024 at hs    levothyroxine 175 MCG Oral Tab Take 1 tablet (175 mcg total) by mouth before breakfast.   5/24/2024 at am    metOLazone 2.5 MG Oral Tab Take 1 tablet (2.5 mg total) by mouth every other day. Per pt.   5/22/2024 at am    dilTIAZem  MG Oral Capsule SR 24 Hr Take 1 capsule (240 mg total) by mouth daily. (Patient taking differently: Take 1 capsule (240 mg total) by mouth at  bedtime.) 90 capsule 3 5/23/2024 at hs    bumetanide 2 MG Oral Tab Take 1.5 tablets (3 mg total) by mouth daily. Per pt.   5/23/2024 at am    clopidogrel 75 MG Oral Tab Take 1 tablet (75 mg total) by mouth at bedtime. Per pt.   5/23/2024 at hs    metoprolol succinate ER 25 MG Oral Tablet 24 Hr Take 1 tablet (25 mg total) by mouth at bedtime. Per pt.   5/23/2024 at hs    Potassium Chloride ER 10 MEQ Oral Cap CR Take 4 capsules (40 mEq total) by mouth at bedtime. Per pt.   5/23/2024 at hs    rosuvastatin 5 MG Oral Tab Take 1 tablet (5 mg total) by mouth every other day.   5/23/2024 at HS    spironolactone 25 MG Oral Tab Take 1 tablet (25 mg total) by mouth daily.   5/23/2024 at am    warfarin 5 MG Oral Tab Take 1 tablet (5 mg total) by mouth nightly. Q Tues., Thurs., Friday and Sunday per pt.   5/23/2024 at hs    ferrous sulfate 325 (65 FE) MG Oral Tab EC Take 1 tablet (325 mg total) by mouth at bedtime. Per pt.   5/23/2024 at hs    Vitamin C 500 MG Oral Tab Take 1 tablet (500 mg total) by mouth at bedtime. Per pt.   5/23/2024 at hs    Zinc 50 MG Oral Cap Take 50 mg by mouth at bedtime. Per pt.   5/23/2024 at hs    Cholecalciferol 125 MCG (5000 UT) Oral Tab Take 1 tablet (5,000 Units total) by mouth at bedtime. Per pt.   5/23/2024 at hs    acetaminophen 325 MG Oral Tab Take 2 tablets (650 mg total) by mouth every 8 (eight) hours as needed for Pain.   5/23/2024    buPROPion  MG Oral Tablet 12 Hr Take 1 tablet (150 mg total) by mouth 2 (two) times daily.   5/24/2024 at am    cyproheptadine 4 MG Oral Tab Take 1 tablet (4 mg total) by mouth nightly.   5/23/2024 at hs    FLUoxetine 20 MG Oral Cap Take 1 capsule (20 mg total) by mouth 2 (two) times daily.   5/24/2024 at am    triamcinolone 0.1 % External Ointment Apply 1 Application topically daily as needed (Neuro dermatitis per pt.).   Past Week    digoxin 0.125 MG Oral Tab Take 1 tablet (125 mcg total) by mouth daily. (Patient taking differently: Take 1 tablet (125  mcg total) by mouth at bedtime.) 30 tablet 0 5/23/2024 at hs    allopurinol 100 MG Oral Tab Take 1 tablet (100 mg total) by mouth daily. (Patient taking differently: Take 1 tablet (100 mg total) by mouth at bedtime. Per pt.) 90 tablet 3 5/23/2024 at hs    Nortriptyline HCl 25 MG Oral Cap Take 1 capsule (25 mg total) by mouth nightly.   5/23/2024 at hs    Multiple Vitamin (MULTIVITAMIN OR) Take 1 tablet by mouth at bedtime. Per pt.   5/23/2024 at hs    calcium carbonate 500 MG Oral Chew Tab Chew 1 tablet (500 mg total) by mouth every 8 (eight) hours as needed (upset stomach).   More than a month       Allergies: Adhesive tape, Codeine, Doxycycline, Hydrocodone, Lisinopril, Metoprolol, Morphine sulfate, Opioid analgesics, Oxycontin, Oxytocin, Vicodin [hydrocodone-acetaminophen], and Skin adhesives      Anesthesia Evaluation    Patient summary reviewed.    Anesthetic Complications           GI/Hepatic/Renal      (+) GERD       (+) chronic renal disease                    Cardiovascular                (+) obesity  (+) hypertension     (+) CAD        (+) valvular problems/murmurs     (+) dysrhythmias and atrial fibrillation  (+) CHF                Endo/Other                           (+) arthritis  (+) rheumatoid arthritis     Pulmonary      (+) asthma  (+) COPD       (+) shortness of breath     (+) sleep apnea       Neuro/Psych                 (+) neuromuscular disease             ERINN (acute kidney injury) (HCC) Acute bronchospasm  Acute on chronic congestive heart failure, unspecified heart failure type (HCC) Acute on chronic diastolic congestive heart failure (HCC)  Acute pain of left knee Anemia, unspecified type  Ankylosing spondylitis (HCC) Aortic valve disease  Atrial fibrillation with RVR (HCC) Atrial fibrillation with rapid ventricular response ([facility])  Atrial fibrillation, chronic ([facility]) Bilateral carotid artery disease (HCC)  Binge eating disorder CKD (chronic kidney disease) stage 3, GFR 30-59 ml/min ([facility])  COPD  (chronic obstructive pulmonary disease) ([facility]) Carotid artery plaque, bilateral  Cellulitis of right lower extremity Cervical stenosis of spinal canal  Chronic diastolic CHF (congestive heart failure) ([facility]) Community acquired pneumonia, unspecified laterality  Constipation, unspecified constipation type Coronary artery disease involving native coronary artery of native heart with other form of angina pectoris ([facility])  Degenerative arthritis of finger Dehydration  Depression, unspecified depression type Essential hypertension  Exertional dyspnea Fibromyalgia  GERD (gastroesophageal reflux disease) 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, right hand  Gross hematuria High blood pressure  Hip pain Hospital discharge follow-up  Hypercholesteremia Hypervolemia  Hypervolemia, unspecified hypervolemia type Hypokalemia  Hyponatremia Hypothyroidism  Long term (current) use of anticoagulants [Z79.01] Lumbar degenerative disc disease  Major depressive disorder, single episode, in partial remission ([facility]) Malaise and fatigue  Morbid obesity with BMI of 40.0-44.9, adult ([facility]) Neuropathy  SHELDON on CPAP Osteomyelitis of toe of right foot ([facility])  PAF (paroxysmal atrial fibrillation) ([facility]) PE (pulmonary thromboembolism) ([facility])  Pre-op testing Presence of IVC filter  Renal insufficiency syndrome Respiratory syncytial virus (RSV)  Rheumatoid arthritis ([facility]) S/P KATHLEEN to LCx 3/15/21  S/P TAVR (transcatheter aortic valve replacement) Severe aortic stenosis  Status post evacuation of subdural hematoma Vitamin deficiency  Weakness generalized             Past Surgical History:   Procedure Laterality Date    Anesth,shoulder replacement Right 2014    hemiathroplasty    Back surgery  2000    Back surgery  2004    complete c-3 -7 ectomy    Biopsy Left 07/20/2023    TEMPORAL ARTERY    Cath transcatheter aortic valve replacement      Colonoscopy N/A 05/10/2018    Procedure: COLONOSCOPY, POSSIBLE BIOPSY, POSSIBLE POLYPECTOMY 09224;  Surgeon: Kendell Valentin MD;   Location: [facility], [facility]    Colonoscopy      D & c  2009    Dilation/curettage,diagnostic      Hip replacement surgery  2009    Rt hip    Knee replacement surgery      Both knees    Other surgical history      Thyroid removal    Other surgical history      LT foot spur removal    Total hip replacement      Total knee replacement       Social History     Socioeconomic History    Marital status: Single   Tobacco Use    Smoking status: Former     Current packs/day: 0.00     Average packs/day: 0.5 packs/day for 30.0 years (15.0 ttl pk-yrs)     Types: Cigarettes     Start date: 1961     Quit date: 1991     Years since quittin.3    Smokeless tobacco: Never   Vaping Use    Vaping status: Never Used   Substance and Sexual Activity    Alcohol use: No    Drug use: No   Other Topics Concern    Caffeine Concern No    Exercise Yes    Seat Belt Yes    Special Diet Yes     Comment: low sodium    Stress Concern Yes    Weight Concern No     History   Drug Use No     Available pre-op labs reviewed.  Lab Results   Component Value Date    WBC 7.5 2024    RBC 4.97 2024    HGB 16.0 2024    HCT 48.3 (H) 2024    MCV 97.2 2024    MCH 32.2 2024    MCHC 33.1 2024    RDW 13.2 2024    .0 2024     Lab Results   Component Value Date     2024    K 4.7 2024     2024    CO2 31.0 2024    BUN 37 (H) 2024    CREATSERUM 0.92 2024     (H) 2024    CA 9.8 2024     Lab Results   Component Value Date    INR 2.31 (H) 2024         Airway      Mallampati: II  Mouth opening: >3 FB  TM distance: 4 - 6 cm  Neck ROM: full Cardiovascular    Cardiovascular exam normal.         Dental    Dentition appears grossly intact         Pulmonary    Pulmonary exam normal.                 Other findings              ASA: 3   Plan: MAC  NPO status verified and           Plan/risks discussed with:  patient                Present on Admission:  **None**

## 2024-05-30 NOTE — PROCEDURES
Procedure: LOYD  Indication: Abn MV  Sedation: Per anesthesia      Findings:     Mod-Sev LAE   Smoke in LA / JOE   No LA / JOE thrombus   Mod ENA   Low NL EF - 50%   Thickened MV leaflets with reduced excursion   Mobile echogenicity associated with the mitral yumiko - appears associated with chordal apparatus of the posterior MV leaflet right near attachment to the mitral valve  Mild MR  Well seated TAVR prosthesis  Mild TR  PAP WNL  No doppler or echocontrast evidence of interatrial shunting  Mild atheromatous aortic plaquing      Full report dictated:  [id number]

## 2024-05-30 NOTE — CONSULTS
Infectious Disease Initial Consultation      Date of admission: 5/24/2024  5:34 PM     Date of service: 05/30/24 5:38 PM    Consult requested by: Palmira Vogt*     Reason for consult: Question of endocarditis, mitral valve echodensity    Chief complaint: Shortness of breath    History of present illness: Arleth Weiss is a 69 year old female with history of chronic diastolic heart failure, atrial fibrillation, TAVR back in 2021, CAD, who presented to the hospital with worsening shortness of breath along with weight gain in the setting of worsening congestive heart failure.    At the time of admission, patient was afebrile, hemodynamically stable.  Labs revealed unremarkable BMP and LFTs.  proBNP was elevated.  CBC was unremarkable.  Chest x-ray done at time of admission showed bilateral pulmonary edema.  Patient was started on IV diuresis and admitted to the medical floor.  As part of her heart failure workup, she underwent a TTE on 5/28, that showed a moderately calcified mitral valve annulus with a mobile echogenic structure on the posterior leaflet chordae.  Her TAVR bioprosthesis did not show any evidence of infection.  Given the mitral valve lesion, she underwent a LOYD on 5/30 that confirmed a lesion.  ID were consulted to rule out underlying endocarditis.    Upon questioning the patient, the patient denies any fevers.  No chills.  No night sweats.  No abnormal weight loss.  No swollen lymph nodes.  No other constitutional symptoms outside of what she presented with.  The patient has a history of rheumatoid arthritis; however, not on any therapy for that.    Risk factors/Exposures:  Living situation: At home alone  Sick contacts: None  Animals: No pets or other animal exposure  Travel: No recent local/international travel  /penitentiary/correction: None  Outdoor activities: Nothing unusual  Active TB exposure: None  Employment: Currently retired  IV drug use: None    Review of systems:  All other  components of the review of systems are negative, except those described in the history of present illness.     Past Medical History:    Aortic stenosis    S/P TAVR    ASTHMA    Asthma (HCC)    Back problem    CANCER    thyroid    Cancer (HCC)    thyroid     COPD (chronic obstructive pulmonary disease) (HCC)    DEPRESSION    Disorder of thyroid    Essential hypertension    Fibromyalgia    High blood pressure    High cholesterol    HYPERTENSION    HYPOTHYROIDISM    Muscle weakness    OBESITY    OSTEOARTHRITIS    Osteoarthritis    OTHER DISEASES    Fibromyalgia    OTHER DISEASES    Pulmonary emboli    OTHER DISEASES    Lymph edema    OTHER DISEASES    Pulmonary hypertension    Peripheral vascular disease (HCC)    Pulmonary embolism (HCC)    Shortness of breath    ON EXERTION    SLEEP APNEA    Sleep apnea    CPAP     Past Surgical History:   Procedure Laterality Date    Anesth,shoulder replacement Right 2014    hemiathroplasty    Back surgery      Back surgery      complete c-3 -7 ectomy    Biopsy Left 2023    TEMPORAL ARTERY    Cath transcatheter aortic valve replacement      Colonoscopy N/A 05/10/2018    Procedure: COLONOSCOPY, POSSIBLE BIOPSY, POSSIBLE POLYPECTOMY 38178;  Surgeon: Kendell Valentin MD;  Location: [facility], [facility]    Colonoscopy      D & c  2009    Dilation/curettage,diagnostic      Hip replacement surgery  2009    Rt hip    Knee replacement surgery      Both knees    Other surgical history      Thyroid removal    Other surgical history      LT foot spur removal    Total hip replacement      Total knee replacement       Social History     Socioeconomic History    Marital status: Single   Tobacco Use    Smoking status: Former     Current packs/day: 0.00     Average packs/day: 0.5 packs/day for 30.0 years (15.0 ttl pk-yrs)     Types: Cigarettes     Start date: 1961     Quit date: 1991     Years since quittin.3    Smokeless tobacco: Never   Vaping  Use    Vaping status: Never Used   Substance and Sexual Activity    Alcohol use: No    Drug use: No   Other Topics Concern    Caffeine Concern No    Exercise Yes    Seat Belt Yes    Special Diet Yes     Comment: low sodium    Stress Concern Yes    Weight Concern No     Social Determinants of Health     Food Insecurity: No Food Insecurity (5/24/2024)    Food Insecurity     Food Insecurity: Never true   Transportation Needs: No Transportation Needs (5/24/2024)    Transportation Needs     Lack of Transportation: No   Housing Stability: Low Risk  (5/24/2024)    Housing Stability     Housing Instability: No     Family History   Problem Relation Age of Onset    Hypertension Father     Lipids Father     Diabetes Mother     Hypertension Mother     Lipids Mother     Cancer Brother     Hypertension Brother      Reviewed, see above    Medications:    warfarin    [START ON 5/31/2024] bumetanide    benzocaine    calcium carbonate    cholecalciferol    cyproheptadine    ferrous sulfate    triamcinolone    Vitamin C    ondansetron    allopurinol    buPROPion SR    clopidogrel    digoxin    dilTIAZem ER    FLUoxetine    levothyroxine    metoprolol succinate ER    nortriptyline    rosuvastatin    acetaminophen     Allergies:  Allergies   Allergen Reactions    Adhesive Tape HIVES     ALL ADHESIVES    Codeine HIVES    Doxycycline SWELLING    Hydrocodone UNKNOWN    Lisinopril TONGUE SWELLING    Metoprolol HIVES    Morphine Sulfate HIVES    Opioid Analgesics UNKNOWN    Oxycontin ITCHING    Oxytocin HIVES    Vicodin [Hydrocodone-Acetaminophen] ITCHING    Skin Adhesives OTHER (SEE COMMENTS)       Physical Exam:  Vitals:    05/30/24 1700   BP: 107/86   Pulse: 74   Resp:    Temp:      Vitals signs and nursing note reviewed.   Constitutional:       Appearance: Normal appearance.   HENT:      Head: Normocephalic and atraumatic.      Mouth: Mucous membranes are moist.   Neck:      Musculoskeletal: Neck supple.   Cardiovascular:      Rate and  Rhythm: Normal rate.      Heart sounds: Normal heart sounds. No murmur. No friction rub. No gallop.    Pulmonary:      Effort: Pulmonary effort is normal. No respiratory distress.      Breath sounds: Normal breath sounds. No stridor. No wheezing, rhonchi or rales.   Chest:      Chest wall: No tenderness.   Abdominal:      General: Abdomen is flat. There is no distension.      Palpations: Abdomen is soft. There is no mass.      Tenderness: There is no tenderness. There is no guarding or rebound.      Hernia: No hernia is present.   Musculoskeletal:      Right lower leg: No edema.      Left lower leg: No edema.   Skin:     General: Skin is warm and dry.   Neurological:      General: No focal deficit present.      Mental Status: Alert and oriented to person, place, and time.     Laboratory data:  I have independently reviewed all lab results; including old microbiological results.  Lab Results   Component Value Date    WBC 7.5 05/30/2024    HGB 16.0 05/30/2024    HCT 48.3 05/30/2024    .0 05/30/2024    CREATSERUM 0.92 05/30/2024    BUN 37 05/30/2024     05/30/2024    K 4.7 05/30/2024     05/30/2024    CO2 31.0 05/30/2024     05/30/2024    CA 9.8 05/30/2024    PTT 70.4 05/30/2024    INR 2.31 05/30/2024    MG 2.6 05/30/2024        Recent Labs   Lab 05/29/24  0605 05/29/24  1049 05/30/24  0623   RBC 5.12   < > 4.97   HGB 16.4*   < > 16.0   HCT 48.4*   < > 48.3*   MCV 94.5   < > 97.2   MCH 32.0   < > 32.2   MCHC 33.9   < > 33.1   RDW 13.2   < > 13.2   NEPRELIM 8.48*  --   --    WBC 11.2*   < > 7.5   .0   < > 290.0    < > = values in this interval not displayed.       Microbiology data:  Hospital Encounter on 05/24/24   1. Blood Culture     Status: None (Preliminary result)    Collection Time: 05/28/24  6:53 PM    Specimen: Blood,peripheral   Result Value Ref Range    Blood Culture Result No Growth 1 Day N/A         Radiology:  I have reviewed all imaging data available independently.    Chest x-ray on 5/24: Bilateral pulmonary edema    Impression:  Arleth Weiss is a 69 year old female with     Mitral valve echodensity seen on LOYD and TTE  The patient is not having any features of endocarditis nor is she having any embolic phenomena  In addition to that, it would be extremely peculiar for her to develop endocarditis of the mitral valve and not have any involvement of her TAVR that is a bioprosthetic valve  Her elevated inflammatory markers are related to her untreated rheumatoid arthritis  Her blood cultures 2 days ago remain negative to date    Recommendations:     Had a long discussion with the patient in regards to her mitral valve findings.  I explained to her that this is most likely not an infectious process as she is not having any features of an infection.  We will repeat her blood cultures and check labs below to rule out other causes for culture-negative endocarditis, but I would not recommend any form of therapy at this point unless the below labs show anything  The patient understands that if she is to develop any fevers or chills or any new concerning symptoms, that she will come to the emergency room for an evaluation.  She understood and agreed.  Check PET CT of the heart.  Repeat 2 sets of blood cultures  Check Whipple's serology,  Check chlamydia serology,  Check fungal survey and Coccidioides serology,  Check Brucella serology,  Check Bartonella serology and PCR,  Check Q fever serology  Continue to monitor daily labs for antibiotic toxicity  Further recommendations will depend on the above work-up and clinical progress     The plan of care was discussed with the primary hospital team, Palmira Vogt*     Recommendations were also discussed with the patient; all questions were answered.     Thank you for this consultation. Please don't hesitate to call the ID team for questions or any acute changes in patient's clinical condition.    Please note that this report has  been produced using speech recognition software and may contain errors related to that system including, but not limited to, errors in grammar, punctuation, and spelling, as well as words and phrases that possibly may have been recognized inappropriately.  If there are any questions or concerns, contact the dictating provider for clarification.    The  [employer] Cures Act makes medical notes like these available to patients in the interest of transparency. Please be advised this is a medical document. Medical documents are intended to carry relevant information, facts as evident, and the clinical opinion of the practitioner. The medical note is intended as peer to peer communication and may appear blunt or direct. It is written in medical language and may contain abbreviations or verbiage that are unfamiliar.     Yvette Whitfield MD  [facility]. Tel: [phone]. Fax: [phone].     Arleth Weiss : 1955 MRN: [id number] [facility]: [id number]

## 2024-05-30 NOTE — PLAN OF CARE
In bed ,awake,sleepy ,denies any chest discomfort  No sob,on room air  Npo ,LOYD today  Consent signed  On going Heparin gtt at 1000 units at 10cc/hr ,PTT therapeutic this am  Coumadin 7.5 given last night  INR 2.31 today  Continue to monitor.    Problem: CARDIOVASCULAR - ADULT  Goal: Absence of cardiac arrhythmias or at baseline  Description: INTERVENTIONS:  - Continuous cardiac monitoring, monitor vital signs, obtain 12 lead EKG if indicated  - Evaluate effectiveness of antiarrhythmic and heart rate control medications as ordered  - Initiate emergency measures for life threatening arrhythmias  - Monitor electrolytes and administer replacement therapy as ordered  Outcome: Progressing  Goal: Maintains optimal cardiac output and hemodynamic stability  Description: INTERVENTIONS:  - Monitor vital signs, rhythm, and trends  - Monitor for bleeding, hypotension and signs of decreased cardiac output  - Evaluate effectiveness of vasoactive medications to optimize hemodynamic stability  - Monitor arterial and/or venous puncture sites for bleeding and/or hematoma  - Assess quality of pulses, skin color and temperature  - Assess for signs of decreased coronary artery perfusion - ex. Angina  - Evaluate fluid balance, assess for edema, trend weights  Outcome: Progressing     Problem: HEMATOLOGIC - ADULT  Goal: Maintains hematologic stability  Description: INTERVENTIONS  - Assess for signs and symptoms of bleeding or hemorrhage  - Monitor labs and vital signs for trends  - Administer supportive blood products/factors, fluids and medications as ordered and appropriate  - Administer supportive blood products/factors as ordered and appropriate  Outcome: Progressing  Goal: Free from bleeding injury  Description: (Example usage: patient with low platelets)  INTERVENTIONS:  - Avoid intramuscular injections, enemas and rectal medication administration  - Ensure safe mobilization of patient  - Hold pressure on venipuncture sites to  achieve adequate hemostasis  - Assess for signs and symptoms of internal bleeding  - Monitor lab trends  - Patient is to report abnormal signs of bleeding to staff  - Avoid use of toothpicks and dental floss  - Use electric shaver for shaving  - Use soft bristle tooth brush  - Limit straining and forceful nose blowing  Outcome: Progressing

## 2024-05-31 VITALS
OXYGEN SATURATION: 96 % | HEIGHT: 69 IN | BODY MASS INDEX: 30.07 KG/M2 | DIASTOLIC BLOOD PRESSURE: 104 MMHG | SYSTOLIC BLOOD PRESSURE: 126 MMHG | WEIGHT: 203 LBS | HEART RATE: 77 BPM | RESPIRATION RATE: 18 BRPM | TEMPERATURE: 98 F

## 2024-05-31 LAB
ANION GAP SERPL CALC-SCNC: 7 MMOL/L (ref 0–18)
BUN BLD-MCNC: 27 MG/DL (ref 9–23)
CALCIUM BLD-MCNC: 9.1 MG/DL (ref 8.5–10.1)
CHLORIDE SERPL-SCNC: 107 MMOL/L (ref 98–112)
CO2 SERPL-SCNC: 26 MMOL/L (ref 21–32)
CREAT BLD-MCNC: 0.75 MG/DL
EGFRCR SERPLBLD CKD-EPI 2021: 85 ML/MIN/1.73M2 (ref 60–?)
ERYTHROCYTE [DISTWIDTH] IN BLOOD BY AUTOMATED COUNT: 12.9 %
GLUCOSE BLD-MCNC: 120 MG/DL (ref 70–99)
HCT VFR BLD AUTO: 44 %
HGB BLD-MCNC: 14.6 G/DL
INR BLD: 2.79 (ref 0.8–1.2)
MAGNESIUM SERPL-MCNC: 2.4 MG/DL (ref 1.6–2.6)
MCH RBC QN AUTO: 32 PG (ref 26–34)
MCHC RBC AUTO-ENTMCNC: 33.2 G/DL (ref 31–37)
MCV RBC AUTO: 96.5 FL
OSMOLALITY SERPL CALC.SUM OF ELEC: 296 MOSM/KG (ref 275–295)
PLATELET # BLD AUTO: 241 10(3)UL (ref 150–450)
POTASSIUM SERPL-SCNC: 3.9 MMOL/L (ref 3.5–5.1)
PROTHROMBIN TIME: 29.8 SECONDS (ref 11.6–14.8)
RBC # BLD AUTO: 4.56 X10(6)UL
SODIUM SERPL-SCNC: 140 MMOL/L (ref 136–145)
WBC # BLD AUTO: 6.5 X10(3) UL (ref 4–11)

## 2024-05-31 PROCEDURE — 83735 ASSAY OF MAGNESIUM: CPT | Performed by: HOSPITALIST

## 2024-05-31 PROCEDURE — 85027 COMPLETE CBC AUTOMATED: CPT | Performed by: HOSPITALIST

## 2024-05-31 PROCEDURE — 80048 BASIC METABOLIC PNL TOTAL CA: CPT | Performed by: HOSPITALIST

## 2024-05-31 PROCEDURE — 94799 UNLISTED PULMONARY SVC/PX: CPT

## 2024-05-31 PROCEDURE — 85610 PROTHROMBIN TIME: CPT | Performed by: HOSPITALIST

## 2024-05-31 RX ORDER — WARFARIN SODIUM 5 MG/1
5 TABLET ORAL
Status: DISCONTINUED | OUTPATIENT
Start: 2024-05-31 | End: 2024-05-31

## 2024-05-31 RX ORDER — ONDANSETRON 2 MG/ML
4 INJECTION INTRAMUSCULAR; INTRAVENOUS EVERY 6 HOURS PRN
Status: DISCONTINUED | OUTPATIENT
Start: 2024-05-31 | End: 2024-05-31 | Stop reason: HOSPADM

## 2024-05-31 RX ORDER — LABETALOL HYDROCHLORIDE 5 MG/ML
5 INJECTION, SOLUTION INTRAVENOUS EVERY 5 MIN PRN
Status: DISCONTINUED | OUTPATIENT
Start: 2024-05-31 | End: 2024-05-31

## 2024-05-31 RX ORDER — MIDAZOLAM HYDROCHLORIDE 1 MG/ML
1 INJECTION INTRAMUSCULAR; INTRAVENOUS EVERY 5 MIN PRN
Status: DISCONTINUED | OUTPATIENT
Start: 2024-05-31 | End: 2024-05-31

## 2024-05-31 RX ORDER — METOCLOPRAMIDE HYDROCHLORIDE 5 MG/ML
10 INJECTION INTRAMUSCULAR; INTRAVENOUS EVERY 8 HOURS PRN
Status: DISCONTINUED | OUTPATIENT
Start: 2024-05-31 | End: 2024-05-31

## 2024-05-31 RX ORDER — NALOXONE HYDROCHLORIDE 0.4 MG/ML
80 INJECTION, SOLUTION INTRAMUSCULAR; INTRAVENOUS; SUBCUTANEOUS AS NEEDED
Status: DISCONTINUED | OUTPATIENT
Start: 2024-05-31 | End: 2024-05-31

## 2024-05-31 RX ORDER — SODIUM CHLORIDE, SODIUM LACTATE, POTASSIUM CHLORIDE, CALCIUM CHLORIDE 600; 310; 30; 20 MG/100ML; MG/100ML; MG/100ML; MG/100ML
INJECTION, SOLUTION INTRAVENOUS CONTINUOUS
Status: DISCONTINUED | OUTPATIENT
Start: 2024-05-31 | End: 2024-05-31

## 2024-05-31 NOTE — PLAN OF CARE
Shift Note:  Assumed care of patient. Patient alert and oriented x4.   Glasses and crutches from home.   Patient on room air, denies difficulty breathing, lung sounds clear.   Denies any cardiac symptoms, Afib with HR in the 80s on tele. Denies pain at this time.   Continent of bowel and incontinent of bladder.   Ambulates standby assists and crutches from home, call light within reach, tolerating care well.     POC:  - DC planning today             Problem: Diabetes/Glucose Control  Goal: Glucose maintained within prescribed range  Description: INTERVENTIONS:  - Monitor Blood Glucose as ordered  - Assess for signs and symptoms of hyperglycemia and hypoglycemia  - Administer ordered medications to maintain glucose within target range  - Assess barriers to adequate nutritional intake and initiate nutrition consult as needed  - Instruct patient on self management of diabetes  Outcome: Progressing     Problem: Patient/Family Goals  Goal: Patient/Family Long Term Goal  Description: Patient's Long Term Goal:discharge    Interventions:  -cardiology consult and follow up -orders ,labs, v/s and procedures ,telemetry monitoring, Activity as ordered meds compliance o2 support  if necessary , CPAP adjustment, iv diuretics ,update plan of care, ,daily weight , strict intake and output   - See additional Care Plan goals for specific interventions  Outcome: Progressing  Goal: Patient/Family Short Term Goal  Description: Patient's Short Term Goal: breath comfortable , safety    Interventions:   - PAIN MGT , BREATH COMFORTABLE WITH CPAP / O2 SUPPORT , Adhere to medication regimen, follow up appointments after discharge, ,education to weight control, cardiac life style, heart healthy diet   - See additional Care Plan goals for specific interventions  Outcome: Progressing     Problem: CARDIOVASCULAR - ADULT  Goal: Absence of cardiac arrhythmias or at baseline  Description: INTERVENTIONS:  - Continuous cardiac monitoring, monitor  vital signs, obtain 12 lead EKG if indicated  - Evaluate effectiveness of antiarrhythmic and heart rate control medications as ordered  - Initiate emergency measures for life threatening arrhythmias  - Monitor electrolytes and administer replacement therapy as ordered  Outcome: Progressing  Goal: Maintains optimal cardiac output and hemodynamic stability  Description: INTERVENTIONS:  - Monitor vital signs, rhythm, and trends  - Monitor for bleeding, hypotension and signs of decreased cardiac output  - Evaluate effectiveness of vasoactive medications to optimize hemodynamic stability  - Monitor arterial and/or venous puncture sites for bleeding and/or hematoma  - Assess quality of pulses, skin color and temperature  - Assess for signs of decreased coronary artery perfusion - ex. Angina  - Evaluate fluid balance, assess for edema, trend weights  Outcome: Progressing     Problem: RESPIRATORY - ADULT  Goal: Achieves optimal ventilation and oxygenation  Description: INTERVENTIONS:  - Assess for changes in respiratory status  - Assess for changes in mentation and behavior  - Position to facilitate oxygenation and minimize respiratory effort  - Oxygen supplementation based on oxygen saturation or ABGs  - Provide Smoking Cessation handout, if applicable  - Encourage broncho-pulmonary hygiene including cough, deep breathe, Incentive Spirometry  - Assess the need for suctioning and perform as needed  - Assess and instruct to report SOB or any respiratory difficulty  - Respiratory Therapy support as indicated  - Manage/alleviate anxiety  - Monitor for signs/symptoms of CO2 retention  Outcome: Progressing     Problem: GASTROINTESTINAL - ADULT  Goal: Minimal or absence of nausea and vomiting  Description: INTERVENTIONS:  - Maintain adequate hydration with IV or PO as ordered and tolerated  - Nasogastric tube to low intermittent suction as ordered  - Evaluate effectiveness of ordered antiemetic medications  - Provide  nonpharmacologic comfort measures as appropriate  - Advance diet as tolerated, if ordered  - Obtain nutritional consult as needed  - Evaluate fluid balance  Outcome: Progressing  Goal: Maintains or returns to baseline bowel function  Description: INTERVENTIONS:  - Assess bowel function  - Maintain adequate hydration with IV or PO as ordered and tolerated  - Evaluate effectiveness of GI medications  - Encourage mobilization and activity  - Obtain nutritional consult as needed  - Establish a toileting routine/schedule  - Consider collaborating with pharmacy to review patient's medication profile  Outcome: Progressing  Goal: Achieves appropriate nutritional intake (bariatric)  Description: INTERVENTIONS:  - Monitor for over-consumption  - Identify factors contributing to increased intake, treat as appropriate  - Monitor I&O, WT and lab values  - Obtain nutritional consult as needed  - Evaluate psychosocial factors contributing to over-consumption  Outcome: Progressing     Problem: METABOLIC/FLUID AND ELECTROLYTES - ADULT  Goal: Glucose maintained within prescribed range  Description: INTERVENTIONS:  - Monitor Blood Glucose as ordered  - Assess for signs and symptoms of hyperglycemia and hypoglycemia  - Administer ordered medications to maintain glucose within target range  - Assess barriers to adequate nutritional intake and initiate nutrition consult as needed  - Instruct patient on self management of diabetes  Outcome: Progressing  Goal: Electrolytes maintained within normal limits  Description: INTERVENTIONS:  - Monitor labs and rhythm and assess patient for signs and symptoms of electrolyte imbalances  - Administer electrolyte replacement as ordered  - Monitor response to electrolyte replacements, including rhythm and repeat lab results as appropriate  - Fluid restriction as ordered  - Instruct patient on fluid and nutrition restrictions as appropriate  Outcome: Progressing  Goal: Hemodynamic stability and optimal  renal function maintained  Description: INTERVENTIONS:  - Monitor labs and assess for signs and symptoms of volume excess or deficit  - Monitor intake, output and patient weight  - Monitor urine specific gravity, serum osmolarity and serum sodium as indicated or ordered  - Monitor response to interventions for patient's volume status, including labs, urine output, blood pressure (other measures as available)  - Encourage oral intake as appropriate  - Instruct patient on fluid and nutrition restrictions as appropriate  Outcome: Progressing     Problem: SKIN/TISSUE INTEGRITY - ADULT  Goal: Skin integrity remains intact  Description: INTERVENTIONS  - Assess and document risk factors for pressure ulcer development  - Assess and document skin integrity  - Monitor for areas of redness and/or skin breakdown  - Initiate interventions, skin care algorithm/standards of care as needed  Outcome: Progressing  Goal: Incision(s), wounds(s) or drain site(s) healing without S/S of infection  Description: INTERVENTIONS:  - Assess and document risk factors for pressure ulcer development  - Assess and document skin integrity  - Assess and document dressing/incision, wound bed, drain sites and surrounding tissue  - Implement wound care per orders  - Initiate isolation precautions as appropriate  - Initiate Pressure Ulcer prevention bundle as indicated  Outcome: Progressing  Goal: Oral mucous membranes remain intact  Description: INTERVENTIONS  - Assess oral mucosa and hygiene practices  - Implement preventative oral hygiene regimen  - Implement oral medicated treatments as ordered  Outcome: Progressing     Problem: HEMATOLOGIC - ADULT  Goal: Maintains hematologic stability  Description: INTERVENTIONS  - Assess for signs and symptoms of bleeding or hemorrhage  - Monitor labs and vital signs for trends  - Administer supportive blood products/factors, fluids and medications as ordered and appropriate  - Administer supportive blood  products/factors as ordered and appropriate  Outcome: Progressing  Goal: Free from bleeding injury  Description: (Example usage: patient with low platelets)  INTERVENTIONS:  - Avoid intramuscular injections, enemas and rectal medication administration  - Ensure safe mobilization of patient  - Hold pressure on venipuncture sites to achieve adequate hemostasis  - Assess for signs and symptoms of internal bleeding  - Monitor lab trends  - Patient is to report abnormal signs of bleeding to staff  - Avoid use of toothpicks and dental floss  - Use electric shaver for shaving  - Use soft bristle tooth brush  - Limit straining and forceful nose blowing  Outcome: Progressing     Problem: MUSCULOSKELETAL - ADULT  Goal: Return mobility to safest level of function  Description: INTERVENTIONS:  - Assess patient stability and activity tolerance for standing, transferring and ambulating w/ or w/o assistive devices  - Assist with transfers and ambulation using safe patient handling equipment as needed  - Ensure adequate protection for wounds/incisions during mobilization  - Obtain PT/OT consults as needed  - Advance activity as appropriate  - Communicate ordered activity level and limitations with patient/family  Outcome: Progressing  Goal: Maintain proper alignment of affected body part  Description: INTERVENTIONS:  - Support and protect limb and body alignment per provider's orders  - Instruct and reinforce with patient and family use of appropriate assistive device and precautions (e.g. spinal or hip dislocation precautions)  Outcome: Progressing     Problem: Impaired Functional Mobility  Goal: Achieve highest/safest level of mobility/gait  Description: Interventions:  - Assess patient's functional ability and stability  - Promote increasing activity/tolerance for mobility and gait  Outcome: Progressing     Problem: SAFETY ADULT - FALL  Goal: Free from fall injury  Description: INTERVENTIONS:  - Assess pt frequently for physical  needs  - Identify cognitive and physical deficits and behaviors that affect risk of falls.  - [place] fall precautions as indicated by assessment.  - Educate pt/family on patient safety including physical limitations  - Instruct pt to call for assistance with activity based on assessment  - Modify environment to reduce risk of injury  - Provide assistive devices as appropriate  - Consider OT/PT consult to assist with strengthening/mobility  - Encourage toileting schedule  Outcome: Progressing

## 2024-05-31 NOTE — PLAN OF CARE
Patient is alert & oriented x4. Pt ambulates w/ bilateral crutches, x1 assist. Breath sounds are clear;diminished bilateral. On room air. Afib rhythm. Headache reported, tylenol given. LLE dressings intact. Bed in low position and call light within reach. Reviewed plan of care and patient verbalizes understanding.        Problem: Diabetes/Glucose Control  Goal: Glucose maintained within prescribed range  Description: INTERVENTIONS:  - Monitor Blood Glucose as ordered  - Assess for signs and symptoms of hyperglycemia and hypoglycemia  - Administer ordered medications to maintain glucose within target range  - Assess barriers to adequate nutritional intake and initiate nutrition consult as needed  - Instruct patient on self management of diabetes  Outcome: Progressing     Problem: Patient/Family Goals  Goal: Patient/Family Long Term Goal  Description: Patient's Long Term Goal:discharge    Interventions:  -cardiology consult and follow up -orders ,labs, v/s and procedures ,telemetry monitoring, Activity as ordered meds compliance o2 support  if necessary , CPAP adjustment, iv diuretics ,update plan of care, ,daily weight , strict intake and output   - See additional Care Plan goals for specific interventions  Outcome: Progressing  Goal: Patient/Family Short Term Goal  Description: Patient's Short Term Goal: breath comfortable , safety    Interventions:   - PAIN MGT , BREATH COMFORTABLE WITH CPAP / O2 SUPPORT , Adhere to medication regimen, follow up appointments after discharge, ,education to weight control, cardiac life style, heart healthy diet   - See additional Care Plan goals for specific interventions  Outcome: Progressing     Problem: CARDIOVASCULAR - ADULT  Goal: Absence of cardiac arrhythmias or at baseline  Description: INTERVENTIONS:  - Continuous cardiac monitoring, monitor vital signs, obtain 12 lead EKG if indicated  - Evaluate effectiveness of antiarrhythmic and heart rate control medications as  ordered  - Initiate emergency measures for life threatening arrhythmias  - Monitor electrolytes and administer replacement therapy as ordered  Outcome: Progressing  Goal: Maintains optimal cardiac output and hemodynamic stability  Description: INTERVENTIONS:  - Monitor vital signs, rhythm, and trends  - Monitor for bleeding, hypotension and signs of decreased cardiac output  - Evaluate effectiveness of vasoactive medications to optimize hemodynamic stability  - Monitor arterial and/or venous puncture sites for bleeding and/or hematoma  - Assess quality of pulses, skin color and temperature  - Assess for signs of decreased coronary artery perfusion - ex. Angina  - Evaluate fluid balance, assess for edema, trend weights  Outcome: Progressing     Problem: RESPIRATORY - ADULT  Goal: Achieves optimal ventilation and oxygenation  Description: INTERVENTIONS:  - Assess for changes in respiratory status  - Assess for changes in mentation and behavior  - Position to facilitate oxygenation and minimize respiratory effort  - Oxygen supplementation based on oxygen saturation or ABGs  - Provide Smoking Cessation handout, if applicable  - Encourage broncho-pulmonary hygiene including cough, deep breathe, Incentive Spirometry  - Assess the need for suctioning and perform as needed  - Assess and instruct to report SOB or any respiratory difficulty  - Respiratory Therapy support as indicated  - Manage/alleviate anxiety  - Monitor for signs/symptoms of CO2 retention  Outcome: Progressing     Problem: GASTROINTESTINAL - ADULT  Goal: Minimal or absence of nausea and vomiting  Description: INTERVENTIONS:  - Maintain adequate hydration with IV or PO as ordered and tolerated  - Nasogastric tube to low intermittent suction as ordered  - Evaluate effectiveness of ordered antiemetic medications  - Provide nonpharmacologic comfort measures as appropriate  - Advance diet as tolerated, if ordered  - Obtain nutritional consult as needed  -  Evaluate fluid balance  Outcome: Progressing  Goal: Maintains or returns to baseline bowel function  Description: INTERVENTIONS:  - Assess bowel function  - Maintain adequate hydration with IV or PO as ordered and tolerated  - Evaluate effectiveness of GI medications  - Encourage mobilization and activity  - Obtain nutritional consult as needed  - Establish a toileting routine/schedule  - Consider collaborating with pharmacy to review patient's medication profile  Outcome: Progressing  Goal: Achieves appropriate nutritional intake (bariatric)  Description: INTERVENTIONS:  - Monitor for over-consumption  - Identify factors contributing to increased intake, treat as appropriate  - Monitor I&O, WT and lab values  - Obtain nutritional consult as needed  - Evaluate psychosocial factors contributing to over-consumption  Outcome: Progressing     Problem: METABOLIC/FLUID AND ELECTROLYTES - ADULT  Goal: Glucose maintained within prescribed range  Description: INTERVENTIONS:  - Monitor Blood Glucose as ordered  - Assess for signs and symptoms of hyperglycemia and hypoglycemia  - Administer ordered medications to maintain glucose within target range  - Assess barriers to adequate nutritional intake and initiate nutrition consult as needed  - Instruct patient on self management of diabetes  Outcome: Progressing  Goal: Electrolytes maintained within normal limits  Description: INTERVENTIONS:  - Monitor labs and rhythm and assess patient for signs and symptoms of electrolyte imbalances  - Administer electrolyte replacement as ordered  - Monitor response to electrolyte replacements, including rhythm and repeat lab results as appropriate  - Fluid restriction as ordered  - Instruct patient on fluid and nutrition restrictions as appropriate  Outcome: Progressing  Goal: Hemodynamic stability and optimal renal function maintained  Description: INTERVENTIONS:  - Monitor labs and assess for signs and symptoms of volume excess or  deficit  - Monitor intake, output and patient weight  - Monitor urine specific gravity, serum osmolarity and serum sodium as indicated or ordered  - Monitor response to interventions for patient's volume status, including labs, urine output, blood pressure (other measures as available)  - Encourage oral intake as appropriate  - Instruct patient on fluid and nutrition restrictions as appropriate  Outcome: Progressing     Problem: SKIN/TISSUE INTEGRITY - ADULT  Goal: Skin integrity remains intact  Description: INTERVENTIONS  - Assess and document risk factors for pressure ulcer development  - Assess and document skin integrity  - Monitor for areas of redness and/or skin breakdown  - Initiate interventions, skin care algorithm/standards of care as needed  Outcome: Progressing  Goal: Incision(s), wounds(s) or drain site(s) healing without S/S of infection  Description: INTERVENTIONS:  - Assess and document risk factors for pressure ulcer development  - Assess and document skin integrity  - Assess and document dressing/incision, wound bed, drain sites and surrounding tissue  - Implement wound care per orders  - Initiate isolation precautions as appropriate  - Initiate Pressure Ulcer prevention bundle as indicated  Outcome: Progressing  Goal: Oral mucous membranes remain intact  Description: INTERVENTIONS  - Assess oral mucosa and hygiene practices  - Implement preventative oral hygiene regimen  - Implement oral medicated treatments as ordered  Outcome: Progressing     Problem: HEMATOLOGIC - ADULT  Goal: Maintains hematologic stability  Description: INTERVENTIONS  - Assess for signs and symptoms of bleeding or hemorrhage  - Monitor labs and vital signs for trends  - Administer supportive blood products/factors, fluids and medications as ordered and appropriate  - Administer supportive blood products/factors as ordered and appropriate  Outcome: Progressing  Goal: Free from bleeding injury  Description: (Example usage:  patient with low platelets)  INTERVENTIONS:  - Avoid intramuscular injections, enemas and rectal medication administration  - Ensure safe mobilization of patient  - Hold pressure on venipuncture sites to achieve adequate hemostasis  - Assess for signs and symptoms of internal bleeding  - Monitor lab trends  - Patient is to report abnormal signs of bleeding to staff  - Avoid use of toothpicks and dental floss  - Use electric shaver for shaving  - Use soft bristle tooth brush  - Limit straining and forceful nose blowing  Outcome: Progressing     Problem: MUSCULOSKELETAL - ADULT  Goal: Return mobility to safest level of function  Description: INTERVENTIONS:  - Assess patient stability and activity tolerance for standing, transferring and ambulating w/ or w/o assistive devices  - Assist with transfers and ambulation using safe patient handling equipment as needed  - Ensure adequate protection for wounds/incisions during mobilization  - Obtain PT/OT consults as needed  - Advance activity as appropriate  - Communicate ordered activity level and limitations with patient/family  Outcome: Progressing  Goal: Maintain proper alignment of affected body part  Description: INTERVENTIONS:  - Support and protect limb and body alignment per provider's orders  - Instruct and reinforce with patient and family use of appropriate assistive device and precautions (e.g. spinal or hip dislocation precautions)  Outcome: Progressing     Problem: Impaired Functional Mobility  Goal: Achieve highest/safest level of mobility/gait  Description: Interventions:  - Assess patient's functional ability and stability  - Promote increasing activity/tolerance for mobility and gait  - Educate and engage patient/family in tolerated activity level and precautions  - Recommend use of  RW for transfers and ambulation  Outcome: Progressing     Problem: SAFETY ADULT - FALL  Goal: Free from fall injury  Description: INTERVENTIONS:  - Assess pt frequently for  physical needs  - Identify cognitive and physical deficits and behaviors that affect risk of falls.  - [place] fall precautions as indicated by assessment.  - Educate pt/family on patient safety including physical limitations  - Instruct pt to call for assistance with activity based on assessment  - Modify environment to reduce risk of injury  - Provide assistive devices as appropriate  - Consider OT/PT consult to assist with strengthening/mobility  - Encourage toileting schedule  Outcome: Progressing

## 2024-05-31 NOTE — CM/SW NOTE
Checking with [facility] if patient is current and if yes, current services being provided--await response in [facility]    Confirmed by phone that patient is current with [facility] for nursing, included PT as well--order, f2f as well as [facility] packet sent--reserved in [facility]    Resume [facility] @ discharge   Phone    Fax  [phone]

## 2024-05-31 NOTE — PROGRESS NOTES
Discharge Note:     Patient tele discontinued, IV discontinued with catheter intact.  Patient discharge instructions reviewed with patient, verbalize understanding.   Patient escorted via wheelchair to the [facility] by this RN.

## 2024-05-31 NOTE — PLAN OF CARE
Problem: CARDIOVASCULAR - ADULT  Goal: Absence of cardiac arrhythmias or at baseline  Description: INTERVENTIONS:  - Continuous cardiac monitoring, monitor vital signs, obtain 12 lead EKG if indicated  - Evaluate effectiveness of antiarrhythmic and heart rate control medications as ordered  - Initiate emergency measures for life threatening arrhythmias  - Monitor electrolytes and administer replacement therapy as ordered  Outcome: Progressing  Goal: Maintains optimal cardiac output and hemodynamic stability  Description: INTERVENTIONS:  - Monitor vital signs, rhythm, and trends  - Monitor for bleeding, hypotension and signs of decreased cardiac output  - Evaluate effectiveness of vasoactive medications to optimize hemodynamic stability  - Monitor arterial and/or venous puncture sites for bleeding and/or hematoma  - Assess quality of pulses, skin color and temperature  - Assess for signs of decreased coronary artery perfusion - ex. Angina  - Evaluate fluid balance, assess for edema, trend weights  Outcome: Progressing     Problem: RESPIRATORY - ADULT  Goal: Achieves optimal ventilation and oxygenation  Description: INTERVENTIONS:  - Assess for changes in respiratory status  - Assess for changes in mentation and behavior  - Position to facilitate oxygenation and minimize respiratory effort  - Oxygen supplementation based on oxygen saturation or ABGs  - Provide Smoking Cessation handout, if applicable  - Encourage broncho-pulmonary hygiene including cough, deep breathe, Incentive Spirometry  - Assess the need for suctioning and perform as needed  - Assess and instruct to report SOB or any respiratory difficulty  - Respiratory Therapy support as indicated  - Manage/alleviate anxiety  - Monitor for signs/symptoms of CO2 retention  Outcome: Progressing  Received from cath lab post LOYD. Pt A&Ox4. Denies any cp or sob. Fall precautions continued. INR 2.31 today. Heparin gtt stopped as ordered.   Will continue to monitor.  Labs and meds as ordered. Coumadin and diuretics as ordered. ID to see as consulted. Continue fall precautions.

## 2024-05-31 NOTE — PROGRESS NOTES
[facility]   part of [facility] Infectious Disease Progress Note    Arleth Weiss Patient Status:  Inpatient    1955 MRN [id number]   Location [facility] 2NE-A Attending Sin, Palmira Rivers*   Hosp Day # 7 PCP Viktoriya Garcias,      Subjective:  Pt with no new complaints.     Objective:    Allergies:  Allergies   Allergen Reactions    Adhesive Tape HIVES     ALL ADHESIVES    Codeine HIVES    Doxycycline SWELLING    Hydrocodone UNKNOWN    Lisinopril TONGUE SWELLING    Metoprolol HIVES    Morphine Sulfate HIVES    Opioid Analgesics UNKNOWN    Oxycontin ITCHING    Oxytocin HIVES    Vicodin [Hydrocodone-Acetaminophen] ITCHING    Skin Adhesives OTHER (SEE COMMENTS)       Medications:    Current Facility-Administered Medications:     lactated ringers IV bolus 500 mL, 500 mL, Intravenous, Once PRN    labetalol (Trandate) 5 mg/mL injection 5 mg, 5 mg, Intravenous, Q5 Min PRN    metoclopramide (Reglan) 5 mg/mL injection 10 mg, 10 mg, Intravenous, Q8H PRN    naloxone (Narcan) 0.4 MG/ML injection 80 mcg, 80 mcg, Intravenous, PRN    midazolam (Versed) 2 MG/2ML injection 1 mg, 1 mg, Intravenous, Q5 Min PRN    bumetanide (Bumex) tab 2 mg, 2 mg, Oral, Daily    benzocaine (Hurricaine/Topex) 20 % mouth spray 1 spray, 1 spray, Mouth/Throat, See Admin Instructions    calcium carbonate (Tums) chewable tab 500 mg, 500 mg, Oral, Q8H PRN    cholecalciferol (Vitamin D3) tab 5,000 Units, 5,000 Units, Oral, Nightly    cyproheptadine (Periactin) tab 4 mg, 4 mg, Oral, Nightly    ferrous sulfate DR tab 325 mg, 325 mg, Oral, Nightly    triamcinolone (Kenalog) 0.1 % ointment 1 Application, 1 Application, Topical, Daily PRN    ascorbic acid (Vitamin C) tab 500 mg, 500 mg, Oral, Nightly    ondansetron (Zofran) 4 MG/2ML injection 4 mg, 4 mg, Intravenous, Q6H PRN    allopurinol (Zyloprim) tab 100 mg, 100 mg, Oral, Nightly    buPROPion SR (WELLBUTRIN SR) 12 hr tab 150 mg, 150 mg, Oral, BID    clopidogrel (Plavix) tab  75 mg, 75 mg, Oral, Nightly    digoxin (Lanoxin) tab 125 mcg, 125 mcg, Oral, Nightly    dilTIAZem ER (CardIZEM CD) 24 hr cap 240 mg, 240 mg, Oral, Nightly    FLUoxetine (PROzac) cap 20 mg, 20 mg, Oral, BID    levothyroxine (Synthroid) tab 175 mcg, 175 mcg, Oral, Daily @ 0700    metoprolol succinate ER (Toprol XL) 24 hr tab 25 mg, 25 mg, Oral, Nightly    nortriptyline (Pamelor) cap 25 mg, 25 mg, Oral, Nightly    rosuvastatin (Crestor) tab 5 mg, 5 mg, Oral, QOD    acetaminophen (Tylenol Extra Strength) tab 500 mg, 500 mg, Oral, Q4H PRN    Physical Exam:  General: Alert, orientated x3.  Cooperative.  No apparent distress.  Vital Signs:  Blood pressure (!) 126/104, pulse 85, temperature 97.3 °F (36.3 °C), temperature source Oral, resp. rate 18, height 5' 9\" (1.753 m), weight 203 lb (92.1 kg), SpO2 92%, not currently breastfeeding.   Temp (24hrs), Av.7 °F (36.5 °C), Min:97.3 °F (36.3 °C), Max:98.2 °F (36.8 °C)      HEENT: Exam is unremarkable.  Without scleral icterus.  Mucous membranes are moist. PERRLA.  Oropharynx is clear.  Neck: No tenderness to palpitation.  Full range of motion to flexion and extension, lateral rotation and lateral flexion of cervical spine.  No JVD. Supple.   Lungs: Clear to auscultation bilaterally.  Cardiac: Regular rate and rhythm. No murmur.  Abdomen:  Soft, non-distended, non-tender, with no rebound or guarding.   Extremities:  No lower extremity edema noted.  Without clubbing or cyanosis.    Skin: Normal texture and turgor.  Neurologic: Cranial nerves are grossly intact.  Motor strength and sensory examination is grossly normal.  No focal neurologic deficit.    Labs:  Lab Results   Component Value Date    WBC 6.5 2024    HGB 14.6 2024    HCT 44.0 2024    .0 2024    CREATSERUM 0.75 2024    BUN 27 2024     2024    K 3.9 2024     2024    CO2 26.0 2024     2024    CA 9.1 2024    INR 2.79  05/31/2024    MG 2.4 05/31/2024         Assessment/Plan:    1.  Abnormal LOYD and TTE  -MV with echodensity  -blood cultures NGTD  -elevated inflammatory markers but with hx of RA  -no other signs of infectious process  -multiple serologies pending  2.  Dispo  -unlikely endocarditis  -stable for dc from ID standpoint  -follow up for lab results, okay for video/televisit    If you have any questions or concerns please call [facility] Infectious Disease at [phone].     Fidencio Ngo, APRN

## 2024-05-31 NOTE — PROGRESS NOTES
05/31/24 0500 05/31/24 0510 05/31/24 0513   Vital Signs   Pulse 80 81 91   Heart Rate Source Monitor  --  Monitor   Resp 18  --   --    Respiratory Quality Normal  --   --    /65 (!) 115/104 (!) 143/92   MAP (mmHg) 78 (!) 110 (!) 105   BP Location Radial Radial Radial   BP Method Automatic Automatic Automatic   Patient Position Lying Sitting Standing     Denies lightheadedness or dizziness when sitting or standing.

## 2024-05-31 NOTE — PROGRESS NOTES
[facility]  Progress Note    Arleth Weiss Patient Status:  Inpatient    1955 MRN [id number]   Location [facility] 2NE-A Attending Sanjiv Zuniga,    Hosp Day # 7 PCP Viktoriya Garcias DO     Subjective:   Feeling better.  No chest pain.  No shortness of breath at rest.  No focal cardiovascular complaints reported.    Objective:  Vitals:    24 0515 24 0852 24 1148 24 1235   BP: (!) 126/104      BP Location: Radial      Pulse: 85  77    Resp:  18  18   Temp:  97.3 °F (36.3 °C)  97.8 °F (36.6 °C)   TempSrc:  Oral  Oral   SpO2:   96%    Weight:       Height:           Temp (24hrs), Av.7 °F (36.5 °C), Min:97.3 °F (36.3 °C), Max:98.2 °F (36.8 °C)      Medications:   Scheduled:    bumetanide  2 mg Oral Daily    benzocaine  1 spray Mouth/Throat See Admin Instructions    cholecalciferol  5,000 Units Oral Nightly    cyproheptadine  4 mg Oral Nightly    ferrous sulfate  325 mg Oral Nightly    Vitamin C  500 mg Oral Nightly    allopurinol  100 mg Oral Nightly    buPROPion SR  150 mg Oral BID    clopidogrel  75 mg Oral Nightly    digoxin  125 mcg Oral Nightly    dilTIAZem ER  240 mg Oral Nightly    FLUoxetine  20 mg Oral BID    levothyroxine  175 mcg Oral Daily @ 0700    metoprolol succinate ER  25 mg Oral Nightly    nortriptyline  25 mg Oral Nightly    rosuvastatin  5 mg Oral QOD       Continuous Infusion:         PRN Medications:     lactated ringers    labetalol    metoclopramide    naloxone    midazolam    calcium carbonate    triamcinolone    ondansetron    acetaminophen    Intake/Output:     Intake/Output Summary (Last 24 hours) at 2024 1325  Last data filed at 2024 1235  Gross per 24 hour   Intake 1370 ml   Output 750 ml   Net 620 ml       Wt Readings from Last 3 Encounters:   24 203 lb (92.1 kg)   24 212 lb 4.9 oz (96.3 kg)   23 219 lb (99.3 kg)       Allergies:  Allergies   Allergen Reactions    Adhesive Tape HIVES     ALL ADHESIVES    Codeine  HIVES    Doxycycline SWELLING    Hydrocodone UNKNOWN    Lisinopril TONGUE SWELLING    Metoprolol HIVES    Morphine Sulfate HIVES    Opioid Analgesics UNKNOWN    Oxycontin ITCHING    Oxytocin HIVES    Vicodin [Hydrocodone-Acetaminophen] ITCHING    Skin Adhesives OTHER (SEE COMMENTS)       Physical Exam:   General:  Well-developed / Well-nourished.  No acute distress.  HEENT:  Normocephalic.  Atraumatic.  No icterus.  Neck:  There is no jugular venous distention.   Cardiovascular:  Cardiovascular examination demonstrates an irregular rate and rhythm.  There is normal S1, S2.  There is no S3 or S4.  There are no rubs or gallops.  Grade 1-2/6 SEMLSB.  No click is appreciated.  PMI is nondisplaced with a normal apical impulse.    Pulmonary:  Lungs are clear to auscultation bilaterally.  There are no focal rales, rhonchi, or wheezes.  Good air movement is noted throughout both lung fields.   Abdomen:  The abdomen is soft, non-distended, and non-tender.  Bowel sounds are present and normoactive.  No organomegaly is appreciated.  Extremities:  Extremities demonstrate trace peripheral edema.   No cyanosis or clubbing of the digits is appreciated.  Neurologic:  Alert and oriented.  Normal affect.  Integument:  No visible rashes are appreciated.      Laboratory/Data:   Recent Labs   Lab 05/24/24  1751 05/25/24  0705 05/29/24  1049 05/29/24  1733 05/30/24  0623 05/31/24  0719   *   < > 118*  --  145* 120*   BUN 17   < > 34*  --  37* 27*   CREATSERUM 0.90   < > 0.92  --  0.92 0.75   CA 8.9   < > 9.5  --  9.8 9.1   ALB 3.4  --   --   --   --   --       < > 135*  --  138 140   K 4.1   < > 3.6 4.1 4.7 3.9      < > 100  --  105 107   CO2 29.0   < > 29.0  --  31.0 26.0   ALKPHO 135  --   --   --   --   --    AST 47*  --   --   --   --   --    ALT 36  --   --   --   --   --    BILT 0.7  --   --   --   --   --    TP 8.0  --   --   --   --   --     < > = values in this interval not displayed.       Recent Labs   Lab  05/24/24  1751 05/25/24  0705 05/29/24  0605 05/29/24  1049 05/30/24  0623 05/31/24  0719   RBC 4.24   < > 5.12 4.95 4.97 4.56   HGB 13.7   < > 16.4* 15.8 16.0 14.6   HCT 40.7   < > 48.4* 48.2* 48.3* 44.0   MCV 96.0   < > 94.5 97.4 97.2 96.5   MCH 32.3   < > 32.0 31.9 32.2 32.0   MCHC 33.7   < > 33.9 32.8 33.1 33.2   RDW 13.7   < > 13.2 13.2 13.2 12.9   NEPRELIM 6.41  --  8.48*  --   --   --    WBC 8.7   < > 11.2* 13.4* 7.5 6.5   .0   < > 307.0 303.0 290.0 241.0    < > = values in this interval not displayed.       Recent Labs   Lab 05/29/24  0605 05/30/24  0619 05/31/24  0739   PTP 21.4* 25.6* 29.8*   INR 1.84* 2.31* 2.79*       No results for input(s): \"TROP\", \"CK\" in the last 168 hours.      Tele: AF - rate controlled    Diagnostics:    Echo:  Conclusions:     1. Left ventricle: The cavity size was normal. Wall thickness was normal.      Systolic function was mildly reduced. The estimated ejection fraction was      45-50%, by visual assessment. Unable to assess LV diastolic function due      to heart rhythm.   2. Left atrium: The left atrial volume was moderately increased.   3. Aortic valve: Elizondo MINOR S3 23mm (TAVR) bioprosthesis was present.      There was no significant regurgitation. The peak systolic velocity was      2.28m/sec. The mean systolic gradient was 10mm Hg. The valve area (VTI)      was 1.38cm^2. The valve area (VTI) index was 0.65cm^2/m^2.   4. Mitral valve: The annulus was moderately to markedly calcified. The      leaflets were moderately calcified. Mobile echogenic structure visualized      on the posterior leaflet versus [place] (image 15). The mean diastolic      gradient was 3mm Hg at 65 bpm.   5. Pulmonary arteries: Systolic pressure was at the upper limits of normal,      in the range of 30mm Hg to 35mm Hg.   Impressions:  Moble echogenic structure of the posteior mitral valve whch   likely represents calcifified chordae/mitral valve annulus. However,   endocarditis can not be  ruled out. Would reccommend clinical correlation. No   images to compare with     LOYD:  :     Mod-Sev LAE   Smoke in LA / JOE   No LA / JOE thrombus   Mod ENA   Low NL EF - 50%   Thickened MV leaflets with reduced excursion   Mobile echogenicity associated with the mitral yumiko - appears associated with chordal apparatus of the posterior MV leaflet right near attachment to the mitral valve  Mild MR  Well seated TAVR prosthesis  Mild TR  PAP WNL  No doppler or echocontrast evidence of interatrial shunting  Mild atheromatous aortic plaquing    Assessment:     Acute on chronic HFrEF  -Pt modifies her Rx independently.  Suspect decompensation reflects compliance issues   Aortic stenosis s/p TAVR    AFib  -Rate controlled  -INR therapeutic  4.    HTN  5.    HL  6.    Hypothyroidism  7.    COPD  8.    Thyroid CA s/p RsSxn                -On supplementation  9.    H/O PE                -S/P IVC filter  10.  PVD  -S/P PTA LLE   11.  Fibromyalgia         12.  CAD  -S/P stent 3/21     13.  Mild LV systolic dysfunction   -EF 45%   -Stable since 7/23  14.  Echo with mobile echogenicity near abn MV.  DDx includes vegetation, thrombus, fibrin.  No Sx of infectious process at present.  ESR 72.  CRP mildly elevated.  Mild WBC.  Cx pending    Plan:    Coumadin.    Plavix at this time   Bumex at 2mg PO qAM   Daily weights   Statin   Appreciate ID input   Blood Cxs pending   F/U 1 week with Dr. Case.   INR in 5 days.    Jose Antonio Ryan MD  5/31/2024

## 2024-06-02 LAB
ASPER FLAVUS: NEGATIVE
ASPER FUMIGATUS: NEGATIVE
ASPER NIGER: NEGATIVE
BLASTOMYCES ABS QN DID: NEGATIVE

## 2024-06-03 LAB
B HENSELAE IGG: NEGATIVE TITER
B HENSELAE IGM: NEGATIVE TITER
B QUINTANA IGG: NEGATIVE TITER
B QUINTANA IGM: NEGATIVE TITER
BARTONELLA DNA PCR: NEGATIVE
BARTONELLA DNA PCR: NEGATIVE

## 2024-06-04 LAB
BRUCELLA IGG AB: NEGATIVE
BRUCELLA IGG AB: NEGATIVE
T WHIPPLEI BY PCR: NOT DETECTED
T WHIPPLEI BY PCR: NOT DETECTED

## 2024-06-05 LAB
COCCIDIOIDES AB (ID): NOT DETECTED
COCCIDIOIDES AB, IGG: 0.5 IV
COCCIDIOIDES AB, IGM: 0.1 IV
Lab: <0.8 INDEX
Lab: <0.91 RATIO

## 2024-06-07 LAB
QFEV IGG PH I: NEGATIVE
QFEV IGG PH I: NEGATIVE
QFEV IGG PH II: NEGATIVE
QFEV IGG PH II: NEGATIVE
QFEV IGM PH I: NEGATIVE
QFEV IGM PH I: NEGATIVE
QFEV IGM PH II: NEGATIVE
QFEV IGM PH II: NEGATIVE

## 2024-06-18 ENCOUNTER — TELEPHONE (OUTPATIENT)
Dept: ORTHOPEDICS CLINIC | Facility: CLINIC | Age: 69
End: 2024-06-18

## 2024-06-18 DIAGNOSIS — M25.512 LEFT SHOULDER PAIN, UNSPECIFIED CHRONICITY: Primary | ICD-10-CM

## 2024-06-18 NOTE — TELEPHONE ENCOUNTER
Patient informed she is wanting to get surgical consult for her left shoulder, added notes onto upcoming appointment with

## 2024-06-18 NOTE — TELEPHONE ENCOUNTER
XR ordered per ortho protocol. XR scheduled and patient was notified via [employer] to let them know that they should arrive 15-20 minutes early, in order for them to complete imaging.

## 2024-06-20 ENCOUNTER — OFFICE VISIT (OUTPATIENT)
Dept: ORTHOPEDICS CLINIC | Facility: CLINIC | Age: 69
End: 2024-06-20

## 2024-06-20 ENCOUNTER — HOSPITAL ENCOUNTER (OUTPATIENT)
Dept: GENERAL RADIOLOGY | Age: 69
Discharge: HOME | End: 2024-06-20
Attending: ORTHOPAEDIC SURGERY

## 2024-06-20 VITALS — HEIGHT: 69 IN | BODY MASS INDEX: 30.07 KG/M2 | WEIGHT: 203 LBS

## 2024-06-20 DIAGNOSIS — M25.512 LEFT SHOULDER PAIN, UNSPECIFIED CHRONICITY: ICD-10-CM

## 2024-06-20 DIAGNOSIS — M19.012 PRIMARY OSTEOARTHRITIS OF LEFT SHOULDER: Primary | ICD-10-CM

## 2024-06-20 PROCEDURE — 73030 X-RAY EXAM OF SHOULDER: CPT | Performed by: ORTHOPAEDIC SURGERY

## 2024-06-20 PROCEDURE — 99214 OFFICE O/P EST MOD 30 MIN: CPT | Performed by: ORTHOPAEDIC SURGERY

## 2024-06-20 NOTE — PROGRESS NOTES
[facility] Patient Note    Chief Complaint   Patient presents with    Shoulder Pain     LT SHOULDER PAIN;  -previous  patient  -would like to discuss potential surgery       HPI: The patient is a 69 year old female who presents with complaints of chronic stiffness and loss of function in her left shoulder.  She has been followed by Dr. Alex Friend for multiple orthopedic complaints and has undergone bilateral knee and hip replacements.  Right shoulder was treated with hemiarthroplasty.  It was about a year ago that she was having significant pain and was considering shoulder surgery.  She was told that this left shoulder may require reverse total shoulder given that the rotator cuff was reportedly insufficient.  Coincidentally her symptoms have dramatically improved to the point where she has minimal pain at the shoulder but is struggling more with weakness and diminishing range of motion.    Past Medical History:    Aortic stenosis    S/P TAVR    ASTHMA    Asthma (HCC)    Back problem    CANCER    thyroid    Cancer (HCC)    thyroid     COPD (chronic obstructive pulmonary disease) (HCC)    DEPRESSION    Disorder of thyroid    Essential hypertension    Fibromyalgia    High blood pressure    High cholesterol    HYPERTENSION    HYPOTHYROIDISM    Muscle weakness    OBESITY    OSTEOARTHRITIS    Osteoarthritis    OTHER DISEASES    Fibromyalgia    OTHER DISEASES    Pulmonary emboli    OTHER DISEASES    Lymph edema    OTHER DISEASES    Pulmonary hypertension    Peripheral vascular disease (HCC)    Pulmonary embolism (HCC)    Shortness of breath    ON EXERTION    SLEEP APNEA    Sleep apnea    CPAP     Past Surgical History:   Procedure Laterality Date    Anesth,shoulder replacement Right 2014    hemiathroplasty    Back surgery  2000    Back surgery  2004    complete c-3 -7 ectomy    Biopsy Left 07/20/2023    TEMPORAL ARTERY    Cath transcatheter aortic valve replacement       Colonoscopy N/A 05/10/2018    Procedure: COLONOSCOPY, POSSIBLE BIOPSY, POSSIBLE POLYPECTOMY 16732;  Surgeon: Kendell Valentin MD;  Location: [facility]    Colonoscopy      D & c  09/2009    Dilation/curettage,diagnostic      Hip replacement surgery  09/2009    Rt hip    Knee replacement surgery  2003    Both knees    Other surgical history  1989    Thyroid removal    Other surgical history  2004    LT foot spur removal    Total hip replacement      Total knee replacement       Current Outpatient Medications   Medication Sig Dispense Refill    bumetanide 2 MG Oral Tab Take 1 tablet (2 mg total) by mouth BID (Diuretic). 60 tablet 0    warfarin 2.5 MG Oral Tab Take 1 tablet (2.5 mg total) by mouth 3 (three) times a week. Q Mon., Wed., and Saturday per pt.      empagliflozin (JARDIANCE) 10 MG Oral Tab Take 1 tablet (10 mg total) by mouth at bedtime. Per pt.      levothyroxine 175 MCG Oral Tab Take 1 tablet (175 mcg total) by mouth before breakfast.      metOLazone 2.5 MG Oral Tab Take 1 tablet (2.5 mg total) by mouth every other day. Per pt.      dilTIAZem  MG Oral Capsule SR 24 Hr Take 1 capsule (240 mg total) by mouth daily. (Patient taking differently: Take 1 capsule (240 mg total) by mouth at bedtime.) 90 capsule 3    clopidogrel 75 MG Oral Tab Take 1 tablet (75 mg total) by mouth at bedtime. Per pt.      metoprolol succinate ER 25 MG Oral Tablet 24 Hr Take 1 tablet (25 mg total) by mouth at bedtime. Per pt.      Potassium Chloride ER 10 MEQ Oral Cap CR Take 4 capsules (40 mEq total) by mouth at bedtime. Per pt.      rosuvastatin 5 MG Oral Tab Take 1 tablet (5 mg total) by mouth every other day.      spironolactone 25 MG Oral Tab Take 1 tablet (25 mg total) by mouth daily.      warfarin 5 MG Oral Tab Take 1 tablet (5 mg total) by mouth nightly. Q Tues., Thurs., Friday and Sunday per pt.      ferrous sulfate 325 (65 FE) MG Oral Tab EC Take 1 tablet (325 mg total) by mouth at bedtime. Per pt.       calcium carbonate 500 MG Oral Chew Tab Chew 1 tablet (500 mg total) by mouth every 8 (eight) hours as needed (upset stomach).      Vitamin C 500 MG Oral Tab Take 1 tablet (500 mg total) by mouth at bedtime. Per pt.      Zinc 50 MG Oral Cap Take 50 mg by mouth at bedtime. Per pt.      Cholecalciferol 125 MCG (5000 UT) Oral Tab Take 1 tablet (5,000 Units total) by mouth at bedtime. Per pt.      acetaminophen 325 MG Oral Tab Take 2 tablets (650 mg total) by mouth every 8 (eight) hours as needed for Pain.      buPROPion  MG Oral Tablet 12 Hr Take 1 tablet (150 mg total) by mouth 2 (two) times daily.      cyproheptadine 4 MG Oral Tab Take 1 tablet (4 mg total) by mouth nightly.      FLUoxetine 20 MG Oral Cap Take 1 capsule (20 mg total) by mouth 2 (two) times daily.      triamcinolone 0.1 % External Ointment Apply 1 Application topically daily as needed (Neuro dermatitis per pt.).      digoxin 0.125 MG Oral Tab Take 1 tablet (125 mcg total) by mouth daily. (Patient taking differently: Take 1 tablet (125 mcg total) by mouth at bedtime.) 30 tablet 0    allopurinol 100 MG Oral Tab Take 1 tablet (100 mg total) by mouth daily. (Patient taking differently: Take 1 tablet (100 mg total) by mouth at bedtime. Per pt.) 90 tablet 3    Nortriptyline HCl 25 MG Oral Cap Take 1 capsule (25 mg total) by mouth nightly.      Multiple Vitamin (MULTIVITAMIN OR) Take 1 tablet by mouth at bedtime. Per pt.       Allergies   Allergen Reactions    Adhesive Tape HIVES     ALL ADHESIVES    Codeine HIVES    Doxycycline SWELLING    Hydrocodone UNKNOWN    Lisinopril TONGUE SWELLING    Metoprolol HIVES    Morphine Sulfate HIVES    Opioid Analgesics UNKNOWN    Oxycontin ITCHING    Oxytocin HIVES    Vicodin [Hydrocodone-Acetaminophen] ITCHING    Skin Adhesives OTHER (SEE COMMENTS)     Family History   Problem Relation Age of Onset    Hypertension Father     Lipids Father     Diabetes Mother     Hypertension Mother     Lipids Mother     Cancer  Brother     Hypertension Brother      Social History     Occupational History    Not on file   Tobacco Use    Smoking status: Former     Current packs/day: 0.00     Average packs/day: 0.5 packs/day for 30.0 years (15.0 ttl pk-yrs)     Types: Cigarettes     Start date: 1961     Quit date: 1991     Years since quittin.4    Smokeless tobacco: Never   Vaping Use    Vaping status: Never Used   Substance and Sexual Activity    Alcohol use: No    Drug use: No    Sexual activity: Not on file        ROS:  Complete ROS reviewed by me and non-contributory to the chief complaint except as mentioned above.    Physical Exam:    Ht 5' 9\" (1.753 m)   Wt 203 lb (92.1 kg)   LMP  (LMP Unknown)   BMI 29.98 kg/m²   Constitutional: Well developed, well nourished 69 year old female  Psychological: NAD, alert and appropriate  Respiratory: Breathing comfortably on room air with RR of 10-14  Cardiac: Palpable distal pulses with pink warm extremities  Inspection of left shoulder reveals no obvious deformity or swelling.  She has multiple areas of subcutaneous ecchymosis in the forearm region.  There is fairly limited active range of motion left shoulder with elevation to 70, abduction to 50 and external rotation to about 25 degrees.  Moderate weakness is noted on abduction and external rotation testing.  No obvious crepitus is palpable on passive rotation.  Moderate lateral deltoid tenderness is present with a fairly benign AC joint.  Neurovascular status is intact on sensory, motor and perfusion assessment distally.    Imaging: Multiple views left shoulder personally viewed, demonstrating late osteoarthritic changes with a large inferior osteophyte, narrowing of the subacromial space and a nonspecific sclerotic line at the lateral scapula.    XR SHOULDER, COMPLETE (MIN 2 VIEWS), LEFT (CPT=73030)    Result Date: 2024  CONCLUSION:     1. There is a bandlike area of sclerosis along the lateral border of the scapula not  present on the prior exam from 2023. Uncertain if this reflects bony reaction secondary to advanced osteoarthritic disease of the glenohumeral joint, or a bone lesion.  Consider bone scan evaluation.   2. Advanced osteoarthritic disease involving the left shoulder, with joint narrowing, sizable osteophyte, sclerosis.  Slightly increased since prior.  Stable more modest AC joint degenerative disease.  No sign of acute fracture dislocation.  LOCATION:  [place]   Dictated by (CST): Crow Santiago MD on 6/20/2024 at 1:43 PM     Finalized by (CST): Crow Santiago MD on 6/20/2024 at 1:44 PM          Assessment/Diagnoses:  Diagnoses and all orders for this visit:    Primary osteoarthritis of left shoulder      Plan:  I reviewed imaging and exam findings with the patient.  She has fairly severe osteoarthritic changes of the left shoulder with suggestion of proximal migration and subacromial narrowing.  It is likely that she has rotator cuff insufficiency along with her arthritis.  She was told she may need a reverse total shoulder arthroplasty by Dr. Friend shortly before his FPC.  She is struggling more with functional loss of motion than pain.  I told her frankly that shoulder arthroplasty will likely improve her functional range but the primary indication is for pain relief.  Although pain was severe year ago it is now very mild and manageable according to the patient.  I therefore suggested that there is no urgency to proceed unless she feels that her quality of life is hampered more from the loss of motion than pain.  It is a softer indication to proceed with a fairly significant operation but this would be up to the patient.  She will think about these options and certainly contact us if symptoms progressed to include pain along with her loss of motion and function.  All questions were answered and she verbalized understanding and appreciation.  Follow-up as needed.      Laura Nix MD  [facility]      This document was partially prepared using Dragon Medical voice recognition software.

## 2024-07-01 ENCOUNTER — OFFICE VISIT (OUTPATIENT)
Dept: ORTHOPEDICS CLINIC | Facility: CLINIC | Age: 69
End: 2024-07-01
Payer: MEDICARE

## 2024-07-01 ENCOUNTER — TELEPHONE (OUTPATIENT)
Dept: ORTHOPEDICS CLINIC | Facility: CLINIC | Age: 69
End: 2024-07-01

## 2024-07-01 VITALS — WEIGHT: 203 LBS | HEIGHT: 69 IN | BODY MASS INDEX: 30.07 KG/M2

## 2024-07-01 DIAGNOSIS — M12.812 ROTATOR CUFF ARTHROPATHY OF LEFT SHOULDER: ICD-10-CM

## 2024-07-01 DIAGNOSIS — M19.012 PRIMARY OSTEOARTHRITIS OF LEFT SHOULDER: Primary | ICD-10-CM

## 2024-07-01 PROCEDURE — 99205 OFFICE O/P NEW HI 60 MIN: CPT | Performed by: ORTHOPAEDIC SURGERY

## 2024-07-01 NOTE — PROGRESS NOTES
OR BOOKING SHEET SHOULDER  Location: [x] [facility]   [] [facility]  Name: Arleth Weiss  MRN: [id number]   : 1955  Diagnosis:  [x] Primary osteoarthritis of left shoulder [M19.012]  Disposition:    [] Ambulatory  [] Overnight for SHELDON  [x] Overnight for observation and pain control  [] Inpatient procedure    Operative Time Required:  3 HOURS  Antibiotics: 2 g cefazolin within 60 minutes of surgical incision  Procedure:   Laterality: [] RIGHT [x] LEFT                  [] BILATERAL  Procedures:  [] Total Shoulder Arthroplasty (26206)   [x] Reverse Shoulder Arthroplasty (25144)   [x] Biceps Tenodesis (17938)    Additional info:   [x] PCP Clearance Needed  [x] MRSA  [] C-Arm  [x] TXA at time surgery  [x] Physical Therapy External   [x] DME Rx Needed  [x] Notify Jai Zacarias  [] Appt with Dr. Collins needed  Implants needed: Rell  Positioning Equipment: Beach Chair Setup, [place]

## 2024-07-01 NOTE — TELEPHONE ENCOUNTER
Spoke with patient and we scheduled surgery, post op and went over pre operative procedures. All questions answered.    PCP CLEARANCE SENT

## 2024-07-01 NOTE — TELEPHONE ENCOUNTER
Date of Surgery:    2024    Post Op Appt:  2024 130PM    Case ID: [id number]     Notes: Lets target  for surgery. Thanks!             OR BOOKING SHEET SHOULDER  Location: [x] [place]                    [] [facility]  Name: Arleth Weiss  MRN: [id number]   : 1955  Diagnosis:  [x] Primary osteoarthritis of left shoulder [M19.012]  Disposition:    [] Ambulatory  [] Overnight for SHELDON  [x] Overnight for observation and pain control  [] Inpatient procedure     Operative Time Required:  3 HOURS  Antibiotics: 2 g cefazolin within 60 minutes of surgical incision  Procedure:   Laterality:                  [] RIGHT                  [x] LEFT                   [] BILATERAL  Procedures:  [] Total Shoulder Arthroplasty (21081)   [x] Reverse Shoulder Arthroplasty (71271)   [x] Biceps Tenodesis (68555)     Additional info:   [x] PCP Clearance Needed  [x] MRSA  [] C-Arm  [x] TXA at time surgery  [x] Physical Therapy External   [x] DME Rx Needed  [x] Notify Jai Zacarias  [] Appt with Dr. Collins needed  Implants needed: Rell  Positioning Equipment: Beach Chair Setup, [place]

## 2024-07-01 NOTE — H&P
Orthopaedic Surgery  [address] [place] [id number]  [phone]     PRE SURGICAL - HISTORY AND PHYSICAL EXAMINATION     Name: Arleth Weiss   MRN: [id number]  Date: 7/1/2024     CC: Left shoulder pain and weakness in the setting of osteoarthritis     HPI:   Arleth Weiss is a very pleasant 69 year old right-hand dominant female who presents today for evaluation of advanced imaging of the shoulder and discussion regarding definitive management plan.     To summarize: left shoulder pain ongoing for many years, related to osteoarthritis. She was evaluated by Dr. Friend on multiple occasions for the knees, hips and shoulders. Notable hx of right shoulder hemiarthroplasty. Currently, her main concern is left shoulder weakness and limited ROM. She was referred to our service by Dr. Nix for further evaluation and surgical discussion.    Currently, pain is 4/10. Quite debilitated and functionally limited in the left upper extremity.     PMH:   Past Medical History:    Aortic stenosis    S/P TAVR    ASTHMA    Asthma (HCC)    Back problem    CANCER    thyroid    Cancer (HCC)    thyroid     COPD (chronic obstructive pulmonary disease) (HCC)    DEPRESSION    Disorder of thyroid    Essential hypertension    Fibromyalgia    High blood pressure    High cholesterol    HYPERTENSION    HYPOTHYROIDISM    Muscle weakness    OBESITY    OSTEOARTHRITIS    Osteoarthritis    OTHER DISEASES    Fibromyalgia    OTHER DISEASES    Pulmonary emboli    OTHER DISEASES    Lymph edema    OTHER DISEASES    Pulmonary hypertension    Peripheral vascular disease (HCC)    Pulmonary embolism (HCC)    Shortness of breath    ON EXERTION    SLEEP APNEA    Sleep apnea    CPAP       PAST SURGICAL HX:  Past Surgical History:   Procedure Laterality Date    Anesth,shoulder replacement Right 2014    hemiathroplasty    Back surgery  2000    Back surgery  2004    complete c-3 -7 ectomy    Biopsy Left 07/20/2023    TEMPORAL ARTERY    Cath  transcatheter aortic valve replacement      Colonoscopy N/A 05/10/2018    Procedure: COLONOSCOPY, POSSIBLE BIOPSY, POSSIBLE POLYPECTOMY 07204;  Surgeon: Kendell Valentin MD;  [facility]: [facility]    Colonoscopy      D & c  09/2009    Dilation/curettage,diagnostic      Hip replacement surgery  09/2009    Rt hip    Knee replacement surgery  2003    Both knees    Other surgical history  1989    Thyroid removal    Other surgical history  2004    LT foot spur removal    Total hip replacement      Total knee replacement         FAMILY HX:  Family History   Problem Relation Age of Onset    Hypertension Father     Lipids Father     Diabetes Mother     Hypertension Mother     Lipids Mother     Cancer Brother     Hypertension Brother        ALLERGIES:  Adhesive tape, Codeine, Doxycycline, Hydrocodone, Lisinopril, Metoprolol, Morphine sulfate, Opioid analgesics, Oxycontin, Oxytocin, Vicodin [hydrocodone-acetaminophen], and Skin adhesives    MEDICATIONS:   Current Outpatient Medications   Medication Sig Dispense Refill    bumetanide 2 MG Oral Tab Take 1 tablet (2 mg total) by mouth BID (Diuretic). 60 tablet 0    warfarin 2.5 MG Oral Tab Take 1 tablet (2.5 mg total) by mouth 3 (three) times a week. Q Mon., Wed., and Saturday per pt.      empagliflozin (JARDIANCE) 10 MG Oral Tab Take 1 tablet (10 mg total) by mouth at bedtime. Per pt.      levothyroxine 175 MCG Oral Tab Take 1 tablet (175 mcg total) by mouth before breakfast.      metOLazone 2.5 MG Oral Tab Take 1 tablet (2.5 mg total) by mouth every other day. Per pt.      dilTIAZem  MG Oral Capsule SR 24 Hr Take 1 capsule (240 mg total) by mouth daily. (Patient taking differently: Take 1 capsule (240 mg total) by mouth at bedtime.) 90 capsule 3    clopidogrel 75 MG Oral Tab Take 1 tablet (75 mg total) by mouth at bedtime. Per pt.      metoprolol succinate ER 25 MG Oral Tablet 24 Hr Take 1 tablet (25 mg total) by mouth at bedtime. Per pt.      Potassium  Chloride ER 10 MEQ Oral Cap CR Take 4 capsules (40 mEq total) by mouth at bedtime. Per pt.      rosuvastatin 5 MG Oral Tab Take 1 tablet (5 mg total) by mouth every other day.      spironolactone 25 MG Oral Tab Take 1 tablet (25 mg total) by mouth daily.      warfarin 5 MG Oral Tab Take 1 tablet (5 mg total) by mouth nightly. Q Tues., Thurs., Friday and Sunday per pt.      ferrous sulfate 325 (65 FE) MG Oral Tab EC Take 1 tablet (325 mg total) by mouth at bedtime. Per pt.      calcium carbonate 500 MG Oral Chew Tab Chew 1 tablet (500 mg total) by mouth every 8 (eight) hours as needed (upset stomach).      Vitamin C 500 MG Oral Tab Take 1 tablet (500 mg total) by mouth at bedtime. Per pt.      Zinc 50 MG Oral Cap Take 50 mg by mouth at bedtime. Per pt.      Cholecalciferol 125 MCG (5000 UT) Oral Tab Take 1 tablet (5,000 Units total) by mouth at bedtime. Per pt.      acetaminophen 325 MG Oral Tab Take 2 tablets (650 mg total) by mouth every 8 (eight) hours as needed for Pain.      buPROPion  MG Oral Tablet 12 Hr Take 1 tablet (150 mg total) by mouth 2 (two) times daily.      cyproheptadine 4 MG Oral Tab Take 1 tablet (4 mg total) by mouth nightly.      FLUoxetine 20 MG Oral Cap Take 1 capsule (20 mg total) by mouth 2 (two) times daily.      triamcinolone 0.1 % External Ointment Apply 1 Application topically daily as needed (Neuro dermatitis per pt.).      digoxin 0.125 MG Oral Tab Take 1 tablet (125 mcg total) by mouth daily. (Patient taking differently: Take 1 tablet (125 mcg total) by mouth at bedtime.) 30 tablet 0    allopurinol 100 MG Oral Tab Take 1 tablet (100 mg total) by mouth daily. (Patient taking differently: Take 1 tablet (100 mg total) by mouth at bedtime. Per pt.) 90 tablet 3    Nortriptyline HCl 25 MG Oral Cap Take 1 capsule (25 mg total) by mouth nightly.      Multiple Vitamin (MULTIVITAMIN OR) Take 1 tablet by mouth at bedtime. Per pt.         ROS: A comprehensive 14 point review of systems was  performed and was negative aside from the aforementioned per history of present illness.    SOCIAL HX:  Social History     Tobacco Use    Smoking status: Former     Current packs/day: 0.00     Average packs/day: 0.5 packs/day for 30.0 years (15.0 ttl pk-yrs)     Types: Cigarettes     Start date: 1961     Quit date: 1991     Years since quittin.4    Smokeless tobacco: Never   Substance Use Topics    Alcohol use: No       PE:   Vitals:    24 1252   Weight: 203 lb   Height: 5' 9\" (1.753 m)     Estimated body mass index is 29.98 kg/m² as calculated from the following:    Height as of this encounter: 5' 9\" (1.753 m).    Weight as of this encounter: 203 lb.    Physical Exam  Constitutional:       Appearance: Normal appearance.   HENT:      Head: Normocephalic and atraumatic.   Eyes:      Extraocular Movements: Extraocular movements intact.   Neck:      Musculoskeletal: Normal range of motion and neck supple.   Cardiovascular:      Pulses: Normal pulses.   Pulmonary:      Effort: Pulmonary effort is normal. No respiratory distress.   Abdominal:      General: There is no distension.   Skin:     General: Skin is warm.      Capillary Refill: Capillary refill takes less than 2 seconds.      Findings: No bruising.   Neurological:      General: No focal deficit present.      Mental Status: Alert.   Psychiatric:         Mood and Affect: Mood normal.     Examination of the left shoulder demonstrates:     Skin is intact, warm and dry.   Cervical:  Full ROM  Spurling's  Negative    Deformity:   none  Atrophy:   none    Scapular winging: Negative    Palpation:     AC Joint   Negative  Biceps Tendon  Negative  Greater Tuberosity Negative    ROM:   Forward Flexion:  30 degrees  Abduction:   full and symmetric  External Rotation:  neutral  Internal Rotation:  Back pocket    Rotator Cuff Strength:   Supraspinatus:   4-/5  Subscapularis:   4-/5  Infraspinatus/Teres: 4-/5    Provocative Tests:   Tania:    Positive  Speed's:   Positive  Coleman's:   Positive  Lift-off:    Negative    Neurovascular Upper Extremity (Bilateral)  Motor:    5/5 EPL, Finger Abduction, , Pinch, Deltoid  Sensation:   intact to light touch median, ulnar, radial and axillary nerve  Circulation:   Normal, 2+ radial pulse    The contralateral upper extremity is without limitation in range of motion or strength, no positive provocative maneuvers.     Radiographic Examination/Diagnostics:    XR of the shoulder personally viewed, independently interpreted and radiology report was reviewed.    XR SHOULDER, COMPLETE (MIN 2 VIEWS), LEFT (CPT=73030)    Result Date: 6/20/2024  PROCEDURE:  XR SHOULDER, COMPLETE (MIN 2 VIEWS), LEFT (CPT=73030)  TECHNIQUE:  Multiple views were obtained.  COMPARISON:  [facility] , XR, XR SHOULDER, COMPLETE (MIN 2 VIEWS), LEFT (CPT=73030), 2/08/2023, 8:42 PM.  INDICATIONS:  M25.512 Left shoulder pain, unspecified chronicity  PATIENT STATED HISTORY: (As transcribed by Technologist)  Ortho consult. Patient states chronic left shoulder pain, no known injury.              CONCLUSION:       1. There is a bandlike area of sclerosis along the lateral border of the scapula not present on the prior exam from 2023. Uncertain if this reflects bony reaction secondary to advanced osteoarthritic disease of the glenohumeral joint, or a bone lesion.  Consider bone scan evaluation.     2. Advanced osteoarthritic disease involving the left shoulder, with joint narrowing, sizable osteophyte, sclerosis.  Slightly increased since prior.  Stable more modest AC joint degenerative disease.  No sign of acute fracture dislocation.  LOCATION:  [facility]   Dictated by (CST): Crow Santiago MD on 6/20/2024 at 1:43 PM     Finalized by (CST): Crow Santiago MD on 6/20/2024 at 1:44 PM         IMPRESSION: Arleth Weiss is a 69 year old female with Left shoulder osteoarthritis with insufficient rotator cuff function.     The patient has failed an extensive  course of nonsurgical conservative management including intra-articular injections, physical therapy and activity modifications with continued impact on functional capabilities and quality of life. They are an appropriate candidate to proceed with reverse shoulder arthroplasty and biceps tenodesis. A CT was ordered for surgical planning.    PLAN:   We had a detailed discussion outlining the etiology, anatomy, pathophysiology, and natural history of glenohumeral osteoarthritis pathology. Imaging was reviewed in detail and correlated to a 3-dimensional model of the shoulder.     I had a lengthy discussion with Arleth about the diagnosis and options, both surgical and nonsurgical. I have recommended that we proceed with reverse shoulder replacement and biceps tenodesis as we agree surgical intervention would likely offer the best opportunity for symptomatic relief and functional recovery. I used diagrams, imaging studies, and a 3-dimensional model to outline her pathology, as well as general surgical principles. We reviewed the risks associated with shoulder replacement.   In particular we discussed risks that include, but are not limited to infection, blood loss, potential transient or permanent injury to nerves or blood vessels, joint stiffness, persistent pain, need for future operation, failure of healing, wound complications, failure of therapeutic intervention, risk of re-injury, fixation failure, deep vein thrombosis and pulmonary embolism. We discussed the proposed rehabilitation timeline as well as expected postoperative restrictions.     Most post-surgical patients after shoulder replacement utilize a shoulder immobilizer/sling for approximately 4 weeks.  Physical therapy is initiated immediately postsurgically with initial passive range of motion, followed by active assisted range of motion, and ultimately active range of motion after the 6 to 8-week romelia.  After the 3-month romelia postsurgically the  restrictions are lifted and continued strengthening is recommended.    Arleth voiced a good understanding of treatment options, risks and benefits, postoperative instructions, rehabilitation timeline, and restrictions. She was given the opportunity to ask questions, which were all answered to the best of my ability and to her satisfaction. Arleth will work with my office to arrange a time for surgery and obtain any medical clearance information necessary. My pre-operative information packet, which details the process and answers many FAQ's will be provided. She was encouraged to call the office with any further questions or concerns.  I elected to order an MRI scan of the shoulder to further characterize internal derangement and plan for surgery.    I spent 60 minutes in preparation to see the patient, counseling/education of relevant pathology, discussing imaging results, surgical counseling, DME fitting, and care coordination.      FOLLOW-UP:   Post-Operative Visit, POD 6 with KISHOR Tobias MD  Knee, Shoulder, & Elbow Surgery / Sports Medicine Specialist  Orthopaedic Surgery  [address] [place] [address].org  [email or web address]  t: [phone]  o: [phone]  f: [phone]    This note was dictated using Dragon software.  While it was briefly proofread prior to completion, some grammatical, spelling, and word choice errors due to dictation may still occur.

## 2024-07-06 ENCOUNTER — HOSPITAL ENCOUNTER (OUTPATIENT)
Dept: CT IMAGING | Age: 69
Discharge: HOME | End: 2024-07-06
Attending: ORTHOPAEDIC SURGERY
Payer: MEDICARE

## 2024-07-06 DIAGNOSIS — M19.012 PRIMARY OSTEOARTHRITIS OF LEFT SHOULDER: ICD-10-CM

## 2024-07-06 PROCEDURE — 73200 CT UPPER EXTREMITY W/O DYE: CPT | Performed by: ORTHOPAEDIC SURGERY

## 2024-07-06 PROCEDURE — 76376 3D RENDER W/INTRP POSTPROCES: CPT | Performed by: ORTHOPAEDIC SURGERY

## 2024-08-12 ENCOUNTER — HOSPITAL ENCOUNTER (INPATIENT)
Facility: HOSPITAL | Age: 69
LOS: 3 days | Discharge: HOME | End: 2024-08-16
Attending: EMERGENCY MEDICINE | Admitting: INTERNAL MEDICINE
Payer: MEDICARE

## 2024-08-12 ENCOUNTER — APPOINTMENT (OUTPATIENT)
Dept: GENERAL RADIOLOGY | Facility: HOSPITAL | Age: 69
End: 2024-08-12
Payer: MEDICARE

## 2024-08-12 ENCOUNTER — HOSPITAL ENCOUNTER (INPATIENT)
Facility: HOSPITAL | Age: 69
LOS: 3 days | Discharge: HOME HEALTH | End: 2024-08-16
Attending: EMERGENCY MEDICINE | Admitting: INTERNAL MEDICINE
Payer: MEDICARE

## 2024-08-12 DIAGNOSIS — I50.33 ACUTE ON CHRONIC DIASTOLIC CONGESTIVE HEART FAILURE (HCC): Primary | ICD-10-CM

## 2024-08-12 DIAGNOSIS — Z74.09 DECREASED FUNCTIONAL MOBILITY AND ENDURANCE: ICD-10-CM

## 2024-08-12 LAB
ALBUMIN SERPL-MCNC: 4.1 G/DL (ref 3.2–4.8)
ALBUMIN/GLOB SERPL: 1.3 {RATIO} (ref 1–2)
ALP LIVER SERPL-CCNC: 125 U/L
ALT SERPL-CCNC: 21 U/L
ANION GAP SERPL CALC-SCNC: 3 MMOL/L (ref 0–18)
AST SERPL-CCNC: 38 U/L (ref ?–34)
BASOPHILS # BLD AUTO: 0.05 X10(3) UL (ref 0–0.2)
BASOPHILS NFR BLD AUTO: 0.5 %
BILIRUB SERPL-MCNC: 0.3 MG/DL (ref 0.2–1.1)
BUN BLD-MCNC: 12 MG/DL (ref 9–23)
CALCIUM BLD-MCNC: 9.8 MG/DL (ref 8.7–10.4)
CHLORIDE SERPL-SCNC: 105 MMOL/L (ref 98–112)
CO2 SERPL-SCNC: 26 MMOL/L (ref 21–32)
CREAT BLD-MCNC: 0.81 MG/DL
EGFRCR SERPLBLD CKD-EPI 2021: 78 ML/MIN/1.73M2 (ref 60–?)
EOSINOPHIL # BLD AUTO: 0.42 X10(3) UL (ref 0–0.7)
EOSINOPHIL NFR BLD AUTO: 4.3 %
ERYTHROCYTE [DISTWIDTH] IN BLOOD BY AUTOMATED COUNT: 14.7 %
GLOBULIN PLAS-MCNC: 3.1 G/DL (ref 2–3.5)
GLUCOSE BLD-MCNC: 126 MG/DL (ref 70–99)
HCT VFR BLD AUTO: 38.2 %
HGB BLD-MCNC: 12.7 G/DL
IMM GRANULOCYTES # BLD AUTO: 0.05 X10(3) UL (ref 0–1)
IMM GRANULOCYTES NFR BLD: 0.5 %
INR BLD: 3.61 (ref 0.8–1.2)
LYMPHOCYTES # BLD AUTO: 1.55 X10(3) UL (ref 1–4)
LYMPHOCYTES NFR BLD AUTO: 15.7 %
MCH RBC QN AUTO: 31.2 PG (ref 26–34)
MCHC RBC AUTO-ENTMCNC: 33.2 G/DL (ref 31–37)
MCV RBC AUTO: 93.9 FL
MONOCYTES # BLD AUTO: 1.03 X10(3) UL (ref 0.1–1)
MONOCYTES NFR BLD AUTO: 10.4 %
NEUTROPHILS # BLD AUTO: 6.76 X10 (3) UL (ref 1.5–7.7)
NEUTROPHILS # BLD AUTO: 6.76 X10(3) UL (ref 1.5–7.7)
NEUTROPHILS NFR BLD AUTO: 68.6 %
NT-PROBNP SERPL-MCNC: 1365 PG/ML (ref ?–125)
OSMOLALITY SERPL CALC.SUM OF ELEC: 279 MOSM/KG (ref 275–295)
PLATELET # BLD AUTO: 333 10(3)UL (ref 150–450)
POTASSIUM SERPL-SCNC: 4 MMOL/L (ref 3.5–5.1)
PROT SERPL-MCNC: 7.2 G/DL (ref 5.7–8.2)
PROTHROMBIN TIME: 36.6 SECONDS (ref 11.6–14.8)
RBC # BLD AUTO: 4.07 X10(6)UL
SODIUM SERPL-SCNC: 134 MMOL/L (ref 136–145)
TROPONIN I SERPL HS-MCNC: 7 NG/L
WBC # BLD AUTO: 9.9 X10(3) UL (ref 4–11)

## 2024-08-12 PROCEDURE — 80053 COMPREHEN METABOLIC PANEL: CPT | Performed by: EMERGENCY MEDICINE

## 2024-08-12 PROCEDURE — 80162 ASSAY OF DIGOXIN TOTAL: CPT | Performed by: EMERGENCY MEDICINE

## 2024-08-12 PROCEDURE — 85610 PROTHROMBIN TIME: CPT | Performed by: EMERGENCY MEDICINE

## 2024-08-12 PROCEDURE — 99285 EMERGENCY DEPT VISIT HI MDM: CPT

## 2024-08-12 PROCEDURE — 93010 ELECTROCARDIOGRAM REPORT: CPT

## 2024-08-12 PROCEDURE — 83880 ASSAY OF NATRIURETIC PEPTIDE: CPT

## 2024-08-12 PROCEDURE — 93005 ELECTROCARDIOGRAM TRACING: CPT

## 2024-08-12 PROCEDURE — 84484 ASSAY OF TROPONIN QUANT: CPT | Performed by: EMERGENCY MEDICINE

## 2024-08-12 PROCEDURE — 0241U SARS-COV-2/FLU A AND B/RSV BY PCR (GENEXPERT): CPT | Performed by: EMERGENCY MEDICINE

## 2024-08-12 PROCEDURE — 85025 COMPLETE CBC W/AUTO DIFF WBC: CPT

## 2024-08-12 PROCEDURE — 71045 X-RAY EXAM CHEST 1 VIEW: CPT

## 2024-08-12 PROCEDURE — 83880 ASSAY OF NATRIURETIC PEPTIDE: CPT | Performed by: EMERGENCY MEDICINE

## 2024-08-12 PROCEDURE — 80053 COMPREHEN METABOLIC PANEL: CPT

## 2024-08-12 PROCEDURE — 85025 COMPLETE CBC W/AUTO DIFF WBC: CPT | Performed by: EMERGENCY MEDICINE

## 2024-08-12 PROCEDURE — 96374 THER/PROPH/DIAG INJ IV PUSH: CPT

## 2024-08-12 RX ORDER — FUROSEMIDE 10 MG/ML
80 INJECTION INTRAMUSCULAR; INTRAVENOUS ONCE
Status: COMPLETED | OUTPATIENT
Start: 2024-08-12 | End: 2024-08-12

## 2024-08-13 LAB
DIGOXIN SERPL-MCNC: 0.44 NG/ML (ref 0.8–2)
FLUAV + FLUBV RNA SPEC NAA+PROBE: NEGATIVE
FLUAV + FLUBV RNA SPEC NAA+PROBE: NEGATIVE
Q-T INTERVAL: 404 MS
QRS DURATION: 110 MS
QTC CALCULATION (BEZET): 451 MS
R AXIS: 86 DEGREES
RSV RNA SPEC NAA+PROBE: NEGATIVE
SARS-COV-2 RNA RESP QL NAA+PROBE: NOT DETECTED
T AXIS: 22 DEGREES
VENTRICULAR RATE: 75 BPM

## 2024-08-13 PROCEDURE — 99213 OFFICE O/P EST LOW 20 MIN: CPT

## 2024-08-13 PROCEDURE — 94660 CPAP INITIATION&MGMT: CPT

## 2024-08-13 PROCEDURE — 94799 UNLISTED PULMONARY SVC/PX: CPT

## 2024-08-13 RX ORDER — METOCLOPRAMIDE HYDROCHLORIDE 5 MG/ML
10 INJECTION INTRAMUSCULAR; INTRAVENOUS EVERY 8 HOURS PRN
Status: DISCONTINUED | OUTPATIENT
Start: 2024-08-13 | End: 2024-08-16

## 2024-08-13 RX ORDER — POTASSIUM CHLORIDE 750 MG/1
10 TABLET, EXTENDED RELEASE ORAL DAILY
Status: DISCONTINUED | OUTPATIENT
Start: 2024-08-13 | End: 2024-08-14

## 2024-08-13 RX ORDER — DILTIAZEM HYDROCHLORIDE 120 MG/1
240 CAPSULE, EXTENDED RELEASE ORAL NIGHTLY
Status: DISCONTINUED | OUTPATIENT
Start: 2024-08-13 | End: 2024-08-16

## 2024-08-13 RX ORDER — CLOPIDOGREL BISULFATE 75 MG/1
75 TABLET ORAL NIGHTLY
Status: DISCONTINUED | OUTPATIENT
Start: 2024-08-13 | End: 2024-08-16

## 2024-08-13 RX ORDER — METOPROLOL SUCCINATE 25 MG/1
25 TABLET, EXTENDED RELEASE ORAL NIGHTLY
Status: DISCONTINUED | OUTPATIENT
Start: 2024-08-13 | End: 2024-08-16

## 2024-08-13 RX ORDER — BISACODYL 10 MG
10 SUPPOSITORY, RECTAL RECTAL
Status: DISCONTINUED | OUTPATIENT
Start: 2024-08-13 | End: 2024-08-16

## 2024-08-13 RX ORDER — ACETAMINOPHEN 500 MG
500 TABLET ORAL EVERY 4 HOURS PRN
Status: DISCONTINUED | OUTPATIENT
Start: 2024-08-13 | End: 2024-08-14

## 2024-08-13 RX ORDER — FERROUS SULFATE 325(65) MG
325 TABLET, DELAYED RELEASE (ENTERIC COATED) ORAL NIGHTLY
Status: DISCONTINUED | OUTPATIENT
Start: 2024-08-13 | End: 2024-08-14

## 2024-08-13 RX ORDER — CYPROHEPTADINE HYDROCHLORIDE 4 MG/1
4 TABLET ORAL NIGHTLY
Status: DISCONTINUED | OUTPATIENT
Start: 2024-08-13 | End: 2024-08-16

## 2024-08-13 RX ORDER — ALLOPURINOL 100 MG/1
100 TABLET ORAL NIGHTLY
Status: DISCONTINUED | OUTPATIENT
Start: 2024-08-13 | End: 2024-08-16

## 2024-08-13 RX ORDER — MELATONIN
3 NIGHTLY PRN
Status: DISCONTINUED | OUTPATIENT
Start: 2024-08-13 | End: 2024-08-16

## 2024-08-13 RX ORDER — NORTRIPTYLINE HCL 25 MG
25 CAPSULE ORAL NIGHTLY
Status: DISCONTINUED | OUTPATIENT
Start: 2024-08-13 | End: 2024-08-16

## 2024-08-13 RX ORDER — SPIRONOLACTONE 25 MG/1
25 TABLET ORAL DAILY
Status: DISCONTINUED | OUTPATIENT
Start: 2024-08-13 | End: 2024-08-16

## 2024-08-13 RX ORDER — DIGOXIN 125 MCG
125 TABLET ORAL NIGHTLY
Status: DISCONTINUED | OUTPATIENT
Start: 2024-08-13 | End: 2024-08-16

## 2024-08-13 RX ORDER — WARFARIN SODIUM 5 MG/1
5 TABLET ORAL
Status: DISCONTINUED | OUTPATIENT
Start: 2024-08-13 | End: 2024-08-13

## 2024-08-13 RX ORDER — ONDANSETRON 2 MG/ML
4 INJECTION INTRAMUSCULAR; INTRAVENOUS EVERY 6 HOURS PRN
Status: DISCONTINUED | OUTPATIENT
Start: 2024-08-13 | End: 2024-08-16

## 2024-08-13 RX ORDER — SODIUM PHOSPHATE, DIBASIC AND SODIUM PHOSPHATE, MONOBASIC 7; 19 G/230ML; G/230ML
1 ENEMA RECTAL ONCE AS NEEDED
Status: DISCONTINUED | OUTPATIENT
Start: 2024-08-13 | End: 2024-08-16

## 2024-08-13 RX ORDER — POLYETHYLENE GLYCOL 3350 17 G/17G
17 POWDER, FOR SOLUTION ORAL DAILY PRN
Status: DISCONTINUED | OUTPATIENT
Start: 2024-08-13 | End: 2024-08-16

## 2024-08-13 RX ORDER — ROSUVASTATIN CALCIUM 5 MG/1
5 TABLET, COATED ORAL EVERY OTHER DAY
Status: DISCONTINUED | OUTPATIENT
Start: 2024-08-14 | End: 2024-08-16

## 2024-08-13 RX ORDER — FUROSEMIDE 10 MG/ML
40 INJECTION INTRAMUSCULAR; INTRAVENOUS
Status: DISCONTINUED | OUTPATIENT
Start: 2024-08-13 | End: 2024-08-15

## 2024-08-13 RX ORDER — BUPROPION HYDROCHLORIDE 150 MG/1
150 TABLET, EXTENDED RELEASE ORAL 2 TIMES DAILY
Status: DISCONTINUED | OUTPATIENT
Start: 2024-08-13 | End: 2024-08-16

## 2024-08-13 RX ORDER — SENNOSIDES 8.6 MG
17.2 TABLET ORAL NIGHTLY PRN
Status: DISCONTINUED | OUTPATIENT
Start: 2024-08-13 | End: 2024-08-16

## 2024-08-13 NOTE — CM/SW NOTE
08/13/24 1200   CM/SW Referral Data   Referral Source Social Work (self-referral);Nurse   Reason for Referral Discharge planning   Informant Patient;Clinical Staff Member;EMR   Medical Hx   Does patient have an established PCP? Yes   Patient Info   Patient's Current Mental Status at Time of Assessment Oriented;Alert   Patient's Home Environment House   Patient lives with Alone   Patient Status Prior to Admission   Independent with ADLs and Mobility Yes   Services in place prior to admission Home Health Care;DME/Supplies at home;long-term Home Care   Home Health Provider Info [facility]   Type of DME/Supplies   (crutches)   long-term Home Care Provider Department on Aging   long-term Care days per week 14   Discharge Needs   Anticipated D/C needs Home health care     Pt is a 68 y/o female admitted with acute on chronic diastolic congestive heart failure. SW received message from Charmaine at [facility] stating pt is current for RN services. SW met with pt who is alert and oriented at bedside.     Pt lives alone and stated she is typically independent with ADLs. Pt stated she has 'all kinds of equipment' and typically uses her crutches to ambulate. Pt stated she is still driving and has groceries delivered. Pt stated she does not have family or friends that live local, but she has services through the [facility] for homemaker services. Pt stated she has 14 hours/week of homemaker services and her homemaker assists with cleaning, meals, laundry, and household tasks. Pt stated the days/hours with her homemaker are flexible and she does not have scheduled day or time for the homemaker. Pt also shared Medicare recently started potentially covering Purewick's and she is working with her urologist to complete the needed forms for Medicare. Pt stated she is incontinent and is hopeful the Purewick is approved to be covered by Medicare. SW informed pt typically Purwicks are an OOP expense,  pt verbalized understanding but stated she is working with Medicare for Ceres coverage. Pt confirmed she is current with [facility]  and is agreeable to continue services at discharge. Pt stated her car is in the parking garage and she plans to drive herself home at discharge. Pt denied any further needs at this time.     ELPIDIO entered and attached to AIDIN referral. [facility] reserved in AIDIN. SW will continue to follow.     GEN Almanza  Discharge Planner

## 2024-08-13 NOTE — CONSULTS
Cardiology Consultation Note      Arleth Weiss Patient Status:  Inpatient    1955 MRN [id number]   Location [facility] 3NE-A Attending Omi Blackman MD   Hosp Day # 0 PCP Viktoriya Garcias DO     Reason for consultation:  HFPEF    Impression:  Acute HFREF: likely due to inability to tolerate bumex. EF 45-50%  Permanent AFIB  Severe aortic stenosis s/p TAVR in 2019: mildly elevated gradients. Is maintained on warfarin   CAD: PCI to Lcx in   HTN  HLD  PVD:  acute limb ischemia and was treated with angiojet and POBA and aborted left pop cutdown 10/23   H/o PE s/p IVC filter  LOYD last admission with mobile echodensity in the posterior mitral leaflet. ID consulted at that time and this was not felt to be 2/2 endocarditis     Plan:  IV lasix 40 mg BID  I/O  Daily weights   Echo when euvolemic   Pharmacy to dose coumadin. INR 3.61. Goal is 2.5-3.5 given increased gradients across TAVR  Continue plavix  Continue aldactone       History of Present Illness:  Arleth Weiss is a 69 year old female who presented to [facility] on 2024.    This is a patient of my partner: Dr. Case.    She has a PMH including asthma, thyroid cancer s/p thyroidectomy with hypothyroidism, atrial fibrillation on coumadin, aortic stenosis s/p TAVR, copd, depression, htn, hld, obesity, fibromyalgia, hx PE, IVC filter, hx Pulm htn, chronic diastolic HF, SHELDON, acute LLE ischemia s/p angiojet and POBA and aborted left pop cutdown at [facility] 10/2023d (follows Dr. Andrade), left eye subconjunctival hemorrhage. .    Has gained at least 15-20 lbs over the past few weeks. Has orthopnea, LE edema, and TALAMANTES    Cardiology consultation was requested.    Medications:  Current Facility-Administered Medications   Medication Dose Route Frequency    acetaminophen (Tylenol Extra Strength) tab 500 mg  500 mg Oral Q4H PRN    melatonin tab 3 mg  3 mg Oral Nightly PRN    ondansetron (Zofran) 4 MG/2ML injection 4 mg  4 mg Intravenous Q6H PRN     metoclopramide (Reglan) 5 mg/mL injection 10 mg  10 mg Intravenous Q8H PRN    polyethylene glycol (PEG 3350) (Miralax) 17 g oral packet 17 g  17 g Oral Daily PRN    sennosides (Senokot) tab 17.2 mg  17.2 mg Oral Nightly PRN    bisacodyl (Dulcolax) 10 MG rectal suppository 10 mg  10 mg Rectal Daily PRN    fleet enema (Fleet) 7-19 GM/118ML rectal enema 133 mL  1 enema Rectal Once PRN       Past Medical History:    Aortic stenosis    S/P TAVR    Arrhythmia    ASTHMA    Asthma (HCC)    Back problem    CANCER    thyroid    Cancer (HCC)    thyroid     CHF (congestive heart failure) (HCC)    CKD (chronic kidney disease) stage 2, GFR 60-89 ml/min    COPD (chronic obstructive pulmonary disease) (HCC)    DEPRESSION    Disorder of thyroid    Essential hypertension    Fibromyalgia    Gout    High blood pressure    High cholesterol    HYPERTENSION    HYPOTHYROIDISM    Muscle weakness    OBESITY    OSTEOARTHRITIS    Osteoarthritis    OTHER DISEASES    Fibromyalgia    OTHER DISEASES    Pulmonary emboli    OTHER DISEASES    Lymph edema    OTHER DISEASES    Pulmonary hypertension    Peripheral vascular disease (HCC)    Pulmonary embolism (HCC)    RA (rheumatoid arthritis) (HCC)    Shortness of breath    ON EXERTION    SLEEP APNEA    Sleep apnea    CPAP       Past Surgical History:   Procedure Laterality Date    Anesth,shoulder replacement Right 2014    hemiathroplasty    Back surgery  2000    Back surgery  2004    complete c-3 -7 ectomy    Biopsy Left 07/20/2023    TEMPORAL ARTERY    Cath transcatheter aortic valve replacement      Colonoscopy N/A 05/10/2018    Procedure: COLONOSCOPY, POSSIBLE BIOPSY, POSSIBLE POLYPECTOMY 20515;  Surgeon: Kendell Valentin MD;  Location: [facility]    Colonoscopy      Craniotomy for lobotomy Left     D & c  09/2009    Dilation/curettage,diagnostic      Hip replacement surgery  09/2009    Rt hip    Knee replacement surgery  2003    Both knees    Other surgical history  1989     Thyroid removal    Other surgical history      LT foot spur removal    Thyroidectomy Bilateral     Total hip replacement      Total knee replacement         Family History  There is no family history of sudden cardiac death.    Social History   reports that she quit smoking about 33 years ago. Her smoking use included cigarettes. She started smoking about 63 years ago. She has a 15 pack-year smoking history. She has never used smokeless tobacco. She reports that she does not drink alcohol and does not use drugs.     Allergies  Allergies   Allergen Reactions    Adhesive Tape HIVES     ALL ADHESIVES    Codeine HIVES    Doxycycline SWELLING    Hydrocodone UNKNOWN    Lisinopril TONGUE SWELLING    Morphine Sulfate HIVES    Opioid Analgesics UNKNOWN    Oxycontin ITCHING    Oxytocin HIVES    Vicodin [Hydrocodone-Acetaminophen] ITCHING    Skin Adhesives OTHER (SEE COMMENTS)         Review of Systems:  Constitutional: negative for fevers  Eyes: negative for visual disturbance  Ears, nose, mouth, throat, and face: negative for epistaxis  Respiratory: negative for dyspnea on exertion  Cardiovascular: negative for chest pain  Gastrointestinal: negative for melena  Genitourinary:negative for hematuria  Hematologic/lymphatic: negative for bleeding  Musculoskeletal:negative for myalgias  Neurological: negative for dizziness and headaches  Endocrine: negative for temperature intolerance      Physical Exam:  Blood pressure 90/50, pulse 78, temperature 97.4 °F (36.3 °C), temperature source Oral, resp. rate 18, weight 238 lb (108 kg), SpO2 94%, not currently breastfeeding.  Temp (24hrs), Av.9 °F (36.6 °C), Min:97.4 °F (36.3 °C), Max:98.1 °F (36.7 °C)    Wt Readings from Last 3 Encounters:   24 238 lb (108 kg)   24 203 lb (92.1 kg)   24 203 lb (92.1 kg)       General: Awake and alert; in no acute distress  HEENT: Extraocular movements are intact; sclerae are anicteric; scalp is atrauamatic; no  thyromegaly  Neck: Supple; no JVD; no carotid bruits  Cardiac: 2/6 systolic murmur at the base   Lungs: Clear to auscultation bilaterally; no accessory muscle use is noted  Abdomen: Soft, non-tender; bowel sounds are normoactive; no hepatosplenomegaly  Extremities: No clubbing or cyanosis; moves all 4 extremities normally  Psychiatric: Normal mood and affect; answers questions appropriately  Dermatologic: No rashes; normal skin turgor    Diagnostic testing:    EKG: Normal sinus rhythm    Labs:   Lab Results   Component Value Date    PT 23.7 (H) 10/09/2011    INR 3.61 (H) 08/12/2024    INR 2.79 (H) 05/31/2024        Lab Results   Component Value Date    WBC 9.9 08/12/2024    HGB 12.7 08/12/2024    HCT 38.2 08/12/2024    .0 08/12/2024    CREATSERUM 0.81 08/12/2024    BUN 12 08/12/2024     08/12/2024    K 4.0 08/12/2024     08/12/2024    CO2 26.0 08/12/2024     08/12/2024    CA 9.8 08/12/2024    ALB 4.1 08/12/2024    ALKPHO 125 08/12/2024    BILT 0.3 08/12/2024    TP 7.2 08/12/2024    AST 38 08/12/2024    ALT 21 08/12/2024    INR 3.61 08/12/2024    PTP 36.6 08/12/2024         Thank you for allowing our practice to participate in the care of your patient. Please do not hesitate to contact me if you have any questions.    Carlitos Martinez MD

## 2024-08-13 NOTE — CONSULTS
[facility] Pharmacy Dosing Service  Warfarin (Coumadin) Initial Dosing    Arleth Weiss is a 69 year old patient for whom pharmacy has been consulted to dose warfarin (COUMADIN) for Prophylaxis of venous thrombosis by Dr. Martinez.  Based on this indication, Goal is 2.5-3.5 given increased gradients across TAVR     Pertinent Patient Medical History:  AFIB, HF, CAD, HTN, HLD, PVD, h/o PE s/p IVC filter  Potential Drug Interactions:  none    Latest INR:   Recent Labs     08/12/24 2039   INR 3.61*       Other Anticoagulants:  none  Home regimen (if applicable):  5mg Tu, Th, Fr, Sa; 2.5mg Mo, We, Sa   Date/Time last dose was given (if applicable): 2.5 mg on 8/12    Based on above -  1.  For today, Hold warfarin (COUMADIN) dose    2.  PT/INR ordered daily while on warfarin    3.  Pharmacy will continue to follow.  We appreciate the opportunity to assist in the care of this patient.    Shankar Gracia, MaritoD  8/13/2024  9:41 AM

## 2024-08-13 NOTE — ED INITIAL ASSESSMENT (HPI)
69YF c/c of swelling Pt state that she been feeling sob, leg and abd swelling for the last few weeks Pt state that she has a hx of CHF

## 2024-08-13 NOTE — CM/SW NOTE
SW received order for Home Health services. Chart reviewed and noted pt has hx with [facility].     Referral sent to [facility] in [facility] and inquired if pt is still current with [facility]. [facility] will continue to follow.     GEN Almanza  Discharge Planner

## 2024-08-13 NOTE — PLAN OF CARE
Assumed patient care at 0730. A/Ox4. Room air. Afib on tele, Rates controlled. Daily weight. Cardiac diet. Purewick. Electrolyte protocol (cardiac). Bilateral legs, weeping wounds in compression, wound care to see. R forearm SL. All safety precautions are in place and will continue with plan of care.     Problem: METABOLIC/FLUID AND ELECTROLYTES - ADULT  Goal: Electrolytes maintained within normal limits  Description: INTERVENTIONS:  - Monitor labs and rhythm and assess patient for signs and symptoms of electrolyte imbalances  - Administer electrolyte replacement as ordered  - Monitor response to electrolyte replacements, including rhythm and repeat lab results as appropriate  - Fluid restriction as ordered  - Instruct patient on fluid and nutrition restrictions as appropriate  Outcome: Progressing     Problem: SKIN/TISSUE INTEGRITY - ADULT  Goal: Skin integrity remains intact  Description: INTERVENTIONS  - Assess and document risk factors for pressure ulcer development  - Assess and document skin integrity  - Monitor for areas of redness and/or skin breakdown  - Initiate interventions, skin care algorithm/standards of care as needed  Outcome: Progressing  Goal: Incision(s), wounds(s) or drain site(s) healing without S/S of infection  Description: INTERVENTIONS:  - Assess and document risk factors for pressure ulcer development  - Assess and document skin integrity  - Assess and document dressing/incision, wound bed, drain sites and surrounding tissue  - Implement wound care per orders  - Initiate isolation precautions as appropriate  - Initiate Pressure Ulcer prevention bundle as indicated  Outcome: Progressing

## 2024-08-13 NOTE — CONSULTS
[facility] [place]  Report of Inpatient Wound Care Consultation    Arleth Weiss Patient Status:  Inpatient    1955 MRN [id number]   Location [facility] 3NE-A Attending Kristie Larkin MD   Hosp Day # 0 PCP Viktoriya Garcias DO     Reason for Consultation:  BLE PAD with blistering    History of Present Illness:  Arleth Weiss is a a(n) 69 year old female. Patient seen at bedside with a fellow wound care nurse. Patient presents with wounds to  bilateral lower extremities ,  with multiple comorbidities.Pt complains of minimal pain in the wounds at this time.          History:  Past Medical History:    Aortic stenosis    S/P TAVR    Arrhythmia    ASTHMA    Asthma (HCC)    Back problem    CANCER    thyroid    Cancer (HCC)    thyroid     CHF (congestive heart failure) (HCC)    CKD (chronic kidney disease) stage 2, GFR 60-89 ml/min    COPD (chronic obstructive pulmonary disease) (HCC)    DEPRESSION    Disorder of thyroid    Essential hypertension    Fibromyalgia    Gout    High blood pressure    High cholesterol    HYPERTENSION    HYPOTHYROIDISM    Muscle weakness    OBESITY    OSTEOARTHRITIS    Osteoarthritis    OTHER DISEASES    Fibromyalgia    OTHER DISEASES    Pulmonary emboli    OTHER DISEASES    Lymph edema    OTHER DISEASES    Pulmonary hypertension    Peripheral vascular disease (HCC)    Pulmonary embolism (HCC)    RA (rheumatoid arthritis) (HCC)    Shortness of breath    ON EXERTION    SLEEP APNEA    Sleep apnea    CPAP     Past Surgical History:   Procedure Laterality Date    Anesth,shoulder replacement Right     hemiathroplasty    Back surgery      Back surgery      complete c-3 -7 ectomy    Biopsy Left 2023    TEMPORAL ARTERY    Cath transcatheter aortic valve replacement      Colonoscopy N/A 05/10/2018    Procedure: COLONOSCOPY, POSSIBLE BIOPSY, POSSIBLE POLYPECTOMY 67458;  Surgeon: Kendell Valentin MD;  Location: [facility]    Colonoscopy      Craniotomy for lobotomy  Left     D & c  09/2009    Dilation/curettage,diagnostic      Hip replacement surgery  09/2009    Rt hip    Knee replacement surgery  2003    Both knees    Other surgical history  1989    Thyroid removal    Other surgical history  2004    LT foot spur removal    Thyroidectomy Bilateral     Total hip replacement      Total knee replacement        reports that she quit smoking about 33 years ago. Her smoking use included cigarettes. She started smoking about 63 years ago. She has a 15 pack-year smoking history. She has never used smokeless tobacco. She reports that she does not drink alcohol and does not use drugs.      Allergies:  @ALLERGY    Laboratory Data:    Recent Labs   Lab 08/12/24 2039   WBC 9.9   HGB 12.7   HCT 38.2   .0   CREATSERUM 0.81   BUN 12   *   CA 9.8   ALB 4.1   TP 7.2   INR 3.61*         ASSESSMENT:  Wound 08/13/24 Leg Right (Active)   Date First Assessed/Time First Assessed: 08/13/24 0300   Primary Wound Type: Arterial Ulcer  Location: Leg  Wound Location Orientation: Right      Assessments 8/13/2024  3:53 PM   Wound Image       Drainage Amount Scant   Drainage Description Serosanguineous   Wound Length (cm) 1.2 cm   Wound Width (cm) 0.7 cm   Wound Surface Area (cm^2) 0.84 cm^2   Wound Depth (cm) 0.1 cm   Wound Volume (cm^3) 0.084 cm^3   Margins Well-defined edges   Non-staged Wound Description Full thickness   Mira-wound Assessment Dry;Edema;Hyperpigmented   Wound Granulation Tissue Pink;Pale Linn;Firm   Wound Bed Granulation (%) 75 %   Wound Bed Fibrin (%) 25 %   Wound Odor None       Wound 08/13/24 Leg Left (Active)   Date First Assessed/Time First Assessed: 08/13/24 0301   Present on Original Admission: Yes  Location: Leg  Wound Location Orientation: Left      Assessments 8/13/2024  3:51 PM   Wound Image      Drainage Amount Scant   Drainage Description Serosanguineous   Wound Length (cm) 1 cm   Wound Width (cm) 0.9 cm   Wound Surface Area (cm^2) 0.9 cm^2   Wound Depth (cm) 0.1  cm   Wound Volume (cm^3) 0.09 cm^3   Margins Well-defined edges   Non-staged Wound Description Full thickness   Mira-wound Assessment Dry;Edema;Hyperpigmented   Wound Granulation Tissue Red;Pink;Spongy   Wound Bed Granulation (%) 100 %   Wound Odor None   Local pulse : pedal pulses obtained via handheld doppler   Capillary refill  < 3 seconds  Temperature : within normal limits     Wound Cleaning and Dressings:  Showering directions: May shower with protection  Wound cleansing:  Cleanse with saline  Wound cleaning frequency: Daily  Wound product: Xeroform gauze,ABD pad and wrap with conforming gauze roll only[ pt does not to use kerlix]  Dressing change frequency:  Change dressing daily and/or PRN    Compression Therapy:   Elevate leg(s) as much as possible      Miscellaneous/Additional Orders:  Offloading: Turn Q 2 hours and as needed, off load heels at all times while in bed to prevent further skin  breakdown      Care Summary:  Care Summary: Discussed Plan of Care at beside with patient. Patient verbally acknowledges understanding of all instructions and all questions were answered.      Additional Notes  May need to continue with home health care if discharged to home  No recent arterial studies on file - last arterial studies 2021    Thank you for this consultation and for allowing me to participate in the care of your patient.      Time Spent 30 Minutes.    Thank you,  Massiel Gill RN BSN [facility]  Wound Care Clinician  [facility] Wound Care Team  8/13/2024  4:43 PM

## 2024-08-13 NOTE — ED PROVIDER NOTES
Patient Seen in: [facility] 3ne-a      History     Chief Complaint   Patient presents with    Swelling Edema     Stated Complaint: Pt c/o of bilateral leg edema, SOB, and abdominal distention. Hx CHF.    Subjective:   HPI    Patient is 69-year-old female history of CHF presents to ED for evaluation of shortness of breath, leg swelling.  Patient has been short of breath for the last few days.  Complains of increased leg swelling for the last 2 weeks.  Patient states she is noncompliant with her Bumex and only takes it 2 days out of the week because she urinates frequently.  She is trying to get a PureWick approved by her insurance so she can take her Bumex as prescribed.  Complains of shortness of breath worse with lying supine.  Denies chest pain.  No fever or cough.  States her dry weight is around 210-213 pounds.  Today she weighs 238 pounds.    Objective:   Past Medical History:    Aortic stenosis    S/P TAVR    Arrhythmia    ASTHMA    Asthma (HCC)    Back problem    CANCER    thyroid    Cancer (HCC)    thyroid     CHF (congestive heart failure) (HCC)    CKD (chronic kidney disease) stage 2, GFR 60-89 ml/min    COPD (chronic obstructive pulmonary disease) (HCC)    DEPRESSION    Disorder of thyroid    Essential hypertension    Fibromyalgia    Gout    High blood pressure    High cholesterol    HYPERTENSION    HYPOTHYROIDISM    Muscle weakness    OBESITY    OSTEOARTHRITIS    Osteoarthritis    OTHER DISEASES    Fibromyalgia    OTHER DISEASES    Pulmonary emboli    OTHER DISEASES    Lymph edema    OTHER DISEASES    Pulmonary hypertension    Peripheral vascular disease (HCC)    Pulmonary embolism (HCC)    RA (rheumatoid arthritis) (HCC)    Shortness of breath    ON EXERTION    SLEEP APNEA    Sleep apnea    CPAP              Past Surgical History:   Procedure Laterality Date    Anesth,shoulder replacement Right 2014    hemiathroplasty    Back surgery  2000    Back surgery  2004    complete c-3 -7 ectomy    Biopsy  Left 2023    TEMPORAL ARTERY    Cath transcatheter aortic valve replacement      Colonoscopy N/A 05/10/2018    Procedure: COLONOSCOPY, POSSIBLE BIOPSY, POSSIBLE POLYPECTOMY 96344;  Surgeon: Kendell Valentin MD;  [facility]: [facility]    Colonoscopy      Craniotomy for lobotomy Left     D & c  2009    Dilation/curettage,diagnostic      Hip replacement surgery  2009    Rt hip    Knee replacement surgery      Both knees    Other surgical history      Thyroid removal    Other surgical history      LT foot spur removal    Thyroidectomy Bilateral     Total hip replacement      Total knee replacement                  Social History     Socioeconomic History    Marital status: Single   Tobacco Use    Smoking status: Former     Current packs/day: 0.00     Average packs/day: 0.5 packs/day for 30.0 years (15.0 ttl pk-yrs)     Types: Cigarettes     Start date: 1961     Quit date: 1991     Years since quittin.5    Smokeless tobacco: Never   Vaping Use    Vaping status: Never Used   Substance and Sexual Activity    Alcohol use: No    Drug use: No   Other Topics Concern    Caffeine Concern No    Exercise Yes    Seat Belt Yes    Special Diet Yes     Comment: low sodium    Stress Concern Yes    Weight Concern No     Social Determinants of Health     Food Insecurity: No Food Insecurity (2024)    Food Insecurity     Food Insecurity: Never true   Transportation Needs: No Transportation Needs (2024)    Transportation Needs     Lack of Transportation: No   Housing Stability: Low Risk  (2024)    Housing Stability     Housing Instability: No              Review of Systems    Positive for stated Chief Complaint: Swelling Edema    Other systems are as noted in HPI.  Constitutional and vital signs reviewed.      All other systems reviewed and negative except as noted above.    Physical Exam     ED Triage Vitals [24]   /58   Pulse 82   Resp 20   Temp 98.1 °F  (36.7 °C)   Temp src Temporal   SpO2 95 %   O2 Device None (Room air)       Current Vitals:   Vital Signs  BP: 135/89  Pulse: 74  Resp: 20  Temp: 98.1 °F (36.7 °C)  Temp src: Temporal    Oxygen Therapy  SpO2: 95 %  O2 Device: None (Room air)  Mode: AutoPAP            Physical Exam    GENERAL: No acute distress, well appearing and non-toxic, Alert and oriented X 3   HEENT: Normocephalic, atraumatic.  Moist mucous membranes.  Pupils equal round reactive to light and accommodation, extraocular motion is intact, sclerae white, conjunctiva is pink.  Oropharynx is unremarkable, no exudate.  NECK: Supple, trachea midline, no lymphadenopathy.   LUNG: Slight crackles bilateral bases  CARDIOVASCULAR: Regular rate and rhythm.  Normal S1S2.  No S3S4 or murmur.  ABDOMEN: Bowel sounds are present. Soft. nondistended, no pulsatile masses. nontender  MUSCULOSKELETAL: Patient has dressings bilateral legs.  She has swelling up into her upper thighs and edema.  SKIN EXAMINATIoN: Warm and dry with normal appearance.  No rashes or lesions.  NEUROLOGICAL:  Motor strength intact all groups.  normal sensation, speech intact    ED Course     Labs Reviewed   CBC WITH DIFFERENTIAL WITH PLATELET - Abnormal; Notable for the following components:       Result Value    Monocyte Absolute 1.03 (*)     All other components within normal limits   COMP METABOLIC PANEL (14) - Abnormal; Notable for the following components:    Glucose 126 (*)     Sodium 134 (*)     AST 38 (*)     All other components within normal limits   PRO BETA NATRIURETIC PEPTIDE - Abnormal; Notable for the following components:    Pro-Beta Natriuretic Peptide 1,365 (*)     All other components within normal limits   PROTHROMBIN TIME (PT) - Abnormal; Notable for the following components:    PT 36.6 (*)     INR 3.61 (*)     All other components within normal limits   DIGOXIN (LANOXIN) - Abnormal; Notable for the following components:    Digoxin 0.44 (*)     All other components  within normal limits   TROPONIN I HIGH SENSITIVITY - Normal   SARS-COV-2/FLU A AND B/RSV BY PCR (GENEXPERT) - Normal    Narrative:     This test is intended for the qualitative detection and differentiation of SARS-CoV-2, influenza A, influenza B, and respiratory syncytial virus (RSV) viral RNA in nasopharyngeal or nares swabs from individuals suspected of respiratory viral infection consistent with COVID-19 by their healthcare provider. Signs and symptoms of respiratory viral infection due to SARS-CoV-2, influenza, and RSV can be similar.    Test performed using the Xpert Xpress SARS-CoV-2/FLU/RSV (real time RT-PCR)  assay on the [employer] instrument, [employer], [place] [address].   This test is being used under the Food and Drug Administration's Emergency Use Authorization.    The authorized Fact Sheet for Healthcare Providers for this assay is available upon request from the laboratory.   RAINBOW DRAW BLUE   Sodium 134.  BNP 1365.  INR 3.61.  Troponin normal  EKG    Rate, intervals and axes as noted on EKG Report.  Rate: 75  Rhythm: Atrial Fibrillation  Reading: No acute changes                 I personally reviewed xray films of chest and independent interpretation shows slight pulmonary cephalization.  I also reviewed formal xray report as read by radiology with findings below:  XR CHEST AP PORTABLE  (CPT=71045)    Result Date: 8/12/2024  PROCEDURE:  XR CHEST AP PORTABLE  (CPT=71045)  TECHNIQUE:  AP chest radiograph was obtained.  COMPARISON:  [facility] , XR, XR CHEST AP PORTABLE  (CPT=71045), 5/24/2024, 6:11 PM.  INDICATIONS:  Pt c/o of bilateral leg edema, SOB, and abdominal distention. Hx CHF.  PATIENT STATED HISTORY: (As transcribed by Technologist)  Patient offered no additional history at this time.    FINDINGS:  There is diffuse prominence of interstitial markings and pulmonary vascular markings in lungs which could represent edema.  There is cardiomegaly.  Aorta is atherosclerotic.  Right glenohumeral  joint prosthesis is stable.  Chest wall structures are otherwise unchanged.            CONCLUSION:  1. Interval development of diffuse increased interstitial and pulmonary vascular markings could represent edema. 2. There is cardiomegaly.   LOCATION:  [facility]      Dictated by (CST): Buzz Barclay MD on 8/12/2024 at 11:35 PM     Finalized by (CST): Buzz Barclay MD on 8/12/2024 at 11:35 PM             Medications   acetaminophen (Tylenol Extra Strength) tab 500 mg (500 mg Oral Given 8/13/24 0339)   melatonin tab 3 mg (has no administration in time range)   ondansetron (Zofran) 4 MG/2ML injection 4 mg (has no administration in time range)   metoclopramide (Reglan) 5 mg/mL injection 10 mg (has no administration in time range)   polyethylene glycol (PEG 3350) (Miralax) 17 g oral packet 17 g (has no administration in time range)   sennosides (Senokot) tab 17.2 mg (has no administration in time range)   bisacodyl (Dulcolax) 10 MG rectal suppository 10 mg (has no administration in time range)   fleet enema (Fleet) 7-19 GM/118ML rectal enema 133 mL (has no administration in time range)   furosemide (Lasix) 10 mg/mL injection 80 mg (80 mg Intravenous Given 8/12/24 0233)         MDM      Patient is a 69-year-old female presents to ED for evaluation of shortness of breath, leg swelling.  Differential clues pneumonia, CHF, peripheral edema.  Patient noncompliant with Bumex.  Laboratory test showed elevated BNP within the range of her baseline.  Hyponatremic and sodium 134.  INR 3.61.  Normal troponin.  EKG showing atrial fibrillation.  Chest x-ray shows fluid overload.  Clinical exam consistent with fluid overload and decompensated CHF due to medication noncompliance.  Patient given Lasix 80 mg IV.  Case discussed with hospitalist.  Recommend cardiology evaluation in the morning for diuresis.  Ejection fraction 45-50% on last echocardiogram May 2024        External and old record review was performed.  I reviewed echocardiogram  May 2024 with results above      Admission disposition: 8/12/2024 11:39 PM                                        MDM    Disposition and Plan     Clinical Impression:  1. Acute on chronic diastolic congestive heart failure (HCC)         Disposition:  Admit  8/12/2024 11:39 pm    Follow-up:  No follow-up provider specified.        Medications Prescribed:  Current Discharge Medication List                            Hospital Problems       Present on Admission  Date Reviewed: 7/1/2024            ICD-10-CM Noted POA    * (Principal) Acute on chronic diastolic congestive heart failure (HCC) I50.33 5/24/2024 Unknown

## 2024-08-13 NOTE — H&P
.CC:   Chief Complaint   Patient presents with    Swelling Edema        PCP: Viktoriya Garcias DO    History of Present Illness: Patient is a 69 year old female with PMH sig for TAVR, asthma, CHFrEF, htn who presents with worsening sob, leg swelling, weight gain of >20 lbs. Occurring more so over past 2 weeks. Noncompliant with bumex and other diuretics because of the frequent urination. Trying to get Purewick approved because she has significant urinary incontinence. No fevers/chills/cough. Has weeping wounds on legs from edema.       PMH  Past Medical History:    Aortic stenosis    S/P TAVR    Arrhythmia    ASTHMA    Asthma (HCC)    Back problem    CANCER    thyroid    Cancer (HCC)    thyroid     CHF (congestive heart failure) (HCC)    CKD (chronic kidney disease) stage 2, GFR 60-89 ml/min    COPD (chronic obstructive pulmonary disease) (HCC)    DEPRESSION    Disorder of thyroid    Essential hypertension    Fibromyalgia    Gout    High blood pressure    High cholesterol    HYPERTENSION    HYPOTHYROIDISM    Muscle weakness    OBESITY    OSTEOARTHRITIS    Osteoarthritis    OTHER DISEASES    Fibromyalgia    OTHER DISEASES    Pulmonary emboli    OTHER DISEASES    Lymph edema    OTHER DISEASES    Pulmonary hypertension    Peripheral vascular disease (HCC)    Pulmonary embolism (HCC)    RA (rheumatoid arthritis) (HCC)    Shortness of breath    ON EXERTION    SLEEP APNEA    Sleep apnea    CPAP        PSH  Past Surgical History:   Procedure Laterality Date    Anesth,shoulder replacement Right 2014    hemiathroplasty    Back surgery  2000    Back surgery  2004    complete c-3 -7 ectomy    Biopsy Left 07/20/2023    TEMPORAL ARTERY    Cath transcatheter aortic valve replacement      Colonoscopy N/A 05/10/2018    Procedure: COLONOSCOPY, POSSIBLE BIOPSY, POSSIBLE POLYPECTOMY 62494;  Surgeon: Kendell Valentin MD;  Location: [facility]    Colonoscopy      Craniotomy for lobotomy Left     D & c  09/2009     Dilation/curettage,diagnostic      Hip replacement surgery  09/2009    Rt hip    Knee replacement surgery  2003    Both knees    Other surgical history  1989    Thyroid removal    Other surgical history  2004    LT foot spur removal    Thyroidectomy Bilateral     Total hip replacement      Total knee replacement          ALL:  Allergies   Allergen Reactions    Adhesive Tape HIVES     ALL ADHESIVES    Codeine HIVES    Doxycycline SWELLING    Hydrocodone UNKNOWN    Lisinopril TONGUE SWELLING    Morphine Sulfate HIVES    Opioid Analgesics UNKNOWN    Oxycontin ITCHING    Oxytocin HIVES    Vicodin [Hydrocodone-Acetaminophen] ITCHING    Skin Adhesives OTHER (SEE COMMENTS)        Home Medications:  Outpatient Medications Marked as Taking for the 8/12/24 encounter (Hospital Encounter)   Medication Sig Dispense Refill    bumetanide 2 MG Oral Tab Take 1 tablet (2 mg total) by mouth BID (Diuretic). 60 tablet 0    warfarin 2.5 MG Oral Tab Take 1 tablet (2.5 mg total) by mouth 3 (three) times a week. Q Mon., Wed., and Saturday per pt.      empagliflozin (JARDIANCE) 10 MG Oral Tab Take 1 tablet (10 mg total) by mouth at bedtime. Per pt.      levothyroxine 175 MCG Oral Tab Take 1 tablet (175 mcg total) by mouth before breakfast.      metOLazone 2.5 MG Oral Tab Take 1 tablet (2.5 mg total) by mouth every other day. Per pt.      dilTIAZem  MG Oral Capsule SR 24 Hr Take 1 capsule (240 mg total) by mouth daily. (Patient taking differently: Take 1 capsule (240 mg total) by mouth at bedtime.) 90 capsule 3    clopidogrel 75 MG Oral Tab Take 1 tablet (75 mg total) by mouth at bedtime. Per pt.      metoprolol succinate ER 25 MG Oral Tablet 24 Hr Take 1 tablet (25 mg total) by mouth at bedtime. Per pt.      rosuvastatin 5 MG Oral Tab Take 1 tablet (5 mg total) by mouth every other day.      spironolactone 25 MG Oral Tab Take 1 tablet (25 mg total) by mouth daily.      warfarin 5 MG Oral Tab Take 1 tablet (5 mg total) by mouth nightly.  Q Tues., Thurs., Friday and  per pt.      ferrous sulfate 325 (65 FE) MG Oral Tab EC Take 1 tablet (325 mg total) by mouth at bedtime. Per pt.      Vitamin C 500 MG Oral Tab Take 1 tablet (500 mg total) by mouth at bedtime. Per pt.      Zinc 50 MG Oral Cap Take 50 mg by mouth at bedtime. Per pt.      Cholecalciferol 125 MCG (5000 UT) Oral Tab Take 1 tablet (5,000 Units total) by mouth at bedtime. Per pt.      acetaminophen 325 MG Oral Tab Take 2 tablets (650 mg total) by mouth every 8 (eight) hours as needed for Pain.      buPROPion  MG Oral Tablet 12 Hr Take 1 tablet (150 mg total) by mouth 2 (two) times daily.      cyproheptadine 4 MG Oral Tab Take 1 tablet (4 mg total) by mouth nightly.      FLUoxetine 20 MG Oral Cap Take 1 capsule (20 mg total) by mouth 2 (two) times daily.      digoxin 0.125 MG Oral Tab Take 1 tablet (125 mcg total) by mouth daily. (Patient taking differently: Take 1 tablet (125 mcg total) by mouth at bedtime.) 30 tablet 0    allopurinol 100 MG Oral Tab Take 1 tablet (100 mg total) by mouth daily. (Patient taking differently: Take 1 tablet (100 mg total) by mouth at bedtime. Per pt.) 90 tablet 3    Nortriptyline HCl 25 MG Oral Cap Take 1 capsule (25 mg total) by mouth nightly.      Multiple Vitamin (MULTIVITAMIN OR) Take 1 tablet by mouth at bedtime. Per pt.           Soc Hx  Social History     Tobacco Use    Smoking status: Former     Current packs/day: 0.00     Average packs/day: 0.5 packs/day for 30.0 years (15.0 ttl pk-yrs)     Types: Cigarettes     Start date: 1961     Quit date: 1991     Years since quittin.5    Smokeless tobacco: Never   Substance Use Topics    Alcohol use: No        Fam Hx  Family History   Problem Relation Age of Onset    Hypertension Father     Lipids Father     Diabetes Mother     Hypertension Mother     Lipids Mother     Cancer Brother     Hypertension Brother        Review of Systems  Comprehensive ROS reviewed and negative except for  what's stated above.       OBJECTIVE:  BP 90/50 (BP Location: Left arm)   Pulse 78   Temp 97.4 °F (36.3 °C) (Oral)   Resp 18   Wt 238 lb (108 kg)   LMP  (LMP Unknown)   SpO2 94%   BMI 35.15 kg/m²     Gen- NAD, appears stated age  HEENT- NCAT, anicteric sclera, MMM, OP clear  Lymph- no cervical LAD  CV- RRR no murmurs. +SEGUN b/l  Lungs- CTAB, good respiratory effort  Abd- soft, ntnd, no organomegaly, BS+  Derm- wounds bandaged on legs b/l  Neuro- A&OX3, no focal deficits      Diagnostic Data:    CBC/Chem  Recent Labs   Lab 08/12/24 2039   WBC 9.9   HGB 12.7   MCV 93.9   .0   INR 3.61*       Recent Labs   Lab 08/12/24 2039   *   K 4.0      CO2 26.0   BUN 12   CREATSERUM 0.81   *   CA 9.8       Recent Labs   Lab 08/12/24 2039   ALT 21   AST 38*   ALB 4.1       No results for input(s): \"TROP\" in the last 168 hours.    Additional Diagnostics: personally reviewed EKG-  Afib    CXR: image personally reviewed- interstitial edema    Radiology: XR CHEST AP PORTABLE  (CPT=71045)    Result Date: 8/12/2024  PROCEDURE:  XR CHEST AP PORTABLE  (CPT=71045)  TECHNIQUE:  AP chest radiograph was obtained.  COMPARISON:  [facility] , XR, XR CHEST AP PORTABLE  (CPT=71045), 5/24/2024, 6:11 PM.  INDICATIONS:  Pt c/o of bilateral leg edema, SOB, and abdominal distention. Hx CHF.  PATIENT STATED HISTORY: (As transcribed by Technologist)  Patient offered no additional history at this time.    FINDINGS:  There is diffuse prominence of interstitial markings and pulmonary vascular markings in lungs which could represent edema.  There is cardiomegaly.  Aorta is atherosclerotic.  Right glenohumeral joint prosthesis is stable.  Chest wall structures are otherwise unchanged.            CONCLUSION:  1. Interval development of diffuse increased interstitial and pulmonary vascular markings could represent edema. 2. There is cardiomegaly.   [place]      Dictated by (CST): Buzz Barclay MD on 8/12/2024 at 11:35 PM      Finalized by (CST): Buzz Barclay MD on 8/12/2024 at 11:35 PM          Available outpatient records reviewed--    ASSESSMENT / PLAN:     68 yo woman with CHFrEF, htn, asthma who presented with worsening leg swelling, weight gain, sob in setting of inconsistent bumex use.    Acute CHFrEF  - 2/2 inconsistent diuretic use  - iv lasix and aldactone  - cards consult  - daily weights, Is/Os, bmp    Afib  Cad  Htn/hl  H/o tavr with increased gradients  - coumadin, pharmacy to dose. Inr 2.5-3.5 goal per cards  - cardizem, dig, metop  - plavix, statin    Copd/asthma- stable    Hypothyroid- levothyroxine    **Certification      PHYSICIAN Certification of Need for Inpatient Hospitalization - Initial Certification    Patient will require inpatient services that will reasonably be expected to span two midnight's based on the clinical documentation in H+P.   Based on patients current state of illness, I anticipate that, after discharge, patient will require TBD.      Kristie Larkin MD  [facility]  Pager [phone]  Answering Service number: [phone]

## 2024-08-13 NOTE — PLAN OF CARE
NURSING ADMISSION NOTE      Patient admitted via Cart  Oriented to room.  Pt is A&Ox4, following commands.   Pt on room air, VSS. CPAP when sleeping. Pulse ox reads artificially low d/t Raynauds in fingers. Lower extremities wrapped in dressing so this is not an option.   A. fib on tele.  Pt reports moderate pain in lower extremities. Redness, edema and weeping blisters noted. Dressings were done by home health nurse. Dressings have not been removed yet as they are still functional and intact  Pt is incontinent. Purewick in place  Pt up with 1-2 + crutches from home. Can ambulate short distances  Pt on cardiac diet. Tolerating diet.   R forearm SL  Allergy to adhesive. Ok to have tape and dressings on skin but must be removed slowly, per pt. Allergy to opioid analgesics  Bed in lowest position, call light in reach.   Safety precautions initiated.  Bed in low position.  Call light in reach.

## 2024-08-13 NOTE — ED QUICK NOTES
Orders for admission, patient is aware of plan and ready to go upstairs. Any questions, please call ED RN Inocencia at extension [phone].     Patient Covid vaccination status: Fully vaccinated     COVID Test Ordered in ED: SARS-CoV-2/Flu A and B/RSV by PCR (GeneXpert)    COVID Suspicion at Admission: N/A    Running Infusions:  None    Mental Status/LOC at time of transport: A&O x 4    Other pertinent information:   CIWA score: N/A   NIH score:  N/A

## 2024-08-14 LAB
ANION GAP SERPL CALC-SCNC: 3 MMOL/L (ref 0–18)
ANION GAP SERPL CALC-SCNC: 5 MMOL/L (ref 0–18)
ANION GAP SERPL CALC-SCNC: 8 MMOL/L (ref 0–18)
BUN BLD-MCNC: 13 MG/DL (ref 9–23)
BUN BLD-MCNC: 14 MG/DL (ref 9–23)
BUN BLD-MCNC: 15 MG/DL (ref 9–23)
CALCIUM BLD-MCNC: 10.1 MG/DL (ref 8.7–10.4)
CALCIUM BLD-MCNC: 10.1 MG/DL (ref 8.7–10.4)
CALCIUM BLD-MCNC: 9.2 MG/DL (ref 8.7–10.4)
CHLORIDE SERPL-SCNC: 100 MMOL/L (ref 98–112)
CHLORIDE SERPL-SCNC: 101 MMOL/L (ref 98–112)
CHLORIDE SERPL-SCNC: 101 MMOL/L (ref 98–112)
CO2 SERPL-SCNC: 32 MMOL/L (ref 21–32)
CO2 SERPL-SCNC: 32 MMOL/L (ref 21–32)
CO2 SERPL-SCNC: 35 MMOL/L (ref 21–32)
CREAT BLD-MCNC: 0.78 MG/DL
CREAT BLD-MCNC: 0.82 MG/DL
CREAT BLD-MCNC: 0.82 MG/DL
EGFRCR SERPLBLD CKD-EPI 2021: 76 ML/MIN/1.73M2 (ref 60–?)
EGFRCR SERPLBLD CKD-EPI 2021: 76 ML/MIN/1.73M2 (ref 60–?)
EGFRCR SERPLBLD CKD-EPI 2021: 81 ML/MIN/1.73M2 (ref 60–?)
ERYTHROCYTE [DISTWIDTH] IN BLOOD BY AUTOMATED COUNT: 14.7 %
GLUCOSE BLD-MCNC: 126 MG/DL (ref 70–99)
GLUCOSE BLD-MCNC: 91 MG/DL (ref 70–99)
GLUCOSE BLD-MCNC: 96 MG/DL (ref 70–99)
HCT VFR BLD AUTO: 39.3 %
HGB BLD-MCNC: 13 G/DL
INR BLD: 3.26 (ref 0.8–1.2)
MAGNESIUM SERPL-MCNC: 1.8 MG/DL (ref 1.6–2.6)
MCH RBC QN AUTO: 31.1 PG (ref 26–34)
MCHC RBC AUTO-ENTMCNC: 33.1 G/DL (ref 31–37)
MCV RBC AUTO: 94 FL
OSMOLALITY SERPL CALC.SUM OF ELEC: 286 MOSM/KG (ref 275–295)
OSMOLALITY SERPL CALC.SUM OF ELEC: 288 MOSM/KG (ref 275–295)
OSMOLALITY SERPL CALC.SUM OF ELEC: 292 MOSM/KG (ref 275–295)
PHOSPHATE SERPL-MCNC: 3.5 MG/DL (ref 2.4–5.1)
PHOSPHATE SERPL-MCNC: 3.6 MG/DL (ref 2.4–5.1)
PLATELET # BLD AUTO: 314 10(3)UL (ref 150–450)
POTASSIUM SERPL-SCNC: 3.2 MMOL/L (ref 3.5–5.1)
POTASSIUM SERPL-SCNC: 3.5 MMOL/L (ref 3.5–5.1)
POTASSIUM SERPL-SCNC: 3.9 MMOL/L (ref 3.5–5.1)
PROTHROMBIN TIME: 33.7 SECONDS (ref 11.6–14.8)
RBC # BLD AUTO: 4.18 X10(6)UL
SODIUM SERPL-SCNC: 138 MMOL/L (ref 136–145)
SODIUM SERPL-SCNC: 139 MMOL/L (ref 136–145)
SODIUM SERPL-SCNC: 140 MMOL/L (ref 136–145)
WBC # BLD AUTO: 6.8 X10(3) UL (ref 4–11)

## 2024-08-14 PROCEDURE — 94799 UNLISTED PULMONARY SVC/PX: CPT

## 2024-08-14 PROCEDURE — 85610 PROTHROMBIN TIME: CPT | Performed by: INTERNAL MEDICINE

## 2024-08-14 PROCEDURE — 85027 COMPLETE CBC AUTOMATED: CPT | Performed by: HOSPITALIST

## 2024-08-14 PROCEDURE — 80048 BASIC METABOLIC PNL TOTAL CA: CPT | Performed by: HOSPITALIST

## 2024-08-14 PROCEDURE — 83735 ASSAY OF MAGNESIUM: CPT | Performed by: HOSPITALIST

## 2024-08-14 PROCEDURE — 80048 BASIC METABOLIC PNL TOTAL CA: CPT | Performed by: INTERNAL MEDICINE

## 2024-08-14 PROCEDURE — 5A09357 ASSISTANCE WITH RESPIRATORY VENTILATION, LESS THAN 24 CONSECUTIVE HOURS, CONTINUOUS POSITIVE AIRWAY PRESSURE: ICD-10-PCS | Performed by: INTERNAL MEDICINE

## 2024-08-14 PROCEDURE — 84100 ASSAY OF PHOSPHORUS: CPT | Performed by: INTERNAL MEDICINE

## 2024-08-14 RX ORDER — POTASSIUM CHLORIDE 750 MG/1
40 TABLET, EXTENDED RELEASE ORAL ONCE
Status: COMPLETED | OUTPATIENT
Start: 2024-08-14 | End: 2024-08-14

## 2024-08-14 RX ORDER — WARFARIN SODIUM 2.5 MG/1
2.5 TABLET ORAL
Status: COMPLETED | OUTPATIENT
Start: 2024-08-14 | End: 2024-08-14

## 2024-08-14 RX ORDER — POTASSIUM CHLORIDE 750 MG/1
40 TABLET, EXTENDED RELEASE ORAL EVERY 4 HOURS
Status: DISCONTINUED | OUTPATIENT
Start: 2024-08-14 | End: 2024-08-14

## 2024-08-14 RX ORDER — MIDODRINE HYDROCHLORIDE 10 MG/1
10 TABLET ORAL 3 TIMES DAILY PRN
Status: DISCONTINUED | OUTPATIENT
Start: 2024-08-14 | End: 2024-08-16

## 2024-08-14 RX ORDER — ACETAMINOPHEN 500 MG
1000 TABLET ORAL EVERY 6 HOURS PRN
Status: DISCONTINUED | OUTPATIENT
Start: 2024-08-14 | End: 2024-08-16

## 2024-08-14 RX ORDER — POTASSIUM CHLORIDE 1500 MG/1
40 TABLET, EXTENDED RELEASE ORAL ONCE
Status: DISCONTINUED | OUTPATIENT
Start: 2024-08-14 | End: 2024-08-14

## 2024-08-14 RX ORDER — FERROUS SULFATE 325(65) MG
325 TABLET, DELAYED RELEASE (ENTERIC COATED) ORAL EVERY OTHER DAY
Status: DISCONTINUED | OUTPATIENT
Start: 2024-08-15 | End: 2024-08-16

## 2024-08-14 RX ORDER — MAGNESIUM OXIDE 400 MG/1
400 TABLET ORAL ONCE
Status: COMPLETED | OUTPATIENT
Start: 2024-08-14 | End: 2024-08-14

## 2024-08-14 RX ORDER — ACETAMINOPHEN 500 MG
1000 TABLET ORAL EVERY 6 HOURS PRN
Status: DISCONTINUED | OUTPATIENT
Start: 2024-08-14 | End: 2024-08-14

## 2024-08-14 RX ORDER — CALCIUM CARBONATE 500 MG/1
500 TABLET, CHEWABLE ORAL EVERY 8 HOURS PRN
Status: DISCONTINUED | OUTPATIENT
Start: 2024-08-14 | End: 2024-08-16

## 2024-08-14 NOTE — DISCHARGE INSTRUCTIONS
Ms. Weiss,    Thank you for allowing us to take care of your health during your admission at [facility]. You are stable for discharge at this time. Your diagnoses and specific instructions for your medications are listed below. Please ensure you follow up with your PCP in 1-2 weeks on discharge and all other follow up appointments listed below.    Diagnoses: Acute on Chronic Systolic Heart Failure Exacerbation    Changes to Medications:  - Continue taking your Bumex 2mg daily    Follow Ups:  - Cardiology Follow Up in 1-2 weeks  - Labs in 1 week    Best Wishes and Jacob Sheppard D.O.  [facility]       Sometimes managing your health at home requires assistance.  The [facility] team has recognized your preference to use [facility].  They can be reached at [phone].  The fax number for your reference is [phone].  A representative from the home health agency will contact you or your family to schedule your first visit.

## 2024-08-14 NOTE — PLAN OF CARE
Assumed care at 0730  A/O x 4  RA  Flips between afib / NSR on tele. Rates controlled.  Complains of chronic pain to bilateral feet, as well as neck discomfort. PRN tylenol given per MAR.  IV lasix BID  Wound care for bilateral legs  Cardiac EP - K replaced today  Denies SOB  Up with crutches as able.   Purewick for urinary incontinence  Daily weight / strict I/O  All needs met. Pt updated. Will continue with plan of care.    Problem: METABOLIC/FLUID AND ELECTROLYTES - ADULT  Goal: Electrolytes maintained within normal limits  Description: INTERVENTIONS:  - Monitor labs and rhythm and assess patient for signs and symptoms of electrolyte imbalances  - Administer electrolyte replacement as ordered  - Monitor response to electrolyte replacements, including rhythm and repeat lab results as appropriate  - Fluid restriction as ordered  - Instruct patient on fluid and nutrition restrictions as appropriate  Outcome: Progressing     Problem: SKIN/TISSUE INTEGRITY - ADULT  Goal: Skin integrity remains intact  Description: INTERVENTIONS  - Assess and document risk factors for pressure ulcer development  - Assess and document skin integrity  - Monitor for areas of redness and/or skin breakdown  - Initiate interventions, skin care algorithm/standards of care as needed  Outcome: Progressing  Goal: Incision(s), wounds(s) or drain site(s) healing without S/S of infection  Description: INTERVENTIONS:  - Assess and document risk factors for pressure ulcer development  - Assess and document skin integrity  - Assess and document dressing/incision, wound bed, drain sites and surrounding tissue  - Implement wound care per orders  - Initiate isolation precautions as appropriate  - Initiate Pressure Ulcer prevention bundle as indicated  Outcome: Progressing

## 2024-08-14 NOTE — CONSULTS
[facility] Dosing Service  Warfarin (Coumadin) Subsequent Dosing    Arleth Weiss is a 69 year old patient for whom pharmacy is dosing warfarin (Coumadin). Goal INR is 2.5-3.5    Recent Labs   Lab 08/12/24 2039 08/14/24  0854   INR 3.61* 3.26*       Consulted by:  Dr. Martinez  Indication:  VTE prophylaxis  Potential Drug Interactions:  Allopurinol, synthroid, plavix  Other Anticoagulants:  none  Home regimen (if applicable):  5mg Tu, Th, Fr, Sa; 2.5mg Mo, We, Sa     Inpatient Dosing History:    Date 8/13 8/14       INR 3.61 (8/12) 3.26       Coumadin dose held                 Based on above -  1.  For today, Give warfarin (COUMADIN) 2.5 mg at 2100 tonight  2   PT/INR ordered daily while on warfarin  3.  Pharmacy will continue to follow.  We appreciate the opportunity to assist in the care of this patient.    Shankar Gracia, MaritoD  8/14/2024  11:55 AM

## 2024-08-14 NOTE — PROGRESS NOTES
[facility]   part of [facility] Hospitalist Progress Note     Arleth Weiss Patient Status:  Inpatient    1955 MRN [id number]   Location [facility] 3NE-A Attending Jacob Villarreal DO   Hosp Day # 1 PCP Viktoriya Garcias DO     Follow Up:  The encounter diagnosis was Acute on chronic diastolic congestive heart failure (HCC).    Subjective:     Patient seen and examined this morning at bedside. Feeling better, less short of breath overall. Otherwise no overnight events, no acute complaints.    Objective:    Review of Systems:   10 point ROS completed and was negative, except for pertinent positive and negatives stated in subjective.    Vital signs:  Temp:  [97.4 °F (36.3 °C)-98.5 °F (36.9 °C)] 97.4 °F (36.3 °C)  Pulse:  [] 98  Resp:  [16-20] 18  BP: (100-127)/(52-73) 106/52  SpO2:  [92 %-100 %] 94 %    Physical Exam:    General: No acute distress.   Respiratory: Clear to auscultation bilaterally. No wheezes. No rhonchi.  Cardiovascular: S1, S2. Regular rate and rhythm. No murmurs.  Abdomen: Soft, nontender, nondistended.  Positive bowel sounds. No rebound or guarding.  Extremities: Bilateral extremities wrapped, unable to determine edema status at this time  Neuro:  Grossly non focal, no motor deficits.        Diagnostic Data:    Labs:  Recent Labs   Lab 24  0023 24  0854   WBC 9.9 6.8  --    HGB 12.7 13.0  --    MCV 93.9 94.0  --    .0 314.0  --    INR 3.61*  --  3.26*       Recent Labs   Lab 24  0023   * 96   BUN 12 14   CREATSERUM 0.81 0.78   CA 9.8 9.2   ALB 4.1  --    * 138   K 4.0 3.2*    101   CO2 26.0 32.0   ALKPHO 125  --    AST 38*  --    ALT 21  --    BILT 0.3  --    TP 7.2  --        Assessment & Plan:      Acute on Chronic HFrEF Exacerbation  Hx of Aortic Stenosis s/p TAVR  Plan:  - Cardiology consulted, appreciate recs  - Continue IV Lasix 40mg BID  - GDMT: Jardiance, Toprol, [place]  -  Daily weights/I/os  - Repeat ECHO prior to discharge as per Cardiology    Hx of CAD s/p PCI  -s/p LHC in 2021 w/ PCI to Lcx  PVD  HTN  HLD  Plan:  - Plavix  - Statin    Hx of AF  - Toprol, Diltiazem, Digoxin  - Pharmacy to dose Coumadin    Hx of Hypothyroidism  - Synthroid    SEBASTIEN  - Ferrous Sulfate    Plan of care: IV Diuresis, PT/OT    Supplementary Documentation:     Quality:  DVT Prophylaxis: Coumadin  CODE status: Full  Calvin: Faisal Villarreal D.O.  [facility] Hospitalist

## 2024-08-14 NOTE — PROGRESS NOTES
Cardiology  Note      Arleth Weiss Patient Status:  Inpatient    1955 MRN [id number]   Location [facility] 3NE-A Attending Omi Blackman MD   Hosp Day # 1 PCP Viktoriya Garcias DO     Reason for consultation:  HFPEF    Impression:  Acute HFREF: likely due to inability to tolerate bumex. EF 45-50%  Permanent AFIB  Severe aortic stenosis s/p TAVR in 2019: mildly elevated gradients. Is maintained on warfarin   CAD: PCI to Lcx in   HTN  HLD  PVD:  acute limb ischemia and was treated with angiojet and POBA and aborted left pop cutdown 10/23   H/o PE s/p IVC filter  LOYD last admission with mobile echodensity in the posterior mitral leaflet. ID consulted at that time and this was not felt to be 2/2 endocarditis     Plan:  IV lasix 40 mg BID  Weight down 18 pounds from admission (accuracy of admission weight vs current? ) will trend.   - 2.7L though not updated yet since .   I/O  Daily weights   Echo when euvolemic   Pharmacy to dose coumadin. INR 3.61. Goal is 2.5-3.5 given increased gradients across TAVR  Continue plavix  Continue aldactone   BP borderline .   Electrolyte replacement: will need close monitoring to allow for diuresis.       History of Present Illness:  Arleth Weiss is a 69 year old female who presented to [facility] on 2024.    This is a patient of my partner: Dr. Case.    She has a PMH including asthma, thyroid cancer s/p thyroidectomy with hypothyroidism, atrial fibrillation on coumadin, aortic stenosis s/p TAVR, copd, depression, htn, hld, obesity, fibromyalgia, hx PE, IVC filter, hx Pulm htn, chronic diastolic HF, SHELDON, acute LLE ischemia s/p angiojet and POBA and aborted left pop cutdown at [facility] 10/2023d (follows Dr. Andrade), left eye subconjunctival hemorrhage. .    Has gained at least 15-20 lbs over the past few weeks. Has orthopnea, LE edema, and TALAMANTES    Cardiology consultation was requested.    Medications:  Current Facility-Administered Medications    Medication Dose Route Frequency    potassium chloride (Klor-Con M10) tab 40 mEq  40 mEq Oral Q4H    lidocaine-menthol 4-1 % patch 2 patch  2 patch Transdermal Daily PRN    acetaminophen (Tylenol Extra Strength) tab 500 mg  500 mg Oral Q4H PRN    melatonin tab 3 mg  3 mg Oral Nightly PRN    ondansetron (Zofran) 4 MG/2ML injection 4 mg  4 mg Intravenous Q6H PRN    metoclopramide (Reglan) 5 mg/mL injection 10 mg  10 mg Intravenous Q8H PRN    polyethylene glycol (PEG 3350) (Miralax) 17 g oral packet 17 g  17 g Oral Daily PRN    sennosides (Senokot) tab 17.2 mg  17.2 mg Oral Nightly PRN    bisacodyl (Dulcolax) 10 MG rectal suppository 10 mg  10 mg Rectal Daily PRN    fleet enema (Fleet) 7-19 GM/118ML rectal enema 133 mL  1 enema Rectal Once PRN    spironolactone (Aldactone) tab 25 mg  25 mg Oral Daily    furosemide (Lasix) 10 mg/mL injection 40 mg  40 mg Intravenous BID (Diuretic)    ferrous sulfate DR tab 325 mg  325 mg Oral Nightly    FLUoxetine (PROzac) cap 20 mg  20 mg Oral BID    levothyroxine (Synthroid) tab 175 mcg  175 mcg Oral Before breakfast    metoprolol succinate ER (Toprol XL) 24 hr tab 25 mg  25 mg Oral Nightly    nortriptyline (Pamelor) cap 25 mg  25 mg Oral Nightly    potassium chloride (Klor-Con M10) tab 10 mEq  10 mEq Oral Daily    rosuvastatin (Crestor) tab 5 mg  5 mg Oral QOD    allopurinol (Zyloprim) tab 100 mg  100 mg Oral Nightly    buPROPion SR (WELLBUTRIN SR) 12 hr tab 150 mg  150 mg Oral BID    clopidogrel (Plavix) tab 75 mg  75 mg Oral Nightly    cyproheptadine (Periactin) tab 4 mg  4 mg Oral Nightly    digoxin (Lanoxin) tab 125 mcg  125 mcg Oral Nightly    dilTIAZem ER (Dilacor XR) 24 hr cap 240 mg  240 mg Oral Nightly    empagliflozin (Jardiance) tab 10 mg  10 mg Oral Nightly       Past Medical History:    Aortic stenosis    S/P TAVR    Arrhythmia    ASTHMA    Asthma (HCC)    Back problem    CANCER    thyroid    Cancer (HCC)    thyroid     CHF (congestive heart failure) (HCC)    CKD  (chronic kidney disease) stage 2, GFR 60-89 ml/min    COPD (chronic obstructive pulmonary disease) (HCC)    DEPRESSION    Disorder of thyroid    Essential hypertension    Fibromyalgia    Gout    High blood pressure    High cholesterol    HYPERTENSION    HYPOTHYROIDISM    Muscle weakness    OBESITY    OSTEOARTHRITIS    Osteoarthritis    OTHER DISEASES    Fibromyalgia    OTHER DISEASES    Pulmonary emboli    OTHER DISEASES    Lymph edema    OTHER DISEASES    Pulmonary hypertension    Peripheral vascular disease (HCC)    Pulmonary embolism (HCC)    RA (rheumatoid arthritis) (HCC)    Shortness of breath    ON EXERTION    SLEEP APNEA    Sleep apnea    CPAP       Past Surgical History:   Procedure Laterality Date    Anesth,shoulder replacement Right 2014    hemiathroplasty    Back surgery  2000    Back surgery  2004    complete c-3 -7 ectomy    Biopsy Left 07/20/2023    TEMPORAL ARTERY    Cath transcatheter aortic valve replacement      Colonoscopy N/A 05/10/2018    Procedure: COLONOSCOPY, POSSIBLE BIOPSY, POSSIBLE POLYPECTOMY 00754;  Surgeon: Kendell Valentin MD;  Location: [facility]    Colonoscopy      Craniotomy for lobotomy Left     D & c  09/2009    Dilation/curettage,diagnostic      Hip replacement surgery  09/2009    Rt hip    Knee replacement surgery  2003    Both knees    Other surgical history  1989    Thyroid removal    Other surgical history  2004    LT foot spur removal    Thyroidectomy Bilateral     Total hip replacement      Total knee replacement         Family History  There is no family history of sudden cardiac death.    Social History   reports that she quit smoking about 33 years ago. Her smoking use included cigarettes. She started smoking about 63 years ago. She has a 15 pack-year smoking history. She has never used smokeless tobacco. She reports that she does not drink alcohol and does not use drugs.     Allergies  Allergies   Allergen Reactions    Adhesive Tape HIVES     ALL  ADHESIVES    Codeine HIVES    Doxycycline SWELLING    Hydrocodone UNKNOWN    Lisinopril TONGUE SWELLING    Morphine Sulfate HIVES    Opioid Analgesics UNKNOWN    Oxycontin ITCHING    Oxytocin HIVES    Vicodin [Hydrocodone-Acetaminophen] ITCHING    Skin Adhesives OTHER (SEE COMMENTS)         Review of Systems:  Constitutional: negative for fevers  Eyes: negative for visual disturbance  Ears, nose, mouth, throat, and face: negative for epistaxis  Respiratory: negative for dyspnea on exertion  Cardiovascular: negative for chest pain  Gastrointestinal: negative for melena  Genitourinary:negative for hematuria  Hematologic/lymphatic: negative for bleeding  Musculoskeletal:negative for myalgias  Neurological: negative for dizziness and headaches  Endocrine: negative for temperature intolerance      Physical Exam:  Blood pressure 106/52, pulse 65, temperature 97.4 °F (36.3 °C), temperature source Oral, resp. rate 18, weight 220 lb 11.2 oz (100.1 kg), SpO2 94%, not currently breastfeeding.  Temp (24hrs), Av.8 °F (36.6 °C), Min:97.4 °F (36.3 °C), Max:98.5 °F (36.9 °C)    Wt Readings from Last 3 Encounters:   24 220 lb 11.2 oz (100.1 kg)   24 203 lb (92.1 kg)   24 203 lb (92.1 kg)       General: Awake and alert; in no acute distress  HEENT: Extraocular movements are intact; sclerae are anicteric; scalp is atrauamatic; no thyromegaly  Neck: Supple; no JVD; no carotid bruits  Cardiac: 2/6 systolic murmur at the base   Lungs: Clear to auscultation bilaterally; no accessory muscle use is noted  Abdomen: Soft, non-tender; bowel sounds are normoactive; no hepatosplenomegaly  Extremities: No clubbing or cyanosis; moves all 4 extremities normally  Psychiatric: Normal mood and affect; answers questions appropriately  Dermatologic: No rashes; normal skin turgor    Diagnostic testing:    EKG: Normal sinus rhythm    Labs:   Lab Results   Component Value Date    PT 23.7 (H) 10/09/2011    INR 3.61 (H) 2024     INR 2.79 (H) 05/31/2024        Lab Results   Component Value Date    WBC 6.8 08/14/2024    HGB 13.0 08/14/2024    HCT 39.3 08/14/2024    .0 08/14/2024    CREATSERUM 0.78 08/14/2024    BUN 14 08/14/2024     08/14/2024    K 3.2 08/14/2024     08/14/2024    CO2 32.0 08/14/2024    GLU 96 08/14/2024    CA 9.2 08/14/2024    MG 1.8 08/14/2024         Thank you for allowing our practice to participate in the care of your patient. Please do not hesitate to contact me if you have any questions.    REICA Hood, 08/14/24, 9:14 AM

## 2024-08-14 NOTE — PLAN OF CARE
Assumed pt care this evening at 1930.  Pt is A&Ox4, following commands.   Pt on room air, VSS.  Afib on tele, controlled and pt asymptomatic  Pt reports pain in legs and generalized. PRN Tylenol as often as MAR allows due to chronic pain  Pt on cardiac diet. Tolerating diet.   R forearm  SL    Wound care has seen pt's legs and provided care orders. See orders for details.   Coumadin for DVT, on hold until PT/INR drawn in am.   Bed in lowest position, call light in reach.         Problem: METABOLIC/FLUID AND ELECTROLYTES - ADULT  Goal: Electrolytes maintained within normal limits  Description: INTERVENTIONS:  - Monitor labs and rhythm and assess patient for signs and symptoms of electrolyte imbalances  - Administer electrolyte replacement as ordered  - Monitor response to electrolyte replacements, including rhythm and repeat lab results as appropriate  - Fluid restriction as ordered  - Instruct patient on fluid and nutrition restrictions as appropriate  Outcome: Progressing     Problem: SKIN/TISSUE INTEGRITY - ADULT  Goal: Skin integrity remains intact  Description: INTERVENTIONS  - Assess and document risk factors for pressure ulcer development  - Assess and document skin integrity  - Monitor for areas of redness and/or skin breakdown  - Initiate interventions, skin care algorithm/standards of care as needed  Outcome: Progressing  Goal: Incision(s), wounds(s) or drain site(s) healing without S/S of infection  Description: INTERVENTIONS:  - Assess and document risk factors for pressure ulcer development  - Assess and document skin integrity  - Assess and document dressing/incision, wound bed, drain sites and surrounding tissue  - Implement wound care per orders  - Initiate isolation precautions as appropriate  - Initiate Pressure Ulcer prevention bundle as indicated  Outcome: Progressing

## 2024-08-15 ENCOUNTER — APPOINTMENT (OUTPATIENT)
Dept: CV DIAGNOSTICS | Facility: HOSPITAL | Age: 69
End: 2024-08-15
Attending: INTERNAL MEDICINE
Payer: MEDICARE

## 2024-08-15 PROBLEM — I50.33 ACUTE ON CHRONIC DIASTOLIC CONGESTIVE HEART FAILURE (HCC): Status: RESOLVED | Noted: 2024-05-24 | Resolved: 2024-08-15

## 2024-08-15 LAB
ANION GAP SERPL CALC-SCNC: 5 MMOL/L (ref 0–18)
ANION GAP SERPL CALC-SCNC: 6 MMOL/L (ref 0–18)
BUN BLD-MCNC: 12 MG/DL (ref 9–23)
BUN BLD-MCNC: 15 MG/DL (ref 9–23)
CALCIUM BLD-MCNC: 10 MG/DL (ref 8.7–10.4)
CALCIUM BLD-MCNC: 10.1 MG/DL (ref 8.7–10.4)
CHLORIDE SERPL-SCNC: 100 MMOL/L (ref 98–112)
CHLORIDE SERPL-SCNC: 101 MMOL/L (ref 98–112)
CO2 SERPL-SCNC: 30 MMOL/L (ref 21–32)
CO2 SERPL-SCNC: 31 MMOL/L (ref 21–32)
CREAT BLD-MCNC: 0.87 MG/DL
CREAT BLD-MCNC: 0.9 MG/DL
EGFRCR SERPLBLD CKD-EPI 2021: 68 ML/MIN/1.73M2 (ref 60–?)
EGFRCR SERPLBLD CKD-EPI 2021: 71 ML/MIN/1.73M2 (ref 60–?)
GLUCOSE BLD-MCNC: 117 MG/DL (ref 70–99)
GLUCOSE BLD-MCNC: 125 MG/DL (ref 70–99)
INR BLD: 2.44 (ref 0.8–1.2)
OSMOLALITY SERPL CALC.SUM OF ELEC: 281 MOSM/KG (ref 275–295)
OSMOLALITY SERPL CALC.SUM OF ELEC: 288 MOSM/KG (ref 275–295)
PHOSPHATE SERPL-MCNC: 3.5 MG/DL (ref 2.4–5.1)
PHOSPHATE SERPL-MCNC: 3.6 MG/DL (ref 2.4–5.1)
POTASSIUM SERPL-SCNC: 3.8 MMOL/L (ref 3.5–5.1)
POTASSIUM SERPL-SCNC: 4.3 MMOL/L (ref 3.5–5.1)
PROTHROMBIN TIME: 26.8 SECONDS (ref 11.6–14.8)
SODIUM SERPL-SCNC: 135 MMOL/L (ref 136–145)
SODIUM SERPL-SCNC: 138 MMOL/L (ref 136–145)

## 2024-08-15 PROCEDURE — 97165 OT EVAL LOW COMPLEX 30 MIN: CPT

## 2024-08-15 PROCEDURE — 93308 TTE F-UP OR LMTD: CPT | Performed by: INTERNAL MEDICINE

## 2024-08-15 PROCEDURE — 97116 GAIT TRAINING THERAPY: CPT

## 2024-08-15 PROCEDURE — 97535 SELF CARE MNGMENT TRAINING: CPT

## 2024-08-15 PROCEDURE — 97161 PT EVAL LOW COMPLEX 20 MIN: CPT

## 2024-08-15 PROCEDURE — 84100 ASSAY OF PHOSPHORUS: CPT | Performed by: INTERNAL MEDICINE

## 2024-08-15 PROCEDURE — 80048 BASIC METABOLIC PNL TOTAL CA: CPT | Performed by: INTERNAL MEDICINE

## 2024-08-15 PROCEDURE — 85610 PROTHROMBIN TIME: CPT | Performed by: INTERNAL MEDICINE

## 2024-08-15 PROCEDURE — 94799 UNLISTED PULMONARY SVC/PX: CPT

## 2024-08-15 RX ORDER — POTASSIUM CHLORIDE 750 MG/1
40 TABLET, EXTENDED RELEASE ORAL ONCE
Status: COMPLETED | OUTPATIENT
Start: 2024-08-15 | End: 2024-08-15

## 2024-08-15 RX ORDER — WARFARIN SODIUM 5 MG/1
5 TABLET ORAL
Status: COMPLETED | OUTPATIENT
Start: 2024-08-15 | End: 2024-08-15

## 2024-08-15 NOTE — PROGRESS NOTES
[facility] Pharmacy Dosing Service  Warfarin (Coumadin) Subsequent Dosing    Arleth Weiss is a 69 year old patient for whom pharmacy is dosing warfarin (Coumadin). Goal INR is 2.5-3.5    Recent Labs   Lab 08/12/24 2039 08/14/24  0854 08/15/24  0600   INR 3.61* 3.26* 2.44*       Consulted by:  Dr. Martinez  Indication:  atrial fibrillation, increased gradients across TAVR, h/o PE  Potential Drug Interactions:  allopurinol, levothyroxine, clopidogrel  Other Anticoagulants:  none  Home regimen:  warfarin 2.5 mg on Mondays, Wednesdays, and Fridays and 5 mg all other days of the week.    Inpatient Dosing History:    Date 8/12 8/13 8/14 8/15     INR 3.61 - 3.26 2.44     Coumadin dose - - 2.5 mg               Based on above -  1.  For today, Give warfarin (COUMADIN) 5 mg at 2100 tonight  2   PT/INR ordered daily while on warfarin  3.  Pharmacy will continue to follow.  We appreciate the opportunity to assist in the care of this patient.    Lexus Vital, PharmD  8/15/2024  2:24 PM

## 2024-08-15 NOTE — PLAN OF CARE
Assumed pt care at 1930  Pt is A&Ox4, following commands.   Pt on room air, VSS./cpap at night  A. fib  PRN Tylenol for pain  Pt max assist.   Pt on cardiac diet. Tolerating diet.   PIV saline locked  Daily weights  Strict I&o's  Wound care daily/prn  Cardiac electrolyte protocol  Bed in lowest position, call light in reach.     Problem: METABOLIC/FLUID AND ELECTROLYTES - ADULT  Goal: Electrolytes maintained within normal limits  Description: INTERVENTIONS:  - Monitor labs and rhythm and assess patient for signs and symptoms of electrolyte imbalances  - Administer electrolyte replacement as ordered  - Monitor response to electrolyte replacements, including rhythm and repeat lab results as appropriate  - Fluid restriction as ordered  - Instruct patient on fluid and nutrition restrictions as appropriate  Outcome: Progressing     Problem: SKIN/TISSUE INTEGRITY - ADULT  Goal: Skin integrity remains intact  Description: INTERVENTIONS  - Assess and document risk factors for pressure ulcer development  - Assess and document skin integrity  - Monitor for areas of redness and/or skin breakdown  - Initiate interventions, skin care algorithm/standards of care as needed  Outcome: Progressing  Goal: Incision(s), wounds(s) or drain site(s) healing without S/S of infection  Description: INTERVENTIONS:  - Assess and document risk factors for pressure ulcer development  - Assess and document skin integrity  - Assess and document dressing/incision, wound bed, drain sites and surrounding tissue  - Implement wound care per orders  - Initiate isolation precautions as appropriate  - Initiate Pressure Ulcer prevention bundle as indicated  Outcome: Progressing

## 2024-08-15 NOTE — PHYSICAL THERAPY NOTE
PHYSICAL THERAPY EVALUATION - INPATIENT     Room Number: 3617/3617-A  Evaluation Date: 8/15/2024  Type of Evaluation: Initial  Physician Order: PT Eval and Treat    Presenting Problem: SOB, leg and abdominal swelling, acute CHFrEF  Co-Morbidities : TAVR, asthma, CFrEF, HTN, afib, PE s/p IVC filter  Reason for Therapy: Mobility Dysfunction and Discharge Planning    PHYSICAL THERAPY ASSESSMENT   Patient is a 69 year old female admitted 2024 for SOB, leg and abdominal swelling.   Patient is currently functioning at baseline with bed mobility, transfers, and gait. Prior to admission, patient's baseline is ambulatory with loftstrand crutches .     Patient will benefit from continued skilled PT Services at discharge to promote prior level of function.  Anticipate patient will return home with OP PT.    PLAN  Patient has been evaluated and presents with no skilled Physical Therapy needs at this time.  Patient discharged from Physical Therapy services.  Please re-order if a new functional limitation presents during this admission.    GOALS  Patient was able to achieve the following goals ...    Patient was able to transfer At previous, functional level   Patient able to ambulate on level surfaces At previous, functional level         HOME SITUATION  Type of Home: House   Home Layout: One level;Ramped entrance                Lives With: Alone  Drives: Yes  Patient Owned Equipment: Rolling walker (loftstrand crutches)       Prior Level of [place]: Pt typically ambulatory with loftstrand crutches. Orders grocery delivery. Most recent fall was last year. Has difficulty rising from lower surfaces.     SUBJECTIVE  \"The walker doesn't feel as stable for me so I use the crutches\"       OBJECTIVE  Precautions: Bed/chair alarm  Fall Risk: High fall risk    WEIGHT BEARING RESTRICTION  Weight Bearing Restriction: None                PAIN ASSESSMENT  Ratin          COGNITION  Safety Judgement:  decreased awareness of need  for safety    RANGE OF MOTION AND STRENGTH ASSESSMENT  See OT note for UE assessment      Lower extremity ROM is within functional limits      Lower extremity strength is within functional limits        BALANCE  Static Sitting: Fair +  Dynamic Sitting: Fair +  Static Standing: Poor +  Dynamic Standing: Poor +    ADDITIONAL TESTS                                    ACTIVITY TOLERANCE                         O2 WALK       NEUROLOGICAL FINDINGS                        AM-PAC '6-Clicks' INPATIENT SHORT FORM - BASIC MOBILITY  How much difficulty does the patient currently have...  Patient Difficulty: Turning over in bed (including adjusting bedclothes, sheets and blankets)?: None   Patient Difficulty: Sitting down on and standing up from a chair with arms (e.g., wheelchair, bedside commode, etc.): None   Patient Difficulty: Moving from lying on back to sitting on the side of the bed?: None   How much help from another person does the patient currently need...   Help from Another: Moving to and from a bed to a chair (including a wheelchair)?: A Little   Help from Another: Need to walk in hospital room?: A Little   Help from Another: Climbing 3-5 steps with a railing?: A Little       AM-PAC Score:  Raw Score: 21   Approx Degree of Impairment: 28.97%   Standardized Score (AM-PAC Scale): 50.25   CMS Modifier (G-Code): CJ    FUNCTIONAL ABILITY STATUS  Gait Assessment   Functional Mobility/Gait Assessment  Gait Assistance: Supervision  Distance (ft): 40  Assistive Device:  (loftstrand crutches)  Pattern:  (dec caleb and speed)    Skilled Therapy Provided     Bed Mobility:  Rolling: NT  Supine to sit: NT   Sit to supine: NT     Transfer Mobility:  Sit to stand: supervision   Stand to sit: supervision  Gait = supervision    Therapist's comments:RN cleared for session. Pt agreeable for therapy, received sitting EOB. Pt req bed elevated due to history of bad knees. Pt able to demo proper usage of loftstrand crutches (limited on R  due to IV). Pt only agreeable to short distances. Pt more comfortable sitting EOB at end of session as chair is too low. Instructed to call for nursing staff for any needs and OOB mobility.     Exercise/Education Provided:  Bed mobility  Body mechanics  Energy conservation  Functional activity tolerated  Gait training  Posture  Strengthening  Transfer training    Patient End of Session: In bed;Needs met;Call light within reach;RN aware of session/findings;All patient questions and concerns addressed    Patient Evaluation Complexity Level:  History High - 3 or more personal factors and/or co-morbidities   Examination of body systems Low -  addressing 1-2 elements   Clinical Presentation Low- Stable   Clinical Decision Making Low Complexity       PT Session Time: 25 minutes  Gait Training: 10 minutes  Therapeutic Activity: 4 minutes

## 2024-08-15 NOTE — PROGRESS NOTES
[facility]   part of [facility] Hospitalist Progress Note     Arleth Weiss Patient Status:  Inpatient    1955 MRN [id number]   Location [facility] 3NE-A Attending Jacob Villarreal,    Hosp Day # 2 PCP Viktoriya Garcias DO     Follow Up:  The primary encounter diagnosis was Acute on chronic diastolic congestive heart failure (HCC). A diagnosis of Decreased functional mobility and endurance was also pertinent to this visit.    Subjective:     Patient seen and examined this morning at bedside. Feeling better, less short of breath overall. Edema has improved as well. Anxious to go home.    Objective:    Review of Systems:   10 point ROS completed and was negative, except for pertinent positive and negatives stated in subjective.    Vital signs:  Temp:  [97.4 °F (36.3 °C)-98.2 °F (36.8 °C)] 97.4 °F (36.3 °C)  Pulse:  [] 89  Resp:  [18-19] 18  BP: ()/(53-88) 89/63  SpO2:  [89 %-98 %] 91 %    Physical Exam:    General: No acute distress.   Respiratory: Clear to auscultation bilaterally. No wheezes. No rhonchi.  Cardiovascular: S1, S2. Regular rate and rhythm. No murmurs.  Abdomen: Soft, nontender, nondistended.  Positive bowel sounds. No rebound or guarding.  Extremities: Bilateral extremities wrapped, improved overall from thighs, no longer pitting  Neuro:  Grossly non focal, no motor deficits.        Diagnostic Data:    Labs:  Recent Labs   Lab 24  0023 08/14/24  0854 08/15/24  0600   WBC 9.9 6.8  --   --    HGB 12.7 13.0  --   --    MCV 93.9 94.0  --   --    .0 314.0  --   --    INR 3.61*  --  3.26* 2.44*       Recent Labs   Lab 243 24  1815 08/15/24  0013 08/15/24  0600   *   < > 91 117* 125*   BUN 12   < > 15 12 15   CREATSERUM 0.81   < > 0.82 0.90 0.87   CA 9.8   < > 10.1 10.0 10.1   ALB 4.1  --   --   --   --    *   < > 139 135* 138   K 4.0   < > 3.9 4.3 3.8      < > 101 100 101   CO2  26.0   < > 35.0* 30.0 31.0   ALKPHO 125  --   --   --   --    AST 38*  --   --   --   --    ALT 21  --   --   --   --    BILT 0.3  --   --   --   --    TP 7.2  --   --   --   --     < > = values in this interval not displayed.       Assessment & Plan:      Acute on Chronic HFrEF Exacerbation  Hx of Aortic Stenosis s/p TAVR  Plan:  - Cardiology consulted, appreciate recs  - IV lasix stopped as per Cardiology, will observe for one more day before po transition anticipated tomorrow morning.  - GDMT: Jardiance, Toprol, Gabino  - Daily weights/I/os  - Repeat ECHO prior to discharge as per Cardiology    Hx of CAD s/p PCI  -s/p LHC in 2021 w/ PCI to Lcx  PVD  HTN  HLD  Plan:  - Plavix  - Statin    Hx of AF  - Toprol, Diltiazem, Digoxin, HOLD tonight's doses if systolic BP < 90   - Pharmacy to dose Coumadin    Hx of Hypothyroidism  - Synthroid    SEBASTIEN  - Ferrous Sulfate    Plan of care: IV Diuresis, Cardiology clearance    Supplementary Documentation:     Quality:  DVT Prophylaxis: Coumadin  CODE status: Full  Calvin: Faisal Villarreal D.O.  [facility] Hospitalist

## 2024-08-15 NOTE — PROGRESS NOTES
Cardiology  Note      Arleth Weiss Patient Status:  Inpatient    1955 MRN [id number]   Location [facility] 3NE-A Attending Omi Blackman MD   Hosp Day # 2 PCP Viktoriya Garcias DO     Reason for consultation:  HFPEF    Impression:  Acute HFREF: likely due to inability to tolerate bumex. EF 45-50%  Permanent AFIB  Severe aortic stenosis s/p TAVR in 2019: mildly elevated gradients. Is maintained on warfarin   CAD: PCI to Lcx in   HTN: now hypotensive   HLD  PVD:  acute limb ischemia and was treated with angiojet and POBA and aborted left pop cutdown 10/23   H/o PE s/p IVC filter  LOYD last admission with mobile echodensity in the posterior mitral leaflet. ID consulted at that time and this was not felt to be 2/2 endocarditis     Plan:  IV lasix 40 mg BID: net negative 5 L. If persistently hypotensive <90 sytolic, hold lasix today. Ok to give 250 CC NS if BP is persistently low   I/O  Daily weights   Echo today or tomorrow   Pharmacy to dose coumadin. Goal is 2.5-3.5 given increased gradients across TAVR  Continue plavix  Continue aldactone   Electrolyte replacement: will need close monitoring to allow for diuresis.       History of Present Illness:  Arleth Weiss is a 69 year old female who presented to [facility] on 2024.    This is a patient of my partner: Dr. Case.    She has a PMH including asthma, thyroid cancer s/p thyroidectomy with hypothyroidism, atrial fibrillation on coumadin, aortic stenosis s/p TAVR, copd, depression, htn, hld, obesity, fibromyalgia, hx PE, IVC filter, hx Pulm htn, chronic diastolic HF, SHELDON, acute LLE ischemia s/p angiojet and POBA and aborted left pop cutdown at [facility] 10/2023d (follows Dr. Andrade), left eye subconjunctival hemorrhage. .    Has gained at least 15-20 lbs over the past few weeks. Has orthopnea, LE edema, and TALAMANTES    Cardiology consultation was requested.    Medications:  Current Facility-Administered Medications   Medication Dose Route  Frequency    warfarin (Coumadin) tab 5 mg  5 mg Oral Once at night    lidocaine-menthol 4-1 % patch 2 patch  2 patch Transdermal Daily PRN    ferrous sulfate DR tab 325 mg  325 mg Oral QOD    midodrine (ProAmatine) tab 10 mg  10 mg Oral TID PRN    calcium carbonate (Tums) chewable tab 500 mg  500 mg Oral Q8H PRN    acetaminophen (Tylenol Extra Strength) tab 1,000 mg  1,000 mg Oral Q6H PRN    melatonin tab 3 mg  3 mg Oral Nightly PRN    ondansetron (Zofran) 4 MG/2ML injection 4 mg  4 mg Intravenous Q6H PRN    metoclopramide (Reglan) 5 mg/mL injection 10 mg  10 mg Intravenous Q8H PRN    polyethylene glycol (PEG 3350) (Miralax) 17 g oral packet 17 g  17 g Oral Daily PRN    sennosides (Senokot) tab 17.2 mg  17.2 mg Oral Nightly PRN    bisacodyl (Dulcolax) 10 MG rectal suppository 10 mg  10 mg Rectal Daily PRN    fleet enema (Fleet) 7-19 GM/118ML rectal enema 133 mL  1 enema Rectal Once PRN    spironolactone (Aldactone) tab 25 mg  25 mg Oral Daily    furosemide (Lasix) 10 mg/mL injection 40 mg  40 mg Intravenous BID (Diuretic)    FLUoxetine (PROzac) cap 20 mg  20 mg Oral BID    levothyroxine (Synthroid) tab 175 mcg  175 mcg Oral Before breakfast    metoprolol succinate ER (Toprol XL) 24 hr tab 25 mg  25 mg Oral Nightly    nortriptyline (Pamelor) cap 25 mg  25 mg Oral Nightly    rosuvastatin (Crestor) tab 5 mg  5 mg Oral QOD    allopurinol (Zyloprim) tab 100 mg  100 mg Oral Nightly    buPROPion SR (WELLBUTRIN SR) 12 hr tab 150 mg  150 mg Oral BID    clopidogrel (Plavix) tab 75 mg  75 mg Oral Nightly    cyproheptadine (Periactin) tab 4 mg  4 mg Oral Nightly    digoxin (Lanoxin) tab 125 mcg  125 mcg Oral Nightly    dilTIAZem ER (Dilacor XR) 24 hr cap 240 mg  240 mg Oral Nightly    empagliflozin (Jardiance) tab 10 mg  10 mg Oral Nightly       Past Medical History:    Aortic stenosis    S/P TAVR    Arrhythmia    ASTHMA    Asthma (HCC)    Back problem    CANCER    thyroid    Cancer (HCC)    thyroid     CHF (congestive heart  failure) (HCC)    CKD (chronic kidney disease) stage 2, GFR 60-89 ml/min    COPD (chronic obstructive pulmonary disease) (HCC)    DEPRESSION    Disorder of thyroid    Essential hypertension    Fibromyalgia    Gout    High blood pressure    High cholesterol    HYPERTENSION    HYPOTHYROIDISM    Muscle weakness    OBESITY    OSTEOARTHRITIS    Osteoarthritis    OTHER DISEASES    Fibromyalgia    OTHER DISEASES    Pulmonary emboli    OTHER DISEASES    Lymph edema    OTHER DISEASES    Pulmonary hypertension    Peripheral vascular disease (HCC)    Pulmonary embolism (HCC)    RA (rheumatoid arthritis) (HCC)    Shortness of breath    ON EXERTION    SLEEP APNEA    Sleep apnea    CPAP       Past Surgical History:   Procedure Laterality Date    Anesth,shoulder replacement Right 2014    hemiathroplasty    Back surgery  2000    Back surgery  2004    complete c-3 -7 ectomy    Biopsy Left 07/20/2023    TEMPORAL ARTERY    Cath transcatheter aortic valve replacement      Colonoscopy N/A 05/10/2018    Procedure: COLONOSCOPY, POSSIBLE BIOPSY, POSSIBLE POLYPECTOMY 84024;  Surgeon: Kendell Valentin MD;  Location: [facility], [facility]    Colonoscopy      Craniotomy for lobotomy Left     D & c  09/2009    Dilation/curettage,diagnostic      Hip replacement surgery  09/2009    Rt hip    Knee replacement surgery  2003    Both knees    Other surgical history  1989    Thyroid removal    Other surgical history  2004    LT foot spur removal    Thyroidectomy Bilateral     Total hip replacement      Total knee replacement         Family History  There is no family history of sudden cardiac death.    Social History   reports that she quit smoking about 33 years ago. Her smoking use included cigarettes. She started smoking about 63 years ago. She has a 15 pack-year smoking history. She has never used smokeless tobacco. She reports that she does not drink alcohol and does not use drugs.     Allergies  Allergies   Allergen Reactions    Adhesive  Tape HIVES     ALL ADHESIVES    Codeine HIVES    Doxycycline SWELLING    Hydrocodone UNKNOWN    Lisinopril TONGUE SWELLING    Morphine Sulfate HIVES    Opioid Analgesics UNKNOWN    Oxycontin ITCHING    Oxytocin HIVES    Vicodin [Hydrocodone-Acetaminophen] ITCHING    Skin Adhesives OTHER (SEE COMMENTS)         Review of Systems:  Constitutional: negative for fevers  Eyes: negative for visual disturbance  Ears, nose, mouth, throat, and face: negative for epistaxis  Respiratory: negative for dyspnea on exertion  Cardiovascular: negative for chest pain  Gastrointestinal: negative for melena  Genitourinary:negative for hematuria  Hematologic/lymphatic: negative for bleeding  Musculoskeletal:negative for myalgias  Neurological: negative for dizziness and headaches  Endocrine: negative for temperature intolerance      Physical Exam:  Blood pressure (!) 89/63, pulse 89, temperature 97.4 °F (36.3 °C), temperature source Temporal, resp. rate 18, weight 211 lb 4.8 oz (95.8 kg), SpO2 91%, not currently breastfeeding.  Temp (24hrs), Av.9 °F (36.6 °C), Min:97.4 °F (36.3 °C), Max:98.2 °F (36.8 °C)    Wt Readings from Last 3 Encounters:   08/15/24 211 lb 4.8 oz (95.8 kg)   24 203 lb (92.1 kg)   24 203 lb (92.1 kg)       General: Awake and alert; in no acute distress  HEENT: Extraocular movements are intact; sclerae are anicteric; scalp is atrauamatic; no thyromegaly  Neck: Supple; no JVD; no carotid bruits  Cardiac: 2/6 systolic murmur at the base   Lungs: Clear to auscultation bilaterally; no accessory muscle use is noted  Abdomen: Soft, non-tender; bowel sounds are normoactive; no hepatosplenomegaly  Extremities: No clubbing or cyanosis; moves all 4 extremities normally  Psychiatric: Normal mood and affect; answers questions appropriately  Dermatologic: No rashes; normal skin turgor    Diagnostic testing:    Tele:rate controlled AFIB    Labs:   Lab Results   Component Value Date    PT 23.7 (H) 10/09/2011    INR  2.44 (H) 08/15/2024    INR 3.26 (H) 08/14/2024        Lab Results   Component Value Date    CREATSERUM 0.87 08/15/2024    BUN 15 08/15/2024     08/15/2024    K 3.8 08/15/2024     08/15/2024    CO2 31.0 08/15/2024     08/15/2024    CA 10.1 08/15/2024    INR 2.44 08/15/2024    PTP 26.8 08/15/2024    PHOS 3.5 08/15/2024         Thank you for allowing our practice to participate in the care of your patient. Please do not hesitate to contact me if you have any questions.    Carlitos Martinez MD

## 2024-08-15 NOTE — PLAN OF CARE
Assumed care at 0730  A/O x 4  RA  Afib on tele. Takes coumadin. Pharmacy assisting.   Cardiac EP - K replaced today  Complains of chronic foot pain - medicated per MAR  Up 1 assist with crutches  Daily weight  Cardiac diet  IV lasix BID  Daily wound care to BLE complete - see flowsheets.   Strict I&O - purewick in place.   BP stable, has midodrine available PRN for SBP < 100. Not needed today.   All needs met. Pt updated. Will continue with plan of care.     1245: BP 89/63 - PRN midodrine given per MAR    Problem: METABOLIC/FLUID AND ELECTROLYTES - ADULT  Goal: Electrolytes maintained within normal limits  Description: INTERVENTIONS:  - Monitor labs and rhythm and assess patient for signs and symptoms of electrolyte imbalances  - Administer electrolyte replacement as ordered  - Monitor response to electrolyte replacements, including rhythm and repeat lab results as appropriate  - Fluid restriction as ordered  - Instruct patient on fluid and nutrition restrictions as appropriate  Outcome: Progressing     Problem: SKIN/TISSUE INTEGRITY - ADULT  Goal: Skin integrity remains intact  Description: INTERVENTIONS  - Assess and document risk factors for pressure ulcer development  - Assess and document skin integrity  - Monitor for areas of redness and/or skin breakdown  - Initiate interventions, skin care algorithm/standards of care as needed  Outcome: Progressing  Goal: Incision(s), wounds(s) or drain site(s) healing without S/S of infection  Description: INTERVENTIONS:  - Assess and document risk factors for pressure ulcer development  - Assess and document skin integrity  - Assess and document dressing/incision, wound bed, drain sites and surrounding tissue  - Implement wound care per orders  - Initiate isolation precautions as appropriate  - Initiate Pressure Ulcer prevention bundle as indicated  Outcome: Progressing

## 2024-08-16 VITALS
SYSTOLIC BLOOD PRESSURE: 113 MMHG | DIASTOLIC BLOOD PRESSURE: 56 MMHG | RESPIRATION RATE: 17 BRPM | HEART RATE: 72 BPM | TEMPERATURE: 98 F | WEIGHT: 215.69 LBS | BODY MASS INDEX: 32 KG/M2 | OXYGEN SATURATION: 95 %

## 2024-08-16 LAB
ANION GAP SERPL CALC-SCNC: 6 MMOL/L (ref 0–18)
BUN BLD-MCNC: 16 MG/DL (ref 9–23)
CALCIUM BLD-MCNC: 10.3 MG/DL (ref 8.7–10.4)
CHLORIDE SERPL-SCNC: 101 MMOL/L (ref 98–112)
CO2 SERPL-SCNC: 31 MMOL/L (ref 21–32)
CREAT BLD-MCNC: 0.84 MG/DL
EGFRCR SERPLBLD CKD-EPI 2021: 74 ML/MIN/1.73M2 (ref 60–?)
GLUCOSE BLD-MCNC: 120 MG/DL (ref 70–99)
INR BLD: 2.38 (ref 0.8–1.2)
OSMOLALITY SERPL CALC.SUM OF ELEC: 288 MOSM/KG (ref 275–295)
PHOSPHATE SERPL-MCNC: 4 MG/DL (ref 2.4–5.1)
POTASSIUM SERPL-SCNC: 3.9 MMOL/L (ref 3.5–5.1)
POTASSIUM SERPL-SCNC: 3.9 MMOL/L (ref 3.5–5.1)
PROTHROMBIN TIME: 26.2 SECONDS (ref 11.6–14.8)
SODIUM SERPL-SCNC: 138 MMOL/L (ref 136–145)

## 2024-08-16 PROCEDURE — 84100 ASSAY OF PHOSPHORUS: CPT | Performed by: INTERNAL MEDICINE

## 2024-08-16 PROCEDURE — 84132 ASSAY OF SERUM POTASSIUM: CPT | Performed by: INTERNAL MEDICINE

## 2024-08-16 PROCEDURE — 80048 BASIC METABOLIC PNL TOTAL CA: CPT | Performed by: INTERNAL MEDICINE

## 2024-08-16 PROCEDURE — 85610 PROTHROMBIN TIME: CPT | Performed by: INTERNAL MEDICINE

## 2024-08-16 RX ORDER — BUMETANIDE 1 MG/1
2 TABLET ORAL DAILY
Status: DISCONTINUED | OUTPATIENT
Start: 2024-08-16 | End: 2024-08-16

## 2024-08-16 RX ORDER — WARFARIN SODIUM 5 MG/1
5 TABLET ORAL
Status: DISCONTINUED | OUTPATIENT
Start: 2024-08-16 | End: 2024-08-16

## 2024-08-16 NOTE — PROGRESS NOTES
Cardiology  Note      Arleth Weiss Patient Status:  Inpatient    1955 MRN [id number]   Location [facility] 3NE-A Attending Omi Blackman MD   Hosp Day # 3 PCP Viktoriya Garcias DO     Reason for consultation:  HFPEF    Impression:  Acute HFREF: likely due to inability to tolerate bumex. EF 45-50%  Permanent AFIB  Severe aortic stenosis s/p TAVR in 2019: mildly elevated gradients. Is maintained on warfarin   CAD: PCI to Lcx in   HTN: now hypotensive   HLD  PVD:  acute limb ischemia and was treated with angiojet and POBA and aborted left pop cutdown 10/23   H/o PE s/p IVC filter  LOYD last admission with mobile echodensity in the posterior mitral leaflet. ID consulted at that time and this was not felt to be 2/2 endocarditis   Mitral stenosis: mild to moderate     Plan:  IV lasix 40 mg BID: net negative 5 L. Switch to PO bumex 2 mg today. If reasonable urine output then discharge this evening   I/O  Daily weights   Echo with stable, mildly reduced EF. Medical management with rate control for mitral stenosis. Further up titration of CCB limited by low BP  Pharmacy to dose coumadin. Goal is 2.5-3.5 given increased gradients across TAVR  Continue plavix  Continue aldactone   Electrolyte replacement: will need close monitoring to allow for diuresis.       History of Present Illness:  Arleth Weiss is a 69 year old female who presented to [facility] on 2024.    This is a patient of my partner: Dr. Case.    She has a PMH including asthma, thyroid cancer s/p thyroidectomy with hypothyroidism, atrial fibrillation on coumadin, aortic stenosis s/p TAVR, copd, depression, htn, hld, obesity, fibromyalgia, hx PE, IVC filter, hx Pulm htn, chronic diastolic HF, SHELDON, acute LLE ischemia s/p angiojet and POBA and aborted left pop cutdown at [facility] 10/2023d (follows Dr. Andrade), left eye subconjunctival hemorrhage. .    Has gained at least 15-20 lbs over the past few weeks. Has orthopnea, LE edema,  and TALAMANTES    Cardiology consultation was requested.    Medications:  Current Facility-Administered Medications   Medication Dose Route Frequency    lidocaine-menthol 4-1 % patch 2 patch  2 patch Transdermal Daily PRN    ferrous sulfate DR tab 325 mg  325 mg Oral QOD    midodrine (ProAmatine) tab 10 mg  10 mg Oral TID PRN    calcium carbonate (Tums) chewable tab 500 mg  500 mg Oral Q8H PRN    acetaminophen (Tylenol Extra Strength) tab 1,000 mg  1,000 mg Oral Q6H PRN    melatonin tab 3 mg  3 mg Oral Nightly PRN    ondansetron (Zofran) 4 MG/2ML injection 4 mg  4 mg Intravenous Q6H PRN    metoclopramide (Reglan) 5 mg/mL injection 10 mg  10 mg Intravenous Q8H PRN    polyethylene glycol (PEG 3350) (Miralax) 17 g oral packet 17 g  17 g Oral Daily PRN    sennosides (Senokot) tab 17.2 mg  17.2 mg Oral Nightly PRN    bisacodyl (Dulcolax) 10 MG rectal suppository 10 mg  10 mg Rectal Daily PRN    fleet enema (Fleet) 7-19 GM/118ML rectal enema 133 mL  1 enema Rectal Once PRN    spironolactone (Aldactone) tab 25 mg  25 mg Oral Daily    FLUoxetine (PROzac) cap 20 mg  20 mg Oral BID    levothyroxine (Synthroid) tab 175 mcg  175 mcg Oral Before breakfast    metoprolol succinate ER (Toprol XL) 24 hr tab 25 mg  25 mg Oral Nightly    nortriptyline (Pamelor) cap 25 mg  25 mg Oral Nightly    rosuvastatin (Crestor) tab 5 mg  5 mg Oral QOD    allopurinol (Zyloprim) tab 100 mg  100 mg Oral Nightly    buPROPion SR (WELLBUTRIN SR) 12 hr tab 150 mg  150 mg Oral BID    clopidogrel (Plavix) tab 75 mg  75 mg Oral Nightly    cyproheptadine (Periactin) tab 4 mg  4 mg Oral Nightly    digoxin (Lanoxin) tab 125 mcg  125 mcg Oral Nightly    dilTIAZem ER (Dilacor XR) 24 hr cap 240 mg  240 mg Oral Nightly    empagliflozin (Jardiance) tab 10 mg  10 mg Oral Nightly       Past Medical History:    Aortic stenosis    S/P TAVR    Arrhythmia    ASTHMA    Asthma (HCC)    Back problem    CANCER    thyroid    Cancer (HCC)    thyroid     CHF (congestive heart  failure) (HCC)    CKD (chronic kidney disease) stage 2, GFR 60-89 ml/min    COPD (chronic obstructive pulmonary disease) (HCC)    DEPRESSION    Disorder of thyroid    Essential hypertension    Fibromyalgia    Gout    High blood pressure    High cholesterol    HYPERTENSION    HYPOTHYROIDISM    Muscle weakness    OBESITY    OSTEOARTHRITIS    Osteoarthritis    OTHER DISEASES    Fibromyalgia    OTHER DISEASES    Pulmonary emboli    OTHER DISEASES    Lymph edema    OTHER DISEASES    Pulmonary hypertension    Peripheral vascular disease (HCC)    Pulmonary embolism (HCC)    RA (rheumatoid arthritis) (HCC)    Shortness of breath    ON EXERTION    SLEEP APNEA    Sleep apnea    CPAP       Past Surgical History:   Procedure Laterality Date    Anesth,shoulder replacement Right 2014    hemiathroplasty    Back surgery  2000    Back surgery  2004    complete c-3 -7 ectomy    Biopsy Left 07/20/2023    TEMPORAL ARTERY    Cath transcatheter aortic valve replacement      Colonoscopy N/A 05/10/2018    Procedure: COLONOSCOPY, POSSIBLE BIOPSY, POSSIBLE POLYPECTOMY 66486;  Surgeon: Kendell Valentin MD;  Location: [facility]    Colonoscopy      Craniotomy for lobotomy Left     D & c  09/2009    Dilation/curettage,diagnostic      Hip replacement surgery  09/2009    Rt hip    Knee replacement surgery  2003    Both knees    Other surgical history  1989    Thyroid removal    Other surgical history  2004    LT foot spur removal    Thyroidectomy Bilateral     Total hip replacement      Total knee replacement         Family History  There is no family history of sudden cardiac death.    Social History   reports that she quit smoking about 33 years ago. Her smoking use included cigarettes. She started smoking about 63 years ago. She has a 15 pack-year smoking history. She has never used smokeless tobacco. She reports that she does not drink alcohol and does not use drugs.     Allergies  Allergies   Allergen Reactions    Adhesive  Tape HIVES     ALL ADHESIVES    Codeine HIVES    Doxycycline SWELLING    Hydrocodone UNKNOWN    Lisinopril TONGUE SWELLING    Morphine Sulfate HIVES    Opioid Analgesics UNKNOWN    Oxycontin ITCHING    Oxytocin HIVES    Vicodin [Hydrocodone-Acetaminophen] ITCHING    Skin Adhesives OTHER (SEE COMMENTS)         Review of Systems:  Constitutional: negative for fevers  Eyes: negative for visual disturbance  Ears, nose, mouth, throat, and face: negative for epistaxis  Respiratory: negative for dyspnea on exertion  Cardiovascular: negative for chest pain  Gastrointestinal: negative for melena  Genitourinary:negative for hematuria  Hematologic/lymphatic: negative for bleeding  Musculoskeletal:negative for myalgias  Neurological: negative for dizziness and headaches  Endocrine: negative for temperature intolerance      Physical Exam:  Blood pressure 108/71, pulse 71, temperature 98.1 °F (36.7 °C), temperature source Oral, resp. rate 16, weight 211 lb 4.8 oz (95.8 kg), SpO2 93%, not currently breastfeeding.  Temp (24hrs), Av °F (36.7 °C), Min:97.4 °F (36.3 °C), Max:98.6 °F (37 °C)    Wt Readings from Last 3 Encounters:   08/15/24 211 lb 4.8 oz (95.8 kg)   24 203 lb (92.1 kg)   24 203 lb (92.1 kg)       General: Awake and alert; in no acute distress  HEENT: Extraocular movements are intact; sclerae are anicteric; scalp is atrauamatic; no thyromegaly  Neck: Supple; no JVD; no carotid bruits  Cardiac: 2/6 systolic murmur at the base   Lungs: Clear to auscultation bilaterally; no accessory muscle use is noted  Abdomen: Soft, non-tender; bowel sounds are normoactive; no hepatosplenomegaly  Extremities: No clubbing or cyanosis; moves all 4 extremities normally  Psychiatric: Normal mood and affect; answers questions appropriately  Dermatologic: No rashes; normal skin turgor    Diagnostic testing:    Tele:rate controlled AFIB    Labs:   Lab Results   Component Value Date    PT 23.7 (H) 10/09/2011    INR 2.44 (H)  08/15/2024    INR 3.26 (H) 08/14/2024                 Thank you for allowing our practice to participate in the care of your patient. Please do not hesitate to contact me if you have any questions.    Carlitos Martinez MD

## 2024-08-16 NOTE — PROGRESS NOTES
[facility] Pharmacy Dosing Service  Warfarin (Coumadin) Subsequent Dosing    Arleth Weiss is a 69 year old patient for whom pharmacy is dosing warfarin (Coumadin). Goal INR is 2.5-3.5    Recent Labs   Lab 08/12/24 2039 08/14/24  0854 08/15/24  0600 08/16/24  0933   INR 3.61* 3.26* 2.44* 2.38*       Consulted by:  Dr. Martinez  Indication:  atrial fibrillation, increased gradients across TAVR, h/o PE  Potential Drug Interactions:  allopurinol, levothyroxine, clopidogrel  Other Anticoagulants:  none  Home regimen:  warfarin 2.5 mg on Mondays, Wednesdays, and Fridays and 5 mg all other days of the week.     Inpatient Dosing History:     Date 8/12 8/13 8/14 8/15 8/16      INR 3.61 - 3.26 2.44 2.38     Coumadin dose - - 2.5 mg  5 mg                    Based on above -  1.  For today, Give warfarin (COUMADIN) 5 mg at 2100 tonight  2   PT/INR ordered daily while on warfarin  3.  Pharmacy will continue to follow.  We appreciate the opportunity to assist in the care of this patient.    Inocencia Shore, PharmD  8/16/2024  2:28 PM

## 2024-08-16 NOTE — DISCHARGE SUMMARY
[facility] Internal Medicine Hospitalist Discharge Summary     Patient ID:  Arleth Weiss  69 year old  4/26/1955    Admit date: 8/12/2024    Discharge date and time: 8/16/2024    Attending Physician: Jacob Villarreal DO     Primary Care Physician: Viktoriya Garcias DO     Discharge Diagnoses: Acute on Chronic HFrEF exacerbation    Please note that only [facility] DMG and EMG patients enrolled in the Medicare ACO, [facility] ACO and [facility] HMOs will be handled by the \[facility]\"" Care Management team.  For all other patients, please follow usual protocol for discharge care transition.    Discharge Condition: stable    Disposition:  Home    Important Follow up:  - PCP: 1-2 weeks  - Consults: 3-4 weeks    Hospital Course:        This is a story of Ms. Arleth Weiss who is a 69-year-old female with past medical history of HFrEF, aortic stenosis status post TAVR, CAD status post PCI in 2021, PVD, HTN, HLD, A-fib on oral anticoagulation, hypothyroidism, SEBASTIEN who presented to the hospital with chief complaints of worsening shortness of breath, leg swelling, weight gain.  Diagnosed with acute on chronic heart failure exacerbation and received 2 days of IV diuresis with cardiology on consult.  She was deemed stable for discharge by cardiology on the morning of 8/16/2024.  She had her home Bumex restarted and was discharged home in good condition.  Discharge weight was 97.8 kg's.  She had a repeat echocardiogram done during her hospitalization that did not show any significant change from her prior.    Consults: IP CONSULT TO HOSPITALIST  IP CONSULT TO SOCIAL WORK  IP CONSULT TO CARDIOLOGY  CONSULT TO WOUND OSTOMY  IP CONSULT TO PHARMACY  IP CONSULT TO SOCIAL WORK    Operative Procedures:        Patient instructions:      I as the attending physician reconciled the current and discharge medications on day of discharge.     Current Discharge Medication List        CONTINUE these medications which  have NOT CHANGED    Details   bumetanide 2 MG Oral Tab Take 1 tablet (2 mg total) by mouth BID (Diuretic).      !! warfarin 2.5 MG Oral Tab Take 1 tablet (2.5 mg total) by mouth 3 (three) times a week. Q Mon., Wed., and Saturday per pt.      empagliflozin (JARDIANCE) 10 MG Oral Tab Take 1 tablet (10 mg total) by mouth at bedtime. Per pt.      levothyroxine 175 MCG Oral Tab Take 1 tablet (175 mcg total) by mouth before breakfast.      metOLazone 2.5 MG Oral Tab Take 1 tablet (2.5 mg total) by mouth every other day. Per pt.      dilTIAZem  MG Oral Capsule SR 24 Hr Take 1 capsule (240 mg total) by mouth daily.      clopidogrel 75 MG Oral Tab Take 1 tablet (75 mg total) by mouth at bedtime. Per pt.      metoprolol succinate ER 25 MG Oral Tablet 24 Hr Take 1 tablet (25 mg total) by mouth at bedtime. Per pt.      rosuvastatin 5 MG Oral Tab Take 1 tablet (5 mg total) by mouth every other day.      spironolactone 25 MG Oral Tab Take 1 tablet (25 mg total) by mouth daily.      !! warfarin 5 MG Oral Tab Take 1 tablet (5 mg total) by mouth nightly. Q Tues., Thurs., Friday and Sunday per pt.      ferrous sulfate 325 (65 FE) MG Oral Tab EC Take 1 tablet (325 mg total) by mouth at bedtime. Per pt.      Vitamin C 500 MG Oral Tab Take 1 tablet (500 mg total) by mouth at bedtime. Per pt.      Zinc 50 MG Oral Cap Take 50 mg by mouth at bedtime. Per pt.      Cholecalciferol 125 MCG (5000 UT) Oral Tab Take 1 tablet (5,000 Units total) by mouth at bedtime. Per pt.      acetaminophen 325 MG Oral Tab Take 2 tablets (650 mg total) by mouth every 8 (eight) hours as needed for Pain.      buPROPion  MG Oral Tablet 12 Hr Take 1 tablet (150 mg total) by mouth 2 (two) times daily.      cyproheptadine 4 MG Oral Tab Take 1 tablet (4 mg total) by mouth nightly.      FLUoxetine 20 MG Oral Cap Take 1 capsule (20 mg total) by mouth 2 (two) times daily.      digoxin 0.125 MG Oral Tab Take 1 tablet (125 mcg total) by mouth daily.       allopurinol 100 MG Oral Tab Take 1 tablet (100 mg total) by mouth daily.      Nortriptyline HCl 25 MG Oral Cap Take 1 capsule (25 mg total) by mouth nightly.      Multiple Vitamin (MULTIVITAMIN OR) Take 1 tablet by mouth at bedtime. Per pt.      Potassium Chloride ER 10 MEQ Oral Cap CR Take 4 capsules (40 mEq total) by mouth at bedtime. Per pt.      calcium carbonate 500 MG Oral Chew Tab Chew 1 tablet (500 mg total) by mouth every 8 (eight) hours as needed (upset stomach).      triamcinolone 0.1 % External Ointment Apply 1 Application topically daily as needed (Neuro dermatitis per pt.).       !! - Potential duplicate medications found. Please discuss with provider.          Activity: activity as tolerated  Diet: cardiac diet  Wound Care: as directed  Code Status: Full Code      Exam on day of discharge:     Vitals:    08/16/24 1120   BP: 113/56   Pulse: 72   Resp: 17   Temp: 97.8 °F (36.6 °C)       General: no acute distress, AAOx3  Heart: RRR  Lungs: clear bilaterally, no active wheezing  Abdomen: nontender, nondistended, intact BS  Extremities: 1+ pitting edema bilaterally, improved overall, wrapped  Neuro: CN inact, no focal deficits      Total time coordinating care for discharge: Greater than 30 minutes    RAMIRO Parker [facility] Hospitalist

## 2024-08-16 NOTE — PLAN OF CARE
Assumed pt care at 1930  Pt is A&Ox4, following commands.   Pt on room air, VSS.  A. fib  PRN Tylenol for chronic foot pain  Pt max assist.   Pt on cardiac diet. Tolerating diet.   PIV saline locked  Daily weights  Strict I&o's  Cpap at bedtime  Purewick in place  Monitoring blood pressure  Bed in lowest position, call light in reach.     Problem: METABOLIC/FLUID AND ELECTROLYTES - ADULT  Goal: Electrolytes maintained within normal limits  Description: INTERVENTIONS:  - Monitor labs and rhythm and assess patient for signs and symptoms of electrolyte imbalances  - Administer electrolyte replacement as ordered  - Monitor response to electrolyte replacements, including rhythm and repeat lab results as appropriate  - Fluid restriction as ordered  - Instruct patient on fluid and nutrition restrictions as appropriate  Outcome: Progressing     Problem: SKIN/TISSUE INTEGRITY - ADULT  Goal: Skin integrity remains intact  Description: INTERVENTIONS  - Assess and document risk factors for pressure ulcer development  - Assess and document skin integrity  - Monitor for areas of redness and/or skin breakdown  - Initiate interventions, skin care algorithm/standards of care as needed  Outcome: Progressing  Goal: Incision(s), wounds(s) or drain site(s) healing without S/S of infection  Description: INTERVENTIONS:  - Assess and document risk factors for pressure ulcer development  - Assess and document skin integrity  - Assess and document dressing/incision, wound bed, drain sites and surrounding tissue  - Implement wound care per orders  - Initiate isolation precautions as appropriate  - Initiate Pressure Ulcer prevention bundle as indicated  Outcome: Progressing

## 2024-08-16 NOTE — PLAN OF CARE
Assumed care at 0730  A/Ox4, RA, CPAP at night, VSS  Tele, a fib  cardiac diet  Daily weights, I&Os  Bilateral leg dressings C/D/I  PRN midodrine   Pending DC   All needs met at this time    Problem: SKIN/TISSUE INTEGRITY - ADULT  Goal: Skin integrity remains intact  Description: INTERVENTIONS  - Assess and document risk factors for pressure ulcer development  - Assess and document skin integrity  - Monitor for areas of redness and/or skin breakdown  - Initiate interventions, skin care algorithm/standards of care as needed  Outcome: Progressing  Goal: Incision(s), wounds(s) or drain site(s) healing without S/S of infection  Description: INTERVENTIONS:  - Assess and document risk factors for pressure ulcer development  - Assess and document skin integrity  - Assess and document dressing/incision, wound bed, drain sites and surrounding tissue  - Implement wound care per orders  - Initiate isolation precautions as appropriate  - Initiate Pressure Ulcer prevention bundle as indicated  Outcome: Progressing     Problem: METABOLIC/FLUID AND ELECTROLYTES - ADULT  Goal: Electrolytes maintained within normal limits  Description: INTERVENTIONS:  - Monitor labs and rhythm and assess patient for signs and symptoms of electrolyte imbalances  - Administer electrolyte replacement as ordered  - Monitor response to electrolyte replacements, including rhythm and repeat lab results as appropriate  - Fluid restriction as ordered  - Instruct patient on fluid and nutrition restrictions as appropriate  Outcome: Progressing     Problem: Patient/Family Goals  Goal: Patient/Family Long Term Goal  Description: Patient's Long Term Goal: dc home    Interventions:  -   - See additional Care Plan goals for specific interventions  Outcome: Progressing  Goal: Patient/Family Short Term Goal  Description: Patient's Short Term Goal:     Interventions:   -   - See additional Care Plan goals for specific interventions  Outcome: Progressing

## 2024-08-16 NOTE — CM/SW NOTE
Pt discussed in rounds today.  Plan to dc home.  [facility] notified of dc.  Will send AVS once completed.    Mariya Martinez LCSW  /Discharge Planner

## 2024-08-16 NOTE — OCCUPATIONAL THERAPY NOTE
OCCUPATIONAL THERAPY EVALUATION - INPATIENT    Room Number: 3617/3617-A  Evaluation Date: 8/15/2024     Type of Evaluation: Initial       Physician Order: IP Consult to Occupational Therapy  Reason for Therapy:  ADL/IADL Dysfunction and Discharge Planning      OCCUPATIONAL THERAPY ASSESSMENT   Patient is a 69 year old female admitted on 8/12/2024 with  . Co-Morbidities : TAVR, asthma, CFrEF, HTN, afib, PE s/p IVC filter  Patient is currently functioning {OT Functioning Baseline:17301} baseline with {OT ADLs:48502}.  Prior to admission, patient's baseline is ***.  Patient met all OT goals at *** level.  Patient reports no further questions/concerns at this time.     Patient will benefit from continued skilled OT Services {OT Services:17642}      WEIGHT BEARING RESTRICTION  Weight Bearing Restriction: None                Recommendations for nursing staff:   Transfers: ***  Toileting location: Toilet    EVALUATION SESSION:  Patient at start of session: ***  FUNCTIONAL TRANSFER ASSESSMENT     BED MOBILITY     BALANCE ASSESSMENT     FUNCTIONAL ADL ASSESSMENT       ACTIVITY TOLERANCE: ***                         O2 SATURATIONS       COGNITION  {Cognition:3840:::0}  COGNITION ASSESSMENTS       Upper Extremity:   ROM: within functional limits {OT UE ROM Exception to WFLs:5379:::0}  Strength: is within functional limits {OT UE Strength Exception to WFLs:4400:::0}  Coordination:  Gross motor: ***  Fine motor: ***  Sensation: {Sensation:3868:::0}    EDUCATION PROVIDED  Patient: Role of Occupational Therapy; Plan of Care; Discharge Recommendations  Patient's Response to Education: Verbalized Understanding    Equipment used: ***  Demonstrates functional use    Therapist comments: ***    Patient End of Session: Up in chair;Needs met;Call light within reach;RN aware of session/findings;All patient questions and concerns addressed;Discussed recommendations with /    OCCUPATIONAL PROFILE    HOME  SITUATION  Type of Home: House  Home Layout: One level;Ramped entrance  Lives With: Alone                     Drives: Yes       Prior Level of Function: ***    SUBJECTIVE  ***    PAIN ASSESSMENT             OBJECTIVE  Precautions: Bed/chair alarm  Fall Risk: High fall risk    WEIGHT BEARING RESTRICTION  Weight Bearing Restriction: None                AM-PAC ‘6-Clicks’ Inpatient Daily Activity Short Form                      AM-PAC Score:               ADDITIONAL TESTS     NEUROLOGICAL FINDINGS        PLAN   Patient has been evaluated and presents with no skilled Occupational Therapy needs at this time.  Patient discharged from Occupational Therapy services.  Please re-order if a new functional limitation presents during this admission.      Patient Evaluation Complexity Level:   Occupational Profile/Medical History {Occupational Profile/Medical History:7260}   Specific performance deficits impacting engagement in ADL/IADL {Specific performance deficits impacting engagement in ADL/IADL:7261}   Client Assessment/Performance Deficits {Client Assessment/Performance Deficits:7262}   Clinical Decision Making {Clinical Decision Makin}   Overall Complexity {Overall Complexity:7264}     OT Session Time: *** minutes  Self-Care Home Management: *** minutes  Therapeutic Activity: *** minutes  Neuromuscular Re-education: *** minutes  Therapeutic Exercise: *** minutes  Cognitive Skills: *** minutes  Sensory Integrative: *** minutes  Orthotic Management and Training: *** minutes  Can add/delete any of these

## 2024-09-11 ENCOUNTER — HOSPITAL ENCOUNTER (INPATIENT)
Facility: HOSPITAL | Age: 69
LOS: 7 days | Discharge: HOME HEALTH | End: 2024-09-19
Attending: EMERGENCY MEDICINE | Admitting: STUDENT IN AN ORGANIZED HEALTH CARE EDUCATION/TRAINING PROGRAM
Payer: MEDICARE

## 2024-09-11 ENCOUNTER — HOSPITAL ENCOUNTER (INPATIENT)
Facility: HOSPITAL | Age: 69
LOS: 7 days | Discharge: HOME | End: 2024-09-19
Attending: EMERGENCY MEDICINE | Admitting: STUDENT IN AN ORGANIZED HEALTH CARE EDUCATION/TRAINING PROGRAM
Payer: MEDICARE

## 2024-09-11 DIAGNOSIS — L03.116 LEFT LEG CELLULITIS: Primary | ICD-10-CM

## 2024-09-11 LAB
ALBUMIN SERPL-MCNC: 3.9 G/DL (ref 3.2–4.8)
ALBUMIN/GLOB SERPL: 1.3 {RATIO} (ref 1–2)
ALP LIVER SERPL-CCNC: 120 U/L
ALT SERPL-CCNC: 120 U/L
ANION GAP SERPL CALC-SCNC: 4 MMOL/L (ref 0–18)
AST SERPL-CCNC: 69 U/L (ref ?–34)
BASOPHILS # BLD AUTO: 0.04 X10(3) UL (ref 0–0.2)
BASOPHILS NFR BLD AUTO: 0.2 %
BILIRUB SERPL-MCNC: 1.5 MG/DL (ref 0.2–1.1)
BUN BLD-MCNC: 26 MG/DL (ref 9–23)
CALCIUM BLD-MCNC: 9.2 MG/DL (ref 8.7–10.4)
CHLORIDE SERPL-SCNC: 99 MMOL/L (ref 98–112)
CO2 SERPL-SCNC: 31 MMOL/L (ref 21–32)
CREAT BLD-MCNC: 0.85 MG/DL
EGFRCR SERPLBLD CKD-EPI 2021: 73 ML/MIN/1.73M2 (ref 60–?)
EOSINOPHIL # BLD AUTO: 0.1 X10(3) UL (ref 0–0.7)
EOSINOPHIL NFR BLD AUTO: 0.4 %
ERYTHROCYTE [DISTWIDTH] IN BLOOD BY AUTOMATED COUNT: 14.5 %
GLOBULIN PLAS-MCNC: 3 G/DL (ref 2–3.5)
GLUCOSE BLD-MCNC: 114 MG/DL (ref 70–99)
HCT VFR BLD AUTO: 36.6 %
HGB BLD-MCNC: 12.5 G/DL
IMM GRANULOCYTES # BLD AUTO: 0.24 X10(3) UL (ref 0–1)
IMM GRANULOCYTES NFR BLD: 1 %
INR BLD: 1.99 (ref 0.8–1.2)
LACTATE SERPL-SCNC: 1 MMOL/L (ref 0.5–2)
LYMPHOCYTES # BLD AUTO: 1.21 X10(3) UL (ref 1–4)
LYMPHOCYTES NFR BLD AUTO: 5.3 %
MCH RBC QN AUTO: 31.6 PG (ref 26–34)
MCHC RBC AUTO-ENTMCNC: 34.2 G/DL (ref 31–37)
MCV RBC AUTO: 92.7 FL
MONOCYTES # BLD AUTO: 1.11 X10(3) UL (ref 0.1–1)
MONOCYTES NFR BLD AUTO: 4.8 %
NEUTROPHILS # BLD AUTO: 20.22 X10 (3) UL (ref 1.5–7.7)
NEUTROPHILS # BLD AUTO: 20.22 X10(3) UL (ref 1.5–7.7)
NEUTROPHILS NFR BLD AUTO: 88.3 %
OSMOLALITY SERPL CALC.SUM OF ELEC: 284 MOSM/KG (ref 275–295)
PLATELET # BLD AUTO: 290 10(3)UL (ref 150–450)
POTASSIUM SERPL-SCNC: 3 MMOL/L (ref 3.5–5.1)
PROT SERPL-MCNC: 6.9 G/DL (ref 5.7–8.2)
PROTHROMBIN TIME: 22.8 SECONDS (ref 11.6–14.8)
RBC # BLD AUTO: 3.95 X10(6)UL
SODIUM SERPL-SCNC: 134 MMOL/L (ref 136–145)
WBC # BLD AUTO: 22.9 X10(3) UL (ref 4–11)

## 2024-09-11 PROCEDURE — 87205 SMEAR GRAM STAIN: CPT | Performed by: EMERGENCY MEDICINE

## 2024-09-11 PROCEDURE — 87186 SC STD MICRODIL/AGAR DIL: CPT | Performed by: EMERGENCY MEDICINE

## 2024-09-11 PROCEDURE — 99285 EMERGENCY DEPT VISIT HI MDM: CPT

## 2024-09-11 PROCEDURE — 87077 CULTURE AEROBIC IDENTIFY: CPT | Performed by: EMERGENCY MEDICINE

## 2024-09-11 PROCEDURE — 87040 BLOOD CULTURE FOR BACTERIA: CPT | Performed by: EMERGENCY MEDICINE

## 2024-09-11 PROCEDURE — 87150 DNA/RNA AMPLIFIED PROBE: CPT | Performed by: EMERGENCY MEDICINE

## 2024-09-11 PROCEDURE — 80053 COMPREHEN METABOLIC PANEL: CPT

## 2024-09-11 PROCEDURE — 85025 COMPLETE CBC W/AUTO DIFF WBC: CPT | Performed by: EMERGENCY MEDICINE

## 2024-09-11 PROCEDURE — 96365 THER/PROPH/DIAG IV INF INIT: CPT

## 2024-09-11 PROCEDURE — 83605 ASSAY OF LACTIC ACID: CPT | Performed by: EMERGENCY MEDICINE

## 2024-09-11 PROCEDURE — 85610 PROTHROMBIN TIME: CPT | Performed by: INTERNAL MEDICINE

## 2024-09-11 PROCEDURE — 36415 COLL VENOUS BLD VENIPUNCTURE: CPT

## 2024-09-11 PROCEDURE — 83605 ASSAY OF LACTIC ACID: CPT

## 2024-09-11 PROCEDURE — 80053 COMPREHEN METABOLIC PANEL: CPT | Performed by: EMERGENCY MEDICINE

## 2024-09-11 PROCEDURE — 85025 COMPLETE CBC W/AUTO DIFF WBC: CPT

## 2024-09-11 RX ORDER — METOPROLOL SUCCINATE 25 MG/1
25 TABLET, EXTENDED RELEASE ORAL NIGHTLY
Status: DISCONTINUED | OUTPATIENT
Start: 2024-09-11 | End: 2024-09-19

## 2024-09-11 RX ORDER — MELATONIN
3 NIGHTLY PRN
Status: DISCONTINUED | OUTPATIENT
Start: 2024-09-11 | End: 2024-09-19

## 2024-09-11 RX ORDER — FERROUS SULFATE 325(65) MG
325 TABLET, DELAYED RELEASE (ENTERIC COATED) ORAL NIGHTLY
Status: DISCONTINUED | OUTPATIENT
Start: 2024-09-11 | End: 2024-09-19

## 2024-09-11 RX ORDER — FERROUS SULFATE 325(65) MG
325 TABLET, DELAYED RELEASE (ENTERIC COATED) ORAL NIGHTLY
Status: DISCONTINUED | OUTPATIENT
Start: 2024-09-11 | End: 2024-09-11

## 2024-09-11 RX ORDER — BUPROPION HYDROCHLORIDE 150 MG/1
150 TABLET, EXTENDED RELEASE ORAL 2 TIMES DAILY
Status: DISCONTINUED | OUTPATIENT
Start: 2024-09-11 | End: 2024-09-19

## 2024-09-11 RX ORDER — FUROSEMIDE 10 MG/ML
60 INJECTION INTRAMUSCULAR; INTRAVENOUS
Status: DISCONTINUED | OUTPATIENT
Start: 2024-09-11 | End: 2024-09-13

## 2024-09-11 RX ORDER — CYPROHEPTADINE HYDROCHLORIDE 4 MG/1
4 TABLET ORAL NIGHTLY
Status: DISCONTINUED | OUTPATIENT
Start: 2024-09-11 | End: 2024-09-19

## 2024-09-11 RX ORDER — POTASSIUM CHLORIDE 1500 MG/1
40 TABLET, EXTENDED RELEASE ORAL ONCE
Status: COMPLETED | OUTPATIENT
Start: 2024-09-11 | End: 2024-09-11

## 2024-09-11 RX ORDER — BUMETANIDE 2 MG/1
4 TABLET ORAL
Status: CANCELLED | OUTPATIENT
Start: 2024-09-11

## 2024-09-11 RX ORDER — DILTIAZEM HYDROCHLORIDE 120 MG/1
240 CAPSULE, EXTENDED RELEASE ORAL NIGHTLY
Status: DISCONTINUED | OUTPATIENT
Start: 2024-09-11 | End: 2024-09-19

## 2024-09-11 RX ORDER — BUMETANIDE 2 MG/1
2 TABLET ORAL
Status: CANCELLED | OUTPATIENT
Start: 2024-09-11

## 2024-09-11 RX ORDER — CLOPIDOGREL BISULFATE 75 MG/1
75 TABLET ORAL NIGHTLY
Status: DISCONTINUED | OUTPATIENT
Start: 2024-09-11 | End: 2024-09-19

## 2024-09-11 RX ORDER — ROSUVASTATIN CALCIUM 5 MG/1
5 TABLET, COATED ORAL EVERY OTHER DAY
Status: DISCONTINUED | OUTPATIENT
Start: 2024-09-12 | End: 2024-09-19

## 2024-09-11 RX ORDER — ACETAMINOPHEN 325 MG/1
650 TABLET ORAL EVERY 6 HOURS PRN
Status: DISCONTINUED | OUTPATIENT
Start: 2024-09-11 | End: 2024-09-19

## 2024-09-11 RX ORDER — SPIRONOLACTONE 25 MG/1
25 TABLET ORAL DAILY
Status: DISCONTINUED | OUTPATIENT
Start: 2024-09-12 | End: 2024-09-19

## 2024-09-11 RX ORDER — NORTRIPTYLINE HCL 25 MG
25 CAPSULE ORAL NIGHTLY
Status: DISCONTINUED | OUTPATIENT
Start: 2024-09-11 | End: 2024-09-19

## 2024-09-11 RX ORDER — DIGOXIN 125 MCG
125 TABLET ORAL NIGHTLY
Status: DISCONTINUED | OUTPATIENT
Start: 2024-09-11 | End: 2024-09-19

## 2024-09-11 RX ORDER — WARFARIN SODIUM 2.5 MG/1
2.5 TABLET ORAL
Status: COMPLETED | OUTPATIENT
Start: 2024-09-11 | End: 2024-09-11

## 2024-09-11 RX ORDER — ALLOPURINOL 100 MG/1
100 TABLET ORAL NIGHTLY
Status: DISCONTINUED | OUTPATIENT
Start: 2024-09-11 | End: 2024-09-19

## 2024-09-11 NOTE — ED INITIAL ASSESSMENT (HPI)
Patient here for cellulitis to the left leg and bilateral lower leg edema. Taking Bumex once a day. Denies fevers at home, shortness of breath or chest pain.

## 2024-09-11 NOTE — ED PROVIDER NOTES
Patient Seen in: [facility] Emergency Department      History     Chief Complaint   Patient presents with    Swelling Edema    Cellulitis     Stated Complaint: cellulitis to left leg and water retention    Subjective:   HPI    69-year-old female comes to the hospital stating that she has a history of having some chronic leg edema bilaterally that she is on diuretics for but noticed Wednesday her leg became very red as well as uncomfortable and hot was afraid is infected.  She is denying any known fevers.  She denies any headaches dizzy pressures no chest pain or troubles breathing she has no abdominal pains.  She has no nausea or vomiting.  She is denying any other specific complaints at this time.  She does have chronic kidney disease and CHF.  She is denies being diabetic.    Objective:   Past Medical History:    Aortic stenosis    S/P TAVR    Arrhythmia    ASTHMA    Asthma (HCC)    Back problem    CANCER    thyroid    Cancer (HCC)    thyroid     CHF (congestive heart failure) (HCC)    CKD (chronic kidney disease) stage 2, GFR 60-89 ml/min    COPD (chronic obstructive pulmonary disease) (HCC)    DEPRESSION    Disorder of thyroid    Essential hypertension    Fibromyalgia    Gout    High blood pressure    High cholesterol    HYPERTENSION    HYPOTHYROIDISM    Muscle weakness    OBESITY    OSTEOARTHRITIS    Osteoarthritis    OTHER DISEASES    Fibromyalgia    OTHER DISEASES    Pulmonary emboli    OTHER DISEASES    Lymph edema    OTHER DISEASES    Pulmonary hypertension    Peripheral vascular disease (HCC)    Pulmonary embolism (HCC)    RA (rheumatoid arthritis) (HCC)    Shortness of breath    ON EXERTION    SLEEP APNEA    Sleep apnea    CPAP              Past Surgical History:   Procedure Laterality Date    Anesth,shoulder replacement Right 2014    hemiathroplasty    Back surgery  2000    Back surgery  2004    complete c-3 -7 ectomy    Biopsy Left 07/20/2023    TEMPORAL ARTERY    Cath transcatheter aortic valve  replacement      Colonoscopy N/A 05/10/2018    Procedure: COLONOSCOPY, POSSIBLE BIOPSY, POSSIBLE POLYPECTOMY 97539;  Surgeon: Kendell Valentin MD;  Location: [facility], [facility]    Colonoscopy      Craniotomy for lobotomy Left     D & c  2009    Dilation/curettage,diagnostic      Hip replacement surgery  2009    Rt hip    Knee replacement surgery      Both knees    Other surgical history      Thyroid removal    Other surgical history      LT foot spur removal    Thyroidectomy Bilateral     Total hip replacement      Total knee replacement                  Social History     Socioeconomic History    Marital status: Single   Tobacco Use    Smoking status: Former     Current packs/day: 0.00     Average packs/day: 0.5 packs/day for 30.0 years (15.0 ttl pk-yrs)     Types: Cigarettes     Start date: 1961     Quit date: 1991     Years since quittin.6    Smokeless tobacco: Never   Vaping Use    Vaping status: Never Used   Substance and Sexual Activity    Alcohol use: No    Drug use: No   Other Topics Concern    Caffeine Concern No    Exercise Yes    Seat Belt Yes    Special Diet Yes     Comment: low sodium    Stress Concern Yes    Weight Concern No     Social Determinants of Health     Food Insecurity: No Food Insecurity (2024)    Food Insecurity     Food Insecurity: Never true   Transportation Needs: No Transportation Needs (2024)    Transportation Needs     Lack of Transportation: No   Housing Stability: Low Risk  (2024)    Housing Stability     Housing Instability: No              Review of Systems    Positive for stated Chief Complaint: Swelling Edema and Cellulitis    Other systems are as noted in HPI.  Constitutional and vital signs reviewed.      All other systems reviewed and negative except as noted above.    Physical Exam     ED Triage Vitals [24 1309]   /64   Pulse 118   Resp 18   Temp 97.8 °F (36.6 °C)   Temp src Oral   SpO2 93 %   O2 Device None  (Room air)       Current Vitals:   Vital Signs  BP: 126/68  Pulse: 98  Resp: 18  Temp: 98 °F (36.7 °C)  Temp src: Temporal  MAP (mmHg): 86    Oxygen Therapy  SpO2: 95 %  O2 Device: None (Room air)            Physical Exam    HEENT; NCAT, EOMI, throat clear, neck supple, no LAD, no JVD  Heart S1S2 RRR  lungs: CTAB  abd: Soft NT, ND,  NABS without rebound or guarding  Ext erythema with wound with drainage noted by the foot with extension of the erythema and warmth and tenderness although up to the distal thigh region.    ED Course     Labs Reviewed   COMP METABOLIC PANEL (14) - Abnormal; Notable for the following components:       Result Value    Glucose 114 (*)     Sodium 134 (*)     Potassium 3.0 (*)     BUN 26 (*)     AST 69 (*)      (*)     Bilirubin, Total 1.5 (*)     All other components within normal limits   CBC WITH DIFFERENTIAL WITH PLATELET - Abnormal; Notable for the following components:    WBC 22.9 (*)     Neutrophil Absolute Prelim 20.22 (*)     Neutrophil Absolute 20.22 (*)     Monocyte Absolute 1.11 (*)     All other components within normal limits   LACTIC ACID, PLASMA - Normal   BLOOD CULTURE   BLOOD CULTURE          ED Course as of 09/11/24 1443  ------------------------------------------------------------  Time: 09/11 1442  Comment: While here the patient had blood cultures x 2 taken.  Her lactic acid level was negative.  Her white count was elevated to 22.9 thousand.  The patient's potassium was 3.0 and replaced while here.  The patient was given IV Rocephin at this time be admitted to the hospital for further care for her cellulitis     Medications   cefTRIAXone (Rocephin) 1 g in sodium chloride 0.9% 100 mL IVPB-ADDV (1 g Intravenous New Bag 9/11/24 1421)   potassium chloride (Klor-Con M20) tab 40 mEq (has no administration in time range)              MDM      Differential diagnosis includes cellulitis, sepsis, osteomyelitis but not limited these.  The patient's workup is consistent with  cellulitis.  In addition the patient was found to be hypokalemic and was given potassium replaced while here.  Patient was given IV Rocephin over the mid to the hospital for further management this time.  The hospitalist has been notified.    This note was prepared using Dragon Medical voice recognition dictation software.  As a result errors may occur.  When identified to these areas have been corrected.  While every attempt is made to correct errors during dictation discrepancies may still exist.  Please contact if there are any errors.    Admission disposition: 9/11/2024  2:43 PM                                        Medical Decision Making      Disposition and Plan     Clinical Impression:  1. Left leg cellulitis         Disposition:  Admit  9/11/2024  2:43 pm    Follow-up:  No follow-up provider specified.        Medications Prescribed:  Current Discharge Medication List                            Hospital Problems       Present on Admission  Date Reviewed: 8/15/2024            ICD-10-CM Noted POA    * (Principal) Left leg cellulitis L03.116 9/11/2024 Unknown

## 2024-09-11 NOTE — ED QUICK NOTES
Orders for admission, patient is aware of plan and ready to go upstairs. Any questions, please call ED RN Kathie at extension [phone].     Patient Covid vaccination status: Fully vaccinated     COVID Test Ordered in ED: None    COVID Suspicion at Admission: N/A    Running Infusions:      Mental Status/LOC at time of transport: Aox4    Other pertinent information:   CIWA score: N/A   NIH score:  N/A

## 2024-09-11 NOTE — H&P
[facility]ist History and Physical      PCP: Viktoriya Garcias DO    History of Present Illness: This is the story of Ms. Arleth Weiss who is a 70 y/o F who presented to the hospital w/ chief complaints of LLE swelling, redness, and pain. Patient states that she went to sleep last night and woke up this morning w/ severe pain, burning and redness to the upper and medial thigh. Patient normally has chronic LE wounds that she's seen in the lymphedema/wound care clinic for. She also states that she feels she is quite overloaded and is up almost 20 pounds on her weight due to her bumex not working as well for her.  ED Vitals: wnl  Labs:   Na 134  K 3.0  WBC 22.9    Patient was given 40meq of KCL and CTX ad admitted to the floors for further management.    Active Inpatient Medical History:  HFrEF  Aortic Stenosis s/p TAVR  CAD s/p PCI  PVD  HTN  HLD  AF on OAC  HypoT  SEBASTIEN    Past Medical History:    Aortic stenosis    S/P TAVR    Arrhythmia    ASTHMA    Asthma (HCC)    Back problem    CANCER    thyroid    Cancer (HCC)    thyroid     CHF (congestive heart failure) (HCC)    CKD (chronic kidney disease) stage 2, GFR 60-89 ml/min    COPD (chronic obstructive pulmonary disease) (HCC)    DEPRESSION    Disorder of thyroid    Essential hypertension    Fibromyalgia    Gout    High blood pressure    High cholesterol    HYPERTENSION    HYPOTHYROIDISM    Muscle weakness    OBESITY    OSTEOARTHRITIS    Osteoarthritis    OTHER DISEASES    Fibromyalgia    OTHER DISEASES    Pulmonary emboli    OTHER DISEASES    Lymph edema    OTHER DISEASES    Pulmonary hypertension    Peripheral vascular disease (HCC)    Pulmonary embolism (HCC)    RA (rheumatoid arthritis) (HCC)    Shortness of breath    ON EXERTION    SLEEP APNEA    Sleep apnea    CPAP      Past Surgical History:   Procedure Laterality Date    Anesth,shoulder replacement Right 2014    hemiathroplasty    Back surgery  2000    Back surgery  2004    complete c-3 -7 ectomy     Biopsy Left 07/20/2023    TEMPORAL ARTERY    Cath transcatheter aortic valve replacement      Colonoscopy N/A 05/10/2018    Procedure: COLONOSCOPY, POSSIBLE BIOPSY, POSSIBLE POLYPECTOMY 34905;  Surgeon: Kendlel Valentin MD;  Location: [facility], [facility]    Colonoscopy      Craniotomy for lobotomy Left     D & c  09/2009    Dilation/curettage,diagnostic      Hip replacement surgery  09/2009    Rt hip    Knee replacement surgery  2003    Both knees    Other surgical history  1989    Thyroid removal    Other surgical history  2004    LT foot spur removal    Thyroidectomy Bilateral     Total hip replacement      Total knee replacement          Prior to Admission Medications   Medication Sig    bumetanide 2 MG Oral Tab Take 1 tablet (2 mg total) by mouth BID (Diuretic).    warfarin 2.5 MG Oral Tab Take 1 tablet (2.5 mg total) by mouth 3 (three) times a week. Q Mon., Wed., and Saturday per pt.    empagliflozin (JARDIANCE) 10 MG Oral Tab Take 1 tablet (10 mg total) by mouth at bedtime. Per pt.    levothyroxine 175 MCG Oral Tab Take 1 tablet (175 mcg total) by mouth before breakfast.    dilTIAZem  MG Oral Capsule SR 24 Hr Take 1 capsule (240 mg total) by mouth daily. (Patient taking differently: Take 1 capsule (240 mg total) by mouth at bedtime.)    clopidogrel 75 MG Oral Tab Take 1 tablet (75 mg total) by mouth at bedtime. Per pt.    metoprolol succinate ER 25 MG Oral Tablet 24 Hr Take 1 tablet (25 mg total) by mouth at bedtime. Per pt.    Potassium Chloride ER 10 MEQ Oral Cap CR Take 4 capsules (40 mEq total) by mouth at bedtime. Per pt.    rosuvastatin 5 MG Oral Tab Take 1 tablet (5 mg total) by mouth every other day.    spironolactone 25 MG Oral Tab Take 1 tablet (25 mg total) by mouth daily.    warfarin 5 MG Oral Tab Take 1 tablet (5 mg total) by mouth nightly. Q Tues., Thurs., Friday and Sunday per pt.    ferrous sulfate 325 (65 FE) MG Oral Tab EC Take 1 tablet (325 mg total) by mouth at bedtime. Per  pt.    calcium carbonate 500 MG Oral Chew Tab Chew 1 tablet (500 mg total) by mouth every 8 (eight) hours as needed (upset stomach).    Vitamin C 500 MG Oral Tab Take 1 tablet (500 mg total) by mouth at bedtime. Per pt.    Zinc 50 MG Oral Cap Take 50 mg by mouth at bedtime. Per pt.    Cholecalciferol 125 MCG (5000 UT) Oral Tab Take 1 tablet (5,000 Units total) by mouth at bedtime. Per pt.    acetaminophen 325 MG Oral Tab Take 2 tablets (650 mg total) by mouth every 8 (eight) hours as needed for Pain.    buPROPion  MG Oral Tablet 12 Hr Take 1 tablet (150 mg total) by mouth 2 (two) times daily.    cyproheptadine 4 MG Oral Tab Take 1 tablet (4 mg total) by mouth nightly.    FLUoxetine 20 MG Oral Cap Take 1 capsule (20 mg total) by mouth 2 (two) times daily.    triamcinolone 0.1 % External Ointment Apply 1 Application topically daily as needed (Neuro dermatitis per pt.).    digoxin 0.125 MG Oral Tab Take 1 tablet (125 mcg total) by mouth daily. (Patient taking differently: Take 1 tablet (125 mcg total) by mouth at bedtime.)    allopurinol 100 MG Oral Tab Take 1 tablet (100 mg total) by mouth daily. (Patient taking differently: Take 1 tablet (100 mg total) by mouth at bedtime. Per pt.)    Nortriptyline HCl 25 MG Oral Cap Take 1 capsule (25 mg total) by mouth nightly.    Multiple Vitamin (MULTIVITAMIN OR) Take 1 tablet by mouth at bedtime. Per pt.       Social History     Tobacco Use    Smoking status: Former     Current packs/day: 0.00     Average packs/day: 0.5 packs/day for 30.0 years (15.0 ttl pk-yrs)     Types: Cigarettes     Start date: 1961     Quit date: 1991     Years since quittin.6    Smokeless tobacco: Never   Substance Use Topics    Alcohol use: No        OBJECTIVE:  /44   Pulse 60   Temp 98 °F (36.7 °C) (Temporal)   Resp 18   Ht 5' 9\" (1.753 m)   Wt 230 lb (104.3 kg)   LMP  (LMP Unknown)   SpO2 96%   BMI 33.97 kg/m²   Gen: No acute distress, alert and oriented x3  Pulm:  Lungs clear bilaterally, normal respiratory effort  CV: Heart with regular rate and rhythm  Abd: Abdomen soft, nontender, non-distended  Skin: no rashes or lesions  Extremities: Bilateral LE w/ chronic wounds seen, weeping clear fluid, 1-2+, medial thigh erythematous, tender to touch  Neuro:  no focal deficits      Data Review:    Pertinent Labs:  Reviewed    Imaging Studies:  None    Assessment/Plan:   Outpatient records or previous hospital records reviewed.   [facility] to continue to follow patient while in house    A/P: 70 y/o F w/ PMHx of HFrEF, aortic stenosis status post TAVR, CAD status post PCI in 2021, PVD, HTN, HLD, A-fib on oral anticoagulation, hypothyroidism, SEBASTIEN who presents to the hospital w/ complaints of pain, swelling and redness to LLE. Diagnosed w/ Cellulitis    LLE Cellulitis  Plan:  - Continue CTX  - Wound care consult    Mild Acute on Chronic HFrEF Exacerbation  Hx of Aortic Stenosis s/p TAVR  Discharge weight at last hospitalization: 97.8kg  Admission weight today 104.3kg  Last ECHO: stable from prior  Plan:  - Will start w/ IV Lasix 40mg BID, continue [place]  - daily weights, strict I/os  - Pharmacy to dose Coumadin, goal INR 2.5-3.5    Hx of CAD s/p PCI  -s/p LHC in 2021 w/ PCI to Lcx  PVD  HTN  HLD  Plan:  - Continue Plavix  - BB    SEBASTIEN  - Ferrous Sulfate    HypoT  - Synthroid    A total of 42 minutes taken with patient and coordinating care.  Greater than 50% face to face encounter.      Jacob Villarreal D.O.  [facility] Hospitalist

## 2024-09-11 NOTE — PROGRESS NOTES
NURSING ADMISSION NOTE      Patient admitted via Cart  Oriented to room.  Safety precautions initiated.  Bed in low position.  Call light in reach.      A&OX4.   Wears CPAP at night. VSS.  Tele: Afib, rates controlled, MD notified. Pt has hx of afib.   Left lower leg redness. On admission abd pads present with drainage. No drainage at the moment.   Cardiac diet.   Med rec completed.   Admission navigator completed with pt's assistance.

## 2024-09-12 LAB
ALBUMIN SERPL-MCNC: 3.6 G/DL (ref 3.2–4.8)
ALBUMIN/GLOB SERPL: 1.2 {RATIO} (ref 1–2)
ALP LIVER SERPL-CCNC: 112 U/L
ALT SERPL-CCNC: 82 U/L
ANION GAP SERPL CALC-SCNC: 10 MMOL/L (ref 0–18)
AST SERPL-CCNC: 37 U/L (ref ?–34)
BASOPHILS # BLD AUTO: 0.03 X10(3) UL (ref 0–0.2)
BASOPHILS NFR BLD AUTO: 0.2 %
BILIRUB SERPL-MCNC: 0.7 MG/DL (ref 0.2–1.1)
BUN BLD-MCNC: 22 MG/DL (ref 9–23)
CALCIUM BLD-MCNC: 9.1 MG/DL (ref 8.7–10.4)
CHLORIDE SERPL-SCNC: 101 MMOL/L (ref 98–112)
CO2 SERPL-SCNC: 30 MMOL/L (ref 21–32)
CREAT BLD-MCNC: 0.69 MG/DL
EGFRCR SERPLBLD CKD-EPI 2021: 93 ML/MIN/1.73M2 (ref 60–?)
EOSINOPHIL # BLD AUTO: 0.06 X10(3) UL (ref 0–0.7)
EOSINOPHIL NFR BLD AUTO: 0.4 %
ERYTHROCYTE [DISTWIDTH] IN BLOOD BY AUTOMATED COUNT: 14.7 %
GLOBULIN PLAS-MCNC: 2.9 G/DL (ref 2–3.5)
GLUCOSE BLD-MCNC: 85 MG/DL (ref 70–99)
HCT VFR BLD AUTO: 35.5 %
HGB BLD-MCNC: 11.8 G/DL
IMM GRANULOCYTES # BLD AUTO: 0.13 X10(3) UL (ref 0–1)
IMM GRANULOCYTES NFR BLD: 0.8 %
INR BLD: 2.28 (ref 0.8–1.2)
LYMPHOCYTES # BLD AUTO: 0.91 X10(3) UL (ref 1–4)
LYMPHOCYTES NFR BLD AUTO: 5.9 %
MAGNESIUM SERPL-MCNC: 2 MG/DL (ref 1.6–2.6)
MCH RBC QN AUTO: 31.3 PG (ref 26–34)
MCHC RBC AUTO-ENTMCNC: 33.2 G/DL (ref 31–37)
MCV RBC AUTO: 94.2 FL
MONOCYTES # BLD AUTO: 1.05 X10(3) UL (ref 0.1–1)
MONOCYTES NFR BLD AUTO: 6.8 %
NEUTROPHILS # BLD AUTO: 13.19 X10 (3) UL (ref 1.5–7.7)
NEUTROPHILS # BLD AUTO: 13.19 X10(3) UL (ref 1.5–7.7)
NEUTROPHILS NFR BLD AUTO: 85.9 %
OSMOLALITY SERPL CALC.SUM OF ELEC: 295 MOSM/KG (ref 275–295)
PHOSPHATE SERPL-MCNC: 3.6 MG/DL (ref 2.4–5.1)
PLATELET # BLD AUTO: 281 10(3)UL (ref 150–450)
PLATELETS.RETICULATED NFR BLD AUTO: 2.1 % (ref 0–7)
POTASSIUM SERPL-SCNC: 3.3 MMOL/L (ref 3.5–5.1)
POTASSIUM SERPL-SCNC: 3.6 MMOL/L (ref 3.5–5.1)
PROT SERPL-MCNC: 6.5 G/DL (ref 5.7–8.2)
PROTHROMBIN TIME: 25.4 SECONDS (ref 11.6–14.8)
RBC # BLD AUTO: 3.77 X10(6)UL
SODIUM SERPL-SCNC: 141 MMOL/L (ref 136–145)
VANCOMYCIN PEAK SERPL-MCNC: 25.9 UG/ML (ref 30–50)
WBC # BLD AUTO: 15.4 X10(3) UL (ref 4–11)

## 2024-09-12 PROCEDURE — 80202 ASSAY OF VANCOMYCIN: CPT | Performed by: INTERNAL MEDICINE

## 2024-09-12 PROCEDURE — 83735 ASSAY OF MAGNESIUM: CPT | Performed by: INTERNAL MEDICINE

## 2024-09-12 PROCEDURE — 84132 ASSAY OF SERUM POTASSIUM: CPT | Performed by: INTERNAL MEDICINE

## 2024-09-12 PROCEDURE — 87040 BLOOD CULTURE FOR BACTERIA: CPT | Performed by: INTERNAL MEDICINE

## 2024-09-12 PROCEDURE — 84100 ASSAY OF PHOSPHORUS: CPT | Performed by: INTERNAL MEDICINE

## 2024-09-12 PROCEDURE — 80053 COMPREHEN METABOLIC PANEL: CPT | Performed by: INTERNAL MEDICINE

## 2024-09-12 PROCEDURE — 85025 COMPLETE CBC W/AUTO DIFF WBC: CPT | Performed by: INTERNAL MEDICINE

## 2024-09-12 PROCEDURE — 85610 PROTHROMBIN TIME: CPT | Performed by: INTERNAL MEDICINE

## 2024-09-12 RX ORDER — POTASSIUM CHLORIDE 1500 MG/1
40 TABLET, EXTENDED RELEASE ORAL EVERY 4 HOURS
Status: COMPLETED | OUTPATIENT
Start: 2024-09-12 | End: 2024-09-12

## 2024-09-12 RX ORDER — CALCIUM CARBONATE 500 MG/1
1000 TABLET, CHEWABLE ORAL 3 TIMES DAILY PRN
Status: DISCONTINUED | OUTPATIENT
Start: 2024-09-12 | End: 2024-09-19

## 2024-09-12 NOTE — RESPIRATORY THERAPY NOTE
RT reviewed order for [facility] protocol. Pt wears cpap @ home, however pt refused cpap due to forgetting mask at home, does not want to use ours. Pt is content with 3L NC for NOC.    3L NC on sb for when pt goes to bed.

## 2024-09-12 NOTE — PROGRESS NOTES
[facility] Pharmacy Dosing Service      Initial Pharmacokinetic Consult for Vancomycin Dosing     Arleth Weiss is a 69 year old female who is being initiated on vancomycin therapy for  cellulitis w/ confirmed MRSA bacteremia .  Pharmacy has been asked to dose vancomycin by Dr. Jacob Villarreal.  The initial treatment and monitoring approach will be first dose AUC strategy.        Weight and Temperature:    Wt Readings from Last 1 Encounters:   24 102.4 kg (225 lb 12 oz)        Temp Readings from Last 1 Encounters:   24 97.8 °F (36.6 °C) (Oral)      Labs:   Recent Labs   Lab 24  1317 24  0723   CREATSERUM 0.85 0.69      Estimated Creatinine Clearance: 78.2 mL/min (based on SCr of 0.69 mg/dL).     Recent Labs   Lab 24  1317 24  0723   WBC 22.9* 15.4*          The Pharmacokinetic Target is:     to 600 mg-h/L and trough <=15 mg/L    Renal Dosing Considerations:    None     Assessment/Plan:   Initial/Loading dose: Will receive 2250 mg IV (25 mg/kg, capped at 2250 mg) x 1 loading dose.      Maintenance dose: Pharmacy will dose vancomycin per FD AUC calculations.    Monitorin) Plan for vancomycin peak and trough to be obtained after the loading dose.    2) Pharmacy will order SCr as clinically indicated to assess renal function.    3) Pharmacy will monitor for toxicity and efficacy, adjust vancomycin dose and/or frequency, and order vancomycin levels as appropriate per the Pharmacy and Therapeutics Committee approved protocol until discontinuation of the medication.       We appreciate the opportunity to assist in the care of this patient.     Tarah Wick, PharmD  2024  12:42 PM  [facility]: [phone]

## 2024-09-12 NOTE — CM/SW NOTE
Department  notified of request for HHC, aidin referrals started. Assigned CM/SW to follow up with pt/family on further discharge planning.     Christiana Rosario, DSC

## 2024-09-12 NOTE — PROGRESS NOTES
Pharmacy Dosing Service: Warfarin    Arleth Weiss is a 69 year old female for whom pharmacy is dosing warfarin (Coumadin) per consult from Dr. Jacob Villarreal.    Indication: Prophylaxis of venous thrombosis  Potential drug interactions: allopurinol, clopidogrel, levothyroxine (all home meds)  Other anticoagulants: none  Home regimen:  2.5mg MWF and 5mg TThSaSu  Goal INR: 2.5-3.5    Recent Labs   Lab 09/11/24 1955 09/12/24  0723   INR 1.99* 2.28*     Inpatient Dosing History  Date 9/11 9/12       INR 1.99 2.28       Coumadin dose 2.5 mg          The last dose of 2.5 mg was given last night 9/11.    Assessment/Plan:    1.  Give warfarin 6 mg at 2100 tonight.  2.  Goal INR 2.5-3.5  3   PT/INR is ordered for daily assessment while on warfarin.  4.  Pharmacy will continue to follow.    Tarah Wick, PharmD  9/12/2024  1:14 PM

## 2024-09-12 NOTE — PROGRESS NOTES
Assumed care around 0700, A/ox4, R/A. 3LNC at night instread of CPAP. Afib on tele. Cardiac diet. Brief, chucked and purewick. Blood cultures + for mrsa. ID consulted. IV vanco started. Rocephin for cellulitis. Daily weight. Strict I/O's. IV lasix. Plan of care ongoing.

## 2024-09-12 NOTE — PLAN OF CARE
Assumed care at 1930  A&Ox4  RA, 3L NC @ [facility]  Tele- Afib, HR controlled 70s- 90s  Cardiac electrolyte protocol  Cardiac diet  Brief, roseanne lloyd in place  C/o 5/10 LLE pain, given PRN pain medication per MAR  Up w/ standby and crutches  PIV L AC flushed and capped   Q24 Rocephin  IV Lasix BID  Daily wt  Strict I & Os  Call light within reach, safety precautions maintained  POC ongoing    Problem: PAIN - ADULT  Goal: Verbalizes/displays adequate comfort level or patient's stated pain goal  Description: INTERVENTIONS:  - Encourage pt to monitor pain and request assistance  - Assess pain using appropriate pain scale  - Administer analgesics based on type and severity of pain and evaluate response  - Implement non-pharmacological measures as appropriate and evaluate response  - Consider cultural and social influences on pain and pain management  - Manage/alleviate anxiety  - Utilize distraction and/or relaxation techniques  - Monitor for opioid side effects  - Notify MD/LIP if interventions unsuccessful or patient reports new pain  - Anticipate increased pain with activity and pre-medicate as appropriate  Outcome: Progressing     Problem: SKIN/TISSUE INTEGRITY - ADULT  Goal: Skin integrity remains intact  Description: INTERVENTIONS  - Assess and document risk factors for pressure ulcer development  - Assess and document skin integrity  - Monitor for areas of redness and/or skin breakdown  - Initiate interventions, skin care algorithm/standards of care as needed  Outcome: Progressing

## 2024-09-12 NOTE — CONSULTS
Infectious Disease Initial Consultation      Date of admission: 9/11/2024  1:39 PM     Date of service: 09/12/24 6:18 PM    Consult requested by: Jacob Villarreal DO     Reason for consult: MRSA bacteremia, left lower extremity cellulitis    Chief complaint: Left leg redness    History of present illness: Arleth Weiss is a 69 year old female with history of aortic stenosis status post TAVR, CAD status post PCI, PVD, hypertension, hyperlipidemia, A-fib, who presents to the hospital with worsening left lower extremity pain, redness along with swelling in the setting of acute cellulitis.    In the emergency room, the patient was afebrile, hemodynamically stable.  Labs revealed leukocytosis with a white count of 22.9 with a left shift.  BMP showed mild hyponatremia and hypokalemia but otherwise was unremarkable.  LFTs were slightly elevated with an AST of 69,  and a bilirubin of 120.  2 sets of blood cultures were obtained, currently growing MRSA.  The patient was started on IV ceftriaxone initially, IV vancomycin started today after cultures resulted.  ID consulted given her bacteremia.    Risk factors/Exposures:  Living situation: At home with family  Sick contacts: None  Animals: No pets or other animal exposure  Travel: No recent local/international travel  /USP/correction: None  Outdoor activities: Nothing unusual  Sexual behavior: Heterosexual, no high risk sexual behavior  Active TB exposure: None  Employment: Currently unemployed  IV drug use: None    Review of systems:  All other components of the review of systems are negative, except those described in the history of present illness.     Past Medical History:    Aortic stenosis    S/P TAVR    Arrhythmia    ASTHMA    Asthma (HCC)    Back problem    CANCER    thyroid    Cancer (HCC)    thyroid     CHF (congestive heart failure) (HCC)    CKD (chronic kidney disease) stage 2, GFR 60-89 ml/min    COPD (chronic obstructive pulmonary disease) (HCC)     DEPRESSION    Disorder of thyroid    Essential hypertension    Fibromyalgia    Gout    High blood pressure    High cholesterol    HYPERTENSION    HYPOTHYROIDISM    Muscle weakness    OBESITY    OSTEOARTHRITIS    Osteoarthritis    OTHER DISEASES    Fibromyalgia    OTHER DISEASES    Pulmonary emboli    OTHER DISEASES    Lymph edema    OTHER DISEASES    Pulmonary hypertension    Peripheral vascular disease (HCC)    Pulmonary embolism (HCC)    RA (rheumatoid arthritis) (HCC)    Shortness of breath    ON EXERTION    SLEEP APNEA    Sleep apnea    CPAP     Past Surgical History:   Procedure Laterality Date    Anesth,shoulder replacement Right 2014    hemiathroplasty    Back surgery      Back surgery      complete c-3 -7 ectomy    Biopsy Left 2023    TEMPORAL ARTERY    Cath transcatheter aortic valve replacement      Colonoscopy N/A 05/10/2018    Procedure: COLONOSCOPY, POSSIBLE BIOPSY, POSSIBLE POLYPECTOMY 00469;  Surgeon: Kendell Valentin MD;  Location: [facility], [facility]    Colonoscopy      Craniotomy for lobotomy Left     D & c  2009    Dilation/curettage,diagnostic      Hip replacement surgery  2009    Rt hip    Knee replacement surgery      Both knees    Other surgical history      Thyroid removal    Other surgical history      LT foot spur removal    Thyroidectomy Bilateral     Total hip replacement      Total knee replacement       Social History     Socioeconomic History    Marital status: Single   Tobacco Use    Smoking status: Former     Current packs/day: 0.00     Average packs/day: 0.5 packs/day for 30.0 years (15.0 ttl pk-yrs)     Types: Cigarettes     Start date: 1961     Quit date: 1991     Years since quittin.6    Smokeless tobacco: Never   Vaping Use    Vaping status: Never Used   Substance and Sexual Activity    Alcohol use: No    Drug use: No   Other Topics Concern    Caffeine Concern No    Exercise Yes    Seat Belt Yes    Special Diet Yes      Comment: low sodium    Stress Concern Yes    Weight Concern No     Social Determinants of Health     Food Insecurity: No Food Insecurity (9/11/2024)    Food Insecurity     Food Insecurity: Never true   Transportation Needs: No Transportation Needs (9/11/2024)    Transportation Needs     Lack of Transportation: No   Housing Stability: Low Risk  (9/11/2024)    Housing Stability     Housing Instability: No     Family History   Problem Relation Age of Onset    Hypertension Father     Lipids Father     Diabetes Mother     Hypertension Mother     Lipids Mother     Cancer Brother     Hypertension Brother      Reviewed, see above    Medications:    potassium chloride    warfarin    Vancomycin IV    allopurinol    buPROPion SR    clopidogrel    cyproheptadine    digoxin    dilTIAZem ER    FLUoxetine    levothyroxine    metoprolol succinate ER    nortriptyline    rosuvastatin    spironolactone    melatonin    furosemide    ferrous sulfate    acetaminophen     Allergies:  Allergies   Allergen Reactions    Adhesive Tape HIVES     ALL ADHESIVES    Codeine HIVES    Doxycycline SWELLING    Hydrocodone UNKNOWN    Lisinopril TONGUE SWELLING    Morphine Sulfate HIVES    Opioid Analgesics UNKNOWN    Oxycontin ITCHING    Oxytocin HIVES    Vicodin [Hydrocodone-Acetaminophen] ITCHING    Skin Adhesives OTHER (SEE COMMENTS)       Physical Exam:  Vitals:    09/12/24 1530   BP: 112/56   Pulse: 71   Resp: 21   Temp: 97.9 °F (36.6 °C)     Vitals signs and nursing note reviewed.   Constitutional:       Appearance: Normal appearance.   HENT:      Head: Normocephalic and atraumatic.      Mouth: Mucous membranes are moist.   Neck:      Musculoskeletal: Neck supple.   Cardiovascular:      Rate and Rhythm: Normal rate.      Heart sounds: Normal heart sounds. No murmur. No friction rub. No gallop.    Pulmonary:      Effort: Pulmonary effort is normal. No respiratory distress.      Breath sounds: Normal breath sounds. No stridor. No wheezing, rhonchi  or rales.   Chest:      Chest wall: No tenderness.   Abdominal:      General: Abdomen is flat. There is no distension.      Palpations: Abdomen is soft. There is no mass.      Tenderness: There is no tenderness. There is no guarding or rebound.      Hernia: No hernia is present.   Musculoskeletal:      Right lower leg: +2 edema.      Left lower leg: +2 edema.  Left lower extremity cellulitis extending from the leg all the way to the mid thigh.  Prosthetic knee noted, no acute signs of infection.  Mild tenderness; however, no knee effusion.  No painful range of motion.  Patient able to put weight on the knee without any pain  Skin:     General: Skin is warm and dry.   Neurological:      General: No focal deficit present.      Mental Status: Alert and oriented to person, place, and time.     Laboratory data:  I have independently reviewed all lab results; including old microbiological results.  Lab Results   Component Value Date    WBC 15.4 09/12/2024    HGB 11.8 09/12/2024    HCT 35.5 09/12/2024    .0 09/12/2024    CREATSERUM 0.69 09/12/2024    BUN 22 09/12/2024     09/12/2024    K 3.3 09/12/2024     09/12/2024    CO2 30.0 09/12/2024    GLU 85 09/12/2024    CA 9.1 09/12/2024    ALB 3.6 09/12/2024    ALKPHO 112 09/12/2024    BILT 0.7 09/12/2024    TP 6.5 09/12/2024    AST 37 09/12/2024    ALT 82 09/12/2024    INR 2.28 09/12/2024    MG 2.0 09/12/2024    PHOS 3.6 09/12/2024        Recent Labs   Lab 09/12/24  0723   RBC 3.77*   HGB 11.8*   HCT 35.5   MCV 94.2   MCH 31.3   MCHC 33.2   RDW 14.7   NEPRELIM 13.19*   WBC 15.4*   .0       Microbiology data:  Hospital Encounter on 09/11/24   1. Blood Culture     Status: None (Preliminary result)    Collection Time: 09/11/24  2:18 PM    Specimen: Blood,peripheral   Result Value Ref Range    Blood Culture Result No Growth 1 Day N/A     Impression:  Arleth Weiss is a 69 year old female with     MRSA bacteremia/sepsis with threat to life  This is in the  setting of left lower extremity cellulitis  Antimicrobials:  Ceftriaxone from 9/11 through 9/12  IV vancomycin from 9/12 through today  Repeat blood cultures ordered  The presence of her bioprosthetic aortic valve raises concerns for endovascular infection  Leukocytosis  Reactive  Continue to trend  Mild transaminitis  Related to sepsis  Improving  History peripheral vascular disease  As per the primary team  Chronic stasis dermatitis  As per the primary team    Recommendations:     Discontinue ceftriaxone  Continue IV vancomycin  Continue to trend white blood cell count  Continue to follow-up on clearance cultures  Check TTE, the patient will need a LOYD given the presence of her bioprosthetic valve  Continue to monitor daily labs for antibiotic toxicity  Further recommendations will depend on the above work-up and clinical progress     The plan of care was discussed with the primary hospital team, Jacob Villarreal DO     Recommendations were also discussed with the patient; all questions were answered.     Thank you for this consultation. Please don't hesitate to call the ID team for questions or any acute changes in patient's clinical condition.    Please note that this report has been produced using speech recognition software and may contain errors related to that system including, but not limited to, errors in grammar, punctuation, and spelling, as well as words and phrases that possibly may have been recognized inappropriately.  If there are any questions or concerns, contact the dictating provider for clarification.    The 21st Century Cures Act makes medical notes like these available to patients in the interest of transparency. Please be advised this is a medical document. Medical documents are intended to carry relevant information, facts as evident, and the clinical opinion of the practitioner. The medical note is intended as peer to peer communication and may appear blunt or direct. It is written in medical  language and may contain abbreviations or verbiage that are unfamiliar.     MD SHALINI Kumar Infectious Disease. Tel: [phone]. Fax: [phone].     Arleth Weiss : 1955 MRN: [id number] CSN: [id number]

## 2024-09-12 NOTE — CONSULTS
[facility] Pharmacy Dosing Service  Warfarin (Coumadin) Initial Dosing    Arleth Weiss is a 69 year old patient for whom pharmacy has been consulted to dose warfarin (COUMADIN) for Prophylaxis of venous thrombosis by Dr. Jacob Villarreal.  Based on this indication, goal INR is 2.5-3.5.    Pertinent Patient Medical History:  AFib, Hx TAVR  Potential Drug Interactions:  Allopurinol, Clopidogrel, Levothyroxine    Latest INR:   Recent Labs     09/11/24 1955   INR 1.99*     Other Anticoagulants:  N/A  Home regimen (if applicable):  2.5mg MWF, 5mg ROW   Date/Time last dose was given (if applicable): 5mg 9/10    Based on above -  1.  For today, Give warfarin (COUMADIN) 2.5 mg at 2100 tonight    2.  PT/INR ordered daily while on warfarin    3.  Pharmacy will continue to follow.  We appreciate the opportunity to assist in the care of this patient.    Kathie Roberts, PharmD  9/11/2024  8:40 PM

## 2024-09-12 NOTE — PROGRESS NOTES
[facility]   part of [facility] Hospitalist Progress Note     Arleth Weiss Patient Status:  Observation    1955 MRN [id number]   Location [facility] 3NE-A Attending Jacob Villarreal,    Hosp Day # 0 PCP Viktoriya Garcias DO     Assessment & Plan:     68 y/o F w/ PMHx of HFrEF, aortic stenosis status post TAVR, CAD status post PCI in , PVD, HTN, HLD, A-fib on oral anticoagulation, hypothyroidism, SEBASTIEN who presents to the hospital w/ complaints of pain, swelling and redness to LLE. Diagnosed w/ Cellulitis     LLE Cellulitis  Plan:  - Continue CTX, D2  - Wound care consult     Mild Acute on Chronic HFrEF Exacerbation  Hx of Aortic Stenosis s/p TAVR  Discharge weight at last hospitalization: 97.8kg  Admission weight today 104.3kg  Last ECHO: stable from prior  UOP: 1L, new weight pending  Plan:  - Continue Lasix 60mg BID, continue tracey  - daily weights, strict I/os  - Pharmacy to dose Coumadin, goal INR 2.5-3.5     Hx of CAD s/p PCI  -s/p LHC in  w/ PCI to Lcx  PVD  HTN  HLD  Plan:  - Continue Plavix  - BB     SEBASTIEN  - Ferrous Sulfate     HypoT  - Synthroid     DVT Ppx: Coumadin  Code: Full  Calvin: No    Subjective:     Patient seen and examined this morning at bedside. Redness has improved, swelling improving as well. No other complaints.    Vital signs:  Temp:  [97.7 °F (36.5 °C)-98.1 °F (36.7 °C)] 97.7 °F (36.5 °C)  Pulse:  [] 88  Resp:  [12-27] 16  BP: (106-137)/(44-75) 116/60  SpO2:  [53 %-97 %] 91 %    Physical Exam:    General: No acute distress.   Respiratory: Clear to auscultation bilaterally. No wheezes.  Cardiovascular: S1, S2. Regular rate and rhythm  Abdomen: Soft, nontender, nondistended.  Positive bowel sounds. No rebound or guarding.  Extremities: No edema. LLE w/ redness decreased, pain improved, swelling improved as well.  Neuro:  Grossly non focal, no motor deficits.      Diagnostic Data:    Pertinent Labs:  K 3.3  Cr stable  INR 2.28  WBC 22.9 ->  15.4    Jacob Villarreal D.O.  Community Hospitalist

## 2024-09-12 NOTE — PLAN OF CARE
Bcx w/ MRSA, Will start Vanc    Jacob Villarreal D.O.  [facility]

## 2024-09-12 NOTE — CM/SW NOTE
09/12/24 1400   CM/SW Referral Data   Referral Source Social Work (self-referral)   Reason for Referral Discharge planning   Informant Patient;EMR;Clinical Staff Member   Patient Info   Patient's Current Mental Status at Time of Assessment Alert;Oriented   Patient's Home Environment House   Number of Levels in Home 1   Number of Stair in Home Ramp to enter home   Patient lives with Alone   Patient Status Prior to Admission   Independent with ADLs and Mobility No   Pt. requires assistance with Housework;Driving   Services in place prior to admission DME/Supplies at home;Home Health Care;[facility]   Home Health Provider Info [facility]   Type of DME/Supplies   (Crutches)   [facility] Provider Department on Aging  (14 hours per week)   Discharge Needs   Anticipated D/C needs Home health care     Sw met with pt to assess for dc needs.  Pt is 68 yo female,admitted due to L leg cellulitis.  Pt has + blood cultures / MRSA.    Pt resides alone and is current with [facility]. Will send resume of orders for [facility] RN and PT to them today.  Pt has caregiver services 14 hours per week through [facility] Dept on Aging.  Pt has groceries delivered.  She does still drive- drove self to the hospital.    Await ID plan.    Mariya Martinez LCSW  /Discharge Planner

## 2024-09-13 LAB
CREAT BLD-MCNC: 0.67 MG/DL
EGFRCR SERPLBLD CKD-EPI 2021: 93 ML/MIN/1.73M2 (ref 60–?)
INR BLD: 1.89 (ref 0.8–1.2)
PROTHROMBIN TIME: 21.9 SECONDS (ref 11.6–14.8)
VANCOMYCIN TROUGH SERPL-MCNC: 15.1 UG/ML (ref 10–20)

## 2024-09-13 PROCEDURE — 85610 PROTHROMBIN TIME: CPT | Performed by: INTERNAL MEDICINE

## 2024-09-13 PROCEDURE — 82565 ASSAY OF CREATININE: CPT | Performed by: INTERNAL MEDICINE

## 2024-09-13 PROCEDURE — 80202 ASSAY OF VANCOMYCIN: CPT | Performed by: INTERNAL MEDICINE

## 2024-09-13 RX ORDER — WARFARIN SODIUM 2.5 MG/1
7.5 TABLET ORAL
Status: COMPLETED | OUTPATIENT
Start: 2024-09-13 | End: 2024-09-13

## 2024-09-13 RX ORDER — FUROSEMIDE 10 MG/ML
40 INJECTION INTRAMUSCULAR; INTRAVENOUS
Status: DISCONTINUED | OUTPATIENT
Start: 2024-09-13 | End: 2024-09-17

## 2024-09-13 RX ORDER — VANCOMYCIN HYDROCHLORIDE
15 EVERY 12 HOURS
Status: DISCONTINUED | OUTPATIENT
Start: 2024-09-13 | End: 2024-09-15 | Stop reason: DRUGHIGH

## 2024-09-13 NOTE — PLAN OF CARE
Assumed care at 1930  A&Ox4  RA, 3L NC @ Noc refusing CPAP  Tele- Afib, HR controlled 70s- 90s  Cardiac electrolyte protocol  Cardiac diet; NPO @ midnight for LOYD in AM  Brief, gabino, purewick in place  C/o 5/10 LLE pain, given PRN pain medication per MAR  Up w/ standby and crutches  PIV L AC flushed and capped   IV Vanco  IV Lasix BID  Daily wt  Strict I & Os  Contact Isolation  Call light within reach, safety precautions maintained  POC ongoing    Problem: PAIN - ADULT  Goal: Verbalizes/displays adequate comfort level or patient's stated pain goal  Description: INTERVENTIONS:  - Encourage pt to monitor pain and request assistance  - Assess pain using appropriate pain scale  - Administer analgesics based on type and severity of pain and evaluate response  - Implement non-pharmacological measures as appropriate and evaluate response  - Consider cultural and social influences on pain and pain management  - Manage/alleviate anxiety  - Utilize distraction and/or relaxation techniques  - Monitor for opioid side effects  - Notify MD/LIP if interventions unsuccessful or patient reports new pain  - Anticipate increased pain with activity and pre-medicate as appropriate  Outcome: Progressing     Problem: SKIN/TISSUE INTEGRITY - ADULT  Goal: Skin integrity remains intact  Description: INTERVENTIONS  - Assess and document risk factors for pressure ulcer development  - Assess and document skin integrity  - Monitor for areas of redness and/or skin breakdown  - Initiate interventions, skin care algorithm/standards of care as needed  Outcome: Progressing

## 2024-09-13 NOTE — CDS QUERY
Dear Dr Anaya,    Documentation includes a diagnosis of Sepsis and bacteremia, please select the diagnosis that applies    [x   ] MRSA Bacteremia without Sepsis  [   ] MRSA Bacteremia with Sepsis was POA  (present on admission) -please document any additional clinical findings supportive of your diagnosis of sepsis _______________  [   ] Other (please specify): ________________________    Documentation from the Medical Record:       CLINICAL INDICATORS: 9/11 ED 69 Y/F- L leg cellulitis, red and hot    bilirubin- 1.5, WBC- 22.9  Creatinine- WNL  Platelets: WNL  Lactic Acid- WNL  [place] coma scale- 15  SpO2- 94% on 3L NC     BP- 130/64, P- 118, RR- 18, T- 97.8, SpO2- 93% RA, BMI- 31.90 (Triage vital signs)  Staphylococcus aureus, MRSA by PCR detected  Blood culture- Staphylococcus aureus     9/13 Hospitalist PN- MRSA bacteremia  LLE Cellulitis    9/12-9/13 ID PN- MRSA bacteremia/sepsis with threat to life    RISK FACTORS: positive blood cultures, left leg cellulitis, 2D echo,     TREATMENT: ID consult, d/c IV CTX, wound care consult, IVF, repeat blood cultures, lactic acid, IV vanco,       Use of terms such as suspected, possible, or probable (associated with a specific diagnosis that is being evaluated, monitored, or treated as if it exists) are acceptable and can be coded in the inpatient setting, when documented at the time of discharge.     Please add any additional documentation to your progress note and continue to document this through discharge.       For questions, please contact Clinical : Debbie Ramos RN, BSN. [phone] Thank you.                                THIS FORM IS A PERMANENT PART OF THE MEDICAL RECORD

## 2024-09-13 NOTE — PROGRESS NOTES
[facility]   part of [facility] Hospitalist Progress Note     Arleth Weiss Patient Status:  Observation    1955 MRN [id number]   Location [facility] 3NE-A Attending Jacob Villarreal,    Hosp Day # 1 PCP Viktoriya Garcias DO     Assessment & Plan:     70 y/o F w/ PMHx of HFrEF, aortic stenosis status post TAVR, CAD status post PCI in , PVD, HTN, HLD, A-fib on oral anticoagulation, hypothyroidism, SEBASTIEN who presents to the hospital w/ complaints of pain, swelling and redness to LLE. Diagnosed w/ Cellulitis.  Patient also found to have MRSA bacteremia.     MRSA bacteremia  LLE Cellulitis  Plan:  - Continue CTX (-), vancomycin (-)  - Repeat blood cultures pending  - ID on consult, case discussed  - 2D echo to further evaluate given bicuspid aortic valve  -LOYD likely today, cardiology consult  - Wound care consult     Mild Acute on Chronic HFrEF Exacerbation  Hx of Aortic Stenosis s/p TAVR  Discharge weight at last hospitalization: 97.8kg  Admission weight today 104.3kg  Last ECHO: stable from prior  UOP: 1L, new weight pending  Plan:  -Change Lasix to 40 mg IV twice daily, continue tracey; good response to Lasix, trend creatinine, may need to decrease if starts to uptrend  - daily weights, strict I/os  - Pharmacy to dose Coumadin, goal INR 2.5-3.5  - Cardiology on consult, appreciate recommendations     Hx of CAD s/p PCI  -s/p LHC in  w/ PCI to Lcx  PVD  HTN  HLD  Plan:  - Continue Plavix  - BB  - Cardiology consult, recommendations appreciated     SEBASTIEN  - Ferrous Sulfate     HypoT  - Synthroid     Sjogren's disease  - Supportive care  DVT Ppx: Coumadin  Code: Full  Calvin: No    Subjective:     Patient seen and examined this morning at bedside.  Has had good urine output over the past 24 hours.  Redness and swelling is improved.  She is awaiting her LOYD.    Vital signs:  Temp:  [97.7 °F (36.5 °C)-98.3 °F (36.8 °C)] 97.8 °F (36.6 °C)  Pulse:  [] 82  Resp:   [14-21] 15  BP: (108-144)/(56-97) 108/61  SpO2:  [96 %-98 %] 97 %    Physical Exam:    General: No acute distress.,  Appears extremely dry  Respiratory: Clear to auscultation bilaterally. No wheezes.  Cardiovascular: S1, S2. Regular rate and rhythm  Abdomen: Soft, nontender, nondistended.  Positive bowel sounds. No rebound or guarding.  Extremities: No edema. LLE w/ redness decreased, pain improved, swelling improved as well.  Neuro:  Grossly non focal, no motor deficits.        Overall plan of care was discussed with patient and her bedside RN.  All questions answered.     Tin Anaya DO  [facility]

## 2024-09-13 NOTE — PROGRESS NOTES
[facility] Pharmacy Dosing Service      Initial Pharmacokinetic Consult for Vancomycin Dosing     Arleth Weiss is a 69 year old female who is being initiated on vancomycin therapy for MRSA bacteremia.  Pharmacy has been asked to dose vancomycin by Dr. Villarreal.The initial treatment and monitoring approach will be steady state AUC strategy.        Weight and Temperature:    Wt Readings from Last 1 Encounters:   24 102.4 kg (225 lb 12 oz)        Temp Readings from Last 1 Encounters:   24 97.9 °F (36.6 °C) (Oral)      Labs:   Recent Labs   Lab 24  1317 24  0723   CREATSERUM 0.85 0.69      Estimated Creatinine Clearance: 78.2 mL/min (based on SCr of 0.69 mg/dL).     Recent Labs   Lab 24  1317 24  0723   WBC 22.9* 15.4*          The Pharmacokinetic Target is:       to 600 mg-h/L and trough <=15 mg/L    Renal Dosing Considerations:      None     Assessment/Plan:     Initial/Loading dose: Has received 2250 mg IV (25 mg/kg, capped at 2250 mg) x 1 loading dose.      Maintenance dose: Pharmacy will dose vancomycin at 1500 mg IV every 12 hours.    Monitorin) Plan for vancomycin peak and trough to be obtained at steady state.    2) Pharmacy will order SCr as clinically indicated to assess renal function.    3) Pharmacy will monitor for toxicity and efficacy, adjust vancomycin dose and/or frequency, and order vancomycin levels as appropriate per the Pharmacy and Therapeutics Committee approved protocol until discontinuation of the medication.       We appreciate the opportunity to assist in the care of this patient.     Sabrina Chase PharmD  2024  4:55 AM  [facility]: [phone]

## 2024-09-13 NOTE — CONSULTS
[facility] Pharmacy Dosing Service  Warfarin (Coumadin) Subsequent Dosing    Arleth Weiss is a 69 year old patient for whom pharmacy is dosing warfarin (Coumadin). Goal INR is 2.5-3.5    Recent Labs   Lab 09/11/24 1955 09/12/24  0723 09/13/24  0857   INR 1.99* 2.28* 1.89*       Consulted by:  Dr. Jacob Villarreal.   Indication: Prophylaxis of venous thrombosis   Potential Drug Interactions:  Allopurinol, clopidogrel, levothyroxine (all home meds)   Other Anticoagulants:  none  Home regimen (if applicable):  Warfarin 2.5 mg MWF and 5 mg TThSaSu     Inpatient Dosing History:    Date 9/11 9/12 9/13      INR 1.99 2.28 1.89      Coumadin dose 2.5 mg 6 mg                Based on above -  1.  For today, Give warfarin (COUMADIN) 7.5 mg at 2100 tonight  2   PT/INR ordered daily while on warfarin  3.  Pharmacy will continue to follow.  We appreciate the opportunity to assist in the care of this patient.    Fanny Carroll RPH  9/13/2024  9:32 AM

## 2024-09-13 NOTE — PROGRESS NOTES
Assumed care around 0700, A/Ox4, R/A, Afib on tele. Rates controlled. Cardiac diet. Briefed and chucked. Up with SBA and crutches. IV vanco for MSRA bacteremia. ID on consult. Cards consulted and will see tomorrow morning. Likely LOYD on Monday. Strict I/O's. IV lasix. Plan of care ongoing.

## 2024-09-13 NOTE — PROGRESS NOTES
Infectious Disease Progress Note      Date of admission: 9/11/2024  1:39 PM     Reason for consult: MRSA bacteremia, left lower extremity cellulitis    Subjective: The patient's leg is getting better.  Redness continues to improve.  No nausea or vomiting.  No diarrhea.    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  Currently on IV vancomycin with clinical improvement.  Clearance cultures with no growth to date.  TTE pending.    Medications:    vancomycin    warfarin    calcium carbonate    allopurinol    buPROPion SR    clopidogrel    cyproheptadine    digoxin    dilTIAZem ER    FLUoxetine    levothyroxine    metoprolol succinate ER    nortriptyline    rosuvastatin    spironolactone    melatonin    furosemide    ferrous sulfate    acetaminophen     Allergies:  Allergies   Allergen Reactions    Adhesive Tape HIVES     ALL ADHESIVES    Codeine HIVES    Doxycycline SWELLING    Hydrocodone UNKNOWN    Lisinopril TONGUE SWELLING    Morphine Sulfate HIVES    Opioid Analgesics UNKNOWN    Oxycontin ITCHING    Oxytocin HIVES    Vicodin [Hydrocodone-Acetaminophen] ITCHING    Skin Adhesives OTHER (SEE COMMENTS)       Physical Exam:  Vitals:    09/13/24 0754   BP: 108/61   Pulse: 82   Resp: 15   Temp: 97.8 °F (36.6 °C)     Vitals signs and nursing note reviewed.   Constitutional:       Appearance: Normal appearance.   HENT:      Head: Normocephalic and atraumatic.      Mouth: Mucous membranes are moist.   Neck:      Musculoskeletal: Neck supple.   Cardiovascular:      Rate and Rhythm: Normal rate.    Pulmonary:      Effort: Pulmonary effort is normal. No respiratory distress.   Musculoskeletal:      Right lower leg: No edema.      Left lower leg: No  edema.  Improving cellulitis  Skin:     General: Skin is warm and dry.   Neurological:      General: No focal deficit present.      Mental Status: Alert and oriented to person, place, and time.       Laboratory data:  I have reviewed all the lab results independently.  Lab Results   Component Value Date    CREATSERUM 0.67 09/13/2024    K 3.6 09/12/2024    INR 1.89 09/13/2024      Recent Labs   Lab 09/12/24  0723   RBC 3.77*   HGB 11.8*   HCT 35.5   MCV 94.2   MCH 31.3   MCHC 33.2   RDW 14.7   NEPRELIM 13.19*   WBC 15.4*   .0      Microbiology data:  Hospital Encounter on 09/11/24   1. Blood Culture     Status: Abnormal (Preliminary result)    Collection Time: 09/11/24  2:18 PM    Specimen: Blood,peripheral   Result Value Ref Range    Blood Culture Result Positive N/A    Blood Culture Result Staphylococcus aureus (A) N/A    Blood Smear Positive Blood Culture (A) N/A    Blood Smear Gram positive cocci in clusters (A) N/A      Impression:  Arleth Weiss is a 69 year old female with    MRSA bacteremia/sepsis with threat to life  This is in the setting of left lower extremity cellulitis  Antimicrobials:  Ceftriaxone from 9/11 to 9/12  IV vancomycin from 9/12 through today  Repeat blood cultures from 9/12 with no growth to date  The patient is at risk for endocarditis given her bioprosthetic valve, will need a LOYD  Leukocytosis  Reactive  Improving  Mild transaminitis  Related to sepsis  Improving  History peripheral vascular disease  As per the primary team  Chronic stasis dermatitis  As per the primary team    Recommendations:    Follow-up on TTE; however, the patient will need a LOYD given the presence of a bioprosthetic valve, can be done sometime next week  Continue IV vancomycin for now  Continue to trend white blood cell count and fever curve  The patient will need at least 4 weeks of antibiotics if her LOYD is negative; however, if the LOYD is positive, then we will have to discuss either removal of the valve  or 6 weeks of IV antibiotics followed by lifelong suppression  Continue to monitor daily labs for antibiotic toxicities  Further recommendations will depend on the above work-up and clinical progress     The plan of care was discussed with the primary hospital team, Jacob Villarreal DO     Recommendations were also discussed with the patient; all questions were answered.     Thank you for this consultation. Please don't hesitate to call the ID team for questions or any acute changes in patient's clinical condition.    Please note that this report has been produced using speech recognition software and may contain errors related to that system including, but not limited to, errors in grammar, punctuation, and spelling, as well as words and phrases that possibly may have been recognized inappropriately.  If there are any questions or concerns, contact the dictating provider for clarification.    The 21st Century Cures Act makes medical notes like these available to patients in the interest of transparency. Please be advised this is a medical document. Medical documents are intended to carry relevant information, facts as evident, and the clinical opinion of the practitioner. The medical note is intended as peer to peer communication and may appear blunt or direct. It is written in medical language and may contain abbreviations or verbiage that are unfamiliar.     Yvette Whitfield MD  [facility]. Tel: [phone]. Fax: [phone].     Arleth Weiss : 1955 MRN: [id number] [facility]: [id number]

## 2024-09-14 ENCOUNTER — APPOINTMENT (OUTPATIENT)
Dept: CV DIAGNOSTICS | Facility: HOSPITAL | Age: 69
End: 2024-09-14
Attending: INTERNAL MEDICINE
Payer: MEDICARE

## 2024-09-14 LAB
ANION GAP SERPL CALC-SCNC: 6 MMOL/L (ref 0–18)
BACTERIA BLD CULT: POSITIVE
BASOPHILS # BLD AUTO: 0.01 X10(3) UL (ref 0–0.2)
BASOPHILS NFR BLD AUTO: 0.1 %
BUN BLD-MCNC: 32 MG/DL (ref 9–23)
CALCIUM BLD-MCNC: 9.4 MG/DL (ref 8.7–10.4)
CHLORIDE SERPL-SCNC: 102 MMOL/L (ref 98–112)
CO2 SERPL-SCNC: 31 MMOL/L (ref 21–32)
CREAT BLD-MCNC: 0.75 MG/DL
CREAT BLD-MCNC: 0.75 MG/DL
EGFRCR SERPLBLD CKD-EPI 2021: 85 ML/MIN/1.73M2 (ref 60–?)
EGFRCR SERPLBLD CKD-EPI 2021: 85 ML/MIN/1.73M2 (ref 60–?)
EOSINOPHIL # BLD AUTO: 0.03 X10(3) UL (ref 0–0.7)
EOSINOPHIL NFR BLD AUTO: 0.4 %
ERYTHROCYTE [DISTWIDTH] IN BLOOD BY AUTOMATED COUNT: 14.3 %
GLUCOSE BLD-MCNC: 126 MG/DL (ref 70–99)
HCT VFR BLD AUTO: 35.8 %
HGB BLD-MCNC: 12.1 G/DL
IMM GRANULOCYTES # BLD AUTO: 0.03 X10(3) UL (ref 0–1)
IMM GRANULOCYTES NFR BLD: 0.4 %
INR BLD: 2.2 (ref 0.8–1.2)
LYMPHOCYTES # BLD AUTO: 1.28 X10(3) UL (ref 1–4)
LYMPHOCYTES NFR BLD AUTO: 15.4 %
MAGNESIUM SERPL-MCNC: 2.2 MG/DL (ref 1.6–2.6)
MCH RBC QN AUTO: 31.4 PG (ref 26–34)
MCHC RBC AUTO-ENTMCNC: 33.8 G/DL (ref 31–37)
MCV RBC AUTO: 93 FL
MONOCYTES # BLD AUTO: 0.71 X10(3) UL (ref 0.1–1)
MONOCYTES NFR BLD AUTO: 8.5 %
NEUTROPHILS # BLD AUTO: 6.25 X10 (3) UL (ref 1.5–7.7)
NEUTROPHILS # BLD AUTO: 6.25 X10(3) UL (ref 1.5–7.7)
NEUTROPHILS NFR BLD AUTO: 75.2 %
OSMOLALITY SERPL CALC.SUM OF ELEC: 296 MOSM/KG (ref 275–295)
PLATELET # BLD AUTO: 320 10(3)UL (ref 150–450)
POTASSIUM SERPL-SCNC: 3.8 MMOL/L (ref 3.5–5.1)
PROTHROMBIN TIME: 24.6 SECONDS (ref 11.6–14.8)
RBC # BLD AUTO: 3.85 X10(6)UL
SODIUM SERPL-SCNC: 139 MMOL/L (ref 136–145)
STAPHYLOCOCCUS AUREUS, MRSA BY PCR: DETECTED
VANCOMYCIN PEAK SERPL-MCNC: 45.6 UG/ML (ref 30–50)
VANCOMYCIN TROUGH SERPL-MCNC: 24.7 UG/ML (ref 10–20)
WBC # BLD AUTO: 8.3 X10(3) UL (ref 4–11)

## 2024-09-14 PROCEDURE — 80202 ASSAY OF VANCOMYCIN: CPT | Performed by: INTERNAL MEDICINE

## 2024-09-14 PROCEDURE — 85610 PROTHROMBIN TIME: CPT | Performed by: INTERNAL MEDICINE

## 2024-09-14 PROCEDURE — 83735 ASSAY OF MAGNESIUM: CPT | Performed by: STUDENT IN AN ORGANIZED HEALTH CARE EDUCATION/TRAINING PROGRAM

## 2024-09-14 PROCEDURE — 85025 COMPLETE CBC W/AUTO DIFF WBC: CPT | Performed by: STUDENT IN AN ORGANIZED HEALTH CARE EDUCATION/TRAINING PROGRAM

## 2024-09-14 PROCEDURE — 80048 BASIC METABOLIC PNL TOTAL CA: CPT | Performed by: STUDENT IN AN ORGANIZED HEALTH CARE EDUCATION/TRAINING PROGRAM

## 2024-09-14 PROCEDURE — 82565 ASSAY OF CREATININE: CPT | Performed by: INTERNAL MEDICINE

## 2024-09-14 PROCEDURE — 93306 TTE W/DOPPLER COMPLETE: CPT | Performed by: INTERNAL MEDICINE

## 2024-09-14 RX ORDER — WARFARIN SODIUM 2.5 MG/1
5 TABLET ORAL
Status: COMPLETED | OUTPATIENT
Start: 2024-09-14 | End: 2024-09-14

## 2024-09-14 RX ORDER — SODIUM CHLORIDE 9 MG/ML
INJECTION, SOLUTION INTRAVENOUS
Status: ACTIVE | OUTPATIENT
Start: 2024-09-16 | End: 2024-09-16

## 2024-09-14 RX ORDER — TRIAMCINOLONE ACETONIDE 1 MG/G
CREAM TOPICAL 2 TIMES DAILY PRN
Status: DISCONTINUED | OUTPATIENT
Start: 2024-09-14 | End: 2024-09-19

## 2024-09-14 NOTE — PROGRESS NOTES
[facility] Pharmacy Dosing Service  Warfarin (Coumadin) Subsequent Dosing      Arleth Weiss is a 69 year old patient for whom pharmacy is dosing warfarin. Goal INR is 2.5-3.5 per AC clinic.      Recent Labs   Lab 09/11/24 1955 09/12/24  0723 09/13/24  0857 09/14/24  0802   INR 1.99* 2.28* 1.89* 2.20*       Consulted by:  Dr. Villarreal.   Indication: Hx Afib, PE & recent TAVR  Potential Drug Interactions:  allopurinol, clopidogrel (home meds)  Other Anticoagulants:  none  Home regimen (if applicable):  2.5 mg MWF & 5 mg ROW      Inpatient Dosing History:    Date 9/11 9/12 9/13 9/14   INR 1.99 2.28 1.89  2.20   Warfarin  dose 2.5 mg 6 mg  7.5 mg                       A/P:  1.  The INR is subtherapeutic.    2.  Ordered warfarin 5mg for tonight at 2100.    3.  PT/INR ordered daily while on warfarin.      Pharmacy will continue to follow.  We appreciate the opportunity to assist in her care.  Espinoza Luis, PharmD  9/14/2024  2:51 PM

## 2024-09-14 NOTE — PROGRESS NOTES
Assumed care around 0700. A/Ox4, R/A. 3LNC@night in place of cpap. Afib on tele. Cardiac diet. IV lasix. Purewick in place and briefed. Up with crutches and 1 assist. PT consulted. LFA PIV SL. LOYD likely on Monday. NPO@ midnight Sunday. Kenalog cream bid prn at patient request. Contact for MRSA bacteremia. ID on. IV vanco dosing per pharmacy. Plan of care ongoing.

## 2024-09-14 NOTE — CONSULTS
[facility] CARDIOLOGY CONSULT      Arleth Weiss is a 69 year old female who I assessed as follows:  Chief Complaint   Patient presents with    Swelling Edema    Cellulitis          REASON FOR CONSULTATION:    bacteremia            ASSESSMENT/PLAN:     IMPRESSIONS:  LE cellulitis with MRSA bacteremia  S/p TAVR for aortic stenosis 1/21  PE, s/p IVC filter  HTN  HL, on statin  PAD  Acute chronic diastolic CHF  CAD s/p stent circ 2021  COPD  AF      PLAN:  Await echo. LOYD early next week  BP meds reviewed  Continue statin  Continue IV diuretic for CHF  Antibiotics per other services  Antiplatelet therapy for  CAD/PAD  Warfarin for AF/VTE    Patient Dr Case        --------------------------------------------------------------------------------------------------------------------------------    HPI:         History TAVR admitted with LE cellulitis and found to have MRSA bacteremia. Asked about LOYD.     LOYD 5/24 for mitral valve echodensity, felt not to be endocarditis.    Admission 8/24 for CHF. On IV diuretic.    On warfarin for AF/VTE.        No current outpatient medications on file.      Past Medical History:    Aortic stenosis    S/P TAVR    Arrhythmia    ASTHMA    Asthma ([facility])    Back problem    CANCER    thyroid    Cancer ([facility])    thyroid     CHF (congestive heart failure) ([facility])    CKD (chronic kidney disease) stage 2, GFR 60-89 ml/min    COPD (chronic obstructive pulmonary disease) ([facility])    DEPRESSION    Disorder of thyroid    Essential hypertension    Fibromyalgia    Gout    High blood pressure    High cholesterol    HYPERTENSION    HYPOTHYROIDISM    Muscle weakness    OBESITY    OSTEOARTHRITIS    Osteoarthritis    OTHER DISEASES    Fibromyalgia    OTHER DISEASES    Pulmonary emboli    OTHER DISEASES    Lymph edema    OTHER DISEASES    Pulmonary hypertension    Peripheral vascular disease ([facility])    Pulmonary embolism ([facility])    RA (rheumatoid arthritis) ([facility])    Shortness of breath    ON EXERTION     SLEEP APNEA    Sleep apnea    CPAP     Past Surgical History:   Procedure Laterality Date    Anesth,shoulder replacement Right 2014    hemiathroplasty    Back surgery      Back surgery  2004    complete c-3 -7 ectomy    Biopsy Left 2023    TEMPORAL ARTERY    Cath transcatheter aortic valve replacement      Colonoscopy N/A 05/10/2018    Procedure: COLONOSCOPY, POSSIBLE BIOPSY, POSSIBLE POLYPECTOMY 26148;  Surgeon: Kendell Valentin MD;  Location: [facility]    Colonoscopy      Craniotomy for lobotomy Left     D & c  2009    Dilation/curettage,diagnostic      Hip replacement surgery  2009    Rt hip    Knee replacement surgery      Both knees    Other surgical history      Thyroid removal    Other surgical history      LT foot spur removal    Thyroidectomy Bilateral     Total hip replacement      Total knee replacement       Family History   Problem Relation Age of Onset    Hypertension Father     Lipids Father     Diabetes Mother     Hypertension Mother     Lipids Mother     Cancer Brother     Hypertension Brother       Social History:  Social History     Socioeconomic History    Marital status: Single   Tobacco Use    Smoking status: Former     Current packs/day: 0.00     Average packs/day: 0.5 packs/day for 30.0 years (15.0 ttl pk-yrs)     Types: Cigarettes     Start date: 1961     Quit date: 1991     Years since quittin.6    Smokeless tobacco: Never   Vaping Use    Vaping status: Never Used   Substance and Sexual Activity    Alcohol use: No    Drug use: No   Other Topics Concern    Caffeine Concern No    Exercise Yes    Seat Belt Yes    Special Diet Yes     Comment: low sodium    Stress Concern Yes    Weight Concern No     Social Determinants of Health     Food Insecurity: No Food Insecurity (2024)    Food Insecurity     Food Insecurity: Never true   Transportation Needs: No Transportation Needs (2024)    Transportation Needs     Lack of  Transportation: No   Housing Stability: Low Risk  (9/11/2024)    Housing Stability     Housing Instability: No          Medications:  Current Facility-Administered Medications   Medication Dose Route Frequency    vancomycin (Vancocin) 1.5 g in sodium chloride 0.9% 250mL IVPB premix  15 mg/kg Intravenous Q12H    furosemide (Lasix) 10 mg/mL injection 40 mg  40 mg Intravenous BID (Diuretic)    calcium carbonate (Tums) chewable tab 1,000 mg  1,000 mg Oral TID PRN    allopurinol (Zyloprim) tab 100 mg  100 mg Oral Nightly    buPROPion SR (WELLBUTRIN SR) 12 hr tab 150 mg  150 mg Oral BID    clopidogrel (Plavix) tab 75 mg  75 mg Oral Nightly    cyproheptadine (Periactin) tab 4 mg  4 mg Oral Nightly    digoxin (Lanoxin) tab 125 mcg  125 mcg Oral Nightly    dilTIAZem ER (Dilacor XR) 24 hr cap 240 mg  240 mg Oral Nightly    FLUoxetine (PROzac) cap 20 mg  20 mg Oral BID    levothyroxine (Synthroid) tab 175 mcg  175 mcg Oral Before breakfast    metoprolol succinate ER (Toprol XL) 24 hr tab 25 mg  25 mg Oral Nightly    nortriptyline (Pamelor) cap 25 mg  25 mg Oral Nightly    rosuvastatin (Crestor) tab 5 mg  5 mg Oral QOD    spironolactone (Aldactone) tab 25 mg  25 mg Oral Daily    melatonin tab 3 mg  3 mg Oral Nightly PRN    ferrous sulfate DR tab 325 mg  325 mg Oral Nightly    acetaminophen (Tylenol) tab 650 mg  650 mg Oral Q6H PRN           Allergies  Allergies   Allergen Reactions    Adhesive Tape HIVES     ALL ADHESIVES    Codeine HIVES    Doxycycline SWELLING    Hydrocodone UNKNOWN    Lisinopril TONGUE SWELLING    Morphine Sulfate HIVES    Opioid Analgesics UNKNOWN    Oxycontin ITCHING    Oxytocin HIVES    Vicodin [Hydrocodone-Acetaminophen] ITCHING    Skin Adhesives OTHER (SEE COMMENTS)               REVIEW OF SYSTEMS:   GENERAL HEALTH: no fevers  SKIN: cellulitis  EARS: no recent changes in hearing  EYE: no recent vision changes  THROAT: denies sore throat  RESPIRATORY: denies cough or shortness of breath with  exertion  CARDIOVASCULAR: denies chest pain, syncope, or palpitations  GI: denies abdominal pain, nausea and vomiting  NEURO: denies headaches and focal neurologic symptoms  PSYCH: no recent depression  ENDOCRINE: no change in sleep pattern  HEME: no easy bruising  All other systems reviewed and negative.          EXAM:         TELE: AF          /53 (BP Location: Right arm)   Pulse 84   Temp 98 °F (36.7 °C) (Oral)   Resp 14   Ht 5' 9\" (1.753 m)   Wt 220 lb (99.8 kg)   LMP  (LMP Unknown)   SpO2 98%   BMI 32.49 kg/m²   Temp (24hrs), Av.9 °F (36.6 °C), Min:97.7 °F (36.5 °C), Max:98.1 °F (36.7 °C)    Wt Readings from Last 3 Encounters:   24 220 lb (99.8 kg)   24 215 lb 11.2 oz (97.8 kg)   24 203 lb (92.1 kg)         Intake/Output Summary (Last 24 hours) at 2024 0848  Last data filed at 2024 0500  Gross per 24 hour   Intake --   Output 2600 ml   Net -2600 ml         GENERAL: well developed, well nourished, in no apparent distress  SKIN: improving LE cellulitis  HEENT: atraumatic, normocephalic, throat without erythema  NECK: supple, no bruits  LUNGS: clear to auscultation  CARDIO:irregular rhythm with 2/6 systolic murmur  GI: soft, nontender  EXTREMITIES: trace edema  NEUROLOGY: alert  PSYCH: cooperative          LABORATORY DATA:               ECG:        ECHO :  1. Left ventricle: The cavity size was normal. Wall thickness was normal.      Systolic function was mildly reduced. The estimated ejection fraction was      45-50%. Beat to beat variation of ejection fraction due to AFIB.   2. Left atrium: The atrium was moderately dilated.   3. Aortic valve: Elizondo MINOR S3 23mm (TAVR) bioprosthesis was present.   4. Mitral valve: The annulus was markedly calcified. This is consistent with      at least mild mitral stenosis The mean diastolic gradient was 7mm Hg.   Impressions:  No significant changes from last study         LOYD :  Moderate to severe left atrial  enlargement.   Spontaneous echo contrast/\"smoke\" in the left atrium and left atrial appendage.   No left atrial or left atrial appendage thrombus.   Moderate right atrial enlargement.   Low normal left ventricular systolic function with ejection fraction visually estimated at 50%.   Thickened and calcified mitral valve leaflets with reduced excursion.   There is a mobile echogenicity noted in association with the mitral valve. This appears associated with the chordal apparatus attaching to the posterior leaflet of the mitral valve. This echogenicity appears to be right beneath the posterior leaflet of the mitral valve by the chordal attachment and measures approximately 0.5 x 0.7 cm in size. The differential diagnosis of this findings includes fibrinous stranding, thrombus, or vegetation.   Mild mitral regurgitation.  Well seated TAVR prosthesis.   Mild tricuspid regurgitation.   Pulmonary artery pressure estimated to fall within normal limits.   No color flow Doppler or saline echo contrast evidence of interatrial septal shunting.   Mild atheromatous plaquing noted in the aorta in the regions visualized.            Labs:  Recent Labs   Lab 09/11/24  1317 09/12/24  0723 09/12/24  1837 09/13/24  0857 09/14/24  0802   * 85  --   --  126*   BUN 26* 22  --   --  32*   CREATSERUM 0.85 0.69  --  0.67 0.75  0.75   CA 9.2 9.1  --   --  9.4   * 141  --   --  139   K 3.0* 3.3* 3.6  --  3.8   CL 99 101  --   --  102   CO2 31.0 30.0  --   --  31.0     No results for input(s): \"TROP\" in the last 168 hours.  Recent Labs   Lab 09/14/24  0802   RBC 3.85   HGB 12.1   HCT 35.8   MCV 93.0   MCH 31.4   MCHC 33.8   RDW 14.3   NEPRELIM 6.25   WBC 8.3   .0     No results for input(s): \"TROP\", \"TROPHS\", \"CK\" in the last 168 hours.    Imaging:  No results found.         Otoniel French MD

## 2024-09-14 NOTE — PROGRESS NOTES
[facility]   part of [facility] Infectious Disease Progress Note    Arleth Weiss Patient Status:  Inpatient    1955 MRN [id number]   Location [facility] 3NE-A Attending Jacob Villarreal DO   Hosp Day # 2 PCP Viktoriya Garcias DO     Subjective:  Chart reviewed, no acute events.  She is feeling better today.  Leg is less swollen and red.  Denies f/c.  No other complaints    Objective:    Allergies:  Allergies   Allergen Reactions    Adhesive Tape HIVES     ALL ADHESIVES    Codeine HIVES    Doxycycline SWELLING    Hydrocodone UNKNOWN    Lisinopril TONGUE SWELLING    Morphine Sulfate HIVES    Opioid Analgesics UNKNOWN    Oxycontin ITCHING    Oxytocin HIVES    Vicodin [Hydrocodone-Acetaminophen] ITCHING    Skin Adhesives OTHER (SEE COMMENTS)       Medications:    Current Facility-Administered Medications:     [START ON 2024] sodium chloride 0.9% infusion, , Intravenous, On Call    [START ON 2024] benzocaine (Hurricaine/Topex) 20 % mouth spray 1 spray, 1 spray, Mouth/Throat, See Admin Instructions    vancomycin (Vancocin) 1.5 g in sodium chloride 0.9% 250mL IVPB premix, 15 mg/kg, Intravenous, Q12H    furosemide (Lasix) 10 mg/mL injection 40 mg, 40 mg, Intravenous, BID (Diuretic)    calcium carbonate (Tums) chewable tab 1,000 mg, 1,000 mg, Oral, TID PRN    allopurinol (Zyloprim) tab 100 mg, 100 mg, Oral, Nightly    buPROPion SR (WELLBUTRIN SR) 12 hr tab 150 mg, 150 mg, Oral, BID    clopidogrel (Plavix) tab 75 mg, 75 mg, Oral, Nightly    cyproheptadine (Periactin) tab 4 mg, 4 mg, Oral, Nightly    digoxin (Lanoxin) tab 125 mcg, 125 mcg, Oral, Nightly    dilTIAZem ER (Dilacor XR) 24 hr cap 240 mg, 240 mg, Oral, Nightly    FLUoxetine (PROzac) cap 20 mg, 20 mg, Oral, BID    levothyroxine (Synthroid) tab 175 mcg, 175 mcg, Oral, Before breakfast    metoprolol succinate ER (Toprol XL) 24 hr tab 25 mg, 25 mg, Oral, Nightly    nortriptyline (Pamelor) cap 25 mg, 25 mg, Oral, Nightly    rosuvastatin  (Crestor) tab 5 mg, 5 mg, Oral, QOD    spironolactone (Aldactone) tab 25 mg, 25 mg, Oral, Daily    melatonin tab 3 mg, 3 mg, Oral, Nightly PRN    ferrous sulfate DR tab 325 mg, 325 mg, Oral, Nightly    acetaminophen (Tylenol) tab 650 mg, 650 mg, Oral, Q6H PRN    Physical Exam:  General: Alert, orientated x3.  Cooperative.  No apparent distress.  Vital Signs:  Blood pressure 128/60, pulse 61, temperature 98 °F (36.7 °C), temperature source Oral, resp. rate 20, height 5' 9\" (1.753 m), weight 220 lb (99.8 kg), SpO2 98%, not currently breastfeeding.   Temp (24hrs), Av.9 °F (36.6 °C), Min:97.7 °F (36.5 °C), Max:98 °F (36.7 °C)      HEENT: Exam is unremarkable.  Without scleral icterus.  Mucous membranes are moist. PERRLA.  Oropharynx is clear.  Lungs: Clear to auscultation bilaterally.  Cardiac: Regular rate   Abdomen:  Soft, non-distended, non-tender, with no rebound or guarding.    Extremities:  LLE with mild erythema and warmth, minimal swelling.  Erythema well within marked borders, no fluctuance.  +dry and flaky skin noted   Skin: Normal texture and turgor.  Neurologic: Cranial nerves are grossly intact.      Labs:  Lab Results   Component Value Date    WBC 8.3 2024    HGB 12.1 2024    HCT 35.8 2024    .0 2024    CREATSERUM 0.75 2024    CREATSERUM 0.75 2024    BUN 32 2024     2024    K 3.8 2024     2024    CO2 31.0 2024     2024    CA 9.4 2024    INR 2.20 2024    MG 2.2 2024       Radiology:      Assessment/Plan:    1.  MRSA bacteremia  -source SSTI on LLE  -blood cultures  with MRSA  -repeat blood cultures 24 NGTD  -with hx of bioprosthetic valve  -TTE done, results pending.  LOYD next week  -on IV Vancomycin     2. Leukocytosis  -normalized today    3. PVD  -per primary     DISPO: Continue IV Vancomycin, follow repeat blood cultures.  Follow up results of 2D echo and will need LOYD next  week as well.  Will continue to follow.     If you have any questions or concerns please call [facility] at [phone].     IRIS Chaudhary  9/14/2024  12:17 PM

## 2024-09-14 NOTE — PROGRESS NOTES
[facility]   part of [facility] Hospitalist Progress Note     Arleth Weiss Patient Status:  Observation    1955 MRN [id number]   Location [facility] 3NE-A Attending Jacob Villarreal,    Hosp Day # 2 PCP Viktoriya Garcias DO     Assessment & Plan:     70 y/o F w/ PMHx of HFrEF, aortic stenosis status post TAVR, CAD status post PCI in , PVD, HTN, HLD, A-fib on oral anticoagulation, hypothyroidism, SEBASTIEN who presents to the hospital w/ complaints of pain, swelling and redness to LLE. Diagnosed w/ Cellulitis.  Patient also found to have MRSA bacteremia.     MRSA bacteremia  LLE Cellulitis  Plan:  - Continue CTX (-), vancomycin (-)  - Repeat blood cultures pending  - ID on consult, case discussed  - 2D echo to further evaluate given prosthetic aortic valve  -LOYD likely monday, cardiology consult- discussed  - Wound care consult     Mild Acute on Chronic HFrEF Exacerbation  Hx of Aortic Stenosis s/p TAVR  Discharge weight at last hospitalization: 97.8kg  Last ECHO: stable from prior  Plan:  -Change Lasix to 40 mg IV twice daily, continue tracey; good response to Lasix, trend creatinine, may need to decrease if starts to uptrend  - daily weights, strict I/os  - Pharmacy to dose Coumadin, goal INR 2.5-3.5  - Cardiology on consult, discussed with Dr. French     Hx of CAD s/p PCI  -s/p LHC in  w/ PCI to Lcx  PVD  HTN  HLD  Plan:  - Continue Plavix  - BB  - Cardiology consult, recommendations appreciated     Hx of bicuspid AV  - s/p TAVR for Aortic stenosis     Hx of PE  - coumadin,   - s/p IVC    SEBASTIEN  - Ferrous Sulfate     HypoT  - Synthroid     Sjogren's disease  - Supportive care    DVT Ppx: Coumadin  Code: Full  Calvin: No    Subjective:     Patient seen and examined this morning at bedside.  Has had good urine output over the past 24 hours.  Redness and swelling is improved.  She is awaiting her LOYD.    Vital signs:  Temp:  [97.7 °F (36.5 °C)-98 °F (36.7 °C)]  98 °F (36.7 °C)  Pulse:  [] 61  Resp:  [9-33] 20  BP: ()/(52-83) 128/60  SpO2:  [75 %-100 %] 98 %    Physical Exam:    General: No acute distress.,  Appears extremely dry  Respiratory: Clear to auscultation bilaterally. No wheezes.  Cardiovascular: S1, S2. Regular rate and rhythm  Abdomen: Soft, nontender, nondistended.  Positive bowel sounds. No rebound or guarding.  Extremities: No edema. LLE w/ redness decreased, pain improved, swelling improved as well.  Neuro:  Grossly non focal, no motor deficits.        Overall plan of care was discussed with patient and her bedside RN.  All questions answered.     Tin Anaya DO  [facility]

## 2024-09-14 NOTE — PLAN OF CARE
Assumed care of 68 y/o female   A/Ox4, RA, 3L nc  AFib-Tele  Cardiac diet  [place], briefed  Tylenol Q6 for pain  Up w/crutches x 1 assist  LAC PIV-SL  Safety measures in place and all needs met          Problem: SKIN/TISSUE INTEGRITY - ADULT  Goal: Skin integrity remains intact  Description: INTERVENTIONS  - Assess and document risk factors for pressure ulcer development  - Assess and document skin integrity  - Monitor for areas of redness and/or skin breakdown  - Initiate interventions, skin care algorithm/standards of care as needed  Outcome: Progressing

## 2024-09-15 LAB
ANION GAP SERPL CALC-SCNC: 6 MMOL/L (ref 0–18)
BASOPHILS # BLD AUTO: 0.03 X10(3) UL (ref 0–0.2)
BASOPHILS NFR BLD AUTO: 0.3 %
BUN BLD-MCNC: 29 MG/DL (ref 9–23)
CALCIUM BLD-MCNC: 9.7 MG/DL (ref 8.7–10.4)
CHLORIDE SERPL-SCNC: 101 MMOL/L (ref 98–112)
CO2 SERPL-SCNC: 32 MMOL/L (ref 21–32)
CREAT BLD-MCNC: 0.71 MG/DL
CREAT BLD-MCNC: 0.71 MG/DL
EGFRCR SERPLBLD CKD-EPI 2021: 91 ML/MIN/1.73M2 (ref 60–?)
EGFRCR SERPLBLD CKD-EPI 2021: 91 ML/MIN/1.73M2 (ref 60–?)
EOSINOPHIL # BLD AUTO: 0.04 X10(3) UL (ref 0–0.7)
EOSINOPHIL NFR BLD AUTO: 0.4 %
ERYTHROCYTE [DISTWIDTH] IN BLOOD BY AUTOMATED COUNT: 14.2 %
GLUCOSE BLD-MCNC: 129 MG/DL (ref 70–99)
HCT VFR BLD AUTO: 41.2 %
HGB BLD-MCNC: 13.3 G/DL
IMM GRANULOCYTES # BLD AUTO: 0.06 X10(3) UL (ref 0–1)
IMM GRANULOCYTES NFR BLD: 0.6 %
INR BLD: 2.3 (ref 0.8–1.2)
LYMPHOCYTES # BLD AUTO: 1.38 X10(3) UL (ref 1–4)
LYMPHOCYTES NFR BLD AUTO: 14.6 %
MCH RBC QN AUTO: 31.1 PG (ref 26–34)
MCHC RBC AUTO-ENTMCNC: 32.3 G/DL (ref 31–37)
MCV RBC AUTO: 96.5 FL
MONOCYTES # BLD AUTO: 0.85 X10(3) UL (ref 0.1–1)
MONOCYTES NFR BLD AUTO: 9 %
NEUTROPHILS # BLD AUTO: 7.08 X10 (3) UL (ref 1.5–7.7)
NEUTROPHILS # BLD AUTO: 7.08 X10(3) UL (ref 1.5–7.7)
NEUTROPHILS NFR BLD AUTO: 75.1 %
OSMOLALITY SERPL CALC.SUM OF ELEC: 296 MOSM/KG (ref 275–295)
PLATELET # BLD AUTO: 357 10(3)UL (ref 150–450)
POTASSIUM SERPL-SCNC: 4.1 MMOL/L (ref 3.5–5.1)
PROTHROMBIN TIME: 25.6 SECONDS (ref 11.6–14.8)
RBC # BLD AUTO: 4.27 X10(6)UL
SODIUM SERPL-SCNC: 139 MMOL/L (ref 136–145)
VANCOMYCIN SERPL-MCNC: 18.7 UG/ML (ref ?–40)
WBC # BLD AUTO: 9.4 X10(3) UL (ref 4–11)

## 2024-09-15 PROCEDURE — 85025 COMPLETE CBC W/AUTO DIFF WBC: CPT | Performed by: STUDENT IN AN ORGANIZED HEALTH CARE EDUCATION/TRAINING PROGRAM

## 2024-09-15 PROCEDURE — 80202 ASSAY OF VANCOMYCIN: CPT | Performed by: INTERNAL MEDICINE

## 2024-09-15 PROCEDURE — 80048 BASIC METABOLIC PNL TOTAL CA: CPT | Performed by: STUDENT IN AN ORGANIZED HEALTH CARE EDUCATION/TRAINING PROGRAM

## 2024-09-15 PROCEDURE — 85610 PROTHROMBIN TIME: CPT | Performed by: INTERNAL MEDICINE

## 2024-09-15 PROCEDURE — 82565 ASSAY OF CREATININE: CPT | Performed by: INTERNAL MEDICINE

## 2024-09-15 RX ORDER — FAMOTIDINE 20 MG/1
20 TABLET, FILM COATED ORAL 2 TIMES DAILY
Status: DISCONTINUED | OUTPATIENT
Start: 2024-09-15 | End: 2024-09-19

## 2024-09-15 RX ORDER — VANCOMYCIN HYDROCHLORIDE
15 EVERY 24 HOURS
Status: DISCONTINUED | OUTPATIENT
Start: 2024-09-15 | End: 2024-09-19

## 2024-09-15 RX ORDER — WARFARIN SODIUM 2.5 MG/1
5 TABLET ORAL
Status: COMPLETED | OUTPATIENT
Start: 2024-09-15 | End: 2024-09-15

## 2024-09-15 RX ORDER — POTASSIUM CHLORIDE 1500 MG/1
40 TABLET, EXTENDED RELEASE ORAL ONCE
Status: COMPLETED | OUTPATIENT
Start: 2024-09-15 | End: 2024-09-15

## 2024-09-15 NOTE — PROGRESS NOTES
[facility] CARDIOLOGY Progress Note      Arleth Weiss is a 69 year old female who I assessed as follows:  Chief Complaint   Patient presents with    Swelling Edema    Cellulitis          REASON FOR CONSULTATION:    bacteremia            ASSESSMENT/PLAN:     IMPRESSIONS:  LE cellulitis with MRSA bacteremia  S/p TAVR for aortic stenosis 1/21  PE, s/p IVC filter  HTN  HL, on statin  PAD  Acute chronic diastolic CHF  CAD s/p stent circ 2021  COPD  AF      PLAN:  Echo reviewed. LOYD early this week, hopefully can get it set up for tomorrow  BP meds reviewed  Continue statin  Continue IV diuretic for CHF. Await today's labs  Antibiotics per other services  Antiplatelet therapy for  CAD/PAD  Warfarin for AF/VTE    Patient Dr Case        --------------------------------------------------------------------------------------------------------------------------------    HPI:         History TAVR admitted with LE cellulitis and found to have MRSA bacteremia. Asked about LOYD.     LOYD 5/24 for mitral valve echodensity, felt not to be endocarditis.    Admission 8/24 for CHF. On IV diuretic.    On warfarin for AF/VTE.        No current outpatient medications on file.      Past Medical History:    Aortic stenosis    S/P TAVR    Arrhythmia    ASTHMA    Asthma (HCC)    Back problem    CANCER    thyroid    Cancer (HCC)    thyroid     CHF (congestive heart failure) ([facility])    CKD (chronic kidney disease) stage 2, GFR 60-89 ml/min    COPD (chronic obstructive pulmonary disease) ([facility])    DEPRESSION    Disorder of thyroid    Essential hypertension    Fibromyalgia    Gout    High blood pressure    High cholesterol    HYPERTENSION    HYPOTHYROIDISM    Muscle weakness    OBESITY    OSTEOARTHRITIS    Osteoarthritis    OTHER DISEASES    Fibromyalgia    OTHER DISEASES    Pulmonary emboli    OTHER DISEASES    Lymph edema    OTHER DISEASES    Pulmonary hypertension    Peripheral vascular disease (HCC)    Pulmonary embolism (HCC)    RA  (rheumatoid arthritis) (HCC)    Shortness of breath    ON EXERTION    SLEEP APNEA    Sleep apnea    CPAP     Past Surgical History:   Procedure Laterality Date    Anesth,shoulder replacement Right 2014    hemiathroplasty    Back surgery      Back surgery      complete c-3 -7 ectomy    Biopsy Left 2023    TEMPORAL ARTERY    Cath transcatheter aortic valve replacement      Colonoscopy N/A 05/10/2018    Procedure: COLONOSCOPY, POSSIBLE BIOPSY, POSSIBLE POLYPECTOMY 60440;  Surgeon: Kendell Valentin MD;  Location: [facility]    Colonoscopy      Craniotomy for lobotomy Left     D & c  2009    Dilation/curettage,diagnostic      Hip replacement surgery  2009    Rt hip    Knee replacement surgery      Both knees    Other surgical history      Thyroid removal    Other surgical history      LT foot spur removal    Thyroidectomy Bilateral     Total hip replacement      Total knee replacement       Family History   Problem Relation Age of Onset    Hypertension Father     Lipids Father     Diabetes Mother     Hypertension Mother     Lipids Mother     Cancer Brother     Hypertension Brother       Social History:  Social History     Socioeconomic History    Marital status: Single   Tobacco Use    Smoking status: Former     Current packs/day: 0.00     Average packs/day: 0.5 packs/day for 30.0 years (15.0 ttl pk-yrs)     Types: Cigarettes     Start date: 1961     Quit date: 1991     Years since quittin.6    Smokeless tobacco: Never   Vaping Use    Vaping status: Never Used   Substance and Sexual Activity    Alcohol use: No    Drug use: No   Other Topics Concern    Caffeine Concern No    Exercise Yes    Seat Belt Yes    Special Diet Yes     Comment: low sodium    Stress Concern Yes    Weight Concern No     Social Determinants of Health     Food Insecurity: No Food Insecurity (2024)    Food Insecurity     Food Insecurity: Never true   Transportation Needs: No  Transportation Needs (9/11/2024)    Transportation Needs     Lack of Transportation: No   Housing Stability: Low Risk  (9/11/2024)    Housing Stability     Housing Instability: No          Medications:  Current Facility-Administered Medications   Medication Dose Route Frequency    [START ON 9/16/2024] sodium chloride 0.9% infusion   Intravenous On Call    [START ON 9/16/2024] benzocaine (Hurricaine/Topex) 20 % mouth spray 1 spray  1 spray Mouth/Throat See Admin Instructions    triamcinolone (Kenalog) 0.1 % cream   Topical BID PRN    [Held by provider] vancomycin (Vancocin) 1.5 g in sodium chloride 0.9% 250mL IVPB premix  15 mg/kg Intravenous Q12H    furosemide (Lasix) 10 mg/mL injection 40 mg  40 mg Intravenous BID (Diuretic)    calcium carbonate (Tums) chewable tab 1,000 mg  1,000 mg Oral TID PRN    allopurinol (Zyloprim) tab 100 mg  100 mg Oral Nightly    buPROPion SR (WELLBUTRIN SR) 12 hr tab 150 mg  150 mg Oral BID    clopidogrel (Plavix) tab 75 mg  75 mg Oral Nightly    cyproheptadine (Periactin) tab 4 mg  4 mg Oral Nightly    digoxin (Lanoxin) tab 125 mcg  125 mcg Oral Nightly    dilTIAZem ER (Dilacor XR) 24 hr cap 240 mg  240 mg Oral Nightly    FLUoxetine (PROzac) cap 20 mg  20 mg Oral BID    levothyroxine (Synthroid) tab 175 mcg  175 mcg Oral Before breakfast    metoprolol succinate ER (Toprol XL) 24 hr tab 25 mg  25 mg Oral Nightly    nortriptyline (Pamelor) cap 25 mg  25 mg Oral Nightly    rosuvastatin (Crestor) tab 5 mg  5 mg Oral QOD    spironolactone (Aldactone) tab 25 mg  25 mg Oral Daily    melatonin tab 3 mg  3 mg Oral Nightly PRN    ferrous sulfate DR tab 325 mg  325 mg Oral Nightly    acetaminophen (Tylenol) tab 650 mg  650 mg Oral Q6H PRN           Allergies  Allergies   Allergen Reactions    Adhesive Tape HIVES     ALL ADHESIVES    Codeine HIVES    Doxycycline SWELLING    Hydrocodone UNKNOWN    Lisinopril TONGUE SWELLING    Morphine Sulfate HIVES    Opioid Analgesics UNKNOWN    Oxycontin ITCHING     Oxytocin HIVES    Vicodin [Hydrocodone-Acetaminophen] ITCHING    Skin Adhesives OTHER (SEE COMMENTS)               REVIEW OF SYSTEMS:   GENERAL HEALTH: no fevers  SKIN: cellulitis  EARS: no recent changes in hearing  EYE: no recent vision changes  THROAT: denies sore throat  RESPIRATORY: denies cough or shortness of breath with exertion  CARDIOVASCULAR: denies chest pain, syncope, or palpitations  GI: denies abdominal pain, nausea and vomiting  NEURO: denies headaches and focal neurologic symptoms  PSYCH: no recent depression  ENDOCRINE: no change in sleep pattern  HEME: no easy bruising  All other systems reviewed and negative.          EXAM:         TELE: AF          /73 (BP Location: Right arm)   Pulse 53   Temp 97.4 °F (36.3 °C) (Oral)   Resp 17   Ht 5' 9\" (1.753 m)   Wt 220 lb (99.8 kg)   LMP  (LMP Unknown)   SpO2 99%   BMI 32.49 kg/m²   Temp (24hrs), Av.8 °F (36.6 °C), Min:97.4 °F (36.3 °C), Max:98 °F (36.7 °C)    Wt Readings from Last 3 Encounters:   24 220 lb (99.8 kg)   24 215 lb 11.2 oz (97.8 kg)   24 203 lb (92.1 kg)         Intake/Output Summary (Last 24 hours) at 9/15/2024 0651  Last data filed at 2024 1930  Gross per 24 hour   Intake --   Output 1200 ml   Net -1200 ml         GENERAL: well developed, well nourished, in no apparent distress  SKIN: improving LE cellulitis  HEENT: atraumatic, normocephalic, throat without erythema  NECK: supple, no bruits  LUNGS: clear to auscultation  CARDIO:irregular rhythm with 2/6 systolic murmur  GI: soft, nontender  EXTREMITIES: trace edema  NEUROLOGY: alert  PSYCH: cooperative          LABORATORY DATA:               ECG:        ECHO 24:  1. Left ventricle: The cavity size was normal. Wall thickness was normal.      Systolic function was at the lower limits of normal. The estimated      ejection fraction was 50%, by visual assessment. Unable to assess LV      diastolic function due to heart rhythm.   2. Right  ventricle: The cavity size was normal. Systolic function was      normal.   3. Left atrium: The left atrial volume was markedly increased.   4. Right atrium: The atrium was mildly dilated.   5. Aortic valve: Elizondo MINOR S3 23mm (TAVR) bioprosthesis was present.      There was no significant regurgitation. The peak systolic velocity was      3.19m/sec. The mean systolic gradient was 19mm Hg. The valve area (VTI)      was 1.46cm^2. The valve area (VTI) index was 0.68cm^2/m^2.   6. Pulmonary arteries: Systolic pressure was mildly increased, in the range      of 35mm Hg to 40mm Hg.   Impressions:  This study is compared with previous dated 8/15/2024: Ejection   fraction was 45-50%.         LOYD 5/24:  Moderate to severe left atrial enlargement.   Spontaneous echo contrast/\"smoke\" in the left atrium and left atrial appendage.   No left atrial or left atrial appendage thrombus.   Moderate right atrial enlargement.   Low normal left ventricular systolic function with ejection fraction visually estimated at 50%.   Thickened and calcified mitral valve leaflets with reduced excursion.   There is a mobile echogenicity noted in association with the mitral valve. This appears associated with the chordal apparatus attaching to the posterior leaflet of the mitral valve. This echogenicity appears to be right beneath the posterior leaflet of the mitral valve by the chordal attachment and measures approximately 0.5 x 0.7 cm in size. The differential diagnosis of this findings includes fibrinous stranding, thrombus, or vegetation.   Mild mitral regurgitation.  Well seated TAVR prosthesis.   Mild tricuspid regurgitation.   Pulmonary artery pressure estimated to fall within normal limits.   No color flow Doppler or saline echo contrast evidence of interatrial septal shunting.   Mild atheromatous plaquing noted in the aorta in the regions visualized.            Labs:  Recent Labs   Lab 09/11/24  1317 09/12/24  0723 09/12/24  1837  09/13/24  0857 09/14/24  0802   * 85  --   --  126*   BUN 26* 22  --   --  32*   CREATSERUM 0.85 0.69  --  0.67 0.75  0.75   CA 9.2 9.1  --   --  9.4   * 141  --   --  139   K 3.0* 3.3* 3.6  --  3.8   CL 99 101  --   --  102   CO2 31.0 30.0  --   --  31.0     No results for input(s): \"TROP\" in the last 168 hours.  Recent Labs   Lab 09/14/24  0802   RBC 3.85   HGB 12.1   HCT 35.8   MCV 93.0   MCH 31.4   MCHC 33.8   RDW 14.3   NEPRELIM 6.25   WBC 8.3   .0     No results for input(s): \"TROP\", \"TROPHS\", \"CK\" in the last 168 hours.  Lab Results   Component Value Date    PT 23.7 (H) 10/09/2011    INR 2.20 (H) 09/14/2024    INR 1.89 (H) 09/13/2024    INR 2.28 (H) 09/12/2024      Imaging:  No results found.         Otoniel French MD

## 2024-09-15 NOTE — PROGRESS NOTES
[facility]   part of [facility] Infectious Disease Progress Note    Arleth Weiss Patient Status:  Inpatient    1955 MRN [id number]   Location [facility] 3NE-A Attending Jacob Villarreal DO   Hosp Day # 3 PCP Viktoriya Garcias DO     Subjective:  Chart reviewed, no acute events.  Pt seen bedside.  Feeling ok overall.  Leg is much improved.  No complaints.     Objective:    Allergies:  Allergies   Allergen Reactions    Adhesive Tape HIVES     ALL ADHESIVES    Codeine HIVES    Doxycycline SWELLING    Hydrocodone UNKNOWN    Lisinopril TONGUE SWELLING    Morphine Sulfate HIVES    Opioid Analgesics UNKNOWN    Oxycontin ITCHING    Oxytocin HIVES    Vicodin [Hydrocodone-Acetaminophen] ITCHING    Skin Adhesives OTHER (SEE COMMENTS)       Medications:    Current Facility-Administered Medications:     warfarin (Coumadin) tab 5 mg, 5 mg, Oral, Once at night    vancomycin (Vancocin) 1.25 g in sodium chloride 0.9% 250mL IVPB premix, 15 mg/kg (Adjusted), Intravenous, Q24H    [START ON 2024] sodium chloride 0.9% infusion, , Intravenous, On Call    [START ON 2024] benzocaine (Hurricaine/Topex) 20 % mouth spray 1 spray, 1 spray, Mouth/Throat, See Admin Instructions    triamcinolone (Kenalog) 0.1 % cream, , Topical, BID PRN    furosemide (Lasix) 10 mg/mL injection 40 mg, 40 mg, Intravenous, BID (Diuretic)    calcium carbonate (Tums) chewable tab 1,000 mg, 1,000 mg, Oral, TID PRN    allopurinol (Zyloprim) tab 100 mg, 100 mg, Oral, Nightly    buPROPion SR (WELLBUTRIN SR) 12 hr tab 150 mg, 150 mg, Oral, BID    clopidogrel (Plavix) tab 75 mg, 75 mg, Oral, Nightly    cyproheptadine (Periactin) tab 4 mg, 4 mg, Oral, Nightly    digoxin (Lanoxin) tab 125 mcg, 125 mcg, Oral, Nightly    dilTIAZem ER (Dilacor XR) 24 hr cap 240 mg, 240 mg, Oral, Nightly    FLUoxetine (PROzac) cap 20 mg, 20 mg, Oral, BID    levothyroxine (Synthroid) tab 175 mcg, 175 mcg, Oral, Before breakfast    metoprolol succinate ER (Toprol  XL) 24 hr tab 25 mg, 25 mg, Oral, Nightly    nortriptyline (Pamelor) cap 25 mg, 25 mg, Oral, Nightly    rosuvastatin (Crestor) tab 5 mg, 5 mg, Oral, QOD    spironolactone (Aldactone) tab 25 mg, 25 mg, Oral, Daily    melatonin tab 3 mg, 3 mg, Oral, Nightly PRN    ferrous sulfate DR tab 325 mg, 325 mg, Oral, Nightly    acetaminophen (Tylenol) tab 650 mg, 650 mg, Oral, Q6H PRN    Physical Exam:  General: Alert, orientated x3.  Cooperative.  No apparent distress.  Vital Signs:  Blood pressure 132/76, pulse 102, temperature 97.8 °F (36.6 °C), temperature source Oral, resp. rate 11, height 5' 9\" (1.753 m), weight 220 lb (99.8 kg), SpO2 98%, not currently breastfeeding.   Temp (24hrs), Av.8 °F (36.6 °C), Min:97.4 °F (36.3 °C), Max:98 °F (36.7 °C)      HEENT: Exam is unremarkable.  Without scleral icterus.  Mucous membranes are moist. PERRLA.  Oropharynx is clear.  Lungs: Clear to auscultation bilaterally.  Cardiac: Regular rate   Abdomen:  Soft, non-distended, non-tender, with no rebound or guarding.    Extremities:  LLE with mild erythema and minimal warmth, no swelling.   Erythema well within marked borders, no fluctuance.  +dry and flaky skin noted   Skin: Normal texture and turgor.  Neurologic: Cranial nerves are grossly intact.      Labs:  Lab Results   Component Value Date    WBC 9.4 09/15/2024    HGB 13.3 09/15/2024    HCT 41.2 09/15/2024    .0 09/15/2024    CREATSERUM 0.71 09/15/2024    CREATSERUM 0.71 09/15/2024    BUN 29 09/15/2024     09/15/2024    K 4.1 09/15/2024     09/15/2024    CO2 32.0 09/15/2024     09/15/2024    CA 9.7 09/15/2024    INR 2.30 09/15/2024       Radiology:      Assessment/Plan:    1.  MRSA bacteremia  -source SSTI on LLE  -blood cultures  with MRSA  -repeat blood cultures 24 NGTD  -with hx of bioprosthetic valve  -TTE done, no obvious vegetations.  LOYD this coming week, hopefully tomorrow   -on IV Vancomycin     2. Leukocytosis  -normalized   -afebrile      3. PVD  -per primary     DISPO: Continue IV Vancomycin, follow repeat blood cultures.  Follow results of LOYD.  Pt aware she will need at least 4 weeks of IV rx, possible more pending LOYD results.   PICC to be placed tomorrow if repeat blood cultures remain negative. Will continue to follow.     If you have any questions or concerns please call [facility] at [phone].     IRIS Chaudhary  9/14/2024  12:17 PM

## 2024-09-15 NOTE — PROGRESS NOTES
[facility] Pharmacy Dosing Service  Warfarin (Coumadin) Subsequent Dosing      Arleth Weiss is a 69 year old patient for whom pharmacy is dosing warfarin. Goal INR is 2.5-3.5 per AC clinic.     Recent Labs   Lab 09/11/24 1955 09/12/24  0723 09/13/24  0857 09/14/24  0802 09/15/24  0801   INR 1.99* 2.28* 1.89* 2.20* 2.30*       Consulted by:  Dr. Villarreal.   Indication: Hx Afib, PE & recent TAVR  Potential Drug Interactions:  allopurinol, clopidogrel (home meds)  Other Anticoagulants:  none  Home regimen (if applicable):  2.5 mg MWF & 5 mg ROW        Inpatient Dosing History:     Date 9/11 9/12 9/13  9/14 9/15   INR 1.99 2.28 1.89  2.20 2.30   Warfarin  dose 2.5 mg 6 mg  7.5 mg 5mg                                                                                Based on above -  1.  The INR is slightly subtherapeutic.  2.  Ordered warfarin 5mg for tonight at 2100.    2   PT/INR ordered daily while on warfarin  3.  Pharmacy will continue to follow.  We appreciate the opportunity to assist in the care of this patient.      Espinoza Luis, PharmD  9/15/2024  8:51 AM

## 2024-09-15 NOTE — PLAN OF CARE
Assumed care at 1930.   A&Ox4, ra, vss.   Vitals signs are stable. Afib on tele.   Scheduled medications given.   Kenalog cream applied to ble.  Plan for LOYD on Monday, patient to be maintained on npo at midnight before procedure.   Purwick in place.   Patient refuses cpap at night, but agreeable to wear 3l of oxygen during sleep.   Updated on plan of care.   Safety and staff rounding maintained.  Isolation maintained.       Problem: Patient/Family Goals  Goal: Patient/Family Long Term Goal  Description: Patient's Long Term Goal: Discharge planning    Interventions:  - Follow up with discharge recommendations  - See additional Care Plan goals for specific interventions  Outcome: Progressing  Goal: Patient/Family Short Term Goal  Description: Patient's Short Term Goal: Free from infection.     Interventions:   - Maintain stable vitals   - See additional Care Plan goals for specific interventions  Outcome: Progressing     Problem: PAIN - ADULT  Goal: Verbalizes/displays adequate comfort level or patient's stated pain goal  Description: INTERVENTIONS:  - Encourage pt to monitor pain and request assistance  - Assess pain using appropriate pain scale  - Administer analgesics based on type and severity of pain and evaluate response  - Implement non-pharmacological measures as appropriate and evaluate response  - Consider cultural and social influences on pain and pain management  - Manage/alleviate anxiety  - Utilize distraction and/or relaxation techniques  - Monitor for opioid side effects  - Notify MD/LIP if interventions unsuccessful or patient reports new pain  - Anticipate increased pain with activity and pre-medicate as appropriate  Outcome: Progressing     Problem: SKIN/TISSUE INTEGRITY - ADULT  Goal: Skin integrity remains intact  Description: INTERVENTIONS  - Assess and document risk factors for pressure ulcer development  - Assess and document skin integrity  - Monitor for areas of redness and/or skin  breakdown  - Initiate interventions, skin care algorithm/standards of care as needed  Outcome: Progressing

## 2024-09-15 NOTE — CM/SW NOTE
CM informed by PT patient is at baseline functional status.  Patient has [facility] health care and [facility] Caregiver services (14 hrs per week) prior to admission per SW progress note on 9/12.  Discharge needs pending ID plan at this time, will endorse to CM/SW for follow up.    Domenica Vidales, RN Case Manager [id number]

## 2024-09-15 NOTE — PROGRESS NOTES
[facility]   part of [facility] Hospitalist Progress Note     Arleth Weiss Patient Status:  Observation    1955 MRN [id number]   Location [facility] 3NE-A Attending Jacob Villarreal,    Hosp Day # 3 PCP Viktoriya Garcias DO     Assessment & Plan:     68 y/o F w/ PMHx of HFrEF, aortic stenosis status post TAVR, CAD status post PCI in , PVD, HTN, HLD, A-fib on oral anticoagulation, hypothyroidism, SEBASTIEN who presents to the hospital w/ complaints of pain, swelling and redness to LLE. Diagnosed w/ Cellulitis.  Patient also found to have MRSA bacteremia.     MRSA bacteremia  LLE Cellulitis  Plan:  - Continue CTX (-), vancomycin (-)  - Repeat blood cultures pending  - ID on consult, case discussed  - 2D echo to further evaluate given prosthetic aortic valve  -LOYD likely monday, cardiology consult- discussed  - Wound care consult     Mild Acute on Chronic HFrEF Exacerbation  Hx of Aortic Stenosis s/p TAVR  Discharge weight at last hospitalization: 97.8kg  Last ECHO: stable from prior  Plan:  -Change Lasix to 40 mg IV twice daily, continue tracey; good response to Lasix, trend creatinine, may need to decrease if starts to uptrend  - daily weights, strict I/os  - Pharmacy to dose Coumadin, goal INR 2.5-3.5  - Cardiology on consult, discussed with Dr. French     Hx of CAD s/p PCI  -s/p LHC in  w/ PCI to Lcx  PVD  HTN  HLD  Plan:  - Continue Plavix  - BB  - Cardiology consult, recommendations appreciated     Hx of bicuspid AV  - s/p TAVR for Aortic stenosis     Hx of PE  - coumadin,   - s/p IVC    SEBASTIEN  - Ferrous Sulfate     HypoT  - Synthroid     Sjogren's disease  - Supportive care    GERD  - famotidine    DVT Ppx: Coumadin  Code: Full  Calvin: No    Subjective:     Patient seen and examined this morning at bedside.  Has had good urine output over the past 24 hours.  Redness and swelling is improved.  She is awaiting her LOYD.  Notes some acid reflux.    Vital  signs:  Temp:  [97.4 °F (36.3 °C)-97.9 °F (36.6 °C)] 97.5 °F (36.4 °C)  Pulse:  [] 94  Resp:  [11-22] 19  BP: (102-138)/(52-83) 138/83  SpO2:  [96 %-99 %] 99 %    Physical Exam:    General: No acute distress.,  Appears extremely dry  Respiratory: Clear to auscultation bilaterally. No wheezes.  Cardiovascular: S1, S2. Regular rate and rhythm  Abdomen: Soft, nontender, nondistended.  Positive bowel sounds. No rebound or guarding.  Extremities: No edema. LLE w/ redness decreased, pain improved, swelling improved as well.  Neuro:  Grossly non focal, no motor deficits.        Overall plan of care was discussed with patient and her bedside RN.  All questions answered.     Tin Anaya DO  Hospitalist  Critical access hospital and [facility]

## 2024-09-15 NOTE — PHYSICAL THERAPY NOTE
PT order received for \"mobility recommendations\" and chart reviewed. Here for cellulitis with chronic issue of poor skin integrity. Chronic use of B lofstrand crutches and stated that she is functional of it's use. Politely refused PT eval at this time and stated that she is within her baseline level of functional skills susan now that the swelling has improved. Also refused to mobilized due to incontinence but is aware of the importance of mobilities while in hosp. Stated that she will get OOB with  nursing staff later. Pt clarified that she has no immediate needs for skilled PT and agreed to be d/c from skilled PT intervention. D/c from skilled PT. Please re-order if needed.

## 2024-09-15 NOTE — PROGRESS NOTES
Pharmacy Dosing Service  Follow-up Pharmacokinetic Consult for Vancomycin Dosing          Arleth Weiss is a 69 year old female who is being treated for MRSA bacteremia.       Vancomycin (9/12-      Est CrCl: >50 mL/min      Cultures:  9/11 BCx x2 - MRSA  9/12 BCx x2 - ngtd      Pharmacokinetic target: Trough 10-15 mg/L    Vancomycin levels:  45.6ug/mL - 9/14 @ 0802 -- disregard - was collected while dose was infusing...    24.7ug/mL - 9/14 @ 2045 (~15 hr post dose) -- was charted at 0620, but completed infusing at 0900 d/t loss of IV access per RN                                                  > 11.25hr between levels -- half-life ~15hr  18.7ug/mL - 9/15 @ 0801        A/P:  1.  Vancomycin administration off time yesterday & level was collected inappropriately yesterday morning (while vanco infusing).  Decided to get a level at 2100 yesterday to see how patient is clearing vancomycin.  That level came back high, so another level was collected this morning at 0800.  I used the two levels to calculate a half-life of ~15hr.  The half-life predicts the level will be about ~12.5ug/mL at 1800 tonight.    2.  Will change vancomycin from 1500mg q12hr to 1250mg q24hr based on calculated PK vancomycin & estimated renal function.    3.  Plan vancomycin peak and trough  at new steady state (3-4 doses) .      4.  Pharmacy will need SCr daily while on vancomycin to assess renal function.      Pharmacy will continue to follow her and adjust dosing as appropriate.        Espinoza Luis, PharmD, BCPS  9/15/2024  10:39 AM  [facility] Extension: [phone]

## 2024-09-16 ENCOUNTER — APPOINTMENT (OUTPATIENT)
Dept: CV DIAGNOSTICS | Facility: HOSPITAL | Age: 69
End: 2024-09-16
Attending: INTERNAL MEDICINE
Payer: MEDICARE

## 2024-09-16 ENCOUNTER — APPOINTMENT (OUTPATIENT)
Facility: HOSPITAL | Age: 69
End: 2024-09-16
Attending: INTERNAL MEDICINE
Payer: MEDICARE

## 2024-09-16 ENCOUNTER — APPOINTMENT (OUTPATIENT)
Dept: GENERAL RADIOLOGY | Facility: HOSPITAL | Age: 69
End: 2024-09-16
Attending: INTERNAL MEDICINE
Payer: MEDICARE

## 2024-09-16 ENCOUNTER — APPOINTMENT (OUTPATIENT)
Dept: CV DIAGNOSTICS | Facility: HOSPITAL | Age: 69
End: 2024-09-16
Payer: MEDICARE

## 2024-09-16 ENCOUNTER — APPOINTMENT (OUTPATIENT)
Dept: INTERVENTIONAL RADIOLOGY/VASCULAR | Facility: HOSPITAL | Age: 69
End: 2024-09-16
Payer: MEDICARE

## 2024-09-16 LAB
ANION GAP SERPL CALC-SCNC: 7 MMOL/L (ref 0–18)
BASOPHILS # BLD AUTO: 0.04 X10(3) UL (ref 0–0.2)
BASOPHILS NFR BLD AUTO: 0.4 %
BUN BLD-MCNC: 30 MG/DL (ref 9–23)
CALCIUM BLD-MCNC: 9.8 MG/DL (ref 8.7–10.4)
CHLORIDE SERPL-SCNC: 102 MMOL/L (ref 98–112)
CO2 SERPL-SCNC: 30 MMOL/L (ref 21–32)
CREAT BLD-MCNC: 0.72 MG/DL
CREAT BLD-MCNC: 0.72 MG/DL
EGFRCR SERPLBLD CKD-EPI 2021: 89 ML/MIN/1.73M2 (ref 60–?)
EGFRCR SERPLBLD CKD-EPI 2021: 89 ML/MIN/1.73M2 (ref 60–?)
EOSINOPHIL # BLD AUTO: 0.05 X10(3) UL (ref 0–0.7)
EOSINOPHIL NFR BLD AUTO: 0.5 %
ERYTHROCYTE [DISTWIDTH] IN BLOOD BY AUTOMATED COUNT: 13.9 %
GLUCOSE BLD-MCNC: 134 MG/DL (ref 70–99)
HCT VFR BLD AUTO: 41.6 %
HGB BLD-MCNC: 13.5 G/DL
IMM GRANULOCYTES # BLD AUTO: 0.16 X10(3) UL (ref 0–1)
IMM GRANULOCYTES NFR BLD: 1.5 %
INR BLD: 2.3 (ref 0.8–1.2)
LYMPHOCYTES # BLD AUTO: 1.61 X10(3) UL (ref 1–4)
LYMPHOCYTES NFR BLD AUTO: 14.8 %
MAGNESIUM SERPL-MCNC: 2.3 MG/DL (ref 1.6–2.6)
MCH RBC QN AUTO: 30.8 PG (ref 26–34)
MCHC RBC AUTO-ENTMCNC: 32.5 G/DL (ref 31–37)
MCV RBC AUTO: 95 FL
MONOCYTES # BLD AUTO: 0.92 X10(3) UL (ref 0.1–1)
MONOCYTES NFR BLD AUTO: 8.5 %
NEUTROPHILS # BLD AUTO: 8.09 X10 (3) UL (ref 1.5–7.7)
NEUTROPHILS # BLD AUTO: 8.09 X10(3) UL (ref 1.5–7.7)
NEUTROPHILS NFR BLD AUTO: 74.3 %
OSMOLALITY SERPL CALC.SUM OF ELEC: 296 MOSM/KG (ref 275–295)
PLATELET # BLD AUTO: 381 10(3)UL (ref 150–450)
POTASSIUM SERPL-SCNC: 4 MMOL/L (ref 3.5–5.1)
POTASSIUM SERPL-SCNC: 4 MMOL/L (ref 3.5–5.1)
PROTHROMBIN TIME: 25.5 SECONDS (ref 11.6–14.8)
RBC # BLD AUTO: 4.38 X10(6)UL
SODIUM SERPL-SCNC: 139 MMOL/L (ref 136–145)
WBC # BLD AUTO: 10.9 X10(3) UL (ref 4–11)

## 2024-09-16 PROCEDURE — 85610 PROTHROMBIN TIME: CPT | Performed by: INTERNAL MEDICINE

## 2024-09-16 PROCEDURE — 85025 COMPLETE CBC W/AUTO DIFF WBC: CPT | Performed by: STUDENT IN AN ORGANIZED HEALTH CARE EDUCATION/TRAINING PROGRAM

## 2024-09-16 PROCEDURE — 82565 ASSAY OF CREATININE: CPT | Performed by: INTERNAL MEDICINE

## 2024-09-16 PROCEDURE — 99152 MOD SED SAME PHYS/QHP 5/>YRS: CPT | Performed by: INTERNAL MEDICINE

## 2024-09-16 PROCEDURE — 80048 BASIC METABOLIC PNL TOTAL CA: CPT | Performed by: STUDENT IN AN ORGANIZED HEALTH CARE EDUCATION/TRAINING PROGRAM

## 2024-09-16 PROCEDURE — 93320 DOPPLER ECHO COMPLETE: CPT | Performed by: INTERNAL MEDICINE

## 2024-09-16 PROCEDURE — 02HV33Z INSERTION OF INFUSION DEVICE INTO SUPERIOR VENA CAVA, PERCUTANEOUS APPROACH: ICD-10-PCS | Performed by: STUDENT IN AN ORGANIZED HEALTH CARE EDUCATION/TRAINING PROGRAM

## 2024-09-16 PROCEDURE — 93325 DOPPLER ECHO COLOR FLOW MAPG: CPT | Performed by: INTERNAL MEDICINE

## 2024-09-16 PROCEDURE — 36569 INSJ PICC 5 YR+ W/O IMAGING: CPT

## 2024-09-16 PROCEDURE — B24BZZ4 ULTRASONOGRAPHY OF HEART WITH AORTA, TRANSESOPHAGEAL: ICD-10-PCS | Performed by: INTERNAL MEDICINE

## 2024-09-16 PROCEDURE — 93312 ECHO TRANSESOPHAGEAL: CPT | Performed by: INTERNAL MEDICINE

## 2024-09-16 PROCEDURE — 84132 ASSAY OF SERUM POTASSIUM: CPT | Performed by: INTERNAL MEDICINE

## 2024-09-16 PROCEDURE — 71045 X-RAY EXAM CHEST 1 VIEW: CPT | Performed by: INTERNAL MEDICINE

## 2024-09-16 PROCEDURE — 83735 ASSAY OF MAGNESIUM: CPT | Performed by: STUDENT IN AN ORGANIZED HEALTH CARE EDUCATION/TRAINING PROGRAM

## 2024-09-16 RX ORDER — LIDOCAINE HYDROCHLORIDE 10 MG/ML
5 INJECTION, SOLUTION EPIDURAL; INFILTRATION; INTRACAUDAL; PERINEURAL
Status: COMPLETED | OUTPATIENT
Start: 2024-09-16 | End: 2024-09-16

## 2024-09-16 RX ORDER — MIDAZOLAM HYDROCHLORIDE 1 MG/ML
INJECTION INTRAMUSCULAR; INTRAVENOUS
Status: COMPLETED
Start: 2024-09-16 | End: 2024-09-16

## 2024-09-16 RX ORDER — SODIUM CHLORIDE 9 MG/ML
INJECTION, SOLUTION INTRAVENOUS
Status: ACTIVE | OUTPATIENT
Start: 2024-09-17 | End: 2024-09-17

## 2024-09-16 RX ORDER — WARFARIN SODIUM 2.5 MG/1
7.5 TABLET ORAL
Status: COMPLETED | OUTPATIENT
Start: 2024-09-16 | End: 2024-09-16

## 2024-09-16 RX ORDER — MIDAZOLAM HYDROCHLORIDE 1 MG/ML
4 INJECTION INTRAMUSCULAR; INTRAVENOUS ONCE
Status: COMPLETED | OUTPATIENT
Start: 2024-09-16 | End: 2024-09-16

## 2024-09-16 NOTE — PROGRESS NOTES
The risks, benefits, and alternatives to transesophageal echocardiography were discussed with the patient.  The risks included, but were not limited to: dental trauma, respiratory failure, esophageal perforation and death.  The benefits of the procedure included: identification of a cardiac source of embolus, evaluation of valvular disease, evaluation of aortic pathology, and identification of endocarditis.  Alternatives to the procedure included: not performing a transesophageal echocardiogram, treatment with medications only and observation.  The patient verbalized understanding and agreed to proceed with the procedure.     Carlitos Martinez MD

## 2024-09-16 NOTE — PROGRESS NOTES
[facility]   part of [facility] Hospitalist Progress Note     Arleth Weiss Patient Status:  Observation    1955 MRN [id number]   Location [facility] 3NE-A Attending Jacob Villarreal,    Hosp Day # 4 PCP Viktoriya Garcias DO     Assessment & Plan:     68 y/o F w/ PMHx of HFrEF, aortic stenosis status post TAVR, CAD status post PCI in , PVD, HTN, HLD, A-fib on oral anticoagulation, hypothyroidism, SEBASTIEN who presents to the hospital w/ complaints of pain, swelling and redness to LLE. Diagnosed w/ Cellulitis.  Patient also found to have MRSA bacteremia.     MRSA bacteremia  LLE Cellulitis  Plan:  - Continue CTX (-), vancomycin (-)  - Repeat blood cultures NGTD  -PICC line placed  for antibiotics at discharge  - ID on consult, case discussed  - 2D echo noted  -LOYD likely today, cardiology consult- discussed  - Wound care consult     Acute on Chronic HFrEF Exacerbation  Hx of Aortic Stenosis s/p TAVR  Discharge weight at last hospitalization: 97.8kg  Last ECHO: stable from prior  Plan:  -Change Lasix to 40 mg IV twice daily, continue tracey; good response to Lasix, trend creatinine, may need to decrease if starts to uptrend  - daily weights, strict I/os  - Pharmacy to dose Coumadin, goal INR 2.5-3.5  - Cardiology on consult, discussed with Dr. French    CKD  - Creatinine has been stable  - Followed by nephrology, placed on consult for recommendations regarding diuresis at discharge    Hx of CAD s/p PCI  -s/p LHC in  w/ PCI to Lcx  PVD  HTN  HLD  Plan:  - Continue Plavix  - BB  - Cardiology consult, recommendations appreciated     Hx of bicuspid AV  - s/p TAVR for Aortic stenosis     Hx of PE  - coumadin,   - s/p IVC    SEBASTIEN  - Ferrous Sulfate     HypoT  - Synthroid     Sjogren's disease  - Supportive care    GERD  - famotidine    DVT Ppx: Coumadin  Code: Full  Calvin: No    Subjective:     Patient seen and examined this morning at bedside.  Reported good  urine output.  Overall feeling better.  Waiting for her LOYD today.  Wants to go home soon.    Vital signs:  Temp:  [97.5 °F (36.4 °C)-97.9 °F (36.6 °C)] 97.6 °F (36.4 °C)  Pulse:  [57-97] 57  Resp:  [12-22] 15  BP: (108-149)/(55-91) 149/88  SpO2:  [97 %-100 %] 100 %    Physical Exam:    General: No acute distress.,  Appears extremely dry, PICC line placed  Respiratory: Clear to auscultation bilaterally. No wheezes.  Cardiovascular: S1, S2. Regular rate and rhythm  Abdomen: Soft, nontender, nondistended.  Positive bowel sounds. No rebound or guarding.  Extremities: No edema. LLE w/ redness decreased, pain improved, swelling improved as well.  Neuro:  Grossly non focal, no motor deficits.        Overall plan of care was discussed with patient and her bedside RN.  All questions answered.     Tin Anaya DO  [facility]

## 2024-09-16 NOTE — PROGRESS NOTES
[facility] Pharmacy Dosing Service  Warfarin (Coumadin) Subsequent Dosing    Arleth Weiss is a 69 year old patient for whom pharmacy is dosing warfarin (Coumadin). Goal INR is 2.5-3.5    Recent Labs   Lab 09/12/24  0723 09/13/24  0857 09/14/24  0802 09/15/24  0801 09/16/24  0706   INR 2.28* 1.89* 2.20* 2.30* 2.30*       Consulted by:  Dr. Villarreal  Indication:  Afib, PE, recent TAVR  Potential Drug Interactions:  Allopurinol, Clopidogrel, Levothyroxine  Other Anticoagulants:  none  Home regimen (if applicable):  2.5 mg MWF, 5 mg ROW    Inpatient Dosing History:       Date 9/11 9/12 9/13  9/14 9/15 9/16   INR 1.99 2.28 1.89  2.20 2.30 2.30   Warfarin  dose 2.5 mg 6 mg  7.5 mg 5mg  5 mg                                                                         Based on above -  1.  For today, Give warfarin (COUMADIN) 7.5 mg at 2100 tonight  2   PT/INR ordered daily while on warfarin  3.  Pharmacy will continue to follow.  We appreciate the opportunity to assist in the care of this patient.    Florecita Pena, PharmD  9/16/2024  9:27 AM

## 2024-09-16 NOTE — CONSULTS
[facility] - Nephrology  Inpatient Consultation    Arleth Weiss Patient Status:  Inpatient    1955 MRN [id number]   [facility] INTERVENTIONAL SUITES Attending Jacob Villarreal DO   Hosp Day # 4 PCP Viktoriya Garcias DO     Reason for consult: Diuretic management  Date of consultation: 2024     HISTORY OF PRESENT ILLNESS:     Arleth Weiss is a 69 year old female with a medical history notable for aortic stenosis s/p TAVR, hypertension, atrial fibrillation on anticoagulation, who presents with cellulitis and diagnosed with MRSA bacteremia. Currently on antibiotics per ID. History of HFrEF with hard to control volume status. Currently receiving IV diuretics while in hospital. Nephrology consulted for further management of diuretics.     REVIEW OF SYSTEMS:     ROS as above, otherwise negative    HISTORY:     Past Medical History:    Aortic stenosis    S/P TAVR    Arrhythmia    ASTHMA    Asthma (HCC)    Back problem    CANCER    thyroid    Cancer (HCC)    thyroid     CHF (congestive heart failure) (HCC)    CKD (chronic kidney disease) stage 2, GFR 60-89 ml/min    COPD (chronic obstructive pulmonary disease) (HCC)    DEPRESSION    Disorder of thyroid    Essential hypertension    Fibromyalgia    Gout    High blood pressure    High cholesterol    HYPERTENSION    HYPOTHYROIDISM    Muscle weakness    OBESITY    OSTEOARTHRITIS    Osteoarthritis    OTHER DISEASES    Fibromyalgia    OTHER DISEASES    Pulmonary emboli    OTHER DISEASES    Lymph edema    OTHER DISEASES    Pulmonary hypertension    Peripheral vascular disease (HCC)    Pulmonary embolism (HCC)    RA (rheumatoid arthritis) (HCC)    Shortness of breath    ON EXERTION    SLEEP APNEA    Sleep apnea    CPAP     Past Surgical History:   Procedure Laterality Date    Anesth,shoulder replacement Right     hemiathroplasty    Back surgery      Back surgery      complete c-3 -7 ectomy    Biopsy Left 2023    TEMPORAL ARTERY     Cath transcatheter aortic valve replacement      Colonoscopy N/A 05/10/2018    Procedure: COLONOSCOPY, POSSIBLE BIOPSY, POSSIBLE POLYPECTOMY 99111;  Surgeon: Kendell Valentin MD;  Location: [facility]    Colonoscopy      Craniotomy for lobotomy Left     D & c  09/2009    Dilation/curettage,diagnostic      Hip replacement surgery  09/2009    Rt hip    Knee replacement surgery  2003    Both knees    Other surgical history  1989    Thyroid removal    Other surgical history  2004    LT foot spur removal    Thyroidectomy Bilateral     Total hip replacement      Total knee replacement       Family History   Problem Relation Age of Onset    Hypertension Father     Lipids Father     Diabetes Mother     Hypertension Mother     Lipids Mother     Cancer Brother     Hypertension Brother       reports that she quit smoking about 33 years ago. Her smoking use included cigarettes. She started smoking about 63 years ago. She has a 15 pack-year smoking history. She has never used smokeless tobacco. She reports that she does not drink alcohol and does not use drugs.  Allergies   Allergen Reactions    Adhesive Tape HIVES     ALL ADHESIVES    Codeine HIVES    Doxycycline SWELLING    Hydrocodone UNKNOWN    Lisinopril TONGUE SWELLING    Morphine Sulfate HIVES    Opioid Analgesics UNKNOWN    Oxycontin ITCHING    Oxytocin HIVES    Vicodin [Hydrocodone-Acetaminophen] ITCHING    Skin Adhesives OTHER (SEE COMMENTS)       MEDICATIONS:       Current Facility-Administered Medications:     warfarin (Coumadin) tab 7.5 mg, 7.5 mg, Oral, Once at night    [START ON 9/17/2024] sodium chloride 0.9% infusion, , Intravenous, On Call    benzocaine (Hurricaine/Topex) 20 % mouth spray 1 spray, 1 spray, Mouth/Throat, See Admin Instructions    [START ON 9/17/2024] sodium chloride 0.9% infusion, , Intravenous, On Call    benzocaine (Hurricaine/Topex) 20 % mouth spray 1 spray, 1 spray, Mouth/Throat, See Admin Instructions    vancomycin  (Vancocin) 1.25 g in sodium chloride 0.9% 250mL IVPB premix, 15 mg/kg (Adjusted), Intravenous, Q24H    famotidine (Pepcid) tab 20 mg, 20 mg, Oral, BID    benzocaine (Hurricaine/Topex) 20 % mouth spray 1 spray, 1 spray, Mouth/Throat, See Admin Instructions    triamcinolone (Kenalog) 0.1 % cream, , Topical, BID PRN    furosemide (Lasix) 10 mg/mL injection 40 mg, 40 mg, Intravenous, BID (Diuretic)    calcium carbonate (Tums) chewable tab 1,000 mg, 1,000 mg, Oral, TID PRN    allopurinol (Zyloprim) tab 100 mg, 100 mg, Oral, Nightly    buPROPion SR (WELLBUTRIN SR) 12 hr tab 150 mg, 150 mg, Oral, BID    clopidogrel (Plavix) tab 75 mg, 75 mg, Oral, Nightly    cyproheptadine (Periactin) tab 4 mg, 4 mg, Oral, Nightly    digoxin (Lanoxin) tab 125 mcg, 125 mcg, Oral, Nightly    dilTIAZem ER (Dilacor XR) 24 hr cap 240 mg, 240 mg, Oral, Nightly    FLUoxetine (PROzac) cap 20 mg, 20 mg, Oral, BID    levothyroxine (Synthroid) tab 175 mcg, 175 mcg, Oral, Before breakfast    metoprolol succinate ER (Toprol XL) 24 hr tab 25 mg, 25 mg, Oral, Nightly    nortriptyline (Pamelor) cap 25 mg, 25 mg, Oral, Nightly    rosuvastatin (Crestor) tab 5 mg, 5 mg, Oral, QOD    spironolactone (Aldactone) tab 25 mg, 25 mg, Oral, Daily    melatonin tab 3 mg, 3 mg, Oral, Nightly PRN    ferrous sulfate DR tab 325 mg, 325 mg, Oral, Nightly    acetaminophen (Tylenol) tab 650 mg, 650 mg, Oral, Q6H PRN    Medications Prior to Admission   Medication Sig Dispense Refill Last Dose    bumetanide 2 MG Oral Tab Take 1 tablet (2 mg total) by mouth BID (Diuretic). 60 tablet 0 9/10/2024    empagliflozin (JARDIANCE) 10 MG Oral Tab Take 1 tablet (10 mg total) by mouth at bedtime. Per pt.   9/10/2024    levothyroxine 175 MCG Oral Tab Take 1 tablet (175 mcg total) by mouth before breakfast.   9/10/2024    dilTIAZem  MG Oral Capsule SR 24 Hr Take 1 capsule (240 mg total) by mouth daily. (Patient taking differently: Take 1 capsule (240 mg total) by mouth at bedtime.) 90  capsule 3 9/10/2024    clopidogrel 75 MG Oral Tab Take 1 tablet (75 mg total) by mouth at bedtime. Per pt.   9/10/2024    metoprolol succinate ER 25 MG Oral Tablet 24 Hr Take 1 tablet (25 mg total) by mouth at bedtime. Per pt.   9/10/2024    Potassium Chloride ER 10 MEQ Oral Cap CR Take 4 capsules (40 mEq total) by mouth at bedtime. Per pt.   9/10/2024    rosuvastatin 5 MG Oral Tab Take 1 tablet (5 mg total) by mouth every other day.   Past Week    spironolactone 25 MG Oral Tab Take 1 tablet (25 mg total) by mouth daily.   9/10/2024    calcium carbonate 500 MG Oral Chew Tab Chew 1 tablet (500 mg total) by mouth every 8 (eight) hours as needed (upset stomach).   Past Month    Vitamin C 500 MG Oral Tab Take 1 tablet (500 mg total) by mouth at bedtime. Per pt.   9/10/2024    Zinc 50 MG Oral Cap Take 50 mg by mouth at bedtime. Per pt.   9/10/2024    Cholecalciferol 125 MCG (5000 UT) Oral Tab Take 1 tablet (5,000 Units total) by mouth at bedtime. Per pt.   9/10/2024    acetaminophen 325 MG Oral Tab Take 2 tablets (650 mg total) by mouth every 8 (eight) hours as needed for Pain.   9/11/2024    buPROPion  MG Oral Tablet 12 Hr Take 1 tablet (150 mg total) by mouth 2 (two) times daily.   9/10/2024    cyproheptadine 4 MG Oral Tab Take 1 tablet (4 mg total) by mouth nightly.   9/10/2024    FLUoxetine 20 MG Oral Cap Take 1 capsule (20 mg total) by mouth 2 (two) times daily.   9/10/2024    triamcinolone 0.1 % External Ointment Apply 1 Application topically daily as needed (Neuro dermatitis per pt.).   Past Week    digoxin 0.125 MG Oral Tab Take 1 tablet (125 mcg total) by mouth daily. (Patient taking differently: Take 1 tablet (125 mcg total) by mouth at bedtime.) 30 tablet 0 9/10/2024    allopurinol 100 MG Oral Tab Take 1 tablet (100 mg total) by mouth daily. (Patient taking differently: Take 1 tablet (100 mg total) by mouth at bedtime. Per pt.) 90 tablet 3 9/10/2024    Nortriptyline HCl 25 MG Oral Cap Take 1 capsule (25  mg total) by mouth nightly.   9/10/2024    Multiple Vitamin (MULTIVITAMIN OR) Take 1 tablet by mouth at bedtime. Per pt.   9/10/2024    warfarin 2.5 MG Oral Tab Take 1 tablet (2.5 mg total) by mouth 3 (three) times a week. Q Mon., Wed., and Saturday per pt.       warfarin 5 MG Oral Tab Take 1 tablet (5 mg total) by mouth nightly. Q Tues., Thurs., Friday and  per pt.       ferrous sulfate 325 (65 FE) MG Oral Tab EC Take 1 tablet (325 mg total) by mouth at bedtime. Per pt.           PHYSICAL EXAM:     Vital Signs: /83   Pulse 81   Temp 97 °F (36.1 °C) (Temporal)   Resp 15   Ht 5' 9\" (1.753 m)   Wt 203 lb 6.4 oz (92.3 kg)   LMP  (LMP Unknown)   SpO2 95%   BMI 30.04 kg/m²   Temp (24hrs), Av.6 °F (36.4 °C), Min:97 °F (36.1 °C), Max:97.9 °F (36.6 °C)       Intake/Output Summary (Last 24 hours) at 2024 1449  Last data filed at 2024 0930  Gross per 24 hour   Intake 250 ml   Output 2080 ml   Net -1830 ml     Wt Readings from Last 3 Encounters:   24 203 lb 6.4 oz (92.3 kg)   24 215 lb 11.2 oz (97.8 kg)   24 203 lb (92.1 kg)       General: no acute distress  HEENT: normocephalic, atraumatic  CV: RRR  Respiratory: no respiratory distress  Abdomen: soft, non-tender  Extremities: + trace edema bilaterally  Skin: warm, dry  Neuro: awake, alert    LABORATORY DATA:     Lab Results   Component Value Date     (H) 2024    BUN 30 (H) 2024    BUNCREA 28.0 (H) 10/07/2021    CREATSERUM 0.72 2024    CREATSERUM 0.72 2024    ANIONGAP 7 2024    GFR 59 (L) 2010    GFRNAA 78 2022    GFRAA 90 2022    CA 9.8 2024    OSMOCALC 296 (H) 2024    ALKPHO 112 2024    AST 37 (H) 2024    ALT 82 (H) 2024    BILT 0.7 2024    TP 6.5 2024    ALB 3.6 2024    GLOBULIN 2.9 2024    AGRATIO 2.1 2014     2024    K 4.0 2024    K 4.0 2024     2024    CO2 30.0 2024      Lab Results   Component Value Date    WBC 10.9 09/16/2024    RBC 4.38 09/16/2024    HGB 13.5 09/16/2024    HCT 41.6 09/16/2024    .0 09/16/2024    MCV 95.0 09/16/2024    MCH 30.8 09/16/2024    MCHC 32.5 09/16/2024    RDW 13.9 09/16/2024    NEPRELIM 8.09 (H) 09/16/2024    NEPERCENT 74.3 09/16/2024    LYPERCENT 14.8 09/16/2024    MOPERCENT 8.5 09/16/2024    EOPERCENT 0.5 09/16/2024    BAPERCENT 0.4 09/16/2024    NE 8.09 (H) 09/16/2024    LYMABS 1.61 09/16/2024    MOABSO 0.92 09/16/2024    EOABSO 0.05 09/16/2024    BAABSO 0.04 09/16/2024     Lab Results   Component Value Date    CREUR 128.00 03/16/2021     Lab Results   Component Value Date    COLORUR Yellow 05/24/2024    CLARITY Clear 05/24/2024    SPECGRAVITY 1.024 05/24/2024    GLUUR >1000 (A) 05/24/2024    BILUR Negative 05/24/2024    KETUR Negative 05/24/2024    BLOODURINE Negative 05/24/2024    PHURINE 7.0 05/24/2024    PROUR Negative 05/24/2024    UROBILINOGEN Normal 05/24/2024    NITRITE Negative 05/24/2024    LEUUR Negative 05/24/2024    NMIC Microscopic not indicated 05/24/2024    WBCUR 11-20 (A) 02/10/2023    RBCUR 0-2 02/10/2023    EPIUR Few (A) 02/10/2023    BACUR Rare (A) 02/10/2023    CAOXUR Few (A) 10/05/2022    HYLUR Present (A) 02/10/2023       IMAGING:     Reviewed    ASSESSMENT:     # HFrEF  # Hypertension  -Home medications: bumetanide 2mg daily, jardiance 10mg daily, spironolactone 25,g daily, metolazone PRN, metoprolol 25mg daily. Diltiazem 240mg daily    # MRSA bacteremia  # Cellulitis    PLAN:     -Continue IV diuretics while in hospital - per Cardiology  -Consider changing oral regimen to bumetanide 2mg BID with metolazone PRN for weight gain >5 lbs on discharge  -Continue spironolactone 25mg daily  -Okay to resume jardiance from nephrology perspective, will help  -Will need close follow-up with cardiology and nephrology to manage volume status  -Antibiotic management per ID - monitor vancomycin trough levels - high a couple of days  ago  -Avoid nephrotoxins and renally dose medications for creatinine clearance  -Monitor intake and output daily  -Daily weights            We will continue to follow.    Thank you for allowing us to participate in the care of this patient.         Fred Young, DO [facility] - Nephrology

## 2024-09-16 NOTE — PROGRESS NOTES
LOYD at bedside with Dr Martinez. Pt tolerated procedure well. See anesthesia record. Pt suctioned clear white secretions.     15:10: Pt awake and tolerating po intake well. Report called to Reyna LAMBERT. Pt transported back to bed in stable condition without c/o.

## 2024-09-16 NOTE — PLAN OF CARE
Assumed care at 1930.   A&Ox4, ra, vss.    Afib  controlled on tele.   Scheduled medications given.   Kenalog cream applied to ble discoloration..  Plan for LOYD tomorrow, patient to be maintained on npo at midnight for  procedure.   Rn checked with Dr. French regarding holding coumadin and Plavix due to procedure tomorrow, per cardiologist there is no need to hold these medications.   Purwick in place.   Patient refuses cpap at night, but agreeable to wear 3l of oxygen during sleep, history of SHELDON.   Safety and staff rounding maintained.  Contact isolation maintained for MRSA in blood.     Plan for PICC placement  for long term iv antibiotics if blood cultures remain negative.   Updated on plan of care.     Problem: Patient/Family Goals  Goal: Patient/Family Long Term Goal  Description: Patient's Long Term Goal: Discharge planning    Interventions:  - Follow up with discharge recommendations  - See additional Care Plan goals for specific interventions  Outcome: Progressing  Goal: Patient/Family Short Term Goal  Description: Patient's Short Term Goal: Free from infection.     Interventions:   - Maintain stable vitals   - See additional Care Plan goals for specific interventions  Outcome: Progressing     Problem: PAIN - ADULT  Goal: Verbalizes/displays adequate comfort level or patient's stated pain goal  Description: INTERVENTIONS:  - Encourage pt to monitor pain and request assistance  - Assess pain using appropriate pain scale  - Administer analgesics based on type and severity of pain and evaluate response  - Implement non-pharmacological measures as appropriate and evaluate response  - Consider cultural and social influences on pain and pain management  - Manage/alleviate anxiety  - Utilize distraction and/or relaxation techniques  - Monitor for opioid side effects  - Notify MD/LIP if interventions unsuccessful or patient reports new pain  - Anticipate increased pain with activity and pre-medicate as  appropriate  Outcome: Progressing     Problem: SKIN/TISSUE INTEGRITY - ADULT  Goal: Skin integrity remains intact  Description: INTERVENTIONS  - Assess and document risk factors for pressure ulcer development  - Assess and document skin integrity  - Monitor for areas of redness and/or skin breakdown  - Initiate interventions, skin care algorithm/standards of care as needed  Outcome: Progressing

## 2024-09-16 NOTE — PROGRESS NOTES
[facility] CARDIOLOGY Progress Note      Arleth Weiss is a 69 year old female who I assessed as follows:  Chief Complaint   Patient presents with    Swelling Edema    Cellulitis          REASON FOR CONSULTATION:    bacteremia            ASSESSMENT/PLAN:     IMPRESSIONS:  LE cellulitis with MRSA bacteremia  S/p TAVR for aortic stenosis 1/21  PE, s/p IVC filter  HTN  HL, on statin  PAD  Acute chronic diastolic CHF  CAD s/p stent circ 2021  COPD  AF      PLAN:  LOYD today without  any vegetation  BP meds reviewed  Continue statin  Continue IV diuretic . Net negative 11 L. Renal function stable   Antibiotics per other services  Antiplatelet therapy for  CAD/PAD  Warfarin for AF/VTE    Patient Dr Case        --------------------------------------------------------------------------------------------------------------------------------    HPI:         History TAVR admitted with LE cellulitis and found to have MRSA bacteremia. Asked about LOYD.     LOYD 5/24 for mitral valve echodensity, felt not to be endocarditis.    Admission 8/24 for CHF. On IV diuretic.    On warfarin for AF/VTE.        No current outpatient medications on file.      Past Medical History:    Aortic stenosis    S/P TAVR    Arrhythmia    ASTHMA    Asthma (HCC)    Back problem    CANCER    thyroid    Cancer ([facility])    thyroid     CHF (congestive heart failure) ([facility])    CKD (chronic kidney disease) stage 2, GFR 60-89 ml/min    COPD (chronic obstructive pulmonary disease) ([facility])    DEPRESSION    Disorder of thyroid    Essential hypertension    Fibromyalgia    Gout    High blood pressure    High cholesterol    HYPERTENSION    HYPOTHYROIDISM    Muscle weakness    OBESITY    OSTEOARTHRITIS    Osteoarthritis    OTHER DISEASES    Fibromyalgia    OTHER DISEASES    Pulmonary emboli    OTHER DISEASES    Lymph edema    OTHER DISEASES    Pulmonary hypertension    Peripheral vascular disease ([facility])    Pulmonary embolism ([facility])    RA (rheumatoid arthritis) ([facility])     Shortness of breath    ON EXERTION    SLEEP APNEA    Sleep apnea    CPAP     Past Surgical History:   Procedure Laterality Date    Anesth,shoulder replacement Right     hemiathroplasty    Back surgery      Back surgery      complete c-3 -7 ectomy    Biopsy Left 2023    TEMPORAL ARTERY    Cath transcatheter aortic valve replacement      Colonoscopy N/A 05/10/2018    Procedure: COLONOSCOPY, POSSIBLE BIOPSY, POSSIBLE POLYPECTOMY 07838;  Surgeon: Kendell Valentin MD;  Location: [facility]    Colonoscopy      Craniotomy for lobotomy Left     D & c  2009    Dilation/curettage,diagnostic      Hip replacement surgery  2009    Rt hip    Knee replacement surgery      Both knees    Other surgical history      Thyroid removal    Other surgical history      LT foot spur removal    Thyroidectomy Bilateral     Total hip replacement      Total knee replacement       Family History   Problem Relation Age of Onset    Hypertension Father     Lipids Father     Diabetes Mother     Hypertension Mother     Lipids Mother     Cancer Brother     Hypertension Brother       Social History:  Social History     Socioeconomic History    Marital status: Single   Tobacco Use    Smoking status: Former     Current packs/day: 0.00     Average packs/day: 0.5 packs/day for 30.0 years (15.0 ttl pk-yrs)     Types: Cigarettes     Start date: 1961     Quit date: 1991     Years since quittin.6    Smokeless tobacco: Never   Vaping Use    Vaping status: Never Used   Substance and Sexual Activity    Alcohol use: No    Drug use: No   Other Topics Concern    Caffeine Concern No    Exercise Yes    Seat Belt Yes    Special Diet Yes     Comment: low sodium    Stress Concern Yes    Weight Concern No     Social Determinants of Health     Food Insecurity: No Food Insecurity (2024)    Food Insecurity     Food Insecurity: Never true   Transportation Needs: No Transportation Needs (2024)     Transportation Needs     Lack of Transportation: No   Housing Stability: Low Risk  (9/11/2024)    Housing Stability     Housing Instability: No          Medications:  Current Facility-Administered Medications   Medication Dose Route Frequency    warfarin (Coumadin) tab 7.5 mg  7.5 mg Oral Once at night    [START ON 9/17/2024] sodium chloride 0.9% infusion   Intravenous On Call    benzocaine (Hurricaine/Topex) 20 % mouth spray 1 spray  1 spray Mouth/Throat See Admin Instructions    vancomycin (Vancocin) 1.25 g in sodium chloride 0.9% 250mL IVPB premix  15 mg/kg (Adjusted) Intravenous Q24H    famotidine (Pepcid) tab 20 mg  20 mg Oral BID    benzocaine (Hurricaine/Topex) 20 % mouth spray 1 spray  1 spray Mouth/Throat See Admin Instructions    triamcinolone (Kenalog) 0.1 % cream   Topical BID PRN    furosemide (Lasix) 10 mg/mL injection 40 mg  40 mg Intravenous BID (Diuretic)    calcium carbonate (Tums) chewable tab 1,000 mg  1,000 mg Oral TID PRN    allopurinol (Zyloprim) tab 100 mg  100 mg Oral Nightly    buPROPion SR (WELLBUTRIN SR) 12 hr tab 150 mg  150 mg Oral BID    clopidogrel (Plavix) tab 75 mg  75 mg Oral Nightly    cyproheptadine (Periactin) tab 4 mg  4 mg Oral Nightly    digoxin (Lanoxin) tab 125 mcg  125 mcg Oral Nightly    dilTIAZem ER (Dilacor XR) 24 hr cap 240 mg  240 mg Oral Nightly    FLUoxetine (PROzac) cap 20 mg  20 mg Oral BID    levothyroxine (Synthroid) tab 175 mcg  175 mcg Oral Before breakfast    metoprolol succinate ER (Toprol XL) 24 hr tab 25 mg  25 mg Oral Nightly    nortriptyline (Pamelor) cap 25 mg  25 mg Oral Nightly    rosuvastatin (Crestor) tab 5 mg  5 mg Oral QOD    spironolactone (Aldactone) tab 25 mg  25 mg Oral Daily    melatonin tab 3 mg  3 mg Oral Nightly PRN    ferrous sulfate DR tab 325 mg  325 mg Oral Nightly    acetaminophen (Tylenol) tab 650 mg  650 mg Oral Q6H PRN           Allergies  Allergies   Allergen Reactions    Adhesive Tape HIVES     ALL ADHESIVES    Codeine HIVES     Doxycycline SWELLING    Hydrocodone UNKNOWN    Lisinopril TONGUE SWELLING    Morphine Sulfate HIVES    Opioid Analgesics UNKNOWN    Oxycontin ITCHING    Oxytocin HIVES    Vicodin [Hydrocodone-Acetaminophen] ITCHING    Skin Adhesives OTHER (SEE COMMENTS)               REVIEW OF SYSTEMS:   GENERAL HEALTH: no fevers  SKIN: cellulitis  EARS: no recent changes in hearing  EYE: no recent vision changes  THROAT: denies sore throat  RESPIRATORY: denies cough or shortness of breath with exertion  CARDIOVASCULAR: denies chest pain, syncope, or palpitations  GI: denies abdominal pain, nausea and vomiting  NEURO: denies headaches and focal neurologic symptoms  PSYCH: no recent depression  ENDOCRINE: no change in sleep pattern  HEME: no easy bruising  All other systems reviewed and negative.          EXAM:         TELE: AF          BP (!) 154/104 (BP Location: Left arm)   Pulse 87   Temp 97 °F (36.1 °C) (Temporal)   Resp 16   Ht 5' 9\" (1.753 m)   Wt 203 lb 6.4 oz (92.3 kg)   LMP  (LMP Unknown)   SpO2 99%   BMI 30.04 kg/m²   Temp (24hrs), Av.6 °F (36.4 °C), Min:97 °F (36.1 °C), Max:97.9 °F (36.6 °C)    Wt Readings from Last 3 Encounters:   24 203 lb 6.4 oz (92.3 kg)   24 215 lb 11.2 oz (97.8 kg)   24 203 lb (92.1 kg)         Intake/Output Summary (Last 24 hours) at 2024 1325  Last data filed at 2024 0930  Gross per 24 hour   Intake 250 ml   Output 2080 ml   Net -1830 ml         GENERAL: well developed, well nourished, in no apparent distress  SKIN: improving LE cellulitis  HEENT: atraumatic, normocephalic, throat without erythema  NECK: supple, no bruits  LUNGS: clear to auscultation  CARDIO:irregular rhythm with 2/6 systolic murmur  GI: soft, nontender  EXTREMITIES: trace edema  NEUROLOGY: alert  PSYCH: cooperative          LABORATORY DATA:               ECG:        ECHO 24:  1. Left ventricle: The cavity size was normal. Wall thickness was normal.      Systolic function was at the  lower limits of normal. The estimated      ejection fraction was 50%, by visual assessment. Unable to assess LV      diastolic function due to heart rhythm.   2. Right ventricle: The cavity size was normal. Systolic function was      normal.   3. Left atrium: The left atrial volume was markedly increased.   4. Right atrium: The atrium was mildly dilated.   5. Aortic valve: Elizondo MINOR S3 23mm (TAVR) bioprosthesis was present.      There was no significant regurgitation. The peak systolic velocity was      3.19m/sec. The mean systolic gradient was 19mm Hg. The valve area (VTI)      was 1.46cm^2. The valve area (VTI) index was 0.68cm^2/m^2.   6. Pulmonary arteries: Systolic pressure was mildly increased, in the range      of 35mm Hg to 40mm Hg.   Impressions:  This study is compared with previous dated 8/15/2024: Ejection   fraction was 45-50%.         LOYD 5/24:  Moderate to severe left atrial enlargement.   Spontaneous echo contrast/\"smoke\" in the left atrium and left atrial appendage.   No left atrial or left atrial appendage thrombus.   Moderate right atrial enlargement.   Low normal left ventricular systolic function with ejection fraction visually estimated at 50%.   Thickened and calcified mitral valve leaflets with reduced excursion.   There is a mobile echogenicity noted in association with the mitral valve. This appears associated with the chordal apparatus attaching to the posterior leaflet of the mitral valve. This echogenicity appears to be right beneath the posterior leaflet of the mitral valve by the chordal attachment and measures approximately 0.5 x 0.7 cm in size. The differential diagnosis of this findings includes fibrinous stranding, thrombus, or vegetation.   Mild mitral regurgitation.  Well seated TAVR prosthesis.   Mild tricuspid regurgitation.   Pulmonary artery pressure estimated to fall within normal limits.   No color flow Doppler or saline echo contrast evidence of interatrial septal  shunting.   Mild atheromatous plaquing noted in the aorta in the regions visualized.            Labs:  Recent Labs   Lab 09/14/24  0802 09/15/24  0801 09/16/24  0706   * 129* 134*   BUN 32* 29* 30*   CREATSERUM 0.75  0.75 0.71  0.71 0.72  0.72   CA 9.4 9.7 9.8    139 139   K 3.8 4.1 4.0  4.0    101 102   CO2 31.0 32.0 30.0     No results for input(s): \"TROP\" in the last 168 hours.  Recent Labs   Lab 09/16/24  0706   RBC 4.38   HGB 13.5   HCT 41.6   MCV 95.0   MCH 30.8   MCHC 32.5   RDW 13.9   NEPRELIM 8.09*   WBC 10.9   .0     No results for input(s): \"TROP\", \"TROPHS\", \"CK\" in the last 168 hours.  Lab Results   Component Value Date    PT 23.7 (H) 10/09/2011    INR 2.30 (H) 09/16/2024    INR 2.30 (H) 09/15/2024    INR 2.20 (H) 09/14/2024      Imaging:  XR CHEST AP PORTABLE  (CPT=71045)    Result Date: 9/16/2024  PROCEDURE:  XR CHEST AP PORTABLE  (CPT=71045)  TECHNIQUE:  AP chest radiograph was obtained.  COMPARISON:  [facility] , XR, XR CHEST AP PORTABLE  (CPT=71045), 8/12/2024, 11:15 PM.  INDICATIONS:  PICC placement  PATIENT STATED HISTORY: (As transcribed by Technologist)  Patient offered no additional history at this time.     FINDINGS: Cardiac silhouette and pulmonary vasculature are unremarkable.  Right shoulder arthroplasty is noted.  Right-sided PICC line with tip in the SVC. No consolidation, pleural effusion or pneumothorax. IMPRESSION: Right-sided PICC line tip in the SVC.  No pneumothorax.   LOCATION:  [facility]      Dictated by (CST): Berry Pierre MD on 9/16/2024 at 12:48 PM     Finalized by (CST): Berry Pierre MD on 9/16/2024 at 12:49 PM         Carlitos Martinez MD

## 2024-09-16 NOTE — CM/SW NOTE
Pt will need IV ABX upon dc.  Pt had PICC line placed today.  Still waiting for final IV ABX order.  Pt declines PT.

## 2024-09-16 NOTE — PLAN OF CARE
Assumed patient care at 0730  A&Ox4  Tele, A-fib rhythm  1 to 2 assist to stand next to pt  Pain to the legs when they're touched/pressed on   Purewick in place with brief   Peripheral IV Ant. L. Forearm saline lock  PICC R. Basilic placed by vascular team   Contact isolation precautions   NPO for LOYD  Daily wt  Strict I&o  Cream applied to BLLE per MAR    Call light within reach, bed in lowest position, updated on plan of care, all needs met at this time.  1549 cardiac diet resumed     Problem: Patient/Family Goals  Goal: Patient/Family Long Term Goal  Description: Patient's Long Term Goal: Discharge planning    Interventions:  - Follow up with discharge recommendations  - See additional Care Plan goals for specific interventions  Outcome: Progressing  Goal: Patient/Family Short Term Goal  Description: Patient's Short Term Goal: Free from infection.     Interventions:   - Maintain stable vitals   - See additional Care Plan goals for specific interventions  Outcome: Progressing     Problem: PAIN - ADULT  Goal: Verbalizes/displays adequate comfort level or patient's stated pain goal  Description: INTERVENTIONS:  - Encourage pt to monitor pain and request assistance  - Assess pain using appropriate pain scale  - Administer analgesics based on type and severity of pain and evaluate response  - Implement non-pharmacological measures as appropriate and evaluate response  - Consider cultural and social influences on pain and pain management  - Manage/alleviate anxiety  - Utilize distraction and/or relaxation techniques  - Monitor for opioid side effects  - Notify MD/LIP if interventions unsuccessful or patient reports new pain  - Anticipate increased pain with activity and pre-medicate as appropriate  Outcome: Progressing     Problem: SKIN/TISSUE INTEGRITY - ADULT  Goal: Skin integrity remains intact  Description: INTERVENTIONS  - Assess and document risk factors for pressure ulcer development  - Assess and document skin  integrity  - Monitor for areas of redness and/or skin breakdown  - Initiate interventions, skin care algorithm/standards of care as needed  Outcome: Progressing

## 2024-09-16 NOTE — PROCEDURES
[facility] Cardiology  Transesophageal Echocardiogram Report    PREOPERATIVE DIAGNOSIS: bacteremia    POSTOPERATIVE DIAGNOSIS: bacteremia    PROCEDURE PERFORMED: Transesophageal echocardiogram  (CPT code 28841) with Doppler echocardiography (CPT code 82992), and color flow velocity mapping (CPT code 32368).    TECHNIQUE: The risks, benefits, and alternatives to transesophageal echocardiography were discussed with the patient. The risks included, but were not limited to: dental trauma, respiratory failure, esophageal perforation and death. The benefits of the procedure included: identification of a cardiac source of embolus, evaluation of valvular disease, evaluation of aortic pathology, and identification of endocarditis. Alternatives to the procedure included: not performing a transesophageal echocardiogram, treatment with medications only and observation. he verbalized understanding and agreed to proceed with the procedure.     Benzocaine spray was applied to the oropharynx for local anesthesia. The patient was placed in the left lateral decubitus position. A bite block was placed. Intravenous sedation was administered, with the patient monitored by continuous pulse oximetry and cardiac telemetry.     Intravenous sedation was administered by the nurse (trained independent observer) under my supervision from 1338 to 1353.    Once adequate sedation was achieved, a lubricated transesophageal echocardiography probe was inserted orally. It was smoothly advanced to the upper esophageal and mid esophageal positions. Imaging was obtained. It was then advanced to the deep gastric position. Further imaging was obtained. It was then slowly withdrawn orally. No complications were noted at the end of the procedure.    FINDINGS:  2D echocardiography:  The left ventricle appears normal in size, thickness, and systolic function. The estimated left ventricular ejection fraction is 50-55%. The right ventricle appears  normal in size, thickness and systolic function. The visualized portions of the tricuspid valve, pulmonic valve have normal morphology and motion. The mitral valve is thickened and there is moderate to severe mitral annular calcium The aortic valve  has a TAVR present that appears to be functioning normally. There is no evidence of valvular vegetations, intracardiac masses or thrombi. The interatrial septum appeared intact by 2D imaging. The visualized portion of the thoracic aorta appeared normal. Agitated saline contrast bubble study was not performed.    Doppler echocardiography:  The tricuspid valve has trace regurgitation. The pulmonic valve has no stenosis or regurgitation. The mitral valve has no stenosis  and mild regurgitation. The aortic valve has no stenosis or regurgitation.  The pulmonary artery systolic pressure could not be accurately measured. Left atrial appendage velocities are consistent with preserved left atrial transit function.    CONCLUSIONS:   1. There is no evidence of valvular vegetations, intracardiac masses or thrombi.   2.The mitral valve is thickened and there is moderate to severe mitral annular calcium The aortic valve  has a TAVR present that appears to be functioning normally.   2. Normal left ventricular systolic function. The estimated ejection fraction is 50-55%.      Carlitos Martinez MD

## 2024-09-16 NOTE — PROGRESS NOTES
[facility]   part of [facility] Infectious Disease  Progress Note    Arleth Weiss Patient Status:  Inpatient    1955 MRN [id number]   Location [facility] 3NE-A Attending Jacob Villarreal DO   Hosp Day # 4 PCP Viktoriya Garcias DO     Subjective:  Patient seen and examined in bed. Feeling well today. Tolerating IV antibiotics. Awaiting LOYD. Denies F/C. Denies n/v/d. Otherwise no new complaints.     Objective:  Blood pressure (!) 125/91, pulse 70, temperature 97.5 °F (36.4 °C), temperature source Oral, resp. rate 17, height 5' 9\" (1.753 m), weight 220 lb (99.8 kg), SpO2 100%, not currently breastfeeding.    Intake/Output:    Intake/Output Summary (Last 24 hours) at 2024 0850  Last data filed at 9/15/2024 2330  Gross per 24 hour   Intake 250 ml   Output 2200 ml   Net -1950 ml       Physical Exam:  General: Awake, alert, non-tox, NAD.  HEENT:  Oropharynx clear, trachea ML.  Heart: RRR S1S2 no murmurs.  Lungs: Essentially CTA b/l, no rhonchi, rales, wheezes.  Abdomen: Soft, NT/ND.  BS present.  No guarding or rebound.  Extremity: LLE with mild erythema. No TTP or swelling.   Neurological: No focal deficits.  Derm:  Warm and dry.    Lab Data Review:  Lab Results   Component Value Date    WBC 10.9 2024    HGB 13.5 2024    HCT 41.6 2024    .0 2024    CREATSERUM 0.72 2024    CREATSERUM 0.72 2024    BUN 30 2024     2024    K 4.0 2024    K 4.0 2024     2024    CO2 30.0 2024     2024    CA 9.8 2024    INR 2.30 2024    MG 2.3 2024        Cultures:  Hospital Encounter on 24   1. Blood Culture     Status: None (Preliminary result)    Collection Time: 24  2:54 PM    Specimen: Blood,peripheral   Result Value Ref Range    Blood Culture Result No Growth 3 Days N/A       Radiology:  No results found.    Assessment and Plan:  1.  MRSA bacteremia/sepsis with  threat to life  - This is in the setting of LLE cellulitis.  - Blood cultures isolating MRSA on 9/11/24.  - Repeat blood cultures, 9/12, NGTD.  - Patient at risk for endocarditis given bioprosthetic valve.  - TTE negative -- LOYD pending.  - IV vancomycin, ongoing.    2.  Leukocytosis  - WBC normalized and currently at 10.9K  - Will trend.    3.  PVD  - As per the primary team.    4.  Recs  - Continue IV vancomycin at this time.  Patient will need at least 4 weeks of antibiotic therapy.  Final duration pending clinical course.  - Patient will need PICC line prior to discharge.  - F/u WBC and fever curve.  - F/u LOYD when available.  - Supportive care as per the primary team.  - Discussed plan of care with nursing.  - Discussed plan of care with patient.  All questions addressed understanding verbalized.  - Further recommendations pending clinical course.    Discussed case with ID attending/collaborating physician, Dr. Yvette Whitfield, who is in agreement with the above plan of care    Please note that this report has been produced using speech recognition software and may contain errors related to that system including, but not limited to, errors in grammar, punctuation, and spelling, as well as words and phrases that possibly may have been recognized inappropriately.  If there are any questions or concerns, contact the dictating provider for clarification.     The 21st Century Cures Act makes medical notes like these available to patients in the interest of transparency. Please be advised this is a medical document. Medical documents are intended to carry relevant information, facts as evident, and the clinical opinion of the practitioner. The medical note is intended as peer to peer communication and may appear blunt or direct. It is written in medical language and may contain abbreviations or verbiage that are unfamiliar.     If you have any questions or concerns please call [facility] Infectious Disease  at [phone].     Sanjiv Ortega, APRN    9/16/2024  8:50 AM

## 2024-09-16 NOTE — CDS QUERY
Dear Doctor Héctor,    Can the type of heart failure be further clarified?    ( x   ) Acute on Chronic Diastolic Heart Failure   (    ) Acute on Chronic Systolic Heart Failure    (    ) Other - please specify:         DOCUMENTATION FROM THE MEDICAL RECORD      Clinical indicators: risks: obesity, HTN, aortic stenosis s/p TAVR CI:     +2 BLE edema    9/11 weight 230 Ib, 9/16 weight: 203 Ib 6.4 oz    Echo- The left ventricle appears normal in size, thickness, and systolic function. The estimated left ventricular ejection fraction is 50-55%.        9/11-9/16 Hospitalist PN- Acute on Chronic HFrEF Exacerbation  Hx of Aortic Stenosis s/p TAVR  Discharge weight at last hospitalization: 97.8kg  Last ECHO: stable from prior    9/15-9/16 Cardiology PN- Acute chronic diastolic CHF   Continue IV diuretic. Net negative 11 L. Renal function stable     Treatment: IV lasix 60mg BID, spironolactone, daily weights, strict I/Os, cardiac monitoring. Echo        Use of terms such as suspected, possible, or probable (associated with a specific diagnosis that is being evaluated, monitored, or treated as if it exists) are acceptable and can be coded in the inpatient setting, when documented at the time of discharge.     Please add any additional documentation to your progress note and continue to document this through discharge.      For questions, please contact Clinical : Debbie Ramos [phone]. Thank You.    THIS FORM IS A PERMANENT PART OF THE MEDICAL RECORD

## 2024-09-17 LAB
ANION GAP SERPL CALC-SCNC: 6 MMOL/L (ref 0–18)
BACTERIA BLD CULT: POSITIVE
BASOPHILS # BLD AUTO: 0.05 X10(3) UL (ref 0–0.2)
BASOPHILS NFR BLD AUTO: 0.4 %
BUN BLD-MCNC: 31 MG/DL (ref 9–23)
CALCIUM BLD-MCNC: 9.8 MG/DL (ref 8.7–10.4)
CHLORIDE SERPL-SCNC: 100 MMOL/L (ref 98–112)
CO2 SERPL-SCNC: 33 MMOL/L (ref 21–32)
CREAT BLD-MCNC: 0.82 MG/DL
CREAT BLD-MCNC: 0.82 MG/DL
EGFRCR SERPLBLD CKD-EPI 2021: 76 ML/MIN/1.73M2 (ref 60–?)
EGFRCR SERPLBLD CKD-EPI 2021: 76 ML/MIN/1.73M2 (ref 60–?)
EOSINOPHIL # BLD AUTO: 0.07 X10(3) UL (ref 0–0.7)
EOSINOPHIL NFR BLD AUTO: 0.5 %
ERYTHROCYTE [DISTWIDTH] IN BLOOD BY AUTOMATED COUNT: 13.9 %
GLUCOSE BLD-MCNC: 125 MG/DL (ref 70–99)
HCT VFR BLD AUTO: 43.3 %
HGB BLD-MCNC: 14.1 G/DL
IMM GRANULOCYTES # BLD AUTO: 0.18 X10(3) UL (ref 0–1)
IMM GRANULOCYTES NFR BLD: 1.4 %
INR BLD: 2.16 (ref 0.8–1.2)
LYMPHOCYTES # BLD AUTO: 1.49 X10(3) UL (ref 1–4)
LYMPHOCYTES NFR BLD AUTO: 11.6 %
MCH RBC QN AUTO: 31.2 PG (ref 26–34)
MCHC RBC AUTO-ENTMCNC: 32.6 G/DL (ref 31–37)
MCV RBC AUTO: 95.8 FL
MONOCYTES # BLD AUTO: 1.08 X10(3) UL (ref 0.1–1)
MONOCYTES NFR BLD AUTO: 8.4 %
NEUTROPHILS # BLD AUTO: 9.96 X10 (3) UL (ref 1.5–7.7)
NEUTROPHILS # BLD AUTO: 9.96 X10(3) UL (ref 1.5–7.7)
NEUTROPHILS NFR BLD AUTO: 77.7 %
OSMOLALITY SERPL CALC.SUM OF ELEC: 296 MOSM/KG (ref 275–295)
PLATELET # BLD AUTO: 397 10(3)UL (ref 150–450)
POTASSIUM SERPL-SCNC: 3.9 MMOL/L (ref 3.5–5.1)
PROTHROMBIN TIME: 24.3 SECONDS (ref 11.6–14.8)
RBC # BLD AUTO: 4.52 X10(6)UL
SODIUM SERPL-SCNC: 139 MMOL/L (ref 136–145)
WBC # BLD AUTO: 12.8 X10(3) UL (ref 4–11)

## 2024-09-17 PROCEDURE — 80048 BASIC METABOLIC PNL TOTAL CA: CPT | Performed by: STUDENT IN AN ORGANIZED HEALTH CARE EDUCATION/TRAINING PROGRAM

## 2024-09-17 PROCEDURE — 85610 PROTHROMBIN TIME: CPT | Performed by: INTERNAL MEDICINE

## 2024-09-17 PROCEDURE — 82565 ASSAY OF CREATININE: CPT | Performed by: INTERNAL MEDICINE

## 2024-09-17 PROCEDURE — 85025 COMPLETE CBC W/AUTO DIFF WBC: CPT | Performed by: STUDENT IN AN ORGANIZED HEALTH CARE EDUCATION/TRAINING PROGRAM

## 2024-09-17 RX ORDER — VANCOMYCIN HYDROCHLORIDE
1.25 EVERY 12 HOURS
Qty: 11500 ML | Refills: 0 | Status: SHIPPED | OUTPATIENT
Start: 2024-09-17 | End: 2024-09-18

## 2024-09-17 RX ORDER — WARFARIN SODIUM 2.5 MG/1
7.5 TABLET ORAL
Status: COMPLETED | OUTPATIENT
Start: 2024-09-17 | End: 2024-09-17

## 2024-09-17 RX ORDER — BUMETANIDE 1 MG/1
2 TABLET ORAL
Status: DISCONTINUED | OUTPATIENT
Start: 2024-09-17 | End: 2024-09-19

## 2024-09-17 NOTE — PROGRESS NOTES
[facility] CARDIOLOGY Progress Note      Arleth Weiss is a 69 year old female who I assessed as follows:  Chief Complaint   Patient presents with    Swelling Edema    Cellulitis          REASON FOR CONSULTATION:    bacteremia            ASSESSMENT/PLAN:     IMPRESSIONS:  LE cellulitis with MRSA bacteremia  S/p TAVR for aortic stenosis 1/21  PE, s/p IVC filter  HTN  HL, on statin  PAD  Acute chronic diastolic CHF  CAD s/p stent circ 2021  COPD  AF      PLAN:  LOYD without endocarditis   . Net negative 12.6 L. Down 31 lbs to 198 today. Stop IV lasix and transition to PO bumex 2mg BID. Per neph, can use metoloazone if weight increases more than 5 lbs   Antibiotics per other services  Antiplatelet therapy for  CAD/PAD  Warfarin for AF/VTE  Likely DC tomorrow     Patient Dr Case        --------------------------------------------------------------------------------------------------------------------------------    HPI:         History TAVR admitted with LE cellulitis and found to have MRSA bacteremia. Asked about LOYD.     LOYD 5/24 for mitral valve echodensity, felt not to be endocarditis.    Admission 8/24 for CHF. On IV diuretic.    On warfarin for AF/VTE.        Current Outpatient Medications   Medication Sig Dispense Refill    vancomycin in sodium chloride 0.9% 1.25 g/250mL Intravenous Solution Inject 250 mL (1.25 g total) into the vein every 12 (twelve) hours for 23 days. Weekly CBC with differential and CMP and vancomycin trough, sed rate and CRP.  Fax results to [facility]. Fax: [phone]. Tel: [phone].  PICC line care as per protocol. 33499 mL 0      Past Medical History:    Aortic stenosis    S/P TAVR    Arrhythmia    ASTHMA    Asthma (HCC)    Back problem    CANCER    thyroid    Cancer (HCC)    thyroid     CHF (congestive heart failure) (HCC)    CKD (chronic kidney disease) stage 2, GFR 60-89 ml/min    COPD (chronic obstructive pulmonary disease) (HCC)    DEPRESSION    Disorder  of thyroid    Essential hypertension    Fibromyalgia    Gout    High blood pressure    High cholesterol    HYPERTENSION    HYPOTHYROIDISM    Muscle weakness    OBESITY    OSTEOARTHRITIS    Osteoarthritis    OTHER DISEASES    Fibromyalgia    OTHER DISEASES    Pulmonary emboli    OTHER DISEASES    Lymph edema    OTHER DISEASES    Pulmonary hypertension    Peripheral vascular disease (HCC)    Pulmonary embolism (HCC)    RA (rheumatoid arthritis) (HCC)    Shortness of breath    ON EXERTION    SLEEP APNEA    Sleep apnea    CPAP     Past Surgical History:   Procedure Laterality Date    Anesth,shoulder replacement Right 2014    hemiathroplasty    Back surgery  2000    Back surgery  2004    complete c-3 -7 ectomy    Biopsy Left 2023    TEMPORAL ARTERY    Cath transcatheter aortic valve replacement      Colonoscopy N/A 05/10/2018    Procedure: COLONOSCOPY, POSSIBLE BIOPSY, POSSIBLE POLYPECTOMY 32710;  Surgeon: Kendell Valentin MD;  Location: [facility], [facility]    Colonoscopy      Craniotomy for lobotomy Left     D & c  2009    Dilation/curettage,diagnostic      Hip replacement surgery  2009    Rt hip    Knee replacement surgery      Both knees    Other surgical history      Thyroid removal    Other surgical history      LT foot spur removal    Thyroidectomy Bilateral     Total hip replacement      Total knee replacement       Family History   Problem Relation Age of Onset    Hypertension Father     Lipids Father     Diabetes Mother     Hypertension Mother     Lipids Mother     Cancer Brother     Hypertension Brother       Social History:  Social History     Socioeconomic History    Marital status: Single   Tobacco Use    Smoking status: Former     Current packs/day: 0.00     Average packs/day: 0.5 packs/day for 30.0 years (15.0 ttl pk-yrs)     Types: Cigarettes     Start date: 1961     Quit date: 1991     Years since quittin.6    Smokeless tobacco: Never   Vaping Use    Vaping  status: Never Used   Substance and Sexual Activity    Alcohol use: No    Drug use: No   Other Topics Concern    Caffeine Concern No    Exercise Yes    Seat Belt Yes    Special Diet Yes     Comment: low sodium    Stress Concern Yes    Weight Concern No     Social Determinants of Health     Food Insecurity: No Food Insecurity (9/11/2024)    Food Insecurity     Food Insecurity: Never true   Transportation Needs: No Transportation Needs (9/11/2024)    Transportation Needs     Lack of Transportation: No   Housing Stability: Low Risk  (9/11/2024)    Housing Stability     Housing Instability: No          Medications:  Current Facility-Administered Medications   Medication Dose Route Frequency    sodium chloride 0.9% infusion   Intravenous On Call    benzocaine (Hurricaine/Topex) 20 % mouth spray 1 spray  1 spray Mouth/Throat See Admin Instructions    sodium chloride 0.9% infusion   Intravenous On Call    benzocaine (Hurricaine/Topex) 20 % mouth spray 1 spray  1 spray Mouth/Throat See Admin Instructions    vancomycin (Vancocin) 1.25 g in sodium chloride 0.9% 250mL IVPB premix  15 mg/kg (Adjusted) Intravenous Q24H    famotidine (Pepcid) tab 20 mg  20 mg Oral BID    benzocaine (Hurricaine/Topex) 20 % mouth spray 1 spray  1 spray Mouth/Throat See Admin Instructions    triamcinolone (Kenalog) 0.1 % cream   Topical BID PRN    furosemide (Lasix) 10 mg/mL injection 40 mg  40 mg Intravenous BID (Diuretic)    calcium carbonate (Tums) chewable tab 1,000 mg  1,000 mg Oral TID PRN    allopurinol (Zyloprim) tab 100 mg  100 mg Oral Nightly    buPROPion SR (WELLBUTRIN SR) 12 hr tab 150 mg  150 mg Oral BID    clopidogrel (Plavix) tab 75 mg  75 mg Oral Nightly    cyproheptadine (Periactin) tab 4 mg  4 mg Oral Nightly    digoxin (Lanoxin) tab 125 mcg  125 mcg Oral Nightly    dilTIAZem ER (Dilacor XR) 24 hr cap 240 mg  240 mg Oral Nightly    FLUoxetine (PROzac) cap 20 mg  20 mg Oral BID    levothyroxine (Synthroid) tab 175 mcg  175 mcg Oral  Before breakfast    metoprolol succinate ER (Toprol XL) 24 hr tab 25 mg  25 mg Oral Nightly    nortriptyline (Pamelor) cap 25 mg  25 mg Oral Nightly    rosuvastatin (Crestor) tab 5 mg  5 mg Oral QOD    spironolactone (Aldactone) tab 25 mg  25 mg Oral Daily    melatonin tab 3 mg  3 mg Oral Nightly PRN    ferrous sulfate DR tab 325 mg  325 mg Oral Nightly    acetaminophen (Tylenol) tab 650 mg  650 mg Oral Q6H PRN           Allergies  Allergies   Allergen Reactions    Adhesive Tape HIVES     ALL ADHESIVES    Codeine HIVES    Doxycycline SWELLING    Hydrocodone UNKNOWN    Lisinopril TONGUE SWELLING    Morphine Sulfate HIVES    Opioid Analgesics UNKNOWN    Oxycontin ITCHING    Oxytocin HIVES    Vicodin [Hydrocodone-Acetaminophen] ITCHING    Skin Adhesives OTHER (SEE COMMENTS)               REVIEW OF SYSTEMS:   GENERAL HEALTH: no fevers  SKIN: cellulitis  EARS: no recent changes in hearing  EYE: no recent vision changes  THROAT: denies sore throat  RESPIRATORY: denies cough or shortness of breath with exertion  CARDIOVASCULAR: denies chest pain, syncope, or palpitations  GI: denies abdominal pain, nausea and vomiting  NEURO: denies headaches and focal neurologic symptoms  PSYCH: no recent depression  ENDOCRINE: no change in sleep pattern  HEME: no easy bruising  All other systems reviewed and negative.          EXAM:         TELE: AF          /76 (BP Location: Right arm)   Pulse 67   Temp 98 °F (36.7 °C) (Oral)   Resp 19   Ht 5' 9\" (1.753 m)   Wt 204 lb 9.4 oz (92.8 kg)   LMP  (LMP Unknown)   SpO2 100%   BMI 30.21 kg/m²   Temp (24hrs), Av.7 °F (36.5 °C), Min:97 °F (36.1 °C), Max:98 °F (36.7 °C)    Wt Readings from Last 3 Encounters:   24 204 lb 9.4 oz (92.8 kg)   24 215 lb 11.2 oz (97.8 kg)   24 203 lb (92.1 kg)         Intake/Output Summary (Last 24 hours) at 2024 0911  Last data filed at 2024 2330  Gross per 24 hour   Intake 220 ml   Output 2430 ml   Net -2210 ml          GENERAL: well developed, well nourished, in no apparent distress  SKIN: improving LE cellulitis  HEENT: atraumatic, normocephalic, throat without erythema  NECK: supple, no bruits  LUNGS: clear to auscultation  CARDIO:irregular rhythm with 2/6 systolic murmur  GI: soft, nontender  EXTREMITIES: trace edema  NEUROLOGY: alert  PSYCH: cooperative          LABORATORY DATA:               ECG:        ECHO 9/14/24:  1. Left ventricle: The cavity size was normal. Wall thickness was normal.      Systolic function was at the lower limits of normal. The estimated      ejection fraction was 50%, by visual assessment. Unable to assess LV      diastolic function due to heart rhythm.   2. Right ventricle: The cavity size was normal. Systolic function was      normal.   3. Left atrium: The left atrial volume was markedly increased.   4. Right atrium: The atrium was mildly dilated.   5. Aortic valve: Elizondo MINOR S3 23mm (TAVR) bioprosthesis was present.      There was no significant regurgitation. The peak systolic velocity was      3.19m/sec. The mean systolic gradient was 19mm Hg. The valve area (VTI)      was 1.46cm^2. The valve area (VTI) index was 0.68cm^2/m^2.   6. Pulmonary arteries: Systolic pressure was mildly increased, in the range      of 35mm Hg to 40mm Hg.   Impressions:  This study is compared with previous dated 8/15/2024: Ejection   fraction was 45-50%.         LOYD 5/24:  Moderate to severe left atrial enlargement.   Spontaneous echo contrast/\"smoke\" in the left atrium and left atrial appendage.   No left atrial or left atrial appendage thrombus.   Moderate right atrial enlargement.   Low normal left ventricular systolic function with ejection fraction visually estimated at 50%.   Thickened and calcified mitral valve leaflets with reduced excursion.   There is a mobile echogenicity noted in association with the mitral valve. This appears associated with the chordal apparatus attaching to the posterior  leaflet of the mitral valve. This echogenicity appears to be right beneath the posterior leaflet of the mitral valve by the chordal attachment and measures approximately 0.5 x 0.7 cm in size. The differential diagnosis of this findings includes fibrinous stranding, thrombus, or vegetation.   Mild mitral regurgitation.  Well seated TAVR prosthesis.   Mild tricuspid regurgitation.   Pulmonary artery pressure estimated to fall within normal limits.   No color flow Doppler or saline echo contrast evidence of interatrial septal shunting.   Mild atheromatous plaquing noted in the aorta in the regions visualized.            Labs:  Recent Labs   Lab 09/15/24  0801 09/16/24  0706 09/17/24  0607   * 134* 125*   BUN 29* 30* 31*   CREATSERUM 0.71  0.71 0.72  0.72 0.82  0.82   CA 9.7 9.8 9.8    139 139   K 4.1 4.0  4.0 3.9    102 100   CO2 32.0 30.0 33.0*     No results for input(s): \"TROP\" in the last 168 hours.  Recent Labs   Lab 09/17/24  0607   RBC 4.52   HGB 14.1   HCT 43.3   MCV 95.8   MCH 31.2   MCHC 32.6   RDW 13.9   NEPRELIM 9.96*   WBC 12.8*   .0     No results for input(s): \"TROP\", \"TROPHS\", \"CK\" in the last 168 hours.  Lab Results   Component Value Date    PT 23.7 (H) 10/09/2011    INR 2.16 (H) 09/17/2024    INR 2.30 (H) 09/16/2024    INR 2.30 (H) 09/15/2024      Imaging:  XR CHEST AP PORTABLE  (CPT=71045)    Result Date: 9/16/2024  PROCEDURE:  XR CHEST AP PORTABLE  (CPT=71045)  TECHNIQUE:  AP chest radiograph was obtained.  COMPARISON:  [facility] , XR, XR CHEST AP PORTABLE  (CPT=71045), 8/12/2024, 11:15 PM.  INDICATIONS:  PICC placement  PATIENT STATED HISTORY: (As transcribed by Technologist)  Patient offered no additional history at this time.     FINDINGS: Cardiac silhouette and pulmonary vasculature are unremarkable.  Right shoulder arthroplasty is noted.  Right-sided PICC line with tip in the SVC. No consolidation, pleural effusion or pneumothorax. IMPRESSION: Right-sided PICC line  tip in the SVC.  No pneumothorax.   LOCATION:  [facility]      Dictated by (CST): Berry Pierre MD on 9/16/2024 at 12:48 PM     Finalized by (CST): Berry Pierre MD on 9/16/2024 at 12:49 PM         Carlitos Martinez MD

## 2024-09-17 NOTE — CM/SW NOTE
SENDY received order stating outpatient antibiotics. Chart reviewed and final IV ABX order for IV Vanco has been placed.     Referral sent to [facility] in [facility], await verification of benefits/coverage. Updated [facility] of need for IV ABX at discharge. SENDY will continue to follow.     Addendum (12pm) - SENDY received message from Dilma at [facility] stating the cost of IV Vanco is $158.61/week. Dilma stated [facility] offers an interest free payment plan.     SENDY met with pt at bedside and updated her on above information. Pt stated she spoke with the HHRN and she will come daily for IV ABX administration. SENDY informed pt HHRN will come 1-2x/week for teaching, line care, and lab draws. SENDY informed pt the HHRN will teach her how to administer the IV ABX. Pt stated she has a rotator cuff and does not think she will be able to reach the line to administer the IV ABX. 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 SENDY discussed option of NAIF, pt declined and stated she prefers to avoid NAIF if possible. Pt stated she feels she will be able to manage IV ABX with a line extender and training videos to reference. Pt stated she still plans to return home at discharge.     SENDY messaged [facility] in [facility] to inquire if a line extender and training videos can be provided to pt. SENDY spoke with RN who stated pt is transitioning to oral diuretics today and is anticipated to discharge tomorrow. SENDY will continue to follow.     GEN Almanza  Discharge Planner

## 2024-09-17 NOTE — PROGRESS NOTES
[facility] - Nephrology  Inpatient Follow-up    Arleth Weiss Patient Status:  Inpatient    1955 MRN [id number]   Location [facility] 3NE-A Attending Tin Anaya DO   Hosp Day # 5 PCP Viktoriya Garcias DO       SUBJECTIVE:     Patient seen and examined at bedside. No acute complaints today.     MEDICATIONS:     Current Facility-Administered Medications   Medication Dose Route Frequency    bumetanide (Bumex) tab 2 mg  2 mg Oral BID (Diuretic)    warfarin (Coumadin) tab 7.5 mg  7.5 mg Oral Once at night    benzocaine (Hurricaine/Topex) 20 % mouth spray 1 spray  1 spray Mouth/Throat See Admin Instructions    benzocaine (Hurricaine/Topex) 20 % mouth spray 1 spray  1 spray Mouth/Throat See Admin Instructions    vancomycin (Vancocin) 1.25 g in sodium chloride 0.9% 250mL IVPB premix  15 mg/kg (Adjusted) Intravenous Q24H    famotidine (Pepcid) tab 20 mg  20 mg Oral BID    benzocaine (Hurricaine/Topex) 20 % mouth spray 1 spray  1 spray Mouth/Throat See Admin Instructions    triamcinolone (Kenalog) 0.1 % cream   Topical BID PRN    calcium carbonate (Tums) chewable tab 1,000 mg  1,000 mg Oral TID PRN    allopurinol (Zyloprim) tab 100 mg  100 mg Oral Nightly    buPROPion SR (WELLBUTRIN SR) 12 hr tab 150 mg  150 mg Oral BID    clopidogrel (Plavix) tab 75 mg  75 mg Oral Nightly    cyproheptadine (Periactin) tab 4 mg  4 mg Oral Nightly    digoxin (Lanoxin) tab 125 mcg  125 mcg Oral Nightly    dilTIAZem ER (Dilacor XR) 24 hr cap 240 mg  240 mg Oral Nightly    FLUoxetine (PROzac) cap 20 mg  20 mg Oral BID    levothyroxine (Synthroid) tab 175 mcg  175 mcg Oral Before breakfast    metoprolol succinate ER (Toprol XL) 24 hr tab 25 mg  25 mg Oral Nightly    nortriptyline (Pamelor) cap 25 mg  25 mg Oral Nightly    rosuvastatin (Crestor) tab 5 mg  5 mg Oral QOD    spironolactone (Aldactone) tab 25 mg  25 mg Oral Daily    melatonin tab 3 mg  3 mg Oral Nightly PRN    ferrous sulfate DR tab 325 mg  325 mg Oral Nightly     acetaminophen (Tylenol) tab 650 mg  650 mg Oral Q6H PRN       PHYSICAL EXAM:     Vital Signs: BP (!) 134/93 (BP Location: Right arm)   Pulse 77   Temp 97.9 °F (36.6 °C) (Oral)   Resp 19   Ht 5' 9\" (1.753 m)   Wt 198 lb 3.2 oz (89.9 kg)   LMP  (LMP Unknown)   SpO2 98%   BMI 29.27 kg/m²   Temp (24hrs), Av.7 °F (36.5 °C), Min:97 °F (36.1 °C), Max:98 °F (36.7 °C)       Intake/Output Summary (Last 24 hours) at 2024 1253  Last data filed at 2024 2330  Gross per 24 hour   Intake 220 ml   Output 1450 ml   Net -1230 ml     Wt Readings from Last 3 Encounters:   24 198 lb 3.2 oz (89.9 kg)   24 215 lb 11.2 oz (97.8 kg)   24 203 lb (92.1 kg)       General: no acute distress  HEENT: normocephalic, atraumatic  CV: RRR  Respiratory: no distress  Abdomen: soft, non-tender  Extremities: + edema bilaterally  Skin: warm, dry  Neuro: awake, alert     LABORATORY DATA:     Lab Results   Component Value Date     (H) 2024    BUN 31 (H) 2024    BUNCREA 28.0 (H) 10/07/2021    CREATSERUM 0.82 2024    CREATSERUM 0.82 2024    ANIONGAP 6 2024    GFR 59 (L) 2010    GFRNAA 78 2022    GFRAA 90 2022    CA 9.8 2024    OSMOCALC 296 (H) 2024    ALKPHO 112 2024    AST 37 (H) 2024    ALT 82 (H) 2024    BILT 0.7 2024    TP 6.5 2024    ALB 3.6 2024    GLOBULIN 2.9 2024    AGRATIO 2.1 2014     2024    K 3.9 2024     2024    CO2 33.0 (H) 2024     Lab Results   Component Value Date    WBC 12.8 (H) 2024    RBC 4.52 2024    HGB 14.1 2024    HCT 43.3 2024    .0 2024    MCV 95.8 2024    MCH 31.2 2024    MCHC 32.6 2024    RDW 13.9 2024    NEPRELIM 9.96 (H) 2024    NEPERCENT 77.7 2024    LYPERCENT 11.6 2024    MOPERCENT 8.4 2024    EOPERCENT 0.5 2024    BAPERCENT 0.4 2024    NE  9.96 (H) 09/17/2024    LYMABS 1.49 09/17/2024    MOABSO 1.08 (H) 09/17/2024    EOABSO 0.07 09/17/2024    BAABSO 0.05 09/17/2024     Lab Results   Component Value Date    CREUR 128.00 03/16/2021     Lab Results   Component Value Date    COLORUR Yellow 05/24/2024    CLARITY Clear 05/24/2024    SPECGRAVITY 1.024 05/24/2024    GLUUR >1000 (A) 05/24/2024    BILUR Negative 05/24/2024    KETUR Negative 05/24/2024    BLOODURINE Negative 05/24/2024    PHURINE 7.0 05/24/2024    PROUR Negative 05/24/2024    UROBILINOGEN Normal 05/24/2024    NITRITE Negative 05/24/2024    LEUUR Negative 05/24/2024    NMIC Microscopic not indicated 05/24/2024    WBCUR 11-20 (A) 02/10/2023    RBCUR 0-2 02/10/2023    EPIUR Few (A) 02/10/2023    BACUR Rare (A) 02/10/2023    CAOXUR Few (A) 10/05/2022    HYLUR Present (A) 02/10/2023       IMAGING:     Reviewed     ASSESSMENT:      # HFrEF  # Hypertension  -Home medications: bumetanide 2mg daily, jardiance 10mg daily, spironolactone 25,g daily, metolazone PRN, metoprolol 25mg daily. Diltiazem 240mg daily     # MRSA bacteremia  # Cellulitis     PLAN:      -IV diuretics per Cardiology - transition to oral today  -Consider bumetanide 2mg BID with metolazone 2.5mg PRN for weight gain >5 lbs on discharge  -Continue spironolactone 25mg daily  -Okay to resume jardiance from nephrology perspective, will help  -Will need close follow-up with cardiology and nephrology to manage volume status  -Antibiotic management per ID - monitor vancomycin trough levels - high a couple of days ago  -Avoid nephrotoxins and renally dose medications for creatinine clearance  -Monitor intake and output daily  -Daily weights           We will continue to follow    Thank you for allowing us to participate in the care of this patient.       DO José Miguel Lafleury [facility] - Nephrology

## 2024-09-17 NOTE — PLAN OF CARE
Patient alert and oriented x4. Afebrile. Room air at daytime. Supposed to be SHELDON-CPAP but patient preferred to use 3LO2 tonight with sats 93-96%. No complaints of pain. No nausea and vomiting. On scheduled IV antibiotics for Left leg Cellulitis. Up with 2 person assist. Fall precautions in place. Call light within reach. Needs attended.     Problem: PAIN - ADULT  Goal: Verbalizes/displays adequate comfort level or patient's stated pain goal  Description: INTERVENTIONS:  - Encourage pt to monitor pain and request assistance  - Assess pain using appropriate pain scale  - Administer analgesics based on type and severity of pain and evaluate response  - Implement non-pharmacological measures as appropriate and evaluate response  - Consider cultural and social influences on pain and pain management  - Manage/alleviate anxiety  - Utilize distraction and/or relaxation techniques  - Monitor for opioid side effects  - Notify MD/LIP if interventions unsuccessful or patient reports new pain  - Anticipate increased pain with activity and pre-medicate as appropriate  Outcome: Progressing     Problem: SKIN/TISSUE INTEGRITY - ADULT  Goal: Skin integrity remains intact  Description: INTERVENTIONS  - Assess and document risk factors for pressure ulcer development  - Assess and document skin integrity  - Monitor for areas of redness and/or skin breakdown  - Initiate interventions, skin care algorithm/standards of care as needed  Outcome: Progressing     Problem: CARDIOVASCULAR - ADULT  Goal: Maintains optimal cardiac output and hemodynamic stability  Description: INTERVENTIONS:  - Monitor vital signs, rhythm, and trends  - Monitor for bleeding, hypotension and signs of decreased cardiac output  - Evaluate effectiveness of vasoactive medications to optimize hemodynamic stability  - Monitor arterial and/or venous puncture sites for bleeding and/or hematoma  - Assess quality of pulses, skin color and temperature  - Assess for signs of  decreased coronary artery perfusion - ex. Angina  - Evaluate fluid balance, assess for edema, trend weights  Outcome: Progressing

## 2024-09-17 NOTE — CONSULTS
[facility] Pharmacy Dosing Service  Warfarin (Coumadin) Subsequent Dosing    Arleth Weiss is a 69 year old patient for whom pharmacy is dosing warfarin (Coumadin). Goal INR is 2.5-3.5    Recent Labs   Lab 09/13/24  0857 09/14/24  0802 09/15/24  0801 09/16/24  0706 09/17/24  0607   INR 1.89* 2.20* 2.30* 2.30* 2.16*     Consulted by:  Dr. Villarreal  Indication:  Afib, PE, recent TAVR  Potential Drug Interactions:  Allopurinol, Clopidogrel, Levothyroxine  Other Anticoagulants:  none  Home regimen (if applicable):  2.5 mg MWF, 5 mg ROW     Inpatient Dosing History:        Date 9/11 9/12 9/13  9/14 9/15 9/16 9/17   INR 1.99 2.28 1.89  2.20 2.30 2.30 2.16   Warfarin  dose 2.5 mg 6 mg  7.5 mg 5mg  5 mg   7.5mg                   Based on above -  1.  For today, Give warfarin (COUMADIN) 7.5  mg at 2100 tonight  2   PT/INR ordered daily while on warfarin  3.  Pharmacy will continue to follow.  We appreciate the opportunity to assist in the care of this patient.    Eva Nix, PharmD  9/17/2024  11:20 AM

## 2024-09-17 NOTE — PLAN OF CARE
Assumed patient care at 0730  A&Ox4  Tele, A-fib rhythm  1 to 2 assist to stand next to pt  Pain to the legs when they're touched/pressed on   Purewick in place with brief   Peripheral IV Ant. L. Forearm  PICC R. Basilic placed by vascular team   Unit draw  Contact isolation precautions   Cardiac diet  Daily wt  Strict I&o  Per cardiology MD, possible discharge for tomorrow.     Call light within reach, bed in lowest position, updated on plan of care, all needs met at this time.  Problem: Patient/Family Goals  Goal: Patient/Family Long Term Goal  Description: Patient's Long Term Goal: Discharge planning    Interventions:  - Follow up with discharge recommendations  - See additional Care Plan goals for specific interventions  9/17/2024 1027 by Kavya Mcclain RN  Outcome: Progressing  9/17/2024 1026 by Kavya Mcclain RN  Outcome: Progressing  Goal: Patient/Family Short Term Goal  Description: Patient's Short Term Goal: Free from infection.     Interventions:   - Maintain stable vitals   - See additional Care Plan goals for specific interventions  9/17/2024 1027 by Kavya Mcclain RN  Outcome: Progressing  9/17/2024 1026 by Kavya Mcclain RN  Outcome: Progressing     Problem: PAIN - ADULT  Goal: Verbalizes/displays adequate comfort level or patient's stated pain goal  Description: INTERVENTIONS:  - Encourage pt to monitor pain and request assistance  - Assess pain using appropriate pain scale  - Administer analgesics based on type and severity of pain and evaluate response  - Implement non-pharmacological measures as appropriate and evaluate response  - Consider cultural and social influences on pain and pain management  - Manage/alleviate anxiety  - Utilize distraction and/or relaxation techniques  - Monitor for opioid side effects  - Notify MD/LIP if interventions unsuccessful or patient reports new pain  - Anticipate increased pain with activity and pre-medicate as appropriate  9/17/2024 1027 by Kavya Mcclain  RN  Outcome: Progressing  9/17/2024 1026 by Kavya Mcclain RN  Outcome: Progressing     Problem: SKIN/TISSUE INTEGRITY - ADULT  Goal: Skin integrity remains intact  Description: INTERVENTIONS  - Assess and document risk factors for pressure ulcer development  - Assess and document skin integrity  - Monitor for areas of redness and/or skin breakdown  - Initiate interventions, skin care algorithm/standards of care as needed  9/17/2024 1027 by Kavya Mcclain RN  Outcome: Progressing  9/17/2024 1026 by Kavya Mcclain RN  Outcome: Progressing     Problem: CARDIOVASCULAR - ADULT  Goal: Maintains optimal cardiac output and hemodynamic stability  Description: INTERVENTIONS:  - Monitor vital signs, rhythm, and trends  - Monitor for bleeding, hypotension and signs of decreased cardiac output  - Evaluate effectiveness of vasoactive medications to optimize hemodynamic stability  - Monitor arterial and/or venous puncture sites for bleeding and/or hematoma  - Assess quality of pulses, skin color and temperature  - Assess for signs of decreased coronary artery perfusion - ex. Angina  - Evaluate fluid balance, assess for edema, trend weights  9/17/2024 1027 by Kavya Mcclain RN  Outcome: Progressing  9/17/2024 1026 by Kavya Mcclain RN  Outcome: Progressing  Goal: Absence of cardiac arrhythmias or at baseline  Description: INTERVENTIONS:  - Continuous cardiac monitoring, monitor vital signs, obtain 12 lead EKG if indicated  - Evaluate effectiveness of antiarrhythmic and heart rate control medications as ordered  - Initiate emergency measures for life threatening arrhythmias  - Monitor electrolytes and administer replacement therapy as ordered  9/17/2024 1027 by Kavya Mcclain RN  Outcome: Progressing  9/17/2024 1026 by Kavya Mcclain RN  Outcome: Progressing

## 2024-09-17 NOTE — PROGRESS NOTES
[facility]   part of [facility] Hospitalist Progress Note     Arleth Weiss Patient Status:  Observation    1955 MRN [id number]   Location [facility] 3NE-A Attending Jacob Villarreal,    Hosp Day # 5 PCP Viktoriya Garcias DO     Assessment & Plan:     68 y/o F w/ PMHx of HFrEF, aortic stenosis status post TAVR, CAD status post PCI in , PVD, HTN, HLD, A-fib on oral anticoagulation, hypothyroidism, SEBASTIEN who presents to the hospital w/ complaints of pain, swelling and redness to LLE. Diagnosed w/ Cellulitis.  Patient also found to have MRSA bacteremia.     MRSA bacteremia  LLE Cellulitis  Plan:  - Continue CTX (-), vancomycin (-)  - Repeat blood cultures NGTD  -PICC line placed  for antibiotics at discharge  - ID on consult, case discussed  - 2D echo noted  -LOYD without endocarditis  - Wound care consult     Acute on Chronic HFrEF Exacerbation  Hx of Aortic Stenosis s/p TAVR  Discharge weight at last hospitalization: 97.8kg  Last ECHO: stable from prior  Plan:  -Plan to continue home dose Bumex 2 mg daily, stop IV Lasix, metolazone as needed  - daily weights, strict I/os  - Pharmacy to dose Coumadin, goal INR 2.5-3.5  - Cardiology on consult, recommendations reviewed      CKD  - Creatinine has been stable  - Followed by nephrology, case discussed  - Home Bumex resumed with plan to add metolazone for extra weight gain      Hx of CAD s/p PCI  -s/p LHC in  w/ PCI to Lcx  PVD  HTN  HLD  Plan:  - Continue Plavix  - BB  - Cardiology consult, recommendations appreciated     Hx of bicuspid AV  - s/p TAVR for Aortic stenosis     Hx of PE  - coumadin,   - s/p IVC    SEBASTIEN  - Ferrous Sulfate     HypoT  - Synthroid     Sjogren's disease  - Supportive care    GERD  - famotidine    DVT Ppx: Coumadin  Code: Full  Calvin: No    Subjective:     Patient seen and examined this morning at bedside.  No new symptoms.  Getting set up for home IV antibiotics.    Vital signs:  Temp:   [97.7 °F (36.5 °C)-98 °F (36.7 °C)] 98 °F (36.7 °C)  Pulse:  [58-95] 95  Resp:  [15-19] 19  BP: ()/() 124/72  SpO2:  [76 %-100 %] 97 %    Physical Exam:    General: No acute distress.,  Appears extremely dry, PICC line placed  Respiratory: Clear to auscultation bilaterally. No wheezes.  Cardiovascular: S1, S2. Regular rate and rhythm  Abdomen: Soft, nontender, nondistended.  Positive bowel sounds. No rebound or guarding.  Extremities: No edema. LLE w/ redness almost resolved, pain improved, swelling resolved as well.  Neuro:  Grossly non focal, no motor deficits.        Overall plan of care was discussed with patient and her bedside RN.  All questions answered.     Tin Anaya DO  Hospitalist  [facility]

## 2024-09-17 NOTE — PROGRESS NOTES
Infectious Disease Progress Note      Date of admission: 9/11/2024  1:39 PM     Reason for consult: MRSA bacteremia, left lower extremity cellulitis    Subjective: The patient's leg is getting better.  Redness continues to improve.  No nausea or vomiting.  No diarrhea.    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  Currently on IV vancomycin with clinical improvement.  Clearance cultures with no growth to date.  LOYD without endocarditis    Medications:    bumetanide    sodium chloride    benzocaine    sodium chloride    benzocaine    vancomycin    famotidine    benzocaine    triamcinolone    calcium carbonate    allopurinol    buPROPion SR    clopidogrel    cyproheptadine    digoxin    dilTIAZem ER    FLUoxetine    levothyroxine    metoprolol succinate ER    nortriptyline    rosuvastatin    spironolactone    melatonin    ferrous sulfate    acetaminophen     Allergies:  Allergies   Allergen Reactions    Adhesive Tape HIVES     ALL ADHESIVES    Codeine HIVES    Doxycycline SWELLING    Hydrocodone UNKNOWN    Lisinopril TONGUE SWELLING    Morphine Sulfate HIVES    Opioid Analgesics UNKNOWN    Oxycontin ITCHING    Oxytocin HIVES    Vicodin [Hydrocodone-Acetaminophen] ITCHING    Skin Adhesives OTHER (SEE COMMENTS)       Physical Exam:  Vitals:    09/17/24 0805   BP:    Pulse: 67   Resp:    Temp:      Vitals signs and nursing note reviewed.   Constitutional:       Appearance: Normal appearance.   HENT:      Head: Normocephalic and atraumatic.      Mouth: Mucous membranes are moist.   Neck:      Musculoskeletal: Neck supple.   Cardiovascular:      Rate and Rhythm: Normal rate.    Pulmonary:      Effort: Pulmonary effort is normal. No respiratory  distress.   Musculoskeletal:      Right lower leg: No edema.      Left lower leg: No edema.  Resolving cellulitis  Skin:     General: Skin is warm and dry.   Neurological:      General: No focal deficit present.      Mental Status: Alert and oriented to person, place, and time.       Laboratory data:  I have reviewed all the lab results independently.  Lab Results   Component Value Date    WBC 12.8 09/17/2024    HGB 14.1 09/17/2024    HCT 43.3 09/17/2024    .0 09/17/2024    CREATSERUM 0.82 09/17/2024    CREATSERUM 0.82 09/17/2024    BUN 31 09/17/2024     09/17/2024    K 3.9 09/17/2024     09/17/2024    CO2 33.0 09/17/2024     09/17/2024    CA 9.8 09/17/2024    INR 2.16 09/17/2024      Recent Labs   Lab 09/17/24  0607   RBC 4.52   HGB 14.1   HCT 43.3   MCV 95.8   MCH 31.2   MCHC 32.6   RDW 13.9   NEPRELIM 9.96*   WBC 12.8*   .0      Microbiology data:  Hospital Encounter on 09/11/24   1. Blood Culture     Status: None (Preliminary result)    Collection Time: 09/12/24  2:54 PM    Specimen: Blood,peripheral   Result Value Ref Range    Blood Culture Result No Growth 4 Days N/A      Impression:  Arleth Weiss is a 69 year old female with    MRSA bacteremia/sepsis with threat to life  This is in the setting of left lower extremity cellulitis  Antimicrobials:  Ceftriaxone from 9/11 to 9/12  IV vancomycin from 9/12 through today  Repeat blood cultures from 9/12 with no growth to date  LOYD without endocarditis  Leukocytosis  Reactive  Improving  Mild transaminitis  Related to sepsis  Improving  History peripheral vascular disease  As per the primary team  Chronic stasis dermatitis  As per the primary team    Recommendations:    Discharge patient on IV vancomycin for total of 4 weeks, through 10/9/2024  Weekly CBC with differential and CMP and vancomycin trough, sed rate and CRP.  Fax results to [facility]. Fax: [phone]. Tel: [phone].  PICC line care as per  protocol.  The patient will need repeat blood cultures 2 weeks after cessation of antibiotics to confirm complete resolution of her infection  Follow-up with infectious disease in 2 weeks  Okay to discharge from ID perspective once okay with the other services    The plan of care was discussed with the primary hospital team, Tin Anaya DO     Recommendations were also discussed with the patient; all questions were answered.     Thank you for this consultation. Please don't hesitate to call the ID team for questions or any acute changes in patient's clinical condition.    Please note that this report has been produced using speech recognition software and may contain errors related to that system including, but not limited to, errors in grammar, punctuation, and spelling, as well as words and phrases that possibly may have been recognized inappropriately.  If there are any questions or concerns, contact the dictating provider for clarification.    The  [employer] Cures Act makes medical notes like these available to patients in the interest of transparency. Please be advised this is a medical document. Medical documents are intended to carry relevant information, facts as evident, and the clinical opinion of the practitioner. The medical note is intended as peer to peer communication and may appear blunt or direct. It is written in medical language and may contain abbreviations or verbiage that are unfamiliar.     Yvette Whitfield MD  [facility]. Tel: [phone]. Fax: [phone].     Arleth Weiss : 1955 MRN: [id number] [facility]: [id number]

## 2024-09-18 LAB
ANION GAP SERPL CALC-SCNC: 5 MMOL/L (ref 0–18)
BASOPHILS # BLD AUTO: 0.04 X10(3) UL (ref 0–0.2)
BASOPHILS NFR BLD AUTO: 0.3 %
BUN BLD-MCNC: 33 MG/DL (ref 9–23)
CALCIUM BLD-MCNC: 10 MG/DL (ref 8.7–10.4)
CHLORIDE SERPL-SCNC: 99 MMOL/L (ref 98–112)
CO2 SERPL-SCNC: 35 MMOL/L (ref 21–32)
CREAT BLD-MCNC: 0.84 MG/DL
CREAT BLD-MCNC: 0.84 MG/DL
EGFRCR SERPLBLD CKD-EPI 2021: 74 ML/MIN/1.73M2 (ref 60–?)
EGFRCR SERPLBLD CKD-EPI 2021: 74 ML/MIN/1.73M2 (ref 60–?)
EOSINOPHIL # BLD AUTO: 0.07 X10(3) UL (ref 0–0.7)
EOSINOPHIL NFR BLD AUTO: 0.6 %
ERYTHROCYTE [DISTWIDTH] IN BLOOD BY AUTOMATED COUNT: 13.8 %
GLUCOSE BLD-MCNC: 130 MG/DL (ref 70–99)
HCT VFR BLD AUTO: 42.9 %
HGB BLD-MCNC: 14.4 G/DL
IMM GRANULOCYTES # BLD AUTO: 0.19 X10(3) UL (ref 0–1)
IMM GRANULOCYTES NFR BLD: 1.6 %
INR BLD: 2.43 (ref 0.8–1.2)
LYMPHOCYTES # BLD AUTO: 1.46 X10(3) UL (ref 1–4)
LYMPHOCYTES NFR BLD AUTO: 12.3 %
MCH RBC QN AUTO: 31.3 PG (ref 26–34)
MCHC RBC AUTO-ENTMCNC: 33.6 G/DL (ref 31–37)
MCV RBC AUTO: 93.3 FL
MONOCYTES # BLD AUTO: 0.9 X10(3) UL (ref 0.1–1)
MONOCYTES NFR BLD AUTO: 7.6 %
NEUTROPHILS # BLD AUTO: 9.25 X10 (3) UL (ref 1.5–7.7)
NEUTROPHILS # BLD AUTO: 9.25 X10(3) UL (ref 1.5–7.7)
NEUTROPHILS NFR BLD AUTO: 77.6 %
OSMOLALITY SERPL CALC.SUM OF ELEC: 297 MOSM/KG (ref 275–295)
PLATELET # BLD AUTO: 409 10(3)UL (ref 150–450)
POTASSIUM SERPL-SCNC: 3.9 MMOL/L (ref 3.5–5.1)
PROTHROMBIN TIME: 26.7 SECONDS (ref 11.6–14.8)
RBC # BLD AUTO: 4.6 X10(6)UL
SODIUM SERPL-SCNC: 139 MMOL/L (ref 136–145)
VANCOMYCIN TROUGH SERPL-MCNC: 10.3 UG/ML (ref 10–20)
WBC # BLD AUTO: 11.9 X10(3) UL (ref 4–11)

## 2024-09-18 PROCEDURE — 85610 PROTHROMBIN TIME: CPT | Performed by: INTERNAL MEDICINE

## 2024-09-18 PROCEDURE — 82565 ASSAY OF CREATININE: CPT | Performed by: INTERNAL MEDICINE

## 2024-09-18 PROCEDURE — 85025 COMPLETE CBC W/AUTO DIFF WBC: CPT | Performed by: STUDENT IN AN ORGANIZED HEALTH CARE EDUCATION/TRAINING PROGRAM

## 2024-09-18 PROCEDURE — 80048 BASIC METABOLIC PNL TOTAL CA: CPT | Performed by: STUDENT IN AN ORGANIZED HEALTH CARE EDUCATION/TRAINING PROGRAM

## 2024-09-18 PROCEDURE — 80202 ASSAY OF VANCOMYCIN: CPT | Performed by: STUDENT IN AN ORGANIZED HEALTH CARE EDUCATION/TRAINING PROGRAM

## 2024-09-18 RX ORDER — VANCOMYCIN HYDROCHLORIDE
1.25 EVERY 24 HOURS
Qty: 5750 ML | Refills: 0 | Status: SHIPPED | OUTPATIENT
Start: 2024-09-18 | End: 2024-09-19

## 2024-09-18 RX ORDER — METOLAZONE 5 MG/1
5 TABLET ORAL DAILY PRN
Qty: 30 TABLET | Refills: 3 | Status: SHIPPED | OUTPATIENT
Start: 2024-09-18 | End: 2025-01-16

## 2024-09-18 RX ORDER — WARFARIN SODIUM 2.5 MG/1
5 TABLET ORAL
Status: COMPLETED | OUTPATIENT
Start: 2024-09-18 | End: 2024-09-18

## 2024-09-18 NOTE — PROGRESS NOTES
Assumed care at 7:30am  Alert and oriented x4.   Able to ambulate with assistive device and staff help  Denies current pain.  States she wants to discuss Bumex with cards, will wait for them to round.   All safety precautions in place. Will continue to monitor patient.

## 2024-09-18 NOTE — PROGRESS NOTES
[facility]   part of [facility] Hospitalist Progress Note     Arleth Weiss Patient Status:  Observation    1955 MRN [id number]   Location [facility] 3NE-A Attending Jacob Villarreal,    Hosp Day # 6 PCP Viktoriya Garcias DO     Assessment & Plan:     68 y/o F w/ PMHx of HFrEF, aortic stenosis status post TAVR, CAD status post PCI in , PVD, HTN, HLD, A-fib on oral anticoagulation, hypothyroidism, SEBASTIEN who presents to the hospital w/ complaints of pain, swelling and redness to LLE. Diagnosed w/ Cellulitis.  Patient also found to have MRSA bacteremia.     MRSA bacteremia  LLE Cellulitis  Plan:  - Continue CTX (-), vancomycin (-)  - Repeat blood cultures NGTD  -PICC line placed  for antibiotics at discharge  - ID on consult, case discussed  - 2D echo noted  -LOYD without endocarditis  - Wound care consult     Acute on Chronic HFrEF Exacerbation  Hx of Aortic Stenosis s/p TAVR  Discharge weight at last hospitalization: 97.8kg  Last ECHO: stable from prior  Plan:  -Plan to continue home dose Bumex 2 mg daily, stop IV Lasix, metolazone as needed  - daily weights, strict I/os  - Pharmacy to dose Coumadin, goal INR 2.5-3.5  - Cardiology on consult, recommendations reviewed      CKD  - Creatinine has been stable  - Followed by nephrology, case discussed  - Home Bumex resumed with plan to add metolazone for extra weight gain      Hx of CAD s/p PCI  -s/p LHC in  w/ PCI to Lcx  PVD  HTN  HLD  Plan:  - Continue Plavix  - BB  - Cardiology consult, recommendations appreciated     Hx of bicuspid AV  - s/p TAVR for Aortic stenosis     Hx of PE  - coumadin,   - s/p IVC    SEBASTIEN  - Ferrous Sulfate     HypoT  - Synthroid     Sjogren's disease  - Supportive care    GERD  - famotidine    DVT Ppx: Coumadin  Code: Full  Calvin: No    Subjective:     Patient seen and examined this morning at bedside.  No new symptoms. Anticipating dc today.    Vital signs:  Temp:  [97.4 °F (36.3  °C)-99.1 °F (37.3 °C)] 97.8 °F (36.6 °C)  Pulse:  [59-93] 73  Resp:  [16-18] 18  BP: (101-137)/(67-79) 101/69  SpO2:  [77 %-100 %] 77 %    Physical Exam:    General: No acute distress.,  Appears extremely dry, PICC line placed  Respiratory: Clear to auscultation bilaterally. No wheezes.  Cardiovascular: S1, S2. Regular rate and rhythm  Abdomen: Soft, nontender, nondistended.  Positive bowel sounds. No rebound or guarding.  Extremities: No edema. LLE w/ redness almost resolved, pain improved, swelling resolved as well.  Neuro:  Grossly non focal, no motor deficits.        Overall plan of care was discussed with patient and her bedside RN.  All questions answered.     Tin Anaya DO  Hospitalist  [facility]

## 2024-09-18 NOTE — DISCHARGE INSTRUCTIONS
[facility]  [phone]    Sometimes managing your health at home requires assistance.  The [facility] team has recognized your preference to use [facility]. They can be reached by phone at [phone]. The fax number for your reference is [phone]. . A representative from the home health agency will contact you or your family to schedule your first visit.

## 2024-09-18 NOTE — PLAN OF CARE
Assumed patient care at 1930  Pt oriented x 4   Afib on tele  Vitals stable   Room air, O2 via nasal canula 2l at night   JONNATHAN picc line c/d/I   Iv antibiotics scheduled  Ambulatory x 1-2 assist   [place] I&O, daily wt   Safety and isolation precaution measures in place   Call light is within reach   Continue poc     Problem: Patient/Family Goals  Goal: Patient/Family Long Term Goal  Description: Patient's Long Term Goal: Discharge planning    Interventions:  - Follow up with discharge recommendations  - See additional Care Plan goals for specific interventions  Outcome: Progressing  Goal: Patient/Family Short Term Goal  Description: Patient's Short Term Goal: Free from infection.     Interventions:   - Maintain stable vitals   - See additional Care Plan goals for specific interventions  Outcome: Progressing

## 2024-09-18 NOTE — PROGRESS NOTES
[facility] CARDIOLOGY Progress Note      Arleth Weiss is a 69 year old female who I assessed as follows:  Chief Complaint   Patient presents with    Swelling Edema    Cellulitis          REASON FOR CONSULTATION:    bacteremia            ASSESSMENT/PLAN:     IMPRESSIONS:  LE cellulitis with MRSA bacteremia  S/p TAVR for aortic stenosis 1/21  PE, s/p IVC filter  HTN  HL, on statin  PAD  Acute chronic diastolic CHF  CAD s/p stent circ 2021  COPD  AF      PLAN:  LOYD without endocarditis   Net negative 13.4 L. Down 30+ lbs to 196 today. PO bumex 2mg BID. Per neph, can use metoloazone if weight increases more than 5 lbs   Antibiotics per other services  Antiplatelet therapy for  CAD/PAD  Warfarin for AF/VTE  DC today     Patient Dr Case        --------------------------------------------------------------------------------------------------------------------------------      Subjective:  No issues overnight    HPI:         History TAVR admitted with LE cellulitis and found to have MRSA bacteremia. Asked about LOYD.     LOYD 5/24 for mitral valve echodensity, felt not to be endocarditis.    Admission 8/24 for CHF. On IV diuretic.    On warfarin for AF/VTE.        Current Outpatient Medications   Medication Sig Dispense Refill    vancomycin in sodium chloride 0.9% 1.25 g/250mL Intravenous Solution Inject 250 mL (1.25 g total) into the vein every 12 (twelve) hours for 23 days. Weekly CBC with differential and CMP and vancomycin trough, sed rate and CRP.  Fax results to [facility]. Fax: [phone]. Tel: [phone].  PICC line care as per protocol. 34223 mL 0      Past Medical History:    Aortic stenosis    S/P TAVR    Arrhythmia    ASTHMA    Asthma (HCC)    Back problem    CANCER    thyroid    Cancer (HCC)    thyroid     CHF (congestive heart failure) (HCC)    CKD (chronic kidney disease) stage 2, GFR 60-89 ml/min    COPD (chronic obstructive pulmonary disease) (HCC)    DEPRESSION    Disorder of  thyroid    Essential hypertension    Fibromyalgia    Gout    High blood pressure    High cholesterol    HYPERTENSION    HYPOTHYROIDISM    Muscle weakness    OBESITY    OSTEOARTHRITIS    Osteoarthritis    OTHER DISEASES    Fibromyalgia    OTHER DISEASES    Pulmonary emboli    OTHER DISEASES    Lymph edema    OTHER DISEASES    Pulmonary hypertension    Peripheral vascular disease (HCC)    Pulmonary embolism (HCC)    RA (rheumatoid arthritis) (HCC)    Shortness of breath    ON EXERTION    SLEEP APNEA    Sleep apnea    CPAP     Past Surgical History:   Procedure Laterality Date    Anesth,shoulder replacement Right 2014    hemiathroplasty    Back surgery  2000    Back surgery  2004    complete c-3 -7 ectomy    Biopsy Left 2023    TEMPORAL ARTERY    Cath transcatheter aortic valve replacement      Colonoscopy N/A 05/10/2018    Procedure: COLONOSCOPY, POSSIBLE BIOPSY, POSSIBLE POLYPECTOMY 97213;  Surgeon: Kendell Valentin MD;  Location: [facility], [facility]    Colonoscopy      Craniotomy for lobotomy Left     D & c  2009    Dilation/curettage,diagnostic      Hip replacement surgery  2009    Rt hip    Knee replacement surgery      Both knees    Other surgical history      Thyroid removal    Other surgical history      LT foot spur removal    Thyroidectomy Bilateral     Total hip replacement      Total knee replacement       Family History   Problem Relation Age of Onset    Hypertension Father     Lipids Father     Diabetes Mother     Hypertension Mother     Lipids Mother     Cancer Brother     Hypertension Brother       Social History:  Social History     Socioeconomic History    Marital status: Single   Tobacco Use    Smoking status: Former     Current packs/day: 0.00     Average packs/day: 0.5 packs/day for 30.0 years (15.0 ttl pk-yrs)     Types: Cigarettes     Start date: 1961     Quit date: 1991     Years since quittin.6    Smokeless tobacco: Never   Vaping Use    Vaping  status: Never Used   Substance and Sexual Activity    Alcohol use: No    Drug use: No   Other Topics Concern    Caffeine Concern No    Exercise Yes    Seat Belt Yes    Special Diet Yes     Comment: low sodium    Stress Concern Yes    Weight Concern No     Social Determinants of Health     Food Insecurity: No Food Insecurity (9/11/2024)    Food Insecurity     Food Insecurity: Never true   Transportation Needs: No Transportation Needs (9/11/2024)    Transportation Needs     Lack of Transportation: No   Housing Stability: Low Risk  (9/11/2024)    Housing Stability     Housing Instability: No          Medications:  Current Facility-Administered Medications   Medication Dose Route Frequency    warfarin (Coumadin) tab 5 mg  5 mg Oral Once at night    bumetanide (Bumex) tab 2 mg  2 mg Oral BID (Diuretic)    benzocaine (Hurricaine/Topex) 20 % mouth spray 1 spray  1 spray Mouth/Throat See Admin Instructions    benzocaine (Hurricaine/Topex) 20 % mouth spray 1 spray  1 spray Mouth/Throat See Admin Instructions    vancomycin (Vancocin) 1.25 g in sodium chloride 0.9% 250mL IVPB premix  15 mg/kg (Adjusted) Intravenous Q24H    famotidine (Pepcid) tab 20 mg  20 mg Oral BID    benzocaine (Hurricaine/Topex) 20 % mouth spray 1 spray  1 spray Mouth/Throat See Admin Instructions    triamcinolone (Kenalog) 0.1 % cream   Topical BID PRN    calcium carbonate (Tums) chewable tab 1,000 mg  1,000 mg Oral TID PRN    allopurinol (Zyloprim) tab 100 mg  100 mg Oral Nightly    buPROPion SR (WELLBUTRIN SR) 12 hr tab 150 mg  150 mg Oral BID    clopidogrel (Plavix) tab 75 mg  75 mg Oral Nightly    cyproheptadine (Periactin) tab 4 mg  4 mg Oral Nightly    digoxin (Lanoxin) tab 125 mcg  125 mcg Oral Nightly    dilTIAZem ER (Dilacor XR) 24 hr cap 240 mg  240 mg Oral Nightly    FLUoxetine (PROzac) cap 20 mg  20 mg Oral BID    levothyroxine (Synthroid) tab 175 mcg  175 mcg Oral Before breakfast    metoprolol succinate ER (Toprol XL) 24 hr tab 25 mg  25 mg  Oral Nightly    nortriptyline (Pamelor) cap 25 mg  25 mg Oral Nightly    rosuvastatin (Crestor) tab 5 mg  5 mg Oral QOD    spironolactone (Aldactone) tab 25 mg  25 mg Oral Daily    melatonin tab 3 mg  3 mg Oral Nightly PRN    ferrous sulfate DR tab 325 mg  325 mg Oral Nightly    acetaminophen (Tylenol) tab 650 mg  650 mg Oral Q6H PRN           Allergies  Allergies   Allergen Reactions    Adhesive Tape HIVES     ALL ADHESIVES    Codeine HIVES    Doxycycline SWELLING    Hydrocodone UNKNOWN    Lisinopril TONGUE SWELLING    Morphine Sulfate HIVES    Opioid Analgesics UNKNOWN    Oxycontin ITCHING    Oxytocin HIVES    Vicodin [Hydrocodone-Acetaminophen] ITCHING    Skin Adhesives OTHER (SEE COMMENTS)               REVIEW OF SYSTEMS:   GENERAL HEALTH: no fevers  SKIN: cellulitis  EARS: no recent changes in hearing  EYE: no recent vision changes  THROAT: denies sore throat  RESPIRATORY: denies cough or shortness of breath with exertion  CARDIOVASCULAR: denies chest pain, syncope, or palpitations  GI: denies abdominal pain, nausea and vomiting  NEURO: denies headaches and focal neurologic symptoms  PSYCH: no recent depression  ENDOCRINE: no change in sleep pattern  HEME: no easy bruising  All other systems reviewed and negative.          EXAM:         TELE: AF          /79 (BP Location: Right arm)   Pulse 73   Temp 97.8 °F (36.6 °C) (Oral)   Resp 16   Ht 5' 9\" (1.753 m)   Wt 196 lb (88.9 kg)   LMP  (LMP Unknown)   SpO2 100%   BMI 28.94 kg/m²   Temp (24hrs), Av.1 °F (36.7 °C), Min:97.7 °F (36.5 °C), Max:99.1 °F (37.3 °C)    Wt Readings from Last 3 Encounters:   24 196 lb (88.9 kg)   24 215 lb 11.2 oz (97.8 kg)   24 203 lb (92.1 kg)         Intake/Output Summary (Last 24 hours) at 2024 0926  Last data filed at 2024 0551  Gross per 24 hour   Intake 360 ml   Output 1100 ml   Net -740 ml         GENERAL: well developed, well nourished, in no apparent distress  SKIN: improving LE  cellulitis  HEENT: atraumatic, normocephalic, throat without erythema  NECK: supple, no bruits  LUNGS: clear to auscultation  CARDIO:irregular rhythm with 2/6 systolic murmur  GI: soft, nontender  EXTREMITIES: trace edema  NEUROLOGY: alert  PSYCH: cooperative          LABORATORY DATA:               ECG:        ECHO 9/14/24:  1. Left ventricle: The cavity size was normal. Wall thickness was normal.      Systolic function was at the lower limits of normal. The estimated      ejection fraction was 50%, by visual assessment. Unable to assess LV      diastolic function due to heart rhythm.   2. Right ventricle: The cavity size was normal. Systolic function was      normal.   3. Left atrium: The left atrial volume was markedly increased.   4. Right atrium: The atrium was mildly dilated.   5. Aortic valve: Elizondo MINOR S3 23mm (TAVR) bioprosthesis was present.      There was no significant regurgitation. The peak systolic velocity was      3.19m/sec. The mean systolic gradient was 19mm Hg. The valve area (VTI)      was 1.46cm^2. The valve area (VTI) index was 0.68cm^2/m^2.   6. Pulmonary arteries: Systolic pressure was mildly increased, in the range      of 35mm Hg to 40mm Hg.   Impressions:  This study is compared with previous dated 8/15/2024: Ejection   fraction was 45-50%.         LOYD 5/24:  Moderate to severe left atrial enlargement.   Spontaneous echo contrast/\"smoke\" in the left atrium and left atrial appendage.   No left atrial or left atrial appendage thrombus.   Moderate right atrial enlargement.   Low normal left ventricular systolic function with ejection fraction visually estimated at 50%.   Thickened and calcified mitral valve leaflets with reduced excursion.   There is a mobile echogenicity noted in association with the mitral valve. This appears associated with the chordal apparatus attaching to the posterior leaflet of the mitral valve. This echogenicity appears to be right beneath the posterior leaflet  of the mitral valve by the chordal attachment and measures approximately 0.5 x 0.7 cm in size. The differential diagnosis of this findings includes fibrinous stranding, thrombus, or vegetation.   Mild mitral regurgitation.  Well seated TAVR prosthesis.   Mild tricuspid regurgitation.   Pulmonary artery pressure estimated to fall within normal limits.   No color flow Doppler or saline echo contrast evidence of interatrial septal shunting.   Mild atheromatous plaquing noted in the aorta in the regions visualized.            Labs:  Recent Labs   Lab 09/16/24  0706 09/17/24  0607 09/18/24  0541   * 125* 130*   BUN 30* 31* 33*   CREATSERUM 0.72  0.72 0.82  0.82 0.84  0.84   CA 9.8 9.8 10.0    139 139   K 4.0  4.0 3.9 3.9    100 99   CO2 30.0 33.0* 35.0*     No results for input(s): \"TROP\" in the last 168 hours.  Recent Labs   Lab 09/18/24  0541   RBC 4.60   HGB 14.4   HCT 42.9   MCV 93.3   MCH 31.3   MCHC 33.6   RDW 13.8   NEPRELIM 9.25*   WBC 11.9*   .0     No results for input(s): \"TROP\", \"TROPHS\", \"CK\" in the last 168 hours.  Lab Results   Component Value Date    PT 23.7 (H) 10/09/2011    INR 2.43 (H) 09/18/2024    INR 2.16 (H) 09/17/2024    INR 2.30 (H) 09/16/2024      Imaging:  XR CHEST AP PORTABLE  (CPT=71045)    Result Date: 9/16/2024  PROCEDURE:  XR CHEST AP PORTABLE  (CPT=71045)  TECHNIQUE:  AP chest radiograph was obtained.  COMPARISON:  [facility] , XR, XR CHEST AP PORTABLE  (CPT=71045), 8/12/2024, 11:15 PM.  INDICATIONS:  PICC placement  PATIENT STATED HISTORY: (As transcribed by Technologist)  Patient offered no additional history at this time.     FINDINGS: Cardiac silhouette and pulmonary vasculature are unremarkable.  Right shoulder arthroplasty is noted.  Right-sided PICC line with tip in the SVC. No consolidation, pleural effusion or pneumothorax. IMPRESSION: Right-sided PICC line tip in the SVC.  No pneumothorax.   LOCATION:  [facility]      Dictated by (CST): Berry Pierre,  MD on 9/16/2024 at 12:48 PM     Finalized by (CST): Berry Pierre MD on 9/16/2024 at 12:49 PM         Carlitos Martinez MD

## 2024-09-18 NOTE — CONSULTS
[facility] Dosing Service  Warfarin (Coumadin) Subsequent Dosing    Arleth Weiss is a 69 year old patient for whom pharmacy is dosing warfarin (Coumadin). Goal INR is 2.5-3.5    Recent Labs   Lab 09/14/24  0802 09/15/24  0801 09/16/24  0706 09/17/24  0607 09/18/24  0541   INR 2.20* 2.30* 2.30* 2.16* 2.43*     Consulted by:  Dr. Villarreal  Indication:  Afib, PE, recent TAVR  Potential Drug Interactions:  Allopurinol, Clopidogrel, Levothyroxine  Other Anticoagulants:  none  Home regimen (if applicable):  2.5 mg MWF, 5 mg ROW     Inpatient Dosing History:        Date 9/11 9/12 9/13  9/14 9/15 9/16 9/17 9/18   INR 1.99 2.28 1.89  2.20 2.30 2.30 2.16 2.43   Warfarin  dose 2.5 mg 6 mg  7.5 mg 5mg  5 mg   7.5mg  7.5              Based on above -  1.  For today, Give warfarin (COUMADIN) 5 mg at 2100 tonight  2   PT/INR ordered daily while on warfarin  3.  Pharmacy will continue to follow.  We appreciate the opportunity to assist in the care of this patient.    Eva Nix, PharmD  9/18/2024  9:24 AM

## 2024-09-18 NOTE — DIETARY NOTE
[facility]   part of [facility]   CLINICAL NUTRITION    Arleth Weiss     Admitting diagnosis:  Left leg cellulitis [L03.116]    Ht: 175.3 cm (5' 9\")  Wt: 88.9 kg (196 lb).   Body mass index is 28.94 kg/m².  IBW: 65.9 kg    Wt Readings from Last 6 Encounters:   09/18/24 88.9 kg (196 lb)   08/16/24 97.8 kg (215 lb 11.2 oz)   07/01/24 92.1 kg (203 lb)   06/20/24 92.1 kg (203 lb)   05/31/24 92.1 kg (203 lb)   01/11/24 96.3 kg (212 lb 4.9 oz)        Labs/Meds reviewed    Diet:       Procedures    Cardiac diet Cardiac; Is Patient on Accuchecks? No     Percent Meals Eaten (last 3 days)       None            Pt chart reviewed d/t LOS.  Patient reports good appetite at this time.  Nursing notes reports Percent Meals Eaten (%): 100 % intake for last meal.  Tolerating po diet without diarrhea, emesis, or constipation.   No significant weight changes noted.     PMH includes thyroid CA, COPD, PVD, HF, CKD. Pt p/w left leg cellulitis .Pt seen for LOS.  Pt reports excellent appetite, just doesn't always enjoy the food in the hospital.  Reports excellent appetite PTA.  Feels wt is stable, other than wt loss from diuresis during admit.  No n/v/d. Last BM 9/17. NKFA. No questions/concerns re: diet at this time.  Pt anticipating discharge this date.    Patient is at low nutrition risk at this time.    Please consult if patient status changes or nutrition issues arise.    Terrie Joyce RD, LDN, [facility]  Clinical Dietitian  Phone [phone]

## 2024-09-18 NOTE — CM/SW NOTE
Pt discussed in rounds and per RN, pt is anticipated to discharge today. SENDY requested RN reach out to ID to confirm if they are agreeable with pt missing overnight dose since IV Vanco is Q12H and HHRN will not provide training until tomorrow morning. SW received message from [facility] in [place] stating they are able to provide a line extender and training videos for reference. SW met with pt at bedside to confirm discharge plan.     Pt stated she will be returning home with home health at discharge. Pt stated she has been to numerous SNF facilities in the past and does not want to go to a SNF. Pt stated she feels comfortable and confident managing her IV antibiotics at home as long as she has a line extender. SW informed pt she will receive dose of IV Vanco prior to discharge and will likely miss overnight dose until HHRN provides training tomorrow. Pt in agreement with discharge plan home with [facility] and [facility]. Pt stated she will be driving herself home. Pt denied any further needs at this time.     SW messaged [facility]  in [place] to notify of discharge today and requested SOC tomorrow morning 9/19/24.     SENDY messaged [facility] in [place] to notify of discharge today and coordinate delivery today. SW received message from Dilma at [facility] stating she will reach out to pt. Dilma stated an updated Vanco Trough is needed, messaged RN. SENDY will continue to follow.     Addendum (12:20pm) - SENDY updated Dilma at [facility] that Vanco Trough would be drawn at 3pm. SENDY received message from Dilma requesting Vanco Trough be drawn STAT because if new orders are placed she will need them by 4pm in order to deliver today. Updated RN.     Addendum (12:40pm) - SENDY spoke with Dilma at [facility] via phone. Dilma stated the recommendations from the pharmacist and the IV ABX order from ID do not match. Dilma stated the Vanco trough will need to be drawn an hour before the dose is given, which is at  3pm. Dilma stated results will need to be completed stat in order for pharmacy to make recommendations and reach out to MD for updated order if needed. Dilma stated the cut off time for delivery today is at 4pm. RN aware. SENDY will continue to follow.     Addendum (4:30pm) - SENDY noted pt's Vanco Trough resulted. CM reached out to ID and Pharmacist, ID updated pt's final IV ABX order. Pt will discharge with IV Vanco daily.     SENDY spoke with Dilma at [facility] via phone. SENDY attached Vanco Trough and final IV ABX order to [place] referral. Dilma stated she has all needed information and [facility] will deliver the medication/supplies to pt's residence tomorrow. Dilma stated she spoke with [facility]  who will provide SOC after 3pm tomorrow 9/19/24.     SENDY met with pt at bedside and updated her on discharge plan. Pt stated it is too late and she is unable to return home today. SENDY reminded pt time of vanco dose at 4pm was discussed this morning, pt stated she thought she would be able to get her dose earlier. Pt stated it will be too dark for her to drive home and she lives 2 [place]/2 hours away. SENDY informed pt she is medically cleared to discharge and must discharge today, or appeal the discharge. SENDY informed pt [facility] will deliver the medication/supplies tomorrow and [facility] will provide SOC tomorrow. SENDY offered Medic[facility], pt stated she cannot afford the Medicar cost. SENDY spoke with CM who stated pt is medically cleared to discharge and if pt remains in the hospital she runs the risk of insurance no longer covering her stay. SENDY google searched the distance from  to pt's residence and it comes up as 1 hour and 20 minutes away.     SENDY met with pt at bedside, joint visit with RN. SENDY informed pt she can have a friend/family drive her home or use a Medicar. Pt stated she cannot afford a Medicar and does not have a family or friend to drive her. RN stated the Vanco will finish infusing at 5:30pm, informed pt she  can discharge once Vanco is finished. Pt voiced frustration with not being informed of discharge or time of discharge today. SW informed pt the treatment team determines medical clearance. SW informed pt if she remains in the hosptial she runs the risk of insurance no longer covering her stay since she is medically cleared. Pt stated 'I'll figure it out.' SW reiterated to pt option for Medicar, pt declined. Pt stated she will discharge home today and drive herself.     [facility] and [facility]  information on AVS.     GEN Almanza  Discharge Planner

## 2024-09-18 NOTE — PROGRESS NOTES
[facility] - Nephrology  Inpatient Follow-up    Arleth Weiss Patient Status:  Inpatient    1955 MRN [id number]   Location [facility] 3NE-A Attending Tin Anaya DO   Hosp Day # 6 PCP Viktoriya Garcias DO       SUBJECTIVE:     Patient seen and examined at bedside. No acute complaints today.     MEDICATIONS:     Current Facility-Administered Medications   Medication Dose Route Frequency    warfarin (Coumadin) tab 5 mg  5 mg Oral Once at night    bumetanide (Bumex) tab 2 mg  2 mg Oral BID (Diuretic)    benzocaine (Hurricaine/Topex) 20 % mouth spray 1 spray  1 spray Mouth/Throat See Admin Instructions    benzocaine (Hurricaine/Topex) 20 % mouth spray 1 spray  1 spray Mouth/Throat See Admin Instructions    vancomycin (Vancocin) 1.25 g in sodium chloride 0.9% 250mL IVPB premix  15 mg/kg (Adjusted) Intravenous Q24H    famotidine (Pepcid) tab 20 mg  20 mg Oral BID    benzocaine (Hurricaine/Topex) 20 % mouth spray 1 spray  1 spray Mouth/Throat See Admin Instructions    triamcinolone (Kenalog) 0.1 % cream   Topical BID PRN    calcium carbonate (Tums) chewable tab 1,000 mg  1,000 mg Oral TID PRN    allopurinol (Zyloprim) tab 100 mg  100 mg Oral Nightly    buPROPion SR (WELLBUTRIN SR) 12 hr tab 150 mg  150 mg Oral BID    clopidogrel (Plavix) tab 75 mg  75 mg Oral Nightly    cyproheptadine (Periactin) tab 4 mg  4 mg Oral Nightly    digoxin (Lanoxin) tab 125 mcg  125 mcg Oral Nightly    dilTIAZem ER (Dilacor XR) 24 hr cap 240 mg  240 mg Oral Nightly    FLUoxetine (PROzac) cap 20 mg  20 mg Oral BID    levothyroxine (Synthroid) tab 175 mcg  175 mcg Oral Before breakfast    metoprolol succinate ER (Toprol XL) 24 hr tab 25 mg  25 mg Oral Nightly    nortriptyline (Pamelor) cap 25 mg  25 mg Oral Nightly    rosuvastatin (Crestor) tab 5 mg  5 mg Oral QOD    spironolactone (Aldactone) tab 25 mg  25 mg Oral Daily    melatonin tab 3 mg  3 mg Oral Nightly PRN    ferrous sulfate DR tab 325 mg  325 mg Oral Nightly     acetaminophen (Tylenol) tab 650 mg  650 mg Oral Q6H PRN       PHYSICAL EXAM:     Vital Signs: /77 (BP Location: Right arm)   Pulse 59   Temp 97.4 °F (36.3 °C) (Oral)   Resp 18   Ht 5' 9\" (1.753 m)   Wt 196 lb (88.9 kg)   LMP  (LMP Unknown)   SpO2 (!) 77%   BMI 28.94 kg/m²   Temp (24hrs), Av °F (36.7 °C), Min:97.4 °F (36.3 °C), Max:99.1 °F (37.3 °C)       Intake/Output Summary (Last 24 hours) at 2024 1213  Last data filed at 2024 0551  Gross per 24 hour   Intake 360 ml   Output 1100 ml   Net -740 ml     Wt Readings from Last 3 Encounters:   24 196 lb (88.9 kg)   24 215 lb 11.2 oz (97.8 kg)   24 203 lb (92.1 kg)       General: no acute distress  HEENT: normocephalic, atraumatic  CV: RRR  Respiratory: no distress  Abdomen: soft, non-tender  Extremities: + edema bilaterally  Skin: warm, dry  Neuro: awake, alert     LABORATORY DATA:     Lab Results   Component Value Date     (H) 2024    BUN 33 (H) 2024    BUNCREA 28.0 (H) 10/07/2021    CREATSERUM 0.84 2024    CREATSERUM 0.84 2024    ANIONGAP 5 2024    GFR 59 (L) 2010    GFRNAA 78 2022    GFRAA 90 2022    CA 10.0 2024    OSMOCALC 297 (H) 2024    ALKPHO 112 2024    AST 37 (H) 2024    ALT 82 (H) 2024    BILT 0.7 2024    TP 6.5 2024    ALB 3.6 2024    GLOBULIN 2.9 2024    AGRATIO 2.1 2014     2024    K 3.9 2024    CL 99 2024    CO2 35.0 (H) 2024     Lab Results   Component Value Date    WBC 11.9 (H) 2024    RBC 4.60 2024    HGB 14.4 2024    HCT 42.9 2024    .0 2024    MCV 93.3 2024    MCH 31.3 2024    MCHC 33.6 2024    RDW 13.8 2024    NEPRELIM 9.25 (H) 2024    NEPERCENT 77.6 2024    LYPERCENT 12.3 2024    MOPERCENT 7.6 2024    EOPERCENT 0.6 2024    BAPERCENT 0.3 2024    NE 9.25 (H)  09/18/2024    LYMABS 1.46 09/18/2024    MOABSO 0.90 09/18/2024    EOABSO 0.07 09/18/2024    BAABSO 0.04 09/18/2024     Lab Results   Component Value Date    CREUR 128.00 03/16/2021     Lab Results   Component Value Date    COLORUR Yellow 05/24/2024    CLARITY Clear 05/24/2024    SPECGRAVITY 1.024 05/24/2024    GLUUR >1000 (A) 05/24/2024    BILUR Negative 05/24/2024    KETUR Negative 05/24/2024    BLOODURINE Negative 05/24/2024    PHURINE 7.0 05/24/2024    PROUR Negative 05/24/2024    UROBILINOGEN Normal 05/24/2024    NITRITE Negative 05/24/2024    LEUUR Negative 05/24/2024    NMIC Microscopic not indicated 05/24/2024    WBCUR 11-20 (A) 02/10/2023    RBCUR 0-2 02/10/2023    EPIUR Few (A) 02/10/2023    BACUR Rare (A) 02/10/2023    CAOXUR Few (A) 10/05/2022    HYLUR Present (A) 02/10/2023       IMAGING:     Reviewed     ASSESSMENT:      # HFrEF  # Hypertension  -Home medications: bumetanide 2mg daily, jardiance 10mg daily, spironolactone 25,g daily, metolazone PRN, metoprolol 25mg daily. Diltiazem 240mg daily     # MRSA bacteremia  # Cellulitis     PLAN:      -Bumetanide 2mg BID with metolazone 2.5mg PRN for weight gain >5 lbs on discharge  -Continue spironolactone 25mg daily  -Okay to resume jardiance from nephrology perspective, will help  -Will need close follow-up with cardiology and nephrology to manage volume status  -Antibiotic management per ID - monitor vancomycin trough levels - high a couple of days ago  -Avoid nephrotoxins and renally dose medications for creatinine clearance  -Monitor intake and output daily  -Daily weights        Okay for discharge from nephrology perspective.    Thank you for allowing us to participate in the care of this patient.       DO Mariel Lafleur [facility] - Nephrology

## 2024-09-18 NOTE — PROGRESS NOTES
[facility]   part of [facility] Infectious Disease Progress Note    Arleth Weiss Patient Status:  Inpatient    1955 MRN [id number]   Location [facility] 3NE-A Attending Tin Anaya DO   Hosp Day # 6 PCP Viktoriya Garcias DO     Subjective:  Pt with no new complaints.     Objective:    Allergies:  Allergies   Allergen Reactions    Adhesive Tape HIVES     ALL ADHESIVES    Codeine HIVES    Doxycycline SWELLING    Hydrocodone UNKNOWN    Lisinopril TONGUE SWELLING    Morphine Sulfate HIVES    Opioid Analgesics UNKNOWN    Oxycontin ITCHING    Oxytocin HIVES    Vicodin [Hydrocodone-Acetaminophen] ITCHING    Skin Adhesives OTHER (SEE COMMENTS)       Medications:    Current Facility-Administered Medications:     warfarin (Coumadin) tab 5 mg, 5 mg, Oral, Once at night    bumetanide (Bumex) tab 2 mg, 2 mg, Oral, BID (Diuretic)    benzocaine (Hurricaine/Topex) 20 % mouth spray 1 spray, 1 spray, Mouth/Throat, See Admin Instructions    benzocaine (Hurricaine/Topex) 20 % mouth spray 1 spray, 1 spray, Mouth/Throat, See Admin Instructions    vancomycin (Vancocin) 1.25 g in sodium chloride 0.9% 250mL IVPB premix, 15 mg/kg (Adjusted), Intravenous, Q24H    famotidine (Pepcid) tab 20 mg, 20 mg, Oral, BID    benzocaine (Hurricaine/Topex) 20 % mouth spray 1 spray, 1 spray, Mouth/Throat, See Admin Instructions    triamcinolone (Kenalog) 0.1 % cream, , Topical, BID PRN    calcium carbonate (Tums) chewable tab 1,000 mg, 1,000 mg, Oral, TID PRN    allopurinol (Zyloprim) tab 100 mg, 100 mg, Oral, Nightly    buPROPion SR (WELLBUTRIN SR) 12 hr tab 150 mg, 150 mg, Oral, BID    clopidogrel (Plavix) tab 75 mg, 75 mg, Oral, Nightly    cyproheptadine (Periactin) tab 4 mg, 4 mg, Oral, Nightly    digoxin (Lanoxin) tab 125 mcg, 125 mcg, Oral, Nightly    dilTIAZem ER (Dilacor XR) 24 hr cap 240 mg, 240 mg, Oral, Nightly    FLUoxetine (PROzac) cap 20 mg, 20 mg, Oral, BID    levothyroxine (Synthroid) tab 175 mcg, 175 mcg,  Oral, Before breakfast    metoprolol succinate ER (Toprol XL) 24 hr tab 25 mg, 25 mg, Oral, Nightly    nortriptyline (Pamelor) cap 25 mg, 25 mg, Oral, Nightly    rosuvastatin (Crestor) tab 5 mg, 5 mg, Oral, QOD    spironolactone (Aldactone) tab 25 mg, 25 mg, Oral, Daily    melatonin tab 3 mg, 3 mg, Oral, Nightly PRN    ferrous sulfate DR tab 325 mg, 325 mg, Oral, Nightly    acetaminophen (Tylenol) tab 650 mg, 650 mg, Oral, Q6H PRN    Physical Exam:  General: Alert, orientated x3.  Cooperative.  No apparent distress.  Vital Signs:  Blood pressure 129/79, pulse 73, temperature 97.8 °F (36.6 °C), temperature source Oral, resp. rate 16, height 175.3 cm (5' 9\"), weight 196 lb (88.9 kg), SpO2 100%, not currently breastfeeding.   Temp (24hrs), Av.1 °F (36.7 °C), Min:97.7 °F (36.5 °C), Max:99.1 °F (37.3 °C)      HEENT: Exam is unremarkable.  Without scleral icterus.  Mucous membranes are moist. PERRLA.  Oropharynx is clear.  Neck: No tenderness to palpitation.  Full range of motion to flexion and extension, lateral rotation and lateral flexion of cervical spine.  No JVD. Supple.   Lungs: Clear to auscultation bilaterally.  Cardiac: Regular rate and rhythm. No murmur.  Abdomen:  Soft, non-distended, non-tender, with no rebound or guarding.   Extremities:  No lower extremity edema noted.  Without clubbing or cyanosis.    Skin: Normal texture and turgor.  Neurologic: Cranial nerves are grossly intact.  Motor strength and sensory examination is grossly normal.  No focal neurologic deficit.    Labs:  Lab Results   Component Value Date    WBC 11.9 2024    HGB 14.4 2024    HCT 42.9 2024    .0 2024    CREATSERUM 0.84 2024    CREATSERUM 0.84 2024    BUN 33 2024     2024    K 3.9 2024    CL 99 2024    CO2 35.0 2024     2024    CA 10.0 2024    INR 2.43 2024         Assessment/Plan:    1.  MRSA sepsis  -blood culture positive on  9/11, repeat on 9/12 NG  -secondary to LLE cellulitis  -LOYD negative for infectious process  -on IV vancomycin  2.  Leukocytosis  -improving  3.  Dispo  -IV vancomycin x 4 weeks through 10/9/2024, continue with current dosing of 1.25g q24hrs on dc  -repeat blood cultures 2 weeks after last dose of abx  -follow up in 2 weeks, okay for video visit    If you have any questions or concerns please call [facility] Infectious Disease at [phone].     Fidencio Ngo, APRN

## 2024-09-19 VITALS
SYSTOLIC BLOOD PRESSURE: 143 MMHG | HEIGHT: 69 IN | OXYGEN SATURATION: 99 % | RESPIRATION RATE: 18 BRPM | BODY MASS INDEX: 29.82 KG/M2 | WEIGHT: 201.31 LBS | HEART RATE: 77 BPM | DIASTOLIC BLOOD PRESSURE: 81 MMHG | TEMPERATURE: 98 F

## 2024-09-19 LAB
CREAT BLD-MCNC: 0.86 MG/DL
EGFRCR SERPLBLD CKD-EPI 2021: 72 ML/MIN/1.73M2 (ref 60–?)
INR BLD: 2.91 (ref 0.8–1.2)
PROTHROMBIN TIME: 30.8 SECONDS (ref 11.6–14.8)

## 2024-09-19 PROCEDURE — 85610 PROTHROMBIN TIME: CPT | Performed by: INTERNAL MEDICINE

## 2024-09-19 PROCEDURE — 82565 ASSAY OF CREATININE: CPT | Performed by: INTERNAL MEDICINE

## 2024-09-19 RX ORDER — FAMOTIDINE 20 MG/1
20 TABLET, FILM COATED ORAL
COMMUNITY
Start: 2024-08-22

## 2024-09-19 RX ORDER — VANCOMYCIN HYDROCHLORIDE
1.25 EVERY 24 HOURS
Qty: 5750 ML | Refills: 0 | Status: SHIPPED | OUTPATIENT
Start: 2024-09-19 | End: 2024-10-12

## 2024-09-19 NOTE — PROGRESS NOTES
Infectious Disease Progress Note      Date of admission: 9/11/2024  1:39 PM     Reason for consult: MRSA bacteremia, left lower extremity cellulitis    Subjective: Patient continues to feel well.  Cellulitis is almost resolved at this point.  No nausea or vomiting.  No diarrhea.  No shortness of breath.  No cough or sputum production.    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  Currently on IV vancomycin with clinical improvement.  Clearance cultures with no growth to date.  LOYD without endocarditis    Medications:    bumetanide    benzocaine    benzocaine    vancomycin    famotidine    benzocaine    triamcinolone    calcium carbonate    allopurinol    buPROPion SR    clopidogrel    cyproheptadine    digoxin    dilTIAZem ER    FLUoxetine    levothyroxine    metoprolol succinate ER    nortriptyline    rosuvastatin    spironolactone    melatonin    ferrous sulfate    acetaminophen     Allergies:  Allergies   Allergen Reactions    Adhesive Tape HIVES     ALL ADHESIVES    Codeine HIVES    Doxycycline SWELLING    Hydrocodone UNKNOWN    Lisinopril TONGUE SWELLING    Morphine Sulfate HIVES    Opioid Analgesics UNKNOWN    Oxycontin ITCHING    Oxytocin HIVES    Vicodin [Hydrocodone-Acetaminophen] ITCHING    Skin Adhesives OTHER (SEE COMMENTS)       Physical Exam:  Vitals:    09/19/24 0757   BP:    Pulse: 77   Resp:    Temp:      Vitals signs and nursing note reviewed.   Constitutional:       Appearance: Normal appearance.   HENT:      Head: Normocephalic and atraumatic.      Mouth: Mucous membranes are moist.   Neck:      Musculoskeletal: Neck supple.   Cardiovascular:      Rate and Rhythm: Normal rate.    Pulmonary:      Effort: Pulmonary  effort is normal. No respiratory distress.   Musculoskeletal:      Right lower leg: No edema.      Left lower leg: No edema.  Resolving cellulitis  Skin:     General: Skin is warm and dry.   Neurological:      General: No focal deficit present.      Mental Status: Alert and oriented to person, place, and time.       Laboratory data:  I have reviewed all the lab results independently.  Lab Results   Component Value Date    CREATSERUM 0.86 09/19/2024    INR 2.91 09/19/2024      Recent Labs   Lab 09/18/24  0541   RBC 4.60   HGB 14.4   HCT 42.9   MCV 93.3   MCH 31.3   MCHC 33.6   RDW 13.8   NEPRELIM 9.25*   WBC 11.9*   .0      Microbiology data:  Hospital Encounter on 09/11/24   1. Blood Culture     Status: None    Collection Time: 09/12/24  2:54 PM    Specimen: Blood,peripheral   Result Value Ref Range    Blood Culture Result No Growth 5 Days N/A      Impression:  Arleth Weiss is a 69 year old female with    MRSA bacteremia/sepsis with threat to life  This is in the setting of left lower extremity cellulitis  Antimicrobials:  Ceftriaxone from 9/11 to 9/12  IV vancomycin from 9/12 through today  Repeat blood cultures from 9/12 with no growth to date  LOYD without endocarditis  Leukocytosis  Reactive  Improving  Mild transaminitis  Related to sepsis  Improving  History peripheral vascular disease  As per the primary team  Chronic stasis dermatitis  As per the primary team    Recommendations:    Discharge patient on IV vancomycin for total of 4 weeks, through 10/9/2024  Weekly CBC with differential and CMP and vancomycin trough, sed rate and CRP.  Fax results to [facility]. Fax: [phone]. Tel: [phone].  PICC line care as per protocol.  The patient will need repeat blood cultures 2 weeks after cessation of antibiotics to confirm complete resolution of her infection  Follow-up with infectious disease in 2 weeks  Okay to discharge from ID perspective once okay with the other services    The plan  of care was discussed with the primary hospital team, Tin Anaya DO     Recommendations were also discussed with the patient; all questions were answered.     Thank you for this consultation. Please don't hesitate to call the ID team for questions or any acute changes in patient's clinical condition.    Please note that this report has been produced using speech recognition software and may contain errors related to that system including, but not limited to, errors in grammar, punctuation, and spelling, as well as words and phrases that possibly may have been recognized inappropriately.  If there are any questions or concerns, contact the dictating provider for clarification.    The  [employer] C[employer] Act makes medical notes like these available to patients in the interest of transparency. Please be advised this is a medical document. Medical documents are intended to carry relevant information, facts as evident, and the clinical opinion of the practitioner. The medical note is intended as peer to peer communication and may appear blunt or direct. It is written in medical language and may contain abbreviations or verbiage that are unfamiliar.     Yvette Whitfield MD  [facility]. Tel: [phone]. Fax: [phone].     Arleth Weiss : 1955 MRN: [id number] [facility]: [id number]

## 2024-09-19 NOTE — PLAN OF CARE
Assumed care at 0730  A/Ox4. ANG Coffman on tele   Denies pain at this time   Purewick in place   IV Vanco q24  RUE PICC in place for discharge   Safety precautions in place. All needs met at this time

## 2024-09-19 NOTE — PLAN OF CARE
NURSING DISCHARGE NOTE    Discharged Home via Wheelchair.  Accompanied by Support staff  Belongings Taken by patient/family.    AVS paperwork reviewed with patient   PICC line C/D/I   Pt taken to ED entrance to her vehicle

## 2024-09-19 NOTE — CM/SW NOTE
SW received message from RN yesterday stating dc order was cancelled yesterday due to concern with pt driving home at night. SW messaged RN and stated pt will need to discharge if still medically cleared, RN reached out to hospitalist.     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 SW messaged [facility] in [place] to notify them pt will discharge this morning and to reach out to pt regarding delivery if needed.     GEN Almanza  Discharge Planner

## 2024-09-19 NOTE — PLAN OF CARE
End of shift note:  Pt is A&Ox4, on tele, controlled afib. Uses room air during the day and 2l via NC @ HS. Eating and drinking w/ no issues-uses purewick. Has RUE picc for long term abx-dressing c/d/I. Safety and isolation precautions maintained. C/O pain overnight-medicated per mar. Plan for discharge home in am. Pt updated on POC, all questions answered.

## 2024-09-25 NOTE — DISCHARGE SUMMARY
General Medicine Discharge Summary     Patient ID:  Arleth Weiss  69 year old  4/26/1955    Admit date: 9/11/2024    Discharge date and time: 9/19/2024 11:35 AM     Attending Physician: No att. providers found     Consults: IP CONSULT TO INFECTIOUS DISEASE  IP CONSULT TO SOCIAL WORK  IP CONSULT TO CARDIOLOGY  IP CONSULT TO VASCULAR ACCESS TEAM  IP CONSULT TO NEPHROLOGY    Primary Care Physician: Viktoriya Garcias DO     Reason for admission: Leg cellulitis    Risk For Readmission: moderate    Discharge Diagnoses: Left leg cellulitis [L03.116]  See Additional Discharge Diagnoses in Hospital Course    Discharged Condition: stable    Follow-up with labs/images appointments: f/u with renal, cards, pcp    Exam  Gen: No acute distress  Pulm: Lungs clear, normal respiratory effort  CV: Heart with regular rate and rhythm  Abd: Abdomen soft,       HPI:     Hospital Course:   68 y/o F w/ PMHx of HFrEF, aortic stenosis status post TAVR, CAD status post PCI in 2021, PVD, HTN, HLD, A-fib on oral anticoagulation, hypothyroidism, SEBASTIEN who presents to the hospital w/ complaints of pain, swelling and redness to LLE. Diagnosed w/ Cellulitis.  Patient also found to have MRSA bacteremia.  Patient was seen and evaluated by infectious disease, she was started on IV antibiotics, also underwent TTE and a LOYD given concerns for MRSA bacteremia to rule out any vegetation.  Her LOYD was negative for any vegetation.  She was seen and evaluated by cardiology.  Additionally, she was managed for acute on chronic diastolic heart failure.  IV diuresis with Lasix was done, and diuretics were switched over to patient's home regimen of Bumex.  Nephrology was also consulted to help manage her diuretics in the setting of CKD.  Patient should continue to follow-up with her primary nephrologist in the outpatient setting.  Home health was arranged for the patient for IV antibiotics postdischarge.  She should continue to follow-up with ID after completing the  antibiotics course.  She continued to improve in terms of her leg swelling as well as her cellulitis and cleared for discharge with outpatient follow-up.     MRSA bacteremia  LLE Cellulitis  Plan:  - Continue CTX (9/11-), vancomycin (9/12-)  - Repeat blood cultures NGTD  -PICC line placed 9/16 for antibiotics at discharge  - ID on consult, case discussed  - 2D echo noted  -LOYD without endocarditis  - Wound care consult     Acute on Chronic HFrEF Exacerbation  Hx of Aortic Stenosis s/p TAVR  Discharge weight at last hospitalization: 97.8kg  Last ECHO: stable from prior  Plan:  -Plan to continue home dose Bumex 2 mg daily, stop IV Lasix, metolazone as needed  - daily weights, strict I/os  - Pharmacy to dose Coumadin, goal INR 2.5-3.5  - Cardiology on consult, recommendations reviewed        CKD  - Creatinine has been stable  - Followed by nephrology, case discussed  - Home Bumex resumed with plan to add metolazone for extra weight gain        Hx of CAD s/p PCI  -s/p LHC in 2021 w/ PCI to Lcx  PVD  HTN  HLD  Plan:  - Continue Plavix  - BB  - Cardiology consult, recommendations appreciated     Hx of bicuspid AV  - s/p TAVR for Aortic stenosis 2021     Hx of PE  - coumadin,   - s/p IVC     SEBASTIEN  - Ferrous Sulfate     HypoT  - Synthroid     Sjogren's disease  - Supportive care     GERD  - famotidine           Operative Procedures:      Imaging: No results found.      Disposition: home    Activity: activity as tolerated  Diet: cardiac diet  Wound Care: keep wound clean and dry  Code Status: Full Code  O2: None    Home Medication Changes:     Med list     Medication List        START taking these medications      metOLazone 5 MG Tabs  Commonly known as: Zaroxolyn  Take 1 tablet (5 mg total) by mouth daily as needed (only if weight increases >5 lbs in 1 day).  Notes to patient: As needed for weight gain of > than 5 ilbsin 1 day     omeprazole 20 MG Cpdr  Commonly known as: PriLOSEC  Take 1 capsule (20 mg total) by mouth every  morning.  Notes to patient: For GERD and acid reflux     vancomycin in sodium chloride 0.9% 1.25 g/250mL Soln  Commonly known as: Vancocin  Inject 250 mL (1.25 g total) into the vein daily for 23 days. Weekly CBC with differential and CMP and vancomycin trough, sed rate and CRP.  Fax results to [facility]. Fax: [phone]. Tel: [phone].  PICC line care as per protocol.            CHANGE how you take these medications      digoxin 0.125 MG Tabs  Commonly known as: Lanoxin  Take 1 tablet (125 mcg total) by mouth daily.  What changed: when to take this  Notes to patient: Strengthens heart muscle and helps keep heart rate at a normal rate     dilTIAZem  MG Cp24  Commonly known as: CardIZEM CD  Take 1 capsule (240 mg total) by mouth daily.  What changed: when to take this  Notes to patient: Helps keep heart rhythm regular and at normal rate.    It also strengthens the heart muscle            CONTINUE taking these medications      acetaminophen 325 MG Tabs  Commonly known as: Tylenol  Notes to patient: As needed for pain     allopurinol 100 MG Tabs  Commonly known as: Zyloprim  Take 1 tablet (100 mg total) by mouth daily.  Notes to patient: For symptoms of gout     bumetanide 2 MG Tabs  Commonly known as: Bumex  Take 1 tablet (2 mg total) by mouth BID (Diuretic).  Notes to patient: Pulls excess fluid off of body     buPROPion  MG Tb12  Commonly known as: WELLBUTRIN SR  Notes to patient: Helps with symptoms of depression     calcium carbonate 500 MG Chew  Commonly known as: Tums  Notes to patient: As needed for upset stomach     Cholecalciferol 125 MCG (5000 UT) Tabs  Commonly known as: VITAMIN D-3  Notes to patient: Helps with bone strength     clopidogrel 75 MG Tabs  Commonly known as: Plavix  Notes to patient: Blood thinner     cyproheptadine 4 MG Tabs  Commonly known as: Periactin  Notes to patient: Lowers the histamine in body and you have less coughing and sneezing     famotidine 20 MG  Tabs  Commonly known as: Pepcid     ferrous sulfate 325 (65 FE) MG Tbec  Notes to patient: Iron  supplement     FLUoxetine 20 MG Caps  Commonly known as: PROzac  Notes to patient: Treats symptoms of depression     Jardiance 10 MG Tabs  Generic drug: empagliflozin  Notes to patient: Treats diabetes     levothyroxine 175 MCG Tabs  Commonly known as: Synthroid  Notes to patient: Replaces thyroid hormone     metoprolol succinate ER 25 MG Tb24  Commonly known as: Toprol XL  Notes to patient: Treats high blood pressure     MULTIVITAMIN OR  Notes to patient: Replace vitamins and minerals so we can get our daily amount of them     nortriptyline 25 MG Caps  Commonly known as: Pamelor  Notes to patient: Treats symptoms of depression     Potassium Chloride ER 10 MEQ Cpcr  Commonly known as: MICRO-K  Notes to patient: To replace the potassium lost from the water pills you are taking     rosuvastatin 5 MG Tabs  Commonly known as: Crestor  Notes to patient: Lowers cholesterol     spironolactone 25 MG Tabs  Commonly known as: Aldactone  Notes to patient: Treats high blood pressure and removes excess fluid     triamcinolone 0.1 % Oint  Commonly known as: Kenalog  Notes to patient: As needed for dermatitis     Vitamin C 500 MG Tabs  Commonly known as: VITAMIN C  Notes to patient: Helps support immune system     * warfarin 2.5 MG Tabs  Commonly known as: Coumadin  Take as directed. If you are unsure how to take this medication, talk to your nurse or doctor.  Notes to patient: Blood thinner     * warfarin 5 MG Tabs  Commonly known as: Coumadin  Take as directed. If you are unsure how to take this medication, talk to your nurse or doctor.  Notes to patient: Blood thinner     Zinc 50 MG Caps  Notes to patient: Helps support the immune system.  Support bone health           * This list has 2 medication(s) that are the same as other medications prescribed for you. Read the directions carefully, and ask your doctor or other care provider  to review them with you.                   Where to Get Your Medications        These medications were sent to [facility] ([facility]) - [place], [place] - [address] [phone], [phone]  [address], [facility] [address]      Phone: [phone]   metOLazone 5 MG Tabs  omeprazole 20 MG Cpdr       You can get these medications from any pharmacy    Bring a paper prescription for each of these medications  vancomycin in sodium chloride 0.9% 1.25 g/250mL Soln            Follow-up Information       Adilia Ferreira APN. Go on 9/25/2024.    Specialty: Nurse Practitioner Family  Why: at 3:30  Contact information:  [address]  [facility] [address]  [phone]               Viktoriya Garcias, DO Follow up in 1 week(s).    Specialty: Family Practice  Contact information:  [address] [facility] [id number] [phone]               Сергей Walton, DO Follow up in 2 week(s).    Specialty: NEPHROLOGY  Contact information:  [address]  [facility] [id number] [phone]               Fidencio Ngo APRN Follow up in 2 week(s).    Specialty: Nurse Practitioner  Contact information:  [address]  [place] [id number] [phone]                             DC instructions:      Other Discharge Instructions:         [facility]  [phone]    Sometimes managing your health at home requires assistance.  The [facility] team has recognized your preference to use [facility]. They can be reached by phone at [phone]. The fax number for your reference is [phone]. . A representative from the home health agency will contact you or your family to schedule your first visit.          Discharge medications reviewed and reconciled.    Total Time Coordinating Care: Greater than 30 minutes    Patient had opportunity to ask questions and state understand and agree with therapeutic plan as outlined    Thank  You,    Tin Anaya, DO    [facility]  Pager

## 2024-10-03 NOTE — CM/SW NOTE
MSW spoke to pt, she states she spoke to pasrr screener, but still shows pending in system. MSW asked RN to order rapid covid, [facility] can accept pt today. 2:45pm Update  Pt accepted to [facility]. Updated Covid screen sent to [facility].      Barrier to [employer]: PASRR screen level two pending Vaccine Information Sheet, \"Influenza - Inactivated\"  given to Josefina Lindo, or parent/legal guardian of  Josefina Lindo and verbalized understanding.    Patient responses:    Have you ever had a reaction to a flu vaccine? No  Are you able to eat eggs without adverse effects?  Yes  Do you have any current illness?  No  Have you ever had Guillian Witter Syndrome?  No    Flu vaccine given per order. Please see immunization tab.             file.

## 2024-11-14 ENCOUNTER — HOSPITAL ENCOUNTER (EMERGENCY)
Facility: HOSPITAL | Age: 69
Discharge: HOME | End: 2024-11-14
Attending: EMERGENCY MEDICINE
Payer: MEDICARE

## 2024-11-14 ENCOUNTER — APPOINTMENT (OUTPATIENT)
Dept: CT IMAGING | Facility: HOSPITAL | Age: 69
End: 2024-11-14
Attending: EMERGENCY MEDICINE
Payer: MEDICARE

## 2024-11-14 VITALS
SYSTOLIC BLOOD PRESSURE: 125 MMHG | HEIGHT: 69 IN | WEIGHT: 213 LBS | BODY MASS INDEX: 31.55 KG/M2 | TEMPERATURE: 98 F | HEART RATE: 73 BPM | DIASTOLIC BLOOD PRESSURE: 76 MMHG | RESPIRATION RATE: 17 BRPM | OXYGEN SATURATION: 97 %

## 2024-11-14 DIAGNOSIS — M79.671 PAIN IN BOTH FEET: ICD-10-CM

## 2024-11-14 DIAGNOSIS — I73.9 PERIPHERAL ARTERIAL DISEASE (HCC): Primary | ICD-10-CM

## 2024-11-14 DIAGNOSIS — M79.672 PAIN IN BOTH FEET: ICD-10-CM

## 2024-11-14 LAB
ALBUMIN SERPL-MCNC: 4 G/DL (ref 3.2–4.8)
ALBUMIN/GLOB SERPL: 1.2 {RATIO} (ref 1–2)
ALP LIVER SERPL-CCNC: 111 U/L
ALT SERPL-CCNC: 24 U/L
ANION GAP SERPL CALC-SCNC: 5 MMOL/L (ref 0–18)
AST SERPL-CCNC: 35 U/L (ref ?–34)
BASOPHILS # BLD AUTO: 0.03 X10(3) UL (ref 0–0.2)
BASOPHILS NFR BLD AUTO: 0.4 %
BILIRUB SERPL-MCNC: 0.4 MG/DL (ref 0.2–1.1)
BUN BLD-MCNC: 17 MG/DL (ref 9–23)
CALCIUM BLD-MCNC: 10.3 MG/DL (ref 8.7–10.4)
CHLORIDE SERPL-SCNC: 102 MMOL/L (ref 98–112)
CO2 SERPL-SCNC: 31 MMOL/L (ref 21–32)
CREAT BLD-MCNC: 0.93 MG/DL
EGFRCR SERPLBLD CKD-EPI 2021: 66 ML/MIN/1.73M2 (ref 60–?)
EOSINOPHIL # BLD AUTO: 0.06 X10(3) UL (ref 0–0.7)
EOSINOPHIL NFR BLD AUTO: 0.9 %
ERYTHROCYTE [DISTWIDTH] IN BLOOD BY AUTOMATED COUNT: 13.6 %
GLOBULIN PLAS-MCNC: 3.3 G/DL (ref 2–3.5)
GLUCOSE BLD-MCNC: 158 MG/DL (ref 70–99)
HCT VFR BLD AUTO: 40.1 %
HGB BLD-MCNC: 13.8 G/DL
IMM GRANULOCYTES # BLD AUTO: 0.05 X10(3) UL (ref 0–1)
IMM GRANULOCYTES NFR BLD: 0.7 %
INR BLD: 3.33 (ref 0.8–1.2)
LYMPHOCYTES # BLD AUTO: 1.45 X10(3) UL (ref 1–4)
LYMPHOCYTES NFR BLD AUTO: 21.7 %
MCH RBC QN AUTO: 32.3 PG (ref 26–34)
MCHC RBC AUTO-ENTMCNC: 34.4 G/DL (ref 31–37)
MCV RBC AUTO: 93.9 FL
MONOCYTES # BLD AUTO: 0.77 X10(3) UL (ref 0.1–1)
MONOCYTES NFR BLD AUTO: 11.5 %
NEUTROPHILS # BLD AUTO: 4.32 X10 (3) UL (ref 1.5–7.7)
NEUTROPHILS # BLD AUTO: 4.32 X10(3) UL (ref 1.5–7.7)
NEUTROPHILS NFR BLD AUTO: 64.8 %
OSMOLALITY SERPL CALC.SUM OF ELEC: 291 MOSM/KG (ref 275–295)
PLATELET # BLD AUTO: 310 10(3)UL (ref 150–450)
POTASSIUM SERPL-SCNC: 3.1 MMOL/L (ref 3.5–5.1)
PROT SERPL-MCNC: 7.3 G/DL (ref 5.7–8.2)
PROTHROMBIN TIME: 34.1 SECONDS (ref 11.6–14.8)
RBC # BLD AUTO: 4.27 X10(6)UL
SODIUM SERPL-SCNC: 138 MMOL/L (ref 136–145)
WBC # BLD AUTO: 6.7 X10(3) UL (ref 4–11)

## 2024-11-14 PROCEDURE — 85025 COMPLETE CBC W/AUTO DIFF WBC: CPT

## 2024-11-14 PROCEDURE — 85025 COMPLETE CBC W/AUTO DIFF WBC: CPT | Performed by: EMERGENCY MEDICINE

## 2024-11-14 PROCEDURE — 93005 ELECTROCARDIOGRAM TRACING: CPT

## 2024-11-14 PROCEDURE — 99285 EMERGENCY DEPT VISIT HI MDM: CPT

## 2024-11-14 PROCEDURE — 93010 ELECTROCARDIOGRAM REPORT: CPT

## 2024-11-14 PROCEDURE — 85610 PROTHROMBIN TIME: CPT | Performed by: EMERGENCY MEDICINE

## 2024-11-14 PROCEDURE — 80053 COMPREHEN METABOLIC PANEL: CPT | Performed by: EMERGENCY MEDICINE

## 2024-11-14 PROCEDURE — 75635 CT ANGIO ABDOMINAL ARTERIES: CPT | Performed by: EMERGENCY MEDICINE

## 2024-11-14 PROCEDURE — 96360 HYDRATION IV INFUSION INIT: CPT

## 2024-11-14 PROCEDURE — 80053 COMPREHEN METABOLIC PANEL: CPT

## 2024-11-14 RX ORDER — TRAMADOL HYDROCHLORIDE 50 MG/1
TABLET ORAL EVERY 6 HOURS PRN
Qty: 10 TABLET | Refills: 0 | Status: ON HOLD | OUTPATIENT
Start: 2024-11-14 | End: 2024-11-25

## 2024-11-14 RX ORDER — SODIUM CHLORIDE 9 MG/ML
INJECTION, SOLUTION INTRAVENOUS CONTINUOUS
Status: DISCONTINUED | OUTPATIENT
Start: 2024-11-14 | End: 2024-11-14

## 2024-11-14 NOTE — ED QUICK NOTES
Pt c/o pain in bilat feet R>L feels worse than her gout pain. Pt was sent for increased discoloration to bilat legs and feet that started last Wednesday. Home health RN informed her vascular MD and was advised to come to ED.

## 2024-11-14 NOTE — ED INITIAL ASSESSMENT (HPI)
Pt sent from IC because IC couldn't feel a pulse in L foot. Pt states she \"has bad circulation issues and R foot is purple and L foot is red\" Pt states her feet are normally red. Swelling in L foot. In triage both feet are red and warm with cap refill less than 3 seconds.

## 2024-11-14 NOTE — ED PROVIDER NOTES
Patient Seen in: [facility] Emergency Department      History     Chief Complaint   Patient presents with    Other     Stated Complaint: bilateral feet pain, unable to detect pulses, \"one is purple one is red\"    Subjective:   HPI      69-year-old female with multiple medical problems including coronary artery disease and peripheral arterial disease was sent to the emergency department for evaluation discoloration in both legs.  The patient stated that over the past several days she has had pain, first in the right second toe and now in the left second toe with purplish discoloration.  She is currently anticoagulated on warfarin and Plavix.  She complains of chronic pain in both legs.  There is no history of fever.  Arterial studies of the left leg previously showed decreased flow to the left foot.  Her vascular surgeon is Dr. Rocio pickett [facility].    Objective:     Past Medical History:    Aortic stenosis    S/P TAVR    Arrhythmia    ASTHMA    Asthma (HCC)    Back problem    CANCER    thyroid    Cancer (HCC)    thyroid     CHF (congestive heart failure) (HCC)    CKD (chronic kidney disease) stage 2, GFR 60-89 ml/min    COPD (chronic obstructive pulmonary disease) (HCC)    DEPRESSION    Disorder of thyroid    Essential hypertension    Fibromyalgia    Gout    High blood pressure    High cholesterol    HYPERTENSION    HYPOTHYROIDISM    Muscle weakness    OBESITY    OSTEOARTHRITIS    Osteoarthritis    OTHER DISEASES    Fibromyalgia    OTHER DISEASES    Pulmonary emboli    OTHER DISEASES    Lymph edema    OTHER DISEASES    Pulmonary hypertension    Peripheral vascular disease (HCC)    Pulmonary embolism (HCC)    RA (rheumatoid arthritis) (HCC)    Shortness of breath    ON EXERTION    SLEEP APNEA    Sleep apnea    CPAP              Past Surgical History:   Procedure Laterality Date    Anesth,shoulder replacement Right 2014    hemiathroplasty    Back surgery  2000    Back surgery  2004    complete c-3 -7 ectomy     Biopsy Left 2023    TEMPORAL ARTERY    Cath transcatheter aortic valve replacement      Colonoscopy N/A 05/10/2018    Procedure: COLONOSCOPY, POSSIBLE BIOPSY, POSSIBLE POLYPECTOMY 24654;  Surgeon: Kendell Valentin MD;  [facility]: [facility], [facility]    Colonoscopy      Craniotomy for lobotomy Left     D & c  2009    Dilation/curettage,diagnostic      Hip replacement surgery  2009    Rt hip    Knee replacement surgery      Both knees    Other surgical history      Thyroid removal    Other surgical history      LT foot spur removal    Thyroidectomy Bilateral     Total hip replacement      Total knee replacement                  Social History     Socioeconomic History    Marital status: Single   Tobacco Use    Smoking status: Former     Current packs/day: 0.00     Average packs/day: 0.5 packs/day for 30.0 years (15.0 ttl pk-yrs)     Types: Cigarettes     Start date: 1961     Quit date: 1991     Years since quittin.8    Smokeless tobacco: Never   Vaping Use    Vaping status: Never Used   Substance and Sexual Activity    Alcohol use: No    Drug use: No   Other Topics Concern    Caffeine Concern No    Exercise Yes    Seat Belt Yes    Special Diet Yes     Comment: low sodium    Stress Concern Yes    Weight Concern No     Social Drivers of Health     Food Insecurity: No Food Insecurity (2024)    Food Insecurity     Food Insecurity: Never true   Transportation Needs: No Transportation Needs (2024)    Transportation Needs     Lack of Transportation: No   Housing Stability: Low Risk  (2024)    Housing Stability     Housing Instability: No                  Physical Exam     ED Triage Vitals   BP 24 1548 154/87   Pulse 24 1548 118   Resp 24 1548 20   Temp 24 1548 97.8 °F (36.6 °C)   Temp src --    SpO2 24 1548 92 %   O2 Device 24 1645 None (Room air)       Current Vitals:   Vital Signs  BP: 125/76  Pulse: 73  Resp: 17  Temp: 97.8 °F  (36.6 °C)  MAP (mmHg): 88    Oxygen Therapy  SpO2: 97 %  O2 Device: None (Room air)        Physical Exam     General Appearance: This is an older female who is awake and conversive sitting on a gurney.  Vital signs were reviewed per nurses notes.  Patient is afebrile.  Monitor reveals atrial fibrillation rate of 70.  Initial blood pressure was 121/61.  HEENT: Normocephalic/atraumatic.  Anicteric sclera.  Oral mucosa is moist.  Oropharynx is normal.  Neck: No adenopathy or thyromegaly.  Lungs are clear to auscultation.  Heart exam: Normal S1-S2 without extra sounds or murmurs.  Regular rate and rhythm.  Abdomen is nontender.  Extremities are atraumatic.  There are chronic changes to the skin of both legs below the knee.  There is a fresh subcentimeter abrasion on the anterior aspect of the left knee.  Chronic dry ulcerations on both dorsal great toes.  Both the skin feet are warm to touch with capillary refill of less than 3 seconds in all digits.  Doppler pulse of the dorsalis pedis was obtained in the right foot but not on the left.  There is black discoloration of the left second toenail.  Subungual region.  Neuroexam: Awake, conversive and moving all 4 extremities well.        ED Course     Labs Reviewed   COMP METABOLIC PANEL (14) - Abnormal; Notable for the following components:       Result Value    Glucose 158 (*)     Potassium 3.1 (*)     AST 35 (*)     All other components within normal limits   PROTHROMBIN TIME (PT) - Abnormal; Notable for the following components:    PT 34.1 (*)     INR 3.33 (*)     All other components within normal limits   CBC WITH DIFFERENTIAL WITH PLATELET   RAINBOW DRAW LAVENDER   RAINBOW DRAW LIGHT GREEN   RAINBOW DRAW BLUE     EKG    Rate, intervals and axes as noted on EKG Report.  Rate: 95  Rhythm: Atrial Fibrillation  Reading: Nonspecific ST abnormality.  Abnormal EKG.  Agree with EKG report.                Intravenous access was obtained.  Laboratory studies were drawn.    CTA  ABDOMEN/PELVIS LOWER EXT BILAT W RUNOFF (YJM=16149)    Result Date: 11/14/2024  PROCEDURE:  CTA ABDOMEN/PELVIS LOWER EXT BILAT W RUNOFF (GKV=48970/39610)  COMPARISON:  [facility], CT, CT ABDOMEN+PELVIS(CONTRAST ONLY)(CPT=74177), 1/09/2023, 12:37 PM.  [facility] , CT, CTA ABD/PEL (CPT=74174), 11/13/2020, 11:41 AM.  INDICATIONS:  bilateral feet pain, unable to detect pulses, one is purple one is red  TECHNIQUE:  CT images of the abdomen, pelvis, and lower extremities were obtained pre- and post- injection of non-ionic intravenous contrast material. Multi-planar reformatted/3-D images were created to optimize visualization of vascular anatomy.  Dose reduction techniques were used. Dose information is transmitted to the ACR (American College of Radiology) NRDR (National Radiology Data Registry) which includes the Dose Index Registry.  PATIENT STATED HISTORY:(As transcribed by Technologist)  Bilateral feet pain, unable to detect pulses, one is purple one is red.   CONTRAST USED:  100cc of Isovue 370  FINDINGS:  This examination is compromised due to artifact from bilateral hip prostheses, bilateral knee prostheses and the patient's arms at her sides.  AORTO-ILIAC:  Atherosclerotic calcifications.  No aneurysm or dissection.  Widely patent celiac, superior mesenteric and single bilateral renal arteries.  Ostial calcification of patent inferior mesenteric artery.  Mild atherosclerotic calcifications of common and internal iliac arteries bilaterally without stenosis.  Widely patent bilateral external iliac arteries. RIGHT LEG:  Detail is compromised by artifact from bilateral hip prostheses.  Plaque of common femoral artery causes less than 50% stenosis.  Calcified and patent profunda femoral artery.  Extensive calcification of superficial femoral artery without stenosis.  Popliteal artery is partly obscured by artifact from hip prostheses with no significant stenosis identified.  Severely compromises assessment of distal runoff  due to extensive calcification of all 3 vessels.  There is contrast opacification of  noncalcified segments of the dorsal pedal and posterior tibial artery in the ankle and hindfoot. LEFT LEG:  Detail is compromised by artifact from bilateral hip prostheses.  Plaque of common femoral artery causes less than 50% stenosis.  Calcified and patent profunda femoral artery.  Extensive calcification of superficial femoral artery without stenosis.  Popliteal artery is partly obscured by artifact from hip prostheses with no significant stenosis identified.  Severely compromises assessment of distal runoff due to extensive calcification of all 3 vessels.  There is contrast opacification of  noncalcified segments of the dorsal pedal and posterior tibial artery in the ankle and hindfoot. LIVER:  Diffuse low attenuation.  No mass. BILIARY:  Calcified gallstones in gallbladder lumen.  No bile duct dilatation. PANCREAS:  Stable mild atrophy.  No mass or ductal dilatation. SPLEEN:  No enlargement or focal lesion.  KIDNEYS:  No mass, obstruction, or calcification.  ADRENALS:  No mass or enlargement.  VASCULAR:  IVC filter in place.  This is a chronic indwelling IVC filter which should not be assessed for potential removal. RETROPERITONEUM:  No mass or adenopathy.  BOWEL/MESENTERY:  No visible mass, obstruction, or bowel wall thickening.  Normal appendix ABDOMINAL WALL:  No mass or hernia.  URINARY BLADDER:  No visible focal wall thickening, lesion, or calculus.  Limited assessment due to artifact from hip prostheses. PELVIC NODES:  No adenopathy.  PELVIC ORGANS:  No visible mass.  Pelvic organs appropriate for patient age.  BONES:  Degenerative changes of spine and ankles.  Bilateral hip and knee prostheses. LUNG BASES:  Mild bibasilar scarring or atelectasis.             CONCLUSION:  1. Compromised study due to artifact from patient's arms at her side, and bilateral hip and knee prostheses and due to extensive calcification of  infrapopliteal runoff vessels. The proximal and mid popliteal arteries are not diagnostically visualized. 2. Scattered atherosclerotic calcifications with no arterial occlusion or significant stenosis identified. 3. Although assessment of runoff vessels is severely compromised due to extensive calcification, there is opacification of noncalcified segments of dorsal pedal and posterior tibial arteries in the ankles and feet bilaterally. 4. Hepatic steatosis. 5. Cholelithiasis.   LOCATION:  [facility]   Dictated by (CST): Juarez Cordon MD on 11/14/2024 at 8:07 PM     Finalized by (CST): Juarez Cordon MD on 11/14/2024 at 8:23 PM       I personally reviewed the images myself and went over results with patient.    I viewed the CT angiogram films myself of the lower extremities.  There is extensive calcification of the patient's blood vessels but opacification of the arteries in the feet suggesting blood flow to those areas.    Test results and treatment plan were discussed.  Patient states that her INR is usually kept between 2.5 and 3.5 which is where she is right now.  She was advised to continue her medication.  Follow-up with her vascular surgeon Dr. Milad lópez by phone tomorrow.  She does have an appointment for arterial ultrasound studies of the legs in 1 week which may provide more information than the CTA did.  Advised to keep the appointment but in the interim return for new or worsening symptoms.       MDM      #1.  Foot discoloration secondary to peripheral arterial disease.  There is blood flow on angiogram into both feet.  Study was limited because of extensive calcification of the patient's blood vessels.  Advise continuing anticoagulation.  Arterial Doppler studies to be done in 1 week.  Vascular surgery follow-up recommended.  2.  Foot pain.  Tramadol provided if needed.        Medical Decision Making      Disposition and Plan     Clinical Impression:  1. Peripheral arterial disease (HCC)    2. Pain in both  feet         Disposition:  Discharge  11/14/2024  8:48 pm    Follow-up:  Will Andrade MD  [address] [facility]  [address] [place], [place] [id number]  [phone]    Call in 1 day(s)            Medications Prescribed:  Discharge Medication List as of 11/14/2024  8:59 PM        START taking these medications    Details   traMADol 50 MG Oral Tab Take 1-2 tablets ( mg total) by mouth every 6 (six) hours as needed for Pain., Normal, Disp-10 tablet, R-0                 Supplementary Documentation:

## 2024-11-15 LAB
Q-T INTERVAL: 384 MS
QRS DURATION: 114 MS
QTC CALCULATION (BEZET): 482 MS
R AXIS: 54 DEGREES
T AXIS: 4 DEGREES
VENTRICULAR RATE: 95 BPM

## 2024-11-15 NOTE — DISCHARGE INSTRUCTIONS
Your INR was 3.3.  Continue your warfarin but contact your clinic to determine if any adjustment to your dose is needed.    Follow-up with Dr. Andrade by phone tomorrow.

## 2024-11-23 ENCOUNTER — APPOINTMENT (OUTPATIENT)
Dept: GENERAL RADIOLOGY | Facility: HOSPITAL | Age: 69
End: 2024-11-23
Attending: EMERGENCY MEDICINE
Payer: MEDICARE

## 2024-11-23 ENCOUNTER — HOSPITAL ENCOUNTER (INPATIENT)
Facility: HOSPITAL | Age: 69
LOS: 11 days | Discharge: HOME HEALTH | End: 2024-12-04
Attending: EMERGENCY MEDICINE | Admitting: INTERNAL MEDICINE
Payer: MEDICARE

## 2024-11-23 DIAGNOSIS — I50.9 ACUTE ON CHRONIC CONGESTIVE HEART FAILURE, UNSPECIFIED HEART FAILURE TYPE (HCC): Primary | ICD-10-CM

## 2024-11-23 LAB
ALBUMIN SERPL-MCNC: 3.5 G/DL (ref 3.2–4.8)
ALBUMIN/GLOB SERPL: 1 {RATIO} (ref 1–2)
ALP LIVER SERPL-CCNC: 114 U/L
ALT SERPL-CCNC: 24 U/L
ANION GAP SERPL CALC-SCNC: 6 MMOL/L (ref 0–18)
AST SERPL-CCNC: 25 U/L (ref ?–34)
BASOPHILS # BLD AUTO: 0.03 X10(3) UL (ref 0–0.2)
BASOPHILS NFR BLD AUTO: 0.4 %
BILIRUB SERPL-MCNC: 0.5 MG/DL (ref 0.2–1.1)
BUN BLD-MCNC: 9 MG/DL (ref 9–23)
CALCIUM BLD-MCNC: 10 MG/DL (ref 8.7–10.4)
CHLORIDE SERPL-SCNC: 108 MMOL/L (ref 98–112)
CO2 SERPL-SCNC: 23 MMOL/L (ref 21–32)
CREAT BLD-MCNC: 0.78 MG/DL
D DIMER PPP FEU-MCNC: 0.46 UG/ML FEU (ref ?–0.71)
EGFRCR SERPLBLD CKD-EPI 2021: 81 ML/MIN/1.73M2 (ref 60–?)
EOSINOPHIL # BLD AUTO: 0.12 X10(3) UL (ref 0–0.7)
EOSINOPHIL NFR BLD AUTO: 1.7 %
ERYTHROCYTE [DISTWIDTH] IN BLOOD BY AUTOMATED COUNT: 14.2 %
GLOBULIN PLAS-MCNC: 3.6 G/DL (ref 2–3.5)
GLUCOSE BLD-MCNC: 172 MG/DL (ref 70–99)
HCT VFR BLD AUTO: 38.3 %
HGB BLD-MCNC: 12.7 G/DL
IMM GRANULOCYTES # BLD AUTO: 0.06 X10(3) UL (ref 0–1)
IMM GRANULOCYTES NFR BLD: 0.9 %
LYMPHOCYTES # BLD AUTO: 1.49 X10(3) UL (ref 1–4)
LYMPHOCYTES NFR BLD AUTO: 21.6 %
MCH RBC QN AUTO: 32.2 PG (ref 26–34)
MCHC RBC AUTO-ENTMCNC: 33.2 G/DL (ref 31–37)
MCV RBC AUTO: 97 FL
MONOCYTES # BLD AUTO: 0.79 X10(3) UL (ref 0.1–1)
MONOCYTES NFR BLD AUTO: 11.4 %
NEUTROPHILS # BLD AUTO: 4.42 X10 (3) UL (ref 1.5–7.7)
NEUTROPHILS # BLD AUTO: 4.42 X10(3) UL (ref 1.5–7.7)
NEUTROPHILS NFR BLD AUTO: 64 %
NT-PROBNP SERPL-MCNC: 1157 PG/ML (ref ?–125)
OSMOLALITY SERPL CALC.SUM OF ELEC: 287 MOSM/KG (ref 275–295)
PLATELET # BLD AUTO: 345 10(3)UL (ref 150–450)
POTASSIUM SERPL-SCNC: 4 MMOL/L (ref 3.5–5.1)
PROT SERPL-MCNC: 7.1 G/DL (ref 5.7–8.2)
RBC # BLD AUTO: 3.95 X10(6)UL
SODIUM SERPL-SCNC: 137 MMOL/L (ref 136–145)
TROPONIN I SERPL HS-MCNC: 10 NG/L
WBC # BLD AUTO: 6.9 X10(3) UL (ref 4–11)

## 2024-11-23 PROCEDURE — 71045 X-RAY EXAM CHEST 1 VIEW: CPT | Performed by: EMERGENCY MEDICINE

## 2024-11-23 RX ORDER — FUROSEMIDE 10 MG/ML
40 INJECTION INTRAMUSCULAR; INTRAVENOUS ONCE
Status: COMPLETED | OUTPATIENT
Start: 2024-11-23 | End: 2024-11-23

## 2024-11-23 RX ORDER — HEPARIN SODIUM 5000 [USP'U]/ML
5000 INJECTION, SOLUTION INTRAVENOUS; SUBCUTANEOUS EVERY 8 HOURS SCHEDULED
Status: DISCONTINUED | OUTPATIENT
Start: 2024-11-23 | End: 2024-11-24

## 2024-11-23 RX ORDER — ACETAMINOPHEN 500 MG
500 TABLET ORAL EVERY 4 HOURS PRN
Status: DISCONTINUED | OUTPATIENT
Start: 2024-11-23 | End: 2024-12-04

## 2024-11-24 LAB
ANION GAP SERPL CALC-SCNC: 10 MMOL/L (ref 0–18)
BASOPHILS # BLD AUTO: 0.02 X10(3) UL (ref 0–0.2)
BASOPHILS NFR BLD AUTO: 0.3 %
BUN BLD-MCNC: 9 MG/DL (ref 9–23)
CALCIUM BLD-MCNC: 9 MG/DL (ref 8.7–10.4)
CHLORIDE SERPL-SCNC: 109 MMOL/L (ref 98–112)
CO2 SERPL-SCNC: 25 MMOL/L (ref 21–32)
CREAT BLD-MCNC: 0.7 MG/DL
EGFRCR SERPLBLD CKD-EPI 2021: 92 ML/MIN/1.73M2 (ref 60–?)
EOSINOPHIL # BLD AUTO: 0.13 X10(3) UL (ref 0–0.7)
EOSINOPHIL NFR BLD AUTO: 2.1 %
ERYTHROCYTE [DISTWIDTH] IN BLOOD BY AUTOMATED COUNT: 14.2 %
EST. AVERAGE GLUCOSE BLD GHB EST-MCNC: 146 MG/DL (ref 68–126)
GLUCOSE BLD-MCNC: 108 MG/DL (ref 70–99)
HBA1C MFR BLD: 6.7 % (ref ?–5.7)
HCT VFR BLD AUTO: 34.9 %
HGB BLD-MCNC: 11.6 G/DL
IMM GRANULOCYTES # BLD AUTO: 0.03 X10(3) UL (ref 0–1)
IMM GRANULOCYTES NFR BLD: 0.5 %
INR BLD: 2.64 (ref 0.8–1.2)
INR BLD: 3.02 (ref 0.8–1.2)
LYMPHOCYTES # BLD AUTO: 1.81 X10(3) UL (ref 1–4)
LYMPHOCYTES NFR BLD AUTO: 28.7 %
MCH RBC QN AUTO: 31.7 PG (ref 26–34)
MCHC RBC AUTO-ENTMCNC: 33.2 G/DL (ref 31–37)
MCV RBC AUTO: 95.4 FL
MONOCYTES # BLD AUTO: 1 X10(3) UL (ref 0.1–1)
MONOCYTES NFR BLD AUTO: 15.8 %
NEUTROPHILS # BLD AUTO: 3.32 X10 (3) UL (ref 1.5–7.7)
NEUTROPHILS # BLD AUTO: 3.32 X10(3) UL (ref 1.5–7.7)
NEUTROPHILS NFR BLD AUTO: 52.6 %
OSMOLALITY SERPL CALC.SUM OF ELEC: 297 MOSM/KG (ref 275–295)
PLATELET # BLD AUTO: 289 10(3)UL (ref 150–450)
POTASSIUM SERPL-SCNC: 3.3 MMOL/L (ref 3.5–5.1)
PROTHROMBIN TIME: 28.4 SECONDS (ref 11.6–14.8)
PROTHROMBIN TIME: 31.6 SECONDS (ref 11.6–14.8)
Q-T INTERVAL: 362 MS
QRS DURATION: 104 MS
QTC CALCULATION (BEZET): 471 MS
R AXIS: 82 DEGREES
RBC # BLD AUTO: 3.66 X10(6)UL
SODIUM SERPL-SCNC: 144 MMOL/L (ref 136–145)
T AXIS: 11 DEGREES
VENTRICULAR RATE: 102 BPM
WBC # BLD AUTO: 6.3 X10(3) UL (ref 4–11)

## 2024-11-24 RX ORDER — FUROSEMIDE 10 MG/ML
40 INJECTION INTRAMUSCULAR; INTRAVENOUS
Status: DISCONTINUED | OUTPATIENT
Start: 2024-11-24 | End: 2024-11-29

## 2024-11-24 RX ORDER — DIPHENHYDRAMINE HCL 25 MG
25 CAPSULE ORAL EVERY 6 HOURS PRN
Status: DISCONTINUED | OUTPATIENT
Start: 2024-11-24 | End: 2024-12-04

## 2024-11-24 RX ORDER — CLOPIDOGREL BISULFATE 75 MG/1
75 TABLET ORAL NIGHTLY
Status: DISCONTINUED | OUTPATIENT
Start: 2024-11-24 | End: 2024-12-04

## 2024-11-24 RX ORDER — METOPROLOL SUCCINATE 25 MG/1
25 TABLET, EXTENDED RELEASE ORAL NIGHTLY
Status: DISCONTINUED | OUTPATIENT
Start: 2024-11-24 | End: 2024-12-03

## 2024-11-24 RX ORDER — POTASSIUM CHLORIDE 1500 MG/1
40 TABLET, EXTENDED RELEASE ORAL ONCE
Status: DISCONTINUED | OUTPATIENT
Start: 2024-11-24 | End: 2024-11-25 | Stop reason: ALTCHOICE

## 2024-11-24 RX ORDER — DILTIAZEM HYDROCHLORIDE 240 MG/1
240 CAPSULE, COATED, EXTENDED RELEASE ORAL NIGHTLY
Status: DISCONTINUED | OUTPATIENT
Start: 2024-11-24 | End: 2024-12-02

## 2024-11-24 RX ORDER — ROSUVASTATIN CALCIUM 5 MG/1
5 TABLET, COATED ORAL EVERY OTHER DAY
Status: DISCONTINUED | OUTPATIENT
Start: 2024-11-24 | End: 2024-12-04

## 2024-11-24 RX ORDER — SPIRONOLACTONE 25 MG/1
25 TABLET ORAL DAILY
Status: DISCONTINUED | OUTPATIENT
Start: 2024-11-24 | End: 2024-12-04

## 2024-11-24 RX ORDER — NORTRIPTYLINE HYDROCHLORIDE 50 MG/1
50 CAPSULE ORAL NIGHTLY
Status: DISCONTINUED | OUTPATIENT
Start: 2024-11-24 | End: 2024-12-04

## 2024-11-24 RX ORDER — WARFARIN SODIUM 5 MG/1
5 TABLET ORAL
Status: DISCONTINUED | OUTPATIENT
Start: 2024-11-26 | End: 2024-12-04

## 2024-11-24 RX ORDER — TRAMADOL HYDROCHLORIDE 50 MG/1
50 TABLET ORAL EVERY 6 HOURS PRN
Status: DISCONTINUED | OUTPATIENT
Start: 2024-11-24 | End: 2024-12-04

## 2024-11-24 RX ORDER — BUPROPION HYDROCHLORIDE 150 MG/1
150 TABLET, EXTENDED RELEASE ORAL 2 TIMES DAILY
Status: DISCONTINUED | OUTPATIENT
Start: 2024-11-24 | End: 2024-12-04

## 2024-11-24 RX ORDER — WARFARIN SODIUM 2.5 MG/1
2.5 TABLET ORAL
Status: DISCONTINUED | OUTPATIENT
Start: 2024-11-24 | End: 2024-12-04

## 2024-11-24 RX ORDER — WARFARIN SODIUM 5 MG/1
5 TABLET ORAL NIGHTLY
Status: DISCONTINUED | OUTPATIENT
Start: 2024-11-24 | End: 2024-11-24

## 2024-11-24 RX ORDER — ALLOPURINOL 100 MG/1
100 TABLET ORAL NIGHTLY
Status: DISCONTINUED | OUTPATIENT
Start: 2024-11-24 | End: 2024-12-04

## 2024-11-24 RX ORDER — DIGOXIN 125 MCG
125 TABLET ORAL NIGHTLY
Status: DISCONTINUED | OUTPATIENT
Start: 2024-11-24 | End: 2024-12-04

## 2024-11-24 NOTE — ED QUICK NOTES
Orders for admission, patient is aware of plan and ready to go upstairs. Any questions, please call ED RN DEBBIE at extension [phone].     Patient Covid vaccination status: Fully vaccinated     COVID Test Ordered in ED: None    COVID Suspicion at Admission: N/A    Running Infusions:  None    Mental Status/LOC at time of transport: ALERT X 3    Other pertinent information: LIVES ALONE, + BILAT LEG WOUND. WITH HOME HEALTH SERVICES @ HOME. + EXERTIONAL DYSPNEA. AMBULATES WITH WALKER/CANE  CIWA score: N/A   NIH score:  N/A

## 2024-11-24 NOTE — PLAN OF CARE
NURSING ADMISSION NOTE    Oriented to room.  Safety precautions initiated.  Bed in low position.  Call light in reach.    Two person skin check completed with Stevo RN  Admission assessment complete. Patient oriented to room. Admission orders received and initiated. Safety precautions in place and reviewed with patient.      Assumed patient care approximately 2230. Patient is alert and oriented X4. SpO2 maintained on room air with saturation maintaining greater than 89%.. A-fib on tele, hear rate controled. Continent bowel, incontinent of bladder, pure wick in place. Generalized bruising, redness, and wounds, dressing changed for lower extremities. Pain managed with medication. Ambulates with steady gait with standby assist and personal crutches. Education provided on medication and plan of care, patient verbalize understanding.      Plan of care: Daily weight, Strict I&Os, diuresis, tele      Problem: Patient/Family Goals  Goal: Patient/Family Long Term Goal  Description: Patient's Long Term Goal: Maintain level of activity    Interventions:  - Follow medication regiment  - See additional Care Plan goals for specific interventions  Outcome: Progressing  Goal: Patient/Family Short Term Goal  Description: Patient's Short Term Goal: Discharge    Interventions:   - Follow medication regiment   - See additional Care Plan goals for specific interventions  Outcome: Progressing     Problem: PAIN - ADULT  Goal: Verbalizes/displays adequate comfort level or patient's stated pain goal  Description: INTERVENTIONS:  - Encourage pt to monitor pain and request assistance  - Assess pain using appropriate pain scale  - Administer analgesics based on type and severity of pain and evaluate response  - Implement non-pharmacological measures as appropriate and evaluate response  - Consider cultural and social influences on pain and pain management  - Manage/alleviate anxiety  - Utilize distraction and/or relaxation techniques  - Monitor  for opioid side effects  - Notify MD/LIP if interventions unsuccessful or patient reports new pain  - Anticipate increased pain with activity and pre-medicate as appropriate  Outcome: Progressing     Problem: CARDIOVASCULAR - ADULT  Goal: Maintains optimal cardiac output and hemodynamic stability  Description: INTERVENTIONS:  - Monitor vital signs, rhythm, and trends  - Monitor for bleeding, hypotension and signs of decreased cardiac output  - Evaluate effectiveness of vasoactive medications to optimize hemodynamic stability  - Monitor arterial and/or venous puncture sites for bleeding and/or hematoma  - Assess quality of pulses, skin color and temperature  - Assess for signs of decreased coronary artery perfusion - ex. Angina  - Evaluate fluid balance, assess for edema, trend weights  Outcome: Progressing  Goal: Absence of cardiac arrhythmias or at baseline  Description: INTERVENTIONS:  - Continuous cardiac monitoring, monitor vital signs, obtain 12 lead EKG if indicated  - Evaluate effectiveness of antiarrhythmic and heart rate control medications as ordered  - Initiate emergency measures for life threatening arrhythmias  - Monitor electrolytes and administer replacement therapy as ordered  Outcome: Progressing     Problem: RESPIRATORY - ADULT  Goal: Achieves optimal ventilation and oxygenation  Description: INTERVENTIONS:  - Assess for changes in respiratory status  - Assess for changes in mentation and behavior  - Position to facilitate oxygenation and minimize respiratory effort  - Oxygen supplementation based on oxygen saturation or ABGs  - Provide Smoking Cessation handout, if applicable  - Encourage broncho-pulmonary hygiene including cough, deep breathe, Incentive Spirometry  - Assess the need for suctioning and perform as needed  - Assess and instruct to report SOB or any respiratory difficulty  - Respiratory Therapy support as indicated  - Manage/alleviate anxiety  - Monitor for signs/symptoms of CO2  retention  Outcome: Progressing

## 2024-11-24 NOTE — PLAN OF CARE
Patient alert and oriented x 4 but can be forgetful at times. Up with standby assist using crutches from home. On RA. A fib on tele. Continent of bowel and incontinent of bladder. Complaints of generalized pain, PRN pain medication administered. No complaints of shortness of breath or chest pain/discomfort. POC discussed with patient. Fall precautions in place. Call light within reach.     Problem: Patient/Family Goals  Goal: Patient/Family Long Term Goal  Description: Patient's Long Term Goal: Maintain level of activity    Interventions:  - Follow medication regiment  - See additional Care Plan goals for specific interventions  Outcome: Progressing  Goal: Patient/Family Short Term Goal  Description: Patient's Short Term Goal: Discharge    Interventions:   - Follow medication regiment   - See additional Care Plan goals for specific interventions  Outcome: Progressing     Problem: PAIN - ADULT  Goal: Verbalizes/displays adequate comfort level or patient's stated pain goal  Description: INTERVENTIONS:  - Encourage pt to monitor pain and request assistance  - Assess pain using appropriate pain scale  - Administer analgesics based on type and severity of pain and evaluate response  - Implement non-pharmacological measures as appropriate and evaluate response  - Consider cultural and social influences on pain and pain management  - Manage/alleviate anxiety  - Utilize distraction and/or relaxation techniques  - Monitor for opioid side effects  - Notify MD/LIP if interventions unsuccessful or patient reports new pain  - Anticipate increased pain with activity and pre-medicate as appropriate  Outcome: Progressing     Problem: CARDIOVASCULAR - ADULT  Goal: Maintains optimal cardiac output and hemodynamic stability  Description: INTERVENTIONS:  - Monitor vital signs, rhythm, and trends  - Monitor for bleeding, hypotension and signs of decreased cardiac output  - Evaluate effectiveness of vasoactive medications to optimize  hemodynamic stability  - Monitor arterial and/or venous puncture sites for bleeding and/or hematoma  - Assess quality of pulses, skin color and temperature  - Assess for signs of decreased coronary artery perfusion - ex. Angina  - Evaluate fluid balance, assess for edema, trend weights  Outcome: Progressing  Goal: Absence of cardiac arrhythmias or at baseline  Description: INTERVENTIONS:  - Continuous cardiac monitoring, monitor vital signs, obtain 12 lead EKG if indicated  - Evaluate effectiveness of antiarrhythmic and heart rate control medications as ordered  - Initiate emergency measures for life threatening arrhythmias  - Monitor electrolytes and administer replacement therapy as ordered  Outcome: Progressing     Problem: RESPIRATORY - ADULT  Goal: Achieves optimal ventilation and oxygenation  Description: INTERVENTIONS:  - Assess for changes in respiratory status  - Assess for changes in mentation and behavior  - Position to facilitate oxygenation and minimize respiratory effort  - Oxygen supplementation based on oxygen saturation or ABGs  - Provide Smoking Cessation handout, if applicable  - Encourage broncho-pulmonary hygiene including cough, deep breathe, Incentive Spirometry  - Assess the need for suctioning and perform as needed  - Assess and instruct to report SOB or any respiratory difficulty  - Respiratory Therapy support as indicated  - Manage/alleviate anxiety  - Monitor for signs/symptoms of CO2 retention  Outcome: Progressing

## 2024-11-24 NOTE — H&P
[facility] Hospitalist H&P       CC:   Chief Complaint   Patient presents with    Difficulty Breathing        PCP: Viktoriya Garcias DO    History of Present Illness: 68 y/o F w/ PMHx of HFrEF, aortic stenosis status post TAVR, CAD status post PCI in 2021, PVD, HTN, HLD, A-fib on oral anticoagulation, hypothyroidism, SEBASTIEN, CKD stage II, COPD, depression, recent admission for MRSA bacteremia s/p completion of vancomycin, who presents to the hospital with leg swelling, dyspnea, and weight gain.  Patient notes that her home diuretic regimen consists of oral Bumex 2 mg twice daily, however notes that she has not been taking this over the last several weeks.  She says that she lives at home alone, notes that it is rather difficult for her when she has to urinate all the time and therefore has been inconsistent with Bumex use.  She says that she has had worsening orthopnea as well, denies any chest pain, nausea, vomiting, dizziness.    Vital signs stable on arrival.  CBC unremarkable, CMP unremarkable.  BNP elevated at 1157, troponin negative.  Chest axillary unremarkable.  Patient received 40 mg of IV Lasix in the ER and admitted for further evaluation of decompensated heart failure with cardiology on consult.    PMH  Past Medical History:    Aortic stenosis    S/P TAVR    Arrhythmia    ASTHMA    Asthma (HCC)    Back problem    CANCER    thyroid    Cancer (HCC)    thyroid     CHF (congestive heart failure) (HCC)    CKD (chronic kidney disease) stage 2, GFR 60-89 ml/min    Congestive heart disease (HCC)    COPD (chronic obstructive pulmonary disease) (HCC)    Deep vein thrombosis (HCC)    DEPRESSION    Depression    Disorder of thyroid    Esophageal reflux    Essential hypertension    Fibromyalgia    Gout    High blood pressure    High cholesterol    History of blood transfusion    HYPERTENSION    HYPOTHYROIDISM    Incontinence    Muscle weakness    Neuropathy    OBESITY    OSTEOARTHRITIS    Osteoarthritis     OTHER DISEASES    Fibromyalgia    OTHER DISEASES    Pulmonary emboli    OTHER DISEASES    Lymph edema    OTHER DISEASES    Pulmonary hypertension    Peripheral vascular disease (HCC)    Pneumonia due to organism    Prediabetes    Pulmonary embolism (HCC)    RA (rheumatoid arthritis) (HCC)    Renal disorder    Shortness of breath    ON EXERTION    SLEEP APNEA    Sleep apnea    CPAP        PSH  Past Surgical History:   Procedure Laterality Date    Anesth,shoulder replacement Right 2014    hemiathroplasty    Back surgery      Back surgery      complete c-3 -7 ectomy    Biopsy Left 2023    TEMPORAL ARTERY    Carpal tunnel release      Cath transcatheter aortic valve replacement      Colonoscopy N/A 05/10/2018    Procedure: COLONOSCOPY, POSSIBLE BIOPSY, POSSIBLE POLYPECTOMY 29860;  Surgeon: Kendell Valentin MD;  Location: [facility] [place] [facility], [facility]    Colonoscopy      Craniotomy for lobotomy Left     D & c  2009    Dilation/curettage,diagnostic      Hip replacement surgery  2009    Rt hip    Knee replacement surgery      Both knees    Other surgical history      Thyroid removal    Other surgical history      LT foot spur removal    Spine surgery procedure unlisted      Thyroidectomy Bilateral     Total hip replacement      Total knee replacement          ALL:  Allergies[1]     Home Medications:  Medications Taking[2]      Soc Hx  Social History     Tobacco Use    Smoking status: Former     Current packs/day: 0.00     Average packs/day: 0.5 packs/day for 30.0 years (15.0 ttl pk-yrs)     Types: Cigarettes     Start date: 1961     Quit date: 1991     Years since quittin.8    Smokeless tobacco: Never   Substance Use Topics    Alcohol use: No        Fam Hx  Family History   Problem Relation Age of Onset    Hypertension Father     Lipids Father     Diabetes Mother     Hypertension Mother     Lipids Mother     Cancer Brother     Hypertension Brother        Review of  Systems  Comprehensive ROS reviewed and negative except for what's stated above.     OBJECTIVE:  BP 99/82 (BP Location: Left arm)   Pulse 79   Temp 97.7 °F (36.5 °C) (Oral)   Resp 17   Wt 227 lb 1.2 oz (103 kg)   LMP  (LMP Unknown)   SpO2 96%   BMI 33.53 kg/m²   General:  Alert, no acute distress   Head:  Normocephalic, without obvious abnormality, atraumatic.   Eyes:  Sclera anicteric, No conjunctival pallor, EOMs intact. PERRLA.   Nose: Nares normal. Septum midline. Mucosa normal. No drainage.   Throat: Lips, mucosa, and tongue normal. Teeth and gums normal.   Neck: Supple, symmetrical, trachea midline, no cervical or supraclavicular lymph adenopathy, thyroid: no enlargment/tenderness/nodules appreciated   Lungs:   Clear to auscultation bilaterally. Normal respiratory effort   Chest wall:  No tenderness or deformity.   Heart:  Regular rate and rhythm, S1, S2 normal, no murmur, rub or gallop appreciated   Abdomen:   Soft, non-tender. Bowel sounds normal. No masses,  No organomegaly. Non distended   Extremities: Extremities normal, atraumatic, no cyanosis, 3+ nonpitting edema lower extremities bilaterally.   Skin: Skin color, texture, turgor normal. No rashes or lesions.    Neurologic:    Psych:     AOx3, no focal neurologic deficits, normal strength, CN II-XII grossly intact  Appropriate mood and affect       Diagnostic Data:    CBC/Chem  Recent Labs   Lab 11/23/24  1751 11/24/24  0736   WBC 6.9 6.3   HGB 12.7 11.6*   MCV 97.0 95.4   .0 289.0   INR 3.02* 2.64*       Recent Labs   Lab 11/23/24  1751 11/24/24  0736    144   K 4.0 3.3*    109   CO2 23.0 25.0   BUN 9 9   CREATSERUM 0.78 0.70   * 108*   CA 10.0 9.0       Recent Labs   Lab 11/23/24  1751   ALT 24   AST 25   ALB 3.5       No results for input(s): \"TROP\" in the last 168 hours.    Additional Diagnostics: ECG: Independently reviewed and interpreted, shows atrial fibrillation with a rate of around 100.  No ST/T wave  abnormalities noted.    CXR: image personally reviewed normal    Radiology: XR CHEST AP PORTABLE  (CPT=71045)    Result Date: 11/23/2024  CONCLUSION:    Cardiomegaly without vascular congestion.  Nonspecific accentuation interstitial markings left base.  No sizable effusion or pneumothorax.  No hyperinflation.  LOCATION:  [facility]      Dictated by (CST): Crow Santiago MD on 11/23/2024 at 6:41 PM     Finalized by (CST): Crow Santiago MD on 11/23/2024 at 6:41 PM          ASSESSMENT / PLAN:     68 y/o F w/ PMHx of HFrEF, aortic stenosis status post TAVR, CAD status post PCI in 2021, PVD, HTN, HLD, A-fib on oral anticoagulation, hypothyroidism, SEBASTIEN, CKD stage II, COPD, depression, recent admission for MRSA bacteremia s/p completion of vancomycin who presents to the hospital with leg swelling, dyspnea, and weight gain.    Acute on chronic decompensated HFrEF (recovered EF 50% in September)  -Patient has not been taking her diuretic (supposed to take Bumex 2 mg twice daily), BNP reviewed and elevated at 1157.  -IV Lasix 40 mg twice daily while inpatient  -Strict I's and O's, daily weights  -Cardiology consulted  -Continue home metoprolol succinate 25 mg nightly, spironolactone 25 mg daily  -Daily RFP's, will monitor renal function  -Cardiac diet, fluid restriction    Aortic stenosis s/p TAVR  Paroxysmal atrial fibrillation  Therapeutic INR  -Continue home warfarin regimen  -Monitor INR  -Continue home beta-blocker, diltiazem, digoxin, Plavix    CKD stage II  -Creatinine normal this admission, continue to monitor  -Avoid nephrotoxic agents    Hyperlipidemia  -Continue home rosuvastatin    Depression  -Continue home fluoxetine, nortriptyline, bupropion    Gout  -Continue home allopurinol    Chronic pain  -Continue home nortriptyline, as needed tramadol, as needed Tylenol    History of MRSA bacteremia  -Recently completed IV vancomycin, no further treatment at this time    Hypothyroidism  -Continue home  levothyroxine    IVF: None  Diet: Cardiac diet  DVT Prophylaxis: Warfarin    Code Status: Full Code   Dispo: Pending clinical course    Outpatient records or previous hospital records reviewed.   Questions/concerns were discussed with patient and/or family by bedside.  Discussed with cardiology.  DM hospitalist to continue to follow patient while in house  A total of 76  minutes taken with patient and coordinating care.     DO Mariel Ospina [facility]  Hospitalist  Contact via [employer]            Advanced Care Planning    While discussing goals of care with the patient and their family, patient voluntarily participated in an advanced care planning discussion. The following was discussed: Patient would like to be full code.    16 Minutes were spent in discussing advanced care planning. This time was exclusive of the documented time for this visit.         [1]   Allergies  Allergen Reactions    Adhesive Tape HIVES     ALL ADHESIVES    Codeine HIVES    Doxycycline SWELLING    Hydrocodone UNKNOWN    Lisinopril TONGUE SWELLING    Morphine Sulfate HIVES    Opioid Analgesics UNKNOWN    Oxycontin ITCHING    Oxytocin HIVES    Vicodin [Hydrocodone-Acetaminophen] ITCHING    Skin Adhesives OTHER (SEE COMMENTS)   [2]   Outpatient Medications Marked as Taking for the 11/23/24 encounter (Hospital Encounter)   Medication Sig Dispense Refill    metOLazone 5 MG Oral Tab Take 1 tablet (5 mg total) by mouth daily as needed (only if weight increases >5 lbs in 1 day). 30 tablet 3    warfarin 2.5 MG Oral Tab Take 1 tablet (2.5 mg total) by mouth As Directed. Except Tuesdays and Thursdays      empagliflozin (JARDIANCE) 10 MG Oral Tab Take 1 tablet (10 mg total) by mouth at bedtime. Per pt.      levothyroxine 175 MCG Oral Tab Take 1 tablet (175 mcg total) by mouth before breakfast.      dilTIAZem  MG Oral Capsule SR 24 Hr Take 1 capsule (240 mg total) by mouth daily. (Patient taking differently: Take 1  capsule (240 mg total) by mouth at bedtime.) 90 capsule 3    clopidogrel 75 MG Oral Tab Take 1 tablet (75 mg total) by mouth at bedtime. Per pt.      metoprolol succinate ER 25 MG Oral Tablet 24 Hr Take 1 tablet (25 mg total) by mouth at bedtime. Per pt.      Potassium Chloride ER 10 MEQ Oral Cap CR Take 4 capsules (40 mEq total) by mouth at bedtime. Per pt.      rosuvastatin 5 MG Oral Tab Take 1 tablet (5 mg total) by mouth every other day.      spironolactone 25 MG Oral Tab Take 1 tablet (25 mg total) by mouth daily.      warfarin 5 MG Oral Tab Take 1 tablet (5 mg total) by mouth As Directed. Only on Tuesdays and Thursdays      ferrous sulfate 325 (65 FE) MG Oral Tab EC Take 1 tablet (325 mg total) by mouth at bedtime. Per pt.      calcium carbonate 500 MG Oral Chew Tab Chew 1 tablet (500 mg total) by mouth every 8 (eight) hours as needed (upset stomach).      Vitamin C 500 MG Oral Tab Take 1 tablet (500 mg total) by mouth at bedtime. Per pt.      Zinc 50 MG Oral Cap Take 50 mg by mouth at bedtime. Per pt.      Cholecalciferol 125 MCG (5000 UT) Oral Tab Take 1 tablet (5,000 Units total) by mouth at bedtime. Per pt.      acetaminophen 325 MG Oral Tab Take 2 tablets (650 mg total) by mouth every 8 (eight) hours as needed for Pain. (Patient taking differently: Take 4 tablets (1,300 mg total) by mouth 2 (two) times daily as needed for Pain.)      buPROPion  MG Oral Tablet 12 Hr Take 1 tablet (150 mg total) by mouth 2 (two) times daily.      cyproheptadine 4 MG Oral Tab Take 1 tablet (4 mg total) by mouth nightly.      FLUoxetine 20 MG Oral Cap Take 1 capsule (20 mg total) by mouth 2 (two) times daily.      triamcinolone 0.1 % External Ointment Apply 1 Application topically daily as needed (Neuro dermatitis per pt.).      digoxin 0.125 MG Oral Tab Take 1 tablet (125 mcg total) by mouth daily. (Patient taking differently: Take 1 tablet (125 mcg total) by mouth at bedtime.) 30 tablet 0    allopurinol 100 MG Oral  Tab Take 1 tablet (100 mg total) by mouth daily. (Patient taking differently: Take 1 tablet (100 mg total) by mouth at bedtime. Per pt.) 90 tablet 3    Nortriptyline HCl 25 MG Oral Cap Take 2 capsules (50 mg total) by mouth nightly.      Multiple Vitamin (MULTIVITAMIN OR) Take 1 tablet by mouth at bedtime. Per pt.

## 2024-11-24 NOTE — ED QUICK NOTES
Assumed care for this pt, alert & coherent, mild SOB. Denies any pain. Per pt, lives alone & travelled/drove self here since all PCP are here.

## 2024-11-24 NOTE — ED PROVIDER NOTES
Patient Seen in: [facility] Emergency Department      History     Chief Complaint   Patient presents with    Difficulty Breathing     Stated Complaint: jose, weight gain  for a few weeks 99% on ra    Subjective:   HPI      69-year-old female presents reporting 20 pound weight gain over the last couple of weeks.  Reporting progressively worsening dyspnea on exertion over those weeks.  Feels swollen all the way up to her abdomen.  She denies cough or congestion.  No fevers.  Says she only walk a couple of feet at home before she gets very dyspneic.  Denies fever.  Says she has not been taking her diuretic as prescribed.    Objective:     No pertinent past medical history.            No pertinent past surgical history.              No pertinent social history.                Physical Exam     ED Triage Vitals [11/23/24 1746]   BP (!) 123/96   Pulse 99   Resp 23   Temp 98.4 °F (36.9 °C)   Temp src Oral   SpO2 96 %   O2 Device None (Room air)       Current Vitals:   Vital Signs  BP: 125/76  Pulse: 106  Resp: 19  Temp: 98.4 °F (36.9 °C)  Temp src: Oral  MAP (mmHg): 92    Oxygen Therapy  SpO2: 99 %  O2 Device: None (Room air)        Physical Exam  General:  Vitals as listed.    HEENT: Sclerae anicteric.  Conjunctivae show no pallor.  Oropharynx clear, mucous membranes moist   Neck: supple, no rigidity   Lungs: good air exchange and clear   Heart: regular rate rhythm and no murmur   Abdomen: Soft and nontender.  No abdominal masses.  No peritoneal signs   Extremities: Swelling in the lower extremities , normal peripheral pulses   Neuro: Alert oriented and nonfocal   Skin: no rashes or nodules    ED Course     Labs Reviewed   COMP METABOLIC PANEL (14) - Abnormal; Notable for the following components:       Result Value    Glucose 172 (*)     Globulin  3.6 (*)     All other components within normal limits   PRO BETA NATRIURETIC PEPTIDE - Abnormal; Notable for the following components:    Pro-Beta Natriuretic Peptide  1,157 (*)     All other components within normal limits   TROPONIN I HIGH SENSITIVITY - Normal   D-DIMER - Normal   CBC WITH DIFFERENTIAL WITH PLATELET   RAINBOW DRAW LAVENDER   RAINBOW DRAW LIGHT GREEN   RAINBOW DRAW BLUE     EKG    Rate, intervals and axes as noted on EKG Report.  Rate: 102  Rhythm: Atrial Fibrillation  Reading: A-fib with rapid ventricular response                XR CHEST AP PORTABLE  (CPT=71045)    Result Date: 11/23/2024  PROCEDURE:  XR CHEST AP PORTABLE  (CPT=71045)  TECHNIQUE:  AP chest radiograph was obtained.  COMPARISON:  [facility] , XR, XR CHEST AP PORTABLE  (CPT=71045), 9/16/2024, 11:52 AM.  INDICATIONS:  jose, weight gain  for a few weeks 99% on ra  PATIENT STATED HISTORY: (As transcribed by Technologist)  Patient stated she has been experiencing sob and weight gain.             CONCLUSION:    Cardiomegaly without vascular congestion.  Nonspecific accentuation interstitial markings left base.  No sizable effusion or pneumothorax.  No hyperinflation.  LOCATION:  [facility]      Dictated by (CST): Crow Santiago MD on 11/23/2024 at 6:41 PM     Finalized by (CST): Crow Santiago MD on 11/23/2024 at 6:41 PM       CTA ABDOMEN/PELVIS LOWER EXT BILAT W RUNOFF (DSW=80131)    Result Date: 11/14/2024  PROCEDURE:  CTA ABDOMEN/PELVIS LOWER EXT BILAT W RUNOFF (CFE=76424/81428)  COMPARISON:  [facility], CT, CT ABDOMEN+PELVIS(CONTRAST ONLY)(CPT=74177), 1/09/2023, 12:37 PM.  [facility] , CT, CTA ABD/PEL (CPT=74174), 11/13/2020, 11:41 AM.  INDICATIONS:  bilateral feet pain, unable to detect pulses, one is purple one is red  TECHNIQUE:  CT images of the abdomen, pelvis, and lower extremities were obtained pre- and post- injection of non-ionic intravenous contrast material. Multi-planar reformatted/3-D images were created to optimize visualization of vascular anatomy.  Dose reduction techniques were used. Dose information is transmitted to the ACR (American College of Radiology) NRDR (National Radiology Data Registry)  which includes the Dose Index Registry.  PATIENT STATED HISTORY:(As transcribed by Technologist)  Bilateral feet pain, unable to detect pulses, one is purple one is red.   CONTRAST USED:  100cc of Isovue 370  FINDINGS:  This examination is compromised due to artifact from bilateral hip prostheses, bilateral knee prostheses and the patient's arms at her sides.  AORTO-ILIAC:  Atherosclerotic calcifications.  No aneurysm or dissection.  Widely patent celiac, superior mesenteric and single bilateral renal arteries.  Ostial calcification of patent inferior mesenteric artery.  Mild atherosclerotic calcifications of common and internal iliac arteries bilaterally without stenosis.  Widely patent bilateral external iliac arteries. RIGHT LEG:  Detail is compromised by artifact from bilateral hip prostheses.  Plaque of common femoral artery causes less than 50% stenosis.  Calcified and patent profunda femoral artery.  Extensive calcification of superficial femoral artery without stenosis.  Popliteal artery is partly obscured by artifact from hip prostheses with no significant stenosis identified.  Severely compromises assessment of distal runoff due to extensive calcification of all 3 vessels.  There is contrast opacification of  noncalcified segments of the dorsal pedal and posterior tibial artery in the ankle and hindfoot. LEFT LEG:  Detail is compromised by artifact from bilateral hip prostheses.  Plaque of common femoral artery causes less than 50% stenosis.  Calcified and patent profunda femoral artery.  Extensive calcification of superficial femoral artery without stenosis.  Popliteal artery is partly obscured by artifact from hip prostheses with no significant stenosis identified.  Severely compromises assessment of distal runoff due to extensive calcification of all 3 vessels.  There is contrast opacification of  noncalcified segments of the dorsal pedal and posterior tibial artery in the ankle and hindfoot. LIVER:   Diffuse low attenuation.  No mass. BILIARY:  Calcified gallstones in gallbladder lumen.  No bile duct dilatation. PANCREAS:  Stable mild atrophy.  No mass or ductal dilatation. SPLEEN:  No enlargement or focal lesion.  KIDNEYS:  No mass, obstruction, or calcification.  ADRENALS:  No mass or enlargement.  VASCULAR:  IVC filter in place.  This is a chronic indwelling IVC filter which should not be assessed for potential removal. RETROPERITONEUM:  No mass or adenopathy.  BOWEL/MESENTERY:  No visible mass, obstruction, or bowel wall thickening.  Normal appendix ABDOMINAL WALL:  No mass or hernia.  URINARY BLADDER:  No visible focal wall thickening, lesion, or calculus.  Limited assessment due to artifact from hip prostheses. PELVIC NODES:  No adenopathy.  PELVIC ORGANS:  No visible mass.  Pelvic organs appropriate for patient age.  BONES:  Degenerative changes of spine and ankles.  Bilateral hip and knee prostheses. LUNG BASES:  Mild bibasilar scarring or atelectasis.             CONCLUSION:  1. Compromised study due to artifact from patient's arms at her side, and bilateral hip and knee prostheses and due to extensive calcification of infrapopliteal runoff vessels. The proximal and mid popliteal arteries are not diagnostically visualized. 2. Scattered atherosclerotic calcifications with no arterial occlusion or significant stenosis identified. 3. Although assessment of runoff vessels is severely compromised due to extensive calcification, there is opacification of noncalcified segments of dorsal pedal and posterior tibial arteries in the ankles and feet bilaterally. 4. Hepatic steatosis. 5. Cholelithiasis.   LOCATION:  [facility]   Dictated by (CST): Juarez Cordon MD on 11/14/2024 at 8:07 PM     Finalized by (CST): Juarez Cordon MD on 11/14/2024 at 8:23 PM             MDM      69-year-old female with history of CHF presents reporting dyspnea on exertion, 20 pound weight gain, poor compliance with diuretics    Additional  history obtained by [facility] vascular clinic documentation reports that the patient has peripheral arterial disease    Differential includes but is not limited to CHF exacerbation, pneumonia, pulmonary embolism, a life threat.    CBC, CMP, troponin, D-dimer, proBNP, chest x-ray, EKG ordered for further evaluation.    My independent interpretation of chest x-ray is that there is no large pleural effusion.    Radiology reports cardiomegaly without evidence of fluid overload.  D-dimer returned negative and does not suggest pulmonary embolism.  Cardiac enzyme within normal limits and does not suggest acute ischemia.  Patient is in atrial fibrillation.  Mild tachycardia.  No hypoxemia here.    Laboratory evaluation is unclear for etiology of her symptoms.  However, she has had a 20 pound weight gain, describes orthopnea, can only walk a few steps before she is dyspneic.  Discussed with cardiology and will admit for further evaluation.  They request Lasix 40 mg IV which has been ordered.  Patient agrees to plan for hospitalization.  Discussed with admitting physician.        Admission disposition: 11/23/2024  8:51 PM           Medical Decision Making      Disposition and Plan     Clinical Impression:  1. Acute on chronic congestive heart failure, unspecified heart failure type (HCC)         Disposition:  Admit  11/23/2024  8:51 pm    Follow-up:  No follow-up provider specified.        Medications Prescribed:  Current Discharge Medication List              Supplementary Documentation:         Hospital Problems       Present on Admission  Date Reviewed: 8/15/2024            ICD-10-CM Noted POA    * (Principal) Acute on chronic congestive heart failure, unspecified heart failure type (HCC) I50.9 1/7/2023 Unknown

## 2024-11-24 NOTE — SPIRITUAL CARE NOTE
Spiritual Care Visit Note    Patient Name: Arleth Weiss Date of Spiritual Care Visit: 24   : 1955 Primary Dx: Acute on chronic congestive heart failure, unspecified heart failure type (HCC)       Referred By:      Spiritual Care Taxonomy:    Intended Effects: Aligning care plan with patient's values    Methods: Collaborate with care team member    Interventions: Active listening    Visit Type/Summary:     - PoA: Other: Checked Epic and paper chart, no POA docs on file. Consulted nurse, patient appropriate for PoaH discussion. Attempted visit, patient deeply sleeping. Left PoA information and Spiritual Care contact information.  remains available for follow up.    Spiritual Care support can be requested via an Epic consult. For urgent/immediate needs, please contact the On Call  at: [place]: ext [phone]    Rev. Mary Anderson MA  Chaplain Resident

## 2024-11-25 ENCOUNTER — APPOINTMENT (OUTPATIENT)
Dept: CV DIAGNOSTICS | Facility: HOSPITAL | Age: 69
End: 2024-11-25
Attending: INTERNAL MEDICINE
Payer: MEDICARE

## 2024-11-25 LAB
ALBUMIN SERPL-MCNC: 3.5 G/DL (ref 3.2–4.8)
ANION GAP SERPL CALC-SCNC: 8 MMOL/L (ref 0–18)
BUN BLD-MCNC: 10 MG/DL (ref 9–23)
CALCIUM BLD-MCNC: 9.2 MG/DL (ref 8.7–10.4)
CHLORIDE SERPL-SCNC: 103 MMOL/L (ref 98–112)
CO2 SERPL-SCNC: 30 MMOL/L (ref 21–32)
CREAT BLD-MCNC: 0.66 MG/DL
EGFRCR SERPLBLD CKD-EPI 2021: 94 ML/MIN/1.73M2 (ref 60–?)
ERYTHROCYTE [DISTWIDTH] IN BLOOD BY AUTOMATED COUNT: 14.2 %
GLUCOSE BLD-MCNC: 109 MG/DL (ref 70–99)
HCT VFR BLD AUTO: 41.5 %
HGB BLD-MCNC: 13.5 G/DL
INR BLD: 2.18 (ref 0.8–1.2)
MAGNESIUM SERPL-MCNC: 1.7 MG/DL (ref 1.6–2.6)
MCH RBC QN AUTO: 31.3 PG (ref 26–34)
MCHC RBC AUTO-ENTMCNC: 32.5 G/DL (ref 31–37)
MCV RBC AUTO: 96.3 FL
OSMOLALITY SERPL CALC.SUM OF ELEC: 292 MOSM/KG (ref 275–295)
PHOSPHATE SERPL-MCNC: 3.7 MG/DL (ref 2.4–5.1)
PLATELET # BLD AUTO: 319 10(3)UL (ref 150–450)
POTASSIUM SERPL-SCNC: 3.2 MMOL/L (ref 3.5–5.1)
POTASSIUM SERPL-SCNC: 3.2 MMOL/L (ref 3.5–5.1)
PROTHROMBIN TIME: 24.4 SECONDS (ref 11.6–14.8)
RBC # BLD AUTO: 4.31 X10(6)UL
SODIUM SERPL-SCNC: 141 MMOL/L (ref 136–145)
WBC # BLD AUTO: 6.5 X10(3) UL (ref 4–11)

## 2024-11-25 PROCEDURE — 93321 DOPPLER ECHO F-UP/LMTD STD: CPT | Performed by: INTERNAL MEDICINE

## 2024-11-25 PROCEDURE — 93308 TTE F-UP OR LMTD: CPT | Performed by: INTERNAL MEDICINE

## 2024-11-25 PROCEDURE — 93325 DOPPLER ECHO COLOR FLOW MAPG: CPT | Performed by: INTERNAL MEDICINE

## 2024-11-25 RX ORDER — TRAMADOL HYDROCHLORIDE 50 MG/1
TABLET ORAL EVERY 6 HOURS PRN
Status: ON HOLD | COMMUNITY
Start: 2023-10-24 | End: 2024-11-25 | Stop reason: CLARIF

## 2024-11-25 RX ORDER — SENNOSIDES 8.6 MG
CAPSULE ORAL 2 TIMES DAILY PRN
COMMUNITY

## 2024-11-25 RX ORDER — POTASSIUM CHLORIDE 1.5 G/1.58G
40 POWDER, FOR SOLUTION ORAL ONCE
Status: COMPLETED | OUTPATIENT
Start: 2024-11-25 | End: 2024-11-25

## 2024-11-25 RX ORDER — MULTIVIT-MIN/IRON/FA/VIT K/LUT 4MG-200MCG
2 TABLET ORAL NIGHTLY
COMMUNITY

## 2024-11-25 RX ORDER — POTASSIUM CHLORIDE 1.5 G/1.58G
20 POWDER, FOR SOLUTION ORAL 2 TIMES DAILY
Status: CANCELLED | OUTPATIENT
Start: 2024-11-25

## 2024-11-25 RX ORDER — BUMETANIDE 1 MG/1
1 TABLET ORAL 3 TIMES DAILY
COMMUNITY
Start: 2024-10-23 | End: 2024-12-04

## 2024-11-25 RX ORDER — AMMONIUM LACTATE 12 G/100G
LOTION TOPICAL AS NEEDED
Status: DISCONTINUED | OUTPATIENT
Start: 2024-11-25 | End: 2024-12-04

## 2024-11-25 RX ORDER — ALBUTEROL SULFATE 90 UG/1
2 INHALANT RESPIRATORY (INHALATION) EVERY 6 HOURS PRN
COMMUNITY
Start: 2024-10-10

## 2024-11-25 RX ORDER — NORTRIPTYLINE HYDROCHLORIDE 75 MG/1
75 CAPSULE ORAL NIGHTLY
COMMUNITY
Start: 2024-11-20

## 2024-11-25 RX ORDER — POTASSIUM CHLORIDE 1500 MG/1
40 TABLET, EXTENDED RELEASE ORAL ONCE
Status: DISCONTINUED | OUTPATIENT
Start: 2024-11-25 | End: 2024-11-26

## 2024-11-25 RX ORDER — MAGNESIUM OXIDE 400 MG/1
400 TABLET ORAL ONCE
Status: COMPLETED | OUTPATIENT
Start: 2024-11-25 | End: 2024-11-25

## 2024-11-25 RX ORDER — CLOTRIMAZOLE AND BETAMETHASONE DIPROPIONATE 10; .64 MG/G; MG/G
CREAM TOPICAL
COMMUNITY

## 2024-11-25 RX ORDER — KETOCONAZOLE 20 MG/G
1 CREAM TOPICAL DAILY PRN
COMMUNITY

## 2024-11-25 NOTE — DIETARY NOTE
[facility]   part of [facility]   CLINICAL NUTRITION    Arleth Weiss     Admitting diagnosis:  Acute on chronic congestive heart failure, unspecified heart failure type (HCC) [I50.9]    Ht:  5' 9\"  Wt: 98.9 kg (218 lb 1.6 oz).   Body mass index is 32.21 kg/m².  IBW: 66 kg    Wt Readings from Last 6 Encounters:   24 98.9 kg (218 lb 1.6 oz)   24 96.6 kg (213 lb)   24 91.3 kg (201 lb 4.8 oz)   24 97.8 kg (215 lb 11.2 oz)   24 92.1 kg (203 lb)   24 92.1 kg (203 lb)        Labs/Meds reviewed  -Glu:109, K+:3.2, M.7  -Kcl:20 meq, Mag Ox, Lasix, Warfarin    Diet:       Procedures    Cardiac diet Cardiac; Is Patient on Accuchecks? No     Percent Meals Eaten (last 3 days)       Date/Time Percent Meals Eaten (%)    24 0900 0 %     Percent Meals Eaten (%): no breakfast patient isn't hungry at 24 0900    24 1322 100 %    24 1946 100 %    24 0847 0 %     Percent Meals Eaten (%): patient declines breakfast at this time at 24 0847    24 1143 100 %          Pt chart reviewed d/t nutrition consult for HF.    Provided and discussed handout on Low Sodium Nutrition Therapy w/ list of foods recommended, foods to avoid/limit, sample menus, and list of salt free seasoning alternatives. Pt stated she is familiar w/ the low sodium diet and has no nutrition related questions at this time.   Pt educated by HF Nurse  earlier today.  Pt w/ diuretic noncompliance d/t frequent urination. Approved for [facility] d/t urinary incontinence.     Patient reports good appetite at this time; although does not care for the hospital food.  Nursing notes reports Percent Meals Eaten (%): 100 % intake for last meal.  Tolerating po diet without diarrhea, emesis, or constipation. Last BM:.   Per RN documentation pt w/ wounds to B/L LE and L arm (painful, pink). Today Wound Care noted no open wounds.    Pt reported wt gain d/t fluid accumulation. Wt trending down  this admit d/t diuresis.     PMH:HTN, HLD, aortic stenosis, s/p TAVR, CAD, s/p PCI, PVD, a-fib, former tobacco use, COPD, SEBASTIEN, CKD2, hypothyroidism, recent admit for MRSA bacteremia.     Patient is at low nutrition risk at this time.    Please consult if patient status changes or nutrition issues arise.    Mana Thornton MS, RD, LDN  Clinical Dietitian  Ext:[phone]

## 2024-11-25 NOTE — PROGRESS NOTES
[facility] Hospitalist Progress Note     CC: Hospital Follow up    PCP: Viktoriya Garcias DO       Subjective:     No acute events overnight, continuing diuresis.  Net -1.2 L in the last 24 hours.    OBJECTIVE:    Blood pressure 113/76, pulse 88, temperature 97.6 °F (36.4 °C), temperature source Oral, resp. rate 16, weight 218 lb 1.6 oz (98.9 kg), SpO2 99%, not currently breastfeeding.    Temp:  [97.5 °F (36.4 °C)-99 °F (37.2 °C)] 97.6 °F (36.4 °C)  Pulse:  [76-98] 88  Resp:  [16-18] 16  BP: ()/(61-77) 113/76  SpO2:  [85 %-99 %] 99 %      Intake/Output:    Intake/Output Summary (Last 24 hours) at 11/25/2024 1046  Last data filed at 11/24/2024 2324  Gross per 24 hour   Intake 840 ml   Output 850 ml   Net -10 ml       Last 3 Weights   11/25/24 0408 218 lb 1.6 oz (98.9 kg)   11/23/24 2313 227 lb 1.2 oz (103 kg)   11/23/24 2233 227 lb 1.2 oz (103 kg)   11/23/24 1746 225 lb (102.1 kg)   11/14/24 1548 213 lb (96.6 kg)   09/19/24 0500 201 lb 4.8 oz (91.3 kg)   09/18/24 0400 196 lb (88.9 kg)   09/17/24 0930 198 lb 3.2 oz (89.9 kg)   09/17/24 0533 204 lb 9.4 oz (92.8 kg)   09/16/24 0920 203 lb 6.4 oz (92.3 kg)   09/14/24 0430 220 lb (99.8 kg)   09/13/24 0617 216 lb (98 kg)   09/12/24 0800 225 lb 12 oz (102.4 kg)   09/11/24 1309 230 lb (104.3 kg)       Exam   Gen: Alert, no acute distress  Heent: Normocephalic, atraumatic, neck supple, EOMI, PERRLA  Pulm: Lungs CTAB, normal respiratory effort  CV:  Regular rate and rhythm, no murmurs/rubs/gallops  Abd: Soft, nontender, nondistended, bowel sounds present  Extremities: 3+ nonpitting edema in lower extremities bilaterally, no clubbing, pulses intact   Skin: No rashes or lesions  Neuro: AOx3, no focal neurologic deficits, CN II-XII grossly intact  Psych: appropriate mood and affect      Data Review:       Labs:     Recent Labs   Lab 11/23/24  1751 11/24/24  0736 11/25/24  0608   RBC 3.95 3.66* 4.31   HGB 12.7 11.6* 13.5   HCT 38.3 34.9* 41.5   MCV 97.0 95.4 96.3    MCH 32.2 31.7 31.3   MCHC 33.2 33.2 32.5   RDW 14.2 14.2 14.2   NEPRELIM 4.42 3.32  --    WBC 6.9 6.3 6.5   .0 289.0 319.0         Recent Labs   Lab 11/23/24  1751 11/24/24  0736 11/25/24  0608   * 108* 109*   BUN 9 9 10   CREATSERUM 0.78 0.70 0.66   EGFRCR 81 92 94   CA 10.0 9.0 9.2    144 141   K 4.0 3.3* 3.2*  3.2*    109 103   CO2 23.0 25.0 30.0       Recent Labs   Lab 11/23/24  1751 11/25/24  0608   ALT 24  --    AST 25  --    ALB 3.5 3.5         Imaging:  XR CHEST AP PORTABLE  (CPT=71045)    Result Date: 11/23/2024  CONCLUSION:    Cardiomegaly without vascular congestion.  Nonspecific accentuation interstitial markings left base.  No sizable effusion or pneumothorax.  No hyperinflation.  LOCATION:  [facility]      Dictated by (CST): Crow Santiago MD on 11/23/2024 at 6:41 PM     Finalized by (CST): Crow Santiago MD on 11/23/2024 at 6:41 PM          Meds:      potassium chloride  40 mEq Oral Once    allopurinol  100 mg Oral Nightly    buPROPion SR  150 mg Oral BID    clopidogrel  75 mg Oral Nightly    digoxin  125 mcg Oral Nightly    dilTIAZem ER  240 mg Oral Nightly    FLUoxetine  20 mg Oral BID    levothyroxine  175 mcg Oral Before breakfast    metoprolol succinate ER  25 mg Oral Nightly    nortriptyline  50 mg Oral Nightly    rosuvastatin  5 mg Oral QOD    spironolactone  25 mg Oral Daily    [START ON 11/26/2024] warfarin  5 mg Oral Once per day on Tuesday Thursday    warfarin  2.5 mg Oral Once per day on Sunday Monday Wednesday Friday Saturday    miconazole   Topical BID    furosemide  40 mg Intravenous BID (Diuretic)         traMADol    diphenhydrAMINE    acetaminophen    melatonin       Assessment/Plan:     70 y/o F w/ PMHx of HFrEF, aortic stenosis status post TAVR, CAD status post PCI in 2021, PVD, HTN, HLD, A-fib on oral anticoagulation, hypothyroidism, SEBASTIEN, CKD stage II, COPD, depression, recent admission for MRSA bacteremia s/p completion of vancomycin who presents to the  hospital with leg swelling, dyspnea, and weight gain.     Acute on chronic decompensated HFrEF (recovered EF 50% in September)  -Patient has not been taking her diuretic (supposed to take Bumex 2 mg twice daily), BNP reviewed and elevated at 1157.  -Continue IV Lasix 40 mg twice daily  -Strict I's and O's, daily weights (net -1.2 L in the last 24 hours)  -Discussed with cardiology, plan for echocardiogram today.  -Continue home metoprolol succinate 25 mg nightly, spironolactone 25 mg daily  -Daily RFP's, will monitor renal function  -Cardiac diet, fluid restriction     Aortic stenosis s/p TAVR  Paroxysmal atrial fibrillation  Therapeutic INR  -Continue home warfarin regimen  -Monitor INR  -Continue home beta-blocker, diltiazem, digoxin, Plavix     CKD stage II  -Creatinine normal this admission, continue to monitor  -Avoid nephrotoxic agents  -Creatinine reviewed today, 0.66  -Daily RFP's     Hyperlipidemia  -Continue home rosuvastatin     Depression  -Continue home fluoxetine, nortriptyline, bupropion     Gout  -Continue home allopurinol     Chronic pain  -Continue home nortriptyline, as needed tramadol, as needed Tylenol     History of MRSA bacteremia  -Recently completed IV vancomycin, no further treatment at this time     Hypothyroidism  -Continue home levothyroxine     IVF: None  Diet: Cardiac diet  DVT Prophylaxis: Warfarin    Code Status: Full Code   Dispo: Pending clinical course     Outpatient records or previous hospital records reviewed.   Questions/concerns were discussed with patient and/or family by bedside.  Discussed with cardiology.  [facility] hospitalist to continue to follow patient while in house  A total of 51 minutes taken with patient and coordinating care.      DO Mariel Ospina [facility]  Hospitalist  Contact via [email or web address]       Supplementary Documentation:   DVT Mechanical Prophylaxis:   SCDs,    DVT Pharmacologic Prophylaxis   Medication    [START ON 11/26/2024] warfarin  (Coumadin) tab 5 mg    warfarin (Coumadin) tab 2.5 mg                Code Status: Full Code  Calvin: External urinary catheter in place  Calvin Duration (in days):   Central line:    JUJU:

## 2024-11-25 NOTE — SPIRITUAL CARE NOTE
Spiritual Care Visit Note    Patient Name: Arleth Weiss Date of Spiritual Care Visit: 24   : 1955 Primary Dx: Acute on chronic congestive heart failure, unspecified heart failure type (HCC)       Referred By:      Spiritual Care Taxonomy:    Intended Effects: Build relationship of care and support    Methods: Offer support    Interventions: Active listening    Visit Type/Summary:     - PoA: Other: Nurse called  back and advised that patient only wanted form (which was given by previous ). Patient confirmed no further support needed.  remains available for support.     Spiritual Care support can be requested via an Epic consult. For urgent/immediate needs, please contact the On Call  at: [facility]: ext [phone]    Pr. Kaitlin Simpson Mdiv.

## 2024-11-25 NOTE — PROGRESS NOTES
Heart Failure Nurse  Progress Note    Patient was evaluated by the Heart Failure Nurse  for understanding, verbalization, demonstration, and recall of education related to heart failure, overall adherence to the behaviors necessary to maintain a compensated status, and risk for readmission.     Patient assessment:    Patient is able to verbalize signs/symptoms fluid overload/impending HF exacerbation and who to contact with problems                                          _X__ yes  ___ no      Patient is following a 2000 mg sodium diet                                             __ yes  _X__ no    If no, barriers to 2000 mg sodium diet:Patient admits she does sometimes go over her limit    Patient informed of 2-Part dietician classes that is free if sign up within 30 days of discharge or $40  _X__ yes  ___ no      Patient is adherent to medication regimen                                              _X__ yes  ___ no    If no, barriers to medication regimen:    Patient has sufficient funds to purchase medication                      __X_ yes  ___ no      Patient has a scale in the home              _X__ yes  ___ no      Patient is adherent to daily weight monitoring                                        __X_ yes   ___ no    If no, barriers to daily weight monitoring:    Symptom Tracker Worksheet reviewed with patient  __X_ yes   ___ no      Patient verbalizes understanding of stoplight/heart failure zones          _X__ yes   ___ no      Patient understands the importance of 7-day follow-up appointment      _X__ yes  ___ no    Appointment Date:          Patient has adequate transportation to attend follow-up appointments    __X_ yes  ___ no    If no, was referral to Social Work made  ___yes  ___ no      Family/Friend present during education: none    Additional consultations required: VIRGINIA Hearn RN(signature)  Extension [phone]

## 2024-11-25 NOTE — CONSULTS
[facility]  Report of Inpatient Wound Care Consultation    Arleth Weiss Patient Status:  Inpatient    1955 MRN [id number]   Location [facility] 2NE-A Attending Maureen Ibarra DO   Hosp Day # 2 PCP Viktoriya Garcias DO     Reason for Consultation:  multiple wounds    History of Present Illness:  Arleth Weiss is a a(n) 69 year old female. Patient with multiple comorbidities, with skin breakdown described below.     Subjective: \"My legs hurt to touch a lot\"    History:  Past Medical History:    Aortic stenosis    S/P TAVR    Arrhythmia    ASTHMA    Asthma (HCC)    Back problem    CANCER    thyroid    Cancer (HCC)    thyroid     CHF (congestive heart failure) (HCC)    CKD (chronic kidney disease) stage 2, GFR 60-89 ml/min    Congestive heart disease (HCC)    COPD (chronic obstructive pulmonary disease) (HCC)    Deep vein thrombosis (HCC)    DEPRESSION    Depression    Disorder of thyroid    Esophageal reflux    Essential hypertension    Fibromyalgia    Gout    High blood pressure    High cholesterol    History of blood transfusion    HYPERTENSION    HYPOTHYROIDISM    Incontinence    Muscle weakness    Neuropathy    OBESITY    OSTEOARTHRITIS    Osteoarthritis    OTHER DISEASES    Fibromyalgia    OTHER DISEASES    Pulmonary emboli    OTHER DISEASES    Lymph edema    OTHER DISEASES    Pulmonary hypertension    Peripheral vascular disease (HCC)    Pneumonia due to organism    Prediabetes    Pulmonary embolism (HCC)    RA (rheumatoid arthritis) (HCC)    Renal disorder    Shortness of breath    ON EXERTION    SLEEP APNEA    Sleep apnea    CPAP     Past Surgical History:   Procedure Laterality Date    Anesth,shoulder replacement Right     hemiathroplasty    Back surgery      Back surgery      complete c-3 -7 ectomy    Biopsy Left 2023    TEMPORAL ARTERY    Carpal tunnel release      Cath transcatheter aortic valve replacement      Colonoscopy N/A 05/10/2018    Procedure: COLONOSCOPY, POSSIBLE  BIOPSY, POSSIBLE POLYPECTOMY 40945;  Surgeon: Kendell Valentin MD;  Location: [facility]    Colonoscopy      Craniotomy for lobotomy Left     D & c  09/2009    Dilation/curettage,diagnostic      Hip replacement surgery  09/2009    Rt hip    Knee replacement surgery  2003    Both knees    Other surgical history  1989    Thyroid removal    Other surgical history  2004    LT foot spur removal    Spine surgery procedure unlisted      Thyroidectomy Bilateral     Total hip replacement      Total knee replacement        reports that she quit smoking about 33 years ago. Her smoking use included cigarettes. She started smoking about 63 years ago. She has a 15 pack-year smoking history. She has never used smokeless tobacco. She reports that she does not drink alcohol and does not use drugs.      Allergies:  @ALLERGY    Laboratory Data:    Recent Labs   Lab 11/23/24  1751 11/24/24  0736 11/25/24  0608   WBC 6.9 6.3 6.5   HGB 12.7 11.6* 13.5   HCT 38.3 34.9* 41.5   .0 289.0 319.0   CREATSERUM 0.78 0.70 0.66   BUN 9 9 10   * 108* 109*   CA 10.0 9.0 9.2   ALB 3.5  --  3.5   TP 7.1  --   --    INR 3.02* 2.64*  --    DDIMER 0.46  --   --          ASSESSMENT:  Wound Leg Right (Active)   No Date First Assessed or Time First Assessed found.   Location: Leg  Wound Location Orientation: Right      Assessments 11/25/2024 10:51 AM   Wound Image       Wound Length (cm) 0 cm   Wound Width (cm) 0 cm   Wound Surface Area (cm^2) 0 cm^2   Wound Depth (cm) 0 cm   Wound Volume (cm^3) 0 cm^3   Shape no open wounds at this time       Wound 08/13/24 Leg Left (Active)   Date First Assessed/Time First Assessed: 08/13/24 0301   Present on Original Admission: Yes  Location: Leg  Wound Location Orientation: Left      Assessments 11/25/2024 10:53 AM   Wound Image       Wound Length (cm) 0 cm   Wound Width (cm) 0 cm   Wound Surface Area (cm^2) 0 cm^2   Wound Depth (cm) 0 cm   Wound Volume (cm^3) 0 cm^3   Wound Healing % 100    Shape no open wounds at this time       Wound 11/24/24 Arm Anterior;Left;Lower (Active)   Date First Assessed/Time First Assessed: 11/24/24 0120   Location: Arm  Wound Location Orientation: Anterior;Left;Lower      Assessments 11/25/2024 10:54 AM   Wound Image      Wound Length (cm) 0 cm   Wound Width (cm) 0 cm   Wound Surface Area (cm^2) 0 cm^2   Wound Depth (cm) 0 cm   Wound Volume (cm^3) 0 cm^3   Shape no open wounds at this time        Edema : None  Pulse: palpable, faint  Capillary refill:>3  Lower Extremity Temp:cool    Wound Cleaning and Dressings:  Wound cleansing: cleansing foam to BLE  Wound cleaning frequency: daily  Wound product: n/a  Dressing change frequency:  n/a     ALL WOUND CARE SUPPLIES CAN BE OBTAINED FROM [facility]     Compression Therapy:   Tubigrip size F       Miscellaneous/Additional Orders:  Apply ammonium lactate daily.     If patient is Diabetic: want to make sure blood sugars are within a controled range for wound healing     Protein intake: depending on providers recommendations and patients kidney functions - if kidneys are good then recommend patient to increase protein intake (Boost, Alvino, Ensure, Premiere Protein)       Additional Notes: no open wounds at this time.  Recommendations: Inspect skin to BLE daily.     Thank you for this consultation and for allowing me to participate in the care of your patient.  Please call [phone] if you have any questions about this consultation and plan of care.     Time Spent 15 Minutes.    Thank you,  Cameron Hull RN  Wound/Ostomy/Continence nurse    11/25/2024  11:14 AM

## 2024-11-25 NOTE — SPIRITUAL CARE NOTE
Spiritual Care Visit Note    Patient Name: Arleth Weiss Date of Spiritual Care Visit: 24   : 1955 Primary Dx: Acute on chronic congestive heart failure, unspecified heart failure type (HCC)       Referred By:      Spiritual Care Taxonomy:    Intended Effects: Build relationship of care and support    Methods: Offer support    Interventions: Active listening    Visit Type/Summary:     - PoA: Other:  followed up with nurse via phone who will check in with patient when giving evening meds and follow up with  about POAH.  remains available for follow up.    Spiritual Care support can be requested via an [employer] consult. For urgent/immediate needs, please contact the On Call  at: Edward: ext [phone]    Pr. Kaitlin Simpson Mdiv.

## 2024-11-25 NOTE — CONSULTS
Cardiology Consultation  [facility]    Arleth Weiss Patient Status:  Inpatient    1955 MRN [id number]   [facility] 2NE-A Attending Maureen Ibarra DO   Hosp Day # 1 PCP Viktoriya Garcias DO     Reason for Consultation:  HFrEF exacerbation     History of Present Illness:  Arleth Weiss is a a(n) 69 year old female with PMH of severe aortic stenosis s/p TAVR, CAD s/p remote stent, Chronic HFpEF, HTN, HLD and hx of PE s/p IVC filter who presented to ED with worsening BLE edema, dyspnea and weight gain. Pt reports that it has been going over the past few weeks. She was taking her PO bumex but her UOP had decreased. However also given her urinary incontiennce, she was not taking her bumex regularly. She had some orthopnea as well. Denies any chest pain and palpitations.   In ED, pt hemodynamically stable. CXR without vascular congestion. BNP elevated.     Currently, she reports her symptoms have improved after the lasix dose. She had a good UOP with IV lasix 40mg. No other concerns at this time.     History:  Past Medical History:    Aortic stenosis    S/P TAVR    Arrhythmia    ASTHMA    Asthma (HCC)    Back problem    CANCER    thyroid    Cancer (HCC)    thyroid     CHF (congestive heart failure) (HCC)    CKD (chronic kidney disease) stage 2, GFR 60-89 ml/min    Congestive heart disease (HCC)    COPD (chronic obstructive pulmonary disease) (HCC)    Deep vein thrombosis (HCC)    DEPRESSION    Depression    Disorder of thyroid    Esophageal reflux    Essential hypertension    Fibromyalgia    Gout    High blood pressure    High cholesterol    History of blood transfusion    HYPERTENSION    HYPOTHYROIDISM    Incontinence    Muscle weakness    Neuropathy    OBESITY    OSTEOARTHRITIS    Osteoarthritis    OTHER DISEASES    Fibromyalgia    OTHER DISEASES    Pulmonary emboli    OTHER DISEASES    Lymph edema    OTHER DISEASES    Pulmonary hypertension    Peripheral vascular disease (HCC)    Pneumonia due  to organism    Prediabetes    Pulmonary embolism (HCC)    RA (rheumatoid arthritis) (HCC)    Renal disorder    Shortness of breath    ON EXERTION    SLEEP APNEA    Sleep apnea    CPAP     Past Surgical History:   Procedure Laterality Date    Anesth,shoulder replacement Right 2014    hemiathroplasty    Back surgery  2000    Back surgery  2004    complete c-3 -7 ectomy    Biopsy Left 07/20/2023    TEMPORAL ARTERY    Carpal tunnel release      Cath transcatheter aortic valve replacement      Colonoscopy N/A 05/10/2018    Procedure: COLONOSCOPY, POSSIBLE BIOPSY, POSSIBLE POLYPECTOMY 11114;  Surgeon: Kendell Valentin MD;  Location: [facility]    Colonoscopy      Craniotomy for lobotomy Left     D & c  09/2009    Dilation/curettage,diagnostic      Hip replacement surgery  09/2009    Rt hip    Knee replacement surgery  2003    Both knees    Other surgical history  1989    Thyroid removal    Other surgical history  2004    LT foot spur removal    Spine surgery procedure unlisted      Thyroidectomy Bilateral     Total hip replacement      Total knee replacement       Family History   Problem Relation Age of Onset    Hypertension Father     Lipids Father     Diabetes Mother     Hypertension Mother     Lipids Mother     Cancer Brother     Hypertension Brother       reports that she quit smoking about 33 years ago. Her smoking use included cigarettes. She started smoking about 63 years ago. She has a 15 pack-year smoking history. She has never used smokeless tobacco. She reports that she does not drink alcohol and does not use drugs.    Allergies:  Allergies[1]    Medications:  Medications Ordered Prior to Encounter[2]    Review of Systems:  Constitutional: denies fevers, chills, night sweats  HEENT: denies headache, vision changes, trouble or pain with swallowing  Cardiac: denies chest pain, palpitations, edema  Pulm: denies dyspnea, cough, wheeze  GI: denies n/v, abd pain, diarrhea or constipation  : denies  hematuria, dysuria, incontinence  MSK: denies muscle or joint pains  Neuro: denies numbness, weakness, paresthesias  Psych: denies anxiety, depression  Integument: denies skin rashes or lesions  Heme: denies easy bruising or bleeding  Endo: denies heat/cold intolerance, skin or nail changes      Physical Exam:  Blood pressure 120/66, pulse 88, temperature 98.1 °F (36.7 °C), temperature source Oral, resp. rate 16, weight 227 lb 1.2 oz (103 kg), SpO2 93%, not currently breastfeeding.  Wt Readings from Last 3 Encounters:   11/23/24 227 lb 1.2 oz (103 kg)   11/14/24 213 lb (96.6 kg)   09/19/24 201 lb 4.8 oz (91.3 kg)       General: awake, alert, oriented x 3, no acute distress  HEENT: at/nc, perrl, eomi  Neck: No JVD, carotids 2+ no bruits.  Cardiac: Regular rate and rhythm, S1, S2 normal, no murmur, rub or gallop.  Lungs: Clear without wheezes, rales, rhonchi or dullness.  Normal excursions and effort.  Abdomen: Soft, non-tender, non-distended, normal bowel sounds   Extremities: Without clubbing, cyanosis or edema.  Peripheral pulses are 2+.  Neurologic: Alert and oriented, normal affect.  Psych: normal mood and affect  Skin: Warm and dry.       Laboratories and Data:  Diagnostics:  EKG: s  Echo: 09/14/24  Conclusions:     1. Left ventricle: The cavity size was normal. Wall thickness was normal. Systolic function was at the lower limits of normal. The estimated ejection fraction was 50%, by visual assessment. Unable to assess LV diastolic function due to heart rhythm.   2. Right ventricle: The cavity size was normal. Systolic function was normal.   3. Left atrium: The left atrial volume was markedly increased.   4. Right atrium: The atrium was mildly dilated.   5. Aortic valve: Elizondo MINOR S3 23mm (TAVR) bioprosthesis was present. There was no significant regurgitation. The peak systolic velocity was 3.19 m/sec. The mean systolic gradient was 19 mm Hg. The valve area (VTI) was 1.46 cm^2. The valve area (VTI) index  was 0.68 cm^2/m^2.   6. Pulmonary arteries: Systolic pressure was mildly increased, in the range      of 35 mm Hg to 40 mm Hg.   Impressions:  This study is compared with previous dated 8/15/2024: Ejection   fraction was 45-50%.       Labs:   CBC:    Lab Results   Component Value Date    WBC 6.3 11/24/2024    WBC 6.9 11/23/2024    WBC 6.7 11/14/2024     Lab Results   Component Value Date    HEMOGLOBIN 13.1 11/05/2014    HEMOGLOBIN 13.1 05/13/2013    HEMOGLOBIN 11.3 (L) 04/20/2012    HGB 11.6 (L) 11/24/2024    HGB 12.7 11/23/2024    HGB 13.8 11/14/2024      Lab Results   Component Value Date    .0 11/24/2024    .0 11/23/2024    .0 11/14/2024     BMP:     Lab Results   Component Value Date    GLUCOSE 69 11/05/2014    GLUCOSE 78 05/13/2013    GLUCOSE 81 04/20/2012     Lab Results   Component Value Date    K 3.3 (L) 11/24/2024    K 4.0 11/23/2024    K 3.1 (L) 11/14/2024     Lab Results   Component Value Date    BUN 9 11/24/2024    BUN 9 11/23/2024    BUN 17 11/14/2024     Lab Results   Component Value Date    CREATSERUM 0.70 11/24/2024    CREATSERUM 0.78 11/23/2024    CREATSERUM 0.93 11/14/2024     Cholesterol:     Lab Results   Component Value Date    CHOLEST 184.00 09/15/2020    CHOLEST 201.00 (H) 08/11/2020    CHOLEST 214 (H) 08/23/2018     Lab Results   Component Value Date    HDL 91 09/15/2020    HDL 87 08/11/2020    HDL 69 08/23/2018     Lab Results   Component Value Date    TRIG 123.00 09/15/2020    TRIG 106.00 08/11/2020    TRIG 114 08/23/2018    TRIGLY 74 11/25/2014    TRIGLY 64 05/13/2013    TRIGLY 88 04/20/2012     Lab Results   Component Value Date    LDL 68 09/15/2020    LDL 93 08/11/2020     08/23/2018     Lab Results   Component Value Date    AST 25 11/23/2024    AST 35 (H) 11/14/2024    AST 37 (H) 09/12/2024     Lab Results   Component Value Date    ALT 24 11/23/2024    ALT 24 11/14/2024    ALT 82 (H) 09/12/2024       Assessment:    #Acute on chronic HFpEF exacerbation: likely  due to inconsistent diuretic use at home  #Severe aortic stenosis s/p TAVR  # CAD s/p stent in   # paroxysmal afib   # HLD   # HTN  # Hx of PE s/p IVC filter    Plan:   - Continue IV lasix 40mg BID    - Strict I/O, daily weights and BMP  - Obtain limited echo to evaluate LVEF and IVC  - Continue diltiazem 240mg daily, metoprolol xl 25mg daily, and spironolactone 25mg daily   - continue plavix 75mg daily   - continue coumadin   - continue rosuvastatin   - monitor telemetry     Cardiology will follow.    Nicolás Medley DO  Cardiologist, [facility]  2024  9:13 PM         [1]   Allergies  Allergen Reactions    Adhesive Tape HIVES     ALL ADHESIVES    Codeine HIVES    Doxycycline SWELLING    Hydrocodone UNKNOWN    Lisinopril TONGUE SWELLING    Morphine Sulfate HIVES    Opioid Analgesics UNKNOWN    Oxycontin ITCHING    Oxytocin HIVES    Vicodin [Hydrocodone-Acetaminophen] ITCHING    Skin Adhesives OTHER (SEE COMMENTS)   [2]   Current Facility-Administered Medications on File Prior to Encounter   Medication Dose Route Frequency Provider Last Rate Last Admin    [COMPLETED] sodium chloride 0.9 % IV bolus 500 mL  500 mL Intravenous Once Rafia Eaton MD   Stopped at 24    [COMPLETED] iopamidol 76% (ISOVUE-370) injection for power injector  100 mL Intravenous ONCE PRN Rafia Eaton MD   100 mL at 24    [COMPLETED] warfarin (Coumadin) tab 5 mg  5 mg Oral Once at night Villarreal, Jacob, DO   5 mg at 24    [COMPLETED] warfarin (Coumadin) tab 7.5 mg  7.5 mg Oral Once at night Villarreal, Jacob, DO   7.5 mg at 24    [COMPLETED] warfarin (Coumadin) tab 7.5 mg  7.5 mg Oral Once at night Villarreal, Jacob, DO   7.5 mg at 24    [] sodium chloride 0.9% infusion   Intravenous On Call Krys Freeman APRN        [COMPLETED] lidocaine PF (Xylocaine-MPF) 1% injection  5 mL Intradermal Once Yvette Whitfield MD   5 mL at 24 1115    [] sodium chloride  0.9% infusion   Intravenous On Call Carlitos Martinez MD        [COMPLETED] midazolam (Versed) 2 MG/2ML injection 4 mg  4 mg Intravenous Once Carlitos Martinez MD   4 mg at 24 1338    [COMPLETED] fentaNYL (Sublimaze) 50 mcg/mL injection 100 mcg  100 mcg Intravenous Once Carlitos Martinez MD   100 mcg at 24 1338    [COMPLETED] benzocaine (Hurricaine/Topex) 20 % mouth spray             [COMPLETED] fentaNYL (Sublimaze) 50 mcg/mL injection             [COMPLETED] midazolam (Versed) 2 MG/2ML injection             [COMPLETED] potassium chloride (Klor-Con M20) tab 40 mEq  40 mEq Oral Once Villarreal, Jacob, DO   40 mEq at 09/15/24 0825    [COMPLETED] warfarin (Coumadin) tab 5 mg  5 mg Oral Once at night Villarreal, Jacob, DO   5 mg at 09/15/24 2210    [] sodium chloride 0.9% infusion   Intravenous On Call Otoniel French MD        [COMPLETED] Perflutren Lipid Microsphere (DEFINITY) 6.52 MG/ML injection 1.5 mL  1.5 mL Intravenous ONCE PRN Villarreal, Jacob, DO   1.5 mL at 24 1149    [COMPLETED] warfarin (Coumadin) tab 5 mg  5 mg Oral Once at night Villarreal, Jacob, DO   5 mg at 24 2118    [COMPLETED] warfarin (Coumadin) tab 7.5 mg  7.5 mg Oral Once at night Villarreal, Jacob, DO   7.5 mg at 24 2200    [COMPLETED] potassium chloride (Klor-Con M20) tab 40 mEq  40 mEq Oral Q4H Villarreal, Jacob, DO   40 mEq at 24 1800    [COMPLETED] vancomycin (Vancocin) 2.25 g in sodium chloride 0.9% 500 mL IVPB premix  25 mg/kg Intravenous Once Villarreal, Jacob,  mL/hr at 24 1330 2,250 mg at 24 1330    [COMPLETED] warfarin (Coumadin) tab 6 mg  6 mg Oral Once at night Villarreal, Jacob, DO   6 mg at 24 2129    [COMPLETED] cefTRIAXone (Rocephin) 1 g in sodium chloride 0.9% 100 mL IVPB-ADDV  1 g Intravenous Once Will Hamm MD   Stopped at 24 1454    [COMPLETED] potassium chloride (Klor-Con M20) tab 40 mEq  40 mEq Oral Once Will Hamm MD   40 mEq at 24 1455    [COMPLETED] warfarin (Coumadin) tab  2.5 mg  2.5 mg Oral Once at night Jacob Villarreal, DO   2.5 mg at 09/11/24 6131     Current Outpatient Medications on File Prior to Encounter   Medication Sig Dispense Refill    metOLazone 5 MG Oral Tab Take 1 tablet (5 mg total) by mouth daily as needed (only if weight increases >5 lbs in 1 day). 30 tablet 3    warfarin 2.5 MG Oral Tab Take 1 tablet (2.5 mg total) by mouth As Directed. Except Tuesdays and Thursdays      empagliflozin (JARDIANCE) 10 MG Oral Tab Take 1 tablet (10 mg total) by mouth at bedtime. Per pt.      levothyroxine 175 MCG Oral Tab Take 1 tablet (175 mcg total) by mouth before breakfast.      dilTIAZem  MG Oral Capsule SR 24 Hr Take 1 capsule (240 mg total) by mouth daily. (Patient taking differently: Take 1 capsule (240 mg total) by mouth at bedtime.) 90 capsule 3    clopidogrel 75 MG Oral Tab Take 1 tablet (75 mg total) by mouth at bedtime. Per pt.      metoprolol succinate ER 25 MG Oral Tablet 24 Hr Take 1 tablet (25 mg total) by mouth at bedtime. Per pt.      Potassium Chloride ER 10 MEQ Oral Cap CR Take 4 capsules (40 mEq total) by mouth at bedtime. Per pt.      rosuvastatin 5 MG Oral Tab Take 1 tablet (5 mg total) by mouth every other day.      spironolactone 25 MG Oral Tab Take 1 tablet (25 mg total) by mouth daily.      warfarin 5 MG Oral Tab Take 1 tablet (5 mg total) by mouth As Directed. Only on Tuesdays and Thursdays      ferrous sulfate 325 (65 FE) MG Oral Tab EC Take 1 tablet (325 mg total) by mouth at bedtime. Per pt.      calcium carbonate 500 MG Oral Chew Tab Chew 1 tablet (500 mg total) by mouth every 8 (eight) hours as needed (upset stomach).      Vitamin C 500 MG Oral Tab Take 1 tablet (500 mg total) by mouth at bedtime. Per pt.      Zinc 50 MG Oral Cap Take 50 mg by mouth at bedtime. Per pt.      Cholecalciferol 125 MCG (5000 UT) Oral Tab Take 1 tablet (5,000 Units total) by mouth at bedtime. Per pt.      acetaminophen 325 MG Oral Tab Take 2 tablets (650 mg total) by mouth  every 8 (eight) hours as needed for Pain. (Patient taking differently: Take 4 tablets (1,300 mg total) by mouth 2 (two) times daily as needed for Pain.)      buPROPion  MG Oral Tablet 12 Hr Take 1 tablet (150 mg total) by mouth 2 (two) times daily.      cyproheptadine 4 MG Oral Tab Take 1 tablet (4 mg total) by mouth nightly.      FLUoxetine 20 MG Oral Cap Take 1 capsule (20 mg total) by mouth 2 (two) times daily.      triamcinolone 0.1 % External Ointment Apply 1 Application topically daily as needed (Neuro dermatitis per pt.).      digoxin 0.125 MG Oral Tab Take 1 tablet (125 mcg total) by mouth daily. (Patient taking differently: Take 1 tablet (125 mcg total) by mouth at bedtime.) 30 tablet 0    allopurinol 100 MG Oral Tab Take 1 tablet (100 mg total) by mouth daily. (Patient taking differently: Take 1 tablet (100 mg total) by mouth at bedtime. Per pt.) 90 tablet 3    Nortriptyline HCl 25 MG Oral Cap Take 2 capsules (50 mg total) by mouth nightly.      Multiple Vitamin (MULTIVITAMIN OR) Take 1 tablet by mouth at bedtime. Per pt.      [] traMADol 50 MG Oral Tab Take 1-2 tablets ( mg total) by mouth every 6 (six) hours as needed for Pain. 10 tablet 0    [] vancomycin in sodium chloride 0.9% 1.25 g/250mL Intravenous Solution Inject 250 mL (1.25 g total) into the vein daily for 23 days. Weekly CBC with differential and CMP and vancomycin trough, sed rate and CRP.  Fax results to Counts include 234 beds at the [facility] Infect[facility]. Fax: [phone]. Tel: [phone].  PICC line care as per protocol. 5750 mL 0    bumetanide 2 MG Oral Tab Take 1 tablet (2 mg total) by mouth BID (Diuretic). 60 tablet 0

## 2024-11-25 NOTE — PLAN OF CARE
Assumed pt care at 0730. A&O x 4. On 2L O2 via NC. Vital signs stable, afib on tele. Purewick in place. Up with x1 assist and crutches. Contact isolation precautions in place.    Plan of care: IV lasix, echo, wound care, PT/OT eval.    Plan of care updated with patient. Questions answered, pt verbalized understanding. Call light is within reach. All needs met at this time.    Problem: Patient/Family Goals  Goal: Patient/Family Long Term Goal  Description: Patient's Long Term Goal: Maintain level of activity    Interventions:  - Follow medication regiment  - See additional Care Plan goals for specific interventions  Outcome: Progressing  Goal: Patient/Family Short Term Goal  Description: Patient's Short Term Goal: Discharge    Interventions:   - Follow medication regiment   - See additional Care Plan goals for specific interventions  Outcome: Progressing     Problem: PAIN - ADULT  Goal: Verbalizes/displays adequate comfort level or patient's stated pain goal  Description: INTERVENTIONS:  - Encourage pt to monitor pain and request assistance  - Assess pain using appropriate pain scale  - Administer analgesics based on type and severity of pain and evaluate response  - Implement non-pharmacological measures as appropriate and evaluate response  - Consider cultural and social influences on pain and pain management  - Manage/alleviate anxiety  - Utilize distraction and/or relaxation techniques  - Monitor for opioid side effects  - Notify MD/LIP if interventions unsuccessful or patient reports new pain  - Anticipate increased pain with activity and pre-medicate as appropriate  Outcome: Progressing     Problem: CARDIOVASCULAR - ADULT  Goal: Maintains optimal cardiac output and hemodynamic stability  Description: INTERVENTIONS:  - Monitor vital signs, rhythm, and trends  - Monitor for bleeding, hypotension and signs of decreased cardiac output  - Evaluate effectiveness of vasoactive medications to optimize hemodynamic  stability  - Monitor arterial and/or venous puncture sites for bleeding and/or hematoma  - Assess quality of pulses, skin color and temperature  - Assess for signs of decreased coronary artery perfusion - ex. Angina  - Evaluate fluid balance, assess for edema, trend weights  Outcome: Progressing  Goal: Absence of cardiac arrhythmias or at baseline  Description: INTERVENTIONS:  - Continuous cardiac monitoring, monitor vital signs, obtain 12 lead EKG if indicated  - Evaluate effectiveness of antiarrhythmic and heart rate control medications as ordered  - Initiate emergency measures for life threatening arrhythmias  - Monitor electrolytes and administer replacement therapy as ordered  Outcome: Progressing     Problem: RESPIRATORY - ADULT  Goal: Achieves optimal ventilation and oxygenation  Description: INTERVENTIONS:  - Assess for changes in respiratory status  - Assess for changes in mentation and behavior  - Position to facilitate oxygenation and minimize respiratory effort  - Oxygen supplementation based on oxygen saturation or ABGs  - Provide Smoking Cessation handout, if applicable  - Encourage broncho-pulmonary hygiene including cough, deep breathe, Incentive Spirometry  - Assess the need for suctioning and perform as needed  - Assess and instruct to report SOB or any respiratory difficulty  - Respiratory Therapy support as indicated  - Manage/alleviate anxiety  - Monitor for signs/symptoms of CO2 retention  Outcome: Progressing     Problem: Diabetes/Glucose Control  Goal: Glucose maintained within prescribed range  Description: INTERVENTIONS:  - Monitor Blood Glucose as ordered  - Assess for signs and symptoms of hyperglycemia and hypoglycemia  - Administer ordered medications to maintain glucose within target range  - Assess barriers to adequate nutritional intake and initiate nutrition consult as needed  - Instruct patient on self management of diabetes  Outcome: Progressing

## 2024-11-25 NOTE — PLAN OF CARE
Pt received at 1930. A&Ox4. Tolerating Room air, cpap at night. A fib on tele monitor, warfarin pm dose administered per orders. Purewick/brief in place. Up with minimal assist. Pt updated and agrees with plan of care. Frequent rounding and fall precautions in place.        0600 pt refusing to wear cpap with sleep this morning, 2L o2 nasal cannula applied  Problem: Patient/Family Goals  Goal: Patient/Family Long Term Goal  Description: Patient's Long Term Goal: Maintain level of activity    Interventions:  - Follow medication regiment  - See additional Care Plan goals for specific interventions  Outcome: Progressing  Goal: Patient/Family Short Term Goal  Description: Patient's Short Term Goal: Discharge    Interventions:   - Follow medication regiment   - See additional Care Plan goals for specific interventions  Outcome: Progressing

## 2024-11-25 NOTE — PROGRESS NOTES
[facility] Cardiology Progress Note        Arleth Weiss Patient Status:  Inpatient    1955 MRN [id number]   [facility] Attending Maureen Ibarra DO   Hosp Day # 2 PCP Viktoriya Garcias DO     Subjective:  The patient denies  chest pain and shortness of breath.  Feels better    Medications:   potassium chloride  40 mEq Oral Once    allopurinol  100 mg Oral Nightly    buPROPion SR  150 mg Oral BID    clopidogrel  75 mg Oral Nightly    digoxin  125 mcg Oral Nightly    dilTIAZem ER  240 mg Oral Nightly    FLUoxetine  20 mg Oral BID    levothyroxine  175 mcg Oral Before breakfast    metoprolol succinate ER  25 mg Oral Nightly    nortriptyline  50 mg Oral Nightly    rosuvastatin  5 mg Oral QOD    spironolactone  25 mg Oral Daily    [START ON 2024] warfarin  5 mg Oral Once per day on     warfarin  2.5 mg Oral Once per day on     miconazole   Topical BID    furosemide  40 mg Intravenous BID (Diuretic)       Continuous Infusions:        Allergies:  Allergies[1]      Objective:        Intake/Output:      Intake/Output Summary (Last 24 hours) at 2024 1056  Last data filed at 2024 2324  Gross per 24 hour   Intake 840 ml   Output 850 ml   Net -10 ml     Wt Readings from Last 3 Encounters:   24 218 lb 1.6 oz (98.9 kg)   24 213 lb (96.6 kg)   24 201 lb 4.8 oz (91.3 kg)       Physical Exam:        Vitals:    24 0558 24 0559 24 0606 24 0824   BP:    113/76   BP Location:    Radial   Pulse: 83 98  88   Resp:    16   Temp:    97.6 °F (36.4 °C)   TempSrc:    Oral   SpO2: (!) 86% (!) 88% 98% 99%   Weight:           Temp:  [97.5 °F (36.4 °C)-99 °F (37.2 °C)] 97.6 °F (36.4 °C)  Pulse:  [76-98] 88  Resp:  [16-18] 16  BP: ()/(61-77) 113/76  SpO2:  [85 %-99 %] 99 %      Temp: 97.6 °F (36.4 °C)  Pulse: 88  Resp: 16  BP: 113/76  General:  Appears comfortable  HEENT: No focal  deficits.  Neck: No JVD, carotids 2+ no bruits.  Cardiac: Irregular S1S2.  No S3, S4, rub, click.  No murmur.  Lungs: Clear to auscultation and percussion.  Abdomen: Soft, non-tender.   Extremities: No LE edema.  No clubbing or cyanosis.    Neurologic: Alert and oriented, normal affect.  Skin: Warm and dry.           LABS:      HEM:  Recent Labs   Lab 11/23/24 1751 11/24/24  0736 11/25/24  0608   WBC 6.9 6.3 6.5   HGB 12.7 11.6* 13.5   HCT 38.3 34.9* 41.5   .0 289.0 319.0       Chem:  Recent Labs   Lab 11/23/24 1751 11/24/24  0736 11/25/24  0608    144 141   K 4.0 3.3* 3.2*  3.2*    109 103   CO2 23.0 25.0 30.0   BUN 9 9 10   CREATSERUM 0.78 0.70 0.66   CA 10.0 9.0 9.2   MG  --   --  1.7   PHOS  --   --  3.7   * 108* 109*       Recent Labs   Lab 11/23/24  1751 11/25/24  0608   ALT 24  --    AST 25  --    ALB 3.5 3.5       Recent Labs   Lab 11/23/24 1751 11/24/24  0736   INR 3.02* 2.64*           Lab Results   Component Value Date    TROP <0.045 07/28/2021         Invalid input(s): \"PBNPML\"                       Diagnostics:   Telemetry: Atrial Fibrillation     EKG, 11/25/2024,         Echo:      Cardiac Cath:              Impression:    #Acute on chronic HFpEF exacerbation: likely due to inconsistent diuretic use at home. Approved for TenderTreewick; this should improve diuretic compliance  #Severe aortic stenosis s/p TAVR  # CAD s/p stent in 2021  # paroxysmal afib   # HLD   # HTN  # Hx of PE s/p IVC filter     Plan:   - Continue IV lasix 40mg BID today                - Strict I/O, daily weights and BMP  - Obtain limited echo to evaluate LVEF and IVC  - Continue diltiazem 240mg daily, metoprolol xl 25mg daily, and spironolactone 25mg daily   - continue plavix 75mg daily   - continue coumadin   - continue rosuvastatin   - monitor telemetry       James Robertson MD  11/25/2024  10:56 AM           [1]   Allergies  Allergen Reactions    Adhesive Tape HIVES     ALL ADHESIVES     Codeine HIVES    Doxycycline SWELLING    Hydrocodone UNKNOWN    Lisinopril TONGUE SWELLING    Morphine Sulfate HIVES    Opioid Analgesics UNKNOWN    Oxycontin ITCHING    Oxytocin HIVES    Vicodin [Hydrocodone-Acetaminophen] ITCHING    Skin Adhesives OTHER (SEE COMMENTS)

## 2024-11-25 NOTE — DISCHARGE INSTRUCTIONS
Going Home Instructions  In this section you will find the tools which will guide you through the first few days after you leave the hospital. Continued use of these tools will help you develop the skills necessary to keep your heart failure under control.     Home Care Instructions Following Heart Failure - the most important things to do every day include:   Weigh yourself and review the “Self-Check Plan” sheet every morning.   Call your cardiologist office if you are in the “Pay Attention-Use Caution” (yellow zone) or “Medical Alert-Warning!” (red zone) as outlined in the Self-Check Plan sheet.  Take your medicines as prescribed.  Limit your sodium (salt) intake.  Know when to call your cardiologist, primary doctor, or nurse.  Know when to seek emergency care.      Things for You to Remember:   1. See your doctor or healthcare provider as written on your discharge instructions.  It is important that you attend this appointment to make sure your symptoms are under control.     2. Your recommended sodium intake is 5835-6838 mg daily.    3.  Weigh yourself every day.    4. Some exercise and activity is important to help keep your heart functioning and strong. Unless instructed not to exercise, you may walk at a slow to moderate pace for 10-15 minutes 2-3 days per week to start. Pace your activity to prevent shortness of breath or fatigue. Stop exercising if you develop chest pain, lightheadedness, or significant shortness of breath.       Call Your Cardiologist If:   You gain 2-3 pounds in one day or 5 pounds in one week.  You have more difficulty breathing.  You are getting more tired with normal activity.  You are more short of breath lying down, or awaken at night short of breath.  You have swelling of your feet or legs.  You urinate less often during the day and more often at night.  You have cramps in your legs.  You have blurred vision or see yellowish-green halos around objects of lights.    Go to the  Emergency Room If:   You have pain or tightness in your chest  You are extremely short of breath  You are coughing up pink-frothy mucus  You are traveling and develop symptoms of worsening heart failure      ** Please follow up with your cardiologist or Advanced Practice Provider as written on your discharge instructions. If you are not provided with an appointment, let your nurse know so you can get an appointment**      [address] [address]  Phone: [phone]  Fax: [phone]

## 2024-11-26 LAB
ALBUMIN SERPL-MCNC: 3.7 G/DL (ref 3.2–4.8)
ANION GAP SERPL CALC-SCNC: 7 MMOL/L (ref 0–18)
BUN BLD-MCNC: 12 MG/DL (ref 9–23)
CALCIUM BLD-MCNC: 9.9 MG/DL (ref 8.7–10.4)
CHLORIDE SERPL-SCNC: 101 MMOL/L (ref 98–112)
CO2 SERPL-SCNC: 32 MMOL/L (ref 21–32)
CREAT BLD-MCNC: 0.7 MG/DL
EGFRCR SERPLBLD CKD-EPI 2021: 92 ML/MIN/1.73M2 (ref 60–?)
ERYTHROCYTE [DISTWIDTH] IN BLOOD BY AUTOMATED COUNT: 13.8 %
GLUCOSE BLD-MCNC: 124 MG/DL (ref 70–99)
HCT VFR BLD AUTO: 41.5 %
HGB BLD-MCNC: 13.7 G/DL
INR BLD: 2.02 (ref 0.8–1.2)
MAGNESIUM SERPL-MCNC: 1.8 MG/DL (ref 1.6–2.6)
MCH RBC QN AUTO: 31.8 PG (ref 26–34)
MCHC RBC AUTO-ENTMCNC: 33 G/DL (ref 31–37)
MCV RBC AUTO: 96.3 FL
OSMOLALITY SERPL CALC.SUM OF ELEC: 291 MOSM/KG (ref 275–295)
PHOSPHATE SERPL-MCNC: 3.8 MG/DL (ref 2.4–5.1)
PLATELET # BLD AUTO: 343 10(3)UL (ref 150–450)
POTASSIUM SERPL-SCNC: 3.4 MMOL/L (ref 3.5–5.1)
POTASSIUM SERPL-SCNC: 3.4 MMOL/L (ref 3.5–5.1)
PROTHROMBIN TIME: 23.1 SECONDS (ref 11.6–14.8)
RBC # BLD AUTO: 4.31 X10(6)UL
SODIUM SERPL-SCNC: 140 MMOL/L (ref 136–145)
WBC # BLD AUTO: 6.4 X10(3) UL (ref 4–11)

## 2024-11-26 RX ORDER — TRIAMCINOLONE ACETONIDE 1 MG/G
1 OINTMENT TOPICAL DAILY PRN
Status: DISCONTINUED | OUTPATIENT
Start: 2024-11-26 | End: 2024-12-04

## 2024-11-26 RX ORDER — MAGNESIUM OXIDE 400 MG/1
400 TABLET ORAL ONCE
Status: COMPLETED | OUTPATIENT
Start: 2024-11-26 | End: 2024-11-26

## 2024-11-26 RX ORDER — POTASSIUM CHLORIDE 1.5 G/1.58G
40 POWDER, FOR SOLUTION ORAL ONCE
Status: COMPLETED | OUTPATIENT
Start: 2024-11-26 | End: 2024-11-26

## 2024-11-26 NOTE — PROGRESS NOTES
[facility] Hospitalist Progress Note     CC: Hospital Follow up    PCP: Viktoriya Garcias DO       Subjective:     No acute events overnight, continuing diuresis.      OBJECTIVE:    Blood pressure 105/58, pulse 99, temperature 98 °F (36.7 °C), temperature source Oral, resp. rate 20, weight 214 lb 1.1 oz (97.1 kg), SpO2 95%, not currently breastfeeding.    Temp:  [97.3 °F (36.3 °C)-98.7 °F (37.1 °C)] 98 °F (36.7 °C)  Pulse:  [] 99  Resp:  [16-20] 20  BP: (100-134)/(58-86) 105/58  SpO2:  [87 %-98 %] 95 %      Intake/Output:    Intake/Output Summary (Last 24 hours) at 11/26/2024 1415  Last data filed at 11/26/2024 1100  Gross per 24 hour   Intake 960 ml   Output 1800 ml   Net -840 ml       Last 3 Weights   11/26/24 0520 214 lb 1.1 oz (97.1 kg)   11/25/24 0408 218 lb 1.6 oz (98.9 kg)   11/23/24 2313 227 lb 1.2 oz (103 kg)   11/23/24 2233 227 lb 1.2 oz (103 kg)   11/23/24 1746 225 lb (102.1 kg)   11/14/24 1548 213 lb (96.6 kg)   09/19/24 0500 201 lb 4.8 oz (91.3 kg)   09/18/24 0400 196 lb (88.9 kg)   09/17/24 0930 198 lb 3.2 oz (89.9 kg)   09/17/24 0533 204 lb 9.4 oz (92.8 kg)   09/16/24 0920 203 lb 6.4 oz (92.3 kg)   09/14/24 0430 220 lb (99.8 kg)   09/13/24 0617 216 lb (98 kg)   09/12/24 0800 225 lb 12 oz (102.4 kg)   09/11/24 1309 230 lb (104.3 kg)       Exam   Gen: Alert, no acute distress  Heent: Normocephalic, atraumatic, neck supple, EOMI, PERRLA  Pulm: Lungs CTAB, normal respiratory effort  CV:  Regular rate and rhythm, no murmurs/rubs/gallops  Abd: Soft, nontender, nondistended, bowel sounds present  Extremities: 3+ nonpitting edema in lower extremities bilaterally, no clubbing, pulses intact   Skin: No rashes or lesions  Neuro: AOx3, no focal neurologic deficits, CN II-XII grossly intact  Psych: appropriate mood and affect      Data Review:       Labs:     Recent Labs   Lab 11/23/24  1751 11/24/24  0736 11/25/24  0608 11/26/24  0734   RBC 3.95 3.66* 4.31 4.31   HGB 12.7 11.6* 13.5 13.7   HCT 38.3  34.9* 41.5 41.5   MCV 97.0 95.4 96.3 96.3   MCH 32.2 31.7 31.3 31.8   MCHC 33.2 33.2 32.5 33.0   RDW 14.2 14.2 14.2 13.8   NEPRELIM 4.42 3.32  --   --    WBC 6.9 6.3 6.5 6.4   .0 289.0 319.0 343.0         Recent Labs   Lab 11/24/24  0736 11/25/24  0608 11/26/24  0734   * 109* 124*   BUN 9 10 12   CREATSERUM 0.70 0.66 0.70   EGFRCR 92 94 92   CA 9.0 9.2 9.9    141 140   K 3.3* 3.2*  3.2* 3.4*  3.4*    103 101   CO2 25.0 30.0 32.0       Recent Labs   Lab 11/23/24  1751 11/25/24  0608 11/26/24  0734   ALT 24  --   --    AST 25  --   --    ALB 3.5 3.5 3.7         Imaging:  XR CHEST AP PORTABLE  (CPT=71045)    Result Date: 11/23/2024  CONCLUSION:    Cardiomegaly without vascular congestion.  Nonspecific accentuation interstitial markings left base.  No sizable effusion or pneumothorax.  No hyperinflation.  LOCATION:  [facility]      Dictated by (CST): Crow Santiago MD on 11/23/2024 at 6:41 PM     Finalized by (CST): Crow Santiago MD on 11/23/2024 at 6:41 PM          Meds:      allopurinol  100 mg Oral Nightly    buPROPion SR  150 mg Oral BID    clopidogrel  75 mg Oral Nightly    digoxin  125 mcg Oral Nightly    dilTIAZem ER  240 mg Oral Nightly    FLUoxetine  20 mg Oral BID    levothyroxine  175 mcg Oral Before breakfast    metoprolol succinate ER  25 mg Oral Nightly    nortriptyline  50 mg Oral Nightly    rosuvastatin  5 mg Oral QOD    spironolactone  25 mg Oral Daily    warfarin  5 mg Oral Once per day on Tuesday Thursday    warfarin  2.5 mg Oral Once per day on Sunday Monday Wednesday Friday Saturday    miconazole   Topical BID    furosemide  40 mg Intravenous BID (Diuretic)         triamcinolone    ammonium lactate    traMADol    diphenhydrAMINE    acetaminophen    melatonin       Assessment/Plan:     68 y/o F w/ PMHx of HFrEF, aortic stenosis status post TAVR, CAD status post PCI in 2021, PVD, HTN, HLD, A-fib on oral anticoagulation, hypothyroidism, SEBASTIEN, CKD stage II, COPD, depression,  recent admission for MRSA bacteremia s/p completion of vancomycin who presents to the hospital with leg swelling, dyspnea, and weight gain.     Acute on chronic decompensated HFrEF (recovered EF 50% in September)  -Patient has not been taking her diuretic (supposed to take Bumex 2 mg twice daily), BNP reviewed and elevated at 1157.  -Continue IV Lasix 40 mg twice daily  -Strict I's and O's, daily weights (net -600 L in the last 24 hours)  -Discussed with cardiology, echo shows no significant change  -Continue home metoprolol succinate 25 mg nightly, spironolactone 25 mg daily  -Daily RFP's, will monitor renal function  -Cardiac diet, fluid restriction     Aortic stenosis s/p TAVR  Paroxysmal atrial fibrillation  Therapeutic INR  -Continue home warfarin regimen  -Monitor INR  -Continue home beta-blocker, diltiazem, digoxin, Plavix     CKD stage II  -Creatinine normal this admission, continue to monitor  -Avoid nephrotoxic agents  -Creatinine reviewed today, 0.66  -Daily RFP's     Hyperlipidemia  -Continue home rosuvastatin     Depression  -Continue home fluoxetine, nortriptyline, bupropion     Gout  -Continue home allopurinol     Chronic pain  -Continue home nortriptyline, as needed tramadol, as needed Tylenol     History of MRSA bacteremia  -Recently completed IV vancomycin, no further treatment at this time     Hypothyroidism  -Continue home levothyroxine     IVF: None  Diet: Cardiac diet  DVT Prophylaxis: Warfarin    Code Status: Full Code   Dispo: Pending clinical course     Outpatient records or previous hospital records reviewed.   Questions/concerns were discussed with patient and/or family by bedside.  Discussed with cardiology.  [facility] hospitalist to continue to follow patient while in house  A total of 52 minutes taken with patient and coordinating care.      DO Mariel Ospina [facility]  Hospitalist  Contact via [employer]/mylearnadfriend       Supplementary Documentation:   DVT Mechanical Prophylaxis:    SCDs,    DVT Pharmacologic Prophylaxis   Medication    warfarin (Coumadin) tab 5 mg    warfarin (Coumadin) tab 2.5 mg                Code Status: Full Code  Calvin: External urinary catheter in place  Calvin Duration (in days):   Central line:    JUJU:

## 2024-11-26 NOTE — PLAN OF CARE
Pt received at 1930.A&Ox3, forgetful at times. Room air during the day, 2L or cpap at noc with sleep. A fib on tele monitor, warfarin given as ordered this evening. Purewick/brief in place. Pt c/o \"sharp shooting\" pain to bilateral lower legs, compression wraps removed per pt request, BLE left open to air and elevated on pillows. PRN pain medication given for pt c/o generalized pain. Pt updated and agrees with plan of care. Frequent rounding and fall precautions in place.     Problem: Patient/Family Goals  Goal: Patient/Family Long Term Goal  Description: Patient's Long Term Goal: Maintain level of activity    Interventions:  - Follow medication regiment  - See additional Care Plan goals for specific interventions  Outcome: Progressing  Goal: Patient/Family Short Term Goal  Description: Patient's Short Term Goal: Discharge    Interventions:   - Follow medication regiment   - See additional Care Plan goals for specific interventions  Outcome: Progressing

## 2024-11-26 NOTE — OCCUPATIONAL THERAPY NOTE
OCCUPATIONAL THERAPY EVALUATION - INPATIENT     Room Number: 2610/2610-A  Evaluation Date: 11/26/2024  Type of Evaluation: Initial  Presenting Problem: Acute on chronic congestive heart failure, unspecified heart failure (HCC)    Physician Order: IP Consult to Occupational Therapy  Reason for Therapy: ADL/IADL Dysfunction and Discharge Planning    OCCUPATIONAL THERAPY ASSESSMENT   Patient is currently functioning near baseline with lower body dressing, bed mobility, transfers, static sitting balance, dynamic sitting balance, static standing balance, dynamic standing balance, maintaining seated position, functional standing tolerance, and energy conservation strategies. Prior to admission, patient's baseline is modIND with ADLs/IADLs.  Patient is requiring maximum assistance as a result of the following impairments: decreased functional reach, decreased endurance, and pain. Occupational Therapy will continue to follow for duration of hospitalization.    Patient will benefit from continued skilled OT Services at discharge to promote prior level of function and safety with additional support and return home with home health OT    History Related to Current Admission: Patient is a 69 year old female admitted on 11/23/2024 with Presenting Problem: Acute on chronic congestive heart failure, unspecified heart failure (HCC). Co-Morbidities : HFrEF s/p TAVR, CAD s/p PCI 2021, PVD, HTN, HLD, A-fib. hypothyroidism    WEIGHT BEARING RESTRICTION       Recommendations for nursing staff:   Transfers: up x 1 assist, use of personal Lofstrand crutches, supervision  Toileting location: Bedlevel, may progress to bedside commode or toilet pending pt pain in B feet.    EVALUATION SESSION:  Patient Start of Session: Semi supine in bed, Rn present in room.    FUNCTIONAL TRANSFER ASSESSMENT  Sit to Stand: Edge of Bed  Edge of Bed: Supervision    BED MOBILITY  Supine to Sit : Supervision  Sit to Supine (OT): Supervision  Scooting:  Supervision    BALANCE ASSESSMENT  Static Sitting: Supervision  Static Standing: Supervision  Dynamic Sitting: Supervision  Dynamic Standing: Supervision    FUNCTIONAL ADL ASSESSMENT  LB Dressing Seated: Maximum Assist (typically utilizes sock aide to don socks at home)    ACTIVITY TOLERANCE: Pt participated in dynamic standing activity to increase tolerance for standing ADLs. Pt tolerated ~ 10 steps within room, pt limited to further mobility as pt with increasing pain to bilateral feet with movement.                         O2 SATURATIONS  Oxygen Therapy  At rest oxygen flow (liters per minute): 2    COGNITION  Overall Cognitive Status:  WFL - within functional limits    Upper Extremity   ROM: within functional limits  Strength: within functional limits   Coordination  Gross motor: WFL  Fine motor: WFL  Sensation: no c/o numbness or tingling    EDUCATION PROVIDED  Patient Education : Role of Occupational Therapy; Plan of Care; Functional Transfer Techniques; Fall Prevention; Posture/Positioning; Energy Conservation; Proper Body Mechanics; Discharge Recommendations  Patient's Response to Education: Verbalized Understanding; Returned Demonstration    Equipment used: personal Lofstrand crutches, hospital bed functions  Demonstrates functional use     Therapist comments: RN cleared pt for session, pt received semi supine in bed, pt willing to work with therapy. Pt initially on 2L 02 at beginning of session, O2 taken off during standing activity. Pt reports her feet have been giving her problems. Pt sat EOB in preparation for seated ADL; pt typically reports using a sock aide/shoehorn at home to don socks/shoes, this writer assisted with donning/doffing socks for this session. Pt able to stand from EOB with use of personal Lofstrand crutches, completed a short walk within room with supervision, however mobility was limited as pt reporting an increase in pain with movement to B feet. Pt requesting to sit back in bed at  end of session, politely declined sitting upright in chair. Pt safely returned to bed at end of session.     Patient End of Session: In bed;Needs met;Call light within reach;RN aware of session/findings;All patient questions and concerns addressed;Alarm set    OCCUPATIONAL PROFILE    HOME SITUATION  Type of Home: House  Home Layout: One level;Ramped entrance  Lives With: Alone;Caregiver part-time    Toilet and Equipment: Comfort height toilet;Grab bar;Toilet riser  Shower/Tub and Equipment: Walk-in shower;Shower chair;Grab bar  Other Equipment: Reacher;Sock aid;Long-handled shoehorn    Occupation/Status: Retired  Hand Dominance: Left  Drives: Yes  Patient Regularly Uses: Glasses;Crutches    Prior Level of Function: Pt reports living at home alone, sometimes has a caregiver who comes, but is inconsistent, supposed to come 14hours/week and helps with household management. Pt is typically modIND with ADLs/IADLs, utilizes AE/DME to complete ADLs, was driving prior to this admission. Pt ambulates with Lofstrand crutches.    SUBJECTIVE   \"Don't ever get old honey\"    PAIN ASSESSMENT  Rating: Unable to rate  Location: feet       OBJECTIVE  Precautions: Bed/chair alarm  Fall Risk: High fall risk    ASSESSMENTS    AM-PAC ‘6-Clicks’ Inpatient Daily Activity Short Form  -   Putting on and taking off regular lower body clothing?: A Little  -   Bathing (including washing, rinsing, drying)?: A Little  -   Toileting, which includes using toilet, bedpan or urinal? : A Little  -   Putting on and taking off regular upper body clothing?: None  -   Taking care of personal grooming such as brushing teeth?: None  -   Eating meals?: None    AM-PAC Score:  Score: 21  Approx Degree of Impairment: 32.79%  Standardized Score (AM-PAC Scale): 44.27    ADDITIONAL TESTS     NEUROLOGICAL FINDINGS      COGNITION ASSESSMENTS     PLAN  OT Device Recommendations: None (Pt has sufficient equipment for ADLs at home)  OT Treatment Plan: Balance  activities;Energy conservation/work simplification techniques;ADL training;IADL training;UE strengthening/ROM;Functional transfer training;Endurance training;Patient/Family education;Patient/Family training;Equipment eval/education;Compensatory technique education;Continued evaluation  Rehab Potential : Good  Frequency: 3-5x/week  Number of Visits to Meet Established Goals: 3    ADL Goals   Patient will perform lower body dressing:  with supervision and with adaptive equipment PRN  Patient will perform toileting: with supervision    Functional Transfer Goals  Patient will transfer to toilet:  with supervision    Patient Evaluation Complexity Level:   Occupational Profile/Medical History MODERATE - Expanded review of history including review of medical or therapy record   Specific performance deficits impacting engagement in ADL/IADL LOW  1 - 3 performance deficits    Client Assessment/Performance Deficits MODERATE - Comorbidities and min to mod modifications of tasks    Clinical Decision Making LOW - Analysis of occupational profile, problem-focused assessments, limited treatment options    Overall Complexity LOW     OT Session Time: 25 minutes  Self-Care Home Management: 15 minutes

## 2024-11-26 NOTE — CM/SW NOTE
SW noted and acknowledged consult order for HH services.    Anticipated therapy need for pt at [facility] is HH.    SW sent HH referral in [place]. Awaiting responses. Will provide pt with options list when available.     to remain available for support and/or discharge planning.    GEN Cui  Discharge Planner  [phone]

## 2024-11-26 NOTE — PROGRESS NOTES
[facility] Cardiology Progress Note        Arleth Weiss Patient Status:  Inpatient    1955 MRN [id number]   [facility] 2NE-A Attending Maureen Ibarra DO   Hosp Day # 3 PCP Viktoriya Garcias DO     Subjective:  The patient denies  chest pain   Still dyspneic    Medications:   potassium chloride  40 mEq Oral Once    allopurinol  100 mg Oral Nightly    buPROPion SR  150 mg Oral BID    clopidogrel  75 mg Oral Nightly    digoxin  125 mcg Oral Nightly    dilTIAZem ER  240 mg Oral Nightly    FLUoxetine  20 mg Oral BID    levothyroxine  175 mcg Oral Before breakfast    metoprolol succinate ER  25 mg Oral Nightly    nortriptyline  50 mg Oral Nightly    rosuvastatin  5 mg Oral QOD    spironolactone  25 mg Oral Daily    warfarin  5 mg Oral Once per day on     warfarin  2.5 mg Oral Once per day on     miconazole   Topical BID    furosemide  40 mg Intravenous BID (Diuretic)       Continuous Infusions:        Allergies:  Allergies[1]      Objective:        Intake/Output:      Intake/Output Summary (Last 24 hours) at 2024 0841  Last data filed at 2024 0520  Gross per 24 hour   Intake 1558 ml   Output 2200 ml   Net -642 ml     Wt Readings from Last 3 Encounters:   24 214 lb 1.1 oz (97.1 kg)   24 213 lb (96.6 kg)   24 201 lb 4.8 oz (91.3 kg)       Physical Exam:        Vitals:    24 2321 24 2349 24 0255 24 0520   BP: 122/83   122/86   BP Location: Radial   Radial   Pulse: 93 97 113 89   Resp: 18 18 16 18   Temp: 98.2 °F (36.8 °C)   97.3 °F (36.3 °C)   TempSrc: Oral   Oral   SpO2: (!) 87% 92% 93% 97%   Weight:    214 lb 1.1 oz (97.1 kg)       Temp:  [97.3 °F (36.3 °C)-98.7 °F (37.1 °C)] 97.3 °F (36.3 °C)  Pulse:  [] 89  Resp:  [16-18] 18  BP: (100-134)/(49-86) 122/86  SpO2:  [87 %-97 %] 97 %      Temp: 97.3 °F (36.3 °C)  Pulse: 89  Resp: 18  BP: 122/86  General:  Appears  comfortable  HEENT: No focal deficits.  Neck: No JVD, carotids 2+ no bruits.  Cardiac: Irregular S1S2.  No S3, S4, rub, click.  No murmur.  Lungs: Clear to auscultation and percussion.  Abdomen: Soft, non-tender.   Extremities: No LE edema.  No clubbing or cyanosis.    Neurologic: Alert and oriented, normal affect.  Skin: Warm and dry.           LABS:      HEM:  Recent Labs   Lab 11/23/24 1751 11/24/24  0736 11/25/24  0608 11/26/24  0734   WBC 6.9 6.3 6.5 6.4   HGB 12.7 11.6* 13.5 13.7   HCT 38.3 34.9* 41.5 41.5   .0 289.0 319.0 343.0       Chem:  Recent Labs   Lab 11/23/24 1751 11/24/24  0736 11/25/24  0608    144 141   K 4.0 3.3* 3.2*  3.2*    109 103   CO2 23.0 25.0 30.0   BUN 9 9 10   CREATSERUM 0.78 0.70 0.66   CA 10.0 9.0 9.2   MG  --   --  1.7   PHOS  --   --  3.7   * 108* 109*       Recent Labs   Lab 11/23/24 1751 11/25/24  0608   ALT 24  --    AST 25  --    ALB 3.5 3.5       Recent Labs   Lab 11/23/24 1751 11/24/24  0736 11/25/24  1750 11/26/24  0734   INR 3.02* 2.64* 2.18* 2.02*           Lab Results   Component Value Date    TROP <0.045 07/28/2021         Invalid input(s): \"PBNPML\"                       Diagnostics:   Telemetry: Atrial Fibrillation     EKG, 11/25/2024,         Echo:  11/25/24      Conclusions:     1. Left ventricle: The cavity size was normal. Wall thickness was normal.      Systolic function was normal. The estimated ejection fraction was 50-55%,      by visual assessment. Wall motion is normal; there are no regional wall      motion abnormalities. Unable to assess LV diastolic function.   2. Right ventricle: Systolic function was normal.   3. Left atrium: The atrium was mildly dilated.   4. Aortic valve: Elizondo MINOR S3 23mm (TAVR) bioprosthesis was present,      well seated. No functional/doppler analysis performed.   5. Mitral valve: The annulus was markedly calcified. The leaflets were      mildly calcified. Minimal stenosis. The mean diastolic  gradient was 3mm      Hg.   Impressions:  This study is compared with previous dated 9/14/2024: No   significant change.               Impression:    #Acute on chronic HFpEF exacerbation: likely due to inconsistent diuretic use at home. Approved for [employer]; this should improve diuretic compliance  -     net out = 1.9 liters      #Severe aortic stenosis s/p TAVR  # CAD s/p stent in 2021  # paroxysmal afib   # HLD   # HTN  # Hx of PE s/p IVC filter     Plan:   - Continue IV lasix 40mg BID today                - Strict I/O, daily weights and BMP    - Continue diltiazem 240mg daily, metoprolol xl 25mg daily, and spironolactone 25mg daily   - continue plavix 75mg daily   - continue coumadin   - continue rosuvastatin   - monitor telemetry       James Robertson MD           [1]   Allergies  Allergen Reactions    Adhesive Tape HIVES     ALL ADHESIVES    Codeine HIVES    Doxycycline SWELLING    Hydrocodone UNKNOWN    Lisinopril TONGUE SWELLING    Morphine Sulfate HIVES    Opioid Analgesics UNKNOWN    Oxycontin ITCHING    Oxytocin HIVES    Vicodin [Hydrocodone-Acetaminophen] ITCHING    Skin Adhesives OTHER (SEE COMMENTS)

## 2024-11-26 NOTE — PHYSICAL THERAPY NOTE
PHYSICAL THERAPY EVALUATION - INPATIENT     Room Number: 2610/2610-A  Evaluation Date: 11/26/2024  Type of Evaluation: Initial  Physician Order: PT Eval and Treat    Presenting Problem: acute on chronic CHF  Co-Morbidities : DVT, COPD, CHF, CKD, thyroid cancer, HTN, TAVR, OA, PVD, fibromyalgia, PE, spine surgery, B TKA, [place]SEB[place], TSA  Reason for Therapy: Mobility Dysfunction and Discharge Planning    PHYSICAL THERAPY ASSESSMENT   Patient is a 69 year old female admitted 11/23/2024 for acute on chronic CHF.  Prior to admission, patient's baseline is mod ind with loftstrand crutches.  Patient is currently functioning below baseline with bed mobility, transfers, gait, and standing prolonged periods.  Patient is requiring supervision as a result of the following impairments: decreased functional strength, decreased endurance/aerobic capacity, pain, decreased muscular endurance, and increased O2 needs from baseline.  Physical Therapy will continue to follow for duration of hospitalization.    Patient will benefit from continued skilled PT Services at discharge to promote prior level of function and safety with additional support and return home with home health PT.    PLAN DURING HOSPITALIZATION  Nursing Mobility Recommendation : 1 Assist  PT Device Recommendation: Other (Comment) (Loftstrand crutches)  PT Treatment Plan: Bed mobility;Endurance;Energy conservation;Patient education;Family education;Gait training;Strengthening;Transfer training;Balance training  Rehab Potential : Good  Frequency (Obs): 3-5x/week     CURRENT GOALS    Goal #1 Patient is able to demonstrate supine - sit EOB @ level: modified independent     Goal #2 Patient is able to demonstrate transfers Sit to/from Stand at assistance level: modified independent     Goal #3 Patient is able to ambulate 150 feet with assist device:  loftstrand crutches  at assistance level: modified independent     Goal #4    Goal #5    Goal #6    Goal Comments: Goals established  on 11/26/2024      PHYSICAL THERAPY MEDICAL/SOCIAL HISTORY  History related to current admission: Patient is a 69 year old female admitted on 11/23/2024 from home for acute on chronic CHF.    HOME SITUATION  Type of Home: House  Home Layout: One level;Ramped entrance                     Lives With: Alone;Caregiver part-time (CG supposed to come 14 hours per week but is inconsistent, assists with household mangement)    Drives: Yes   Patient Regularly Uses: Glasses;Rollator (loftstrand crutches, adjustable bed)      Prior Level of [place]: Pt is typically mod ind with ADLs and ambulates with loftstrand crutches. Pt has been having increased difficulty lately due to increased pain in B feet. Pt does not sit in low chairs.     SUBJECTIVE  Pt pleasant and cooperative    OBJECTIVE  Precautions: Bed/chair alarm  Fall Risk: High fall risk    WEIGHT BEARING RESTRICTION     PAIN ASSESSMENT  Rating: Unable to rate  Location: B feet  Management Techniques: Activity promotion;Relaxation;Repositioning;Breathing techniques    COGNITION  Overall Cognitive Status:  WFL - within functional limits    RANGE OF MOTION AND STRENGTH ASSESSMENT  Upper extremity ROM and strength are within functional limits     Lower extremity ROM is within functional limits, pt reports limited B knee flexion     Lower extremity strength is within functional limits, except mild deconditioning    BALANCE  Static Sitting: Good  Dynamic Sitting: Fair +  Static Standing: Fair -  Dynamic Standing: Fair -    ADDITIONAL TESTS                                    ACTIVITY TOLERANCE   Pt with shortness of breath, SPO2 stable on 2 L O2                      O2 WALK       NEUROLOGICAL FINDINGS                        AM-PAC '6-Clicks' INPATIENT SHORT FORM - BASIC MOBILITY  How much difficulty does the patient currently have...  Patient Difficulty: Turning over in bed (including adjusting bedclothes, sheets and blankets)?: A Little   Patient Difficulty: Sitting down  on and standing up from a chair with arms (e.g., wheelchair, bedside commode, etc.): A Little   Patient Difficulty: Moving from lying on back to sitting on the side of the bed?: A Little   How much help from another person does the patient currently need...   Help from Another: Moving to and from a bed to a chair (including a wheelchair)?: A Little   Help from Another: Need to walk in hospital room?: A Little   Help from Another: Climbing 3-5 steps with a railing?: Total     AM-PAC Score:  Raw Score: 16   Approx Degree of Impairment: 54.16%   Standardized Score (AM-PAC Scale): 40.78   CMS Modifier (G-Code): CK    FUNCTIONAL ABILITY STATUS  Gait Assessment   Functional Mobility/Gait Assessment  Gait Assistance: Supervision  Distance (ft): 5  Assistive Device:  (loftstrand crutches)  Pattern: Within Functional Limits (antaglic)    Skilled Therapy Provided: Per RN okay to work with pt. Pt received in supine and was agreeable to PT session.     Bed Mobility:  Rolling: NT  Supine to sit: supervision, HOB elevated    Sit to supine: supervision, HOB elevated      Transfer Mobility:  Sit to stand: CGA   Stand to sit: CGA  Gait = pt ambulated with loftstrand crutches and supervision    Pt limited due to pain in B feet.     Therapist's Comments: Pt educated on role of therapy, goals for session, safety, fall prevention, and activity recommendations.     Exercise/Education Provided:  Bed mobility  Energy conservation  Functional activity tolerated  Gait training  Posture  Strengthening  Transfer training    Patient End of Session: In bed;Needs met;Call light within reach;RN aware of session/findings;All patient questions and concerns addressed;Alarm set (pt had non slip socks on for ambulating but prefers them off in bed due to pain)      Patient Evaluation Complexity Level:  History Moderate - 1 or 2 personal factors and/or co-morbidities   Examination of body systems Low -  addressing 1-2 elements   Clinical Presentation  Low- Stable   Clinical Decision Making Low Complexity       PT Session Time: 25 minutes  Therapeutic Activity: 8 minutes

## 2024-11-26 NOTE — PLAN OF CARE
Assumed care of pt at 0730.  Pt is alert and oriented x4.  Pt is on room air with adequate oxygen saturations. Continuous pulse ox maintained.  Pt is Afib per telemetry.    Pt is incontinent of bowel and bladder.   Pt has pain in right foot. Pt repositioned. Pt refusing Tubigrip. Wound care provided to BLE and feet.   Pt is up with 1-2 and crutches.  Pt updated on plan for day and agreeable.  Safety precautions in place and maintained.      Problem: Patient/Family Goals  Goal: Patient/Family Long Term Goal  Description: Patient's Long Term Goal: Maintain level of activity    Interventions:  - Follow medication regiment  - See additional Care Plan goals for specific interventions  Outcome: Progressing  Goal: Patient/Family Short Term Goal  Description: Patient's Short Term Goal: Discharge    Interventions:   - Follow medication regiment   - See additional Care Plan goals for specific interventions  Outcome: Progressing     Problem: PAIN - ADULT  Goal: Verbalizes/displays adequate comfort level or patient's stated pain goal  Description: INTERVENTIONS:  - Encourage pt to monitor pain and request assistance  - Assess pain using appropriate pain scale  - Administer analgesics based on type and severity of pain and evaluate response  - Implement non-pharmacological measures as appropriate and evaluate response  - Consider cultural and social influences on pain and pain management  - Manage/alleviate anxiety  - Utilize distraction and/or relaxation techniques  - Monitor for opioid side effects  - Notify MD/LIP if interventions unsuccessful or patient reports new pain  - Anticipate increased pain with activity and pre-medicate as appropriate  Outcome: Progressing     Problem: CARDIOVASCULAR - ADULT  Goal: Maintains optimal cardiac output and hemodynamic stability  Description: INTERVENTIONS:  - Monitor vital signs, rhythm, and trends  - Monitor for bleeding, hypotension and signs of decreased cardiac output  - Evaluate  effectiveness of vasoactive medications to optimize hemodynamic stability  - Monitor arterial and/or venous puncture sites for bleeding and/or hematoma  - Assess quality of pulses, skin color and temperature  - Assess for signs of decreased coronary artery perfusion - ex. Angina  - Evaluate fluid balance, assess for edema, trend weights  Outcome: Progressing  Goal: Absence of cardiac arrhythmias or at baseline  Description: INTERVENTIONS:  - Continuous cardiac monitoring, monitor vital signs, obtain 12 lead EKG if indicated  - Evaluate effectiveness of antiarrhythmic and heart rate control medications as ordered  - Initiate emergency measures for life threatening arrhythmias  - Monitor electrolytes and administer replacement therapy as ordered  Outcome: Progressing     Problem: RESPIRATORY - ADULT  Goal: Achieves optimal ventilation and oxygenation  Description: INTERVENTIONS:  - Assess for changes in respiratory status  - Assess for changes in mentation and behavior  - Position to facilitate oxygenation and minimize respiratory effort  - Oxygen supplementation based on oxygen saturation or ABGs  - Provide Smoking Cessation handout, if applicable  - Encourage broncho-pulmonary hygiene including cough, deep breathe, Incentive Spirometry  - Assess the need for suctioning and perform as needed  - Assess and instruct to report SOB or any respiratory difficulty  - Respiratory Therapy support as indicated  - Manage/alleviate anxiety  - Monitor for signs/symptoms of CO2 retention  Outcome: Progressing     Problem: Diabetes/Glucose Control  Goal: Glucose maintained within prescribed range  Description: INTERVENTIONS:  - Monitor Blood Glucose as ordered  - Assess for signs and symptoms of hyperglycemia and hypoglycemia  - Administer ordered medications to maintain glucose within target range  - Assess barriers to adequate nutritional intake and initiate nutrition consult as needed  - Instruct patient on self management of  diabetes  Outcome: Progressing

## 2024-11-26 NOTE — CM/SW NOTE
11/26/24 1500   CM/SW Referral Data   Referral Source Social Work (self-referral)   Reason for Referral Discharge planning   Informant EMR;Clinical Staff Member;Patient   Medical Hx   Does patient have an established PCP? Yes   Patient Info   Patient's Current Mental Status at Time of Assessment Alert;Oriented   Patient's Home Environment House   Number of Levels in Home 1   Number of Stair in Home 4 stairs to get into the house, but has a ramp   Patient lives with Alone   Patient Status Prior to Admission   Independent with ADLs and Mobility Yes   Services in place prior to admission DME/Supplies at home   Type of DME/Supplies Straight Cane;Wheeled Walker;Ramp;Shower Chair;Grab Bars;CPAP  (crutches)   Discharge Needs   Anticipated D/C needs Home health care   Choice of Post-Acute Provider   Informed patient of right to choose their preferred provider Yes   List of appropriate post-acute services provided to patient/family with quality data No - Declined list   Patient/family choice [facility]     Patient is a 68 y/o female admitted due to acute on chronic CHF. Anticipated therapy need for pt at [place] is HH. Referral was sent in Aidin and options list was obtained.    SENDY met with pt at bedside to discuss DC plan. Pt reports she lives alone in a [place] house. Pt stated there are 4 stairs to enter the house, but has a ramp. Pt stated she owns crutches, canes, RW, shower chair, grab bars, and a CPAP. Pt reports still being independent with her ADLs and stated she still drives. Pt reports she uses her crutches with ambulation.    SENDY discussed HH services for pt at [place] and offered options list. Pt declined list and informed SENDY that she is already current with [facility]. Pt requested to resume services with them at [place]. SENDY confirmed she will reserve [facility] for pt. Pt thanked SENDY and denied having any further questions at this time.    SENDY reserved [facility] in [facility].    SENDY discussed PC consult  with hospitalist. Dr. Ibarra will place PC consult order for pt.     to remain available for support and/or discharge planning.    Katherine Mccray \[facility] [place] [facility]\""  Discharge Planner  [phone]

## 2024-11-27 PROBLEM — Z51.5 PALLIATIVE CARE ENCOUNTER: Status: ACTIVE | Noted: 2024-11-27

## 2024-11-27 PROBLEM — Z71.89 GOALS OF CARE, COUNSELING/DISCUSSION: Status: ACTIVE | Noted: 2024-11-27

## 2024-11-27 LAB
ALBUMIN SERPL-MCNC: 4 G/DL (ref 3.2–4.8)
ANION GAP SERPL CALC-SCNC: 7 MMOL/L (ref 0–18)
BUN BLD-MCNC: 12 MG/DL (ref 9–23)
CALCIUM BLD-MCNC: 9.8 MG/DL (ref 8.7–10.4)
CHLORIDE SERPL-SCNC: 98 MMOL/L (ref 98–112)
CO2 SERPL-SCNC: 33 MMOL/L (ref 21–32)
CREAT BLD-MCNC: 0.78 MG/DL
EGFRCR SERPLBLD CKD-EPI 2021: 81 ML/MIN/1.73M2 (ref 60–?)
ERYTHROCYTE [DISTWIDTH] IN BLOOD BY AUTOMATED COUNT: 13.4 %
GLUCOSE BLD-MCNC: 135 MG/DL (ref 70–99)
HCT VFR BLD AUTO: 42 %
HGB BLD-MCNC: 14 G/DL
INR BLD: 1.89 (ref 0.8–1.2)
MAGNESIUM SERPL-MCNC: 1.9 MG/DL (ref 1.6–2.6)
MCH RBC QN AUTO: 31.8 PG (ref 26–34)
MCHC RBC AUTO-ENTMCNC: 33.3 G/DL (ref 31–37)
MCV RBC AUTO: 95.5 FL
OSMOLALITY SERPL CALC.SUM OF ELEC: 288 MOSM/KG (ref 275–295)
PHOSPHATE SERPL-MCNC: 4.1 MG/DL (ref 2.4–5.1)
PLATELET # BLD AUTO: 346 10(3)UL (ref 150–450)
POTASSIUM SERPL-SCNC: 3.2 MMOL/L (ref 3.5–5.1)
POTASSIUM SERPL-SCNC: 3.2 MMOL/L (ref 3.5–5.1)
PROTHROMBIN TIME: 21.9 SECONDS (ref 11.6–14.8)
RBC # BLD AUTO: 4.4 X10(6)UL
SODIUM SERPL-SCNC: 138 MMOL/L (ref 136–145)
WBC # BLD AUTO: 7.3 X10(3) UL (ref 4–11)

## 2024-11-27 PROCEDURE — 99222 1ST HOSP IP/OBS MODERATE 55: CPT | Performed by: CLINICAL NURSE SPECIALIST

## 2024-11-27 RX ORDER — POTASSIUM CHLORIDE 1.5 G/1.58G
40 POWDER, FOR SOLUTION ORAL ONCE
Status: COMPLETED | OUTPATIENT
Start: 2024-11-27 | End: 2024-11-27

## 2024-11-27 NOTE — CONSULTS
[facility]   part of [facility]  Palliative Care Initial Consult Note    Arleth Weiss Patient Status:  Inpatient    1955 MRN [id number]   Location [facility] 2NE-A Attending Maureen Ibarra DO   Hosp Day # 4 PCP Viktoriya Garcias DO     Date of Consult: 2024  Patient seen at: [facility] In[facility]    Reason for Consultation: Consult ordered by:: Dr. Maureen Ibarra for evaluation of Palliative Care needs and Goals of care discussion    Subjective     History of Present Illness: Arleth Weiss is a 69 year old female with history of CAD s/p stents, CHF, HTN, TAVR, Afib (on coumadin), DVT/PE (IVC filter), PVD, COPD (no home O2), SHELDON (compliant with CPAP), narcolepsy, CKD3, hypothyroid, RA, OA (multiple joint replacements and cervical fusion), Sjögren's disease, fibromyalgia, recent 24 LE cellulitis LLE/MRSA, who was admitted on 2024 for 20# weight gain, shortness of breath. States she wasn't taking diuretics as prescribed due to incontinence. Work up in our hospital revealed fluid overload, IV lasix initiated, ECHO 50-55%, electrolyte replacement.  History was obtained from [facility] and the patient.      Today is day #4 of hospitalization.     When I entered the room, the patient was awake & alert and lying in bed. No family present at bedside.      Review of Systems:   Symptoms(s): Pain    Dyspnea: denies  Cough: denies  Nausea: denies   Appetite: good  Pain: reports 8/10 pain bilat feet.distal , sharp, shooting. States she has an appointment with a vascular doctor next week as she is aware she has \"calcium buildup\" in her vessels. States she has had PVD \"since I was 8 yo\".   Constipation: no  Last BM:   Bowel Movement         2024             Stool (mL): 0 mL            Palliative Care Social History:   Marital Status: Single  Children: No  Living Situation Prior to Admit: Home  Does Patient Live Alone: Yes  Is Patient Confused: No  Occupational History: Retired HR  manager    Substance History:   reports that she quit smoking about 33 years ago. Her smoking use included cigarettes. She started smoking about 63 years ago. She has a 15 pack-year smoking history. She has never used smokeless tobacco.  reports no history of alcohol use.  reports no history of drug use.    Spiritual Assessment:   Oriental orthodox - Parish Not Listed    Past Medical History/Past Surgical History:   This is the 3rd hospitalization in the past 6 months.    Medical History: obtained from [facility]  Past Medical History:    Aortic stenosis    S/P TAVR    Arrhythmia    ASTHMA    Asthma (HCC)    Back problem    CANCER    thyroid    Cancer (HCC)    thyroid     CHF (congestive heart failure) (HCC)    CKD (chronic kidney disease) stage 2, GFR 60-89 ml/min    Congestive heart disease (HCC)    COPD (chronic obstructive pulmonary disease) (HCC)    Deep vein thrombosis (HCC)    DEPRESSION    Depression    Disorder of thyroid    Esophageal reflux    Essential hypertension    Fibromyalgia    Gout    High blood pressure    High cholesterol    History of blood transfusion    HYPERTENSION    HYPOTHYROIDISM    Incontinence    Muscle weakness    Neuropathy    OBESITY    OSTEOARTHRITIS    Osteoarthritis    OTHER DISEASES    Fibromyalgia    OTHER DISEASES    Pulmonary emboli    OTHER DISEASES    Lymph edema    OTHER DISEASES    Pulmonary hypertension    Peripheral vascular disease (HCC)    Pneumonia due to organism    Prediabetes    Pulmonary embolism (HCC)    RA (rheumatoid arthritis) (HCC)    Renal disorder    Shortness of breath    ON EXERTION    SLEEP APNEA    Sleep apnea    CPAP     Past Surgical History:   Procedure Laterality Date    Anesth,shoulder replacement Right 2014    hemiathroplasty    Back surgery  2000    Back surgery  2004    complete c-3 -7 ectomy    Biopsy Left 07/20/2023    TEMPORAL ARTERY    Carpal tunnel release      Cath transcatheter aortic valve replacement      Colonoscopy N/A 05/10/2018    Procedure:  COLONOSCOPY, POSSIBLE BIOPSY, POSSIBLE POLYPECTOMY 98965;  Surgeon: Kendell Valentin MD;  Location: [facility], [facility]    Colonoscopy      Craniotomy for lobotomy Left     D & c  09/2009    Dilation/curettage,diagnostic      Hip replacement surgery  09/2009    Rt hip    Knee replacement surgery  2003    Both knees    Other surgical history  1989    Thyroid removal    Other surgical history  2004    LT foot spur removal    Spine surgery procedure unlisted      Thyroidectomy Bilateral     Total hip replacement      Total knee replacement         Family History: obtained from [facility]  Family History   Problem Relation Age of Onset    Hypertension Father     Lipids Father     Diabetes Mother     Hypertension Mother     Lipids Mother     Cancer Brother     Hypertension Brother        Allergies:  Allergies[1]    Medications:     Current Facility-Administered Medications:     triamcinolone (Kenalog) 0.1 % ointment 1 Application, 1 Application, Topical, Daily PRN    ammonium lactate (Lac-Hydrin) 12 % lotion, , Topical, PRN    allopurinol (Zyloprim) tab 100 mg, 100 mg, Oral, Nightly    buPROPion SR (WELLBUTRIN SR) 12 hr tab 150 mg, 150 mg, Oral, BID    clopidogrel (Plavix) tab 75 mg, 75 mg, Oral, Nightly    digoxin (Lanoxin) tab 125 mcg, 125 mcg, Oral, Nightly    dilTIAZem ER (CardIZEM CD) 24 hr cap 240 mg, 240 mg, Oral, Nightly    FLUoxetine (PROzac) cap 20 mg, 20 mg, Oral, BID    levothyroxine (Synthroid) tab 175 mcg, 175 mcg, Oral, Before breakfast    metoprolol succinate ER (Toprol XL) 24 hr tab 25 mg, 25 mg, Oral, Nightly    nortriptyline (Pamelor) cap 50 mg, 50 mg, Oral, Nightly    rosuvastatin (Crestor) tab 5 mg, 5 mg, Oral, QOD    spironolactone (Aldactone) tab 25 mg, 25 mg, Oral, Daily    traMADol (Ultram) tab 50 mg, 50 mg, Oral, Q6H PRN    diphenhydrAMINE (Benadryl) cap/tab 25 mg, 25 mg, Oral, Q6H PRN    warfarin (Coumadin) tab 5 mg, 5 mg, Oral, Once per day on Tuesday Thursday    warfarin (Coumadin) tab 2.5  mg, 2.5 mg, Oral, Once per day on Sunday Monday Wednesday Friday Saturday    miconazole 2 % powder, , Topical, BID    furosemide (Lasix) 10 mg/mL injection 40 mg, 40 mg, Intravenous, BID (Diuretic)    acetaminophen (Tylenol Extra Strength) tab 500 mg, 500 mg, Oral, Q4H PRN    melatonin tab 3 mg, 3 mg, Oral, Nightly PRN    Functional Status History:  ADLs: bathing or showering, dressing, getting in and out of bed or a chair, walking, using the toilet, and eating  - Independent  IADLs: use the phone, shop for groceries, meal preparation, manage medicines, clean living area, use transportation by self, manage money  - Requires some assistance  DME: crutches, and Shower Chair  Falls: Yes    Palliative Performance Scale:   Prior to admission: (pt/family reported) 70 %  Observed during hospitalization: 60 %  % Ambulation Activity Level Self-Care Intake Consciousness   100 Full  Normal  No Disease Full Normal Full   90 Full  Normal  Some Disease Full Normal Full   80 Full  Normal w/effort  Some Disease Full Normal or reduced Full   70 Reduced  Can't Perform Job  Some Disease Full Normal or reduced Full   60 Reduced  Can't Perform Hobby   Significant Disease Occ Assist Normal or reduced Full or confused   50 Mainly sit/lie Can't do any work  Extensive Disease Partial Assist Normal or reduced Full or confused   40 Mainly in bed Can't do any work  Extensive Disease Mainly Assist Normal or reduced Full or confused   30 Bed Bound Can't do any work  Extensive Disease Max Assist  Total Care Reduced  Drowsy/confused   20 Bed Bound Can't do any work  Extensive Disease Max Assist  Total Care Minimal  Drowsy/confused   10 Bed Bound Can't do any work  Extensive Disease Max Assist  Total Care Mouth Care  Drowsy/confused   0 Death        Objective      Vital Signs:  Blood pressure (!) 119/92, pulse 106, temperature 97.9 °F (36.6 °C), temperature source Oral, resp. rate 18, weight 208 lb 15.9 oz (94.8 kg), SpO2 95%, not currently  breastfeeding.  Body mass index is 30.86 kg/m².    Physical Exam:  General: Alert & Awake. In no apparent respiratory distress. Body habitus Obese   HEENT: AT/NC. No gross focal deficits. Dry MM has Sjögren's disease   Cardiac:  Afib per monitor  Lungs: Normal effort on NC 2L   Extremities: No LE edema present, discolored lower legs from chronic vascular disease  Neurologic: Alert and oriented to person, place, time, and situation   Psychiatric: Mood pleasant mood     Hematology:  Lab Results   Component Value Date    WBC 7.3 11/27/2024    HGB 14.0 11/27/2024    HCT 42.0 11/27/2024    .0 11/27/2024       Coags:  Lab Results   Component Value Date    PT 23.7 (H) 10/09/2011    INR 1.89 (H) 11/27/2024    PTT 70.4 (H) 05/30/2024       Chemistry:  Lab Results   Component Value Date    CREATSERUM 0.78 11/27/2024    BUN 12 11/27/2024     11/27/2024    K 3.2 (L) 11/27/2024    K 3.2 (L) 11/27/2024    CL 98 11/27/2024    CO2 33.0 (H) 11/27/2024     (H) 11/27/2024    CA 9.8 11/27/2024    ALB 4.0 11/27/2024    ALKPHO 114 11/23/2024    BILT 0.5 11/23/2024    TP 7.1 11/23/2024    AST 25 11/23/2024    ALT 24 11/23/2024    DDIMER 0.46 11/23/2024    MG 1.9 11/27/2024    PHOS 4.1 11/27/2024    TROP <0.045 07/28/2021       Imaging:  No results found.    Summary of Discussion      I discussed reason for palliative care consultation with Patient    I differentiated the palliative treatment-focus model versus the hospice comfort-focused philosophy of care. I informed the patient/family that having palliative care support does not limit medical treatment options or decisions to those who wish to continue curative or restorative medical therapies. I discussed the benefits of palliative care to include assistance with arising symptom management needs, an extra layer of support, to ensure GOC are respected throughout healthcare continuum    Outpatient/Community Palliative Care Services:  Usually visit once per 4  weeks  Focus on GOC and symptom management   Palliative Care criteria:  Not altered by prognosis   Does not limit curative or restorative therapies      At this time patient is declining referral to Community Palliative Care. Brochure and contact information provided.     Prognostic awareness/understanding: Excellent.  Patient able to explain in detail her multiple chronic health issues (heart, lung, vascular) and POC.   We discussed the disease trajectory of  heart failure and chronic lung and vascular disease with associated symptoms and decline over time.     Hopes/goals:   Arleth has always been very independent and hopes to remain that way. She would never want to be confined to an ECF long term.   Arleth discussed her upcoming appointment with vascular to address her PVD BLE. She has been experiencing severe pain and sensitivity and due to her sensitivity to medications and hx of narcolepsy she does not tolerate meds like gabapentin and opioids. She is trying Tramadol but not sure if it really helps and it does make her very sedate so this limits it's use.     Fears/concerns:   Arleth voiced concern that her brother who is her closest relative and HCPOA is also not in good health. They don't live close by and he is limited in how he could assist her.   Arleth discussed her concern and issue with her complete incontinence which sometimes limits her ability to be compliant with her diuretics. She shared she has been evaluated by urology and \"the pills didn't work\". She has recently obtained a Purwick for home use that she feels will be beneficial especially at night.   Provided emotional support to Patient.    Advance Care Planning counseling and discussion:   HCPOA:  Document on file Yes [] No []. Requested for file [x] Patient not sure where document is located at home. Provided and reviewed blank copy and offered to facilitate completing while she is here. She will review and let staff know if interested. Confirmed  her brother is her HCPOA.   Healthcare Agent Appointed: Yes  Healthcare Agent's Name: haleigh Weiss  Healthcare Agent's Phone Number: [phone]    A voluntary discussion of the risks vs benefits of life sustaining treatments in the setting of advanced age and chronic cardiac and pulmonary disease was had with  Arleth. She shared that for now she would want resuscitation attempted but would not want any long term life support. She stated, \"my brother knows he can pull the plug\".    Code Status:  Full Code confirmed    Problem List:  Principal Problem:    Acute on chronic congestive heart failure, unspecified heart failure type (HCC)  Active Problems:    Palliative care encounter    Goals of care, counseling/discussion      Assessment and Recommendations      Goals of care: established and treatment focused   Continue current acute care medical management and return home with [facility] support.   Plan to f/u with vascular provider next week regarding \"calcium in the veins in my legs\"  Will start using home Purwick for chronic/complete incontinence to facilitate compliance with diuretics     Advanced Care Planning:  Code Status: Full Code confirmed  HCPOA:  Healthcare Agent's Name: haleigh Weiss Patient not sure where document is located at home. Provided and reviewed blank copy and offered to facilitate completing while she is here. She will review and let staff know if interested. Confirmed her brother is her HCPOA.   Symptoms  BLE pain: per primary service  Multiple barriers to pharmacologic interventions with allergy and intolerance due to narcolepsy.     Discussed today's visit with  RN, SW/CM, and hospitalist.    Palliative Care Follow Up: Palliative care team will sign off at this time. Feel free to contact our team with any questions or concerns.  Palliative care follow up at discharge:  patient politely declined. Palliative brochure provided .    Thank you for allowing Palliative Care services to participate in the  care of Arleth Weiss.    A total of 55 minutes were spent on this consult, which included all of the following: chart review, direct face to face contact, history taking, physical examination, counseling and coordinating care, and documentation.        Arleth Munguia, ERICA  11/27/2024  11:16 AM  Palliative Care Services    The 21st Century Cures Act makes medical notes like these available to patients in the interest of transparency. Please be advised this is a medical document. Medical documents are intended to carry relevant information, facts as evident, and the clinical opinion of the practitioner. The medical note is intended as peer to peer communication and may appear blunt or direct. It is written in medical language and may contain abbreviations or verbiage that are unfamiliar.          [1]   Allergies  Allergen Reactions    Adhesive Tape HIVES     ALL ADHESIVES    Codeine HIVES    Doxycycline SWELLING    Hydrocodone UNKNOWN    Lisinopril TONGUE SWELLING    Morphine Sulfate HIVES    Opioid Analgesics UNKNOWN    Oxycontin ITCHING    Oxytocin HIVES    Vicodin [Hydrocodone-Acetaminophen] ITCHING    Skin Adhesives OTHER (SEE COMMENTS)

## 2024-11-27 NOTE — PLAN OF CARE
Received Pt this morning in bed, A&O X4, on RA, A-fib on tele chronic. Voiced no c/o`s at present. Pt refused compression therapy to bilat lower ext edema. Poc updated, diurese, mobilize, daily wt`s, monitor I&O`s. Cpm.  Problem: Patient/Family Goals  Goal: Patient/Family Long Term Goal  Description: Patient's Long Term Goal: Maintain level of activity    Interventions:  - Follow medication regiment  - See additional Care Plan goals for specific interventions  11/27/2024 1309 by Julianna Henriquez RN  Outcome: Progressing  11/27/2024 1309 by Julianna Henriquez RN  Outcome: Progressing  Goal: Patient/Family Short Term Goal  Description: Patient's Short Term Goal: Discharge    Interventions:   - Follow medication regiment   - See additional Care Plan goals for specific interventions  11/27/2024 1309 by Julianna Henriquez RN  Outcome: Progressing  11/27/2024 1309 by Julianna Henriquez RN  Outcome: Progressing     Problem: PAIN - ADULT  Goal: Verbalizes/displays adequate comfort level or patient's stated pain goal  Description: INTERVENTIONS:  - Encourage pt to monitor pain and request assistance  - Assess pain using appropriate pain scale  - Administer analgesics based on type and severity of pain and evaluate response  - Implement non-pharmacological measures as appropriate and evaluate response  - Consider cultural and social influences on pain and pain management  - Manage/alleviate anxiety  - Utilize distraction and/or relaxation techniques  - Monitor for opioid side effects  - Notify MD/LIP if interventions unsuccessful or patient reports new pain  - Anticipate increased pain with activity and pre-medicate as appropriate  11/27/2024 1309 by Julianna Henriquez RN  Outcome: Progressing  11/27/2024 1309 by Julianna Henriquez RN  Outcome: Progressing     Problem: CARDIOVASCULAR - ADULT  Goal: Maintains optimal cardiac output and hemodynamic stability  Description: INTERVENTIONS:  - Monitor vital signs,  rhythm, and trends  - Monitor for bleeding, hypotension and signs of decreased cardiac output  - Evaluate effectiveness of vasoactive medications to optimize hemodynamic stability  - Monitor arterial and/or venous puncture sites for bleeding and/or hematoma  - Assess quality of pulses, skin color and temperature  - Assess for signs of decreased coronary artery perfusion - ex. Angina  - Evaluate fluid balance, assess for edema, trend weights  11/27/2024 1309 by Julianna Henriquez RN  Outcome: Progressing  11/27/2024 1309 by Julianna Henriquez RN  Outcome: Progressing  Goal: Absence of cardiac arrhythmias or at baseline  Description: INTERVENTIONS:  - Continuous cardiac monitoring, monitor vital signs, obtain 12 lead EKG if indicated  - Evaluate effectiveness of antiarrhythmic and heart rate control medications as ordered  - Initiate emergency measures for life threatening arrhythmias  - Monitor electrolytes and administer replacement therapy as ordered  11/27/2024 1309 by Julianna Henriquez RN  Outcome: Progressing  11/27/2024 1309 by Julianna Henriquez RN  Outcome: Progressing     Problem: RESPIRATORY - ADULT  Goal: Achieves optimal ventilation and oxygenation  Description: INTERVENTIONS:  - Assess for changes in respiratory status  - Assess for changes in mentation and behavior  - Position to facilitate oxygenation and minimize respiratory effort  - Oxygen supplementation based on oxygen saturation or ABGs  - Provide Smoking Cessation handout, if applicable  - Encourage broncho-pulmonary hygiene including cough, deep breathe, Incentive Spirometry  - Assess the need for suctioning and perform as needed  - Assess and instruct to report SOB or any respiratory difficulty  - Respiratory Therapy support as indicated  - Manage/alleviate anxiety  - Monitor for signs/symptoms of CO2 retention  11/27/2024 1309 by Julianna Henriquez RN  Outcome: Progressing  11/27/2024 1309 by Julianna Henriquez RN  Outcome:  Progressing     Problem: Diabetes/Glucose Control  Goal: Glucose maintained within prescribed range  Description: INTERVENTIONS:  - Monitor Blood Glucose as ordered  - Assess for signs and symptoms of hyperglycemia and hypoglycemia  - Administer ordered medications to maintain glucose within target range  - Assess barriers to adequate nutritional intake and initiate nutrition consult as needed  - Instruct patient on self management of diabetes  11/27/2024 1309 by Julianna Henriquez, RN  Outcome: Progressing  11/27/2024 1309 by Julianna Henriquez, RN  Outcome: Progressing

## 2024-11-27 NOTE — CM/SW NOTE
SW noted and acknowledged consult order for HH services.    Pt is current with [facility]. ELPIDIO order was already sent to them via Aidin.     to remain available for support and/or discharge planning.    GEN Cui  Discharge Planner  [phone]

## 2024-11-27 NOTE — PROGRESS NOTES
[facility] Hospitalist Progress Note     CC: Hospital Follow up    PCP: Viktoriya Garcias DO       Subjective:     No acute events overnight, continuing diuresis.      OBJECTIVE:    Blood pressure 132/77, pulse 102, temperature 98 °F (36.7 °C), temperature source Oral, resp. rate 20, weight 208 lb 15.9 oz (94.8 kg), SpO2 90%, not currently breastfeeding.    Temp:  [97.6 °F (36.4 °C)-98 °F (36.7 °C)] 98 °F (36.7 °C)  Pulse:  [] 102  Resp:  [18-20] 20  BP: (105-152)/(58-92) 132/77  SpO2:  [90 %-99 %] 90 %      Intake/Output:    Intake/Output Summary (Last 24 hours) at 11/27/2024 1147  Last data filed at 11/27/2024 1134  Gross per 24 hour   Intake 360 ml   Output 2000 ml   Net -1640 ml       Last 3 Weights   11/27/24 0522 208 lb 15.9 oz (94.8 kg)   11/26/24 0520 214 lb 1.1 oz (97.1 kg)   11/25/24 0408 218 lb 1.6 oz (98.9 kg)   11/23/24 2313 227 lb 1.2 oz (103 kg)   11/23/24 2233 227 lb 1.2 oz (103 kg)   11/23/24 1746 225 lb (102.1 kg)   11/14/24 1548 213 lb (96.6 kg)   09/19/24 0500 201 lb 4.8 oz (91.3 kg)   09/18/24 0400 196 lb (88.9 kg)   09/17/24 0930 198 lb 3.2 oz (89.9 kg)   09/17/24 0533 204 lb 9.4 oz (92.8 kg)   09/16/24 0920 203 lb 6.4 oz (92.3 kg)   09/14/24 0430 220 lb (99.8 kg)   09/13/24 0617 216 lb (98 kg)   09/12/24 0800 225 lb 12 oz (102.4 kg)   09/11/24 1309 230 lb (104.3 kg)       Exam   Gen: Alert, no acute distress  Heent: Normocephalic, atraumatic, neck supple, EOMI, PERRLA  Pulm: Lungs CTAB, normal respiratory effort  CV:  Regular rate and rhythm, no murmurs/rubs/gallops  Abd: Soft, nontender, nondistended, bowel sounds present  Extremities: 3+ nonpitting edema in lower extremities bilaterally, no clubbing, pulses intact   Skin: No rashes or lesions  Neuro: AOx3, no focal neurologic deficits, CN II-XII grossly intact  Psych: appropriate mood and affect      Data Review:       Labs:     Recent Labs   Lab 11/23/24  1751 11/24/24  0736 11/25/24  0608 11/26/24  0734 11/27/24  0617   RBC  3.95 3.66* 4.31 4.31 4.40   HGB 12.7 11.6* 13.5 13.7 14.0   HCT 38.3 34.9* 41.5 41.5 42.0   MCV 97.0 95.4 96.3 96.3 95.5   MCH 32.2 31.7 31.3 31.8 31.8   MCHC 33.2 33.2 32.5 33.0 33.3   RDW 14.2 14.2 14.2 13.8 13.4   NEPRELIM 4.42 3.32  --   --   --    WBC 6.9 6.3 6.5 6.4 7.3   .0 289.0 319.0 343.0 346.0         Recent Labs   Lab 11/25/24  0608 11/26/24  0734 11/27/24  0617   * 124* 135*   BUN 10 12 12   CREATSERUM 0.66 0.70 0.78   EGFRCR 94 92 81   CA 9.2 9.9 9.8    140 138   K 3.2*  3.2* 3.4*  3.4* 3.2*  3.2*    101 98   CO2 30.0 32.0 33.0*       Recent Labs   Lab 11/23/24  1751 11/25/24  0608 11/26/24  0734 11/27/24  0617   ALT 24  --   --   --    AST 25  --   --   --    ALB 3.5 3.5 3.7 4.0         Imaging:  No results found.      Meds:      allopurinol  100 mg Oral Nightly    buPROPion SR  150 mg Oral BID    clopidogrel  75 mg Oral Nightly    digoxin  125 mcg Oral Nightly    dilTIAZem ER  240 mg Oral Nightly    FLUoxetine  20 mg Oral BID    levothyroxine  175 mcg Oral Before breakfast    metoprolol succinate ER  25 mg Oral Nightly    nortriptyline  50 mg Oral Nightly    rosuvastatin  5 mg Oral QOD    spironolactone  25 mg Oral Daily    warfarin  5 mg Oral Once per day on Tuesday Thursday    warfarin  2.5 mg Oral Once per day on Sunday Monday Wednesday Friday Saturday    miconazole   Topical BID    furosemide  40 mg Intravenous BID (Diuretic)         triamcinolone    ammonium lactate    traMADol    diphenhydrAMINE    acetaminophen    melatonin       Assessment/Plan:     68 y/o F w/ PMHx of HFrEF, aortic stenosis status post TAVR, CAD status post PCI in 2021, PVD, HTN, HLD, A-fib on oral anticoagulation, hypothyroidism, SEBASTIEN, CKD stage II, COPD, depression, recent admission for MRSA bacteremia s/p completion of vancomycin who presents to the hospital with leg swelling, dyspnea, and weight gain.     Acute on chronic decompensated HFrEF (recovered EF 50% in September)  Acute hypoxic  respiratory failure 2/2 above  -Patient has not been taking her diuretic (supposed to take Bumex 2 mg twice daily).  Currently on 2 L O2 nasal cannula.  -Strict I's and O's, daily weights -net -3.6 L since admission  -Discussed with cardiology, echo shows no significant change from prior  -Continue diuresis with 40 mg IV Lasix twice daily  -Continue home metoprolol succinate 25 mg nightly, spironolactone 25 mg daily  -Daily RFP's, will monitor renal function  -Cardiac diet, fluid restriction     Aortic stenosis s/p TAVR  Paroxysmal atrial fibrillation  Therapeutic INR  -Continue home warfarin regimen  -Monitor INR  -Continue home beta-blocker, diltiazem, digoxin, Plavix     CKD stage II  -Creatinine normal this admission, continue to monitor  -Avoid nephrotoxic agents  -Creatinine reviewed today, 0.78  -Daily RFP's     Hyperlipidemia  -Continue home rosuvastatin     Depression  -Continue home fluoxetine, nortriptyline, bupropion     Gout  -Continue home allopurinol     Chronic pain  -Continue home nortriptyline, as needed tramadol, as needed Tylenol     History of MRSA bacteremia  -Recently completed IV vancomycin, no further treatment at this time     Hypothyroidism  -Continue home levothyroxine     IVF: None  Diet: Cardiac diet  DVT Prophylaxis: Warfarin    Code Status: Full Code   Dispo: Pending clinical course     Outpatient records or previous hospital records reviewed.   Questions/concerns were discussed with patient and/or family by bedside.  Discussed with cardiology.  [facility] hospitalist to continue to follow patient while in house  A total of 51 minutes taken with patient and coordinating care.      DO Mariel Ospina [facility] [place]  Hospitalist  Contact via [employer]       Supplementary Documentation:   DVT Mechanical Prophylaxis:   SCDs,    DVT Pharmacologic Prophylaxis   Medication    warfarin (Coumadin) tab 5 mg    warfarin (Coumadin) tab 2.5 mg                Code Status: Full  Code  Calvin: External urinary catheter in place  Calvin Duration (in days):   Central line:    JUJU:

## 2024-11-27 NOTE — PLAN OF CARE
Received report @1930. Patient is A&OX 4. Reports pain to BLE. Pain medication administered. Assessment completed. Afib noted on Tele monitor. Patient educated on use of call light. Call light within reach.     Problem: PAIN - ADULT  Goal: Verbalizes/displays adequate comfort level or patient's stated pain goal  Description: INTERVENTIONS:  - Encourage pt to monitor pain and request assistance  - Assess pain using appropriate pain scale  - Administer analgesics based on type and severity of pain and evaluate response  - Implement non-pharmacological measures as appropriate and evaluate response  - Consider cultural and social influences on pain and pain management  - Manage/alleviate anxiety  - Utilize distraction and/or relaxation techniques  - Monitor for opioid side effects  - Notify MD/LIP if interventions unsuccessful or patient reports new pain  - Anticipate increased pain with activity and pre-medicate as appropriate  Outcome: Progressing     Problem: CARDIOVASCULAR - ADULT  Goal: Maintains optimal cardiac output and hemodynamic stability  Description: INTERVENTIONS:  - Monitor vital signs, rhythm, and trends  - Monitor for bleeding, hypotension and signs of decreased cardiac output  - Evaluate effectiveness of vasoactive medications to optimize hemodynamic stability  - Monitor arterial and/or venous puncture sites for bleeding and/or hematoma  - Assess quality of pulses, skin color and temperature  - Assess for signs of decreased coronary artery perfusion - ex. Angina  - Evaluate fluid balance, assess for edema, trend weights  Outcome: Progressing  Goal: Absence of cardiac arrhythmias or at baseline  Description: INTERVENTIONS:  - Continuous cardiac monitoring, monitor vital signs, obtain 12 lead EKG if indicated  - Evaluate effectiveness of antiarrhythmic and heart rate control medications as ordered  - Initiate emergency measures for life threatening arrhythmias  - Monitor electrolytes and administer  replacement therapy as ordered  Outcome: Progressing     Problem: RESPIRATORY - ADULT  Goal: Achieves optimal ventilation and oxygenation  Description: INTERVENTIONS:  - Assess for changes in respiratory status  - Assess for changes in mentation and behavior  - Position to facilitate oxygenation and minimize respiratory effort  - Oxygen supplementation based on oxygen saturation or ABGs  - Provide Smoking Cessation handout, if applicable  - Encourage broncho-pulmonary hygiene including cough, deep breathe, Incentive Spirometry  - Assess the need for suctioning and perform as needed  - Assess and instruct to report SOB or any respiratory difficulty  - Respiratory Therapy support as indicated  - Manage/alleviate anxiety  - Monitor for signs/symptoms of CO2 retention  Outcome: Progressing     Problem: Diabetes/Glucose Control  Goal: Glucose maintained within prescribed range  Description: INTERVENTIONS:  - Monitor Blood Glucose as ordered  - Assess for signs and symptoms of hyperglycemia and hypoglycemia  - Administer ordered medications to maintain glucose within target range  - Assess barriers to adequate nutritional intake and initiate nutrition consult as needed  - Instruct patient on self management of diabetes  Outcome: Progressing

## 2024-11-27 NOTE — PROGRESS NOTES
[facility] Cardiology Progress Note        Arleth Weiss Patient Status:  Inpatient    1955 MRN [id number]   [facility] Attending Maureen Ibarra DO   Hosp Day # 4 PCP Viktoriya Garcias DO     Subjective:  The patient denies  chest pain   Still dyspneic, but improved    Medications:   allopurinol  100 mg Oral Nightly    buPROPion SR  150 mg Oral BID    clopidogrel  75 mg Oral Nightly    digoxin  125 mcg Oral Nightly    dilTIAZem ER  240 mg Oral Nightly    FLUoxetine  20 mg Oral BID    levothyroxine  175 mcg Oral Before breakfast    metoprolol succinate ER  25 mg Oral Nightly    nortriptyline  50 mg Oral Nightly    rosuvastatin  5 mg Oral QOD    spironolactone  25 mg Oral Daily    warfarin  5 mg Oral Once per day on     warfarin  2.5 mg Oral Once per day on     miconazole   Topical BID    furosemide  40 mg Intravenous BID (Diuretic)       Continuous Infusions:        Allergies:  Allergies[1]      Objective:        Intake/Output:      Intake/Output Summary (Last 24 hours) at 2024 1359  Last data filed at 2024 1134  Gross per 24 hour   Intake 360 ml   Output 2000 ml   Net -1640 ml     Wt Readings from Last 3 Encounters:   24 208 lb 15.9 oz (94.8 kg)   24 213 lb (96.6 kg)   24 201 lb 4.8 oz (91.3 kg)       Physical Exam:        Vitals:    24 2330 24 0522 24 0811 24 1134   BP: 134/77 152/73 (!) 119/92 132/77   BP Location: Left arm Left arm Left arm Left arm   Pulse: 105 83 106 102   Resp: 18 20 18 20   Temp: 97.6 °F (36.4 °C) 97.7 °F (36.5 °C) 97.9 °F (36.6 °C) 98 °F (36.7 °C)   TempSrc: Oral Axillary Oral Oral   SpO2: 95% 99% 95% 90%   Weight:  208 lb 15.9 oz (94.8 kg)         Temp:  [97.6 °F (36.4 °C)-98 °F (36.7 °C)] 98 °F (36.7 °C)  Pulse:  [] 102  Resp:  [18-20] 20  BP: (110-152)/(72-92) 132/77  SpO2:  [90 %-99 %] 90 %      Temp: 98 °F (36.7 °C)  Pulse:  102  Resp: 20  BP: 132/77  General:  Appears comfortable  HEENT: No focal deficits.  Neck: No JVD, carotids 2+ no bruits.  Cardiac: Irregular S1S2.  No S3, S4, rub, click.  No murmur.  Lungs: Clear to auscultation and percussion.  Abdomen: Soft, non-tender.   Extremities: No LE edema.  No clubbing or cyanosis.    Neurologic: Alert and oriented, normal affect.  Skin: Warm and dry.           LABS:      HEM:  Recent Labs   Lab 11/23/24 1751 11/24/24 0736 11/25/24  0608 11/26/24  0734 11/27/24  0617   WBC 6.9 6.3 6.5 6.4 7.3   HGB 12.7 11.6* 13.5 13.7 14.0   HCT 38.3 34.9* 41.5 41.5 42.0   .0 289.0 319.0 343.0 346.0       Chem:  Recent Labs   Lab 11/23/24 1751 11/24/24 0736 11/25/24  0608 11/26/24  0734 11/27/24  0617    144 141 140 138   K 4.0 3.3* 3.2*  3.2* 3.4*  3.4* 3.2*  3.2*    109 103 101 98   CO2 23.0 25.0 30.0 32.0 33.0*   BUN 9 9 10 12 12   CREATSERUM 0.78 0.70 0.66 0.70 0.78   CA 10.0 9.0 9.2 9.9 9.8   MG  --   --  1.7 1.8 1.9   PHOS  --   --  3.7 3.8 4.1   * 108* 109* 124* 135*       Recent Labs   Lab 11/23/24 1751 11/25/24  0608 11/26/24  0734 11/27/24  0617   ALT 24  --   --   --    AST 25  --   --   --    ALB 3.5 3.5 3.7 4.0       Recent Labs   Lab 11/23/24 1751 11/24/24  0736 11/25/24  1750 11/26/24  0734 11/27/24  0617   INR 3.02* 2.64* 2.18* 2.02* 1.89*           Lab Results   Component Value Date    TROP <0.045 07/28/2021         Invalid input(s): \"PBNPML\"                       Diagnostics:   Telemetry: Atrial Fibrillation     EKG, 11/25/2024,         Echo:  11/25/24      Conclusions:     1. Left ventricle: The cavity size was normal. Wall thickness was normal.      Systolic function was normal. The estimated ejection fraction was 50-55%,      by visual assessment. Wall motion is normal; there are no regional wall      motion abnormalities. Unable to assess LV diastolic function.   2. Right ventricle: Systolic function was normal.   3. Left atrium: The atrium was  mildly dilated.   4. Aortic valve: Elizondo MINOR S3 23mm (TAVR) bioprosthesis was present,      well seated. No functional/doppler analysis performed.   5. Mitral valve: The annulus was markedly calcified. The leaflets were      mildly calcified. Minimal stenosis. The mean diastolic gradient was 3mm      Hg.   Impressions:  This study is compared with previous dated 9/14/2024: No   significant change.               Impression:    #Acute on chronic HFpEF exacerbation: likely due to inconsistent diuretic use at home. Approved for [employer]; this should improve diuretic compliance  -     net out = 3.6 liters  -     weight: 227---> 208 lbs      #Severe aortic stenosis s/p TAVR  # CAD s/p stent in 2021  # paroxysmal afib   # HLD   # HTN , well-controlled   # Hx of PE s/p IVC filter     Plan:   - Continue IV lasix 40mg BID today                - Strict I/O, daily weights and BMP    - Continue diltiazem 240mg daily, metoprolol xl 25mg daily, and spironolactone 25mg daily   - continue plavix 75mg daily   - continue coumadin   - continue rosuvastatin   - monitor telemetry   - replace K+    James Robertson MD         [1]   Allergies  Allergen Reactions    Adhesive Tape HIVES     ALL ADHESIVES    Codeine HIVES    Doxycycline SWELLING    Hydrocodone UNKNOWN    Lisinopril TONGUE SWELLING    Morphine Sulfate HIVES    Opioid Analgesics UNKNOWN    Oxycontin ITCHING    Oxytocin HIVES    Vicodin [Hydrocodone-Acetaminophen] ITCHING    Skin Adhesives OTHER (SEE COMMENTS)

## 2024-11-27 NOTE — PROGRESS NOTES
Assumed care of patient ~1640. Patient is Aox4. On RA. Up x1 w/ walker. Afib on tele. No complaints of pain, shortness of breath, chest pain/discomfort. POC: IV lasix BID, wound care daily. Patient makes needs known, all questions answered at this time.

## 2024-11-28 LAB
ALBUMIN SERPL-MCNC: 4.1 G/DL (ref 3.2–4.8)
ANION GAP SERPL CALC-SCNC: 7 MMOL/L (ref 0–18)
BUN BLD-MCNC: 14 MG/DL (ref 9–23)
CALCIUM BLD-MCNC: 9.9 MG/DL (ref 8.7–10.4)
CHLORIDE SERPL-SCNC: 98 MMOL/L (ref 98–112)
CO2 SERPL-SCNC: 32 MMOL/L (ref 21–32)
CREAT BLD-MCNC: 0.82 MG/DL
EGFRCR SERPLBLD CKD-EPI 2021: 76 ML/MIN/1.73M2 (ref 60–?)
ERYTHROCYTE [DISTWIDTH] IN BLOOD BY AUTOMATED COUNT: 13.5 %
GLUCOSE BLD-MCNC: 140 MG/DL (ref 70–99)
HCT VFR BLD AUTO: 41.9 %
HGB BLD-MCNC: 13.9 G/DL
INR BLD: 2.09 (ref 0.8–1.2)
MAGNESIUM SERPL-MCNC: 1.8 MG/DL (ref 1.6–2.6)
MCH RBC QN AUTO: 31.8 PG (ref 26–34)
MCHC RBC AUTO-ENTMCNC: 33.2 G/DL (ref 31–37)
MCV RBC AUTO: 95.9 FL
OSMOLALITY SERPL CALC.SUM OF ELEC: 287 MOSM/KG (ref 275–295)
PHOSPHATE SERPL-MCNC: 4.1 MG/DL (ref 2.4–5.1)
PLATELET # BLD AUTO: 346 10(3)UL (ref 150–450)
POTASSIUM SERPL-SCNC: 3.4 MMOL/L (ref 3.5–5.1)
POTASSIUM SERPL-SCNC: 3.4 MMOL/L (ref 3.5–5.1)
PROTHROMBIN TIME: 23.7 SECONDS (ref 11.6–14.8)
RBC # BLD AUTO: 4.37 X10(6)UL
SODIUM SERPL-SCNC: 137 MMOL/L (ref 136–145)
WBC # BLD AUTO: 7.2 X10(3) UL (ref 4–11)

## 2024-11-28 RX ORDER — POTASSIUM CHLORIDE 1.5 G/1.58G
40 POWDER, FOR SOLUTION ORAL ONCE
Status: COMPLETED | OUTPATIENT
Start: 2024-11-28 | End: 2024-11-28

## 2024-11-28 RX ORDER — MAGNESIUM OXIDE 400 MG/1
400 TABLET ORAL ONCE
Status: COMPLETED | OUTPATIENT
Start: 2024-11-28 | End: 2024-11-28

## 2024-11-28 RX ORDER — POTASSIUM CHLORIDE 1500 MG/1
40 TABLET, EXTENDED RELEASE ORAL DAILY
Status: DISCONTINUED | OUTPATIENT
Start: 2024-11-28 | End: 2024-11-28

## 2024-11-28 NOTE — PLAN OF CARE
Received patient at approximately 0700. Alert and oriented x4. Breathing unlabored on RA. Vitals stable; Afib on telemetry. Up c crutches and 1-2 person assist. All due medications given and tolerated well. No c/o pain or discomfort. Incont b/b; purwick in place and draining well. Patient currently lying in bed c call light and personal items in reach. All needs met at this time.     Problem: Patient/Family Goals  Goal: Patient/Family Long Term Goal  Description: Patient's Long Term Goal: Maintain level of activity    Interventions:  - Follow medication regiment  - See additional Care Plan goals for specific interventions  Outcome: Progressing  Goal: Patient/Family Short Term Goal  Description: Patient's Short Term Goal: Discharge    Interventions:   - Follow medication regiment   - See additional Care Plan goals for specific interventions  Outcome: Progressing     Problem: PAIN - ADULT  Goal: Verbalizes/displays adequate comfort level or patient's stated pain goal  Description: INTERVENTIONS:  - Encourage pt to monitor pain and request assistance  - Assess pain using appropriate pain scale  - Administer analgesics based on type and severity of pain and evaluate response  - Implement non-pharmacological measures as appropriate and evaluate response  - Consider cultural and social influences on pain and pain management  - Manage/alleviate anxiety  - Utilize distraction and/or relaxation techniques  - Monitor for opioid side effects  - Notify MD/LIP if interventions unsuccessful or patient reports new pain  - Anticipate increased pain with activity and pre-medicate as appropriate  Outcome: Progressing     Problem: CARDIOVASCULAR - ADULT  Goal: Maintains optimal cardiac output and hemodynamic stability  Description: INTERVENTIONS:  - Monitor vital signs, rhythm, and trends  - Monitor for bleeding, hypotension and signs of decreased cardiac output  - Evaluate effectiveness of vasoactive medications to optimize  hemodynamic stability  - Monitor arterial and/or venous puncture sites for bleeding and/or hematoma  - Assess quality of pulses, skin color and temperature  - Assess for signs of decreased coronary artery perfusion - ex. Angina  - Evaluate fluid balance, assess for edema, trend weights  Outcome: Progressing  Goal: Absence of cardiac arrhythmias or at baseline  Description: INTERVENTIONS:  - Continuous cardiac monitoring, monitor vital signs, obtain 12 lead EKG if indicated  - Evaluate effectiveness of antiarrhythmic and heart rate control medications as ordered  - Initiate emergency measures for life threatening arrhythmias  - Monitor electrolytes and administer replacement therapy as ordered  Outcome: Progressing     Problem: RESPIRATORY - ADULT  Goal: Achieves optimal ventilation and oxygenation  Description: INTERVENTIONS:  - Assess for changes in respiratory status  - Assess for changes in mentation and behavior  - Position to facilitate oxygenation and minimize respiratory effort  - Oxygen supplementation based on oxygen saturation or ABGs  - Provide Smoking Cessation handout, if applicable  - Encourage broncho-pulmonary hygiene including cough, deep breathe, Incentive Spirometry  - Assess the need for suctioning and perform as needed  - Assess and instruct to report SOB or any respiratory difficulty  - Respiratory Therapy support as indicated  - Manage/alleviate anxiety  - Monitor for signs/symptoms of CO2 retention  Outcome: Progressing     Problem: Diabetes/Glucose Control  Goal: Glucose maintained within prescribed range  Description: INTERVENTIONS:  - Monitor Blood Glucose as ordered  - Assess for signs and symptoms of hyperglycemia and hypoglycemia  - Administer ordered medications to maintain glucose within target range  - Assess barriers to adequate nutritional intake and initiate nutrition consult as needed  - Instruct patient on self management of diabetes  Outcome: Progressing

## 2024-11-28 NOTE — PROGRESS NOTES
[facility] Hospitalist Progress Note     CC: Hospital Follow up    PCP: Viktoriya Garcias DO       Subjective:     Feels okay, reports dry weight is closer to 200 lbs.    OBJECTIVE:    Blood pressure 116/65, pulse 93, temperature 98 °F (36.7 °C), temperature source Oral, resp. rate 18, weight 207 lb 14.3 oz (94.3 kg), SpO2 96%, not currently breastfeeding.    Temp:  [97.8 °F (36.6 °C)-98.4 °F (36.9 °C)] 98 °F (36.7 °C)  Pulse:  [] 93  Resp:  [18-20] 18  BP: (114-126)/(65-84) 116/65  SpO2:  [91 %-96 %] 96 %      Intake/Output:    Intake/Output Summary (Last 24 hours) at 11/28/2024 1149  Last data filed at 11/28/2024 0957  Gross per 24 hour   Intake 710 ml   Output 1525 ml   Net -815 ml       Last 3 Weights   11/28/24 0550 207 lb 14.3 oz (94.3 kg)   11/27/24 0522 208 lb 15.9 oz (94.8 kg)   11/26/24 0520 214 lb 1.1 oz (97.1 kg)   11/25/24 0408 218 lb 1.6 oz (98.9 kg)   11/23/24 2313 227 lb 1.2 oz (103 kg)   11/23/24 2233 227 lb 1.2 oz (103 kg)   11/23/24 1746 225 lb (102.1 kg)   11/14/24 1548 213 lb (96.6 kg)   09/19/24 0500 201 lb 4.8 oz (91.3 kg)   09/18/24 0400 196 lb (88.9 kg)   09/17/24 0930 198 lb 3.2 oz (89.9 kg)   09/17/24 0533 204 lb 9.4 oz (92.8 kg)   09/16/24 0920 203 lb 6.4 oz (92.3 kg)   09/14/24 0430 220 lb (99.8 kg)   09/13/24 0617 216 lb (98 kg)   09/12/24 0800 225 lb 12 oz (102.4 kg)   09/11/24 1309 230 lb (104.3 kg)       Exam   Gen: Alert, no acute distress  Heent: Normocephalic, atraumatic, neck supple, EOMI, PERRLA  Pulm: Lungs CTAB, normal respiratory effort  CV:  Regular rate and rhythm, no murmurs/rubs/gallops  Abd: Soft, nontender, nondistended, bowel sounds present  Extremities: nonpitting edema in lower extremities bilaterally, no clubbing, pulses intact   Skin: No rashes or lesions  Neuro: AOx3, no focal neurologic deficits, CN II-XII grossly intact  Psych: appropriate mood and affect      Data Review:       Labs:     Recent Labs   Lab 11/23/24  1751 11/24/24  0761  11/25/24  0608 11/26/24  0734 11/27/24  0617 11/28/24  0638   RBC 3.95 3.66*   < > 4.31 4.40 4.37   HGB 12.7 11.6*   < > 13.7 14.0 13.9   HCT 38.3 34.9*   < > 41.5 42.0 41.9   MCV 97.0 95.4   < > 96.3 95.5 95.9   MCH 32.2 31.7   < > 31.8 31.8 31.8   MCHC 33.2 33.2   < > 33.0 33.3 33.2   RDW 14.2 14.2   < > 13.8 13.4 13.5   NEPRELIM 4.42 3.32  --   --   --   --    WBC 6.9 6.3   < > 6.4 7.3 7.2   .0 289.0   < > 343.0 346.0 346.0    < > = values in this interval not displayed.         Recent Labs   Lab 11/26/24  0734 11/27/24  0617 11/28/24  0638   * 135* 140*   BUN 12 12 14   CREATSERUM 0.70 0.78 0.82   EGFRCR 92 81 76   CA 9.9 9.8 9.9    138 137   K 3.4*  3.4* 3.2*  3.2* 3.4*  3.4*    98 98   CO2 32.0 33.0* 32.0       Recent Labs   Lab 11/23/24  1751 11/25/24  0608 11/26/24  0734 11/27/24  0617 11/28/24  0638   ALT 24  --   --   --   --    AST 25  --   --   --   --    ALB 3.5 3.5 3.7 4.0 4.1         Imaging:  No results found.      Meds:      potassium chloride  40 mEq Oral Daily    allopurinol  100 mg Oral Nightly    buPROPion SR  150 mg Oral BID    clopidogrel  75 mg Oral Nightly    digoxin  125 mcg Oral Nightly    dilTIAZem ER  240 mg Oral Nightly    FLUoxetine  20 mg Oral BID    levothyroxine  175 mcg Oral Before breakfast    metoprolol succinate ER  25 mg Oral Nightly    nortriptyline  50 mg Oral Nightly    rosuvastatin  5 mg Oral QOD    spironolactone  25 mg Oral Daily    warfarin  5 mg Oral Once per day on Tuesday Thursday    warfarin  2.5 mg Oral Once per day on Sunday Monday Wednesday Friday Saturday    miconazole   Topical BID    furosemide  40 mg Intravenous BID (Diuretic)         triamcinolone    ammonium lactate    traMADol    diphenhydrAMINE    acetaminophen    melatonin       Assessment/Plan:     68 y/o F w/ PMHx of HFrEF, aortic stenosis status post TAVR, CAD status post PCI in 2021, PVD, HTN, HLD, A-fib on oral anticoagulation, hypothyroidism, SEBASTIEN, CKD stage II, COPD,  depression, recent admission for MRSA bacteremia s/p completion of vancomycin who presents to the hospital with leg swelling, dyspnea, and weight gain.     Acute on chronic decompensated HFrEF (recovered EF 50% in September)  Acute hypoxic respiratory failure 2/2 above  -Patient has not been taking her diuretic (supposed to take Bumex 2 mg twice daily).  Currently on 2 L O2 nasal cannula.  -Strict I's and O's, daily weights -net -3.6 L since admission  -Discussed with cardiology, echo shows no significant change from prior  -Continue diuresis with 40 mg IV Lasix twice daily  -Continue home metoprolol succinate 25 mg nightly, spironolactone 25 mg daily  -Daily RFP's, will monitor renal function  -Cardiac diet, fluid restriction     Aortic stenosis s/p TAVR  Paroxysmal atrial fibrillation  Therapeutic INR  -Continue home warfarin regimen  -Monitor INR  -Continue home beta-blocker, diltiazem, digoxin, Plavix     CKD stage II  -stable     Hyperlipidemia  -Continue home rosuvastatin     Depression  -Continue home fluoxetine, nortriptyline, bupropion     Gout  -Continue home allopurinol     Chronic pain  -Continue home nortriptyline, as needed tramadol, as needed Tylenol     History of MRSA bacteremia  -Recently completed IV vancomycin, no further treatment at this time     Hypothyroidism  -Continue home levothyroxine     IVF: None  Diet: Cardiac diet  DVT Prophylaxis: Warfarin    Code Status: Full Code   Dispo: Pending clinical course     Kristie Larkin MD  [facility]  Hospitalist  Contact via [employer]       Supplementary Documentation:   DVT Mechanical Prophylaxis:   SCDs,    DVT Pharmacologic Prophylaxis   Medication    warfarin (Coumadin) tab 5 mg    warfarin (Coumadin) tab 2.5 mg                Code Status: Full Code  Calvin: External urinary catheter in place  Calvin Duration (in days):   Central line:    JUJU:

## 2024-11-28 NOTE — PLAN OF CARE
Pt chief complaint upon admission: CHF exacerbation. Received pt at 1930 during handoff. Pt in bed. A&Ox 4. RA, CPAP @ night. Rhythm Afib on tele monitor. Urge incontinence, purewick in place for accurate I&Os. Moderate complaint of pain located in BLE d/t wounds. All medications given as ordered. Pt resting in bed w/ all safety measures in place and call light within reach.     Plan is IV lasix BID. DW, I&Os.    Problem: Patient/Family Goals  Goal: Patient/Family Long Term Goal  Description: Patient's Long Term Goal: Maintain level of activity    Interventions:  - Follow medication regiment  - See additional Care Plan goals for specific interventions  Outcome: Progressing  Goal: Patient/Family Short Term Goal  Description: Patient's Short Term Goal: Discharge    Interventions:   - Follow medication regiment   - See additional Care Plan goals for specific interventions  Outcome: Progressing     Problem: PAIN - ADULT  Goal: Verbalizes/displays adequate comfort level or patient's stated pain goal  Description: INTERVENTIONS:  - Encourage pt to monitor pain and request assistance  - Assess pain using appropriate pain scale  - Administer analgesics based on type and severity of pain and evaluate response  - Implement non-pharmacological measures as appropriate and evaluate response  - Consider cultural and social influences on pain and pain management  - Manage/alleviate anxiety  - Utilize distraction and/or relaxation techniques  - Monitor for opioid side effects  - Notify MD/LIP if interventions unsuccessful or patient reports new pain  - Anticipate increased pain with activity and pre-medicate as appropriate  Outcome: Progressing     Problem: CARDIOVASCULAR - ADULT  Goal: Maintains optimal cardiac output and hemodynamic stability  Description: INTERVENTIONS:  - Monitor vital signs, rhythm, and trends  - Monitor for bleeding, hypotension and signs of decreased cardiac output  - Evaluate effectiveness of vasoactive  medications to optimize hemodynamic stability  - Monitor arterial and/or venous puncture sites for bleeding and/or hematoma  - Assess quality of pulses, skin color and temperature  - Assess for signs of decreased coronary artery perfusion - ex. Angina  - Evaluate fluid balance, assess for edema, trend weights  Outcome: Progressing  Goal: Absence of cardiac arrhythmias or at baseline  Description: INTERVENTIONS:  - Continuous cardiac monitoring, monitor vital signs, obtain 12 lead EKG if indicated  - Evaluate effectiveness of antiarrhythmic and heart rate control medications as ordered  - Initiate emergency measures for life threatening arrhythmias  - Monitor electrolytes and administer replacement therapy as ordered  Outcome: Progressing     Problem: RESPIRATORY - ADULT  Goal: Achieves optimal ventilation and oxygenation  Description: INTERVENTIONS:  - Assess for changes in respiratory status  - Assess for changes in mentation and behavior  - Position to facilitate oxygenation and minimize respiratory effort  - Oxygen supplementation based on oxygen saturation or ABGs  - Provide Smoking Cessation handout, if applicable  - Encourage broncho-pulmonary hygiene including cough, deep breathe, Incentive Spirometry  - Assess the need for suctioning and perform as needed  - Assess and instruct to report SOB or any respiratory difficulty  - Respiratory Therapy support as indicated  - Manage/alleviate anxiety  - Monitor for signs/symptoms of CO2 retention  Outcome: Progressing     Problem: Diabetes/Glucose Control  Goal: Glucose maintained within prescribed range  Description: INTERVENTIONS:  - Monitor Blood Glucose as ordered  - Assess for signs and symptoms of hyperglycemia and hypoglycemia  - Administer ordered medications to maintain glucose within target range  - Assess barriers to adequate nutritional intake and initiate nutrition consult as needed  - Instruct patient on self management of diabetes  Outcome:  Progressing

## 2024-11-28 NOTE — PROGRESS NOTES
[facility] Cardiology Progress Note        Arleth Weiss Patient Status:  Inpatient    1955 MRN [id number]   Location [facility] 2NE-A Attending Maureen Ibarra DO   Hosp Day # 5 PCP Viktoriya Garcias DO     Subjective:  The patient denies  chest pain   Comfortable at rest. Some SOB with walking still    Medications:   allopurinol  100 mg Oral Nightly    buPROPion SR  150 mg Oral BID    clopidogrel  75 mg Oral Nightly    digoxin  125 mcg Oral Nightly    dilTIAZem ER  240 mg Oral Nightly    FLUoxetine  20 mg Oral BID    levothyroxine  175 mcg Oral Before breakfast    metoprolol succinate ER  25 mg Oral Nightly    nortriptyline  50 mg Oral Nightly    rosuvastatin  5 mg Oral QOD    spironolactone  25 mg Oral Daily    warfarin  5 mg Oral Once per day on     warfarin  2.5 mg Oral Once per day on     miconazole   Topical BID    furosemide  40 mg Intravenous BID (Diuretic)       Continuous Infusions:        Allergies:  Allergies[1]      Objective:        Intake/Output:      Intake/Output Summary (Last 24 hours) at 2024 1146  Last data filed at 2024 0957  Gross per 24 hour   Intake 710 ml   Output 1525 ml   Net -815 ml     Wt Readings from Last 3 Encounters:   24 207 lb 14.3 oz (94.3 kg)   24 213 lb (96.6 kg)   24 201 lb 4.8 oz (91.3 kg)       Physical Exam:        Vitals:    24 0520 24 0550 24 0751 24 0957   BP: 126/84  116/65    BP Location: Left arm  Radial    Pulse: 105 99 94 93   Resp: 18  18    Temp: 97.8 °F (36.6 °C)  98 °F (36.7 °C)    TempSrc: Oral  Oral    SpO2: 93% 94% 92% 96%   Weight:  207 lb 14.3 oz (94.3 kg)         Temp:  [97.8 °F (36.6 °C)-98.4 °F (36.9 °C)] 98 °F (36.7 °C)  Pulse:  [] 93  Resp:  [18-20] 18  BP: (114-126)/(65-84) 116/65  SpO2:  [91 %-96 %] 96 %      Temp: 98 °F (36.7 °C)  Pulse: 93  Resp: 18  BP: 116/65  General:  Appears comfortable  HEENT: No focal  deficits.  Neck: No JVD, carotids 2+ no bruits.  Cardiac: Irregular S1S2.  No S3, S4, rub, click.  No murmur.  Lungs: Clear to auscultation and percussion.  Abdomen: Soft, non-tender.   Extremities: No LE edema.  No clubbing or cyanosis.    Neurologic: Alert and oriented, normal affect.  Skin: Warm and dry.           LABS:      HEM:  Recent Labs   Lab 11/24/24  0736 11/25/24  0608 11/26/24  0734 11/27/24  0617 11/28/24  0638   WBC 6.3 6.5 6.4 7.3 7.2   HGB 11.6* 13.5 13.7 14.0 13.9   HCT 34.9* 41.5 41.5 42.0 41.9   .0 319.0 343.0 346.0 346.0       Chem:  Recent Labs   Lab 11/24/24  0736 11/25/24  0608 11/26/24  0734 11/27/24  0617 11/28/24  0638    141 140 138 137   K 3.3* 3.2*  3.2* 3.4*  3.4* 3.2*  3.2* 3.4*  3.4*    103 101 98 98   CO2 25.0 30.0 32.0 33.0* 32.0   BUN 9 10 12 12 14   CREATSERUM 0.70 0.66 0.70 0.78 0.82   CA 9.0 9.2 9.9 9.8 9.9   MG  --  1.7 1.8 1.9 1.8   PHOS  --  3.7 3.8 4.1 4.1   * 109* 124* 135* 140*       Recent Labs   Lab 11/23/24  1751 11/25/24  0608 11/26/24  0734 11/27/24  0617 11/28/24  0638   ALT 24  --   --   --   --    AST 25  --   --   --   --    ALB 3.5 3.5 3.7 4.0 4.1       Recent Labs   Lab 11/24/24  0736 11/25/24  1750 11/26/24  0734 11/27/24  0617 11/28/24  0638   INR 2.64* 2.18* 2.02* 1.89* 2.09*           Lab Results   Component Value Date    TROP <0.045 07/28/2021         Invalid input(s): \"PBNPML\"                       Diagnostics:   Telemetry: Atrial Fibrillation     EKG, 11/25/2024,         Echo:  11/25/24      Conclusions:     1. Left ventricle: The cavity size was normal. Wall thickness was normal.      Systolic function was normal. The estimated ejection fraction was 50-55%,      by visual assessment. Wall motion is normal; there are no regional wall      motion abnormalities. Unable to assess LV diastolic function.   2. Right ventricle: Systolic function was normal.   3. Left atrium: The atrium was mildly dilated.   4. Aortic valve: Elizondo  MINOR S3 23mm (TAVR) bioprosthesis was present,      well seated. No functional/doppler analysis performed.   5. Mitral valve: The annulus was markedly calcified. The leaflets were      mildly calcified. Minimal stenosis. The mean diastolic gradient was 3mm      Hg.   Impressions:  This study is compared with previous dated 9/14/2024: No   significant change.               Impression:    #Acute on chronic HFpEF exacerbation: likely due to inconsistent diuretic use at home. Approved for [employer]; this should improve diuretic compliance  -     net out = 4.7 liters  -     weight: 227---> 207 lbs      #Severe aortic stenosis s/p TAVR  # CAD s/p stent in 2021  # paroxysmal afib   # HLD   # HTN , well-controlled   # Hx of PE s/p IVC filter     Plan:   - Continue IV lasix 40mg BID today                - Strict I/O, daily weights and BMP                - as she gets to dry weight 200-205 lbs) will transition   to po torsemide    - Continue diltiazem 240mg daily, metoprolol xl 25mg daily, and spironolactone 25mg daily   - continue plavix 75mg daily   - continue coumadin   - continue rosuvastatin   - monitor telemetry   - replace K+    James Robertson MD         [1]   Allergies  Allergen Reactions    Adhesive Tape HIVES     ALL ADHESIVES    Codeine HIVES    Doxycycline SWELLING    Hydrocodone UNKNOWN    Lisinopril TONGUE SWELLING    Morphine Sulfate HIVES    Opioid Analgesics UNKNOWN    Oxycontin ITCHING    Oxytocin HIVES    Vicodin [Hydrocodone-Acetaminophen] ITCHING    Skin Adhesives OTHER (SEE COMMENTS)

## 2024-11-29 LAB
ANION GAP SERPL CALC-SCNC: 9 MMOL/L (ref 0–18)
BASOPHILS # BLD AUTO: 0.04 X10(3) UL (ref 0–0.2)
BASOPHILS NFR BLD AUTO: 0.5 %
BUN BLD-MCNC: 17 MG/DL (ref 9–23)
CALCIUM BLD-MCNC: 9.8 MG/DL (ref 8.7–10.4)
CHLORIDE SERPL-SCNC: 97 MMOL/L (ref 98–112)
CO2 SERPL-SCNC: 31 MMOL/L (ref 21–32)
CREAT BLD-MCNC: 0.79 MG/DL
EGFRCR SERPLBLD CKD-EPI 2021: 80 ML/MIN/1.73M2 (ref 60–?)
EOSINOPHIL # BLD AUTO: 0.16 X10(3) UL (ref 0–0.7)
EOSINOPHIL NFR BLD AUTO: 2 %
ERYTHROCYTE [DISTWIDTH] IN BLOOD BY AUTOMATED COUNT: 13.4 %
GLUCOSE BLD-MCNC: 142 MG/DL (ref 70–99)
HCT VFR BLD AUTO: 43.2 %
HGB BLD-MCNC: 14.3 G/DL
IMM GRANULOCYTES # BLD AUTO: 0.04 X10(3) UL (ref 0–1)
IMM GRANULOCYTES NFR BLD: 0.5 %
INR BLD: 1.92 (ref 0.8–1.2)
LYMPHOCYTES # BLD AUTO: 1.72 X10(3) UL (ref 1–4)
LYMPHOCYTES NFR BLD AUTO: 21.9 %
MAGNESIUM SERPL-MCNC: 2 MG/DL (ref 1.6–2.6)
MCH RBC QN AUTO: 31.8 PG (ref 26–34)
MCHC RBC AUTO-ENTMCNC: 33.1 G/DL (ref 31–37)
MCV RBC AUTO: 96 FL
MONOCYTES # BLD AUTO: 1.24 X10(3) UL (ref 0.1–1)
MONOCYTES NFR BLD AUTO: 15.8 %
NEUTROPHILS # BLD AUTO: 4.66 X10 (3) UL (ref 1.5–7.7)
NEUTROPHILS # BLD AUTO: 4.66 X10(3) UL (ref 1.5–7.7)
NEUTROPHILS NFR BLD AUTO: 59.3 %
OSMOLALITY SERPL CALC.SUM OF ELEC: 288 MOSM/KG (ref 275–295)
PLATELET # BLD AUTO: 322 10(3)UL (ref 150–450)
POTASSIUM SERPL-SCNC: 3.4 MMOL/L (ref 3.5–5.1)
POTASSIUM SERPL-SCNC: 3.4 MMOL/L (ref 3.5–5.1)
PROTHROMBIN TIME: 22.2 SECONDS (ref 11.6–14.8)
RBC # BLD AUTO: 4.5 X10(6)UL
SODIUM SERPL-SCNC: 137 MMOL/L (ref 136–145)
WBC # BLD AUTO: 7.9 X10(3) UL (ref 4–11)

## 2024-11-29 RX ORDER — TORSEMIDE 20 MG/1
40 TABLET ORAL DAILY
Status: DISCONTINUED | OUTPATIENT
Start: 2024-11-30 | End: 2024-12-01

## 2024-11-29 RX ORDER — MINERAL OIL/HYDROPHIL PETROLAT
OINTMENT (GRAM) TOPICAL AS NEEDED
Status: DISCONTINUED | OUTPATIENT
Start: 2024-11-29 | End: 2024-12-04

## 2024-11-29 RX ORDER — POTASSIUM CHLORIDE 1.5 G/1.58G
40 POWDER, FOR SOLUTION ORAL ONCE
Status: COMPLETED | OUTPATIENT
Start: 2024-11-29 | End: 2024-11-29

## 2024-11-29 NOTE — PROGRESS NOTES
[facility] Cardiology Progress Note        Arleth Weiss Patient Status:  Inpatient    1955 MRN [id number]   Location [facility] 2NE-A Attending Maureen Ibarra DO   Hosp Day # 6 PCP Viktoriya Garcias DO     Subjective:  The patient denies  chest pain   Comfortable at rest.     Medications:   allopurinol  100 mg Oral Nightly    buPROPion SR  150 mg Oral BID    clopidogrel  75 mg Oral Nightly    digoxin  125 mcg Oral Nightly    dilTIAZem ER  240 mg Oral Nightly    FLUoxetine  20 mg Oral BID    levothyroxine  175 mcg Oral Before breakfast    metoprolol succinate ER  25 mg Oral Nightly    nortriptyline  50 mg Oral Nightly    rosuvastatin  5 mg Oral QOD    spironolactone  25 mg Oral Daily    warfarin  5 mg Oral Once per day on     warfarin  2.5 mg Oral Once per day on     miconazole   Topical BID    furosemide  40 mg Intravenous BID (Diuretic)       Continuous Infusions:        Allergies:  Allergies[1]      Objective:        Intake/Output:      Intake/Output Summary (Last 24 hours) at 2024 0726  Last data filed at 2024 0405  Gross per 24 hour   Intake 360 ml   Output 1830 ml   Net -1470 ml     Wt Readings from Last 3 Encounters:   24 214 lb 15.2 oz (97.5 kg)   24 213 lb (96.6 kg)   24 201 lb 4.8 oz (91.3 kg)       Physical Exam:        Vitals:    24 2335 24 0034 24 0405   BP: 116/66 113/70  112/74   BP Location: Radial Radial  Radial   Pulse: 94 103 100 106   Resp: 20 18 18 16   Temp: 98.2 °F (36.8 °C) 97.7 °F (36.5 °C)  98 °F (36.7 °C)   TempSrc: Oral Oral  Axillary   SpO2: 94% 97% 92% 96%   Weight:    214 lb 15.2 oz (97.5 kg)       Temp:  [97.7 °F (36.5 °C)-98.6 °F (37 °C)] 98 °F (36.7 °C)  Pulse:  [] 106  Resp:  [16-20] 16  BP: (107-125)/(60-74) 112/74  SpO2:  [91 %-97 %] 96 %      Temp: 98 °F (36.7 °C)  Pulse: 106  Resp: 16  BP: 112/  General:  Appears  comfortable  HEENT: No focal deficits.  Neck: No JVD, carotids 2+ no bruits.  Cardiac: Irregular S1S2.  No S3, S4, rub, click.  No murmur.  Lungs: Clear to auscultation and percussion.  Abdomen: Soft, non-tender.   Extremities: No LE edema.  No clubbing or cyanosis.    Neurologic: Alert and oriented, normal affect.  Skin: Warm and dry.           LABS:      HEM:  Recent Labs   Lab 11/25/24  0608 11/26/24  0734 11/27/24  0617 11/28/24  0638 11/29/24  0641   WBC 6.5 6.4 7.3 7.2 7.9   HGB 13.5 13.7 14.0 13.9 14.3   HCT 41.5 41.5 42.0 41.9 43.2   .0 343.0 346.0 346.0 322.0       Chem:  Recent Labs   Lab 11/25/24  0608 11/26/24  0734 11/27/24  0617 11/28/24 0638 11/29/24  0641    140 138 137 137   K 3.2*  3.2* 3.4*  3.4* 3.2*  3.2* 3.4*  3.4* 3.4*  3.4*    101 98 98 97*   CO2 30.0 32.0 33.0* 32.0 31.0   BUN 10 12 12 14 17   CREATSERUM 0.66 0.70 0.78 0.82 0.79   CA 9.2 9.9 9.8 9.9 9.8   MG 1.7 1.8 1.9 1.8 2.0   PHOS 3.7 3.8 4.1 4.1  --    * 124* 135* 140* 142*       Recent Labs   Lab 11/23/24  1751 11/25/24  0608 11/26/24  0734 11/27/24  0617 11/28/24  0638   ALT 24  --   --   --   --    AST 25  --   --   --   --    ALB 3.5 3.5 3.7 4.0 4.1       Recent Labs   Lab 11/25/24  1750 11/26/24  0734 11/27/24  0617 11/28/24  0638 11/29/24  0641   INR 2.18* 2.02* 1.89* 2.09* 1.92*           Lab Results   Component Value Date    TROP <0.045 07/28/2021         Invalid input(s): \"PBNPML\"                       Diagnostics:   Telemetry: Atrial Fibrillation     EKG, 11/25/2024,         Echo:  11/25/24      Conclusions:     1. Left ventricle: The cavity size was normal. Wall thickness was normal.      Systolic function was normal. The estimated ejection fraction was 50-55%,      by visual assessment. Wall motion is normal; there are no regional wall      motion abnormalities. Unable to assess LV diastolic function.   2. Right ventricle: Systolic function was normal.   3. Left atrium: The atrium was mildly  dilated.   4. Aortic valve: Elizondo MINOR S3 23mm (TAVR) bioprosthesis was present,      well seated. No functional/doppler analysis performed.   5. Mitral valve: The annulus was markedly calcified. The leaflets were      mildly calcified. Minimal stenosis. The mean diastolic gradient was 3mm      Hg.   Impressions:  This study is compared with previous dated 9/14/2024: No   significant change.               Impression:    #Acute on chronic HFpEF exacerbation: likely due to inconsistent diuretic use at home. Approved for [employer]; this should improve diuretic compliance  -     net out = 6.2 liters  -     weight: 227---> 214 lbs ?!      #Severe aortic stenosis s/p TAVR  # CAD s/p stent in 2021  # paroxysmal afib   # HLD   # HTN , well-controlled   # Hx of PE s/p IVC filter     Plan:   - Continue IV lasix 40mg BID today                - Strict I/O, daily weights and BMP                - once breathing is at comfortable level with ambulation will transition   to po torsemide    - Continue diltiazem 240mg daily, metoprolol xl 25mg daily, and spironolactone 25mg daily   - continue plavix 75mg daily   - continue coumadin   - continue rosuvastatin   - monitor telemetry   - started oral K+    James Robertson MD         [1]   Allergies  Allergen Reactions    Adhesive Tape HIVES     ALL ADHESIVES    Codeine HIVES    Doxycycline SWELLING    Hydrocodone UNKNOWN    Lisinopril TONGUE SWELLING    Morphine Sulfate HIVES    Opioid Analgesics UNKNOWN    Oxycontin ITCHING    Oxytocin HIVES    Vicodin [Hydrocodone-Acetaminophen] ITCHING    Skin Adhesives OTHER (SEE COMMENTS)

## 2024-11-29 NOTE — PHYSICAL THERAPY NOTE
PHYSICAL THERAPY TREATMENT NOTE - INPATIENT    Room Number: 2610/2610-A     Session: 1     Number of Visits to Meet Established Goals: 5    Presenting Problem: acute on chronic CHF  Co-Morbidities : DVT, COPD, CHF, CKD, thyroid cancer, HTN, TAVR, OA, PVD, fibromyalgia, PE, spine surgery, B TKA, EUSEBIA, TSA    PHYSICAL THERAPY ASSESSMENT   Patient demonstrates fair progress this session, goals  remain in progress and ocnt to be a high risk of fall    Patient is requiring minimal assist as a result of the following impairments: decreased functional strength, decreased endurance/aerobic capacity, pain, impaired standing balance, decreased muscular endurance, and increased O2 needs from baseline.     Patient continues to function below baseline with bed mobility, transfers, gait, standing prolonged periods, and performing household tasks.  Next session anticipate patient to progress bed mobility, transfers, gait, standing prolonged periods, and performing household tasks.  Physical Therapy will continue to follow patient for duration of hospitalization.    Patient continues to benefit from continued skilled PT services: at discharge to promote prior level of function and safety with additional support and return home with home health PT.    PLAN DURING HOSPITALIZATION  Nursing Mobility Recommendation : 1 Assist  PT Device Recommendation: Other (Comment) (Loftstrand crutches)  PT Treatment Plan: Bed mobility;Body mechanics;Endurance;Energy conservation;Patient education;Family education;Gait training;Range of motion;Strengthening;Stair training;Transfer training;Balance training  Frequency (Obs): 3-5x/week     CURRENT GOALS     Goal #1 Patient is able to demonstrate supine - sit EOB @ level: modified independent      Goal #2 Patient is able to demonstrate transfers Sit to/from Stand at assistance level: modified independent      Goal #3 Patient is able to ambulate 150 feet with assist device:  loftstrand crutches  at  assistance level: modified independent      Goal #4     Goal #5     Goal #6     Goal Comments: Goals established on 11/26/2024 11/29/2024 all goals ongoing    SUBJECTIVE  Pain in my legs and I am dizzy when up,maybe more fluid was taken more than necessary while having the diuretics    OBJECTIVE  Precautions: Bed/chair alarm    WEIGHT BEARING RESTRICTION     PAIN ASSESSMENT   Rating: Unable to rate  Location: B feet  Management Techniques: Activity promotion;Relaxation;Repositioning;Breathing techniques    BALANCE                                                                                                                       Static Sitting: Good  Dynamic Sitting: Good           Static Standing: Fair -  Dynamic Standing: Fair -    ACTIVITY TOLERANCE; poor+      O2 WALK  Oxygen Therapy  At rest oxygen flow (liters per minute): 2    AM-PAC '6-Clicks' INPATIENT SHORT FORM - BASIC MOBILITY  How much difficulty does the patient currently have...  Patient Difficulty: Turning over in bed (including adjusting bedclothes, sheets and blankets)?: A Little   Patient Difficulty: Sitting down on and standing up from a chair with arms (e.g., wheelchair, bedside commode, etc.): A Little   Patient Difficulty: Moving from lying on back to sitting on the side of the bed?: A Little   How much help from another person does the patient currently need...   Help from Another: Moving to and from a bed to a chair (including a wheelchair)?: A Little   Help from Another: Need to walk in hospital room?: A Little   Help from Another: Climbing 3-5 steps with a railing?: A Lot     AM-PAC Score:  Raw Score: 17   Approx Degree of Impairment: 50.57%   Standardized Score (AM-PAC Scale): 42.13   CMS Modifier (G-Code): CK    FUNCTIONAL ABILITY STATUS  Gait Assessment   Functional Mobility/Gait Assessment  Gait Assistance: Supervision  Distance (ft): 10  Assistive Device:  (B lofstrand crutches)  Pattern: Within Functional Limits  (joan)    Skilled Therapy Provided    Bed Mobility:  Rolling: min with extra time   Supine<>Sit: min A with B LE with extra time   Sit<>Supine: min A With B LE with extra time    Transfer Mobility:  Sit<>Stand: min A with modified bed height   Stand<>Sit: min A   Gait: B lofstrand AC, limited ambulation with c/o pain on B feet and dizziness    Therapist's Comments:   Cont to needs 2L.nc at all times  Noted with slight increase with HR with minimal activity  C/o dizziness limiting with task tolerance  Pain on B feet limiting standing/WB tolerance as well as sensitive to foot wear.  Discussed on the importance of mobilities while in hosp  Addressed all issues and concern  Nursing is aware of this visit      THERAPEUTIC EXERCISES  Lower Extremity Leg slides  SLR     Upper Extremity      Position Supine     Repetitions   10   Sets   1     Patient End of Session: Needs met;Call light within reach;RN aware of session/findings;All patient questions and concerns addressed;Alarm set;In bed    PT Session Time: 30 minutes  Gait Training: 10 minutes  Therapeutic Activity: 10 minutes  Therapeutic Exercise: 5 minutes

## 2024-11-29 NOTE — PROGRESS NOTES
[facility] Hospitalist Progress Note     CC: Hospital Follow up    PCP: Viktoriya Garcias DO       Subjective:     Doing okay. Has b/l foot pain. Notes discordant weights today although standing gave a similar weight of 207 like yesterday. Has dry patches of skin on arms.     OBJECTIVE:    Blood pressure 112/74, pulse 106, temperature 98 °F (36.7 °C), temperature source Axillary, resp. rate 16, weight 214 lb 15.2 oz (97.5 kg), SpO2 96%, not currently breastfeeding.    Temp:  [97.7 °F (36.5 °C)-98.6 °F (37 °C)] 98 °F (36.7 °C)  Pulse:  [] 106  Resp:  [16-20] 16  BP: (107-125)/(60-74) 112/74  SpO2:  [91 %-97 %] 96 %      Intake/Output:    Intake/Output Summary (Last 24 hours) at 11/29/2024 0752  Last data filed at 11/29/2024 0405  Gross per 24 hour   Intake 360 ml   Output 1830 ml   Net -1470 ml       Last 3 Weights   11/29/24 0405 214 lb 15.2 oz (97.5 kg)   11/28/24 0550 207 lb 14.3 oz (94.3 kg)   11/27/24 0522 208 lb 15.9 oz (94.8 kg)   11/26/24 0520 214 lb 1.1 oz (97.1 kg)   11/25/24 0408 218 lb 1.6 oz (98.9 kg)   11/23/24 2313 227 lb 1.2 oz (103 kg)   11/23/24 2233 227 lb 1.2 oz (103 kg)   11/23/24 1746 225 lb (102.1 kg)   11/14/24 1548 213 lb (96.6 kg)   09/19/24 0500 201 lb 4.8 oz (91.3 kg)   09/18/24 0400 196 lb (88.9 kg)   09/17/24 0930 198 lb 3.2 oz (89.9 kg)   09/17/24 0533 204 lb 9.4 oz (92.8 kg)   09/16/24 0920 203 lb 6.4 oz (92.3 kg)   09/14/24 0430 220 lb (99.8 kg)   09/13/24 0617 216 lb (98 kg)   09/12/24 0800 225 lb 12 oz (102.4 kg)   09/11/24 1309 230 lb (104.3 kg)       Exam   Gen: Alert, no acute distress  Heent: Normocephalic, atraumatic, neck supple, EOMI, PERRLA  Pulm: Lungs CTAB, normal respiratory effort  CV:  Regular rate and rhythm, no murmurs/rubs/gallops  Abd: Soft, nontender, nondistended, bowel sounds present  Extremities: nonpitting edema in lower extremities bilaterally, no clubbing, pulses intact   Skin: No rashes or lesions  Neuro: AOx3, no focal neurologic deficits, CN  II-XII grossly intact  Psych: appropriate mood and affect      Data Review:       Labs:     Recent Labs   Lab 11/23/24  1751 11/24/24  0736 11/25/24  0608 11/27/24  0617 11/28/24  0638 11/29/24  0641   RBC 3.95 3.66*   < > 4.40 4.37 4.50   HGB 12.7 11.6*   < > 14.0 13.9 14.3   HCT 38.3 34.9*   < > 42.0 41.9 43.2   MCV 97.0 95.4   < > 95.5 95.9 96.0   MCH 32.2 31.7   < > 31.8 31.8 31.8   MCHC 33.2 33.2   < > 33.3 33.2 33.1   RDW 14.2 14.2   < > 13.4 13.5 13.4   NEPRELIM 4.42 3.32  --   --   --  4.66   WBC 6.9 6.3   < > 7.3 7.2 7.9   .0 289.0   < > 346.0 346.0 322.0    < > = values in this interval not displayed.         Recent Labs   Lab 11/27/24  0617 11/28/24  0638 11/29/24  0641   * 140* 142*   BUN 12 14 17   CREATSERUM 0.78 0.82 0.79   EGFRCR 81 76 80   CA 9.8 9.9 9.8    137 137   K 3.2*  3.2* 3.4*  3.4* 3.4*  3.4*   CL 98 98 97*   CO2 33.0* 32.0 31.0       Recent Labs   Lab 11/23/24  1751 11/25/24  0608 11/26/24  0734 11/27/24  0617 11/28/24  0638   ALT 24  --   --   --   --    AST 25  --   --   --   --    ALB 3.5 3.5 3.7 4.0 4.1         Imaging:  No results found.      Meds:      potassium chloride  40 mEq Oral Once    allopurinol  100 mg Oral Nightly    buPROPion SR  150 mg Oral BID    clopidogrel  75 mg Oral Nightly    digoxin  125 mcg Oral Nightly    dilTIAZem ER  240 mg Oral Nightly    FLUoxetine  20 mg Oral BID    levothyroxine  175 mcg Oral Before breakfast    metoprolol succinate ER  25 mg Oral Nightly    nortriptyline  50 mg Oral Nightly    rosuvastatin  5 mg Oral QOD    spironolactone  25 mg Oral Daily    warfarin  5 mg Oral Once per day on Tuesday Thursday    warfarin  2.5 mg Oral Once per day on Sunday Monday Wednesday Friday Saturday    miconazole   Topical BID    furosemide  40 mg Intravenous BID (Diuretic)         triamcinolone    ammonium lactate    traMADol    diphenhydrAMINE    acetaminophen    melatonin       Assessment/Plan:     70 y/o F w/ PMHx of HFrEF, aortic  stenosis status post TAVR, CAD status post PCI in 2021, PVD, HTN, HLD, A-fib on oral anticoagulation, hypothyroidism, SEBASTIEN, CKD stage II, COPD, depression, recent admission for MRSA bacteremia s/p completion of vancomycin who presents to the hospital with leg swelling, dyspnea, and weight gain.     Acute on chronic decompensated HFrEF (recovered EF 50% in September)  Acute hypoxic respiratory failure 2/2 above  -Patient has not been taking her diuretic (supposed to take Bumex 2 mg twice daily).  Currently on 2 L O2 nasal cannula.  -Strict I's and O's, daily weights  -echo without changes  -Continue diuresis with 40 mg IV Lasix twice daily. Aim to get closer to dry weight and get breathing to more comfortable level.  -Continue home metoprolol succinate 25 mg nightly, spironolactone 25 mg daily       Aortic stenosis s/p TAVR  Paroxysmal atrial fibrillation  Therapeutic INR  -Continue home warfarin regimen  -Monitor INR  -Continue home beta-blocker, diltiazem, digoxin, Plavix     CKD stage II  -stable     Hyperlipidemia  -Continue home rosuvastatin     Depression  -Continue home fluoxetine, nortriptyline, bupropion     Gout  -Continue home allopurinol     Chronic pain  -Continue home nortriptyline, as needed tramadol, as needed Tylenol     History of MRSA bacteremia  -Recently completed IV vancomycin, no further treatment at this time     Hypothyroidism  -Continue home levothyroxine     IVF: None  Diet: Cardiac diet  DVT Prophylaxis: Warfarin    Code Status: Full Code   Dispo: Pending clinical course     Kristie Larkin MD  [facility]  Hospitalist  Contact via [employer]       Supplementary Documentation:   DVT Mechanical Prophylaxis:   SCDs,    DVT Pharmacologic Prophylaxis   Medication    warfarin (Coumadin) tab 5 mg    warfarin (Coumadin) tab 2.5 mg                Code Status: Full Code  Calvin: External urinary catheter in place  Calvin Duration (in days):   Central line:    JUJU:

## 2024-11-29 NOTE — CM/SW NOTE
Pt discussed in rounds and chart was reviewed. Plan is for pt to transition from IV lasix to PO torsemide starting tomorrow.     Likely DC tomorrow.    SW noted and acknowledged consult order for HH services. Pt is current with [facility] and [facility] order was already sent to them. SW also uploaded updates to [facility] referral.     to remain available for support and/or discharge planning.    GEN Cui  Discharge Planner  [phone]

## 2024-11-29 NOTE — PLAN OF CARE
Assumed care of pt at 0730.  Pt is alert and oriented x4.  Pt is on room air with adequate oxygen saturations. Continuous pulse ox maintained.  Pt is Afib per telemetry.    Pt is continent of bowel and has increased urinary frequency due to Lasix.   Pt has pain in bilateral feet. Pt declines Tubigrip.   Pt is up with 2 and crutches. Pt declines getting up to chair or ambulating in garsia.  Pt updated on plan for day and agreeable.  Safety precautions in place and maintained.      Addendum: pt standing scale weight 207.4lb. Paged cardiology, received verbal orders to switch from IV lasix to PO torsemide 40mg daily starting tomorrow. Wound care provided to BLE.   Problem: Patient/Family Goals  Goal: Patient/Family Long Term Goal  Description: Patient's Long Term Goal: Maintain level of activity    Interventions:  - Follow medication regiment  - See additional Care Plan goals for specific interventions  Outcome: Progressing  Goal: Patient/Family Short Term Goal  Description: Patient's Short Term Goal: Discharge    Interventions:   - Follow medication regiment   - See additional Care Plan goals for specific interventions  Outcome: Progressing     Problem: PAIN - ADULT  Goal: Verbalizes/displays adequate comfort level or patient's stated pain goal  Description: INTERVENTIONS:  - Encourage pt to monitor pain and request assistance  - Assess pain using appropriate pain scale  - Administer analgesics based on type and severity of pain and evaluate response  - Implement non-pharmacological measures as appropriate and evaluate response  - Consider cultural and social influences on pain and pain management  - Manage/alleviate anxiety  - Utilize distraction and/or relaxation techniques  - Monitor for opioid side effects  - Notify MD/LIP if interventions unsuccessful or patient reports new pain  - Anticipate increased pain with activity and pre-medicate as appropriate  Outcome: Progressing     Problem: CARDIOVASCULAR -  ADULT  Goal: Maintains optimal cardiac output and hemodynamic stability  Description: INTERVENTIONS:  - Monitor vital signs, rhythm, and trends  - Monitor for bleeding, hypotension and signs of decreased cardiac output  - Evaluate effectiveness of vasoactive medications to optimize hemodynamic stability  - Monitor arterial and/or venous puncture sites for bleeding and/or hematoma  - Assess quality of pulses, skin color and temperature  - Assess for signs of decreased coronary artery perfusion - ex. Angina  - Evaluate fluid balance, assess for edema, trend weights  Outcome: Progressing  Goal: Absence of cardiac arrhythmias or at baseline  Description: INTERVENTIONS:  - Continuous cardiac monitoring, monitor vital signs, obtain 12 lead EKG if indicated  - Evaluate effectiveness of antiarrhythmic and heart rate control medications as ordered  - Initiate emergency measures for life threatening arrhythmias  - Monitor electrolytes and administer replacement therapy as ordered  Outcome: Progressing     Problem: RESPIRATORY - ADULT  Goal: Achieves optimal ventilation and oxygenation  Description: INTERVENTIONS:  - Assess for changes in respiratory status  - Assess for changes in mentation and behavior  - Position to facilitate oxygenation and minimize respiratory effort  - Oxygen supplementation based on oxygen saturation or ABGs  - Provide Smoking Cessation handout, if applicable  - Encourage broncho-pulmonary hygiene including cough, deep breathe, Incentive Spirometry  - Assess the need for suctioning and perform as needed  - Assess and instruct to report SOB or any respiratory difficulty  - Respiratory Therapy support as indicated  - Manage/alleviate anxiety  - Monitor for signs/symptoms of CO2 retention  Outcome: Progressing     Problem: Diabetes/Glucose Control  Goal: Glucose maintained within prescribed range  Description: INTERVENTIONS:  - Monitor Blood Glucose as ordered  - Assess for signs and symptoms of  hyperglycemia and hypoglycemia  - Administer ordered medications to maintain glucose within target range  - Assess barriers to adequate nutritional intake and initiate nutrition consult as needed  - Instruct patient on self management of diabetes  Outcome: Progressing

## 2024-11-29 NOTE — PLAN OF CARE
HF RN rounded on patient. Questions answered. Will continue to follow.     Adela FRENCHN, RN, PCCN  HF  [id number]

## 2024-11-30 LAB
ANION GAP SERPL CALC-SCNC: 9 MMOL/L (ref 0–18)
BUN BLD-MCNC: 19 MG/DL (ref 9–23)
CALCIUM BLD-MCNC: 10.1 MG/DL (ref 8.7–10.4)
CHLORIDE SERPL-SCNC: 97 MMOL/L (ref 98–112)
CO2 SERPL-SCNC: 32 MMOL/L (ref 21–32)
CREAT BLD-MCNC: 0.8 MG/DL
EGFRCR SERPLBLD CKD-EPI 2021: 79 ML/MIN/1.73M2 (ref 60–?)
GLUCOSE BLD-MCNC: 155 MG/DL (ref 70–99)
INR BLD: 2.11 (ref 0.8–1.2)
OSMOLALITY SERPL CALC.SUM OF ELEC: 291 MOSM/KG (ref 275–295)
POTASSIUM SERPL-SCNC: 3.5 MMOL/L (ref 3.5–5.1)
POTASSIUM SERPL-SCNC: 3.5 MMOL/L (ref 3.5–5.1)
PROTHROMBIN TIME: 23.8 SECONDS (ref 11.6–14.8)
SODIUM SERPL-SCNC: 138 MMOL/L (ref 136–145)

## 2024-11-30 NOTE — PLAN OF CARE
Pt alert and oriented x4.  Up max assist with crutches .  On room air .  AFIB on tele.  Continent of bowel and  bladder.  No complaints of pain, sob, or chest pain/ discomfort.  Plan of care discussed with pt.  Falls precautions in place.pt  Call light within reach.  POC: po diuretics, discharge tomorrow  Problem: PAIN - ADULT  Goal: Verbalizes/displays adequate comfort level or patient's stated pain goal  Description: INTERVENTIONS:  - Encourage pt to monitor pain and request assistance  - Assess pain using appropriate pain scale  - Administer analgesics based on type and severity of pain and evaluate response  - Implement non-pharmacological measures as appropriate and evaluate response  - Consider cultural and social influences on pain and pain management  - Manage/alleviate anxiety  - Utilize distraction and/or relaxation techniques  - Monitor for opioid side effects  - Notify MD/LIP if interventions unsuccessful or patient reports new pain  - Anticipate increased pain with activity and pre-medicate as appropriate  Outcome: Progressing     Problem: CARDIOVASCULAR - ADULT  Goal: Maintains optimal cardiac output and hemodynamic stability  Description: INTERVENTIONS:  - Monitor vital signs, rhythm, and trends  - Monitor for bleeding, hypotension and signs of decreased cardiac output  - Evaluate effectiveness of vasoactive medications to optimize hemodynamic stability  - Monitor arterial and/or venous puncture sites for bleeding and/or hematoma  - Assess quality of pulses, skin color and temperature  - Assess for signs of decreased coronary artery perfusion - ex. Angina  - Evaluate fluid balance, assess for edema, trend weights  Outcome: Progressing  Goal: Absence of cardiac arrhythmias or at baseline  Description: INTERVENTIONS:  - Continuous cardiac monitoring, monitor vital signs, obtain 12 lead EKG if indicated  - Evaluate effectiveness of antiarrhythmic and heart rate control medications as ordered  - Initiate  emergency measures for life threatening arrhythmias  - Monitor electrolytes and administer replacement therapy as ordered  Outcome: Progressing     Problem: RESPIRATORY - ADULT  Goal: Achieves optimal ventilation and oxygenation  Description: INTERVENTIONS:  - Assess for changes in respiratory status  - Assess for changes in mentation and behavior  - Position to facilitate oxygenation and minimize respiratory effort  - Oxygen supplementation based on oxygen saturation or ABGs  - Provide Smoking Cessation handout, if applicable  - Encourage broncho-pulmonary hygiene including cough, deep breathe, Incentive Spirometry  - Assess the need for suctioning and perform as needed  - Assess and instruct to report SOB or any respiratory difficulty  - Respiratory Therapy support as indicated  - Manage/alleviate anxiety  - Monitor for signs/symptoms of CO2 retention  Outcome: Progressing     Problem: Diabetes/Glucose Control  Goal: Glucose maintained within prescribed range  Description: INTERVENTIONS:  - Monitor Blood Glucose as ordered  - Assess for signs and symptoms of hyperglycemia and hypoglycemia  - Administer ordered medications to maintain glucose within target range  - Assess barriers to adequate nutritional intake and initiate nutrition consult as needed  - Instruct patient on self management of diabetes  Outcome: Progressing

## 2024-11-30 NOTE — PROGRESS NOTES
[facility] Hospitalist Progress Note     CC: Hospital Follow up    PCP: Viktoriya Garcias DO       Subjective:     Weight down to 205 lbs. Breathing is still not at baseline when ambulating in room. A bit unsure if she would do well driving home 2.5 hours today with new transition to oral diuretic.    OBJECTIVE:    Blood pressure 126/88, pulse 95, temperature 97.9 °F (36.6 °C), temperature source Oral, resp. rate 18, weight 205 lb 7.5 oz (93.2 kg), SpO2 95%, not currently breastfeeding.    Temp:  [97.4 °F (36.3 °C)-98.9 °F (37.2 °C)] 97.9 °F (36.6 °C)  Pulse:  [] 95  Resp:  [16-18] 18  BP: (113-126)/(74-98) 126/88  SpO2:  [90 %-99 %] 95 %      Intake/Output:    Intake/Output Summary (Last 24 hours) at 11/30/2024 0815  Last data filed at 11/30/2024 0736  Gross per 24 hour   Intake 490 ml   Output 1550 ml   Net -1060 ml       Last 3 Weights   11/30/24 0523 205 lb 7.5 oz (93.2 kg)   11/29/24 0956 207 lb 6.4 oz (94.1 kg)   11/29/24 0405 214 lb 15.2 oz (97.5 kg)   11/28/24 0550 207 lb 14.3 oz (94.3 kg)   11/27/24 0522 208 lb 15.9 oz (94.8 kg)   11/26/24 0520 214 lb 1.1 oz (97.1 kg)   11/25/24 0408 218 lb 1.6 oz (98.9 kg)   11/23/24 2313 227 lb 1.2 oz (103 kg)   11/23/24 2233 227 lb 1.2 oz (103 kg)   11/23/24 1746 225 lb (102.1 kg)   11/14/24 1548 213 lb (96.6 kg)   09/19/24 0500 201 lb 4.8 oz (91.3 kg)   09/18/24 0400 196 lb (88.9 kg)   09/17/24 0930 198 lb 3.2 oz (89.9 kg)   09/17/24 0533 204 lb 9.4 oz (92.8 kg)   09/16/24 0920 203 lb 6.4 oz (92.3 kg)   09/14/24 0430 220 lb (99.8 kg)   09/13/24 0617 216 lb (98 kg)   09/12/24 0800 225 lb 12 oz (102.4 kg)   09/11/24 1309 230 lb (104.3 kg)       Exam   Gen: Alert, no acute distress  Heent: Normocephalic, atraumatic, neck supple, EOMI, PERRLA  Pulm: Lungs CTAB, normal respiratory effort  CV:  Regular rate and rhythm, no murmurs/rubs/gallops  Abd: Soft, nontender, nondistended, bowel sounds present  Extremities: nonpitting edema in lower extremities bilaterally,  no clubbing, pulses intact   Skin: No rashes or lesions  Neuro: AOx3, no focal neurologic deficits, CN II-XII grossly intact  Psych: appropriate mood and affect      Data Review:       Labs:     Recent Labs   Lab 11/23/24  1751 11/24/24  0736 11/25/24  0608 11/27/24  0617 11/28/24  0638 11/29/24  0641   RBC 3.95 3.66*   < > 4.40 4.37 4.50   HGB 12.7 11.6*   < > 14.0 13.9 14.3   HCT 38.3 34.9*   < > 42.0 41.9 43.2   MCV 97.0 95.4   < > 95.5 95.9 96.0   MCH 32.2 31.7   < > 31.8 31.8 31.8   MCHC 33.2 33.2   < > 33.3 33.2 33.1   RDW 14.2 14.2   < > 13.4 13.5 13.4   NEPRELIM 4.42 3.32  --   --   --  4.66   WBC 6.9 6.3   < > 7.3 7.2 7.9   .0 289.0   < > 346.0 346.0 322.0    < > = values in this interval not displayed.         Recent Labs   Lab 11/28/24  0638 11/29/24  0641 11/30/24  0734   * 142* 155*   BUN 14 17 19   CREATSERUM 0.82 0.79 0.80   EGFRCR 76 80 79   CA 9.9 9.8 10.1    137 138   K 3.4*  3.4* 3.4*  3.4* 3.5  3.5   CL 98 97* 97*   CO2 32.0 31.0 32.0       Recent Labs   Lab 11/23/24  1751 11/25/24  0608 11/26/24  0734 11/27/24  0617 11/28/24  0638   ALT 24  --   --   --   --    AST 25  --   --   --   --    ALB 3.5 3.5 3.7 4.0 4.1         Imaging:  No results found.      Meds:      torsemide  40 mg Oral Daily    allopurinol  100 mg Oral Nightly    buPROPion SR  150 mg Oral BID    clopidogrel  75 mg Oral Nightly    digoxin  125 mcg Oral Nightly    dilTIAZem ER  240 mg Oral Nightly    FLUoxetine  20 mg Oral BID    levothyroxine  175 mcg Oral Before breakfast    metoprolol succinate ER  25 mg Oral Nightly    nortriptyline  50 mg Oral Nightly    rosuvastatin  5 mg Oral QOD    spironolactone  25 mg Oral Daily    warfarin  5 mg Oral Once per day on Tuesday Thursday    warfarin  2.5 mg Oral Once per day on Sunday Monday Wednesday Friday Saturday    miconazole   Topical BID         mineral oil-hydrophilic petrolatum    triamcinolone    ammonium lactate    traMADol    diphenhydrAMINE     acetaminophen    melatonin       Assessment/Plan:     68 y/o F w/ PMHx of HFrEF, aortic stenosis status post TAVR, CAD status post PCI in 2021, PVD, HTN, HLD, A-fib on oral anticoagulation, hypothyroidism, SEBASTIEN, CKD stage II, COPD, depression, recent admission for MRSA bacteremia s/p completion of vancomycin who presents to the hospital with leg swelling, dyspnea, and weight gain.     Acute on chronic decompensated HFrEF (recovered EF 50% in September)  Acute hypoxic respiratory failure 2/2 above  -Patient has not been taking her diuretic (supposed to take Bumex 2 mg twice daily).  Currently on 2 L O2 nasal cannula.  -Strict I's and O's, daily weights  -echo without changes  -switched to torsemide today  -Continue home metoprolol succinate 25 mg nightly, spironolactone 25 mg daily       Aortic stenosis s/p TAVR  Paroxysmal atrial fibrillation  Therapeutic INR  -Continue home warfarin regimen  -Monitor INR  -Continue home beta-blocker, diltiazem, digoxin, Plavix     CKD stage II  -stable     Hyperlipidemia  -Continue home rosuvastatin     Depression  -Continue home fluoxetine, nortriptyline, bupropion     Gout  -Continue home allopurinol     Chronic pain  -Continue home nortriptyline, as needed tramadol, as needed Tylenol     History of MRSA bacteremia  -Recently completed IV vancomycin, no further treatment at this time     Hypothyroidism  -Continue home levothyroxine     Pt with mild dyspnea when ambulating in room, more than usual with her copd- possible that she is a bit deconditioned from being in bed more here. Will encourage IS and further ambulation.     Will monitor one more day on torsemide and plan for dc tomorrow.     Kristie Larkin MD  [facility]  Hospitalist  Contact via [employer]

## 2024-11-30 NOTE — PLAN OF CARE
Assumed pt care at 0730. A&O x 4. On room air. Denies pain. Vital signs stable, afib on tele. Purewick in place. Up with x1-2 assist and crutches.     Plan of care: PO torsemide, DC planning.    Plan of care updated with patient. Questions answered, pt verbalized understanding. Call light is within reach. All needs met at this time.    Problem: Patient/Family Goals  Goal: Patient/Family Long Term Goal  Description: Patient's Long Term Goal: Maintain level of activity    Interventions:  - Follow medication regiment  - See additional Care Plan goals for specific interventions  Outcome: Progressing  Goal: Patient/Family Short Term Goal  Description: Patient's Short Term Goal: Discharge    Interventions:   - Follow medication regiment   - See additional Care Plan goals for specific interventions  Outcome: Progressing     Problem: PAIN - ADULT  Goal: Verbalizes/displays adequate comfort level or patient's stated pain goal  Description: INTERVENTIONS:  - Encourage pt to monitor pain and request assistance  - Assess pain using appropriate pain scale  - Administer analgesics based on type and severity of pain and evaluate response  - Implement non-pharmacological measures as appropriate and evaluate response  - Consider cultural and social influences on pain and pain management  - Manage/alleviate anxiety  - Utilize distraction and/or relaxation techniques  - Monitor for opioid side effects  - Notify MD/LIP if interventions unsuccessful or patient reports new pain  - Anticipate increased pain with activity and pre-medicate as appropriate  Outcome: Progressing     Problem: CARDIOVASCULAR - ADULT  Goal: Maintains optimal cardiac output and hemodynamic stability  Description: INTERVENTIONS:  - Monitor vital signs, rhythm, and trends  - Monitor for bleeding, hypotension and signs of decreased cardiac output  - Evaluate effectiveness of vasoactive medications to optimize hemodynamic stability  - Monitor arterial and/or venous  puncture sites for bleeding and/or hematoma  - Assess quality of pulses, skin color and temperature  - Assess for signs of decreased coronary artery perfusion - ex. Angina  - Evaluate fluid balance, assess for edema, trend weights  Outcome: Progressing  Goal: Absence of cardiac arrhythmias or at baseline  Description: INTERVENTIONS:  - Continuous cardiac monitoring, monitor vital signs, obtain 12 lead EKG if indicated  - Evaluate effectiveness of antiarrhythmic and heart rate control medications as ordered  - Initiate emergency measures for life threatening arrhythmias  - Monitor electrolytes and administer replacement therapy as ordered  Outcome: Progressing     Problem: RESPIRATORY - ADULT  Goal: Achieves optimal ventilation and oxygenation  Description: INTERVENTIONS:  - Assess for changes in respiratory status  - Assess for changes in mentation and behavior  - Position to facilitate oxygenation and minimize respiratory effort  - Oxygen supplementation based on oxygen saturation or ABGs  - Provide Smoking Cessation handout, if applicable  - Encourage broncho-pulmonary hygiene including cough, deep breathe, Incentive Spirometry  - Assess the need for suctioning and perform as needed  - Assess and instruct to report SOB or any respiratory difficulty  - Respiratory Therapy support as indicated  - Manage/alleviate anxiety  - Monitor for signs/symptoms of CO2 retention  Outcome: Progressing     Problem: Diabetes/Glucose Control  Goal: Glucose maintained within prescribed range  Description: INTERVENTIONS:  - Monitor Blood Glucose as ordered  - Assess for signs and symptoms of hyperglycemia and hypoglycemia  - Administer ordered medications to maintain glucose within target range  - Assess barriers to adequate nutritional intake and initiate nutrition consult as needed  - Instruct patient on self management of diabetes  Outcome: Progressing

## 2024-11-30 NOTE — PROGRESS NOTES
[facility] Progress Note        Arleth Weiss Patient Status:  Inpatient    1955 MRN [id number]   [facility] 2NE-A Attending Maureen Ibarra DO   Hosp Day # 7 PCP Viktoriya Garcias DO     Subjective:  The patient denies chest pain   Comfortable at rest  Feels as though improving.     Medications:   torsemide  40 mg Oral Daily    allopurinol  100 mg Oral Nightly    buPROPion SR  150 mg Oral BID    clopidogrel  75 mg Oral Nightly    digoxin  125 mcg Oral Nightly    dilTIAZem ER  240 mg Oral Nightly    FLUoxetine  20 mg Oral BID    levothyroxine  175 mcg Oral Before breakfast    metoprolol succinate ER  25 mg Oral Nightly    nortriptyline  50 mg Oral Nightly    rosuvastatin  5 mg Oral QOD    spironolactone  25 mg Oral Daily    warfarin  5 mg Oral Once per day on     warfarin  2.5 mg Oral Once per day on     miconazole   Topical BID       Continuous Infusions:        Allergies:  Allergies[1]      Objective:        Intake/Output:      Intake/Output Summary (Last 24 hours) at 2024 1251  Last data filed at 2024 1154  Gross per 24 hour   Intake 730 ml   Output 1550 ml   Net -820 ml     Wt Readings from Last 3 Encounters:   24 205 lb 7.5 oz (93.2 kg)   24 213 lb (96.6 kg)   24 201 lb 4.8 oz (91.3 kg)       Physical Exam:        Vitals:    24 0233 24 0523 24 0736 24 1154   BP:  123/74 126/88 (!) 116/95   BP Location:  Left arm Left arm Left arm   Pulse: 84 90 95 93   Resp:  18 18 20   Temp:  98.9 °F (37.2 °C) 97.9 °F (36.6 °C) 98.2 °F (36.8 °C)   TempSrc:  Oral Oral Oral   SpO2: 93% 91% 95% 93%   Weight:  205 lb 7.5 oz (93.2 kg)         Temp:  [97.6 °F (36.4 °C)-98.9 °F (37.2 °C)] 98.2 °F (36.8 °C)  Pulse:  [] 93  Resp:  [16-20] 20  BP: (113-126)/(74-95) 116/95  SpO2:  [90 %-95 %] 93 %      Temp: 98.2 °F (36.8 °C)  Pulse: 93  Resp: 20  BP: 116/95  General:  Appears  comfortable  HEENT: No focal deficits.  Neck: No JVD, carotids 2+ no bruits.  Cardiac: Irregular S1S2.  No S3, S4, rub, click.  No murmur.  Lungs: Clear to auscultation and percussion.  Abdomen: Soft, non-tender.   Extremities: No LE edema.  No clubbing or cyanosis.    Neurologic: Alert and oriented, normal affect.  Skin: Warm and dry. Skin changes in BLE.          LABS:      HEM:  Recent Labs   Lab 11/25/24  0608 11/26/24  0734 11/27/24  0617 11/28/24  0638 11/29/24  0641   WBC 6.5 6.4 7.3 7.2 7.9   HGB 13.5 13.7 14.0 13.9 14.3   HCT 41.5 41.5 42.0 41.9 43.2   .0 343.0 346.0 346.0 322.0       Chem:  Recent Labs   Lab 11/25/24  0608 11/26/24  0734 11/27/24  0617 11/28/24 0638 11/29/24 0641 11/30/24  0734    140 138 137 137 138   K 3.2*  3.2* 3.4*  3.4* 3.2*  3.2* 3.4*  3.4* 3.4*  3.4* 3.5  3.5    101 98 98 97* 97*   CO2 30.0 32.0 33.0* 32.0 31.0 32.0   BUN 10 12 12 14 17 19   CREATSERUM 0.66 0.70 0.78 0.82 0.79 0.80   CA 9.2 9.9 9.8 9.9 9.8 10.1   MG 1.7 1.8 1.9 1.8 2.0  --    PHOS 3.7 3.8 4.1 4.1  --   --    * 124* 135* 140* 142* 155*       Recent Labs   Lab 11/23/24  1751 11/25/24  0608 11/26/24  0734 11/27/24  0617 11/28/24  0638   ALT 24  --   --   --   --    AST 25  --   --   --   --    ALB 3.5 3.5 3.7 4.0 4.1       Recent Labs   Lab 11/26/24  0734 11/27/24  0617 11/28/24  0638 11/29/24  0641 11/30/24  0734   INR 2.02* 1.89* 2.09* 1.92* 2.11*           Lab Results   Component Value Date    TROP <0.045 07/28/2021         Invalid input(s): \"PBNPML\"                       Diagnostics:   Telemetry: Atrial Fibrillation     EKG, 11/25/2024,         Echo:  11/25/24      Conclusions:     1. Left ventricle: The cavity size was normal. Wall thickness was normal.      Systolic function was normal. The estimated ejection fraction was 50-55%,      by visual assessment. Wall motion is normal; there are no regional wall      motion abnormalities. Unable to assess LV diastolic function.   2.  Right ventricle: Systolic function was normal.   3. Left atrium: The atrium was mildly dilated.   4. Aortic valve: Elizondo MINOR S3 23mm (TAVR) bioprosthesis was present,      well seated. No functional/doppler analysis performed.   5. Mitral valve: The annulus was markedly calcified. The leaflets were      mildly calcified. Minimal stenosis. The mean diastolic gradient was 3mm      Hg.   Impressions:  This study is compared with previous dated 9/14/2024: No   significant change.               Impression:    #Acute on chronic HFpEF exacerbation: likely due to inconsistent diuretic use at home. Approved for Unomy; this should improve diuretic compliance  -     net out = 6.2 liters  -     weight: 227---> 205lbs  #Severe aortic stenosis s/p TAVR  # CAD s/p stent in 2021  # paroxysmal afib    -Now AF  # HLD   # HTN , well-controlled   # Hx of PE s/p IVC filter  # PVD       Plan:   - Transitioned to PO diuretic  - Continue diltiazem 240mg daily, metoprolol xl 25mg daily, and spironolactone 25mg daily   - continue plavix 75mg daily   - continue coumadin   - continue rosuvastatin   - monitor telemetry   - Increase activity as able    Jose Antonio Ryan MD       [1]   Allergies  Allergen Reactions    Adhesive Tape HIVES     ALL ADHESIVES    Codeine HIVES    Doxycycline SWELLING    Hydrocodone UNKNOWN    Lisinopril TONGUE SWELLING    Morphine Sulfate HIVES    Opioid Analgesics UNKNOWN    Oxycontin ITCHING    Oxytocin HIVES    Vicodin [Hydrocodone-Acetaminophen] ITCHING    Skin Adhesives OTHER (SEE COMMENTS)

## 2024-12-01 LAB
ANION GAP SERPL CALC-SCNC: 10 MMOL/L (ref 0–18)
BUN BLD-MCNC: 23 MG/DL (ref 9–23)
CALCIUM BLD-MCNC: 10.1 MG/DL (ref 8.7–10.4)
CHLORIDE SERPL-SCNC: 93 MMOL/L (ref 98–112)
CO2 SERPL-SCNC: 31 MMOL/L (ref 21–32)
CREAT BLD-MCNC: 1.1 MG/DL
EGFRCR SERPLBLD CKD-EPI 2021: 54 ML/MIN/1.73M2 (ref 60–?)
GLUCOSE BLD-MCNC: 129 MG/DL (ref 70–99)
INR BLD: 2.16 (ref 0.8–1.2)
NT-PROBNP SERPL-MCNC: 333 PG/ML (ref ?–125)
OSMOLALITY SERPL CALC.SUM OF ELEC: 283 MOSM/KG (ref 275–295)
POTASSIUM SERPL-SCNC: 3.5 MMOL/L (ref 3.5–5.1)
PROTHROMBIN TIME: 24.3 SECONDS (ref 11.6–14.8)
SODIUM SERPL-SCNC: 134 MMOL/L (ref 136–145)

## 2024-12-01 RX ORDER — METOPROLOL SUCCINATE 25 MG/1
25 TABLET, EXTENDED RELEASE ORAL NIGHTLY
Status: SHIPPED | COMMUNITY
Start: 2024-12-01

## 2024-12-01 RX ORDER — TORSEMIDE 20 MG/1
20 TABLET ORAL DAILY
Status: DISCONTINUED | OUTPATIENT
Start: 2024-12-02 | End: 2024-12-03

## 2024-12-01 RX ORDER — SODIUM CHLORIDE 9 MG/ML
INJECTION, SOLUTION INTRAVENOUS CONTINUOUS
Status: DISCONTINUED | OUTPATIENT
Start: 2024-12-01 | End: 2024-12-04

## 2024-12-01 NOTE — PROGRESS NOTES
[facility] Progress Note        Arleth Weiss Patient Status:  Inpatient    1955 MRN [id number]   [facility] 2NE-A Attending Maureen Ibarra DO   Hosp Day # 8 PCP Viktoriya Garcias DO     Subjective:  The patient denies chest pain   Comfortable at rest  Feels as though improving overall but weak.  Feels less urine output and some dizziness / lightheadedness upon standing.      Medications:   torsemide  40 mg Oral Daily    allopurinol  100 mg Oral Nightly    buPROPion SR  150 mg Oral BID    clopidogrel  75 mg Oral Nightly    digoxin  125 mcg Oral Nightly    dilTIAZem ER  240 mg Oral Nightly    FLUoxetine  20 mg Oral BID    levothyroxine  175 mcg Oral Before breakfast    metoprolol succinate ER  25 mg Oral Nightly    nortriptyline  50 mg Oral Nightly    rosuvastatin  5 mg Oral QOD    spironolactone  25 mg Oral Daily    warfarin  5 mg Oral Once per day on     warfarin  2.5 mg Oral Once per day on     miconazole   Topical BID       Continuous Infusions:        Allergies:  Allergies[1]      Objective:        Intake/Output:      Intake/Output Summary (Last 24 hours) at 2024 1154  Last data filed at 2024 0809  Gross per 24 hour   Intake 1180 ml   Output 650 ml   Net 530 ml     Wt Readings from Last 3 Encounters:   24 203 lb 7.8 oz (92.3 kg)   24 213 lb (96.6 kg)   24 201 lb 4.8 oz (91.3 kg)       Physical Exam:        Vitals:    24 2331 24 0202 24 0640 24 0809   BP:   125/86 115/76   BP Location:   Right arm Left arm   Pulse:  97 94 97   Resp:   19 18   Temp:   97.5 °F (36.4 °C) 97.6 °F (36.4 °C)   TempSrc:   Oral Oral   SpO2: 94% 92% 96% 98%   Weight:   203 lb 7.8 oz (92.3 kg)        Temp:  [97.5 °F (36.4 °C)-98.8 °F (37.1 °C)] 97.6 °F (36.4 °C)  Pulse:  [] 97  Resp:  [18-20] 18  BP: (102-125)/(59-86) 115/76  SpO2:  [88 %-99 %] 98 %      Temp: 97.6 °F (36.4  °C)  Pulse: 97  Resp: 18  BP: 115/76  General:  Appears comfortable  HEENT: No focal deficits.  Neck: No JVD, carotids 2+ no bruits.  Cardiac: Irregular S1S2.  No S3, S4, rub, click.  No murmur.  Lungs: Clear to auscultation and percussion.  Abdomen: Soft, non-tender.   Extremities: No LE edema.  No clubbing or cyanosis.    Neurologic: Alert and oriented, normal affect.  Skin: Warm and dry. Skin changes in BLE.          LABS:      HEM:  Recent Labs   Lab 11/25/24  0608 11/26/24  0734 11/27/24  0617 11/28/24 0638 11/29/24 0641   WBC 6.5 6.4 7.3 7.2 7.9   HGB 13.5 13.7 14.0 13.9 14.3   HCT 41.5 41.5 42.0 41.9 43.2   .0 343.0 346.0 346.0 322.0       Chem:  Recent Labs   Lab 11/25/24  0608 11/26/24  0734 11/27/24  0617 11/28/24 0638 11/29/24  0641 11/30/24  0734 12/01/24 0621    140 138 137 137 138 134*   K 3.2*  3.2* 3.4*  3.4* 3.2*  3.2* 3.4*  3.4* 3.4*  3.4* 3.5  3.5 3.5    101 98 98 97* 97* 93*   CO2 30.0 32.0 33.0* 32.0 31.0 32.0 31.0   BUN 10 12 12 14 17 19 23   CREATSERUM 0.66 0.70 0.78 0.82 0.79 0.80 1.10*   CA 9.2 9.9 9.8 9.9 9.8 10.1 10.1   MG 1.7 1.8 1.9 1.8 2.0  --   --    PHOS 3.7 3.8 4.1 4.1  --   --   --    * 124* 135* 140* 142* 155* 129*       Recent Labs   Lab 11/25/24  0608 11/26/24  0734 11/27/24 0617 11/28/24  0638   ALB 3.5 3.7 4.0 4.1       Recent Labs   Lab 11/27/24  0617 11/28/24  0638 11/29/24  0641 11/30/24  0734 12/01/24  0620   INR 1.89* 2.09* 1.92* 2.11* 2.16*           Lab Results   Component Value Date    TROP <0.045 07/28/2021         Invalid input(s): \"PBNPML\"                       Diagnostics:   Telemetry: Atrial Fibrillation     EKG, 11/25/2024,         Echo:  11/25/24      Conclusions:     1. Left ventricle: The cavity size was normal. Wall thickness was normal.      Systolic function was normal. The estimated ejection fraction was 50-55%,      by visual assessment. Wall motion is normal; there are no regional wall      motion abnormalities. Unable  to assess LV diastolic function.   2. Right ventricle: Systolic function was normal.   3. Left atrium: The atrium was mildly dilated.   4. Aortic valve: Elizondo MINOR S3 23mm (TAVR) bioprosthesis was present,      well seated. No functional/doppler analysis performed.   5. Mitral valve: The annulus was markedly calcified. The leaflets were      mildly calcified. Minimal stenosis. The mean diastolic gradient was 3mm      Hg.   Impressions:  This study is compared with previous dated 9/14/2024: No   significant change.               Impression:    #Acute on chronic HFpEF exacerbation: likely due to inconsistent diuretic use at home. Now may be a bit dry  -     net out = 6.2 liters  -     weight: 227---> 203lbs  #Severe aortic stenosis s/p TAVR  # CAD s/p stent in 2021  # paroxysmal afib    -Now AF  # HLD   # HTN , well-controlled   # Hx of PE s/p IVC filter  # PVD       Plan:   - Transitioned to PO diuretic - reduce to Torsemide 20mg PO qAM  - Continue diltiazem 240mg daily, metoprolol xl 25mg daily, and spironolactone 25mg daily   - continue plavix 75mg daily   - continue coumadin   - continue rosuvastatin   - monitor telemetry   - Increase activity as able  - Check orthostatics    Jose Antonio Ryan MD       [1]   Allergies  Allergen Reactions    Adhesive Tape HIVES     ALL ADHESIVES    Codeine HIVES    Doxycycline SWELLING    Hydrocodone UNKNOWN    Lisinopril TONGUE SWELLING    Morphine Sulfate HIVES    Opioid Analgesics UNKNOWN    Oxycontin ITCHING    Oxytocin HIVES    Vicodin [Hydrocodone-Acetaminophen] ITCHING    Skin Adhesives OTHER (SEE COMMENTS)

## 2024-12-01 NOTE — PLAN OF CARE
Assumed care for pt at 19:30 on 11/30    A&Ox4. VSS on RA, wore CPAP during sleep. Took tramadol in morning for bilat leg pain. Aquaphor applied to legs. Bladder scanned overnight- 120cc. 150 cc emptied from purewick at end of shift. Up with 1 assist.     Plan: Daily weight, I&O, anticipated dc    Bed alarm on, call light in reach, able to make needs known.

## 2024-12-01 NOTE — PROGRESS NOTES
[facility] Hospitalist Progress Note     CC: Hospital Follow up    PCP: Viktoriya Garcias DO       Subjective:     Weight decreasing to 203. Pt reported feeling a little LH as well as sob still with ambulation.     OBJECTIVE:    Blood pressure 120/72, pulse 75, temperature 97.8 °F (36.6 °C), temperature source Oral, resp. rate 18, weight 203 lb 7.8 oz (92.3 kg), SpO2 98%, not currently breastfeeding.    Temp:  [97.5 °F (36.4 °C)-98.8 °F (37.1 °C)] 97.8 °F (36.6 °C)  Pulse:  [] 75  Resp:  [18-20] 18  BP: ()/(45-86) 120/72  SpO2:  [88 %-98 %] 98 %      Intake/Output:    Intake/Output Summary (Last 24 hours) at 12/1/2024 1620  Last data filed at 12/1/2024 1554  Gross per 24 hour   Intake 1220 ml   Output 800 ml   Net 420 ml       Last 3 Weights   12/01/24 0640 203 lb 7.8 oz (92.3 kg)   11/30/24 0523 205 lb 7.5 oz (93.2 kg)   11/29/24 0956 207 lb 6.4 oz (94.1 kg)   11/29/24 0405 214 lb 15.2 oz (97.5 kg)   11/28/24 0550 207 lb 14.3 oz (94.3 kg)   11/27/24 0522 208 lb 15.9 oz (94.8 kg)   11/26/24 0520 214 lb 1.1 oz (97.1 kg)   11/25/24 0408 218 lb 1.6 oz (98.9 kg)   11/23/24 2313 227 lb 1.2 oz (103 kg)   11/23/24 2233 227 lb 1.2 oz (103 kg)   11/23/24 1746 225 lb (102.1 kg)   11/14/24 1548 213 lb (96.6 kg)   09/19/24 0500 201 lb 4.8 oz (91.3 kg)   09/18/24 0400 196 lb (88.9 kg)   09/17/24 0930 198 lb 3.2 oz (89.9 kg)   09/17/24 0533 204 lb 9.4 oz (92.8 kg)   09/16/24 0920 203 lb 6.4 oz (92.3 kg)   09/14/24 0430 220 lb (99.8 kg)   09/13/24 0617 216 lb (98 kg)   09/12/24 0800 225 lb 12 oz (102.4 kg)   09/11/24 1309 230 lb (104.3 kg)       Exam   Gen: Alert, no acute distress  Heent: Normocephalic, atraumatic, neck supple, EOMI, PERRLA  Pulm: Lungs CTAB, normal respiratory effort  CV:  Regular rate and rhythm, no murmurs/rubs/gallops  Abd: Soft, nontender, nondistended, bowel sounds present  Extremities: chronic changes to feet with scabbing and discoloration  Neuro: AOx3, no focal neurologic deficits, CN  II-XII grossly intact  Psych: appropriate mood and affect      Data Review:       Labs:     Recent Labs   Lab 11/27/24  0617 11/28/24  0638 11/29/24  0641   RBC 4.40 4.37 4.50   HGB 14.0 13.9 14.3   HCT 42.0 41.9 43.2   MCV 95.5 95.9 96.0   MCH 31.8 31.8 31.8   MCHC 33.3 33.2 33.1   RDW 13.4 13.5 13.4   NEPRELIM  --   --  4.66   WBC 7.3 7.2 7.9   .0 346.0 322.0         Recent Labs   Lab 11/29/24  0641 11/30/24  0734 12/01/24  0621   * 155* 129*   BUN 17 19 23   CREATSERUM 0.79 0.80 1.10*   EGFRCR 80 79 54*   CA 9.8 10.1 10.1    138 134*   K 3.4*  3.4* 3.5  3.5 3.5   CL 97* 97* 93*   CO2 31.0 32.0 31.0       Recent Labs   Lab 11/25/24  0608 11/26/24  0734 11/27/24  0617 11/28/24  0638   ALB 3.5 3.7 4.0 4.1         Imaging:  No results found.      Meds:      [START ON 12/2/2024] torsemide  20 mg Oral Daily    allopurinol  100 mg Oral Nightly    buPROPion SR  150 mg Oral BID    clopidogrel  75 mg Oral Nightly    digoxin  125 mcg Oral Nightly    dilTIAZem ER  240 mg Oral Nightly    FLUoxetine  20 mg Oral BID    levothyroxine  175 mcg Oral Before breakfast    metoprolol succinate ER  25 mg Oral Nightly    nortriptyline  50 mg Oral Nightly    rosuvastatin  5 mg Oral QOD    spironolactone  25 mg Oral Daily    warfarin  5 mg Oral Once per day on Tuesday Thursday    warfarin  2.5 mg Oral Once per day on Sunday Monday Wednesday Friday Saturday    miconazole   Topical BID      sodium chloride 75 mL/hr at 12/01/24 1213       mineral oil-hydrophilic petrolatum    triamcinolone    ammonium lactate    traMADol    diphenhydrAMINE    acetaminophen    melatonin       Assessment/Plan:     68 y/o F w/ PMHx of HFrEF, aortic stenosis status post TAVR, CAD status post PCI in 2021, PVD, HTN, HLD, A-fib on oral anticoagulation, hypothyroidism, SEBASTIEN, CKD stage II, COPD, depression, recent admission for MRSA bacteremia s/p completion of vancomycin who presents to the hospital with leg swelling, dyspnea, and weight gain.      Acute on chronic decompensated HFrEF (recovered EF 50% in September)  Acute hypoxic respiratory failure 2/2 above  -Patient had not been taking her diuretic (supposed to take Bumex 2 mg twice daily).    -echo without changes  -switched to torsemide but now concerned that pt may be becoming more dry- checking orthostatics and giving IVF. Follow orthostatics and bmp.  -Continue home metoprolol succinate 25 mg nightly, spironolactone 25 mg daily       Aortic stenosis s/p TAVR  Paroxysmal atrial fibrillation  Therapeutic INR  -Continue home warfarin regimen  -Monitor INR  -Continue home beta-blocker, diltiazem, digoxin, Plavix     CKD stage II  -stable     Hyperlipidemia  -Continue home rosuvastatin     Depression  -Continue home fluoxetine, nortriptyline, bupropion     Gout  -Continue home allopurinol     Chronic pain  -Continue home nortriptyline, as needed tramadol, as needed Tylenol     History of MRSA bacteremia  -Recently completed IV vancomycin, no further treatment at this time     Hypothyroidism  -Continue home levothyroxine    PVD- pt with recent CTA LE showing significant disease. Pt supposed to see vascular np this week and obtain arterial dopplers. If pt going to remain in hospital may try to complete some vascular workup here.     Continue to work on increasing activity level.     Kristie Larkin MD  [facility]  Hospitalist  Contact via [email or web address]

## 2024-12-01 NOTE — PHYSICAL THERAPY NOTE
PHYSICAL THERAPY TREATMENT NOTE - INPATIENT    Room Number: 2610/2610-A     Session: 2     Number of Visits to Meet Established Goals: 5    Presenting Problem: acute on chronic CHF  Co-Morbidities : DVT, COPD, CHF, CKD, thyroid cancer, HTN, TAVR, OA, PVD, fibromyalgia, PE, spine surgery, B TKA, EUSEBIA, TSA    PHYSICAL THERAPY MEDICAL/SOCIAL HISTORY  History related to current admission: Patient is a 69 year old female admitted on 11/23/2024 from home for acute on chronic CHF.     HOME SITUATION  Type of Home: House  Home Layout: One level;Ramped entrance  Lives With: Alone;Caregiver part-time (CG supposed to come 14 hours per week but is inconsistent, assists with household mangement)    Drives: Yes   Patient Regularly Uses: Glasses;Rollator (loftstrand crutches, adjustable bed)       Prior Level of [place]: Pt is typically mod ind with ADLs and ambulates with loftstrand crutches. Pt has been having increased difficulty lately due to increased pain in B feet. Pt does not sit in low chairs.        PHYSICAL THERAPY ASSESSMENT   Patient demonstrates fair progress this session, goals  remain in progress.      Patient is requiring minimal assist as a result of the following impairments: decreased functional strength, decreased endurance/aerobic capacity, pain, impaired standing balance, decreased muscular endurance, and medical status.     Patient continues to function below baseline with bed mobility, transfers, gait, stair negotiation, standing prolonged periods, and performing household tasks.  Next session anticipate patient to progress bed mobility, transfers, and gait.  Physical Therapy will continue to follow patient for duration of hospitalization.    Patient continues to benefit from continued skilled PT services: at discharge to promote prior level of function and safety with additional support and return home with home health PT.    PLAN DURING HOSPITALIZATION  Nursing Mobility Recommendation : 1 Assist  PT  Device Recommendation: Other (Comment) (Loftstrand crutches)  PT Treatment Plan: Bed mobility;Body mechanics;Endurance;Energy conservation;Patient education;Family education;Gait training;Range of motion;Strengthening;Stair training;Transfer training;Balance training  Frequency (Obs): 3-5x/week     CURRENT GOALS     Goal #1 Patient is able to demonstrate supine - sit EOB @ level: modified independent      Goal #2 Patient is able to demonstrate transfers Sit to/from Stand at assistance level: modified independent      Goal #3 Patient is able to ambulate 150 feet with assist device:  loftstrand crutches  at assistance level: modified independent      Goal #4     Goal #5     Goal #6     Goal Comments: Goals established on 2024 all goals ongoing  2024 all goals ongoing    SUBJECTIVE  \" I am ready now, my feet hurts still\"    OBJECTIVE  Precautions: Bed/chair alarm    WEIGHT BEARING RESTRICTION     PAIN ASSESSMENT   Ratin  Location: B feet  Management Techniques: Breathing techniques;Relaxation;Repositioning    BALANCE                                                                                                                       Static Sitting: Good  Dynamic Sitting: Good           Static Standing: Fair -  Dynamic Standing: Fair -    O2 WALK  Oxygen Therapy  SPO2% on Room Air at Rest: 95  SPO2% Ambulation on Room Air: 91    AM-PAC '6-Clicks' INPATIENT SHORT FORM - BASIC MOBILITY  How much difficulty does the patient currently have...  Patient Difficulty: Turning over in bed (including adjusting bedclothes, sheets and blankets)?: None   Patient Difficulty: Sitting down on and standing up from a chair with arms (e.g., wheelchair, bedside commode, etc.): A Little   Patient Difficulty: Moving from lying on back to sitting on the side of the bed?: None   How much help from another person does the patient currently need...   Help from Another: Moving to and from a bed to a chair (including a  wheelchair)?: A Little   Help from Another: Need to walk in hospital room?: A Little   Help from Another: Climbing 3-5 steps with a railing?: A Lot     AM-PAC Score:  Raw Score: 19   Approx Degree of Impairment: 41.77%   Standardized Score (AM-PAC Scale): 45.44   CMS Modifier (G-Code): CK    FUNCTIONAL ABILITY STATUS  Gait Assessment   Functional Mobility/Gait Assessment  Gait Assistance: Supervision  Distance (ft): 10  Assistive Device: Rolling walker  Pattern: Within Functional Limits (antaglic)    Skilled Therapy Provided    Bed Mobility:  Rolling: SBA   Supine<>Sit: SBA with HOB max elevated   Sit<>Supine: min A     Transfer Mobility:  Sit<>Stand: min A with bed elevated   Stand<>Sit: min A   Gait: RW with min A    Therapist's Comments: RN cleared pt for session. Pt received elevated supine in bed, agreeable to session. Pt performed above functional mobility. Pt reports have been using RW instead of Loftstrand crutches due to unsteadiness. Pt requires increased time for transfers and gait due to SOB with spO2 WNL in room air this session per pt request. Pt ambulated approx 10 feet, unable to increase distance due to SOB and dizziness. Pt returned to sit EOB immediately. Educated on offloading heels to prevent pressure and address pain on heels. Pt verbalized understanding and had a pillow under LE to offload heels at end of session. Pt performed B LE exercises below. Pt returned to bed with all safety measures in place and needs met.       THERAPEUTIC EXERCISES  Lower Extremity Ankle pumps  LAQ     Position Sitting and Supine     Repetitions   10   Sets   1     Patient End of Session: In bed;Needs met;Call light within reach;All patient questions and concerns addressed    PT Session Time: 25 minutes  Gait Training: 10 minutes  Therapeutic Activity: 15 minutes  Therapeutic Exercise: minutes   Neuromuscular Re-education:  minutes

## 2024-12-01 NOTE — PROGRESS NOTES
12/01/24 1155 12/01/24 1200 12/01/24 1205   Vital Signs   Pulse  --   --  118   /74 (!) 89/77 (!) 68/45   MAP (mmHg) 86 83 (!) 52   BP Location Left arm Left arm Left arm   BP Method Automatic Automatic Automatic   Patient Position Lying Sitting Standing     Patient was light headed and dizzy upon standing.Notified Dr Ryan.Received orders for IVF for 4 hours.Orders carried out.

## 2024-12-02 LAB
ANION GAP SERPL CALC-SCNC: 9 MMOL/L (ref 0–18)
BUN BLD-MCNC: 26 MG/DL (ref 9–23)
CALCIUM BLD-MCNC: 10.1 MG/DL (ref 8.7–10.4)
CHLORIDE SERPL-SCNC: 95 MMOL/L (ref 98–112)
CO2 SERPL-SCNC: 32 MMOL/L (ref 21–32)
CREAT BLD-MCNC: 1.05 MG/DL
EGFRCR SERPLBLD CKD-EPI 2021: 57 ML/MIN/1.73M2 (ref 60–?)
GLUCOSE BLD-MCNC: 142 MG/DL (ref 70–99)
INR BLD: 2.07 (ref 0.8–1.2)
OSMOLALITY SERPL CALC.SUM OF ELEC: 289 MOSM/KG (ref 275–295)
POTASSIUM SERPL-SCNC: 3.4 MMOL/L (ref 3.5–5.1)
PROTHROMBIN TIME: 23.4 SECONDS (ref 11.6–14.8)
SODIUM SERPL-SCNC: 136 MMOL/L (ref 136–145)

## 2024-12-02 RX ORDER — POTASSIUM CHLORIDE 1.5 G/1.58G
40 POWDER, FOR SOLUTION ORAL ONCE
Status: COMPLETED | OUTPATIENT
Start: 2024-12-02 | End: 2024-12-02

## 2024-12-02 NOTE — PLAN OF CARE
Assumed care for pt at 19:30 on 12/1    A&Ox4. Prn tramadol and melatonin given. VSS on RA. Orthostatics positive and symptomatic:     12/02/24 0605 12/02/24 0615 12/02/24 0620   Vital Signs   /79 (!) 89/42 (!) 67/28   MAP (mmHg) 90 (!) 58 (!) 41   BP Location Radial Radial Radial   BP Method Automatic Automatic Automatic   Patient Position Lying Sitting Standing     Afib on tele. INR 2.16, coumadin given. Purewick in place, BM on bed pan. Up with one assist to standing position. Did not want cream or ointment on legs.      Plan: Daily weight, orthos q shift, AM labs    Bed alarm on, call light in reach, able to make needs known.

## 2024-12-02 NOTE — PROGRESS NOTES
[facility] Hospitalist Progress Note     CC: Hospital Follow up    PCP: Viktoriya Garcias DO       Subjective:     Still significant orthostatic. +LH/dizzy like she has felt in past with overdiuresis    OBJECTIVE:    Blood pressure 113/75, pulse 88, temperature 97.7 °F (36.5 °C), temperature source Oral, resp. rate 18, weight 203 lb 7.8 oz (92.3 kg), SpO2 98%, not currently breastfeeding.    Temp:  [97.5 °F (36.4 °C)-98.5 °F (36.9 °C)] 97.7 °F (36.5 °C)  Pulse:  [0-118] 88  Resp:  [18] 18  BP: ()/(28-80) 113/75  SpO2:  [89 %-100 %] 98 %      Intake/Output:    Intake/Output Summary (Last 24 hours) at 12/2/2024 0851  Last data filed at 12/2/2024 0758  Gross per 24 hour   Intake 580 ml   Output 1050 ml   Net -470 ml       Last 3 Weights   12/02/24 0620 203 lb 7.8 oz (92.3 kg)   12/01/24 0640 203 lb 7.8 oz (92.3 kg)   11/30/24 0523 205 lb 7.5 oz (93.2 kg)   11/29/24 0956 207 lb 6.4 oz (94.1 kg)   11/29/24 0405 214 lb 15.2 oz (97.5 kg)   11/28/24 0550 207 lb 14.3 oz (94.3 kg)   11/27/24 0522 208 lb 15.9 oz (94.8 kg)   11/26/24 0520 214 lb 1.1 oz (97.1 kg)   11/25/24 0408 218 lb 1.6 oz (98.9 kg)   11/23/24 2313 227 lb 1.2 oz (103 kg)   11/23/24 2233 227 lb 1.2 oz (103 kg)   11/23/24 1746 225 lb (102.1 kg)   11/14/24 1548 213 lb (96.6 kg)   09/19/24 0500 201 lb 4.8 oz (91.3 kg)   09/18/24 0400 196 lb (88.9 kg)   09/17/24 0930 198 lb 3.2 oz (89.9 kg)   09/17/24 0533 204 lb 9.4 oz (92.8 kg)   09/16/24 0920 203 lb 6.4 oz (92.3 kg)   09/14/24 0430 220 lb (99.8 kg)   09/13/24 0617 216 lb (98 kg)   09/12/24 0800 225 lb 12 oz (102.4 kg)   09/11/24 1309 230 lb (104.3 kg)       Exam   Gen: Alert, no acute distress  Heent: Normocephalic, atraumatic, neck supple, EOMI, PERRLA  Pulm: Lungs CTAB, normal respiratory effort  CV:  Regular rate and rhythm, no murmurs/rubs/gallops  Abd: Soft, nontender, nondistended, bowel sounds present  Extremities: chronic changes to feet with scabbing and discoloration  Neuro: AOx3, no  focal neurologic deficits, CN II-XII grossly intact  Psych: appropriate mood and affect      Data Review:       Labs:     Recent Labs   Lab 11/27/24  0617 11/28/24  0638 11/29/24  0641   RBC 4.40 4.37 4.50   HGB 14.0 13.9 14.3   HCT 42.0 41.9 43.2   MCV 95.5 95.9 96.0   MCH 31.8 31.8 31.8   MCHC 33.3 33.2 33.1   RDW 13.4 13.5 13.4   NEPRELIM  --   --  4.66   WBC 7.3 7.2 7.9   .0 346.0 322.0         Recent Labs   Lab 11/30/24  0734 12/01/24  0621 12/02/24  0604   * 129* 142*   BUN 19 23 26*   CREATSERUM 0.80 1.10* 1.05*   EGFRCR 79 54* 57*   CA 10.1 10.1 10.1    134* 136   K 3.5  3.5 3.5 3.4*   CL 97* 93* 95*   CO2 32.0 31.0 32.0       Recent Labs   Lab 11/26/24  0734 11/27/24  0617 11/28/24  0638   ALB 3.7 4.0 4.1         Imaging:  No results found.      Meds:      torsemide  20 mg Oral Daily    allopurinol  100 mg Oral Nightly    buPROPion SR  150 mg Oral BID    clopidogrel  75 mg Oral Nightly    digoxin  125 mcg Oral Nightly    dilTIAZem ER  240 mg Oral Nightly    FLUoxetine  20 mg Oral BID    levothyroxine  175 mcg Oral Before breakfast    metoprolol succinate ER  25 mg Oral Nightly    nortriptyline  50 mg Oral Nightly    rosuvastatin  5 mg Oral QOD    spironolactone  25 mg Oral Daily    warfarin  5 mg Oral Once per day on Tuesday Thursday    warfarin  2.5 mg Oral Once per day on Sunday Monday Wednesday Friday Saturday    miconazole   Topical BID      sodium chloride 75 mL/hr at 12/01/24 1213       mineral oil-hydrophilic petrolatum    triamcinolone    ammonium lactate    traMADol    diphenhydrAMINE    acetaminophen    melatonin       Assessment/Plan:     68 y/o F w/ PMHx of HFrEF, aortic stenosis status post TAVR, CAD status post PCI in 2021, PVD, HTN, HLD, A-fib on oral anticoagulation, hypothyroidism, SEBASTIEN, CKD stage II, COPD, depression, recent admission for MRSA bacteremia s/p completion of vancomycin who presents to the hospital with leg swelling, dyspnea, and weight gain.     Acute on  chronic decompensated HFrEF (recovered EF 50% in September)  Acute hypoxic respiratory failure 2/2 above  -Patient had not been taking her diuretic (supposed to take Bumex 2 mg twice daily).    -echo without changes  -switched to torsemide from IV lasix but now seems intravascularly dry. +orthostatics still despite IVF given yesterday. Cont to hold torsemide today.  -Continue home metoprolol succinate 25 mg nightly, spironolactone 25 mg daily       Aortic stenosis s/p TAVR  Paroxysmal atrial fibrillation  Therapeutic INR  -Continue home warfarin regimen  -Monitor INR  -Continue home beta-blocker, diltiazem, digoxin, Plavix     CKD stage II  -stable     Hyperlipidemia  -Continue home rosuvastatin     Depression  -Continue home fluoxetine, nortriptyline, bupropion     Gout  -Continue home allopurinol     Chronic pain  -Continue home nortriptyline, as needed tramadol, as needed Tylenol     History of MRSA bacteremia  -Recently completed IV vancomycin, no further treatment at this time     Hypothyroidism  -Continue home levothyroxine    PVD- pt with recent CTA LE showing significant disease. Discussed with Dr. Andrade who had reviewed this CT. He does not feel that additional US will add info, pt can follow-up as previously scheduled. No interventions at this time.     Continue to work on increasing activity level.     Kristie Larkin MD  [facility]  Hospitalist  Contact via [email or web address]

## 2024-12-02 NOTE — PROGRESS NOTES
[facility] Cardiology Progress Note        Arleth Weiss Patient Status:  Inpatient    1955 MRN [id number]   [facility] 2NE-A Attending Kristie Larkin MD   Hosp Day # 9 PCP Viktoriya Garcias DO     Subjective:  The patient denies  chest pain and shortness of breath.  She c/o feeling weak    Medications:   potassium chloride  40 mEq Oral Once    torsemide  20 mg Oral Daily    allopurinol  100 mg Oral Nightly    buPROPion SR  150 mg Oral BID    clopidogrel  75 mg Oral Nightly    digoxin  125 mcg Oral Nightly    dilTIAZem ER  240 mg Oral Nightly    FLUoxetine  20 mg Oral BID    levothyroxine  175 mcg Oral Before breakfast    metoprolol succinate ER  25 mg Oral Nightly    nortriptyline  50 mg Oral Nightly    rosuvastatin  5 mg Oral QOD    spironolactone  25 mg Oral Daily    warfarin  5 mg Oral Once per day on     warfarin  2.5 mg Oral Once per day on     miconazole   Topical BID       Continuous Infusions:   sodium chloride 75 mL/hr at 24 1213         Allergies:  Allergies[1]      Objective:        Intake/Output:      Intake/Output Summary (Last 24 hours) at 2024 0818  Last data filed at 2024 0600  Gross per 24 hour   Intake 400 ml   Output 1050 ml   Net -650 ml     Wt Readings from Last 3 Encounters:   24 203 lb 7.8 oz (92.3 kg)   24 213 lb (96.6 kg)   24 201 lb 4.8 oz (91.3 kg)       Physical Exam:        Vitals:    24 0605 24 0615 24 0620 24 0720   BP: 133/79 (!) 89/42 (!) 67/28 135/78   BP Location: Radial Radial Radial    Pulse: 92 97 112 94   Resp: 18      Temp: 97.5 °F (36.4 °C)      TempSrc: Oral      SpO2: 100% 90% (!) 89%    Weight:   203 lb 7.8 oz (92.3 kg)        Temp:  [97.5 °F (36.4 °C)-98.5 °F (36.9 °C)] 97.5 °F (36.4 °C)  Pulse:  [0-118] 94  Resp:  [18] 18  BP: ()/(28-80) 135/78  SpO2:  [89 %-100 %] 89 %      Temp: 97.5 °F (36.4 °C)  Pulse:  94  Resp: 18  BP: 135/78  General:  Appears comfortable  HEENT: No focal deficits.  Neck: No JVD, carotids 2+ no bruits.  Cardiac: Irregular S1S2.  No S3, S4, rub, click.  No murmur.  Lungs: Clear to auscultation and percussion.  Abdomen: Soft, non-tender.   Extremities: No LE edema.  No clubbing or cyanosis.    Neurologic: Alert and oriented, normal affect.  Skin: Warm and dry.           LABS:      HEM:  Recent Labs   Lab 11/26/24  0734 11/27/24 0617 11/28/24 0638 11/29/24 0641   WBC 6.4 7.3 7.2 7.9   HGB 13.7 14.0 13.9 14.3   HCT 41.5 42.0 41.9 43.2   .0 346.0 346.0 322.0       Chem:  Recent Labs   Lab 11/26/24 0734 11/27/24 0617 11/28/24 0638 11/29/24 0641 11/30/24 0734 12/01/24  0621 12/02/24  0604    138 137 137 138 134* 136   K 3.4*  3.4* 3.2*  3.2* 3.4*  3.4* 3.4*  3.4* 3.5  3.5 3.5 3.4*    98 98 97* 97* 93* 95*   CO2 32.0 33.0* 32.0 31.0 32.0 31.0 32.0   BUN 12 12 14 17 19 23 26*   CREATSERUM 0.70 0.78 0.82 0.79 0.80 1.10* 1.05*   CA 9.9 9.8 9.9 9.8 10.1 10.1 10.1   MG 1.8 1.9 1.8 2.0  --   --   --    PHOS 3.8 4.1 4.1  --   --   --   --    * 135* 140* 142* 155* 129* 142*       Recent Labs   Lab 11/26/24 0734 11/27/24 0617 11/28/24  0638   ALB 3.7 4.0 4.1       Recent Labs   Lab 11/28/24 0638 11/29/24 0641 11/30/24 0734 12/01/24  0620 12/02/24  0604   INR 2.09* 1.92* 2.11* 2.16* 2.07*           Lab Results   Component Value Date    TROP <0.045 07/28/2021         Invalid input(s): \"PBNPML\"                       Diagnostics:       Telemetry: Atrial Fibrillation      EKG, 11/25/2024,            Echo:  11/25/24        Conclusions:     1. Left ventricle: The cavity size was normal. Wall thickness was normal.      Systolic function was normal. The estimated ejection fraction was 50-55%,      by visual assessment. Wall motion is normal; there are no regional wall      motion abnormalities. Unable to assess LV diastolic function.   2. Right ventricle: Systolic function was  normal.   3. Left atrium: The atrium was mildly dilated.   4. Aortic valve: Elizondo MINOR S3 23mm (TAVR) bioprosthesis was present,      well seated. No functional/doppler analysis performed.   5. Mitral valve: The annulus was markedly calcified. The leaflets were      mildly calcified. Minimal stenosis. The mean diastolic gradient was 3mm      Hg.   Impressions:  This study is compared with previous dated 9/14/2024: No   significant change.              Impression:    #Acute on chronic HFpEF exacerbation: likely due to inconsistent diuretic use at home. Approved for [employer]; this should improve diuretic compliance  -     net out = 8.0 liters  -     weight: 227---> 203lbs  #Severe aortic stenosis s/p TAVR  # CAD s/p stent in 2021  # paroxysmal afib                 -Now AF  # HLD   # HTN , she displays orthostatic hypotension to SBP 67 this am  # Hx of PE s/p IVC filter  # PVD        Plan:   - hold torsemide until orthostasis resolves  -check ortho bp's q shift  - Continue  metoprolol xl 25mg daily, and spironolactone 25mg daily   -hold cardizem for now  - continue plavix 75mg daily   - continue coumadin   - continue rosuvastatin   - monitor telemetry   - Increase activity as able         James Robertson MD  12/2/2024  8:18 AM           [1]   Allergies  Allergen Reactions    Adhesive Tape HIVES     ALL ADHESIVES    Codeine HIVES    Doxycycline SWELLING    Hydrocodone UNKNOWN    Lisinopril TONGUE SWELLING    Morphine Sulfate HIVES    Opioid Analgesics UNKNOWN    Oxycontin ITCHING    Oxytocin HIVES    Vicodin [Hydrocodone-Acetaminophen] ITCHING    Skin Adhesives OTHER (SEE COMMENTS)

## 2024-12-02 NOTE — PROGRESS NOTES
Patient seen for heart failure education on 11/25/24. Met with patient, reviewed heart failure management steps for when she goes home. Patient has no questions or needs for reeducation in regard to education. Instructed patient to let her nurse know if she has any questions or would like reeducation in regard to heart failure and the nurse will let the HF coaches know to see her again.     Michelle Hearn RN  HF   Extension [phone]

## 2024-12-02 NOTE — OCCUPATIONAL THERAPY NOTE
Occupational therapy deferred this date due to pt's positive orthostatics this AM with symptoms (67/28). Will follow up as able and appropriate.

## 2024-12-03 LAB
ANION GAP SERPL CALC-SCNC: 10 MMOL/L (ref 0–18)
BUN BLD-MCNC: 26 MG/DL (ref 9–23)
CALCIUM BLD-MCNC: 10.5 MG/DL (ref 8.7–10.4)
CHLORIDE SERPL-SCNC: 97 MMOL/L (ref 98–112)
CO2 SERPL-SCNC: 28 MMOL/L (ref 21–32)
CREAT BLD-MCNC: 0.95 MG/DL
EGFRCR SERPLBLD CKD-EPI 2021: 64 ML/MIN/1.73M2 (ref 60–?)
GLUCOSE BLD-MCNC: 142 MG/DL (ref 70–99)
INR BLD: 1.94 (ref 0.8–1.2)
OSMOLALITY SERPL CALC.SUM OF ELEC: 287 MOSM/KG (ref 275–295)
POTASSIUM SERPL-SCNC: 3.6 MMOL/L (ref 3.5–5.1)
POTASSIUM SERPL-SCNC: 3.6 MMOL/L (ref 3.5–5.1)
PROTHROMBIN TIME: 22.3 SECONDS (ref 11.6–14.8)
SODIUM SERPL-SCNC: 135 MMOL/L (ref 136–145)

## 2024-12-03 RX ORDER — METOPROLOL SUCCINATE 25 MG/1
25 TABLET, EXTENDED RELEASE ORAL
Status: DISCONTINUED | OUTPATIENT
Start: 2024-12-03 | End: 2024-12-04

## 2024-12-03 RX ORDER — TORSEMIDE 5 MG/1
10 TABLET ORAL DAILY
Status: DISCONTINUED | OUTPATIENT
Start: 2024-12-03 | End: 2024-12-04

## 2024-12-03 NOTE — PLAN OF CARE
Pt received at 1930. A&Ox4. Room air, tolerating cpap at noc. A fib on tele monitor, on warfarin pm dose administered per orders. Orthostatics Q shift, pt c/o lightheadedness with position changes. Purewick in place for incontinence, last bm 12/2. Up with walker and x2 person assist. Pt updated and agrees with plan of care. Frequent rounding and fall precautions in place.    Problem: Patient/Family Goals  Goal: Patient/Family Long Term Goal  Description: Patient's Long Term Goal: Maintain level of activity    Interventions:  - Follow medication regiment  - See additional Care Plan goals for specific interventions  12/3/2024 0036 by Evonne Bojorquez, RN  Outcome: Progressing  12/2/2024 2255 by Evonne Bojorquez, RN  Outcome: Progressing  Goal: Patient/Family Short Term Goal  Description: Patient's Short Term Goal: Discharge    Interventions:   - Follow medication regiment   - See additional Care Plan goals for specific interventions  12/3/2024 0036 by Evonne Bojorquez, RN  Outcome: Progressing  12/2/2024 2255 by Evonne Bojorquez, RN  Outcome: Progressing

## 2024-12-03 NOTE — PROGRESS NOTES
12/03/24 0604 12/03/24 0609 12/03/24 0613   Vital Signs   Pulse 83 107 111   /75 103/86 107/66   MAP (mmHg) 82 94 79   BP Location Left arm Left arm Left arm   BP Method Automatic Automatic Automatic   Patient Position Lying Sitting Standing     AM orthostatic BPs

## 2024-12-03 NOTE — PROGRESS NOTES
[facility] Hospitalist Progress Note     CC: Hospital Follow up    PCP: Viktoriya Garcias DO       Subjective:     Orthostatics better, pt less dizzy. Notes that she is urinating less as diuretics have been held. Weight up 1 lb from yesterday    OBJECTIVE:    Blood pressure 112/77, pulse 87, temperature 97.9 °F (36.6 °C), temperature source Oral, resp. rate 18, weight 204 lb 2.3 oz (92.6 kg), SpO2 96%, not currently breastfeeding.    Temp:  [97.4 °F (36.3 °C)-97.9 °F (36.6 °C)] 97.9 °F (36.6 °C)  Pulse:  [] 87  Resp:  [18-22] 18  BP: ()/(61-95) 112/77  SpO2:  [90 %-97 %] 96 %      Intake/Output:    Intake/Output Summary (Last 24 hours) at 12/3/2024 1125  Last data filed at 12/3/2024 0743  Gross per 24 hour   Intake 1520 ml   Output 1350 ml   Net 170 ml       Last 3 Weights   12/03/24 0613 204 lb 2.3 oz (92.6 kg)   12/02/24 0620 203 lb 7.8 oz (92.3 kg)   12/01/24 0640 203 lb 7.8 oz (92.3 kg)   11/30/24 0523 205 lb 7.5 oz (93.2 kg)   11/29/24 0956 207 lb 6.4 oz (94.1 kg)   11/29/24 0405 214 lb 15.2 oz (97.5 kg)   11/28/24 0550 207 lb 14.3 oz (94.3 kg)   11/27/24 0522 208 lb 15.9 oz (94.8 kg)   11/26/24 0520 214 lb 1.1 oz (97.1 kg)   11/25/24 0408 218 lb 1.6 oz (98.9 kg)   11/23/24 2313 227 lb 1.2 oz (103 kg)   11/23/24 2233 227 lb 1.2 oz (103 kg)   11/23/24 1746 225 lb (102.1 kg)   11/14/24 1548 213 lb (96.6 kg)   09/19/24 0500 201 lb 4.8 oz (91.3 kg)   09/18/24 0400 196 lb (88.9 kg)   09/17/24 0930 198 lb 3.2 oz (89.9 kg)   09/17/24 0533 204 lb 9.4 oz (92.8 kg)   09/16/24 0920 203 lb 6.4 oz (92.3 kg)   09/14/24 0430 220 lb (99.8 kg)   09/13/24 0617 216 lb (98 kg)   09/12/24 0800 225 lb 12 oz (102.4 kg)   09/11/24 1309 230 lb (104.3 kg)       Exam   Gen: Alert, no acute distress  Heent: Normocephalic, atraumatic, neck supple, EOMI, PERRLA  Pulm: Lungs CTAB, normal respiratory effort  CV:  Regular rate and rhythm, no murmurs/rubs/gallops  Abd: Soft, nontender, nondistended, bowel sounds  present  Extremities: chronic changes to feet with scabbing and discoloration  Neuro: AOx3, no focal neurologic deficits, CN II-XII grossly intact  Psych: appropriate mood and affect      Data Review:       Labs:     Recent Labs   Lab 11/27/24  0617 11/28/24  0638 11/29/24  0641   RBC 4.40 4.37 4.50   HGB 14.0 13.9 14.3   HCT 42.0 41.9 43.2   MCV 95.5 95.9 96.0   MCH 31.8 31.8 31.8   MCHC 33.3 33.2 33.1   RDW 13.4 13.5 13.4   NEPRELIM  --   --  4.66   WBC 7.3 7.2 7.9   .0 346.0 322.0         Recent Labs   Lab 12/01/24  0621 12/02/24  0604 12/03/24  0627   * 142* 142*   BUN 23 26* 26*   CREATSERUM 1.10* 1.05* 0.95   EGFRCR 54* 57* 64   CA 10.1 10.1 10.5*   * 136 135*   K 3.5 3.4* 3.6  3.6   CL 93* 95* 97*   CO2 31.0 32.0 28.0       Recent Labs   Lab 11/27/24  0617 11/28/24  0638   ALB 4.0 4.1         Imaging:  No results found.      Meds:      metoprolol succinate ER  25 mg Oral Daily Beta Blocker    torsemide  10 mg Oral Daily    allopurinol  100 mg Oral Nightly    buPROPion SR  150 mg Oral BID    clopidogrel  75 mg Oral Nightly    digoxin  125 mcg Oral Nightly    FLUoxetine  20 mg Oral BID    levothyroxine  175 mcg Oral Before breakfast    nortriptyline  50 mg Oral Nightly    rosuvastatin  5 mg Oral QOD    spironolactone  25 mg Oral Daily    warfarin  5 mg Oral Once per day on Tuesday Thursday    warfarin  2.5 mg Oral Once per day on Sunday Monday Wednesday Friday Saturday    miconazole   Topical BID      sodium chloride 75 mL/hr at 12/01/24 1213       mineral oil-hydrophilic petrolatum    triamcinolone    ammonium lactate    traMADol    diphenhydrAMINE    acetaminophen    melatonin       Assessment/Plan:     68 y/o F w/ PMHx of HFrEF, aortic stenosis status post TAVR, CAD status post PCI in 2021, PVD, HTN, HLD, A-fib on oral anticoagulation, hypothyroidism, SEBASTIEN, CKD stage II, COPD, depression, recent admission for MRSA bacteremia s/p completion of vancomycin who presents to the hospital with  leg swelling, dyspnea, and weight gain.     Acute on chronic decompensated HFrEF (recovered EF 50% in September)  Acute hypoxic respiratory failure 2/2 above  -Patient had not been taking her diuretic (supposed to take Bumex 2 mg twice daily).    -echo without changes  -switched to torsemide from IV lasix but then orthostatic dry on 12/1. Received ivf bolus and torsemide then held. Orthos better today and pt less dizzy. Torsemide being resumed at 10 daily  -Continue home metoprolol succinate 25 mg nightly, spironolactone 25 mg daily       Aortic stenosis s/p TAVR  Paroxysmal atrial fibrillation  Therapeutic INR  -Continue home warfarin regimen  -Monitor INR  -Continue home beta-blocker, digoxin, Plavix. Stopping cardizem per cards.     CKD stage II  -stable     Hyperlipidemia  -Continue home rosuvastatin     Depression  -Continue home fluoxetine, nortriptyline, bupropion     Gout  -Continue home allopurinol     Chronic pain  -Continue home nortriptyline, as needed tramadol, as needed Tylenol     History of MRSA bacteremia  -Recently completed IV vancomycin, no further treatment at this time     Hypothyroidism  -Continue home levothyroxine    PVD- pt with recent CTA LE showing significant disease. Discussed with Dr. Andrade who had reviewed this CT. He does not feel that additional US will add info, pt can follow-up as previously scheduled. No interventions at this time.     Continue to work on increasing activity level. And hopeful for dc tomorrow.      Kristie Larkin MD  [facility]  Hospitalist  Contact via [email or web address]

## 2024-12-03 NOTE — PROGRESS NOTES
Orthostatic BP and HR  Lying 115/84 Hr-91  Sitting 93/61 Hr-74  Standing 79/66 Hr-72 c/o dizziness

## 2024-12-03 NOTE — PROGRESS NOTES
[facility] Cardiology Progress Note        Arleth Weiss Patient Status:  Inpatient    1955 MRN [id number]   [facility] 2NE-A Attending Kristie Larkin MD   Hosp Day # 10 PCP Viktoriya Garcias DO     Subjective:  The patient denies  chest pain   Gradual improvement in breathing      Medications:   torsemide  20 mg Oral Daily    allopurinol  100 mg Oral Nightly    buPROPion SR  150 mg Oral BID    clopidogrel  75 mg Oral Nightly    digoxin  125 mcg Oral Nightly    FLUoxetine  20 mg Oral BID    levothyroxine  175 mcg Oral Before breakfast    metoprolol succinate ER  25 mg Oral Nightly    nortriptyline  50 mg Oral Nightly    rosuvastatin  5 mg Oral QOD    spironolactone  25 mg Oral Daily    warfarin  5 mg Oral Once per day on     warfarin  2.5 mg Oral Once per day on     miconazole   Topical BID       Continuous Infusions:   sodium chloride 75 mL/hr at 24 1213         Allergies:  Allergies[1]      Objective:        Intake/Output:      Intake/Output Summary (Last 24 hours) at 12/3/2024 0840  Last data filed at 12/3/2024 0743  Gross per 24 hour   Intake 1520 ml   Output 1350 ml   Net 170 ml     Wt Readings from Last 3 Encounters:   24 204 lb 2.3 oz (92.6 kg)   24 213 lb (96.6 kg)   24 201 lb 4.8 oz (91.3 kg)       Physical Exam:        Vitals:    24 0604 24 0609 24 0613 24 0743   BP: 104/75 103/86 107/66 112/77   BP Location: Left arm Left arm Left arm Left arm   Pulse: 83 107 111 87   Resp:    18   Temp:    97.9 °F (36.6 °C)   TempSrc:    Oral   SpO2: 97% 97%  96%   Weight:   204 lb 2.3 oz (92.6 kg)        Temp:  [97.4 °F (36.3 °C)-97.9 °F (36.6 °C)] 97.9 °F (36.6 °C)  Pulse:  [] 87  Resp:  [18-22] 18  BP: ()/(61-95) 112/77  SpO2:  [90 %-97 %] 96 %      Temp: 97.9 °F (36.6 °C)  Pulse: 87  Resp: 18  BP: 112/77  General:  Appears comfortable  HEENT: No focal  deficits.  Neck: No JVD, carotids 2+ no bruits.  Cardiac: Irregular S1S2.  No S3, S4, rub, click.  No murmur.  Lungs: Clear to auscultation and percussion.  Abdomen: Soft, non-tender.   Extremities: No LE edema.  No clubbing or cyanosis.    Neurologic: Alert and oriented, normal affect.  Skin: Warm and dry.           LABS:      HEM:  Recent Labs   Lab 11/27/24 0617 11/28/24 0638 11/29/24 0641   WBC 7.3 7.2 7.9   HGB 14.0 13.9 14.3   HCT 42.0 41.9 43.2   .0 346.0 322.0       Chem:  Recent Labs   Lab 11/27/24 0617 11/28/24 0638 11/29/24 0641 11/30/24 0734 12/01/24 0621 12/02/24 0604 12/03/24 0627    137 137 138 134* 136  --    K 3.2*  3.2* 3.4*  3.4* 3.4*  3.4* 3.5  3.5 3.5 3.4* 3.6   CL 98 98 97* 97* 93* 95*  --    CO2 33.0* 32.0 31.0 32.0 31.0 32.0  --    BUN 12 14 17 19 23 26*  --    CREATSERUM 0.78 0.82 0.79 0.80 1.10* 1.05*  --    CA 9.8 9.9 9.8 10.1 10.1 10.1  --    MG 1.9 1.8 2.0  --   --   --   --    PHOS 4.1 4.1  --   --   --   --   --    * 140* 142* 155* 129* 142*  --        Recent Labs   Lab 11/27/24 0617 11/28/24 0638   ALB 4.0 4.1       Recent Labs   Lab 11/29/24 0641 11/30/24 0734 12/01/24 0620 12/02/24 0604 12/03/24 0627   INR 1.92* 2.11* 2.16* 2.07* 1.94*           Lab Results   Component Value Date    TROP <0.045 07/28/2021         Invalid input(s): \"PBNPML\"                       Diagnostics:       Telemetry: Atrial Fibrillation      EKG, 11/25/2024,            Echo:  11/25/24        Conclusions:     1. Left ventricle: The cavity size was normal. Wall thickness was normal.      Systolic function was normal. The estimated ejection fraction was 50-55%,      by visual assessment. Wall motion is normal; there are no regional wall      motion abnormalities. Unable to assess LV diastolic function.   2. Right ventricle: Systolic function was normal.   3. Left atrium: The atrium was mildly dilated.   4. Aortic valve: Elizondo MINOR S3 23mm (TAVR) bioprosthesis was  present,      well seated. No functional/doppler analysis performed.   5. Mitral valve: The annulus was markedly calcified. The leaflets were      mildly calcified. Minimal stenosis. The mean diastolic gradient was 3mm      Hg.   Impressions:  This study is compared with previous dated 9/14/2024: No   significant change.              Impression:    #Acute on chronic HFpEF exacerbation: likely due to inconsistent diuretic use at home. Approved for purewick; this should improve diuretic compliance  -     net out = 7.4 liters  -     weight: 227---> 204 lbs  #Severe aortic stenosis s/p TAVR  # CAD s/p stent in 2021  # paroxysmal afib                 -Now AF  # HLD   # HTN , she displays orthostatic hypotension to SBP 67 this am  # Hx of PE s/p IVC filter  # PVD        Plan:   - resume torsemide at 10 mg daily  -check ortho bp's q shift  - toprol 25 mg bid for CHF, HR mgt.   -  continue  spironolactone 25mg daily   - stop cardizem  - continue plavix 75mg daily   - continue coumadin   - continue rosuvastatin   - monitor telemetry   - Increase activity as able     Discharge planning    James Robertson MD           [1]   Allergies  Allergen Reactions    Adhesive Tape HIVES     ALL ADHESIVES    Codeine HIVES    Doxycycline SWELLING    Hydrocodone UNKNOWN    Lisinopril TONGUE SWELLING    Morphine Sulfate HIVES    Opioid Analgesics UNKNOWN    Oxycontin ITCHING    Oxytocin HIVES    Vicodin [Hydrocodone-Acetaminophen] ITCHING    Skin Adhesives OTHER (SEE COMMENTS)

## 2024-12-03 NOTE — PROGRESS NOTES
Orthostatic BP and HR  Lying 102/58 Hr-65  Sitting 103/59 Hr-95  Standing 114/92 Hr-101  Ambulated 12 ft but c/o being so weak and want to go back to bed.

## 2024-12-03 NOTE — PROGRESS NOTES
Pt received at 0730 A&Ox4. Room air, tolerating cpap at noc. A fib on tele monitor, on warfarin pm dose administered per orders. Orthostatics Q shift, pt c/o lightheadedness with position changes. Purewick in place for incontinence, last bm 12/2. Up with walker and x2 person assist. Pt updated and agrees with plan of care. Frequent rounding and fall precautions in place.     Problem: Patient/Family Goals  Goal: Patient/Family Long Term Goal  Description: Patient's Long Term Goal: Maintain level of activity    Interventions:  - Follow medication regiment  - See additional Care Plan goals for specific interventions    Outcome: Progressing    Goal: Patient/Family Short Term Goal  Description: Patient's Short Term Goal: Discharge    Interventions:   - Follow medication regiment   - See additional Care Plan goals for specific interventions    Outcome: Progressing

## 2024-12-04 LAB
INR BLD: 1.95 (ref 0.8–1.2)
NT-PROBNP SERPL-MCNC: 457 PG/ML (ref ?–125)
PROTHROMBIN TIME: 22.4 SECONDS (ref 11.6–14.8)

## 2024-12-04 RX ORDER — TORSEMIDE 10 MG/1
10 TABLET ORAL DAILY
Qty: 30 TABLET | Refills: 0 | Status: SHIPPED | OUTPATIENT
Start: 2024-12-05

## 2024-12-04 RX ORDER — BISACODYL 10 MG
10 SUPPOSITORY, RECTAL RECTAL ONCE
Status: COMPLETED | OUTPATIENT
Start: 2024-12-04 | End: 2024-12-04

## 2024-12-04 NOTE — PHYSICAL THERAPY NOTE
PHYSICAL THERAPY TREATMENT NOTE - INPATIENT    Room Number: 2610/2610-A       Session: 3     Number of Visits to Meet Established Goals: 5    Presenting Problem: acute on chronic CHF  Co-Morbidities : DVT, COPD, CHF, CKD, thyroid cancer, HTN, TAVR, OA, PVD, fibromyalgia, PE, spine surgery, B TKA, EUSEBIA, TSA    PHYSICAL THERAPY MEDICAL/SOCIAL HISTORY  History related to current admission: Patient is a 69 year old female admitted on 11/23/2024 from home for acute on chronic CHF.     HOME SITUATION  Type of Home: House  Home Layout: One level;Ramped entrance  Lives With: Alone;Caregiver part-time (CG supposed to come 14 hours per week but is inconsistent, assists with household mangement)    Drives: Yes   Patient Regularly Uses: Glasses;Rollator (loftstrand crutches, adjustable bed)       Prior Level of [place]: Pt is typically mod ind with ADLs and ambulates with loftstrand crutches. Pt has been having increased difficulty lately due to increased pain in B feet. Pt does not sit in low chairs.     *future! Recommend use of 4 blankets to be placed on recliner chair to be used for chair follow       PHYSICAL THERAPY ASSESSMENT   Patient demonstrates fair progress this session, goals  remain in progress.      Patient is requiring stand-by assist as a result of the following impairments: decreased functional strength, decreased endurance/aerobic capacity, pain, impaired standing balance, decreased muscular endurance, and medical status.     Patient continues to function below baseline with bed mobility, transfers, gait, stair negotiation, standing prolonged periods, and performing household tasks.  Next session anticipate patient to progress bed mobility, transfers, and gait.  Physical Therapy will continue to follow patient for duration of hospitalization.    Patient continues to benefit from continued skilled PT services: at discharge to promote prior level of function and safety with additional support and return  home with home health PT.    PLAN DURING HOSPITALIZATION  Nursing Mobility Recommendation : 1 Assist  PT Device Recommendation: Other (Comment) (Loftstrand crutches)  PT Treatment Plan: Bed mobility;Body mechanics;Endurance;Energy conservation;Patient education;Family education;Gait training;Range of motion;Strengthening;Stair training;Transfer training;Balance training  Frequency (Obs): 3-5x/week     CURRENT GOALS     Goal #1 Patient is able to demonstrate supine - sit EOB @ level: modified independent      Goal #2 Patient is able to demonstrate transfers Sit to/from Stand at assistance level: modified independent      Goal #3 Patient is able to ambulate 150 feet with assist device:  loftstrand crutches  at assistance level: modified independent      Goal #4     Goal #5     Goal #6     Goal Comments: Goals established on 2024  All goals ongoing as of 2024     SUBJECTIVE  \"Yeah thanks for not hovering\"    OBJECTIVE  Precautions: Bed/chair alarm    WEIGHT BEARING RESTRICTION     PAIN ASSESSMENT   Ratin  Location: B feet  Management Techniques: Breathing techniques;Relaxation;Repositioning    BALANCE                                                                                                                       Static Sitting: Good  Dynamic Sitting: Good           Static Standing: Fair -  Dynamic Standing: Fair -    O2 WALK       AM-PAC '6-Clicks' INPATIENT SHORT FORM - BASIC MOBILITY  How much difficulty does the patient currently have...  Patient Difficulty: Turning over in bed (including adjusting bedclothes, sheets and blankets)?: None   Patient Difficulty: Sitting down on and standing up from a chair with arms (e.g., wheelchair, bedside commode, etc.): A Little   Patient Difficulty: Moving from lying on back to sitting on the side of the bed?: None   How much help from another person does the patient currently need...   Help from Another: Moving to and from a bed to a chair (including a  wheelchair)?: A Little   Help from Another: Need to walk in hospital room?: A Little   Help from Another: Climbing 3-5 steps with a railing?: A Lot     AM-PAC Score:  Raw Score: 19   Approx Degree of Impairment: 41.77%   Standardized Score (AM-PAC Scale): 45.44   CMS Modifier (G-Code): CK    FUNCTIONAL ABILITY STATUS  Gait Assessment   Functional Mobility/Gait Assessment  Gait Assistance: Supervision  Distance (ft): 40  Assistive Device:  (loftstrand crutches)  Pattern: Within Functional Limits (antaglic)    Skilled Therapy Provided    Bed Mobility:  Rolling: SBA   Supine<>Sit: SBA with HOB max elevated   Sit<>Supine: SBA    Transfer Mobility:  Sit<>Stand: SBA with bed elevated   Stand<>Sit: SBA  Gait: SBA with personal loftstrand - 4pt gait.     Therapist's Comments: Pt reported she has appointment with vascular team as an outpatient because of her stenosis/calcification.  She is able to walk short distance at home, and reported increased confidence today!      THERAPEUTIC EXERCISES  Lower Extremity Ankle pumps  LAQ     Position Sitting and Supine     Repetitions   10   Sets   1     Patient End of Session: In bed;Needs met;Call light within reach;RN aware of session/findings;All patient questions and concerns addressed;Hospital anti-slip socks    PT Session Time: 23 minutes  Gait Training: 15 minutes  Therapeutic Activity: 8 minutes  Therapeutic Exercise: minutes   Neuromuscular Re-education:  minutes

## 2024-12-04 NOTE — CM/SW NOTE
12/04/24 1400   Discharge disposition   Expected discharge disposition Home-Health   Post Acute Care Provider   ([facility])   Discharge transportation Private car     Patient medically ready for DC.     SENDY spoke with pt about discharging today. Pt was made aware she is medically ready for DC so insurance would not cover another night in the hospital. Pt verbalized understanding and is agreeable with DC today.    Pt has an appointments tomorrow in [place], so SW suggested for pt to look at hotels in [place] for the night if she is not wanting to make the drive all the way home. Pt stated she was going to look into it.    Pt will transport herself at DC.    SENDY notified [facility] that pt will DC today.     to remain available for support and/or discharge planning.    GEN Cui  Discharge Planner  [phone]

## 2024-12-04 NOTE — PROGRESS NOTES
CC: follow-up hospital admission chf    SUBJECTIVE:  Interval History:     No acute issues overnight  Felt dizzy when standing but overall did much better walking today than yesterday  She doesn't feel comfortable going home today, lives 2.5 hrs away and apparently it's going to be snowing with strong yolanda winds per pt    OBJECTIVE:  Scheduled Meds:    metoprolol succinate ER  25 mg Oral Daily Beta Blocker    torsemide  10 mg Oral Daily    allopurinol  100 mg Oral Nightly    buPROPion SR  150 mg Oral BID    clopidogrel  75 mg Oral Nightly    digoxin  125 mcg Oral Nightly    FLUoxetine  20 mg Oral BID    levothyroxine  175 mcg Oral Before breakfast    nortriptyline  50 mg Oral Nightly    rosuvastatin  5 mg Oral QOD    spironolactone  25 mg Oral Daily    warfarin  5 mg Oral Once per day on Tuesday Thursday    warfarin  2.5 mg Oral Once per day on Sunday Monday Wednesday Friday Saturday    miconazole   Topical BID     Continuous Infusions:    sodium chloride 75 mL/hr at 12/01/24 1213     PRN Meds:   mineral oil-hydrophilic petrolatum    triamcinolone    ammonium lactate    traMADol    diphenhydrAMINE    acetaminophen    melatonin    PHYSICAL EXAM  Vital signs: Temp:  [97.4 °F (36.3 °C)-98.9 °F (37.2 °C)] 98.3 °F (36.8 °C)  Pulse:  [] 88  Resp:  [18-22] 20  BP: ()/(51-92) 94/51  SpO2:  [71 %-98 %] 97 %      GENERAL - NAD, AAO  EYES- sclera anicteric   HENT- normocephalic,    NECK - supple  CV- RRR  RESP - decreased at b ases, normal resp effort  ABDOMEN- soft, NT/ND   EXT- no LE edema        Data Review:   Labs:   Recent Labs   Lab 11/28/24  0638 11/29/24  0641 11/30/24  0734 12/01/24  0620 12/02/24  0604 12/03/24  0627 12/04/24  0618   WBC 7.2 7.9  --   --   --   --   --    HGB 13.9 14.3  --   --   --   --   --    MCV 95.9 96.0  --   --   --   --   --    .0 322.0  --   --   --   --   --    INR 2.09* 1.92* 2.11* 2.16* 2.07* 1.94* 1.95*       Recent Labs   Lab 11/28/24  0638 11/29/24  0641  11/30/24  0734 12/01/24  0621 12/02/24  0604 12/03/24  0627    137 138 134* 136 135*   K 3.4*  3.4* 3.4*  3.4* 3.5  3.5 3.5 3.4* 3.6  3.6   CL 98 97* 97* 93* 95* 97*   CO2 32.0 31.0 32.0 31.0 32.0 28.0   BUN 14 17 19 23 26* 26*   CREATSERUM 0.82 0.79 0.80 1.10* 1.05* 0.95   CA 9.9 9.8 10.1 10.1 10.1 10.5*   MG 1.8 2.0  --   --   --   --    PHOS 4.1  --   --   --   --   --    * 142* 155* 129* 142* 142*       Recent Labs   Lab 11/28/24  0638   ALB 4.1       No results for input(s): \"PGLU\" in the last 168 hours.        ASSESSMENT/PLAN:    70 y/o F w/ PMHx of HFrEF, aortic stenosis status post TAVR, CAD status post PCI in 2021, PVD, HTN, HLD, A-fib on oral anticoagulation, hypothyroidism, SEBASTIEN, CKD stage II, COPD, depression, recent admission for MRSA bacteremia s/p completion of vancomycin who presents to the hospital with leg swelling, dyspnea, and weight gain.     Acute on chronic decompensated HFrEF (recovered EF 50% in September)  Acute hypoxic respiratory failure 2/2 above  -Patient had not been taking her diuretic (supposed to take Bumex 2 mg twice daily).    -echo without changes  -switched to torsemide from IV lasix but then orthostatic dry on 12/1. Received ivf bolus and torsemide then held.  Torsemide being resumed at 10 daily  -Continue home metoprolol succinate 25 mg nightly, spironolactone 25 mg daily        Aortic stenosis s/p TAVR  Paroxysmal atrial fibrillation  Therapeutic INR  -Continue home warfarin regimen  -Monitor INR  -Continue home beta-blocker, digoxin, Plavix. Stopping cardizem per cards.     CKD stage II  -stable     Hyperlipidemia  -Continue home rosuvastatin     Depression  -Continue home fluoxetine, nortriptyline, bupropion     Gout  -Continue home allopurinol     Chronic pain  -Continue home nortriptyline, as needed tramadol, as needed Tylenol     History of MRSA bacteremia  -Recently completed IV vancomycin, no further treatment at this time     Hypothyroidism  -Continue  home levothyroxine     PVD- pt with recent CTA LE showing significant disease. Partner discussed with Dr. Andrade who had reviewed this CT. He does not feel that additional US will add info, pt can follow-up as previously scheduled. No interventions at this time.    Addendum    Bret HORTON who discussed with pt and is agreeable to dc today       Will continue to follow while hospitalized. Please page me or the on-call hospitalist with questions or concerns.    Cody Floyd Hospitalist  [phone]  Answering Service: [phone]

## 2024-12-04 NOTE — PLAN OF CARE
Pt received at 1930. A&Ox4. Room air, cpap at night. Pt c/o dyspnea on exertion, improves at rest. Pt also reports involuntary yawning past couple days. A fib on tele monitor, warfarin administered per orders. Purewick/brief in place for incontinence. Denies pain, pt c/o BLE weakness with ambulating, PT ordered to re-evaluate for discharge recommendations. Up with walker and x2 person assist. Pt updated and agrees with plan of care. Frequent rounding in place.     Problem: Patient/Family Goals  Goal: Patient/Family Long Term Goal  Description: Patient's Long Term Goal: Maintain level of activity    Interventions:  - Follow medication regiment  - See additional Care Plan goals for specific interventions  Outcome: Progressing  Goal: Patient/Family Short Term Goal  Description: Patient's Short Term Goal: Discharge    Interventions:   - Follow medication regiment   - See additional Care Plan goals for specific interventions  Outcome: Progressing

## 2024-12-04 NOTE — PROGRESS NOTES
[facility] Cardiology Progress Note        Arleth Weiss Patient Status:  Inpatient    1955 MRN [id number]   [facility] 2NE-A Attending Kristie Larkin MD   Hosp Day # 11 PCP Viktoriya Garcias DO     Subjective:  The patient denies  chest pain   Breathing is comfortable      Medications:   metoprolol succinate ER  25 mg Oral Daily Beta Blocker    torsemide  10 mg Oral Daily    allopurinol  100 mg Oral Nightly    buPROPion SR  150 mg Oral BID    clopidogrel  75 mg Oral Nightly    digoxin  125 mcg Oral Nightly    FLUoxetine  20 mg Oral BID    levothyroxine  175 mcg Oral Before breakfast    nortriptyline  50 mg Oral Nightly    rosuvastatin  5 mg Oral QOD    spironolactone  25 mg Oral Daily    warfarin  5 mg Oral Once per day on     warfarin  2.5 mg Oral Once per day on     miconazole   Topical BID       Continuous Infusions:   sodium chloride 75 mL/hr at 24 1213         Allergies:  Allergies[1]      Objective:        Intake/Output:      Intake/Output Summary (Last 24 hours) at 2024 0721  Last data filed at 2024 0100  Gross per 24 hour   Intake 1080 ml   Output 1301 ml   Net -221 ml     Wt Readings from Last 3 Encounters:   24 198 lb 10.2 oz (90.1 kg)   24 213 lb (96.6 kg)   24 201 lb 4.8 oz (91.3 kg)       Physical Exam:        Vitals:    24 2330 24 0100 24 0252 24 0430   BP: 93/73   121/68   BP Location: Left arm   Left arm   Pulse: 104 112 91 92   Resp: 20   20   Temp: 98.9 °F (37.2 °C)   97.5 °F (36.4 °C)   TempSrc: Oral   Oral   SpO2: 96% 93% 98% 95%   Weight:    198 lb 10.2 oz (90.1 kg)       Temp:  [97.4 °F (36.3 °C)-98.9 °F (37.2 °C)] 97.5 °F (36.4 °C)  Pulse:  [] 92  Resp:  [18-22] 20  BP: ()/(58-92) 121/68  SpO2:  [71 %-98 %] 95 %      Temp: 97.5 °F (36.4 °C)  Pulse: 92  Resp: 20  BP: 121/68  General:  Appears comfortable  HEENT: No focal  deficits.  Neck: No JVD, carotids 2+ no bruits.  Cardiac: Irregular S1S2.  No S3, S4, rub, click.  No murmur.  Lungs: Clear to auscultation and percussion.  Abdomen: Soft, non-tender.   Extremities: No LE edema.  No clubbing or cyanosis.    Neurologic: Alert and oriented, normal affect.  Skin: Warm and dry.           LABS:      HEM:  Recent Labs   Lab 11/28/24  0638 11/29/24 0641   WBC 7.2 7.9   HGB 13.9 14.3   HCT 41.9 43.2   .0 322.0       Chem:  Recent Labs   Lab 11/28/24  0638 11/29/24  0641 11/30/24  0734 12/01/24  0621 12/02/24  0604 12/03/24  0627    137 138 134* 136 135*   K 3.4*  3.4* 3.4*  3.4* 3.5  3.5 3.5 3.4* 3.6  3.6   CL 98 97* 97* 93* 95* 97*   CO2 32.0 31.0 32.0 31.0 32.0 28.0   BUN 14 17 19 23 26* 26*   CREATSERUM 0.82 0.79 0.80 1.10* 1.05* 0.95   CA 9.9 9.8 10.1 10.1 10.1 10.5*   MG 1.8 2.0  --   --   --   --    PHOS 4.1  --   --   --   --   --    * 142* 155* 129* 142* 142*       Recent Labs   Lab 11/28/24  0638   ALB 4.1       Recent Labs   Lab 11/30/24  0734 12/01/24  0620 12/02/24  0604 12/03/24  0627 12/04/24  0618   INR 2.11* 2.16* 2.07* 1.94* 1.95*           Lab Results   Component Value Date    TROP <0.045 07/28/2021         Invalid input(s): \"PBNPML\"                       Diagnostics:       Telemetry: Atrial Fibrillation      EKG, 11/25/2024,            Echo:  11/25/24        Conclusions:     1. Left ventricle: The cavity size was normal. Wall thickness was normal.      Systolic function was normal. The estimated ejection fraction was 50-55%,      by visual assessment. Wall motion is normal; there are no regional wall      motion abnormalities. Unable to assess LV diastolic function.   2. Right ventricle: Systolic function was normal.   3. Left atrium: The atrium was mildly dilated.   4. Aortic valve: Elizondo MINOR S3 23mm (TAVR) bioprosthesis was present,      well seated. No functional/doppler analysis performed.   5. Mitral valve: The annulus was markedly  calcified. The leaflets were      mildly calcified. Minimal stenosis. The mean diastolic gradient was 3mm      Hg.   Impressions:  This study is compared with previous dated 9/14/2024: No   significant change.              Impression:    #Acute on chronic HFpEF exacerbation: likely due to inconsistent diuretic use at home. Approved for [employer]; this should improve diuretic compliance  -     net out = 7.7 liters  -     weight: 227---> 204 lbs. 198 today?!  #Severe aortic stenosis s/p TAVR  # CAD s/p stent in 2021  # paroxysmal afib                 -Now AF  # HLD   # HTN , she displays orthostatic hypotension to SBP 67 this am  # Hx of PE s/p IVC filter  # PVD        Plan:   -  continue torsemide at 10 mg daily  -check ortho bp's q shift  - toprol 25 mg bid for CHF, HR mgt.   -  continue  spironolactone 25mg daily   - stopped cardizem  - continue plavix 75mg daily   - continue coumadin   - continue rosuvastatin   - monitor telemetry   - Increase activity as able     Discharge planning    James Robertson MD         [1]   Allergies  Allergen Reactions    Adhesive Tape HIVES     ALL ADHESIVES    Codeine HIVES    Doxycycline SWELLING    Hydrocodone UNKNOWN    Lisinopril TONGUE SWELLING    Morphine Sulfate HIVES    Opioid Analgesics UNKNOWN    Oxycontin ITCHING    Oxytocin HIVES    Vicodin [Hydrocodone-Acetaminophen] ITCHING    Skin Adhesives OTHER (SEE COMMENTS)

## 2024-12-04 NOTE — DISCHARGE SUMMARY
[facility] Discharge Summary    Patient ID  Arleth Weiss  [id number]  69 year old  4/26/1955    Admit date: 11/23/2024    Discharge date: 12/04/24    Attending: Kristie Larkin MD     Primary Care Physician: Viktoriya Garcias DO      Reason for admission:  chf    Discharge condition: stable    Disposition: home    Important follow up:  -PCP within 7 d  -specialists:      -labs:    -radiology:      Additional patient instructions       Discharge med list     Medication List        CHANGE how you take these medications      allopurinol 100 MG Tabs  Commonly known as: Zyloprim  Take 1 tablet (100 mg total) by mouth daily.  What changed: when to take this     clotrimazole-betamethasone 1-0.05 % Crea  Commonly known as: Lotrisone  What changed: See the new instructions.     digoxin 0.125 MG Tabs  Commonly known as: Lanoxin  Take 1 tablet (125 mcg total) by mouth daily.  What changed: when to take this     dilTIAZem  MG Cp24  Commonly known as: CardIZEM CD  Take 1 capsule (240 mg total) by mouth daily.  What changed: when to take this            CONTINUE taking these medications      albuterol 108 (90 Base) MCG/ACT Aers  Commonly known as: Ventolin HFA     buPROPion  MG Tb12  Commonly known as: WELLBUTRIN SR     calcium carbonate 500 MG Chew  Commonly known as: Tums     Centrum Minis Women 50+ Tabs     Cholecalciferol 125 MCG (5000 UT) Tabs  Commonly known as: VITAMIN D-3     clopidogrel 75 MG Tabs  Commonly known as: Plavix     cyproheptadine 4 MG Tabs  Commonly known as: Periactin     ferrous sulfate 325 (65 FE) MG Tbec     FLUoxetine 20 MG Caps  Commonly known as: PROzac     ketoconazole 2 % Crea  Commonly known as: Nizoral     levothyroxine 175 MCG Tabs  Commonly known as: Synthroid     metoprolol succinate ER 25 MG Tb24  Commonly known as: Toprol XL     Nortriptyline HCl 75 MG Caps  Commonly known as: PAMELOR     rosuvastatin 5 MG Tabs  Commonly known as: Crestor     spironolactone 25 MG Tabs  Commonly  known as: Aldactone     triamcinolone 0.1 % Oint  Commonly known as: Kenalog     Tylenol 8 Hour 650 MG Tbcr  Generic drug: Acetaminophen ER     Vitamin C 500 MG Tabs  Commonly known as: VITAMIN C     * warfarin 5 MG Tabs  Commonly known as: Coumadin  Take as directed. If you are unsure how to take this medication, talk to your nurse or doctor.     * warfarin 5 MG Tabs  Commonly known as: Coumadin  Take as directed. If you are unsure how to take this medication, talk to your nurse or doctor.     Zinc 50 MG Caps           * This list has 2 medication(s) that are the same as other medications prescribed for you. Read the directions carefully, and ask your doctor or other care provider to review them with you.                STOP taking these medications      bumetanide 1 MG Tabs  Commonly known as: Bumex     traMADol 50 MG Tabs  Commonly known as: Ultram            ASK your doctor about these medications      Jardiance 10 MG Tabs  Generic drug: empagliflozin     metOLazone 5 MG Tabs  Commonly known as: Zaroxolyn  Take 1 tablet (5 mg total) by mouth daily as needed (only if weight increases >5 lbs in 1 day).     Potassium Chloride ER 10 MEQ Cpcr  Commonly known as: MICRO-K              Discharge Diagnoses:    Acute CHF  Aortic stenosis  CKD  HLD  Depression  Gout  Chronic pain  pad    Consults:  IP CONSULT TO CARDIOLOGY  IP CONSULT TO HOSPITALIST  IP CONSULT TO SPIRITUAL CARE  IP CONSULT TO SOCIAL WORK  CONSULT TO WOUND OSTOMY  IP CONSULT TO CARDIAC REHAB  NURSING CONSULT TO DIETITIAN  IP CONSULT TO SOCIAL WORK  IP CONSULT PALLIATIVE CARE  IP CONSULT TO SOCIAL WORK  IP CONSULT TO SOCIAL WORK    Radiology:  CARD ECHO 2D LIMITED W/CONTRAST ([id number])    Result Date: 2024  Transthoracic Echocardiogram Name:Arleth Weiss Date: 2024 :  1955 Ht:  (69in)  BP: 113 / 76 MRN:  [id number]    Age:  69years    Wt:  (218lb) HR: 92bpm Loc:  [facility]       Gndr: F          BSA: 2.14m^2 Sonographer: MEME Orellana  Ordering:    Nicolás Medley Consulting:  Milka Stahl ---------------------------------------------------------------------------- History/Indications:   Coronary artery disease.  Congestive heart failure. Chronic Obstructive Pulmonary Disease. Shortness of breath, concern for congestive heart failure, evaluate left ventricular function  Risk factors: Hypertension. ---------------------------------------------------------------------------- Procedure information:  A transthoracic echocardiogram, limited study was performed. Additional evaluation included M-mode and limited 2D.  Patient status:  Inpatient.  Location:  Room 2610.    Comparison was made to the study of 09/14/2024.    This was a routine study. Transthoracic echocardiography for ventricular function evaluation. Image quality was suboptimal. The study was technically limited due to poor acoustic window availability. Intravenous contrast (Definity) was administered to opacify the LV. ECG rhythm:   Atrial fibrillation ---------------------------------------------------------------------------- Conclusions: 1. Left ventricle: The cavity size was normal. Wall thickness was normal.    Systolic function was normal. The estimated ejection fraction was 50-55%,    by visual assessment. Wall motion is normal; there are no regional wall    motion abnormalities. Unable to assess LV diastolic function. 2. Right ventricle: Systolic function was normal. 3. Left atrium: The atrium was mildly dilated. 4. Aortic valve: Elizondo MINOR S3 23mm (TAVR) bioprosthesis was present,    well seated. No functional/doppler analysis performed. 5. Mitral valve: The annulus was markedly calcified. The leaflets were    mildly calcified. Minimal stenosis. The mean diastolic gradient was 3mm    Hg. Impressions:  This study is compared with previous dated 9/14/2024: No significant change. * ---------------------------------------------------------------------------- * Findings: Left  ventricle:  The cavity size was normal. Wall thickness was normal. Systolic function was normal. The estimated ejection fraction was 50-55%, by visual assessment. Wall motion is normal; there are no regional wall motion abnormalities. Unable to assess LV diastolic function. Left atrium:  The atrium was mildly dilated. Right ventricle:  The cavity size was normal. Systolic function was normal. Right atrium:  The atrium was normal in size. Mitral valve:  The annulus was markedly calcified. The leaflets were mildly calcified.  Doppler:  Minimal stenosis.    The mean diastolic gradient was 3mm Hg. Aortic valve:  Elizondo MINOR S3 23mm (TAVR) bioprosthesis was present, well seated. No functional/doppler analysis performed. Pericardium:   There was no pericardial effusion. Aorta: Ascending aorta: The ascending aorta was normal. Systemic veins: Inferior vena cava: The IVC was poorly visualized. ---------------------------------------------------------------------------- Measurements  Left ventricle         Value        Ref       09/14/2024  IVS thickness, ED, (H) 1.0   cm     0.6 - 0.9 0.8  PLAX  LV ID, ED, PLAX        4.8   cm     3.8 - 5.2 5.2  LV ID, ES, PLAX        3.5   cm     2.2 - 3.5 3.8  LV PW thickness,       0.8   cm     0.6 - 0.9 0.8  ED, PLAX  IVS/LV PW ratio,       1.28         --------- 1.03  ED, PLAX  LV PW/LV ID ratio,     0.16         --------- 0.15  ED, PLAX  LV ejection            54    %      54 - 74   52  fraction  Ascending aorta        Value        Ref       09/14/2024  Ascending aorta        3.2   cm     1.9 - 3.5 ----------  ID, A-P, ED  Left atrium            Value        Ref       09/14/2024  LA ID, A-P, ES     (H) 4.4   cm     2.7 - 3.8 4.4  LA volume, ES, 1-p (H) 114   ml     22 - 52   155  A4C  Mitral valve           Value        Ref       09/14/2024  Mitral mean            3     mm Hg  --------- 3  gradient, D  Mitral peak            10    mm Hg  --------- 11  gradient, D  Mitral regurg VTI,      41.8  cm     --------- 38.7  PISA  Right ventricle        Value        Ref       09/14/2024  RV s', lateral         10.9  cm/sec >=9.5     ---------- Legend: (L)  and  (H)  romelia values outside specified reference range. ---------------------------------------------------------------------------- Prepared and electronically signed by Khoa Caputo 11/25/2024 13:54     XR CHEST AP PORTABLE  (CPT=71045)    Result Date: 11/23/2024  PROCEDURE:  XR CHEST AP PORTABLE  (CPT=71045)  TECHNIQUE:  AP chest radiograph was obtained.  COMPARISON:  [facility] , XR, XR CHEST AP PORTABLE  (CPT=71045), 9/16/2024, 11:52 AM.  INDICATIONS:  jose, weight gain  for a few weeks 99% on ra  PATIENT STATED HISTORY: (As transcribed by Technologist)  Patient stated she has been experiencing sob and weight gain.             CONCLUSION:    Cardiomegaly without vascular congestion.  Nonspecific accentuation interstitial markings left base.  No sizable effusion or pneumothorax.  No hyperinflation.  LOCATION:  [facility]      Dictated by (CST): Crow Santiago MD on 11/23/2024 at 6:41 PM     Finalized by (CST): Crow Santiago MD on 11/23/2024 at 6:41 PM       CTA ABDOMEN/PELVIS LOWER EXT BILAT W RUNOFF (DUR=34225)    Result Date: 11/14/2024  PROCEDURE:  CTA ABDOMEN/PELVIS LOWER EXT BILAT W RUNOFF (XCY=72774/18536)  COMPARISON:  [facility], CT, CT ABDOMEN+PELVIS(CONTRAST ONLY)(CPT=74177), 1/09/2023, 12:37 PM.  [facility] , CT, CTA ABD/PEL (CPT=74174), 11/13/2020, 11:41 AM.  INDICATIONS:  bilateral feet pain, unable to detect pulses, one is purple one is red  TECHNIQUE:  CT images of the abdomen, pelvis, and lower extremities were obtained pre- and post- injection of non-ionic intravenous contrast material. Multi-planar reformatted/3-D images were created to optimize visualization of vascular anatomy.  Dose reduction techniques were used. Dose information is transmitted to the ACR (American College of Radiology) NRDR (National Radiology Data Registry) which includes  the Dose Index Registry.  PATIENT STATED HISTORY:(As transcribed by Technologist)  Bilateral feet pain, unable to detect pulses, one is purple one is red.   CONTRAST USED:  100cc of Isovue 370  FINDINGS:  This examination is compromised due to artifact from bilateral hip prostheses, bilateral knee prostheses and the patient's arms at her sides.  AORTO-ILIAC:  Atherosclerotic calcifications.  No aneurysm or dissection.  Widely patent celiac, superior mesenteric and single bilateral renal arteries.  Ostial calcification of patent inferior mesenteric artery.  Mild atherosclerotic calcifications of common and internal iliac arteries bilaterally without stenosis.  Widely patent bilateral external iliac arteries. RIGHT LEG:  Detail is compromised by artifact from bilateral hip prostheses.  Plaque of common femoral artery causes less than 50% stenosis.  Calcified and patent profunda femoral artery.  Extensive calcification of superficial femoral artery without stenosis.  Popliteal artery is partly obscured by artifact from hip prostheses with no significant stenosis identified.  Severely compromises assessment of distal runoff due to extensive calcification of all 3 vessels.  There is contrast opacification of  noncalcified segments of the dorsal pedal and posterior tibial artery in the ankle and hindfoot. LEFT LEG:  Detail is compromised by artifact from bilateral hip prostheses.  Plaque of common femoral artery causes less than 50% stenosis.  Calcified and patent profunda femoral artery.  Extensive calcification of superficial femoral artery without stenosis.  Popliteal artery is partly obscured by artifact from hip prostheses with no significant stenosis identified.  Severely compromises assessment of distal runoff due to extensive calcification of all 3 vessels.  There is contrast opacification of  noncalcified segments of the dorsal pedal and posterior tibial artery in the ankle and hindfoot. LIVER:  Diffuse low  attenuation.  No mass. BILIARY:  Calcified gallstones in gallbladder lumen.  No bile duct dilatation. PANCREAS:  Stable mild atrophy.  No mass or ductal dilatation. SPLEEN:  No enlargement or focal lesion.  KIDNEYS:  No mass, obstruction, or calcification.  ADRENALS:  No mass or enlargement.  VASCULAR:  IVC filter in place.  This is a chronic indwelling IVC filter which should not be assessed for potential removal. RETROPERITONEUM:  No mass or adenopathy.  BOWEL/MESENTERY:  No visible mass, obstruction, or bowel wall thickening.  Normal appendix ABDOMINAL WALL:  No mass or hernia.  URINARY BLADDER:  No visible focal wall thickening, lesion, or calculus.  Limited assessment due to artifact from hip prostheses. PELVIC NODES:  No adenopathy.  PELVIC ORGANS:  No visible mass.  Pelvic organs appropriate for patient age.  BONES:  Degenerative changes of spine and ankles.  Bilateral hip and knee prostheses. LUNG BASES:  Mild bibasilar scarring or atelectasis.             CONCLUSION:  1. Compromised study due to artifact from patient's arms at her side, and bilateral hip and knee prostheses and due to extensive calcification of infrapopliteal runoff vessels. The proximal and mid popliteal arteries are not diagnostically visualized. 2. Scattered atherosclerotic calcifications with no arterial occlusion or significant stenosis identified. 3. Although assessment of runoff vessels is severely compromised due to extensive calcification, there is opacification of noncalcified segments of dorsal pedal and posterior tibial arteries in the ankles and feet bilaterally. 4. Hepatic steatosis. 5. Cholelithiasis.   LOCATION:  [facility]   Dictated by (CST): Juarez Cordon MD on 11/14/2024 at 8:07 PM     Finalized by (CST): Juarez Cordon MD on 11/14/2024 at 8:23 PM         Operative reports:      Hospital course:    70 y/o F w/ PMHx of HFrEF, aortic stenosis status post TAVR, CAD status post PCI in 2021, PVD, HTN, HLD, A-fib on oral  anticoagulation, hypothyroidism, SEBASTIEN, CKD stage II, COPD, depression, recent admission for MRSA bacteremia s/p completion of vancomycin who presents to the hospital with leg swelling, dyspnea, and weight gain.     Acute on chronic decompensated HFrEF (recovered EF 50% in September)  Acute hypoxic respiratory failure 2/2 above  -Patient had not been taking her diuretic (supposed to take Bumex 2 mg twice daily).    -echo without changes  -switched to torsemide from IV lasix but then orthostatic dry on 12/1. Received ivf bolus and torsemide then held.  Torsemide being resumed at 10 daily  -Continue home metoprolol succinate 25 mg nightly, spironolactone 25 mg daily        Aortic stenosis s/p TAVR  Paroxysmal atrial fibrillation  Therapeutic INR  -Continue home warfarin regimen  -Monitor INR  -Continue home beta-blocker, digoxin, Plavix. Stopping cardizem per cards.     CKD stage II  -stable     Hyperlipidemia  -Continue home rosuvastatin     Depression  -Continue home fluoxetine, nortriptyline, bupropion     Gout  -Continue home allopurinol     Chronic pain  -Continue home nortriptyline, as needed tramadol, as needed Tylenol     History of MRSA bacteremia  -Recently completed IV vancomycin, no further treatment at this time     Hypothyroidism  -Continue home levothyroxine     PVD- pt with recent CTA LE showing significant disease. Partner discussed with Dr. Andrade who had reviewed this CT. He does not feel that additional US will add info, pt can follow-up as previously scheduled. No interventions at this time.        Day of discharge exam:  Vitals:    12/04/24 0819   BP: 94/51   Pulse: 88   Resp: 20   Temp: 98.3 °F (36.8 °C)        Total time coordinating care 32 min      Patient and/or family had opportunity to ask questions and expressed understanding and agreement with therapeutic plan as outlined         Cody [facility] Hospitalist  [phone]  Answering Service: [phone]

## 2024-12-04 NOTE — PLAN OF CARE
Assumed care of patient @ 0730 patient resting in bed, AOX4, reports mild to moderate foot pain, declined prn medication at this time. Lung sounds clear, but diminished bilaterally, O2 saturation adequate on room air. No complaints of shortness of breath or chest pain at this time. Afib on tele. Bowel sounds present and active in all quadrants, last bowel movement today. Pt ambulating with crutches from home, short distances.     Plan of Care: Orthos q shift. Possible discharge today.     Discussed plan of care with patient, verbalized understanding. Call light within reach.     Problem: Patient/Family Goals  Goal: Patient/Family Long Term Goal  Description: Patient's Long Term Goal: Maintain level of activity    Interventions:  - Follow medication regiment  - See additional Care Plan goals for specific interventions  Outcome: Progressing  Goal: Patient/Family Short Term Goal  Description: Patient's Short Term Goal: Discharge    Interventions:   - Follow medication regiment   - See additional Care Plan goals for specific interventions  Outcome: Progressing     Problem: PAIN - ADULT  Goal: Verbalizes/displays adequate comfort level or patient's stated pain goal  Description: INTERVENTIONS:  - Encourage pt to monitor pain and request assistance  - Assess pain using appropriate pain scale  - Administer analgesics based on type and severity of pain and evaluate response  - Implement non-pharmacological measures as appropriate and evaluate response  - Consider cultural and social influences on pain and pain management  - Manage/alleviate anxiety  - Utilize distraction and/or relaxation techniques  - Monitor for opioid side effects  - Notify MD/LIP if interventions unsuccessful or patient reports new pain  - Anticipate increased pain with activity and pre-medicate as appropriate  Outcome: Progressing     Problem: CARDIOVASCULAR - ADULT  Goal: Maintains optimal cardiac output and hemodynamic stability  Description:  INTERVENTIONS:  - Monitor vital signs, rhythm, and trends  - Monitor for bleeding, hypotension and signs of decreased cardiac output  - Evaluate effectiveness of vasoactive medications to optimize hemodynamic stability  - Monitor arterial and/or venous puncture sites for bleeding and/or hematoma  - Assess quality of pulses, skin color and temperature  - Assess for signs of decreased coronary artery perfusion - ex. Angina  - Evaluate fluid balance, assess for edema, trend weights  Outcome: Progressing  Goal: Absence of cardiac arrhythmias or at baseline  Description: INTERVENTIONS:  - Continuous cardiac monitoring, monitor vital signs, obtain 12 lead EKG if indicated  - Evaluate effectiveness of antiarrhythmic and heart rate control medications as ordered  - Initiate emergency measures for life threatening arrhythmias  - Monitor electrolytes and administer replacement therapy as ordered  Outcome: Progressing     Problem: RESPIRATORY - ADULT  Goal: Achieves optimal ventilation and oxygenation  Description: INTERVENTIONS:  - Assess for changes in respiratory status  - Assess for changes in mentation and behavior  - Position to facilitate oxygenation and minimize respiratory effort  - Oxygen supplementation based on oxygen saturation or ABGs  - Provide Smoking Cessation handout, if applicable  - Encourage broncho-pulmonary hygiene including cough, deep breathe, Incentive Spirometry  - Assess the need for suctioning and perform as needed  - Assess and instruct to report SOB or any respiratory difficulty  - Respiratory Therapy support as indicated  - Manage/alleviate anxiety  - Monitor for signs/symptoms of CO2 retention  Outcome: Progressing     Problem: Diabetes/Glucose Control  Goal: Glucose maintained within prescribed range  Description: INTERVENTIONS:  - Monitor Blood Glucose as ordered  - Assess for signs and symptoms of hyperglycemia and hypoglycemia  - Administer ordered medications to maintain glucose within  target range  - Assess barriers to adequate nutritional intake and initiate nutrition consult as needed  - Instruct patient on self management of diabetes  Outcome: Progressing

## 2024-12-04 NOTE — DIETARY NOTE
[facility]   part of [facility]   CLINICAL NUTRITION    Arleth Weiss     Admitting diagnosis:  Acute on chronic congestive heart failure, unspecified heart failure type (HCC) [I50.9]    Ht:  5' 9\"  Wt: 90.1 kg (198 lb 10.2 oz).   Body mass index is 29.33 kg/m².  IBW: 66 kg    Wt Readings from Last 6 Encounters:   12/04/24 90.1 kg (198 lb 10.2 oz)   11/14/24 96.6 kg (213 lb)   09/19/24 91.3 kg (201 lb 4.8 oz)   08/16/24 97.8 kg (215 lb 11.2 oz)   07/01/24 92.1 kg (203 lb)   06/20/24 92.1 kg (203 lb)        Labs/Meds reviewed      Diet:       Procedures    Cardiac diet Cardiac; Is Patient on Accuchecks? No     Percent Meals Eaten (last 3 days)       Date/Time Percent Meals Eaten (%)    12/01/24 1554 50 %    12/01/24 1900 50 %    12/02/24 0758 0 %     Percent Meals Eaten (%): Pt refuses breakfast at 12/02/24 0758    12/02/24 1300 75 %    12/03/24 0743 0 %     Percent Meals Eaten (%): Pt refused Breakfast at 12/03/24 0743    12/03/24 2029 50 %    12/04/24 0819 0 %          12/4-     11/25-Pt chart reviewed d/t nutrition consult for HF.    Provided and discussed handout on Low Sodium Nutrition Therapy w/ list of foods recommended, foods to avoid/limit, sample menus, and list of salt free seasoning alternatives. Pt stated she is familiar w/ the low sodium diet and has no nutrition related questions at this time.   Pt educated by HF Nurse  earlier today.  Pt w/ diuretic noncompliance d/t frequent urination. Approved for [facility] d/t urinary incontinence.     Patient reports good appetite at this time; although does not care for the hospital food.  Nursing notes reports Percent Meals Eaten (%): 0 % intake for last meal.  Tolerating po diet without diarrhea, emesis, or constipation. Last BM:11/22.   Per RN documentation pt w/ wounds to B/L LE and L arm (painful, pink). Today Wound Care noted no open wounds.    Pt reported wt gain d/t fluid accumulation. Wt trending down this admit d/t diuresis.     PMH:HTN,  HLD, aortic stenosis, s/p TAVR, CAD, s/p PCI, PVD, a-fib, former tobacco use, COPD, SEBASTIEN, CKD2, hypothyroidism, recent admit for MRSA bacteremia.     Patient is at low nutrition risk at this time.    Please consult if patient status changes or nutrition issues arise.    Mana Thornton MS, RD, LDN  Clinical Dietitian  Ext:[phone]

## 2024-12-04 NOTE — PLAN OF CARE
Pt cleared to discharge per hospitalist and consults. Discharge AVS including medication information, follow-ups, and prescriptions given. Patient verbalized understanding and is agreeable with discharge. IV removed, telemetry discontinued. All belongings taken with patient. Transported off unit via wheelchair. Will drive self home as car is here at the hospital.     Problem: Patient/Family Goals  Goal: Patient/Family Long Term Goal  Description: Patient's Long Term Goal: Maintain level of activity    Interventions:  - Follow medication regiment  - See additional Care Plan goals for specific interventions  12/4/2024 1531 by Barbie Puga RN  Outcome: Adequate for Discharge  12/4/2024 1048 by Barbie Puga RN  Outcome: Progressing  Goal: Patient/Family Short Term Goal  Description: Patient's Short Term Goal: Discharge    Interventions:   - Follow medication regiment   - See additional Care Plan goals for specific interventions  12/4/2024 1531 by Barbie Puga RN  Outcome: Adequate for Discharge  12/4/2024 1048 by Barbie Puga RN  Outcome: Progressing     Problem: PAIN - ADULT  Goal: Verbalizes/displays adequate comfort level or patient's stated pain goal  Description: INTERVENTIONS:  - Encourage pt to monitor pain and request assistance  - Assess pain using appropriate pain scale  - Administer analgesics based on type and severity of pain and evaluate response  - Implement non-pharmacological measures as appropriate and evaluate response  - Consider cultural and social influences on pain and pain management  - Manage/alleviate anxiety  - Utilize distraction and/or relaxation techniques  - Monitor for opioid side effects  - Notify MD/LIP if interventions unsuccessful or patient reports new pain  - Anticipate increased pain with activity and pre-medicate as appropriate  12/4/2024 1531 by Barbie Puga RN  Outcome: Adequate for Discharge  12/4/2024 1048 by Barbie Puga RN  Outcome:  Progressing     Problem: CARDIOVASCULAR - ADULT  Goal: Maintains optimal cardiac output and hemodynamic stability  Description: INTERVENTIONS:  - Monitor vital signs, rhythm, and trends  - Monitor for bleeding, hypotension and signs of decreased cardiac output  - Evaluate effectiveness of vasoactive medications to optimize hemodynamic stability  - Monitor arterial and/or venous puncture sites for bleeding and/or hematoma  - Assess quality of pulses, skin color and temperature  - Assess for signs of decreased coronary artery perfusion - ex. Angina  - Evaluate fluid balance, assess for edema, trend weights  12/4/2024 1531 by Barbie Puga RN  Outcome: Adequate for Discharge  12/4/2024 1048 by Barbie Puga RN  Outcome: Progressing  Goal: Absence of cardiac arrhythmias or at baseline  Description: INTERVENTIONS:  - Continuous cardiac monitoring, monitor vital signs, obtain 12 lead EKG if indicated  - Evaluate effectiveness of antiarrhythmic and heart rate control medications as ordered  - Initiate emergency measures for life threatening arrhythmias  - Monitor electrolytes and administer replacement therapy as ordered  12/4/2024 1531 by Barbie Puga RN  Outcome: Adequate for Discharge  12/4/2024 1048 by Barbie Puga RN  Outcome: Progressing     Problem: RESPIRATORY - ADULT  Goal: Achieves optimal ventilation and oxygenation  Description: INTERVENTIONS:  - Assess for changes in respiratory status  - Assess for changes in mentation and behavior  - Position to facilitate oxygenation and minimize respiratory effort  - Oxygen supplementation based on oxygen saturation or ABGs  - Provide Smoking Cessation handout, if applicable  - Encourage broncho-pulmonary hygiene including cough, deep breathe, Incentive Spirometry  - Assess the need for suctioning and perform as needed  - Assess and instruct to report SOB or any respiratory difficulty  - Respiratory Therapy support as indicated  - Manage/alleviate  anxiety  - Monitor for signs/symptoms of CO2 retention  12/4/2024 1531 by Barbie Puga, FAUSTO  Outcome: Adequate for Discharge  12/4/2024 1048 by Barbie Puga RN  Outcome: Progressing     Problem: Diabetes/Glucose Control  Goal: Glucose maintained within prescribed range  Description: INTERVENTIONS:  - Monitor Blood Glucose as ordered  - Assess for signs and symptoms of hyperglycemia and hypoglycemia  - Administer ordered medications to maintain glucose within target range  - Assess barriers to adequate nutritional intake and initiate nutrition consult as needed  - Instruct patient on self management of diabetes  12/4/2024 1531 by Barbie Puga, RN  Outcome: Adequate for Discharge  12/4/2024 1048 by Barbie Puga, RN  Outcome: Progressing

## 2024-12-04 NOTE — OCCUPATIONAL THERAPY NOTE
OCCUPATIONAL THERAPY TREATMENT NOTE - INPATIENT     Room Number: 2610/2610-A  Session: 1   Number of Visits to Meet Established Goals: 3    Presenting Problem: Acute on chronic congestive heart failure, unspecified heart failure (HCC)    ASSESSMENT   Patient demonstrates fair progress this session, goals at supervision level.  Patient functions near baseline with  all ADL .   Occupational Therapy will discharge patient at this time as all goals have been met at near baseline. Per pt, she has ways that work for her at home in terms of ADL. No ADL concerns, per pt.     History: Patient is a 69 year old female admitted on 11/23/2024 with Presenting Problem: Acute on chronic congestive heart failure, unspecified heart failure (HCC). Co-Morbidities : HFrEF s/p TAVR, CAD s/p PCI 2021, PVD, HTN, HLD, A-fib. hypothyroidism     TREATMENT SESSION:  Patient Start of Session: seated edge of bed    FUNCTIONAL TRANSFER ASSESSMENT  Sit to Stand: Chair  Edge of Bed: Supervision  Chair: Supervision    BED MOBILITY  Rolling: Independent  Supine to Sit : Modified Independent  Sit to Supine (OT): Supervision  Scooting: independent    BALANCE ASSESSMENT  Static Sitting: Independent  Static Standing: Supervision    FUNCTIONAL ADL ASSESSMENT  LB Dressing Seated: Maximum Assist (typically utilizes sock aide to don socks at home)      EDUCATION PROVIDED  Patient Education : Role of Occupational Therapy; Plan of Care; Energy Conservation  Patient's Response to Education: Verbalized Understanding    Equipment used: loftstrand crutches  Demonstrates functional use     Therapist comments: seated at edge of bed.  Using her loftstrand crutches, Modified independent to stand, take steps with supervision. Refer to PT note. Per pt, she was able to ambulate the distance she needs to walk to go to the bathroom at home.   Independent with UB dressing (gown off seated, brining sleeves together).   Pt mentioned that she already uses compensatory  strategies to complete ADL.     Patient End of Session: In bed;Needs met;Call light within reach;RN aware of session/findings;All patient questions and concerns addressed    PAIN ASSESSMENT  Ratin  Location: feet        OBJECTIVE  Precautions: Bed/chair alarm    AM-PAC ‘6-Clicks’ Inpatient Daily Activity Short Form  -   Putting on and taking off regular lower body clothing?: A Little (supervision)  -   Bathing (including washing, rinsing, drying)?: A Little  -   Toileting, which includes using toilet, bedpan or urinal? : A Little (supervision)  -   Putting on and taking off regular upper body clothing?: None  -   Taking care of personal grooming such as brushing teeth?: None  -   Eating meals?: None    AM-PAC Score:  Score: 21  Approx Degree of Impairment: 32.79%  Standardized Score (AM-PAC Scale): 44.27    PLAN  OT Device Recommendations: None (Pt has sufficient equipment for ADLs at home)  OT Treatment Plan: Balance activities;Energy conservation/work simplification techniques;ADL training;IADL training;UE strengthening/ROM;Functional transfer training;Endurance training;Patient/Family education;Patient/Family training;Equipment eval/education;Compensatory technique education;Continued evaluation  Rehab Potential : Good  Frequency: 3-5x/week    OT Goals:   ADL Goals   Patient will perform lower body dressing:  with supervision and with adaptive equipment PRN  Patient will perform toileting: with supervision     Functional Transfer Goals  Patient will transfer to toilet:  with supervision    OT Session Time: 15  minutes  Self-Care Home Management: 8 minutes  Therapeutic Activity: 2 minutes

## 2024-12-07 ENCOUNTER — TELEPHONE (OUTPATIENT)
Dept: CASE MANAGEMENT | Facility: HOSPITAL | Age: 69
End: 2024-12-07

## 2024-12-07 NOTE — CM/SW NOTE
Patient is currently in [place] at [facility] in the ER.   Is requesting transfer to EDW for continuity of care.  There are currently no tele beds available.  [facility] to call transfer center tomorrow if a bed is still needed.

## 2024-12-09 VITALS
SYSTOLIC BLOOD PRESSURE: 147 MMHG | HEART RATE: 102 BPM | OXYGEN SATURATION: 65 % | RESPIRATION RATE: 20 BRPM | TEMPERATURE: 98 F | BODY MASS INDEX: 29 KG/M2 | WEIGHT: 198.63 LBS | DIASTOLIC BLOOD PRESSURE: 97 MMHG

## 2025-01-06 NOTE — PROGRESS NOTES
[facility]  Progress Note    Macho Patino Patient Status:  Inpatient    1955 MRN [id number]   [facility] 6NE-A Attending Rafael Dang DO   Hosp Day # 3 PCP Laura Tam DO     Impression:  1.  Severe non-rhe murmurs/rubs/gallops are appreciated  Lungs: Clear to auscultation bilaterally; no accessory muscle use  Abdomen: Soft, non-tender; bowel sounds are normoactive  Extremities: No clubbing/cyanosis; moves all 4 extremities normally      •  aspirin EC tab 81 0.9% NaCl infusion, , Intravenous, Continuous    •  buPROPion HCl ER (SR) (WELLBUTRIN SR) 12 hr tab 150 mg, 150 mg, Oral, BID    •  Levothyroxine Sodium (SYNTHROID) tab 150 mcg, 150 mcg, Oral, Before breakfast    •  Rosuvastatin Calcium (CRESTOR) tab 5 mg, [Follow-Up] : a follow-up visit [TextBox_[id number]] : FERMIN

## 2025-01-27 PROCEDURE — 96374 THER/PROPH/DIAG INJ IV PUSH: CPT

## 2025-01-27 PROCEDURE — 36415 COLL VENOUS BLD VENIPUNCTURE: CPT

## 2025-01-27 PROCEDURE — 99285 EMERGENCY DEPT VISIT HI MDM: CPT

## 2025-01-27 PROCEDURE — 96375 TX/PRO/DX INJ NEW DRUG ADDON: CPT

## 2025-01-28 ENCOUNTER — HOSPITAL ENCOUNTER (INPATIENT)
Facility: HOSPITAL | Age: 70
LOS: 7 days | Discharge: NURSING, REHAB OR LONG-TERM CARE | End: 2025-02-04
Attending: EMERGENCY MEDICINE | Admitting: INTERNAL MEDICINE
Payer: MEDICARE

## 2025-01-28 ENCOUNTER — APPOINTMENT (OUTPATIENT)
Dept: GENERAL RADIOLOGY | Facility: HOSPITAL | Age: 70
End: 2025-01-28
Attending: EMERGENCY MEDICINE
Payer: MEDICARE

## 2025-01-28 DIAGNOSIS — I50.9 ACUTE ON CHRONIC CONGESTIVE HEART FAILURE, UNSPECIFIED HEART FAILURE TYPE (HCC): Primary | ICD-10-CM

## 2025-01-28 DIAGNOSIS — S61.411A SKIN TEAR OF HAND WITHOUT COMPLICATION, RIGHT, INITIAL ENCOUNTER: ICD-10-CM

## 2025-01-28 DIAGNOSIS — M86.9 OSTEOMYELITIS OF LEFT FOOT, UNSPECIFIED TYPE (HCC): ICD-10-CM

## 2025-01-28 LAB
ALBUMIN SERPL-MCNC: 3.6 G/DL (ref 3.2–4.8)
ALBUMIN/GLOB SERPL: 1.2 {RATIO} (ref 1–2)
ALP LIVER SERPL-CCNC: 117 U/L
ALT SERPL-CCNC: 20 U/L
ANION GAP SERPL CALC-SCNC: 8 MMOL/L (ref 0–18)
AST SERPL-CCNC: 35 U/L (ref ?–34)
BASOPHILS # BLD AUTO: 0.05 X10(3) UL (ref 0–0.2)
BASOPHILS NFR BLD AUTO: 0.6 %
BILIRUB SERPL-MCNC: 0.4 MG/DL (ref 0.2–1.1)
BUN BLD-MCNC: 14 MG/DL (ref 9–23)
CALCIUM BLD-MCNC: 9.4 MG/DL (ref 8.7–10.6)
CHLORIDE SERPL-SCNC: 106 MMOL/L (ref 98–112)
CO2 SERPL-SCNC: 26 MMOL/L (ref 21–32)
CREAT BLD-MCNC: 0.56 MG/DL
CRP SERPL-MCNC: 1.8 MG/DL (ref ?–0.5)
EGFRCR SERPLBLD CKD-EPI 2021: 98 ML/MIN/1.73M2 (ref 60–?)
EOSINOPHIL # BLD AUTO: 0.41 X10(3) UL (ref 0–0.7)
EOSINOPHIL NFR BLD AUTO: 5.2 %
ERYTHROCYTE [DISTWIDTH] IN BLOOD BY AUTOMATED COUNT: 14.4 %
GLOBULIN PLAS-MCNC: 3.1 G/DL (ref 2–3.5)
GLUCOSE BLD-MCNC: 89 MG/DL (ref 70–99)
HCT VFR BLD AUTO: 32.2 %
HGB BLD-MCNC: 10.9 G/DL
IMM GRANULOCYTES # BLD AUTO: 0.03 X10(3) UL (ref 0–1)
IMM GRANULOCYTES NFR BLD: 0.4 %
INR BLD: 1.8 (ref 0.8–1.2)
LACTATE SERPL-SCNC: 0.9 MMOL/L (ref 0.5–2)
LYMPHOCYTES # BLD AUTO: 1.62 X10(3) UL (ref 1–4)
LYMPHOCYTES NFR BLD AUTO: 20.7 %
MCH RBC QN AUTO: 32.2 PG (ref 26–34)
MCHC RBC AUTO-ENTMCNC: 33.9 G/DL (ref 31–37)
MCV RBC AUTO: 95 FL
MONOCYTES # BLD AUTO: 0.83 X10(3) UL (ref 0.1–1)
MONOCYTES NFR BLD AUTO: 10.6 %
NEUTROPHILS # BLD AUTO: 4.88 X10 (3) UL (ref 1.5–7.7)
NEUTROPHILS # BLD AUTO: 4.88 X10(3) UL (ref 1.5–7.7)
NEUTROPHILS NFR BLD AUTO: 62.5 %
NT-PROBNP SERPL-MCNC: 1266 PG/ML (ref ?–125)
OSMOLALITY SERPL CALC.SUM OF ELEC: 290 MOSM/KG (ref 275–295)
PLATELET # BLD AUTO: 401 10(3)UL (ref 150–450)
POTASSIUM SERPL-SCNC: 3.3 MMOL/L (ref 3.5–5.1)
POTASSIUM SERPL-SCNC: 3.6 MMOL/L (ref 3.5–5.1)
PROCALCITONIN SERPL-MCNC: 0.1 NG/ML (ref ?–0.05)
PROT SERPL-MCNC: 6.7 G/DL (ref 5.7–8.2)
PROTHROMBIN TIME: 21.1 SECONDS (ref 11.6–14.8)
RBC # BLD AUTO: 3.39 X10(6)UL
SODIUM SERPL-SCNC: 140 MMOL/L (ref 136–145)
WBC # BLD AUTO: 7.8 X10(3) UL (ref 4–11)

## 2025-01-28 PROCEDURE — 71045 X-RAY EXAM CHEST 1 VIEW: CPT | Performed by: EMERGENCY MEDICINE

## 2025-01-28 PROCEDURE — 86140 C-REACTIVE PROTEIN: CPT | Performed by: EMERGENCY MEDICINE

## 2025-01-28 PROCEDURE — 84132 ASSAY OF SERUM POTASSIUM: CPT | Performed by: INTERNAL MEDICINE

## 2025-01-28 PROCEDURE — 73630 X-RAY EXAM OF FOOT: CPT | Performed by: EMERGENCY MEDICINE

## 2025-01-28 PROCEDURE — 73130 X-RAY EXAM OF HAND: CPT | Performed by: EMERGENCY MEDICINE

## 2025-01-28 PROCEDURE — 83605 ASSAY OF LACTIC ACID: CPT | Performed by: EMERGENCY MEDICINE

## 2025-01-28 PROCEDURE — 94760 N-INVAS EAR/PLS OXIMETRY 1: CPT

## 2025-01-28 PROCEDURE — 87040 BLOOD CULTURE FOR BACTERIA: CPT | Performed by: EMERGENCY MEDICINE

## 2025-01-28 PROCEDURE — 83880 ASSAY OF NATRIURETIC PEPTIDE: CPT | Performed by: EMERGENCY MEDICINE

## 2025-01-28 PROCEDURE — 84145 PROCALCITONIN (PCT): CPT | Performed by: EMERGENCY MEDICINE

## 2025-01-28 PROCEDURE — 85025 COMPLETE CBC W/AUTO DIFF WBC: CPT | Performed by: EMERGENCY MEDICINE

## 2025-01-28 PROCEDURE — 85610 PROTHROMBIN TIME: CPT | Performed by: EMERGENCY MEDICINE

## 2025-01-28 PROCEDURE — 80053 COMPREHEN METABOLIC PANEL: CPT | Performed by: EMERGENCY MEDICINE

## 2025-01-28 RX ORDER — ALLOPURINOL 100 MG/1
100 TABLET ORAL NIGHTLY
Status: DISCONTINUED | OUTPATIENT
Start: 2025-01-28 | End: 2025-02-04

## 2025-01-28 RX ORDER — POTASSIUM CHLORIDE 1.5 G/1.58G
40 POWDER, FOR SOLUTION ORAL EVERY 4 HOURS
Status: DISPENSED | OUTPATIENT
Start: 2025-01-28 | End: 2025-01-28

## 2025-01-28 RX ORDER — ACETAMINOPHEN 500 MG
TABLET ORAL 2 TIMES DAILY PRN
Status: DISCONTINUED | OUTPATIENT
Start: 2025-01-28 | End: 2025-02-04

## 2025-01-28 RX ORDER — WARFARIN SODIUM 5 MG/1
5 TABLET ORAL NIGHTLY
Status: DISCONTINUED | OUTPATIENT
Start: 2025-01-28 | End: 2025-01-28 | Stop reason: DRUGHIGH

## 2025-01-28 RX ORDER — LIDOCAINE HYDROCHLORIDE AND EPINEPHRINE 10; 10 MG/ML; UG/ML
1 INJECTION, SOLUTION INFILTRATION; PERINEURAL ONCE
Status: COMPLETED | OUTPATIENT
Start: 2025-01-28 | End: 2025-01-28

## 2025-01-28 RX ORDER — VANCOMYCIN HYDROCHLORIDE
15 EVERY 12 HOURS
Status: DISCONTINUED | OUTPATIENT
Start: 2025-01-28 | End: 2025-01-28

## 2025-01-28 RX ORDER — METOPROLOL SUCCINATE 25 MG/1
25 TABLET, EXTENDED RELEASE ORAL NIGHTLY
Status: DISCONTINUED | OUTPATIENT
Start: 2025-01-28 | End: 2025-01-30

## 2025-01-28 RX ORDER — VANCOMYCIN HYDROCHLORIDE
15 ONCE
Status: COMPLETED | OUTPATIENT
Start: 2025-01-28 | End: 2025-01-28

## 2025-01-28 RX ORDER — CLOPIDOGREL BISULFATE 75 MG/1
75 TABLET ORAL NIGHTLY
Status: DISCONTINUED | OUTPATIENT
Start: 2025-01-28 | End: 2025-02-04

## 2025-01-28 RX ORDER — WARFARIN SODIUM 2.5 MG/1
2.5 TABLET ORAL
Status: DISCONTINUED | OUTPATIENT
Start: 2025-01-29 | End: 2025-01-29

## 2025-01-28 RX ORDER — WARFARIN SODIUM 5 MG/1
5 TABLET ORAL
Status: DISCONTINUED | OUTPATIENT
Start: 2025-01-28 | End: 2025-01-29

## 2025-01-28 RX ORDER — VANCOMYCIN HYDROCHLORIDE
15
Status: DISCONTINUED | OUTPATIENT
Start: 2025-01-28 | End: 2025-01-31

## 2025-01-28 RX ORDER — BUPROPION HYDROCHLORIDE 150 MG/1
150 TABLET, EXTENDED RELEASE ORAL 2 TIMES DAILY
Status: DISCONTINUED | OUTPATIENT
Start: 2025-01-28 | End: 2025-02-04

## 2025-01-28 RX ORDER — FUROSEMIDE 40 MG/1
40 TABLET ORAL DAILY
Status: DISCONTINUED | OUTPATIENT
Start: 2025-01-28 | End: 2025-01-28

## 2025-01-28 RX ORDER — DIGOXIN 125 MCG
125 TABLET ORAL NIGHTLY
Status: DISCONTINUED | OUTPATIENT
Start: 2025-01-28 | End: 2025-02-04

## 2025-01-28 RX ORDER — FUROSEMIDE 10 MG/ML
40 INJECTION INTRAMUSCULAR; INTRAVENOUS ONCE
Status: DISCONTINUED | OUTPATIENT
Start: 2025-01-29 | End: 2025-01-29

## 2025-01-28 RX ORDER — LIDOCAINE HYDROCHLORIDE 20 MG/ML
JELLY TOPICAL
Status: COMPLETED
Start: 2025-01-28 | End: 2025-01-28

## 2025-01-28 RX ORDER — ROSUVASTATIN CALCIUM 5 MG/1
5 TABLET, COATED ORAL EVERY OTHER DAY
Status: DISCONTINUED | OUTPATIENT
Start: 2025-01-29 | End: 2025-02-04

## 2025-01-28 RX ORDER — ALBUTEROL SULFATE 90 UG/1
2 INHALANT RESPIRATORY (INHALATION) EVERY 6 HOURS PRN
Status: DISCONTINUED | OUTPATIENT
Start: 2025-01-28 | End: 2025-02-04

## 2025-01-28 RX ORDER — ACETAMINOPHEN 500 MG
500 TABLET ORAL EVERY 4 HOURS PRN
Status: DISCONTINUED | OUTPATIENT
Start: 2025-01-28 | End: 2025-01-28

## 2025-01-28 RX ORDER — FUROSEMIDE 10 MG/ML
40 INJECTION INTRAMUSCULAR; INTRAVENOUS ONCE
Status: COMPLETED | OUTPATIENT
Start: 2025-01-28 | End: 2025-01-28

## 2025-01-28 NOTE — ED QUICK NOTES
Orders for admission, patient is aware of plan and ready to go upstairs. Any questions, please call ED RN Arianna at extension [phone].     Patient Covid vaccination status: Fully vaccinated     COVID Test Ordered in ED: None    COVID Suspicion at Admission: N/A    Running Infusions:      Mental Status/LOC at time of transport: A&Ox4    Other pertinent information: Pt high fall risk, does not ambulate well without assitance  CIWA score: N/A   NIH score:  N/A

## 2025-01-28 NOTE — PLAN OF CARE
NURSING ADMISSION NOTE      Patient admitted via Cart  Oriented to room.  Safety precautions initiated.  Bed in low position.  Call light in reach.    Assumed care for pt. From ED at approximately 0510. A&Ox4, lungs clear on room air. Afib on tele, HR controlled. Purewick in place, draining clear yellow urine. Multiple wounds noted, pictures taken for medical chart. Order for wound care to evaluate. Vanco completed. Plan for ID consult, labs. Patient updated on the plan of care for the morning, questions addressed.

## 2025-01-28 NOTE — PHYSICAL THERAPY NOTE
PT orders received and chart reviewed. Pt politely but firmly declining to participate in PT eval at this time. Requests PT check back tomorrow. Will follow and re-attempt as able and appropriate.

## 2025-01-28 NOTE — ED INITIAL ASSESSMENT (HPI)
PT WITH FALL TODAY, SEEN BY EMS, LIVES IN [facility].  ARRIVED WITH RIGHT HAND WRAPPED IN GAUZE, DRIED BLOOD NOTED THROUGH GAUZE. STATES SHE FELL AROUND 1400 TODAY. STATES THE INJURY TO HER HAND IS \"MORE THAN A SKIN TEAR\"   +THINNERS.  DENIES HITTING HEAD, NECK OR BACK.    HX OF MRSA IN WOULD IN LEGS.

## 2025-01-28 NOTE — CONSULTS
Infectious Disease Initial Consultation      Date of admission: 1/28/2025 12:05 AM     Date of service: 01/28/25 9:14 AM    Consult requested by: Omi Blackman MD    Reason for consult: Question of bacteremia/sepsis, left foot osteomyelitis    Chief complaint: Pain in multiple sites, feeling ill and weak    History of present illness: Arleth Weiss is a 69 year old female with history of aortic stenosis, status post TAVR, CHF, hypertension, rheumatoid arthritis type 2 diabetes, who presents to the hospital with left foot pain, right hand pain, along with generalized weakness concerned about MRSA bacteremia.  The patient was given doxycycline recently for her left foot pain/cellulitis without any improvement.    In the emergency room, the patient was afebrile, hemodynamically stable.  Labs revealed mild anemia but otherwise unremarkable CBC.  BMP was unremarkable.  LFTs unremarkable.  proBNP was elevated at 1266.  CRP was slightly elevated 1.8.  Procalcitonin was negative.  2 sets of blood cultures obtained, remain negative to date.  An x-ray of the left foot was concerning for osteomyelitis involving the second middle and distal phalanx.  There was associated soft tissue swelling consistent with cellulitis.  X-ray of the right hand did not show any acute abnormalities.  The patient was then given a dose of IV vancomycin and admitted to the medical floor.  ID were consulted for antibiotic recommendations.    Review of systems:  All other components of the review of systems are negative, except those described in the history of present illness.     Past Medical History:    Aortic stenosis    S/P TAVR    Arrhythmia    ASTHMA    Asthma (HCC)    Back problem    CANCER    thyroid    Cancer (HCC)    thyroid     CHF (congestive heart failure) ([facility])    CKD (chronic kidney disease) stage 2, GFR 60-89 ml/min    Congestive heart disease (HCC)    COPD (chronic obstructive pulmonary disease) ([facility])    Deep vein thrombosis  (HCC)    DEPRESSION    Depression    Disorder of thyroid    Esophageal reflux    Essential hypertension    Fibromyalgia    Gout    High blood pressure    High cholesterol    History of blood transfusion    HYPERTENSION    HYPOTHYROIDISM    Incontinence    Muscle weakness    Neuropathy    OBESITY    OSTEOARTHRITIS    Osteoarthritis    OTHER DISEASES    Fibromyalgia    OTHER DISEASES    Pulmonary emboli    OTHER DISEASES    Lymph edema    OTHER DISEASES    Pulmonary hypertension    Peripheral vascular disease    Pneumonia due to organism    Prediabetes    Pulmonary embolism (HCC)    RA (rheumatoid arthritis) (HCC)    Renal disorder    Shortness of breath    ON EXERTION    SLEEP APNEA    Sleep apnea    CPAP     Past Surgical History:   Procedure Laterality Date    Anesth,shoulder replacement Right 2014    hemiathroplasty    Back surgery      Back surgery      complete c-3 -7 ectomy    Biopsy Left 2023    TEMPORAL ARTERY    Carpal tunnel release      Cath transcatheter aortic valve replacement      Colonoscopy N/A 05/10/2018    Procedure: COLONOSCOPY, POSSIBLE BIOPSY, POSSIBLE POLYPECTOMY 24780;  Surgeon: Kendell Valentin MD;  Location: [facility]    Colonoscopy      Craniotomy for lobotomy Left     D & c  2009    Dilation/curettage,diagnostic      Hip replacement surgery  2009    Rt hip    Knee replacement surgery      Both knees    Other surgical history      Thyroid removal    Other surgical history      LT foot spur removal    Spine surgery procedure unlisted      Thyroidectomy Bilateral     Total hip replacement      Total knee replacement       Social History     Socioeconomic History    Marital status: Single   Tobacco Use    Smoking status: Former     Current packs/day: 0.00     Average packs/day: 0.5 packs/day for 30.0 years (15.0 ttl pk-yrs)     Types: Cigarettes     Start date: 1961     Quit date: 1991     Years since quittin.0    Smokeless  tobacco: Never   Vaping Use    Vaping status: Never Used   Substance and Sexual Activity    Alcohol use: No    Drug use: No   Other Topics Concern    Caffeine Concern No    Exercise Yes    Seat Belt Yes    Special Diet Yes     Comment: low sodium    Stress Concern Yes    Weight Concern No     Social Drivers of Health     Food Insecurity: No Food Insecurity (1/28/2025)    Food Insecurity     Food Insecurity: Never true   Transportation Needs: No Transportation Needs (1/28/2025)    Transportation Needs     Lack of Transportation: No   Housing Stability: Low Risk  (1/28/2025)    Housing Stability     Housing Instability: No     Family History   Problem Relation Age of Onset    Hypertension Father     Lipids Father     Diabetes Mother     Hypertension Mother     Lipids Mother     Cancer Brother     Hypertension Brother      Reviewed, see above    Medications:    acetaminophen    albuterol    allopurinol    buPROPion SR    clopidogrel    digoxin    FLUoxetine    levothyroxine    metoprolol succinate ER    rosuvastatin    warfarin    potassium chloride     Allergies:  Allergies[1]    Physical Exam:  Vitals:    01/28/25 0804   BP: 119/46   Pulse: 82   Resp: 18   Temp: 98.5 °F (36.9 °C)     Vitals signs and nursing note reviewed.   Constitutional:       Appearance: Normal appearance.   HENT:      Head: Normocephalic and atraumatic.      Mouth: Mucous membranes are moist.   Neck:      Musculoskeletal: Neck supple.   Cardiovascular:      Rate and Rhythm: Normal rate.      Heart sounds: Normal heart sounds. No murmur. No friction rub. No gallop.    Pulmonary:      Effort: Pulmonary effort is normal. No respiratory distress.      Breath sounds: Normal breath sounds. No stridor. No wheezing, rhonchi or rales.   Chest:      Chest wall: No tenderness.   Abdominal:      General: Abdomen is flat. There is no distension.      Palpations: Abdomen is soft. There is no mass.      Tenderness: There is no tenderness. There is no guarding  or rebound.      Hernia: No hernia is present.   Musculoskeletal:      Right lower leg: No edema.      Left lower leg: No edema.  Left foot cellulitis noted with an open sores involving the second toe.     Right upper extremity:  Large hematoma noted with skin laceration.  No signs of infection.  No cellulitis.  Skin:     General: Skin is warm and dry.   Neurological:      General: No focal deficit present.      Mental Status: Alert and oriented to person, place, and time.     Laboratory data:  I have independently reviewed all lab results; including old microbiological results.  Lab Results   Component Value Date    WBC 7.8 01/28/2025    HGB 10.9 01/28/2025    HCT 32.2 01/28/2025    .0 01/28/2025    CREATSERUM 0.56 01/28/2025    BUN 14 01/28/2025     01/28/2025    K 3.6 01/28/2025     01/28/2025    CO2 26.0 01/28/2025    GLU 89 01/28/2025    CA 9.4 01/28/2025    ALB 3.6 01/28/2025    ALKPHO 117 01/28/2025    BILT 0.4 01/28/2025    TP 6.7 01/28/2025    AST 35 01/28/2025    ALT 20 01/28/2025    INR 1.80 01/28/2025    CRP 1.80 01/28/2025        Recent Labs   Lab 01/28/25  0058   RBC 3.39*   HGB 10.9*   HCT 32.2*   MCV 95.0   MCH 32.2   MCHC 33.9   RDW 14.4   NEPRELIM 4.88   WBC 7.8   .0       Microbiology data:  No results found for this visit on 01/28/25.    CULTURE, AEROBIC  Specimen: Culture - Toe  Component 11 d ago Comments   CULTURE RESULTS METHICILLIN RESISTANT STAPHYLOCOCCUS AUREUS PREVIOUSLY FLAGGED SAME ORGANISM   Resulting Agency [facility]    Susceptibility    Organism Antibiotic Method Susceptibility   Methicillin Resistant Staph aureus Clindamycin SEMC VITEK II Resistant    Comment: PRESUMED TO BE RESISTANT BASED ON DETECTION OF INDUCIBLE CLINDAMYCIN RESISTANCE   Methicillin Resistant Staph aureus Erythromycin SEMC VITEK II >=8 mcg/ml: Resistant   Methicillin Resistant Staph aureus Gentamicin SEMC VITEK II <=0.5 mcg/ml: Susceptible    Methicillin Resistant Staph aureus Oxacillin SEMC VITEK II >=4 mcg/ml: Resistant   Methicillin Resistant Staph aureus Tetracycline SEMC VITEK II <=1 mcg/ml: Susceptible   Methicillin Resistant Staph aureus Trimeth/Sulfamethoxazole SEMC VITEK II <=10 mcg/ml: Susceptible   Methicillin Resistant Staph aureus Vancomycin SEMC VITEK II <=0.5 mcg/ml: Susceptible   Specimen Collected: 01/17/25 11:20    Performed by: [facility] Last Resulted: 01/19/25 06:30   Received From: [facility]  Result Received: 01/27/25 21:16         Radiology:  I have reviewed all imaging data available independently.   X-ray of the left foot:  Question of osteomyelitis involving the second middle and distal phalanx.  Soft tissue swelling consistent with cellulitis.    X-ray of the right hand:  No acute abnormalities    Chest x-ray on 1/28:  Atelectasis noted on the left lower lobe, no other acute normalities.  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  Status post 1 dose of vancomycin  Drainage cultures from outside hospital 1/17/2025 grew MRSA  Would benefit from an MRI of the foot  Right hand stable after injury  X-ray without any acute abnormalities  No signs of infection on exam  Generalized weakness and fatigability  No fever  I do not see any signs of sepsis at this point  Rheumatoid arthritis  History of MRSA bacteremia back in September 2024  Treated with 4 weeks of IV vancomycin  LOYD during that admission did not show any evidence of endocarditis (patient has a history of TAVR)  Repeat blood cultures obtained on 10/23 of antibiotics did not grow any organisms.    Recommendations:     Obtain an MRI of the left foot with without contrast  Consult podiatry once MRI is done  Continue IV  vancomycin in the meantime, pharmacy to dose  Continue to monitor daily labs for antibiotic toxicity  Further recommendations will depend on the above work-up and clinical progress     The plan of care was discussed with the primary hospital team, Omi Blackman MD     Recommendations were also discussed with the patient; all questions were answered.     Thank you for this consultation. Please don't hesitate to call the ID team for questions or any acute changes in patient's clinical condition.    Please note that this report has been produced using speech recognition software and may contain errors related to that system including, but not limited to, errors in grammar, punctuation, and spelling, as well as words and phrases that possibly may have been recognized inappropriately.  If there are any questions or concerns, contact the dictating provider for clarification.    The  [employer] Cures Act makes medical notes like these available to patients in the interest of transparency. Please be advised this is a medical document. Medical documents are intended to carry relevant information, facts as evident, and the clinical opinion of the practitioner. The medical note is intended as peer to peer communication and may appear blunt or direct. It is written in medical language and may contain abbreviations or verbiage that are unfamiliar.     Yvette Whitfield MD  [facility]. Tel: [phone]. Fax: [phone].     Arleth Weiss : 1955 MRN: [id number]    In the emergency room,It did not improve.For her left foot; however,Recently started on doxycyclineShe was also having some generalized weakness when she was concerned about MRSA bacteremia.Along with worsening pain, worried about exposed bone.  She is also having pain and swelling involving the left foot.With a right hand skin tear who presents to the hospital, Type 2 diabetes,975 CSN: [id number]          [1]   Allergies  Allergen Reactions     Adhesive Tape HIVES     ALL ADHESIVES    Codeine HIVES    Doxycycline SWELLING    Hydrocodone UNKNOWN    Lisinopril TONGUE SWELLING    Morphine Sulfate HIVES    Opioid Analgesics UNKNOWN    Oxycontin ITCHING    Oxytocin HIVES    Vicodin [Hydrocodone-Acetaminophen] ITCHING    Skin Adhesives OTHER (SEE COMMENTS)

## 2025-01-28 NOTE — H&P
[facility] Hospitalist History and Physical      Chief Complaint   Patient presents with    Fall    Cellulitis        PCP: Viktoriya Garcias DO      History of Present Illness: Patient is a 69 year old female with PMH sig for HFpEF, Afib on coumadin, CAD, PAD, COPD, SHELDON who presents for evaluation after a fall and multiple medical complaints.  Patient states she was recently discharged from rehab and fell outside her house.  She experienced a laceration on her right hand.  She has a history of MRSA bacteremia.  She was given doxycycline for treatment of left foot cellulitis however she did not improve.  She also endorses dyspnea and states her legs are more swollen than usual, reports a 12 pound weight gain.  States the rehab facility did not give her diuretic the whole time she was there.    Workup significant for stable vitals, hemoglobin 10.9, CRP 1.8, BNP 1266, AST 35, ALT 29, creatinine 0.56.  Chest x-ray significant for atelectasis.  X-ray hand negative for acute fracture.  Left foot x-ray with findings concerning for osteomyelitis.  Patient given IV antibiotics and Lasix in the ED.  ID consulted.    Past Medical History:    Aortic stenosis    S/P TAVR    Arrhythmia    ASTHMA    Asthma (HCC)    Back problem    CANCER    thyroid    Cancer (HCC)    thyroid     CHF (congestive heart failure) (HCC)    CKD (chronic kidney disease) stage 2, GFR 60-89 ml/min    Congestive heart disease (HCC)    COPD (chronic obstructive pulmonary disease) (HCC)    Deep vein thrombosis (HCC)    DEPRESSION    Depression    Disorder of thyroid    Esophageal reflux    Essential hypertension    Fibromyalgia    Gout    High blood pressure    High cholesterol    History of blood transfusion    HYPERTENSION    HYPOTHYROIDISM    Incontinence    Muscle weakness    Neuropathy    OBESITY    OSTEOARTHRITIS    Osteoarthritis    OTHER DISEASES    Fibromyalgia    OTHER DISEASES    Pulmonary emboli    OTHER DISEASES    Lymph edema    OTHER  DISEASES    Pulmonary hypertension    Peripheral vascular disease    Pneumonia due to organism    Prediabetes    Pulmonary embolism (HCC)    RA (rheumatoid arthritis) (HCC)    Renal disorder    Shortness of breath    ON EXERTION    SLEEP APNEA    Sleep apnea    CPAP      Past Surgical History:   Procedure Laterality Date    Anesth,shoulder replacement Right 2014    hemiathroplasty    Back surgery      Back surgery      complete c-3 -7 ectomy    Biopsy Left 2023    TEMPORAL ARTERY    Carpal tunnel release      Cath transcatheter aortic valve replacement      Colonoscopy N/A 05/10/2018    Procedure: COLONOSCOPY, POSSIBLE BIOPSY, POSSIBLE POLYPECTOMY 82444;  Surgeon: Kendell Valentin MD;  [facility]: [facility]    Colonoscopy      Craniotomy for lobotomy Left     D & c  2009    Dilation/curettage,diagnostic      Hip replacement surgery  2009    Rt hip    Knee replacement surgery      Both knees    Other surgical history      Thyroid removal    Other surgical history      LT foot spur removal    Spine surgery procedure unlisted      Thyroidectomy Bilateral     Total hip replacement      Total knee replacement          ALL:  Allergies[1]     No current outpatient medications on file.       Social History     Tobacco Use    Smoking status: Former     Current packs/day: 0.00     Average packs/day: 0.5 packs/day for 30.0 years (15.0 ttl pk-yrs)     Types: Cigarettes     Start date: 1961     Quit date: 1991     Years since quittin.0    Smokeless tobacco: Never   Substance Use Topics    Alcohol use: No        Fam Hx  Family History   Problem Relation Age of Onset    Hypertension Father     Lipids Father     Diabetes Mother     Hypertension Mother     Lipids Mother     Cancer Brother     Hypertension Brother        Review of Systems  Comprehensive ROS reviewed and negative except for what is stated in HPI.      OBJECTIVE:  /46 (BP Location: Right arm)   Pulse  82   Temp 98.5 °F (36.9 °C) (Oral)   Resp 18   Ht 5' 9\" (1.753 m)   Wt 199 lb 15.3 oz (90.7 kg)   LMP  (LMP Unknown)   SpO2 98%   BMI 29.53 kg/m²   Physical Exam:  General: Alert, awake, cooperative.  HEENT:  Normocephalic, atraumatic.  Neck:  Trachea midline.  No JVD.   Chest:  Clear to auscultation bilaterally. No wheezes, rales, or rhonchi.  CV: Irregular rhythm, nontachycardic.  Positive S1/S2.   GI: Bowel sounds present in all four quadrants, abdomen is soft, non-tender, non-distended.  Extremities: Bilateral lower extremity edema.  Chronic venous stasis changes.  Neurological:  AAOx3.  Moving all extremities.  Neck supple.  Skin:  Warm and dry.        Data Review:    LABS:   Lab Results   Component Value Date    WBC 7.8 01/28/2025    HGB 10.9 01/28/2025    HCT 32.2 01/28/2025    .0 01/28/2025    CREATSERUM 0.56 01/28/2025    BUN 14 01/28/2025     01/28/2025    K 3.6 01/28/2025     01/28/2025    CO2 26.0 01/28/2025    GLU 89 01/28/2025    CA 9.4 01/28/2025    ALB 3.6 01/28/2025    ALKPHO 117 01/28/2025    BILT 0.4 01/28/2025    TP 6.7 01/28/2025    AST 35 01/28/2025    ALT 20 01/28/2025    INR 1.80 01/28/2025    PTP 21.1 01/28/2025    CRP 1.80 01/28/2025       CXR: image personally reviewed.  Left lower atelectasis.    Radiology: XR CHEST AP PORTABLE  (CPT=71045)    Result Date: 1/28/2025  PROCEDURE:  XR CHEST AP PORTABLE  (CPT=71045)  TECHNIQUE:  AP chest radiograph was obtained.  COMPARISON:  [facility] , SHIELA, XR CHEST AP PORTABLE  (CPT=71045), 11/23/2024, 5:57 PM.  INDICATIONS:  fall, on thinners, states [facility] has MRSA in foot  PATIENT STATED HISTORY: (As transcribed by Technologist)  Patient offered no additional history at this time.    FINDINGS:  Cardiomediastinal silhouette is stable in size.  Status post TAVR.  Left lower lobe retrocardiac increased opacity is most consistent with atelectasis and or scar.  No definite focal consolidation, pleural effusion, or pneumothorax.  Osseous  structures are stable including post cervical spinal fusion and right shoulder arthroplasty.            CONCLUSION:  1. Findings most consistent with left lower lobe atelectasis and or scar without definite focal consolidation.  Continued clinical correlation recommended.  ED M.D. notified of these findings with [facility].    LOCATION:  [facility]      Dictated by (CST): Tasha Land MD on 1/28/2025 at 8:33 AM     Finalized by (CST): Tasha Land MD on 1/28/2025 at 8:34 AM       XR FOOT, COMPLETE (MIN 3 VIEWS), LEFT (CPT=73630)    Result Date: 1/28/2025  PROCEDURE:  XR FOOT, COMPLETE (MIN 3 VIEWS), LEFT (CPT=73630)  TECHNIQUE:  AP, oblique, and lateral views were obtained.  COMPARISON:  None.  INDICATIONS:  fall, on thinners, states seh has MRSA in foot  PATIENT STATED HISTORY: (As transcribed by Technologist)  Patient states wound along plantar aspect of left foot due to calcium build x6 months. MRSA per patient; No known injury. Patient states she fell earlier today and injured right hand. Large laceration along medial aspect of right hand and along posterior 5th digit.    FINDINGS:  BONES:  Osseous destruction involves the 2nd middle and distal phalanx concerning for osteomyelitis.  There is surrounding soft tissue swelling.  Decreased bone mineralization involves the 2nd proximal phalanx distally with age indeterminate mildly displaced fracture.  No dislocation.  Marked vascular calcifications.  Osseous defect involves the base of the 5th metatarsal bone best seen on lateral projection, infectious process also cannot be excluded within this location.            CONCLUSION:  1. Findings concerning for osteomyelitis as detailed above.  An MRI can be performed for further evaluation as clinically indicated.  DANY HARRISON notified of these findings with preliminary [facility] report [facility].    LOCATION:  [facility]   Dictated by (CST): Tasha Land MD on 1/28/2025 at 8:11 AM      Finalized by (CST): Tahsa Land MD on 1/28/2025 at 8:14 AM       XR HAND (MIN 3 VIEWS), RIGHT (CPT=73130)    Result Date: 1/28/2025  PROCEDURE:  XR HAND (MIN 3 VIEWS), RIGHT (CPT=73130)  TECHNIQUE:  Three views of the right hand were obtained.  COMPARISON:  None.  INDICATIONS:  fall, on thinners, states seh has MRSA in foot  PATIENT STATED HISTORY: (As transcribed by Technologist)  Patient states wound along plantar aspect of left foot due to calcium build x6 months. MRSA per patient; No known injury. Patient states she fell earlier today and injured right hand. Large laceration along medial aspect of right hand and along posterior 5th digit.    FINDINGS:  Osseous structures are intact.  Distal interphalangeal joint space narrowing predominantly involves the 3rd digit where there is complete obliteration of the joint space, large marginal osteophytes and palmar subluxation of the distal phalanx.   Findings are most consistent with degenerative change.    Marked vascular calcifications.  Soft tissue irregularity adjacent to the 5th metacarpal bone is most likely due to patient's laceration given the history.            CONCLUSION:  1. No acute visible fracture.  Please see details as above.  [facility] M.D. notified of these findings with preliminary radiology report from [facility] services.    LOCATION:  [facility]   Dictated by (CST): Tasha Land MD on 1/28/2025 at 8:08 AM     Finalized by (CST): Tasha Land MD on 1/28/2025 at 8:10 AM          Assessment/Plan:   69 year old female with PMH sig for HFpEF, Afib on coumadin, CAD, PAD, COPD, SHELDON who presents for evaluation after a fall and multiple medical complaints.     # Lower extremity cellulitis/suspected osteomyelitis  -MRI ordered  -Continue IV vancomycin per ID  -Wound care    # Right hand laceration   -Continue local wound care    # Stage C/NYHA III/HFpEF, acute on chronic  -Apparently her diuretic was not given when she was arrived at the nursing facility.  Continue IV  Lasix.  Goal net -1 L/day.   -Strict I's/O, daily weights, fluid restriction  -Monitor volume status.  Trend renal function.    #A-fib on Coumadin  -Continue Toprol and Coumadin  -Daily PT/INR    #CAD  #PAD  -Continue Plavix, statin    #COPD  -Continue bronchodilators    #SHELDON  -CPAP as nightly and as needed     #depression  -Continue bupropion and fluoxetine    Discussed with ID    CODE STATUS: Full code  DVT prophylaxis: Coumadin    Outpatient records or previous hospital records reviewed.   [facility] hospitalist to continue to follow patient while in house  A total of 78 minutes taken with patient and coordinating care.  Greater than 50% face to face encounter.      [facility] Hospitalist      **Certification      PHYSICIAN Certification of Need for Inpatient Hospitalization - Initial Certification    Patient will require inpatient services that will reasonably be expected to span two midnight's based on the clinical documentation in H+P.   Based on patients current state of illness, I anticipate that, after discharge, patient will require TBD.         [1]   Allergies  Allergen Reactions    Adhesive Tape HIVES     ALL ADHESIVES    Codeine HIVES    Doxycycline SWELLING    Hydrocodone UNKNOWN    Lisinopril TONGUE SWELLING    Morphine Sulfate HIVES    Opioid Analgesics UNKNOWN    Oxycontin ITCHING    Oxytocin HIVES    Vicodin [Hydrocodone-Acetaminophen] ITCHING    Skin Adhesives OTHER (SEE COMMENTS)

## 2025-01-28 NOTE — PLAN OF CARE
Patient alert and oriented x 4. Up with x1 assist using walker and gaitbelt. On RA. A fib on tele. Continent of bowel and incontinent of bladder. No complaints of pain, shortness of breath, or chest pain/discomfort at this time. POC discussed with patient. Fall precautions in place. Call light within reach.     Problem: Patient/Family Goals  Goal: Patient/Family Long Term Goal  Description: Patient's Long Term Goal: to go home    Interventions:  - consults to see  - medication adjustment as needed  - get stronger  - See additional Care Plan goals for specific interventions  Outcome: Progressing  Goal: Patient/Family Short Term Goal  Description: Patient's Short Term Goal: address foot issue    Interventions:   - ID consult  - MRI  - IV abt  - See additional Care Plan goals for specific interventions  Outcome: Progressing     Problem: PAIN - ADULT  Goal: Verbalizes/displays adequate comfort level or patient's stated pain goal  Description: INTERVENTIONS:  - Encourage pt to monitor pain and request assistance  - Assess pain using appropriate pain scale  - Administer analgesics based on type and severity of pain and evaluate response  - Implement non-pharmacological measures as appropriate and evaluate response  - Consider cultural and social influences on pain and pain management  - Manage/alleviate anxiety  - Utilize distraction and/or relaxation techniques  - Monitor for opioid side effects  - Notify MD/LIP if interventions unsuccessful or patient reports new pain  - Anticipate increased pain with activity and pre-medicate as appropriate  Outcome: Progressing     Problem: RISK FOR INFECTION - ADULT  Goal: Absence of fever/infection during anticipated neutropenic period  Description: INTERVENTIONS  - Monitor WBC  - Administer growth factors as ordered  - Implement neutropenic guidelines  Outcome: Progressing     Problem: SAFETY ADULT - FALL  Goal: Free from fall injury  Description: INTERVENTIONS:  - Assess pt  frequently for physical needs  - Identify cognitive and physical deficits and behaviors that affect risk of falls.  - [place] fall precautions as indicated by assessment.  - Educate pt/family on patient safety including physical limitations  - Instruct pt to call for assistance with activity based on assessment  - Modify environment to reduce risk of injury  - Provide assistive devices as appropriate  - Consider OT/PT consult to assist with strengthening/mobility  - Encourage toileting schedule  Outcome: Progressing     Problem: DISCHARGE PLANNING  Goal: Discharge to home or other facility with appropriate resources  Description: INTERVENTIONS:  - Identify barriers to discharge w/pt and caregiver  - Include patient/family/discharge partner in discharge planning  - Arrange for needed discharge resources and transportation as appropriate  - Identify discharge learning needs (meds, wound care, etc)  - Arrange for interpreters to assist at discharge as needed  - Consider post-discharge preferences of patient/family/discharge partner  - Complete POLST form as appropriate  - Assess patient's ability to be responsible for managing their own health  - Refer to Case Management Department for coordinating discharge planning if the patient needs post-hospital services based on physician/LIP order or complex needs related to functional status, cognitive ability or social support system  Outcome: Progressing     Problem: Altered Communication/Language Barrier  Goal: Patient/Family is able to understand and participate in their care  Description: Interventions:  - Assess communication ability and preferred communication style  - Implement communication aides and strategies  - Use visual cues when possible  - Listen attentively, be patient, do not interrupt  - Minimize distractions  - Allow time for understanding and response  - Establish method for patient to ask for assistance (call light)  - Provide an  as  needed  - Communicate barriers and strategies to overcome with those who interact with patient  Outcome: Progressing     Problem: CARDIOVASCULAR - ADULT  Goal: Maintains optimal cardiac output and hemodynamic stability  Description: INTERVENTIONS:  - Monitor vital signs, rhythm, and trends  - Monitor for bleeding, hypotension and signs of decreased cardiac output  - Evaluate effectiveness of vasoactive medications to optimize hemodynamic stability  - Monitor arterial and/or venous puncture sites for bleeding and/or hematoma  - Assess quality of pulses, skin color and temperature  - Assess for signs of decreased coronary artery perfusion - ex. Angina  - Evaluate fluid balance, assess for edema, trend weights  Outcome: Progressing  Goal: Absence of cardiac arrhythmias or at baseline  Description: INTERVENTIONS:  - Continuous cardiac monitoring, monitor vital signs, obtain 12 lead EKG if indicated  - Evaluate effectiveness of antiarrhythmic and heart rate control medications as ordered  - Initiate emergency measures for life threatening arrhythmias  - Monitor electrolytes and administer replacement therapy as ordered  Outcome: Progressing

## 2025-01-28 NOTE — CONSULTS
[facility] Pharmacy Dosing Service      Initial Pharmacokinetic Consult for Vancomycin Dosing     Arleth Weiss is a 69 year old female who is being initiated on vancomycin therapy for osteomyelitis.  Pharmacy has been asked to dose vancomycin by Dr Whitfield.  The initial treatment and monitoring approach will be steady state AUC strategy.        Weight and Temperature:    Wt Readings from Last 1 Encounters:   25 90.7 kg (199 lb 15.3 oz)        Temp Readings from Last 1 Encounters:   25 98.5 °F (36.9 °C) (Oral)      Labs:   Recent Labs   Lab 25  0058   CREATSERUM 0.56      Estimated Creatinine Clearance: 96.3 mL/min (based on SCr of 0.56 mg/dL).     Recent Labs   Lab 25  0058   WBC 7.8          The Pharmacokinetic Target is:     to 600 mg-h/L and trough <=15 mg/L    Renal Dosing Considerations:    None     Assessment/Plan:   Initial/Loading dose: Has received 1500 mg IV (15 mg/kg, capped at 2250 mg) x 1 initial dose in ER.      Maintenance dose: Pharmacy will dose vancomycin at 1500 mg IV every 18 hours    Monitorin) Plan for vancomycin peak and trough to be obtained at steady state on 25 post 4th dose    2) Pharmacy will order SCr as clinically indicated to assess renal function.    3) Pharmacy will monitor for toxicity and efficacy, adjust vancomycin dose and/or frequency, and order vancomycin levels as appropriate per the Pharmacy and Therapeutics Committee approved protocol until discontinuation of the medication.       We appreciate the opportunity to assist in the care of this patient.     Melodie Barney, PharmD  2025  10:08 AM  [facility] Extension: [phone]

## 2025-01-28 NOTE — ED PROVIDER NOTES
Patient Seen in: [facility] Emergency Department      History     Chief Complaint   Patient presents with    Fall    Cellulitis     Stated Complaint: fall, on thinners, states seh has MRSA in foot    Subjective:   HPI      69-year-old female presents to the emergency department for variety of complaints.  Patient had been in an outside hospital with atrial fibrillation fluid overload and renal insufficiency.  She states that she was stopped from her diuretics and has put on almost 15 pounds.  She was being transported by medic car and sustained an injury to her right hand with a skin tear.  She states that she is concerned that there is exposed bone from the injury.  Patient also complains of pain and injury to her left foot.  She states that she was recently told that she has MRSA and is concerned about \"MRSA sepsis\" she has not had any fevers.  She states that she was started on doxycycline but does not feel that her symptoms have improved and there was a concern about osteomyelitis.    Objective:     Past Medical History:    Aortic stenosis    S/P TAVR    Arrhythmia    ASTHMA    Asthma (HCC)    Back problem    CANCER    thyroid    Cancer (HCC)    thyroid     CHF (congestive heart failure) (HCC)    CKD (chronic kidney disease) stage 2, GFR 60-89 ml/min    Congestive heart disease (HCC)    COPD (chronic obstructive pulmonary disease) (HCC)    Deep vein thrombosis (HCC)    DEPRESSION    Depression    Disorder of thyroid    Esophageal reflux    Essential hypertension    Fibromyalgia    Gout    High blood pressure    High cholesterol    History of blood transfusion    HYPERTENSION    HYPOTHYROIDISM    Incontinence    Muscle weakness    Neuropathy    OBESITY    OSTEOARTHRITIS    Osteoarthritis    OTHER DISEASES    Fibromyalgia    OTHER DISEASES    Pulmonary emboli    OTHER DISEASES    Lymph edema    OTHER DISEASES    Pulmonary hypertension    Peripheral vascular disease    Pneumonia due to organism     Prediabetes    Pulmonary embolism (HCC)    RA (rheumatoid arthritis) (HCC)    Renal disorder    Shortness of breath    ON EXERTION    SLEEP APNEA    Sleep apnea    CPAP              Past Surgical History:   Procedure Laterality Date    Anesth,shoulder replacement Right 2014    hemiathroplasty    Back surgery      Back surgery      complete c-3 -7 ectomy    Biopsy Left 2023    TEMPORAL ARTERY    Carpal tunnel release      Cath transcatheter aortic valve replacement      Colonoscopy N/A 05/10/2018    Procedure: COLONOSCOPY, POSSIBLE BIOPSY, POSSIBLE POLYPECTOMY 05801;  Surgeon: Kendell Valentin MD;  Location: [facility], [facility]    Colonoscopy      Craniotomy for lobotomy Left     D & c  2009    Dilation/curettage,diagnostic      Hip replacement surgery  2009    Rt hip    Knee replacement surgery      Both knees    Other surgical history      Thyroid removal    Other surgical history      LT foot spur removal    Spine surgery procedure unlisted      Thyroidectomy Bilateral     Total hip replacement      Total knee replacement                  Social History     Socioeconomic History    Marital status: Single   Tobacco Use    Smoking status: Former     Current packs/day: 0.00     Average packs/day: 0.5 packs/day for 30.0 years (15.0 ttl pk-yrs)     Types: Cigarettes     Start date: 1961     Quit date: 1991     Years since quittin.0    Smokeless tobacco: Never   Vaping Use    Vaping status: Never Used   Substance and Sexual Activity    Alcohol use: No    Drug use: No   Other Topics Concern    Caffeine Concern No    Exercise Yes    Seat Belt Yes    Special Diet Yes     Comment: low sodium    Stress Concern Yes    Weight Concern No     Social Drivers of Health     Food Insecurity: No Food Insecurity (12/10/2024)    Received from [facility]    Hunger Vital Sign     Worried About Running Out of Food in the Last Year: Never true     Ran  Out of Food in the Last Year: Never true   Transportation Needs: No Transportation Needs (12/10/2024)    Received from [facility]    PRAPARE - Transportation     Lack of Transportation (Medical): No     Lack of Transportation (Non-Medical): No   Housing Stability: Low Risk  (12/10/2024)    Received from [facility]    Housing Stability Vital Sign     Unable to Pay for Housing in the Last Year: No     Number of Times Moved in the Last Year: 0     Homeless in the Last Year: No                  Physical Exam     ED Triage Vitals [01/27/25 2212]   /75   Pulse 63   Resp 18   Temp 97.8 °F (36.6 °C)   Temp src Temporal   SpO2 93 %   O2 Device None (Room air)       Current Vitals:   Vital Signs  BP: 123/75  Pulse: 87  Resp: 22  Temp: 97.8 °F (36.6 °C)  Temp src: Temporal    Oxygen Therapy  SpO2: 99 %  O2 Device: None (Room air)        Physical Exam  Vitals and nursing note reviewed.   Constitutional:       Appearance: She is well-developed.   HENT:      Head: Normocephalic and atraumatic.   Cardiovascular:      Rate and Rhythm: Normal rate and regular rhythm.      Heart sounds: Normal heart sounds.   Pulmonary:      Effort: Pulmonary effort is normal.      Breath sounds: Normal breath sounds. No stridor. No wheezing.   Abdominal:      General: Bowel sounds are normal.      Palpations: Abdomen is soft.      Tenderness: There is no abdominal tenderness. There is no rebound.   Musculoskeletal:         General: Tenderness present.      Cervical back: Normal range of motion and neck supple.      Right lower leg: Edema present.      Left lower leg: Edema present.      Comments: Skin tear to the dorsum of patient's right hand, skin tear to left foot and erythema of toes   Lymphadenopathy:      Cervical: No cervical adenopathy.   Skin:     General: Skin is warm and dry.      Coloration: Skin is not pale.   Neurological:      General: No focal deficit  present.      Mental Status: She is alert and oriented to person, place, and time.      Cranial Nerves: No cranial nerve deficit.      Coordination: Coordination normal.            ED Course     Labs Reviewed   CBC WITH DIFFERENTIAL WITH PLATELET - Abnormal; Notable for the following components:       Result Value    RBC 3.39 (*)     HGB 10.9 (*)     HCT 32.2 (*)     All other components within normal limits   COMP METABOLIC PANEL (14) - Abnormal; Notable for the following components:    AST 35 (*)     All other components within normal limits   PROTHROMBIN TIME (PT) - Abnormal; Notable for the following components:    PT 21.1 (*)     INR 1.80 (*)     All other components within normal limits   C-REACTIVE PROTEIN - Abnormal; Notable for the following components:    C-Reactive Protein 1.80 (*)     All other components within normal limits   PROCALCITONIN - Abnormal; Notable for the following components:    Procalcitonin 0.10 (*)     All other components within normal limits   PRO BETA NATRIURETIC PEPTIDE - Abnormal; Notable for the following components:    Pro-Beta Natriuretic Peptide 1,266 (*)     All other components within normal limits   LACTIC ACID, PLASMA   RAINBOW DRAW LAVENDER   RAINBOW DRAW LIGHT GREEN   RAINBOW DRAW BLUE   BLOOD CULTURE   BLOOD CULTURE                   MDM      Patient had IV established and blood work obtained.  She had overall normal vitals.  She does have some edema but does not have significant Rales that I can appreciate.  She is adamant that she is short of breath and that she is feeling very fluid overloaded.  She states that she does do daily weights and states that she gained between 10 to 15 pounds.  She also thought that she was not on any diuretics and that she was taken off after being in the hospital when I reviewed her records it appears that she is still on spironolactone but she does not recall receiving this.  Patient's proBNP is elevated compared to her baseline.  Should  be given a dose of Lasix.  Patient had an x-ray of her hand and foot.  There is no evidence of any fracture in her hand that I could appreciate her foot she has extensive DJD I reviewed vision read report and there is a concern of a possible osteomyelitis of the second distal and middle phalanx of her left foot patient has an elevated CRP.  It does appear to be slightly decreased from a previous CRP last week but continues to be elevated and at that time she had a sedimentation rate over 100.  Blood cultures were obtained.  Patient's wound cultures from the 17th were positive for MRSA.  She will be initiated on vancomycin.  Patient's injury to her right hand she had extensive dressing on it that took quite a bit of time to remove as patient was very intolerant of removal of the dressing and adhered to the skin tear.  We did ultimately get off with a combination of soaking and local infiltration with 1% lidocaine.  Patient's dorsum of her hand is quite macerated with extensive skin tear.  I attempted to see if there is any component of it that could be dermabonded and it does not approximate well and there is tissue loss.  Subsequently we will put Vaseline gauze on it this may need to be evaluated by the wound specialist.  I reviewed all this with the patient.  I contacted the [facility] hospitalist.  Patient mated for further care and treatment    Admission disposition: 1/28/2025  3:28 AM           Medical Decision Making      Disposition and Plan     Clinical Impression:  1. Acute on chronic congestive heart failure, unspecified heart failure type (HCC)    2. Osteomyelitis of left foot, unspecified type (HCC)    3. Skin tear of hand without complication, right, initial encounter         Disposition:  Admit  1/28/2025  3:28 am    Follow-up:  No follow-up provider specified.        Medications Prescribed:  Current Discharge Medication List              Supplementary Documentation:         Hospital Problems       Present on  Admission  Date Reviewed: 8/15/2024            ICD-10-CM Noted POA    * (Principal) Acute on chronic congestive heart failure, unspecified heart failure type (HCC) I50.9 1/7/2023 Unknown

## 2025-01-29 ENCOUNTER — APPOINTMENT (OUTPATIENT)
Dept: CV DIAGNOSTICS | Facility: HOSPITAL | Age: 70
End: 2025-01-29
Attending: INTERNAL MEDICINE
Payer: MEDICARE

## 2025-01-29 LAB
ANION GAP SERPL CALC-SCNC: 8 MMOL/L (ref 0–18)
ATRIAL RATE: 66 BPM
BASOPHILS # BLD AUTO: 0.03 X10(3) UL (ref 0–0.2)
BASOPHILS NFR BLD AUTO: 0.5 %
BUN BLD-MCNC: 9 MG/DL (ref 9–23)
CALCIUM BLD-MCNC: 8.4 MG/DL (ref 8.7–10.6)
CHLORIDE SERPL-SCNC: 103 MMOL/L (ref 98–112)
CO2 SERPL-SCNC: 31 MMOL/L (ref 21–32)
CREAT BLD-MCNC: 0.66 MG/DL
EGFRCR SERPLBLD CKD-EPI 2021: 94 ML/MIN/1.73M2 (ref 60–?)
EOSINOPHIL # BLD AUTO: 0.35 X10(3) UL (ref 0–0.7)
EOSINOPHIL NFR BLD AUTO: 5.7 %
ERYTHROCYTE [DISTWIDTH] IN BLOOD BY AUTOMATED COUNT: 14.5 %
GLUCOSE BLD-MCNC: 74 MG/DL (ref 70–99)
HCT VFR BLD AUTO: 31.5 %
HGB BLD-MCNC: 10.5 G/DL
IMM GRANULOCYTES # BLD AUTO: 0.02 X10(3) UL (ref 0–1)
IMM GRANULOCYTES NFR BLD: 0.3 %
INR BLD: 1.67 (ref 0.8–1.2)
LYMPHOCYTES # BLD AUTO: 1.74 X10(3) UL (ref 1–4)
LYMPHOCYTES NFR BLD AUTO: 28.5 %
MAGNESIUM SERPL-MCNC: 1.6 MG/DL (ref 1.6–2.6)
MCH RBC QN AUTO: 32 PG (ref 26–34)
MCHC RBC AUTO-ENTMCNC: 33.3 G/DL (ref 31–37)
MCV RBC AUTO: 96 FL
MONOCYTES # BLD AUTO: 0.87 X10(3) UL (ref 0.1–1)
MONOCYTES NFR BLD AUTO: 14.2 %
NEUTROPHILS # BLD AUTO: 3.1 X10 (3) UL (ref 1.5–7.7)
NEUTROPHILS # BLD AUTO: 3.1 X10(3) UL (ref 1.5–7.7)
NEUTROPHILS NFR BLD AUTO: 50.8 %
OSMOLALITY SERPL CALC.SUM OF ELEC: 291 MOSM/KG (ref 275–295)
PLATELET # BLD AUTO: 350 10(3)UL (ref 150–450)
POTASSIUM SERPL-SCNC: 3.2 MMOL/L (ref 3.5–5.1)
POTASSIUM SERPL-SCNC: 3.2 MMOL/L (ref 3.5–5.1)
PROTHROMBIN TIME: 19.8 SECONDS (ref 11.6–14.8)
Q-T INTERVAL: 394 MS
QRS DURATION: 114 MS
QTC CALCULATION (BEZET): 465 MS
R AXIS: 28 DEGREES
RBC # BLD AUTO: 3.28 X10(6)UL
SODIUM SERPL-SCNC: 142 MMOL/L (ref 136–145)
T AXIS: 5 DEGREES
VENTRICULAR RATE: 84 BPM
WBC # BLD AUTO: 6.1 X10(3) UL (ref 4–11)

## 2025-01-29 PROCEDURE — 84132 ASSAY OF SERUM POTASSIUM: CPT | Performed by: STUDENT IN AN ORGANIZED HEALTH CARE EDUCATION/TRAINING PROGRAM

## 2025-01-29 PROCEDURE — 93306 TTE W/DOPPLER COMPLETE: CPT | Performed by: INTERNAL MEDICINE

## 2025-01-29 PROCEDURE — 80048 BASIC METABOLIC PNL TOTAL CA: CPT | Performed by: STUDENT IN AN ORGANIZED HEALTH CARE EDUCATION/TRAINING PROGRAM

## 2025-01-29 PROCEDURE — 93010 ELECTROCARDIOGRAM REPORT: CPT | Performed by: INTERNAL MEDICINE

## 2025-01-29 PROCEDURE — 99214 OFFICE O/P EST MOD 30 MIN: CPT

## 2025-01-29 PROCEDURE — 85025 COMPLETE CBC W/AUTO DIFF WBC: CPT | Performed by: STUDENT IN AN ORGANIZED HEALTH CARE EDUCATION/TRAINING PROGRAM

## 2025-01-29 PROCEDURE — 93005 ELECTROCARDIOGRAM TRACING: CPT

## 2025-01-29 PROCEDURE — 85610 PROTHROMBIN TIME: CPT | Performed by: INTERNAL MEDICINE

## 2025-01-29 PROCEDURE — 83735 ASSAY OF MAGNESIUM: CPT | Performed by: STUDENT IN AN ORGANIZED HEALTH CARE EDUCATION/TRAINING PROGRAM

## 2025-01-29 RX ORDER — ENOXAPARIN SODIUM 100 MG/ML
1 INJECTION SUBCUTANEOUS EVERY 12 HOURS SCHEDULED
Status: DISCONTINUED | OUTPATIENT
Start: 2025-01-29 | End: 2025-01-30

## 2025-01-29 RX ORDER — WARFARIN SODIUM 5 MG/1
5 TABLET ORAL
Status: DISCONTINUED | OUTPATIENT
Start: 2025-01-29 | End: 2025-01-29

## 2025-01-29 RX ORDER — MAGNESIUM OXIDE 400 MG/1
400 TABLET ORAL ONCE
Status: COMPLETED | OUTPATIENT
Start: 2025-01-29 | End: 2025-01-29

## 2025-01-29 RX ORDER — TORSEMIDE 5 MG/1
10 TABLET ORAL DAILY
Status: DISCONTINUED | OUTPATIENT
Start: 2025-01-29 | End: 2025-01-29

## 2025-01-29 RX ORDER — FUROSEMIDE 10 MG/ML
40 INJECTION INTRAMUSCULAR; INTRAVENOUS
Status: DISCONTINUED | OUTPATIENT
Start: 2025-01-29 | End: 2025-01-31

## 2025-01-29 RX ORDER — SPIRONOLACTONE 25 MG/1
25 TABLET ORAL DAILY
Status: DISCONTINUED | OUTPATIENT
Start: 2025-01-29 | End: 2025-02-04

## 2025-01-29 NOTE — CONSULTS
[facility]  Report of Inpatient Wound Care Consultation    Arleth Weiss Patient Status:  Inpatient    1955 MRN [id number]   Location [facility] 8NE-A Attending Cody Patel DO   Hosp Day # 1 PCP Viktoriya Garcias DO     Reason for Consultation:  Multiple wounds /skin tear    History of Present Illness:  Arleth Weiss is a a(n) 69 year old female. Patient seen at bedside  with a fellow wound care nurse.Patient presents with multiple skin issues as described below.Complains of mild to moderate pain on the right hand with skin tear Patient with multiple comorbidities.          History:  Past Medical History:    Aortic stenosis    S/P TAVR    Arrhythmia    ASTHMA    Asthma (HCC)    Back problem    CANCER    thyroid    Cancer (HCC)    thyroid     CHF (congestive heart failure) (HCC)    CKD (chronic kidney disease) stage 2, GFR 60-89 ml/min    Congestive heart disease (HCC)    COPD (chronic obstructive pulmonary disease) (HCC)    Deep vein thrombosis (HCC)    DEPRESSION    Depression    Disorder of thyroid    Esophageal reflux    Essential hypertension    Fibromyalgia    Gout    High blood pressure    High cholesterol    History of blood transfusion    HYPERTENSION    HYPOTHYROIDISM    Incontinence    Muscle weakness    Neuropathy    OBESITY    OSTEOARTHRITIS    Osteoarthritis    OTHER DISEASES    Fibromyalgia    OTHER DISEASES    Pulmonary emboli    OTHER DISEASES    Lymph edema    OTHER DISEASES    Pulmonary hypertension    Peripheral vascular disease    Pneumonia due to organism    Prediabetes    Pulmonary embolism (HCC)    RA (rheumatoid arthritis) (HCC)    Renal disorder    Shortness of breath    ON EXERTION    SLEEP APNEA    Sleep apnea    CPAP     Past Surgical History:   Procedure Laterality Date    Anesth,shoulder replacement Right     hemiathroplasty    Back surgery      Back surgery      complete c-3 -7 ectomy    Biopsy Left 2023    TEMPORAL ARTERY    Carpal tunnel release       Cath transcatheter aortic valve replacement      Colonoscopy N/A 05/10/2018    Procedure: COLONOSCOPY, POSSIBLE BIOPSY, POSSIBLE POLYPECTOMY 62922;  Surgeon: Kendell Valentin MD;  Location: [facility], [facility]    Colonoscopy      Craniotomy for lobotomy Left     D & c  09/2009    Dilation/curettage,diagnostic      Hip replacement surgery  09/2009    Rt hip    Knee replacement surgery  2003    Both knees    Other surgical history  1989    Thyroid removal    Other surgical history  2004    LT foot spur removal    Spine surgery procedure unlisted      Thyroidectomy Bilateral     Total hip replacement      Total knee replacement        reports that she quit smoking about 34 years ago. Her smoking use included cigarettes. She started smoking about 64 years ago. She has a 15 pack-year smoking history. She has never used smokeless tobacco. She reports that she does not drink alcohol and does not use drugs.      Allergies:  @ALLERGY    Laboratory Data:    Recent Labs   Lab 01/28/25  0058 01/29/25  0447   WBC 7.8 6.1   HGB 10.9* 10.5*   HCT 32.2* 31.5*   .0 350.0   CREATSERUM 0.56 0.66   BUN 14 9   GLU 89 74   CA 9.4 8.4*   ALB 3.6  --    TP 6.7  --    INR 1.80* 1.67*   CRP 1.80*  --          ASSESSMENT:  Wound 01/28/25 Foot Left;Plantar (Active)   Date First Assessed/Time First Assessed: 01/28/25 0506   Present on Original Admission: Yes  Location: Foot  Wound Location Orientation: Left;Plantar      Assessments 1/29/2025 10:09 AM   Wound Image     Drainage Amount None   Wound Length (cm) 0.9 cm   Wound Width (cm) 0.9 cm   Wound Surface Area (cm^2) 0.81 cm^2   Wound Depth (cm) 0 cm   Wound Volume (cm^3) 0 cm^3   Margins Attached edges   Non-staged Wound Description Full thickness   Mira-wound Assessment Dry   Wound Bed Eschar (%) 100 %   Wound Odor None       Wound 01/28/25 Coccyx (Active)   Date First Assessed/Time First Assessed: 01/28/25 0510   Present on Original Admission: Yes  Location: Coccyx       Assessments 1/29/2025 10:19 AM   Wound Image      Shape No open wound.Blanchable erythema       Wound 01/28/25 Toe (Comment which one) Dorsal;Left (Active)   Date First Assessed/Time First Assessed: 01/28/25 0528   Present on Original Admission: Yes  Location: Toe (Comment which one)  Wound Location Orientation: (c) Dorsal;Left      Assessments 1/29/2025 10:11 AM   Wound Image     Drainage Amount None   Wound Length (cm) 0.8 cm   Wound Width (cm) 1.3 cm   Wound Surface Area (cm^2) 1.04 cm^2   Wound Depth (cm) 0 cm   Wound Volume (cm^3) 0 cm^3   Margins Attached edges   Non-staged Wound Description Full thickness   Mira-wound Assessment Dry;Edema   Wound Bed Eschar (%) 100 %   Wound Odor None       Wound 01/28/25 Toe (Comment which one) Dorsal;Right (Active)   Date First Assessed/Time First Assessed: 01/28/25 0531   Location: Toe (Comment which one)  Wound Location Orientation: Dorsal;Right      Assessments 1/29/2025 10:12 AM   Wound Image     Drainage Amount None   Wound Odor None   Shape scab       Wound 01/29/25 Hand Lower;Posterior;Right (Active)   Date First Assessed/Time First Assessed: 01/29/25 1004   Location: Hand  Wound Location Orientation: Lower;Posterior;Right      Assessments 1/29/2025 10:30 AM   Wound Image       Drainage Amount Small   Drainage Description Serosanguineous   Wound Length (cm) 5.5 cm   Wound Width (cm) 4.5 cm   Wound Surface Area (cm^2) 24.75 cm^2   Wound Depth (cm) 0.1 cm   Wound Volume (cm^3) 2.475 cm^3   Margins Well-defined edges   Non-staged Wound Description Full thickness   Mira-wound Assessment Painful;Ecchymosis   Wound Granulation Tissue Red;Pink;Spongy   Wound Bed Granulation (%) 50 %   Wound Odor None   Shape About 25% intact skin included in the measurement and 25% skin flap remains attached to the wound       Wound 01/29/25 Toe (Comment which one) Left;Plantar (Active)   Date First Assessed/Time First Assessed: 01/29/25 1006   Location: Toe (Comment which one)  Wound  Location Orientation: (c) Left;Plantar      Assessments 1/29/2025 10:07 AM   Wound Image     Drainage Amount None   Wound Length (cm) 1 cm   Wound Width (cm) 1 cm   Wound Surface Area (cm^2) 1 cm^2   Wound Depth (cm) 0 cm   Wound Volume (cm^3) 0 cm^3   Margins Attached edges   Non-staged Wound Description Full thickness   Mira-wound Assessment Dry   Wound Odor None   Shape intact blood filled blister       Wound 01/29/25 Heel Posterior;Right (Active)   Date First Assessed/Time First Assessed: 01/29/25 1021   Location: Heel  Wound Location Orientation: Posterior;Right      Assessments 1/29/2025 10:21 AM   Wound Image     Shape No open wound.Blanchable erythema       Wound 01/29/25 Heel Left;Posterior (Active)   Date First Assessed/Time First Assessed: 01/29/25 1022   Location: Heel  Wound Location Orientation: Left;Posterior      Assessments 1/29/2025 10:22 AM   Wound Image     Shape No open wound.Blanchable erythema    Local pulses: faint , dorsalis pedis  Capillary refill : <3  seconds  Temperature : warm        Wound Cleaning and Dressings:  1.Left great toe , left plantar foot, left third toe right second toe  Wound cleansing:  Cleanse with saline  Wound product: Paint with betadine daily.Leave open to air.    2.Right hand  Wound cleansing:  Cleanse with saline  Wound product: Xeroform gauze, ABD pad.Wrap with kerlix   Frequency: Change dressing daily and prn     3.Left posterior heel, right posterior heel- protective dressing   Wound cleansing:  Cleanse with saline  Wound product: Bordered foam  Frequency: Change dressing every 3 days and  prn    4.Sacrum  Wound cleansing:Wash with mild soap and water  or the no rinse cleansing foam  Wound product: Apply zinc oxide  skin protectant daily and  prn      Compression Therapy:   Elevate leg(s) as much as possible      Miscellaneous/Additional Orders:  Offloading: Turn Q 2 hours and other as needed, off load heels at this time    Miscellaneous orders: Increase dietary  protein intake.Dietitian or Attending physician may need to evaluate amount of protein intake/supplements depending on the kidney functions and other medical conditions     Care Summary:  Care Summary: Discussed Plan of Care at beside with patient. Patient verbally acknowledges understanding of all instructions and all questions were answered.      Recommendations:  May need to follow up with home health care and or the outpatient wound clinic if discharged to  home      Thank you for this consultation and for allowing me to participate in the care of your patient.      Time Spent 45 Minutes.    Thank you,  Massiel Gill RN BSN [facility] Wound Care Team  1/29/2025  11:21 AM

## 2025-01-29 NOTE — PHYSICAL THERAPY NOTE
PT orders received and chart reviewed. Pt declining to participate in PT evaluation at this time, stating she is waiting for multiple tests at this time. Will follow and re-attempt as able and appropriate.

## 2025-01-29 NOTE — PROGRESS NOTES
[facility]   part of [facility] Infectious Disease  Progress Note    Arleth Weiss Patient Status:  Inpatient    1955 MRN [id number]   Location [facility] 8NE-A Attending Cody Patel DO   Hosp Day # 1 PCP Viktoriya Garcias DO     Subjective:  Patient seen and examined. No acute overnight events. Remains afebrile. Denies n/v/d. Otherwise no complaints.     Objective:  Blood pressure 91/71, pulse 78, temperature 97.8 °F (36.6 °C), temperature source Oral, resp. rate 18, height 5' 9\" (1.753 m), weight 193 lb 2 oz (87.6 kg), SpO2 99%, not currently breastfeeding.    Intake/Output:    Intake/Output Summary (Last 24 hours) at 2025 0834  Last data filed at 2025 0713  Gross per 24 hour   Intake 1680 ml   Output 4250 ml   Net -2570 ml       Physical Exam:  General: Awake, alert, non-tox, NAD.  HEENT:  Oropharynx clear, trachea ML.  Heart: RRR S1S2 no murmurs.  Lungs: Essentially CTA b/l, no rhonchi, rales, wheezes.  Abdomen: Soft, NT/ND.  BS present.  No guarding or rebound.  Extremity: No edema.  Neurological: No focal deficits.  Derm:  Warm and dry. Wound pics reviewed as noted below.                Lab Data Review:  Lab Results   Component Value Date    WBC 6.1 2025    HGB 10.5 2025    HCT 31.5 2025    .0 2025    CREATSERUM 0.66 2025    BUN 9 2025     2025    K 3.2 2025    K 3.2 2025     2025    CO2 31.0 2025    GLU 74 2025    CA 8.4 2025    INR 1.67 2025    MG 1.6 2025        Cultures:  No results found for this visit on 25.    Radiology:  XR CHEST AP PORTABLE  (CPT=71045)    Result Date: 2025  CONCLUSION:  1. Findings most consistent with left lower lobe atelectasis and or scar without definite focal consolidation.  Continued clinical correlation recommended.  ED M.D. notified of these findings with preliminary radiology report from [facility]  services.    LOCATION:  [facility]      Dictated by (CST): Tasha Land MD on 1/28/2025 at 8:33 AM     Finalized by (CST): Tasha Land MD on 1/28/2025 at 8:34 AM       XR FOOT, COMPLETE (MIN 3 VIEWS), LEFT (CPT=73630)    Result Date: 1/28/2025  CONCLUSION:  1. Findings concerning for osteomyelitis as detailed above.  An MRI can be performed for further evaluation as clinically indicated.  ED M.NIRALI. notified of these findings [facility]    LOCATION:  [facility]   Dictated by (CST): Tasha Land MD on 1/28/2025 at 8:11 AM     Finalized by (CST): Tasha Land MD on 1/28/2025 at 8:14 AM       XR HAND (MIN 3 VIEWS), RIGHT (CPT=73130)    Result Date: 1/28/2025  CONCLUSION:  1. No acute visible fracture.  Please see details as above.  ED M.NIRALI. notified of these findings [facility]    LOCATION:  [facility]   Dictated by (CST): Tasha Land MD on 1/28/2025 at 8:08 AM     Finalized by (CST): Tasha Land MD on 1/28/2025 at 8:10 AM        Assessment and Plan:  1. L foot cellulitis with what appears to be OM on XR of foot with threat to limb  - Known to be colonized with MRSA.  - Drainage cultures from outside hospital, 1/17/25, with MRSA.  - MRI foot pending.  - IV vancomycin, ongoing.     2. R hand stable after injury  - XR without any acute abnormalities.  - No signs of infection on exam.    3. Generalized weakness and fatigue  - Afebrile.  - No signs of sepsis at this point.    4. RA    5. H/o MRSA bacteremia in September 2024  - Patient also with h/o TAVR.   - LOYD during that admission without evidence of endocarditis.   - Treated with 4 weeks of IV vancomycin.  - Repeat blood cultures obtained off antibiotics, 10/23/24, NGTD.     6. Recs  - Continue IV vancomycin, pharmacy to dose.   - F/u WBC and fever curve.  - F/u cultures.  - F/u MRI.  - Supportive care as per the primary team.  - Discussed plan of care with nursing.  - Discussed plan of care with  patient. All questions addressed and understanding verbalized.  - Further recommendations pending clinical course.     Discussed case with ID attending/collaborating physician, Dr. Yvette Whitfield, who is in agreement with the above plan of care    Please note that this report has been produced using speech recognition software and may contain errors related to that system including, but not limited to, errors in grammar, punctuation, and spelling, as well as words and phrases that possibly may have been recognized inappropriately.  If there are any questions or concerns, contact the dictating provider for clarification.     The 21st Century Cures Act makes medical notes like these available to patients in the interest of transparency. Please be advised this is a medical document. Medical documents are intended to carry relevant information, facts as evident, and the clinical opinion of the practitioner. The medical note is intended as peer to peer communication and may appear blunt or direct. It is written in medical language and may contain abbreviations or verbiage that are unfamiliar.     If you have any questions or concerns please call [facility] Infectious Disease at [phone].     Sanjiv Ortega, APRN    1/29/2025  8:34 AM

## 2025-01-29 NOTE — PLAN OF CARE
Assumed care of patient at 0730.  Alert and oriented x4.  On room air with adequate o2 saturations.  Afib on tele, rates controlled. No complaints of chest pain or shortness of breath.,  Incontinent of bladder, purewick in place.  Up with 1 and walker.   Updated on plan of care, verbalized understanding.     Addendum:  Updated Dr. Patel with procedure date. Also let Cardiology know blood pressures were soft and they instructed to hold lasix this evening.     Problem: Patient/Family Goals  Goal: Patient/Family Long Term Goal  Description: Patient's Long Term Goal: to go home    Interventions:  - consults to see  - medication adjustment as needed  - get stronger  - See additional Care Plan goals for specific interventions  Outcome: Progressing  Goal: Patient/Family Short Term Goal  Description: Patient's Short Term Goal: address foot issue    Interventions:   - ID consult  - MRI  - IV abt  - See additional Care Plan goals for specific interventions  Outcome: Progressing     Problem: PAIN - ADULT  Goal: Verbalizes/displays adequate comfort level or patient's stated pain goal  Description: INTERVENTIONS:  - Encourage pt to monitor pain and request assistance  - Assess pain using appropriate pain scale  - Administer analgesics based on type and severity of pain and evaluate response  - Implement non-pharmacological measures as appropriate and evaluate response  - Consider cultural and social influences on pain and pain management  - Manage/alleviate anxiety  - Utilize distraction and/or relaxation techniques  - Monitor for opioid side effects  - Notify MD/LIP if interventions unsuccessful or patient reports new pain  - Anticipate increased pain with activity and pre-medicate as appropriate  Outcome: Progressing     Problem: RISK FOR INFECTION - ADULT  Goal: Absence of fever/infection during anticipated neutropenic period  Description: INTERVENTIONS  - Monitor WBC  - Administer growth factors as ordered  - Implement  neutropenic guidelines  Outcome: Progressing     Problem: SAFETY ADULT - FALL  Goal: Free from fall injury  Description: INTERVENTIONS:  - Assess pt frequently for physical needs  - Identify cognitive and physical deficits and behaviors that affect risk of falls.  - [place] fall precautions as indicated by assessment.  - Educate pt/family on patient safety including physical limitations  - Instruct pt to call for assistance with activity based on assessment  - Modify environment to reduce risk of injury  - Provide assistive devices as appropriate  - Consider OT/PT consult to assist with strengthening/mobility  - Encourage toileting schedule  Outcome: Progressing     Problem: DISCHARGE PLANNING  Goal: Discharge to home or other facility with appropriate resources  Description: INTERVENTIONS:  - Identify barriers to discharge w/pt and caregiver  - Include patient/family/discharge partner in discharge planning  - Arrange for needed discharge resources and transportation as appropriate  - Identify discharge learning needs (meds, wound care, etc)  - Arrange for interpreters to assist at discharge as needed  - Consider post-discharge preferences of patient/family/discharge partner  - Complete POLST form as appropriate  - Assess patient's ability to be responsible for managing their own health  - Refer to Case Management Department for coordinating discharge planning if the patient needs post-hospital services based on physician/LIP order or complex needs related to functional status, cognitive ability or social support system  Outcome: Progressing     Problem: Altered Communication/Language Barrier  Goal: Patient/Family is able to understand and participate in their care  Description: Interventions:  - Assess communication ability and preferred communication style  - Implement communication aides and strategies  - Use visual cues when possible  - Listen attentively, be patient, do not interrupt  - Minimize  distractions  - Allow time for understanding and response  - Establish method for patient to ask for assistance (call light)  - Provide an  as needed  - Communicate barriers and strategies to overcome with those who interact with patient  Outcome: Progressing     Problem: CARDIOVASCULAR - ADULT  Goal: Maintains optimal cardiac output and hemodynamic stability  Description: INTERVENTIONS:  - Monitor vital signs, rhythm, and trends  - Monitor for bleeding, hypotension and signs of decreased cardiac output  - Evaluate effectiveness of vasoactive medications to optimize hemodynamic stability  - Monitor arterial and/or venous puncture sites for bleeding and/or hematoma  - Assess quality of pulses, skin color and temperature  - Assess for signs of decreased coronary artery perfusion - ex. Angina  - Evaluate fluid balance, assess for edema, trend weights  Outcome: Progressing  Goal: Absence of cardiac arrhythmias or at baseline  Description: INTERVENTIONS:  - Continuous cardiac monitoring, monitor vital signs, obtain 12 lead EKG if indicated  - Evaluate effectiveness of antiarrhythmic and heart rate control medications as ordered  - Initiate emergency measures for life threatening arrhythmias  - Monitor electrolytes and administer replacement therapy as ordered  Outcome: Progressing

## 2025-01-29 NOTE — PLAN OF CARE
Assumed patient care approximately 1930. Patient is alert and oriented X4. SpO2 maintained on room air with saturation maintaining greater than 89%.. A-fib on tele, heart rate controlled. Continent of bowel, Incontinent of bladder, purewick and brief in place.Skin lesions and skin breakdown throughout body, dressings in place, clean & dry, wound care to see. Pain managed with PRN medication. Education provided on medication, antibiotics, and plan of care, patient verbalize understanding. Patient encouraged to take PO potasium, patient declined.     Plan of care: Tele, SpO2, IV lasix & abx, wound care, pain management       Problem: Patient/Family Goals  Goal: Patient/Family Long Term Goal  Description: Patient's Long Term Goal: to go home    Interventions:  - consults to see  - medication adjustment as needed  - get stronger  - See additional Care Plan goals for specific interventions  Outcome: Progressing  Goal: Patient/Family Short Term Goal  Description: Patient's Short Term Goal: address foot issue    Interventions:   - ID consult  - MRI  - IV abt  - See additional Care Plan goals for specific interventions  Outcome: Progressing     Problem: PAIN - ADULT  Goal: Verbalizes/displays adequate comfort level or patient's stated pain goal  Description: INTERVENTIONS:  - Encourage pt to monitor pain and request assistance  - Assess pain using appropriate pain scale  - Administer analgesics based on type and severity of pain and evaluate response  - Implement non-pharmacological measures as appropriate and evaluate response  - Consider cultural and social influences on pain and pain management  - Manage/alleviate anxiety  - Utilize distraction and/or relaxation techniques  - Monitor for opioid side effects  - Notify MD/LIP if interventions unsuccessful or patient reports new pain  - Anticipate increased pain with activity and pre-medicate as appropriate  Outcome: Progressing     Problem: RISK FOR INFECTION -  ADULT  Goal: Absence of fever/infection during anticipated neutropenic period  Description: INTERVENTIONS  - Monitor WBC  - Administer growth factors as ordered  - Implement neutropenic guidelines  Outcome: Progressing     Problem: SAFETY ADULT - FALL  Goal: Free from fall injury  Description: INTERVENTIONS:  - Assess pt frequently for physical needs  - Identify cognitive and physical deficits and behaviors that affect risk of falls.  - [place] fall precautions as indicated by assessment.  - Educate pt/family on patient safety including physical limitations  - Instruct pt to call for assistance with activity based on assessment  - Modify environment to reduce risk of injury  - Provide assistive devices as appropriate  - Consider OT/PT consult to assist with strengthening/mobility  - Encourage toileting schedule  Outcome: Progressing     Problem: DISCHARGE PLANNING  Goal: Discharge to home or other facility with appropriate resources  Description: INTERVENTIONS:  - Identify barriers to discharge w/pt and caregiver  - Include patient/family/discharge partner in discharge planning  - Arrange for needed discharge resources and transportation as appropriate  - Identify discharge learning needs (meds, wound care, etc)  - Arrange for interpreters to assist at discharge as needed  - Consider post-discharge preferences of patient/family/discharge partner  - Complete POLST form as appropriate  - Assess patient's ability to be responsible for managing their own health  - Refer to Case Management Department for coordinating discharge planning if the patient needs post-hospital services based on physician/LIP order or complex needs related to functional status, cognitive ability or social support system  Outcome: Progressing     Problem: Altered Communication/Language Barrier  Goal: Patient/Family is able to understand and participate in their care  Description: Interventions:  - Assess communication ability and preferred  communication style  - Implement communication aides and strategies  - Use visual cues when possible  - Listen attentively, be patient, do not interrupt  - Minimize distractions  - Allow time for understanding and response  - Establish method for patient to ask for assistance (call light)  - Provide an  as needed  - Communicate barriers and strategies to overcome with those who interact with patient  Outcome: Progressing     Problem: CARDIOVASCULAR - ADULT  Goal: Maintains optimal cardiac output and hemodynamic stability  Description: INTERVENTIONS:  - Monitor vital signs, rhythm, and trends  - Monitor for bleeding, hypotension and signs of decreased cardiac output  - Evaluate effectiveness of vasoactive medications to optimize hemodynamic stability  - Monitor arterial and/or venous puncture sites for bleeding and/or hematoma  - Assess quality of pulses, skin color and temperature  - Assess for signs of decreased coronary artery perfusion - ex. Angina  - Evaluate fluid balance, assess for edema, trend weights  Outcome: Progressing  Goal: Absence of cardiac arrhythmias or at baseline  Description: INTERVENTIONS:  - Continuous cardiac monitoring, monitor vital signs, obtain 12 lead EKG if indicated  - Evaluate effectiveness of antiarrhythmic and heart rate control medications as ordered  - Initiate emergency measures for life threatening arrhythmias  - Monitor electrolytes and administer replacement therapy as ordered  Outcome: Progressing

## 2025-01-29 NOTE — PROGRESS NOTES
CC: follow-up hospital admission chf, OM of foot     SUBJECTIVE:  Interval History:     No acute issues overnight. No nv,, no chest pain    OBJECTIVE:  Scheduled Meds:    potassium chloride  40 mEq Intravenous Once    magnesium oxide  400 mg Oral Once    allopurinol  100 mg Oral Nightly    buPROPion SR  150 mg Oral BID    clopidogrel  75 mg Oral Nightly    digoxin  125 mcg Oral Nightly    FLUoxetine  20 mg Oral BID    levothyroxine  175 mcg Oral QAM AC    metoprolol succinate ER  25 mg Oral Nightly    rosuvastatin  5 mg Oral QOD    vancomycin  15 mg/kg Intravenous Q18H    warfarin  5 mg Oral Once per day on Tuesday Thursday    warfarin  2.5 mg Oral Once per day on Sunday Monday Wednesday Friday Saturday    furosemide  40 mg Intravenous Once     Continuous Infusions:   PRN Meds:   albuterol    acetaminophen    PHYSICAL EXAM  Vital signs: Temp:  [97.8 °F (36.6 °C)-98.4 °F (36.9 °C)] 97.8 °F (36.6 °C)  Pulse:  [75-95] 78  Resp:  [14-18] 18  BP: ()/(55-75) 91/71  SpO2:  [96 %-99 %] 99 %      GENERAL - NAD, AAO  EYES- sclera anicteric,    HENT- normocephalic,    NECK - no JVD  CV- RRR  RESP - CTAB, normal resp effort  ABDOMEN- soft, NT/ND   EXT- no LE edema, feet warm, ulcer on 2nd left toe,   PSYCH - normal mentation/ normal affect    Data Review:   Labs:   Recent Labs   Lab 01/28/25 0058 01/29/25  0447   WBC 7.8 6.1   HGB 10.9* 10.5*   MCV 95.0 96.0   .0 350.0   INR 1.80* 1.67*       Recent Labs   Lab 01/28/25  0058 01/28/25  1413 01/29/25  0447     --  142   K 3.6 3.3* 3.2*  3.2*     --  103   CO2 26.0  --  31.0   BUN 14  --  9   CREATSERUM 0.56  --  0.66   CA 9.4  --  8.4*   MG  --   --  1.6   GLU 89  --  74       Recent Labs   Lab 01/28/25 0058   ALT 20   AST 35*   ALB 3.6       No results for input(s): \"PGLU\" in the last 168 hours.        ASSESSMENT/PLAN:    69 year old female with PMH sig for HFpEF, Afib on coumadin, CAD, PAD, COPD, SHELDON who presents for evaluation after a fall and  multiple medical complaints.      # Lower extremity cellulitis/suspected osteomyelitis  -MRI ordered  -Continue IV vancomycin per ID  -Wound care  -will have vascular see given pad hx     # Right hand laceration   -Continue local wound care     # acute on chronic HFpEF  -Apparently her diuretic was not given when she was arrived at the nursing facility.  Continue IV Lasix.  Goal net -1 L/day.   -Strict I's/O, daily weights, fluid restriction  -Monitor volume status.  Trend renal function.     #paroxysmal A-fib on Coumadin  -Continue Toprol and Coumadin  -Daily PT/INR - subtherapeutic, will bridge with lovenox     #CAD  #PAD  -severe aortic stenosis sp TAVR  -Continue Plavix, statin     #COPD  -Continue bronchodilators     #SHELDON  -CPAP as nightly and as needed      #depression  -Continue bupropion and fluoxetine        CODE STATUS: Full code  DVT prophylaxis: Coumadin    Will continue to follow while hospitalized. Please page me or the on-call hospitalist with questions or concerns.    Cody Floyd Hospitalist  [phone]  Answering Service: [phone]

## 2025-01-29 NOTE — CONSULTS
Martha Hood MD.  [facility]   Vascular and Endovascular Surgery     VASCULAR SURGERY   CONSULT NOTE      Name: Arleth Weiss   :   1955  [id number]     2025       REFERRING PHYSICIAN: Cody Patel DO  PRIMARY CARE PHYSICIAN:  Viktoriya Garcias DO    HISTORY OF PRESENT ILLNESS: Arleth is a 69 year old female who presents with painful ulceration on the left 2nd toe. Patient states this has been present since Thanks and is just not healing. She reports significant pain and difficulty in reaching her foot to perform adequate foot care. She has had previous venous ablation with our office. She takes a daily plavix, warfarin and statin. She does not smoke.     PAST MEDICAL HISTORY:    Past Medical History:    Aortic stenosis    S/P TAVR    Arrhythmia    ASTHMA    Asthma (HCC)    Back problem    CANCER    thyroid    Cancer (HCC)    thyroid     CHF (congestive heart failure) (HCC)    CKD (chronic kidney disease) stage 2, GFR 60-89 ml/min    Congestive heart disease (HCC)    COPD (chronic obstructive pulmonary disease) (HCC)    Deep vein thrombosis (HCC)    DEPRESSION    Depression    Disorder of thyroid    Esophageal reflux    Essential hypertension    Fibromyalgia    Gout    High blood pressure    High cholesterol    History of blood transfusion    HYPERTENSION    HYPOTHYROIDISM    Incontinence    Muscle weakness    Neuropathy    OBESITY    OSTEOARTHRITIS    Osteoarthritis    OTHER DISEASES    Fibromyalgia    OTHER DISEASES    Pulmonary emboli    OTHER DISEASES    Lymph edema    OTHER DISEASES    Pulmonary hypertension    Peripheral vascular disease    Pneumonia due to organism    Prediabetes    Pulmonary embolism (HCC)    RA (rheumatoid arthritis) (HCC)    Renal disorder    Shortness of breath    ON EXERTION    SLEEP APNEA    Sleep apnea    CPAP     PAST SURGICAL HISTORY:   Past Surgical History:   Procedure Laterality Date    Anesth,shoulder replacement Right     hemiathroplasty     Back surgery  2000    Back surgery  2004    complete c-3 -7 ectomy    Biopsy Left 07/20/2023    TEMPORAL ARTERY    Carpal tunnel release      Cath transcatheter aortic valve replacement      Colonoscopy N/A 05/10/2018    Procedure: COLONOSCOPY, POSSIBLE BIOPSY, POSSIBLE POLYPECTOMY 15464;  Surgeon: Kendell Valentin MD;  Location: [facility]    Colonoscopy      Craniotomy for lobotomy Left     D & c  09/2009    Dilation/curettage,diagnostic      Hip replacement surgery  09/2009    Rt hip    Knee replacement surgery  2003    Both knees    Other surgical history  1989    Thyroid removal    Other surgical history  2004    LT foot spur removal    Spine surgery procedure unlisted      Thyroidectomy Bilateral     Total hip replacement      Total knee replacement       MEDICATIONS:   Medications Ordered Prior to Encounter[1]     ALLERGIES:    She is allergic to adhesive tape, codeine, doxycycline, hydrocodone, lisinopril, morphine sulfate, opioid analgesics, oxycontin, oxytocin, vicodin [hydrocodone-acetaminophen], and skin adhesives.    SOCIAL HISTORY:    Patient  reports that she quit smoking about 34 years ago. Her smoking use included cigarettes. She started smoking about 64 years ago. She has a 15 pack-year smoking history. She has never used smokeless tobacco. She reports that she does not drink alcohol and does not use drugs.    FAMILY HISTORY:    Patient's family history includes Cancer in her brother; Diabetes in her mother; Hypertension in her brother, father, and mother; Lipids in her father and mother.    ROS:    Comprehensive ROS reviewed and negative except for what's stated above.  Including negative for chest pain, shortness of breath, syncope.     EXAM:  BP 91/71 (BP Location: Left arm)   Pulse 78   Temp 97.8 °F (36.6 °C) (Oral)   Resp 18   Ht 5' 9\" (1.753 m)   Wt 193 lb 2 oz (87.6 kg)   LMP  (LMP Unknown)   SpO2 99%   BMI 28.52 kg/m²   GENERAL: alert and oriented x3, in no  apparent distress  PSYCH: Normal mood and affect  NEURO: Cranial nerves grossly intact. No sensory or motor deficits  HEENT: Ears and throat are clear  NECK: Supple  RESPIRATORY: Normal work of breathing  CARDIO: RRR   ABDOMEN: Soft, non-tender, non-distended  BACK: Normal, no tenderness  SKIN: No rashes, warm and dry  MUSCULOSKELETAL: Strength 5/5 throughout, sensation intact  EXTREMITIES: No edema  VASCULAR: DP pulses are palpable bilaterally        Diagnostic Data:      LABS:  Recent Labs   Lab 01/28/25 0058 01/29/25 0447   WBC 7.8 6.1   HGB 10.9* 10.5*   MCV 95.0 96.0   .0 350.0   INR 1.80* 1.67*       Recent Labs   Lab 01/28/25 0058 01/28/25  1413 01/29/25 0447     --  142   K 3.6 3.3* 3.2*  3.2*     --  103   CO2 26.0  --  31.0   BUN 14  --  9   CREATSERUM 0.56  --  0.66   GLU 89  --  74   CA 9.4  --  8.4*   MG  --   --  1.6     Recent Labs   Lab 01/28/25 0058 01/29/25 0447   PTP 21.1* 19.8*   INR 1.80* 1.67*     Recent Labs   Lab 01/28/25 0058   ALT 20   AST 35*   ALB 3.6     No results for input(s): \"TROP\" in the last 168 hours.  No results found for: \"ANAS\", \"LENNY\", \"ANASCRN\"  No results for input(s): \"PCACT\", \"PSACT\", \"AT3ACT\", \"HIPAB\", \"PATHI\", \"STALA\", \"DRVVTRATIO\", \"DRVVT\", \"STACLOT\", \"CARIGG\", \"A3SV5XLTEN\", \"K7YX3HDNTC\", \"RA\", \"HAVIGM\", \"HBCIGM\", \"HCVAB\", \"HBSAG\", \"HBCAB\", \"HBVDNAINTERP\", \"ANAS\", \"C3\", \"C4\" in the last 168 hours.    Radiology: XR CHEST AP PORTABLE  (CPT=71045)    Result Date: 1/28/2025  PROCEDURE:  XR CHEST AP PORTABLE  (CPT=71045)  TECHNIQUE:  AP chest radiograph was obtained.  COMPARISON:  [facility] , XR, XR CHEST AP PORTABLE  (CPT=71045), 11/23/2024, 5:57 PM.  INDICATIONS:  fall, on thinners, states seh has MRSA in foot  PATIENT STATED HISTORY: (As transcribed by Technologist)  Patient offered no additional history at this time.    FINDINGS:  Cardiomediastinal silhouette is stable in size.  Status post TAVR.  Left lower lobe retrocardiac increased opacity is  most consistent with atelectasis and or scar.  No definite focal consolidation, pleural effusion, or pneumothorax.  Osseous structures are stable including post cervical spinal fusion and right shoulder arthroplasty.            CONCLUSION:  1. Findings most consistent with left lower lobe atelectasis and or scar without definite focal consolidation.  Continued clinical correlation recommended.  DANY HARRISON notified of these findings with preliminary radiology report from [facility] services.    LOCATION:  [facility]      Dictated by (CST): Tasha Land MD on 1/28/2025 at 8:33 AM     Finalized by (CST): Tasha Land MD on 1/28/2025 at 8:34 AM       XR FOOT, COMPLETE (MIN 3 VIEWS), LEFT (CPT=73630)    Result Date: 1/28/2025  PROCEDURE:  XR FOOT, COMPLETE (MIN 3 VIEWS), LEFT (CPT=73630)  TECHNIQUE:  AP, oblique, and lateral views were obtained.  COMPARISON:  None.  INDICATIONS:  fall, on thinners, states seh has MRSA in foot  PATIENT STATED HISTORY: (As transcribed by Technologist)  Patient states wound along plantar aspect of left foot due to calcium build x6 months. MRSA per patient; No known injury. Patient states she fell earlier today and injured right hand. Large laceration along medial aspect of right hand and along posterior 5th digit.    FINDINGS:  BONES:  Osseous destruction involves the 2nd middle and distal phalanx concerning for osteomyelitis.  There is surrounding soft tissue swelling.  Decreased bone mineralization involves the 2nd proximal phalanx distally with age indeterminate mildly displaced fracture.  No dislocation.  Marked vascular calcifications.  Osseous defect involves the base of the 5th metatarsal bone best seen on lateral projection, infectious process also cannot be excluded within this location.            CONCLUSION:  1. Findings concerning for osteomyelitis as detailed above.  An MRI can be performed for further evaluation as clinically indicated.  [facility] M.D. notified of these findings with preliminary  radiology report from [facility] services.    LOCATION:  [facility]   Dictated by (CST): Tasha Land MD on 1/28/2025 at 8:11 AM     Finalized by (CST): Tasha Land MD on 1/28/2025 at 8:14 AM       XR HAND (MIN 3 VIEWS), RIGHT (CPT=73130)    Result Date: 1/28/2025  PROCEDURE:  XR HAND (MIN 3 VIEWS), RIGHT (CPT=73130)  TECHNIQUE:  Three views of the right hand were obtained.  COMPARISON:  None.  INDICATIONS:  fall, on thinners, states [facility] has MRSA in foot  PATIENT STATED HISTORY: (As transcribed by Technologist)  Patient states wound along plantar aspect of left foot due to calcium build x6 months. MRSA per patient; No known injury. Patient states she fell earlier today and injured right hand. Large laceration along medial aspect of right hand and along posterior 5th digit.    FINDINGS:  Osseous structures are intact.  Distal interphalangeal joint space narrowing predominantly involves the 3rd digit where there is complete obliteration of the joint space, large marginal osteophytes and palmar subluxation of the distal phalanx.   Findings are most consistent with degenerative change.    Marked vascular calcifications.  Soft tissue irregularity adjacent to the 5th metacarpal bone is most likely due to patient's laceration given the history.            CONCLUSION:  1. No acute visible fracture.  Please see details as above.  [facility] M.NIRALI. notified of these findings with preliminary radiology report from [facility] services.    LOCATION:  [facility]   Dictated by (CST): Tasha Land MD on 1/28/2025 at 8:08 AM     Finalized by (CST): Tasha Land MD on 1/28/2025 at 8:10 AM            ASSESSMENT AND PLAN:     The patient is a 69 year old female who presents with LLE non-healing wound. Although she has palpable DP pulses, she has significantly calcified vessels on CTA and a wound that has not healed in two months. Thus I have recommended a left leg angiogram with likely tibial intervention. Will plan this for Friday. We discussed the risks and  benefits and she elects to proceed. Hold Warfarin     The patient indicated an understanding of these issues and agreed to the plan and all questions were answered.     Thank you for allowing me to participate in the patient's care.     Sincerely,  Martha Hood MD  01/29/25   10:51 AM                       [1]   Current Facility-Administered Medications on File Prior to Encounter   Medication Dose Route Frequency Provider Last Rate Last Admin    [COMPLETED] bisacodyl (Dulcolax) 10 MG rectal suppository 10 mg  10 mg Rectal Once SaTin gutierrez, DO   10 mg at 12/04/24 0022    [COMPLETED] potassium chloride (Klor-Con) 20 MEQ oral powder 40 mEq  40 mEq Oral Once Kristie Larkin MD   40 mEq at 12/02/24 0844    [COMPLETED] potassium chloride (Klor-Con) 20 MEQ oral powder 40 mEq  40 mEq Oral Once Ali, Irfan, DO   40 mEq at 11/29/24 0808    [COMPLETED] potassium chloride (Klor-Con) 20 MEQ oral powder 40 mEq  40 mEq Oral Once Ali, Irfan, DO   40 mEq at 11/28/24 1657    [COMPLETED] magnesium oxide (Mag-Ox) tab 400 mg  400 mg Oral Once Ali, Irfan, DO   400 mg at 11/28/24 1657    [COMPLETED] potassium chloride (Klor-Con) 20 MEQ oral powder 40 mEq  40 mEq Oral Once Ali, Irfan, DO   40 mEq at 11/27/24 0909    [COMPLETED] magnesium oxide (Mag-Ox) tab 400 mg  400 mg Oral Once Ali, Irfan, DO   400 mg at 11/26/24 1036    [COMPLETED] potassium chloride (Klor-Con) 20 MEQ oral powder 40 mEq  40 mEq Oral Once Ali, Irfan, DO   40 mEq at 11/26/24 1036    [COMPLETED] magnesium oxide (Mag-Ox) tab 400 mg  400 mg Oral Once Ali, Irfan, DO   400 mg at 11/25/24 0847    [COMPLETED] potassium chloride (Klor-Con) 20 MEQ oral powder 40 mEq  40 mEq Oral Once Ali, Irfan, DO   40 mEq at 11/25/24 0937    [COMPLETED] Perflutren Lipid Microsphere (DEFINITY) 6.52 MG/ML injection 1.5 mL  1.5 mL Intravenous ONCE PRN Ali, Irfan, DO   1.5 mL at 11/25/24 1335    [COMPLETED] furosemide (Lasix) 10 mg/mL injection 40 mg  40 mg Intravenous Once Khoa Flannery MD    40 mg at 11/23/24 2110    [COMPLETED] sodium chloride 0.9 % IV bolus 500 mL  500 mL Intravenous Once Rafia Eaton MD   Stopped at 11/14/24 1930    [COMPLETED] iopamidol 76% (ISOVUE-370) injection for power injector  100 mL Intravenous ONCE PRN Rafia Eaton MD   100 mL at 11/14/24 1931     Current Outpatient Medications on File Prior to Encounter   Medication Sig Dispense Refill    metoprolol succinate ER 25 MG Oral Tablet 24 Hr Take 1 tablet (25 mg total) by mouth at bedtime.      Acetaminophen ER (TYLENOL 8 HOUR) 650 MG Oral Tab CR Take 1-2 tablets (650-1,300 mg total) by mouth 2 (two) times daily as needed for Pain.      albuterol 108 (90 Base) MCG/ACT Inhalation Aero Soln Inhale 2 puffs into the lungs every 6 (six) hours as needed for Wheezing.      Nortriptyline HCl 75 MG Oral Cap Take 1 capsule (75 mg total) by mouth nightly.      Multiple Vitamins-Minerals (CENTRUM MINIS WOMEN 50+) Oral Tab Take 2 tablets by mouth at bedtime.      warfarin 5 MG Oral Tab See Admin Instructions. Take ½ tablet (2.5 MG total) by mouth daily on Sundays, Mondays, Wednesdays, Fridays, and Saturdays.      empagliflozin (JARDIANCE) 10 MG Oral Tab Take 1 tablet (10 mg total) by mouth at bedtime.      levothyroxine 175 MCG Oral Tab Take 1 tablet (175 mcg total) by mouth every morning before breakfast.      clopidogrel 75 MG Oral Tab Take 1 tablet (75 mg total) by mouth at bedtime.      Potassium Chloride ER 10 MEQ Oral Cap CR Take 4 capsules (40 mEq total) by mouth at bedtime. Per pt.      rosuvastatin 5 MG Oral Tab Take 1 tablet (5 mg total) by mouth every other day.      spironolactone 25 MG Oral Tab Take 1 tablet (25 mg total) by mouth at bedtime.      warfarin 5 MG Oral Tab Take 1 tablet (5 mg total) by mouth twice a week. on Tuesdays and Thursdays.      ferrous sulfate 325 (65 FE) MG Oral Tab EC Take 1 tablet (325 mg total) by mouth at bedtime.      calcium carbonate 500 MG Oral Chew Tab Chew 1 tablet (500 mg total)  by mouth every 8 (eight) hours as needed (upset stomach).      Zinc 50 MG Oral Cap Take 50 mg by mouth at bedtime.      Cholecalciferol 125 MCG (5000 UT) Oral Tab Take 1 tablet (5,000 Units total) by mouth at bedtime. Per pt.      buPROPion  MG Oral Tablet 12 Hr Take 1 tablet (150 mg total) by mouth 2 (two) times daily.      cyproheptadine 4 MG Oral Tab Take 1 tablet (4 mg total) by mouth nightly.      FLUoxetine 20 MG Oral Cap Take 1 capsule (20 mg total) by mouth 2 (two) times daily.      triamcinolone 0.1 % External Ointment Apply 1 Application topically daily as needed (Neuro dermatitis per pt.).      digoxin 0.125 MG Oral Tab Take 1 tablet (125 mcg total) by mouth daily. (Patient taking differently: Take 1 tablet (125 mcg total) by mouth at bedtime.) 30 tablet 0    allopurinol 100 MG Oral Tab Take 1 tablet (100 mg total) by mouth daily. (Patient taking differently: Take 1 tablet (100 mg total) by mouth at bedtime.) 90 tablet 3    torsemide 10 MG Oral Tab Take 1 tablet (10 mg total) by mouth daily. 30 tablet 0    clotrimazole-betamethasone 1-0.05 % External Cream APPLY TO AFFECTED AREA(S) TWO TIMES A DAY AS DIRECTED ( UNDER FOLDS/ABDOMEN AND UNDER BREAST) (Patient taking differently: Apply topically 2 (two) times daily as needed.)      ketoconazole 2 % External Cream Apply 1 Application topically daily as needed. IN THE MORNING      [] metOLazone 5 MG Oral Tab Take 1 tablet (5 mg total) by mouth daily as needed (only if weight increases >5 lbs in 1 day). 30 tablet 3    Vitamin C 500 MG Oral Tab Take 1 tablet (500 mg total) by mouth at bedtime. Per pt.

## 2025-01-29 NOTE — CONSULTS
[facility] Consultation    Arleth Weiss Patient Status:  Inpatient    1955 MRN [id number]   [facility] 8NE-A Attending Cody Patel DO   Hosp Day # 1 PCP Viktoriya Garcias DO     Reason for Consultation:  CHF      History of Present Illness:  Arleth Weiss is a a(n) 69 year old female. [facility] s/p TAVR around , CAD s/p remote stent, ,  Chronic HFpEF, HTN, PAD, HLD and hx of PE s/p IVC filter .  She was admitted to [facility] in [place] 2 months ago due to SOB.  She was then transferred to [facility].  She has toe ulcers, possible osteo.  She reports that she has not been on her usual torsemide or spironolactone for 2 months as they were not proscribed by the doctors at [facility].  She reports that she remains dyspneic with moderate activity, ie, walking distances.  No orthopnea/PND/CP.  Her LE's are free of edema on exam. Lungs/ CXR are  clear.  BNP elevated at 1266.    History:  Past Medical History:    Aortic stenosis    S/P TAVR    Arrhythmia    ASTHMA    Asthma (HCC)    Back problem    CANCER    thyroid    Cancer (HCC)    thyroid     CHF (congestive heart failure) (HCC)    CKD (chronic kidney disease) stage 2, GFR 60-89 ml/min    Congestive heart disease (HCC)    COPD (chronic obstructive pulmonary disease) (HCC)    Deep vein thrombosis (HCC)    DEPRESSION    Depression    Disorder of thyroid    Esophageal reflux    Essential hypertension    Fibromyalgia    Gout    High blood pressure    High cholesterol    History of blood transfusion    HYPERTENSION    HYPOTHYROIDISM    Incontinence    Muscle weakness    Neuropathy    OBESITY    OSTEOARTHRITIS    Osteoarthritis    OTHER DISEASES    Fibromyalgia    OTHER DISEASES    Pulmonary emboli    OTHER DISEASES    Lymph edema    OTHER DISEASES    Pulmonary hypertension    Peripheral vascular disease    Pneumonia due to organism    Prediabetes    Pulmonary embolism (HCC)    RA  (rheumatoid arthritis) (HCC)    Renal disorder    Shortness of breath    ON EXERTION    SLEEP APNEA    Sleep apnea    CPAP     Past Surgical History:   Procedure Laterality Date    Anesth,shoulder replacement Right 2014    hemiathroplasty    Back surgery  2000    Back surgery  2004    complete c-3 -7 ectomy    Biopsy Left 07/20/2023    TEMPORAL ARTERY    Carpal tunnel release      Cath transcatheter aortic valve replacement      Colonoscopy N/A 05/10/2018    Procedure: COLONOSCOPY, POSSIBLE BIOPSY, POSSIBLE POLYPECTOMY 39297;  Surgeon: Kendell Valentin MD;  Location: [facility]    Colonoscopy      Craniotomy for lobotomy Left     D & c  09/2009    Dilation/curettage,diagnostic      Hip replacement surgery  09/2009    Rt hip    Knee replacement surgery  2003    Both knees    Other surgical history  1989    Thyroid removal    Other surgical history  2004    LT foot spur removal    Spine surgery procedure unlisted      Thyroidectomy Bilateral     Total hip replacement      Total knee replacement       Family History   Problem Relation Age of Onset    Hypertension Father     Lipids Father     Diabetes Mother     Hypertension Mother     Lipids Mother     Cancer Brother     Hypertension Brother          Allergies:  Allergies[1]    Medications:   potassium chloride  40 mEq Intravenous Once    magnesium oxide  400 mg Oral Once    furosemide  40 mg Intravenous BID (Diuretic)    enoxaparin  1 mg/kg Subcutaneous 2 times per day    allopurinol  100 mg Oral Nightly    buPROPion SR  150 mg Oral BID    clopidogrel  75 mg Oral Nightly    digoxin  125 mcg Oral Nightly    FLUoxetine  20 mg Oral BID    levothyroxine  175 mcg Oral QAM AC    metoprolol succinate ER  25 mg Oral Nightly    rosuvastatin  5 mg Oral QOD    vancomycin  15 mg/kg Intravenous Q18H    warfarin  5 mg Oral Once per day on Tuesday Thursday    warfarin  2.5 mg Oral Once per day on Sunday Monday Wednesday Friday Saturday       Continuous  Infusions:      Social History:   reports that she quit smoking about 34 years ago. Her smoking use included cigarettes. She started smoking about 64 years ago. She has a 15 pack-year smoking history. She has never used smokeless tobacco. She reports that she does not drink alcohol and does not use drugs.    Review of Systems:  All systems were reviewed and are negative except as described above in HPI.    Physical Exam:      Wt Readings from Last 3 Encounters:   01/29/25 193 lb 2 oz (87.6 kg)   01/28/25 199 lb (90.3 kg)   12/04/24 198 lb 10.2 oz (90.1 kg)       Vitals:    01/28/25 2340 01/29/25 0121 01/29/25 0512 01/29/25 0713   BP:  (!) 89/65 100/71 91/71   BP Location:   Left arm Left arm   Pulse: 93  87 78   Resp: 18  14 18   Temp: 98.2 °F (36.8 °C)  97.8 °F (36.6 °C) 97.8 °F (36.6 °C)   TempSrc: Oral  Oral Oral   SpO2: 98%  98% 99%   Weight:   193 lb 2 oz (87.6 kg)    Height:           Temp:  [97.8 °F (36.6 °C)-98.4 °F (36.9 °C)] 97.8 °F (36.6 °C)  Pulse:  [75-95] 78  Resp:  [14-18] 18  BP: ()/(55-75) 91/71  SpO2:  [96 %-99 %] 99 %    Temp: 97.8 °F (36.6 °C)  Pulse: 78  Resp: 18  BP: 91/71      General:  Appears comfortable  HEENT: No focal deficits.  Neck: No JVD, carotids 2+ no bruits.  Cardiac: Irregular S1S2.  No S3, S4, rub, click.  No murmur.  Lungs: Clear to auscultation and percussion.  Abdomen: Soft, non-tender.   Extremities: No LE edema.  No clubbing or cyanosis  Neurologic: Alert and oriented, normal affect.  Skin: Warm and dry.     Labs:      HEM:  Recent Labs   Lab 01/28/25  0058 01/29/25  0447   WBC 7.8 6.1   HGB 10.9* 10.5*   HCT 32.2* 31.5*   .0 350.0       Chem:  Recent [employer]   Lab 01/28/25 0058 01/28/25  1413 01/29/25 0447     --  142   K 3.6 3.3* 3.2*  3.2*     --  103   CO2 26.0  --  31.0   BUN 14  --  9   CREATSERUM 0.56  --  0.66   MG  --   --  1.6   ALT 20  --   --    AST 35*  --   --    ALB 3.6  --   --        Recent Labs   Lab 01/28/25 0058 01/29/25 0447    INR 1.80* 1.67*                     Lab Results   Component Value Date    TROP <0.045 07/28/2021         Invalid input(s): \"PBNPML\"                 Telemetry: Atrial Fibrillation     Laboratories and Data:  Diagnostics:        CXR, 1/28/2025:    1. Findings most consistent with left lower lobe atelectasis and or scar without definite focal consolidation.  Continued clinical correlation recommended.     Echo, 11/2024    Conclusions:     1. Left ventricle: The cavity size was normal. Wall thickness was normal.      Systolic function was normal. The estimated ejection fraction was 50-55%,      by visual assessment. Wall motion is normal; there are no regional wall      motion abnormalities. Unable to assess LV diastolic function.   2. Right ventricle: Systolic function was normal.   3. Left atrium: The atrium was mildly dilated.   4. Aortic valve: Elizondo MINOR S3 23mm (TAVR) bioprosthesis was present,      well seated. No functional/doppler analysis performed.   5. Mitral valve: The annulus was markedly calcified. The leaflets were      mildly calcified. Minimal stenosis. The mean diastolic gradient was 3mm      Hg.   Impressions:  This study is compared with previous dated 9/14/2024: No   significant change.     Impression:    1.  HFpEF. She reports TALAMANTES with moderate activity.  She is concerned about missing her trcaey and torsemide  x 2 months. No mario fluid o/l on exam or CXR    2. ,s/p TAVR    3. Paroxysmal Atrial Fibrillation    4. LLE non-healing wound. . Per vascular/ID      Recommend:    1.  EKG  2. Update echo  3. Will resume op tracey  4. Follow I/O's, BMP's  5. Trial of IV lasix. Transition back to torsemide at dc        James Robertson MD  1/29/2025  9:44 AM         [1]   Allergies  Allergen Reactions    Adhesive Tape HIVES     ALL ADHESIVES    Codeine HIVES    Doxycycline SWELLING    Hydrocodone UNKNOWN    Lisinopril TONGUE SWELLING    Morphine Sulfate HIVES    Opioid Analgesics UNKNOWN     Oxycontin ITCHING    Oxytocin HIVES    Vicodin [Hydrocodone-Acetaminophen] ITCHING    Skin Adhesives OTHER (SEE COMMENTS)

## 2025-01-30 ENCOUNTER — APPOINTMENT (OUTPATIENT)
Dept: MRI IMAGING | Facility: HOSPITAL | Age: 70
End: 2025-01-30
Attending: INTERNAL MEDICINE
Payer: MEDICARE

## 2025-01-30 LAB
ANION GAP SERPL CALC-SCNC: 9 MMOL/L (ref 0–18)
BASOPHILS # BLD AUTO: 0.04 X10(3) UL (ref 0–0.2)
BASOPHILS NFR BLD AUTO: 0.7 %
BUN BLD-MCNC: 10 MG/DL (ref 9–23)
CALCIUM BLD-MCNC: 8.9 MG/DL (ref 8.7–10.6)
CHLORIDE SERPL-SCNC: 105 MMOL/L (ref 98–112)
CO2 SERPL-SCNC: 28 MMOL/L (ref 21–32)
CREAT BLD-MCNC: 0.59 MG/DL
EGFRCR SERPLBLD CKD-EPI 2021: 96 ML/MIN/1.73M2 (ref 60–?)
EOSINOPHIL # BLD AUTO: 0.41 X10(3) UL (ref 0–0.7)
EOSINOPHIL NFR BLD AUTO: 7.5 %
ERYTHROCYTE [DISTWIDTH] IN BLOOD BY AUTOMATED COUNT: 14.1 %
GLUCOSE BLD-MCNC: 83 MG/DL (ref 70–99)
HCT VFR BLD AUTO: 32.1 %
HGB BLD-MCNC: 10.5 G/DL
IMM GRANULOCYTES # BLD AUTO: 0.02 X10(3) UL (ref 0–1)
IMM GRANULOCYTES NFR BLD: 0.4 %
INR BLD: 1.87 (ref 0.8–1.2)
LYMPHOCYTES # BLD AUTO: 1.69 X10(3) UL (ref 1–4)
LYMPHOCYTES NFR BLD AUTO: 30.8 %
MAGNESIUM SERPL-MCNC: 1.7 MG/DL (ref 1.6–2.6)
MCH RBC QN AUTO: 31.9 PG (ref 26–34)
MCHC RBC AUTO-ENTMCNC: 32.7 G/DL (ref 31–37)
MCV RBC AUTO: 97.6 FL
MONOCYTES # BLD AUTO: 0.77 X10(3) UL (ref 0.1–1)
MONOCYTES NFR BLD AUTO: 14 %
NEUTROPHILS # BLD AUTO: 2.56 X10 (3) UL (ref 1.5–7.7)
NEUTROPHILS # BLD AUTO: 2.56 X10(3) UL (ref 1.5–7.7)
NEUTROPHILS NFR BLD AUTO: 46.6 %
OSMOLALITY SERPL CALC.SUM OF ELEC: 292 MOSM/KG (ref 275–295)
PLATELET # BLD AUTO: 354 10(3)UL (ref 150–450)
POTASSIUM SERPL-SCNC: 3.4 MMOL/L (ref 3.5–5.1)
POTASSIUM SERPL-SCNC: 3.4 MMOL/L (ref 3.5–5.1)
PROTHROMBIN TIME: 21.7 SECONDS (ref 11.6–14.8)
RBC # BLD AUTO: 3.29 X10(6)UL
SODIUM SERPL-SCNC: 142 MMOL/L (ref 136–145)
VANCOMYCIN PEAK SERPL-MCNC: 36.1 UG/ML (ref 30–50)
WBC # BLD AUTO: 5.5 X10(3) UL (ref 4–11)

## 2025-01-30 PROCEDURE — 80048 BASIC METABOLIC PNL TOTAL CA: CPT | Performed by: STUDENT IN AN ORGANIZED HEALTH CARE EDUCATION/TRAINING PROGRAM

## 2025-01-30 PROCEDURE — 97166 OT EVAL MOD COMPLEX 45 MIN: CPT

## 2025-01-30 PROCEDURE — 73720 MRI LWR EXTREMITY W/O&W/DYE: CPT | Performed by: INTERNAL MEDICINE

## 2025-01-30 PROCEDURE — 85610 PROTHROMBIN TIME: CPT | Performed by: STUDENT IN AN ORGANIZED HEALTH CARE EDUCATION/TRAINING PROGRAM

## 2025-01-30 PROCEDURE — 97535 SELF CARE MNGMENT TRAINING: CPT

## 2025-01-30 PROCEDURE — 85025 COMPLETE CBC W/AUTO DIFF WBC: CPT | Performed by: STUDENT IN AN ORGANIZED HEALTH CARE EDUCATION/TRAINING PROGRAM

## 2025-01-30 PROCEDURE — 97530 THERAPEUTIC ACTIVITIES: CPT

## 2025-01-30 PROCEDURE — 80202 ASSAY OF VANCOMYCIN: CPT | Performed by: INTERNAL MEDICINE

## 2025-01-30 PROCEDURE — 97161 PT EVAL LOW COMPLEX 20 MIN: CPT

## 2025-01-30 PROCEDURE — 84132 ASSAY OF SERUM POTASSIUM: CPT | Performed by: HOSPITALIST

## 2025-01-30 PROCEDURE — A9575 INJ GADOTERATE MEGLUMI 0.1ML: HCPCS | Performed by: HOSPITALIST

## 2025-01-30 PROCEDURE — 83735 ASSAY OF MAGNESIUM: CPT | Performed by: STUDENT IN AN ORGANIZED HEALTH CARE EDUCATION/TRAINING PROGRAM

## 2025-01-30 RX ORDER — BISACODYL 10 MG
10 SUPPOSITORY, RECTAL RECTAL ONCE
Status: COMPLETED | OUTPATIENT
Start: 2025-01-30 | End: 2025-01-30

## 2025-01-30 RX ORDER — GADOTERATE MEGLUMINE 376.9 MG/ML
20 INJECTION INTRAVENOUS
Status: COMPLETED | OUTPATIENT
Start: 2025-01-30 | End: 2025-01-30

## 2025-01-30 RX ORDER — METOPROLOL TARTRATE 50 MG
50 TABLET ORAL
Status: DISCONTINUED | OUTPATIENT
Start: 2025-01-30 | End: 2025-02-02

## 2025-01-30 RX ORDER — SENNOSIDES 8.6 MG
8.6 TABLET ORAL 2 TIMES DAILY
Status: DISCONTINUED | OUTPATIENT
Start: 2025-01-30 | End: 2025-02-04

## 2025-01-30 RX ORDER — DOCUSATE SODIUM 100 MG/1
100 CAPSULE, LIQUID FILLED ORAL 2 TIMES DAILY
Status: DISCONTINUED | OUTPATIENT
Start: 2025-01-30 | End: 2025-02-04

## 2025-01-30 RX ORDER — MAGNESIUM OXIDE 400 MG/1
400 TABLET ORAL ONCE
Status: COMPLETED | OUTPATIENT
Start: 2025-01-30 | End: 2025-01-30

## 2025-01-30 NOTE — PHYSICAL THERAPY NOTE
PHYSICAL THERAPY EVALUATION - INPATIENT     Room Number: 8600/8600-A  Evaluation Date: 1/30/2025  Type of Evaluation: Initial  Physician Order: PT Eval and Treat    Presenting Problem: Fall, LE cellulitis, osteomyelitis  Co-Morbidities : HFpEF, afib, CAD, PAD, COPD, SHELDON  Reason for Therapy: Mobility Dysfunction and Discharge Planning    PHYSICAL THERAPY ASSESSMENT   Patient is a 69 year old female admitted 1/28/2025 for fall, LE cellulitis, osteomyelitis.  Prior to admission, patient's baseline is ambulatory with loftstrand crutches.  Patient is currently functioning below baseline with bed mobility, transfers, and gait.  Patient is requiring contact guard assist and moderate assist as a result of the following impairments: decreased functional strength, decreased endurance/aerobic capacity, pain, impaired static and dynamic balance, impaired motor planning, decreased muscular endurance, and medical status.  Physical Therapy will continue to follow for duration of hospitalization.    Patient will benefit from continued skilled PT Services to promote return to prior level of function and safety with continuous assistance and gradual rehabilitative therapy .    PLAN DURING HOSPITALIZATION  Nursing Mobility Recommendation : 1 Assist  PT Device Recommendation: Rolling walker  PT Treatment Plan: Bed mobility;Body mechanics;Endurance;Energy conservation;Patient education;Family education;Gait training;Neuromuscular re-educate;Transfer training;Balance training  Rehab Potential : Guarded  Frequency (Obs): 3x/week     CURRENT GOALS    Goal #1 Patient is able to demonstrate supine - sit EOB @ level: supervision     Goal #2 Patient is able to demonstrate transfers EOB to/from Chair/Wheelchair at assistance level: supervision     Goal #3 Patient is able to ambulate 50 feet with assist device:  LRAD  at assistance level: supervision     Goal #4    Goal #5    Goal #6    Goal Comments: Goals established on 1/30/2025      PHYSICAL  THERAPY MEDICAL/SOCIAL HISTORY  History related to current admission: Patient is a 69 year old female admitted on 1/28/2025 from home for fall.  Pt diagnosed with LE cellulitis, osteomyelitis.    Previous Admissions:   12/7-12/11/2024: Afib; [facility]; [place] acute rehab [facility]  11/23-12/4/2024: acute on chronic congestive heart failure; [facility]  ED visit 11/14/2024: PAD   9/11/-9/19/2024: LLE cellulitis; declined therapy   8/12-8/16/2024: acute on chronic diastolic congestive heart failure; rec OPPT; [facility]     HOME SITUATION  Type of Home: House  Home Layout: One level;Ramped entrance                     Lives With: Caregiver part-time               Prior Level of [place]: Pt typically ambulates with loftstrand crutches. Has an adjustable bed.     SUBJECTIVE  \"I don't need 4 people flipping me upside down\"     OBJECTIVE  Precautions: Bed/chair alarm  Fall Risk: High fall risk    WEIGHT BEARING RESTRICTION  L Lower Extremity: Weight Bearing as Tolerated    PAIN ASSESSMENT  Rating: Unable to rate  Location: everywhere  Management Techniques: Relaxation    COGNITION  Problem Solving:  assistance required to identify errors made, assistance required to generate solutions, and assistance required to implement solutions    RANGE OF MOTION AND STRENGTH ASSESSMENT  See OT note for UE assessment    Lower extremity ROM is within functional limits      Lower extremity strength is grossly 3 to 4/5    BALANCE  Static Sitting: Fair +  Dynamic Sitting: Fair  Static Standing: Poor +  Dynamic Standing: Poor +    ADDITIONAL TESTS                                    ACTIVITY TOLERANCE  Pulse: 104  Heart Rate Source: Monitor                   O2 WALK       NEUROLOGICAL FINDINGS                        AM-PAC '6-Clicks' INPATIENT SHORT FORM - BASIC MOBILITY  How much difficulty does the patient currently have...  Patient Difficulty: Turning over in bed (including adjusting bedclothes, sheets and blankets)?: A Lot   Patient  Difficulty: Sitting down on and standing up from a chair with arms (e.g., wheelchair, bedside commode, etc.): A Little   Patient Difficulty: Moving from lying on back to sitting on the side of the bed?: A Lot   How much help from another person does the patient currently need...   Help from Another: Moving to and from a bed to a chair (including a wheelchair)?: A Little   Help from Another: Need to walk in hospital room?: A Little   Help from Another: Climbing 3-5 steps with a railing?: A Lot     AM-PAC Score:  Raw Score: 15   Approx Degree of Impairment: 57.7%   Standardized Score (AM-PAC Scale): 39.45   CMS Modifier (G-Code): CK    FUNCTIONAL ABILITY STATUS  Gait Assessment   Functional Mobility/Gait Assessment  Gait Assistance: Not tested  Distance (ft): 0    Skilled Therapy Provided     Bed Mobility:  Rolling: NT  Supine to sit: modA HOB elevated  Sit to supine: NT     Transfer Mobility:  Sit to stand: CGA bed elevated   Stand to sit: CGA bed elevated  Gait = NT    Therapist's Comments: RN cleared for session. Pt agreeable for therapy, received supine. Pt requires max time for transfers and emotional preparedness for mobility. Extensive emotional support provided as well as ice water, tissues, etc.  Pt cued on supine<>sit and sit<>stand and pivot mechanics. 55 minutes spent performing supine<>sit and pivot transfer to commode. Pt left on commode with call light. RN aware. Instructed to call for nursing staff for any needs and OOB mobility.     Exercise/Education Provided:  Bed mobility  Body mechanics  Energy conservation  Functional activity tolerated  Gait training  Posture  Strengthening  Transfer training    Patient End of Session: Hospital anti-slip socks;All patient questions and concerns addressed;Call light within reach (on commode, RN aware)      Patient Evaluation Complexity Level:  History High - 3 or more personal factors and/or co-morbidities   Examination of body systems Low -  addressing 1-2  elements   Clinical Presentation Low- Stable   Clinical Decision Making Low Complexity       PT Session Time: 55 minutes   Therapeutic Activity: 25 minutes

## 2025-01-30 NOTE — PROGRESS NOTES
[facility]   part of [facility]     Vascular Surgery Progress Note    Arleth Weiss Patient Status:  Inpatient    1955 MRN [id number]   Location [facility] 8NE-A Attending Cody Patel DO   Hosp Day # 2 PCP Viktoriya Garcias DO     Objective:   Temp: 98 °F (36.7 °C)  Pulse: 80  Resp: 20  BP: 124/69      Events Yesterday/Overnight:  No events overnight. Patient doing well.       Exam:    Ulceration on the second toe of the left foot. Palpable bilateral DP pulses. Neurologically intact. Pain well controlled.    Impression/Plan:    Plan for left lower extremity angiogram on  with Dr. Hood due to non-healing ulcerations of the left 2nd toe.    NPO after midnight.    Hold AM dose of heparin    Continue antibiotic therapy per ID.   Medications:  Current Facility-Administered Medications   Medication Dose Route Frequency    potassium chloride 40 mEq in 250mL sodium chloride 0.9% IVPB premix  40 mEq Intravenous Once    magnesium oxide (Mag-Ox) tab 400 mg  400 mg Oral Once    furosemide (Lasix) 10 mg/mL injection 40 mg  40 mg Intravenous BID (Diuretic)    enoxaparin (Lovenox) 100 MG/ML SUBQ injection 90 mg  1 mg/kg Subcutaneous 2 times per day    spironolactone (Aldactone) tab 25 mg  25 mg Oral Daily    albuterol (Ventolin HFA) 108 (90 Base) MCG/ACT inhaler 2 puff  2 puff Inhalation Q6H PRN    allopurinol (Zyloprim) tab 100 mg  100 mg Oral Nightly    buPROPion SR (WELLBUTRIN SR) 12 hr tab 150 mg  150 mg Oral BID    clopidogrel (Plavix) tab 75 mg  75 mg Oral Nightly    digoxin (Lanoxin) tab 125 mcg  125 mcg Oral Nightly    FLUoxetine (PROzac) cap 20 mg  20 mg Oral BID    levothyroxine (Synthroid) tab 175 mcg  175 mcg Oral QAM AC    metoprolol succinate ER (Toprol XL) 24 hr tab 25 mg  25 mg Oral Nightly    rosuvastatin (Crestor) tab 5 mg  5 mg Oral QOD    vancomycin (Vancocin) 1.5 g in sodium chloride 0.9% 250mL IVPB premix  15 mg/kg Intravenous Q18H    acetaminophen (Tylenol Extra Strength) tab  500-1,000 mg  500-1,000 mg Oral BID PRN       Laboratory/Data:    LABS:  Recent Labs   Lab 01/28/25 0058 01/29/25 0447 01/30/25  0524   WBC 7.8 6.1 5.5   HGB 10.9* 10.5* 10.5*   MCV 95.0 96.0 97.6   .0 350.0 354.0   INR 1.80* 1.67* 1.87*       Recent Labs   Lab 01/28/25 0058 01/28/25  1413 01/29/25 0447 01/30/25  0524     --  142 142   K 3.6 3.3* 3.2*  3.2* 3.4*  3.4*     --  103 105   CO2 26.0  --  31.0 28.0   BUN 14  --  9 10   CREATSERUM 0.56  --  0.66 0.59   GLU 89  --  74 83   CA 9.4  --  8.4* 8.9   MG  --   --  1.6 1.7     Recent Labs   Lab 01/28/25 0058 01/29/25 0447 01/30/25  0524   PTP 21.1* 19.8* 21.7*   INR 1.80* 1.67* 1.87*     Recent Labs   Lab 01/28/25 0058   ALT 20   AST 35*   ALB 3.6     No results for input(s): \"TROP\" in the last 168 hours.  No results found for: \"ANAS\", \"LENNY\", \"ANASCRN\"  No results for input(s): \"PCACT\", \"PSACT\", \"AT3ACT\", \"HIPAB\", \"PATHI\", \"STALA\", \"DRVVTRATIO\", \"DRVVT\", \"STACLOT\", \"CARIGG\", \"I8YB4EROUT\", \"X0EQ5JROTH\", \"RA\", \"HAVIGM\", \"HBCIGM\", \"HCVAB\", \"HBSAG\", \"HBCAB\", \"HBVDNAINTERP\", \"ANAS\", \"C3\", \"C4\" in the last 168 hours.    DISHA Mary  1/30/2025  8:25 AM

## 2025-01-30 NOTE — PLAN OF CARE
Assumed patient at 1930. Alert and oriented x 4 on room air. Lung sounds diminished bilaterally. Afib on tele. Incontinent of bladder, continent of bowel. Up 1 and a walker with a gait belt. Patient updated on plan of care and verbalized understanding.     POC: IV vanco, MRI L foot, Angio Friday  Problem: Patient/Family Goals  Goal: Patient/Family Long Term Goal  Description: Patient's Long Term Goal: to go home    Interventions:  - consults to see  - medication adjustment as needed  - get stronger  - See additional Care Plan goals for specific interventions  Outcome: Progressing  Goal: Patient/Family Short Term Goal  Description: Patient's Short Term Goal: address foot issue    Interventions:   - ID consult  - MRI  - IV abt  - See additional Care Plan goals for specific interventions  Outcome: Progressing     Problem: PAIN - ADULT  Goal: Verbalizes/displays adequate comfort level or patient's stated pain goal  Description: INTERVENTIONS:  - Encourage pt to monitor pain and request assistance  - Assess pain using appropriate pain scale  - Administer analgesics based on type and severity of pain and evaluate response  - Implement non-pharmacological measures as appropriate and evaluate response  - Consider cultural and social influences on pain and pain management  - Manage/alleviate anxiety  - Utilize distraction and/or relaxation techniques  - Monitor for opioid side effects  - Notify MD/LIP if interventions unsuccessful or patient reports new pain  - Anticipate increased pain with activity and pre-medicate as appropriate  Outcome: Progressing     Problem: RISK FOR INFECTION - ADULT  Goal: Absence of fever/infection during anticipated neutropenic period  Description: INTERVENTIONS  - Monitor WBC  - Administer growth factors as ordered  - Implement neutropenic guidelines  Outcome: Progressing     Problem: SAFETY ADULT - FALL  Goal: Free from fall injury  Description: INTERVENTIONS:  - Assess pt frequently for  physical needs  - Identify cognitive and physical deficits and behaviors that affect risk of falls.  - [place] fall precautions as indicated by assessment.  - Educate pt/family on patient safety including physical limitations  - Instruct pt to call for assistance with activity based on assessment  - Modify environment to reduce risk of injury  - Provide assistive devices as appropriate  - Consider OT/PT consult to assist with strengthening/mobility  - Encourage toileting schedule  Outcome: Progressing     Problem: DISCHARGE PLANNING  Goal: Discharge to home or other facility with appropriate resources  Description: INTERVENTIONS:  - Identify barriers to discharge w/pt and caregiver  - Include patient/family/discharge partner in discharge planning  - Arrange for needed discharge resources and transportation as appropriate  - Identify discharge learning needs (meds, wound care, etc)  - Arrange for interpreters to assist at discharge as needed  - Consider post-discharge preferences of patient/family/discharge partner  - Complete POLST form as appropriate  - Assess patient's ability to be responsible for managing their own health  - Refer to Case Management Department for coordinating discharge planning if the patient needs post-hospital services based on physician/LIP order or complex needs related to functional status, cognitive ability or social support system  Outcome: Progressing     Problem: Altered Communication/Language Barrier  Goal: Patient/Family is able to understand and participate in their care  Description: Interventions:  - Assess communication ability and preferred communication style  - Implement communication aides and strategies  - Use visual cues when possible  - Listen attentively, be patient, do not interrupt  - Minimize distractions  - Allow time for understanding and response  - Establish method for patient to ask for assistance (call light)  - Provide an  as needed  - Communicate  barriers and strategies to overcome with those who interact with patient  Outcome: Progressing     Problem: CARDIOVASCULAR - ADULT  Goal: Maintains optimal cardiac output and hemodynamic stability  Description: INTERVENTIONS:  - Monitor vital signs, rhythm, and trends  - Monitor for bleeding, hypotension and signs of decreased cardiac output  - Evaluate effectiveness of vasoactive medications to optimize hemodynamic stability  - Monitor arterial and/or venous puncture sites for bleeding and/or hematoma  - Assess quality of pulses, skin color and temperature  - Assess for signs of decreased coronary artery perfusion - ex. Angina  - Evaluate fluid balance, assess for edema, trend weights  Outcome: Progressing  Goal: Absence of cardiac arrhythmias or at baseline  Description: INTERVENTIONS:  - Continuous cardiac monitoring, monitor vital signs, obtain 12 lead EKG if indicated  - Evaluate effectiveness of antiarrhythmic and heart rate control medications as ordered  - Initiate emergency measures for life threatening arrhythmias  - Monitor electrolytes and administer replacement therapy as ordered  Outcome: Progressing

## 2025-01-30 NOTE — CONSULTS
Arleth Weiss 69 year old female with significant past medical history as listed below presents with osteomyelitis involving the second toe of the left foot.  Patient complains of pain to the left foot.  She reports having a wound on the left second toe for several weeks.  Currently she denies any shortness of breath, chest pain, nausea fever vomiting chills.     Past Medical History:    Aortic stenosis    S/P TAVR    Arrhythmia    ASTHMA    Asthma (HCC)    Back problem    CANCER    thyroid    Cancer (HCC)    thyroid     CHF (congestive heart failure) (HCC)    CKD (chronic kidney disease) stage 2, GFR 60-89 ml/min    Congestive heart disease (HCC)    COPD (chronic obstructive pulmonary disease) (HCC)    Deep vein thrombosis (HCC)    DEPRESSION    Depression    Disorder of thyroid    Esophageal reflux    Essential hypertension    Fibromyalgia    Gout    High blood pressure    High cholesterol    History of blood transfusion    HYPERTENSION    HYPOTHYROIDISM    Incontinence    Muscle weakness    Neuropathy    OBESITY    OSTEOARTHRITIS    Osteoarthritis    OTHER DISEASES    Fibromyalgia    OTHER DISEASES    Pulmonary emboli    OTHER DISEASES    Lymph edema    OTHER DISEASES    Pulmonary hypertension    Peripheral vascular disease    Pneumonia due to organism    Prediabetes    Pulmonary embolism (HCC)    RA (rheumatoid arthritis) (HCC)    Renal disorder    Shortness of breath    ON EXERTION    SLEEP APNEA    Sleep apnea    CPAP     Past Surgical History:   Procedure Laterality Date    Anesth,shoulder replacement Right 2014    hemiathroplasty    Back surgery  2000    Back surgery  2004    complete c-3 -7 ectomy    Biopsy Left 07/20/2023    TEMPORAL ARTERY    Carpal tunnel release      Cath transcatheter aortic valve replacement      Colonoscopy N/A 05/10/2018    Procedure: COLONOSCOPY, POSSIBLE BIOPSY, POSSIBLE POLYPECTOMY 23243;  Surgeon: Kendell Valentin MD;  Location: [facility]    Colonoscopy       Craniotomy for lobotomy Left     D & c  2009    Dilation/curettage,diagnostic      Hip replacement surgery  2009    Rt hip    Knee replacement surgery      Both knees    Other surgical history      Thyroid removal    Other surgical history      LT foot spur removal    Spine surgery procedure unlisted      Thyroidectomy Bilateral     Total hip replacement      Total knee replacement       Family History   Problem Relation Age of Onset    Hypertension Father     Lipids Father     Diabetes Mother     Hypertension Mother     Lipids Mother     Cancer Brother     Hypertension Brother      Social History     Socioeconomic History    Marital status: Single     Spouse name: Not on file    Number of children: Not on file    Years of education: Not on file    Highest education level: Not on file   Occupational History    Not on file   Tobacco Use    Smoking status: Former     Current packs/day: 0.00     Average packs/day: 0.5 packs/day for 30.0 years (15.0 ttl pk-yrs)     Types: Cigarettes     Start date: 1961     Quit date: 1991     Years since quittin.0    Smokeless tobacco: Never   Vaping Use    Vaping status: Never Used   Substance and Sexual Activity    Alcohol use: No    Drug use: No    Sexual activity: Not on file   Other Topics Concern    Caffeine Concern No    Exercise Yes    Seat Belt Yes    Special Diet Yes     Comment: low sodium    Stress Concern Yes    Weight Concern No   Social History Narrative    Not on file     Social Drivers of Health     Financial Resource Strain: Not on file   Food Insecurity: No Food Insecurity (2025)    Food Insecurity     Food Insecurity: Never true   Transportation Needs: No Transportation Needs (2025)    Transportation Needs     Lack of Transportation: No     Car Seat: Not on file   Physical Activity: Not on file   Stress: Not on file   Social Connections: Not on file   Housing Stability: Low Risk  (2025)    Housing Stability     Housing  Instability: No     Housing Instability Emergency: Not on file     Crib or [place]: Not on file     Allergies[1]    ROS  Constitutional: negative for - fever, chills, weight change  Integument:  Left foot ulcers  Respiratory: negative for - wheezing, SOB, chest congestion, cough  Cardiovascular: negative for - lower extremity edema, cyanosis, palpitations, chest pain, night cramps  Gastrointestinal: negative for - abdominal pain, diarrhea, heartburn, nausea  Musculoskeletal: + foot pain  Neurological: negative for - tremors, memory loss, paralysis, numbness    Physical exam:   Vitals:    01/30/25 0740   BP: 124/69   Pulse: 80   Resp: 20   Temp: 98 °F (36.7 °C)     General: NAD  HEENT: EOMI, PERRLA  Respiratory: No wheezing, no rhonchi  Musculoskeletal: Left foot pain  Integument: left foot ulcer  Neuro: No focal deficits  Psych: Mood and affect normal    Lower extremity exam:  DP/PT diminished bilaterally. CFT<3secs all digits. Sensation diminished.  Necrotic ulcer noted at second PIP joint with probe to bone and no erythema or drainage.  Ulcer noted plantar first MTP joint with no erythema or drainage.     Last A1c value was 6.7% done 11/23/2024.      Lab Results   Component Value Date    WBC 5.5 01/30/2025    HGB 10.5 01/30/2025    HCT 32.1 01/30/2025    .0 01/30/2025    CREATSERUM 0.59 01/30/2025    BUN 10 01/30/2025     01/30/2025    K 3.4 01/30/2025    K 3.4 01/30/2025     01/30/2025    CO2 28.0 01/30/2025    GLU 83 01/30/2025    CA 8.9 01/30/2025    INR 1.87 01/30/2025    MG 1.7 01/30/2025       Hospital Encounter on 01/28/25   1. Blood Culture     Status: None (Preliminary result)    Collection Time: 01/28/25  3:20 AM    Specimen: Blood,peripheral   Result Value Ref Range    Blood Culture Result No Growth 2 Days N/A       MRI FOOT (W+WO), LEFT (CPT=73720)    Result Date: 1/30/2025  CONCLUSION:  1. There is osteomyelitis of the middle phalange of 2nd toe and distal aspect of the proximal  phalange of 2nd toe centered around the proximal to phalangeal joint. 2. 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   LOCATION:  [facility]   Dictated by (CST): Buzz Barclay MD on 1/30/2025 at 8:09 AM     Finalized by (CST): Buzz Barclay MD on 1/30/2025 at 8:11 AM       XR CHEST AP PORTABLE  (CPT=71045)    Result Date: 1/28/2025  CONCLUSION:  1. Findings most consistent with left lower lobe atelectasis and or scar without definite focal consolidation.  Continued clinical correlation recommended.  [facility] M.D. notified of these findings with preliminary radiology report from [facility].    LOCATION:  [facility]      Dictated by (CST): Tasha Land MD on 1/28/2025 at 8:33 AM     Finalized by (CST): Tasha Land MD on 1/28/2025 at 8:34 AM       XR FOOT, COMPLETE (MIN 3 VIEWS), LEFT (CPT=73630)    Result Date: 1/28/2025  CONCLUSION:  1. Findings concerning for osteomyelitis as detailed above.  An MRI can be performed for further evaluation as clinically indicated.  [facility] M.D. notified of these findings with preliminary radiology report from [facility].    LOCATION:  [facility]   Dictated by (CST): Tasha Land MD on 1/28/2025 at 8:11 AM     Finalized by (CST): Tasha Land MD on 1/28/2025 at 8:14 AM       XR HAND (MIN 3 VIEWS), RIGHT (CPT=73130)    Result Date: 1/28/2025  CONCLUSION:  1. No acute visible fracture.  Please see details as above.  [facility] M.D. notified of these findings with preliminary radiology report from [facility].    LOCATION:  [facility]   Dictated by (CST): Tasha Land MD on 1/28/2025 at 8:08 AM     Finalized by (CST): Tasha Land MD on 1/28/2025 at 8:10 AM        Assessment & Plan: 69 year old female with     Left second toe osteomyelitis  Chronic nonhealing ulcers of the left foot  Peripheral arterial disease    Afebrile, no leukocytosis  Betadine paint to the left foot ulcers  Weightbearing as tolerated    +OM on MRI  IV antibiotics per ID    Left lower extremity angiogram  planned for tomorrow.  Will plan further treatment pending vascular intervention.    Thank you for the consult    Discussed with RN    Dago Diaz D.P.M.       [1]   Allergies  Allergen Reactions    Adhesive Tape HIVES     ALL ADHESIVES    Codeine HIVES    Doxycycline SWELLING    Hydrocodone UNKNOWN    Lisinopril TONGUE SWELLING    Morphine Sulfate HIVES    Opioid Analgesics UNKNOWN    Oxycontin ITCHING    Oxytocin HIVES    Vicodin [Hydrocodone-Acetaminophen] ITCHING    Skin Adhesives OTHER (SEE COMMENTS)

## 2025-01-30 NOTE — PROGRESS NOTES
[facility] Cardiology Progress Note        Arleth Weiss Patient Status:  Inpatient    1955 MRN [id number]   Location [facility] 8NE-A Attending Cody Patel DO   Hosp Day # 2 PCP Viktoriya Garcias DO     Subjective:  The patient denies  chest pain and shortness of breath.  Numerous complaints related to lower and upper extremities    Medications:   docusate sodium  100 mg Oral BID    sennosides  8.6 mg Oral BID    furosemide  40 mg Intravenous BID (Diuretic)    spironolactone  25 mg Oral Daily    allopurinol  100 mg Oral Nightly    buPROPion SR  150 mg Oral BID    clopidogrel  75 mg Oral Nightly    digoxin  125 mcg Oral Nightly    FLUoxetine  20 mg Oral BID    levothyroxine  175 mcg Oral QAM AC    metoprolol succinate ER  25 mg Oral Nightly    rosuvastatin  5 mg Oral QOD    vancomycin  15 mg/kg Intravenous Q18H       Continuous Infusions:        Allergies:  Allergies[1]      Objective:        Intake/Output:      Intake/Output Summary (Last 24 hours) at 2025 1409  Last data filed at 2025 1100  Gross per 24 hour   Intake 120 ml   Output 2800 ml   Net -2680 ml     Wt Readings from Last 3 Encounters:   25 193 lb 2 oz (87.6 kg)   25 199 lb (90.3 kg)   24 198 lb 10.2 oz (90.1 kg)       Physical Exam:        Vitals:    25 2041 25 0440 25 0740 25 1155   BP:  128/87 124/69 129/80   BP Location:  Left arm Left arm Left arm   Pulse: 88 97 80 97   Resp:  18 20 18   Temp:  97.7 °F (36.5 °C) 98 °F (36.7 °C) 97.7 °F (36.5 °C)   TempSrc:  Oral Oral Oral   SpO2:   98% (!) 78%   Weight:       Height:           Temp:  [97.7 °F (36.5 °C)-98 °F (36.7 °C)] 97.7 °F (36.5 °C)  Pulse:  [80-97] 97  Resp:  [17-20] 18  BP: (105-159)/() 129/80  SpO2:  [78 %-100 %] 78 %      Temp: 97.7 °F (36.5 °C)  Pulse: 97  Resp: 18  BP: 129/80  General:  Appears comfortable  HEENT: No focal deficits.  Neck: No JVD, carotids 2+ no bruits.  Cardiac: Irregular S1S2.   No S3, S4, rub, click.  No murmur.  Lungs: Clear to auscultation and percussion.  Abdomen: Soft, non-tender.   Extremities: No LE edema.  No clubbing or cyanosis.    Neurologic: Alert and oriented, normal affect.  Skin: Warm and dry.           LABS:      HEM:  Recent Labs   Lab 01/28/25 0058 01/29/25 0447 01/30/25 0524   WBC 7.8 6.1 5.5   HGB 10.9* 10.5* 10.5*   HCT 32.2* 31.5* 32.1*   .0 350.0 354.0       Chem:  Recent Labs   Lab 01/28/25 0058 01/28/25  1413 01/29/25 0447 01/30/25 0524     --  142 142   K 3.6 3.3* 3.2*  3.2* 3.4*  3.4*     --  103 105   CO2 26.0  --  31.0 28.0   BUN 14  --  9 10   CREATSERUM 0.56  --  0.66 0.59   CA 9.4  --  8.4* 8.9   MG  --   --  1.6 1.7   GLU 89  --  74 83       Recent Labs   Lab 01/28/25 0058   ALT 20   AST 35*   ALB 3.6       Recent Labs   Lab 01/28/25 0058 01/29/25 0447 01/30/25 0524   INR 1.80* 1.67* 1.87*           Lab Results   Component Value Date    TROP <0.045 07/28/2021         Invalid input(s): \"PBNPML\"                       Diagnostics:     Telemetry: Atrial Fibrillation  115/min     Laboratories and Data:  Diagnostics:           CXR, 1/28/2025:    1. Findings most consistent with left lower lobe atelectasis and or scar without definite focal consolidation.  Continued clinical correlation recommended.      Echo, 1/29/25:      Conclusions:     1. Left ventricle: The cavity size was normal. Wall thickness was normal.      Systolic function was at the lower limits of normal. The estimated      ejection fraction was 50-55%, by visual assessment. Unable to assess LV      diastolic function due to heart rhythm.   2. Left atrium: The left atrial volume was markedly increased.   3. Aortic valve: Elizondo MINOR S3 23mm (TAVR) bioprosthesis was present.      Transvalvular velocity was increased. There was no significant      regurgitation. The peak systolic velocity was 3.07m/sec. The mean      systolic gradient was 17mm Hg. The valve area (VTI)  was 1.55cm^2. The      valve area (VTI) index was 0.76cm^2/m^2.   4. Mitral valve: The annulus was markedly calcified. The leaflets were      mildly calcified. There was trivial regurgitation. The mean diastolic      gradient was 3mm Hg.   5. Pulmonary arteries: Systolic pressure was moderately increased, in the      range of 45mm Hg to 50mm Hg.   Impressions:  This study is compared with previous dated 9/14/2024:   *     Echo, 11/2024     Conclusions:     1. Left ventricle: The cavity size was normal. Wall thickness was normal.      Systolic function was normal. The estimated ejection fraction was 50-55%,      by visual assessment. Wall motion is normal; there are no regional wall      motion abnormalities. Unable to assess LV diastolic function.   2. Right ventricle: Systolic function was normal.   3. Left atrium: The atrium was mildly dilated.   4. Aortic valve: Elizondo MIONR S3 23mm (TAVR) bioprosthesis was present,      well seated. No functional/doppler analysis performed.   5. Mitral valve: The annulus was markedly calcified. The leaflets were      mildly calcified. Minimal stenosis. The mean diastolic gradient was 3mm      Hg.   Impressions:  This study is compared with previous dated 9/14/2024: No   significant change.      Impression:     1.  HFpEF. She reports TALAMANTES with moderate activity.  She is concerned about missing her tracey and torsemide  x 2 months. No mario fluid o/l on exam or CXR    -     net out = 2.9 liters       2. ,s/p TAVR. Normal valve fct on echo, 1/2025     3. Paroxysmal Atrial Fibrillation. Somewhat elevated HR    -     coumadin on hold for angiogram in am     4. LLE non-healing wound. . Per vascular/ID        Recommend:     Telemetry  Advance BB dose to optimize control of HR and bp  3. Back on tracey  4. Follow I/O's, BMP's  5. Trial of IV lasix. Transition back to torsemide at dc       James Robertson MD  1/30/2025  2:09 PM           [1]   Allergies  Allergen Reactions     Adhesive Tape HIVES     ALL ADHESIVES    Codeine HIVES    Doxycycline SWELLING    Hydrocodone UNKNOWN    Lisinopril TONGUE SWELLING    Morphine Sulfate HIVES    Opioid Analgesics UNKNOWN    Oxycontin ITCHING    Oxytocin HIVES    Vicodin [Hydrocodone-Acetaminophen] ITCHING    Skin Adhesives OTHER (SEE COMMENTS)

## 2025-01-30 NOTE — CM/SW NOTE
01/30/25 1000   CM/SW Referral Data   Referral Source Social Work (self-referral)   Reason for Referral Discharge planning   Informant Patient   Medical Hx   Does patient have an established PCP? Yes   Patient Info   Patient's Current Mental Status at Time of Assessment Alert;Oriented   Patient's Home Environment House   Patient lives with Alone   Patient Status Prior to Admission   Independent with ADLs and Mobility Yes   Discharge Needs   Anticipated D/C needs To be determined       SW met with pt at bedside to discuss discharge planning. Pt is a 70 y/o female who was admitted from home. Pt resides in [facility]. Pt states that she does not like seeking medical care in [facility] because the hospitals are not equipped. Pt states that she comes to [address] for care since her doctors and specialists are in [place]. Pt states that she has been to rehab in the past. Pt mentioned that she was at [facility] in [facility]. Pt mentioned that she has also been to a hospital in [facility]. Pt states that she has minimal support at home, but has managed IV abx at home. Pt is current / [facility]. Discharge needs are TBD at this time. Anticipate pt is going to need IV abx at discharge, possible NAIF placement also. Will have to verify that pt has Medicare days remaining for NAIF.  Pt is going for angiogram on Friday.      &  to remain available and supportive for discharge planning needs.    Viki NORTON, RIZWANW  Discharge Planner

## 2025-01-30 NOTE — PROGRESS NOTES
CC: follow-up hospital admission chf, OM of foot     SUBJECTIVE:  Interval History:     No acute issues overnight. No nv,, no chest pain  Explained her the findings of OM on MRI   She would like to continue with abx, no interested in surgical treatment / amputation    OBJECTIVE:  Scheduled Meds:    furosemide  40 mg Intravenous BID (Diuretic)    spironolactone  25 mg Oral Daily    allopurinol  100 mg Oral Nightly    buPROPion SR  150 mg Oral BID    clopidogrel  75 mg Oral Nightly    digoxin  125 mcg Oral Nightly    FLUoxetine  20 mg Oral BID    levothyroxine  175 mcg Oral QAM AC    metoprolol succinate ER  25 mg Oral Nightly    rosuvastatin  5 mg Oral QOD    vancomycin  15 mg/kg Intravenous Q18H     Continuous Infusions:   PRN Meds:   albuterol    acetaminophen    PHYSICAL EXAM  Vital signs: Temp:  [97.7 °F (36.5 °C)-98 °F (36.7 °C)] 97.7 °F (36.5 °C)  Pulse:  [80-97] 97  Resp:  [17-20] 18  BP: (105-159)/() 129/80  SpO2:  [78 %-100 %] 78 %      GENERAL - NAD, AAO  EYES- sclera anicteric,    HENT- normocephalic,    NECK - supple  CV- RRR  RESP - CTAB, normal resp effort  ABDOMEN- soft, NT/ND   EXT- no LE edema        Data Review:   Labs:   Recent Labs   Lab 01/28/25 0058 01/29/25 0447 01/30/25  0524   WBC 7.8 6.1 5.5   HGB 10.9* 10.5* 10.5*   MCV 95.0 96.0 97.6   .0 350.0 354.0   INR 1.80* 1.67* 1.87*       Recent Labs   Lab 01/28/25 0058 01/28/25  1413 01/29/25 0447 01/30/25  0524     --  142 142   K 3.6 3.3* 3.2*  3.2* 3.4*  3.4*     --  103 105   CO2 26.0  --  31.0 28.0   BUN 14  --  9 10   CREATSERUM 0.56  --  0.66 0.59   CA 9.4  --  8.4* 8.9   MG  --   --  1.6 1.7   GLU 89  --  74 83       Recent Labs   Lab 01/28/25  0058   ALT 20   AST 35*   ALB 3.6       No results for input(s): \"PGLU\" in the last 168 hours.        ASSESSMENT/PLAN:    69 year old female with PMH sig for HFpEF, Afib on coumadin, CAD, PAD, COPD, SHELDON who presents for evaluation after a fall and multiple medical  complaints.      # Lower extremity cellulitis/suspected osteomyelitis  -MRI with OM of 2nd toe  -Continue IV vancomycin per ID  -Wound care  -plan for angiogram tomorrow for revascularization attempt      # Right hand laceration   -Continue local wound care     # acute on chronic HFpEF  -Apparently her diuretic was not given when she was arrived at the nursing facility.  Continue IV Lasix.    -Strict I's/O, daily weights, fluid restriction  -Monitor volume status.  Trend renal function  Appears euvolmic now.     #paroxysmal A-fib on Coumadin  -Continue Toprol and Coumadin  -Daily PT/INR - subtherapeutic, will bridge with lovenox - now held for procedure tomorrow     #CAD  #PAD  -severe aortic stenosis sp TAVR  -Continue Plavix, statin     #COPD  -Continue bronchodilators     #SHELDON  -CPAP as nightly and as needed      #depression  -Continue bupropion and fluoxetine        CODE STATUS: Full code  DVT prophylaxis: Coumadin    Will continue to follow while hospitalized. Please page me or the on-call hospitalist with questions or concerns.    Cody Floyd Hospitalist  [phone]  Answering Service: [phone]

## 2025-01-30 NOTE — PLAN OF CARE
Assumed care of patient at 0730.  Alert and oriented x4.  On room air with adequate O2 saturations. No complaints of chest pain or shortness of breath.  Afib on tele with rates controlled. Warfarin on hold for angiogram tomorrow per Dr. Hood and lovenox on hold per Dr. Patel. Cardiology aware.  Incontinent of bladder, purewick in place.  Pt updated on plan of care, verbalized understanding.     Pt adamantly refusing PO potassium replacements. Only agreeable to IV. Also refusing breakfast and lunch.     Problem: Patient/Family Goals  Goal: Patient/Family Long Term Goal  Description: Patient's Long Term Goal: to go home    Interventions:  - consults to see  - medication adjustment as needed  - get stronger  - See additional Care Plan goals for specific interventions  Outcome: Progressing  Goal: Patient/Family Short Term Goal  Description: Patient's Short Term Goal: address foot issue    Interventions:   - ID consult  - MRI  - IV abt  - See additional Care Plan goals for specific interventions  Outcome: Progressing     Problem: PAIN - ADULT  Goal: Verbalizes/displays adequate comfort level or patient's stated pain goal  Description: INTERVENTIONS:  - Encourage pt to monitor pain and request assistance  - Assess pain using appropriate pain scale  - Administer analgesics based on type and severity of pain and evaluate response  - Implement non-pharmacological measures as appropriate and evaluate response  - Consider cultural and social influences on pain and pain management  - Manage/alleviate anxiety  - Utilize distraction and/or relaxation techniques  - Monitor for opioid side effects  - Notify MD/LIP if interventions unsuccessful or patient reports new pain  - Anticipate increased pain with activity and pre-medicate as appropriate  Outcome: Progressing     Problem: RISK FOR INFECTION - ADULT  Goal: Absence of fever/infection during anticipated neutropenic period  Description: INTERVENTIONS  - Monitor WBC  -  Administer growth factors as ordered  - Implement neutropenic guidelines  Outcome: Progressing     Problem: SAFETY ADULT - FALL  Goal: Free from fall injury  Description: INTERVENTIONS:  - Assess pt frequently for physical needs  - Identify cognitive and physical deficits and behaviors that affect risk of falls.  - [place] fall precautions as indicated by assessment.  - Educate pt/family on patient safety including physical limitations  - Instruct pt to call for assistance with activity based on assessment  - Modify environment to reduce risk of injury  - Provide assistive devices as appropriate  - Consider OT/PT consult to assist with strengthening/mobility  - Encourage toileting schedule  Outcome: Progressing     Problem: DISCHARGE PLANNING  Goal: Discharge to home or other facility with appropriate resources  Description: INTERVENTIONS:  - Identify barriers to discharge w/pt and caregiver  - Include patient/family/discharge partner in discharge planning  - Arrange for needed discharge resources and transportation as appropriate  - Identify discharge learning needs (meds, wound care, etc)  - Arrange for interpreters to assist at discharge as needed  - Consider post-discharge preferences of patient/family/discharge partner  - Complete POLST form as appropriate  - Assess patient's ability to be responsible for managing their own health  - Refer to Case Management Department for coordinating discharge planning if the patient needs post-hospital services based on physician/LIP order or complex needs related to functional status, cognitive ability or social support system  Outcome: Progressing     Problem: Altered Communication/Language Barrier  Goal: Patient/Family is able to understand and participate in their care  Description: Interventions:  - Assess communication ability and preferred communication style  - Implement communication aides and strategies  - Use visual cues when possible  - Listen attentively, be  patient, do not interrupt  - Minimize distractions  - Allow time for understanding and response  - Establish method for patient to ask for assistance (call light)  - Provide an  as needed  - Communicate barriers and strategies to overcome with those who interact with patient  Outcome: Progressing     Problem: CARDIOVASCULAR - ADULT  Goal: Maintains optimal cardiac output and hemodynamic stability  Description: INTERVENTIONS:  - Monitor vital signs, rhythm, and trends  - Monitor for bleeding, hypotension and signs of decreased cardiac output  - Evaluate effectiveness of vasoactive medications to optimize hemodynamic stability  - Monitor arterial and/or venous puncture sites for bleeding and/or hematoma  - Assess quality of pulses, skin color and temperature  - Assess for signs of decreased coronary artery perfusion - ex. Angina  - Evaluate fluid balance, assess for edema, trend weights  Outcome: Progressing  Goal: Absence of cardiac arrhythmias or at baseline  Description: INTERVENTIONS:  - Continuous cardiac monitoring, monitor vital signs, obtain 12 lead EKG if indicated  - Evaluate effectiveness of antiarrhythmic and heart rate control medications as ordered  - Initiate emergency measures for life threatening arrhythmias  - Monitor electrolytes and administer replacement therapy as ordered  Outcome: Progressing

## 2025-01-30 NOTE — OCCUPATIONAL THERAPY NOTE
OCCUPATIONAL THERAPY EVALUATION - INPATIENT     Room Number: 8600/8600-A  Evaluation Date: 1/30/2025  Type of Evaluation: Initial  Presenting Problem: wound on foot    Physician Order: IP Consult to Occupational Therapy  Reason for Therapy: ADL/IADL Dysfunction and Discharge Planning    OCCUPATIONAL THERAPY ASSESSMENT   Patient is currently functioning below baseline with toileting, upper body dressing, lower body dressing, grooming, bed mobility, transfers, static sitting balance, dynamic sitting balance, static standing balance, dynamic standing balance, maintaining seated position, functional standing tolerance, and energy conservation strategies. Prior to admission, patient's baseline is ambulatory with loftstrand crutches and able to complete all aDLs.  Patient is requiring moderate assistance as a result of the following impairments: decreased functional strength, decreased functional reach, decreased endurance, impaired coordination, impaired motor planning, and decreased muscular endurance. Occupational Therapy will continue to follow for duration of hospitalization.    Patient will benefit from continued skilled OT Services to promote return to prior level of function and safety with continuous assistance and gradual rehabilitative therapy    History Related to Current Admission: Patient is a 69 year old female admitted on 1/28/2025 with Presenting Problem: wound on foot. Co-Morbidities : HFpEF, afib, CAD, PAD, COPD, SHELDON    WEIGHT BEARING RESTRICTION  L Lower Extremity: Weight Bearing as Tolerated      Recommendations for nursing staff:   Transfers: Mod A  Toileting location: commode    EVALUATION SESSION:  Patient Start of Session: supine in bed for session    FUNCTIONAL TRANSFER ASSESSMENT  Sit to Stand: Edge of Bed  Edge of Bed: Contact Guard Assist    BED MOBILITY  Rolling: Moderate Assist  Supine to Sit : Maximum Assist  Scooting: Max A to EOB    BALANCE ASSESSMENT  Static Sitting: Contact Guard  Assist  Static Standing: Minimal Assist (to stand and pivot to commode)    FUNCTIONAL ADL ASSESSMENT  Grooming Seated: Contact Guard Assist (to wipe face)  LB Dressing Seated: Maximum Assist (for socks)  Toileting Seated: Moderate Assist (for clothing management and brie-care at tall commode)    ACTIVITY TOLERANCE: vitals stable                         O2 SATURATIONS       COGNITION  Overall Cognitive Status:  WFL - within functional limits    Upper Extremity   ROM: within functional limits   Strength: within functional limits   Coordination  Gross motor: WNL  Fine motor: WNL  Sensation: Light touch:  intact    EDUCATION PROVIDED  Patient Education : Role of Occupational Therapy; Plan of Care  Patient's Response to Education: Verbalized Understanding; Returned Demonstration    Equipment used: commode  Demonstrates functional use, Would benefit from additional trial      Therapist comments: Pt required extensive time and encouragement to complete all activities, extensive education provided to pt,    Patient End of Session: Needs met;Call light within reach;RN aware of session/findings;Hospital anti-slip socks;All patient questions and concerns addressed;In bathroom - nursing staff aware (on commode with call light RN aware)    OCCUPATIONAL PROFILE    HOME SITUATION  Type of Home: House  Home Layout: One level;Ramped entrance  Lives With: Caregiver part-time    Toilet and Equipment: Comfort height toilet  Shower/Tub and Equipment: Walk-in shower                     Prior Level of Function: Pt typically ambulates with loftstrand crutches. Has an adjustable bed, able to complete ADLs/    SUBJECTIVE   Pt stated, \"I need more time and patience.\"    PAIN ASSESSMENT  Rating: Unable to rate  Location: foot, generalized  Management Techniques: Activity promotion;Body mechanics;Breathing techniques;Relaxation;Repositioning    OBJECTIVE  Precautions: Bed/chair alarm  Fall Risk: High fall risk    ASSESSMENTS    AM-PAC  ‘6-Clicks’ Inpatient Daily Activity Short Form  -   Putting on and taking off regular lower body clothing?: A Lot  -   Bathing (including washing, rinsing, drying)?: A Lot  -   Toileting, which includes using toilet, bedpan or urinal? : A Lot  -   Putting on and taking off regular upper body clothing?: A Little  -   Taking care of personal grooming such as brushing teeth?: A Little  -   Eating meals?: A Little    AM-PAC Score:  Score: 15  Approx Degree of Impairment: 56.46%  Standardized Score (AM-PAC Scale): 34.69    ADDITIONAL TESTS     NEUROLOGICAL FINDINGS      COGNITION ASSESSMENTS     PLAN     OT Treatment Plan: Balance activities;Energy conservation/work simplification techniques;ADL training;IADL training;Continued evaluation;Compensatory technique education;Equipment eval/education;Patient/Family training;Patient/Family education;Endurance training;UE strengthening/ROM;Functional transfer training  Rehab Potential : Good  Frequency: 3-5x/week  Number of Visits to Meet Established Goals: 5    ADL Goals   Patient will perform upper body dressing:  with supervision  Patient will perform lower body dressing:  with supervision  Patient will perform toileting: with supervision    Functional Transfer Goals  Patient will transfer from supine to sit:  with supervision  Patient will transfer from sit to stand:  with supervision  Patient will transfer to toilet:  with supervision    UE Exercise Program Goal  Patient will be supervision with bilateral AROM HEP (home exercise program).    Additional Goals:  Pt will verbalize at least 3 energy conservation techniques  Pt will stand at sink for 5 minutes to complete grooming routine    Patient Evaluation Complexity Level:   Occupational Profile/Medical History MODERATE - Expanded review of history including review of medical or therapy record   Specific performance deficits impacting engagement in ADL/IADL MODERATE  3 - 5 performance deficits   Client  Assessment/Performance Deficits MODERATE - Comorbidities and min to mod modifications of tasks    Clinical Decision Making MODERATE - Analysis of occupational profile, detailed assessments, several treatment options    Overall Complexity MODERATE     OT Session Time: 60 minutes  Self-Care Home Management: 30 minutes  Therapeutic Activity: 0 minutes  Neuromuscular Re-education: 0 minutes  Therapeutic Exercise: 0 minutes  Cognitive Skills: 0 minutes  Sensory Integrative: 0 minutes  Orthotic Management and Trainin minutes  Can add/delete any of these

## 2025-01-30 NOTE — PROGRESS NOTES
Infectious Disease Progress Note      Date of admission: 1/28/2025 12:05 AM     Reason for consult: Left foot osteomyelitis    Referring physician: Cody Patel DO    Subjective: The patient is feeling about the same.  Pain involving the left foot is about the same.  Redness is slightly better.  Swelling is improving.     The rest of the systems were reviewed and found to be negative except was mentioned above    Interval events: This is a 69-year-old female patient with history of aortic stenosis, status post TAVR, congestive heart failure, hypertension, rheumatoid arthritis, type 2 diabetes, who presents here with worsening left foot pain with x-ray finding concerning for underlying osteomyelitis.  Outpatient cultures grew MRSA.  Blood cultures here remain negative to date.  MRI of the foot showing osteomyelitis of the L phalanx of the second toe and distal aspect of the proximal phalanx of the second toe.  Evaluated by vascular surgery, plan for angiogram on 1/31/2025.    Medications:    potassium chloride    furosemide    enoxaparin    spironolactone    albuterol    allopurinol    buPROPion SR    clopidogrel    digoxin    FLUoxetine    levothyroxine    metoprolol succinate ER    rosuvastatin    vancomycin    acetaminophen     Allergies:  Allergies[1]    Physical Exam:  Vitals:    01/30/25 0740   BP: 124/69   Pulse: 80   Resp: 20   Temp: 98 °F (36.7 °C)     Vitals signs and nursing note reviewed.   Constitutional:       Appearance: Normal appearance.   HENT:      Head: Normocephalic and atraumatic.      Mouth: Mucous membranes are moist.   Neck:      Musculoskeletal: Neck supple.   Cardiovascular:      Rate and Rhythm: Normal rate.   Pulmonary:      Effort: Pulmonary effort is normal. No respiratory distress.   Musculoskeletal:      Right lower leg: No edema.      Left lower leg: No edema.  Left foot second toe ulcer noted without any cellulitis at this point.  Skin:     General: Skin is warm and dry.    Neurological:      General: No focal deficit present.      Mental Status: Alert and oriented to person, place, and time.       Laboratory data:  I have reviewed all the lab results independently.  Lab Results   Component Value Date    WBC 5.5 01/30/2025    HGB 10.5 01/30/2025    HCT 32.1 01/30/2025    .0 01/30/2025    CREATSERUM 0.59 01/30/2025    BUN 10 01/30/2025     01/30/2025    K 3.4 01/30/2025    K 3.4 01/30/2025     01/30/2025    CO2 28.0 01/30/2025    GLU 83 01/30/2025    CA 8.9 01/30/2025    INR 1.87 01/30/2025    MG 1.7 01/30/2025      Recent Labs   Lab 01/30/25  0524   RBC 3.29*   HGB 10.5*   HCT 32.1*   MCV 97.6   MCH 31.9   MCHC 32.7   RDW 14.1   NEPRELIM 2.56   WBC 5.5   .0      Microbiology data:  Hospital Encounter on 01/28/25   1. Blood Culture     Status: None (Preliminary result)    Collection Time: 01/28/25  3:20 AM    Specimen: Blood,peripheral   Result Value Ref Range    Blood Culture Result No Growth 1 Day N/A    CULTURE, AEROBIC  Specimen: Culture - Toe  Component 11 d ago Comments   CULTURE RESULTS METHICILLIN RESISTANT STAPHYLOCOCCUS AUREUS PREVIOUSLY FLAGGED SAME ORGANISM   Resulting Agency [facility]     Susceptibility             Organism Antibiotic Method Susceptibility   Methicillin Resistant Staph aureus Clindamycin SEMC VITEK II Resistant     Comment: PRESUMED TO BE RESISTANT BASED ON DETECTION OF INDUCIBLE CLINDAMYCIN RESISTANCE   Methicillin Resistant Staph aureus Erythromycin SEMC VITEK II >=8 mcg/ml: Resistant   Methicillin Resistant Staph aureus Gentamicin SEMC VITEK II <=0.5 mcg/ml: Susceptible   Methicillin Resistant Staph aureus Oxacillin SEMC VITEK II >=4 mcg/ml: Resistant   Methicillin Resistant Staph aureus Tetracycline SEMC VITEK II <=1 mcg/ml: Susceptible   Methicillin Resistant Staph aureus Trimeth/Sulfamethoxazole SEMC VITEK II <=10 mcg/ml: Susceptible   Methicillin Resistant Staph aureus Vancomycin SEMC  VITEK II <=0.5 mcg/ml: Susceptible   Specimen Collected: 01/17/25 11:20     Performed by: [facility] Last Resulted: 01/19/25 06:30   Received From: [facility]  Result Received: 01/27/25 21:16        Radiology:  I have reviewed all imagining data available independently.   MRI of the left foot on 1/30/2025:  1. There is osteomyelitis of the middle phalange of 2nd toe and distal aspect of the proximal phalange of 2nd toe centered around the proximal to phalangeal joint.   2. 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     Impression:  Arleth Weiss is a 69 year old female with    Left foot osteomyelitis, with threat to limb  Outpatient wound cultures with MRSA back on 1/17/2025  MRI of the foot confirming osteomyelitis  Would benefit from podiatry consultation once done with vascular workup  Vascular workup undergoing, see below  Peripheral vascular disease  Evaluated by vascular surgery  Plan for angiogram on 1/31/2025  Right hand laceration after injury  X-ray without any acute abnormalities  No signs of infection at this point  History of MRSA bacteremia back in September 2024  Treated with 4 weeks of IV vancomycin  LOYD during that admission did not show any evidence of endocarditis including her TAVR  Repeat blood cultures obtained on 10/31 off antibiotics did not show any organisms  Repeat blood cultures here remain negative to date    Recommendations:    Continue IV vancomycin, pharmacy to dose  Agree with vascular workup as per vascular surgery  Continue to monitor daily labs for antibiotic toxicities  Further recommendations will depend on the above work-up and clinical progress     The plan of care was discussed with the primary hospital team, Cody Patel DO     Recommendations were also discussed with the patient; all questions were answered.     Thank you for this consultation. Please don't hesitate to  call the ID team for questions or any acute changes in patient's clinical condition.    Please note that this report has been produced using speech recognition software and may contain errors related to that system including, but not limited to, errors in grammar, punctuation, and spelling, as well as words and phrases that possibly may have been recognized inappropriately.  If there are any questions or concerns, contact the dictating provider for clarification.    The  Cures Act makes medical notes like these available to patients in the interest of transparency. Please be advised this is a medical document. Medical documents are intended to carry relevant information, facts as evident, and the clinical opinion of the practitioner. The medical note is intended as peer to peer communication and may appear blunt or direct. It is written in medical language and may contain abbreviations or verbiage that are unfamiliar.     Yvette Whitfield MD  [facility]. Tel: [phone]. Fax: [phone].     Arleth Weiss : 1955 MRN: [id number] [facility]: [id number]          [1]   Allergies  Allergen Reactions    Adhesive Tape HIVES     ALL ADHESIVES    Codeine HIVES    Doxycycline SWELLING    Hydrocodone UNKNOWN    Lisinopril TONGUE SWELLING    Morphine Sulfate HIVES    Opioid Analgesics UNKNOWN    Oxycontin ITCHING    Oxytocin HIVES    Vicodin [Hydrocodone-Acetaminophen] ITCHING    Skin Adhesives OTHER (SEE COMMENTS)

## 2025-01-31 ENCOUNTER — ANESTHESIA EVENT (OUTPATIENT)
Dept: SURGERY | Facility: HOSPITAL | Age: 70
End: 2025-01-31
Payer: MEDICARE

## 2025-01-31 ENCOUNTER — APPOINTMENT (OUTPATIENT)
Dept: INTERVENTIONAL RADIOLOGY/VASCULAR | Facility: HOSPITAL | Age: 70
End: 2025-01-31
Attending: SURGERY
Payer: MEDICARE

## 2025-01-31 LAB
ANION GAP SERPL CALC-SCNC: 9 MMOL/L (ref 0–18)
BASOPHILS # BLD AUTO: 0.05 X10(3) UL (ref 0–0.2)
BASOPHILS NFR BLD AUTO: 0.7 %
BUN BLD-MCNC: 8 MG/DL (ref 9–23)
CALCIUM BLD-MCNC: 9.2 MG/DL (ref 8.7–10.6)
CHLORIDE SERPL-SCNC: 100 MMOL/L (ref 98–112)
CO2 SERPL-SCNC: 30 MMOL/L (ref 21–32)
CREAT BLD-MCNC: 0.68 MG/DL
EGFRCR SERPLBLD CKD-EPI 2021: 93 ML/MIN/1.73M2 (ref 60–?)
EOSINOPHIL # BLD AUTO: 0.54 X10(3) UL (ref 0–0.7)
EOSINOPHIL NFR BLD AUTO: 7.9 %
ERYTHROCYTE [DISTWIDTH] IN BLOOD BY AUTOMATED COUNT: 14 %
GLUCOSE BLD-MCNC: 70 MG/DL (ref 70–99)
HCT VFR BLD AUTO: 35.2 %
HGB BLD-MCNC: 11.5 G/DL
IMM GRANULOCYTES # BLD AUTO: 0.02 X10(3) UL (ref 0–1)
IMM GRANULOCYTES NFR BLD: 0.3 %
INR BLD: 1.58 (ref 0.8–1.2)
LYMPHOCYTES # BLD AUTO: 1.79 X10(3) UL (ref 1–4)
LYMPHOCYTES NFR BLD AUTO: 26.1 %
MAGNESIUM SERPL-MCNC: 1.8 MG/DL (ref 1.6–2.6)
MCH RBC QN AUTO: 31.5 PG (ref 26–34)
MCHC RBC AUTO-ENTMCNC: 32.7 G/DL (ref 31–37)
MCV RBC AUTO: 96.4 FL
MONOCYTES # BLD AUTO: 0.98 X10(3) UL (ref 0.1–1)
MONOCYTES NFR BLD AUTO: 14.3 %
NEUTROPHILS # BLD AUTO: 3.49 X10 (3) UL (ref 1.5–7.7)
NEUTROPHILS # BLD AUTO: 3.49 X10(3) UL (ref 1.5–7.7)
NEUTROPHILS NFR BLD AUTO: 50.7 %
OSMOLALITY SERPL CALC.SUM OF ELEC: 285 MOSM/KG (ref 275–295)
PLATELET # BLD AUTO: 387 10(3)UL (ref 150–450)
POTASSIUM SERPL-SCNC: 3.2 MMOL/L (ref 3.5–5.1)
POTASSIUM SERPL-SCNC: 3.2 MMOL/L (ref 3.5–5.1)
PROTHROMBIN TIME: 19 SECONDS (ref 11.6–14.8)
RBC # BLD AUTO: 3.65 X10(6)UL
SODIUM SERPL-SCNC: 139 MMOL/L (ref 136–145)
VANCOMYCIN TROUGH SERPL-MCNC: 21 UG/ML (ref 10–20)
WBC # BLD AUTO: 6.9 X10(3) UL (ref 4–11)

## 2025-01-31 PROCEDURE — 84132 ASSAY OF SERUM POTASSIUM: CPT | Performed by: INTERNAL MEDICINE

## 2025-01-31 PROCEDURE — 85610 PROTHROMBIN TIME: CPT | Performed by: STUDENT IN AN ORGANIZED HEALTH CARE EDUCATION/TRAINING PROGRAM

## 2025-01-31 PROCEDURE — 36246 INS CATH ABD/L-EXT ART 2ND: CPT | Performed by: SURGERY

## 2025-01-31 PROCEDURE — 75710 ARTERY X-RAYS ARM/LEG: CPT | Performed by: SURGERY

## 2025-01-31 PROCEDURE — 83880 ASSAY OF NATRIURETIC PEPTIDE: CPT

## 2025-01-31 PROCEDURE — B41GYZZ FLUOROSCOPY OF LEFT LOWER EXTREMITY ARTERIES USING OTHER CONTRAST: ICD-10-PCS | Performed by: SURGERY

## 2025-01-31 PROCEDURE — 85025 COMPLETE CBC W/AUTO DIFF WBC: CPT | Performed by: STUDENT IN AN ORGANIZED HEALTH CARE EDUCATION/TRAINING PROGRAM

## 2025-01-31 PROCEDURE — 83735 ASSAY OF MAGNESIUM: CPT | Performed by: STUDENT IN AN ORGANIZED HEALTH CARE EDUCATION/TRAINING PROGRAM

## 2025-01-31 PROCEDURE — 80048 BASIC METABOLIC PNL TOTAL CA: CPT | Performed by: STUDENT IN AN ORGANIZED HEALTH CARE EDUCATION/TRAINING PROGRAM

## 2025-01-31 PROCEDURE — 80202 ASSAY OF VANCOMYCIN: CPT

## 2025-01-31 PROCEDURE — 99152 MOD SED SAME PHYS/QHP 5/>YRS: CPT | Performed by: SURGERY

## 2025-01-31 RX ORDER — ENOXAPARIN SODIUM 100 MG/ML
1 INJECTION SUBCUTANEOUS EVERY 12 HOURS SCHEDULED
Status: DISCONTINUED | OUTPATIENT
Start: 2025-01-31 | End: 2025-02-02

## 2025-01-31 RX ORDER — FUROSEMIDE 10 MG/ML
40 INJECTION INTRAMUSCULAR; INTRAVENOUS
Status: DISCONTINUED | OUTPATIENT
Start: 2025-02-01 | End: 2025-01-31

## 2025-01-31 RX ORDER — VERAPAMIL HYDROCHLORIDE 2.5 MG/ML
INJECTION, SOLUTION INTRAVENOUS
Status: DISCONTINUED
Start: 2025-01-31 | End: 2025-01-31 | Stop reason: WASHOUT

## 2025-01-31 RX ORDER — HEPARIN SODIUM 5000 [USP'U]/ML
INJECTION, SOLUTION INTRAVENOUS; SUBCUTANEOUS
Status: COMPLETED
Start: 2025-01-31 | End: 2025-01-31

## 2025-01-31 RX ORDER — ZINC SULFATE 50(220)MG
220 CAPSULE ORAL NIGHTLY
Status: DISCONTINUED | OUTPATIENT
Start: 2025-01-31 | End: 2025-02-04

## 2025-01-31 RX ORDER — TORSEMIDE 20 MG/1
20 TABLET ORAL DAILY
Status: DISCONTINUED | OUTPATIENT
Start: 2025-01-31 | End: 2025-02-01

## 2025-01-31 RX ORDER — WARFARIN SODIUM 5 MG/1
5 TABLET ORAL
Status: COMPLETED | OUTPATIENT
Start: 2025-01-31 | End: 2025-01-31

## 2025-01-31 RX ORDER — LIDOCAINE HYDROCHLORIDE 10 MG/ML
INJECTION, SOLUTION EPIDURAL; INFILTRATION; INTRACAUDAL; PERINEURAL
Status: COMPLETED
Start: 2025-01-31 | End: 2025-01-31

## 2025-01-31 RX ORDER — MAGNESIUM OXIDE 400 MG/1
400 TABLET ORAL ONCE
Status: COMPLETED | OUTPATIENT
Start: 2025-01-31 | End: 2025-01-31

## 2025-01-31 RX ORDER — CHOLECALCIFEROL (VITAMIN D3) 25 MCG
1000 TABLET ORAL NIGHTLY
Status: DISCONTINUED | OUTPATIENT
Start: 2025-01-31 | End: 2025-02-04

## 2025-01-31 RX ORDER — MIDAZOLAM HYDROCHLORIDE 1 MG/ML
INJECTION INTRAMUSCULAR; INTRAVENOUS
Status: COMPLETED
Start: 2025-01-31 | End: 2025-01-31

## 2025-01-31 RX ORDER — VANCOMYCIN HYDROCHLORIDE
15 EVERY 24 HOURS
Status: DISCONTINUED | OUTPATIENT
Start: 2025-01-31 | End: 2025-02-04

## 2025-01-31 RX ORDER — CALCIUM CARBONATE 500 MG/1
500 TABLET, CHEWABLE ORAL EVERY 8 HOURS PRN
Status: DISCONTINUED | OUTPATIENT
Start: 2025-01-31 | End: 2025-02-04

## 2025-01-31 RX ORDER — HEPARIN SODIUM 5000 [USP'U]/ML
INJECTION, SOLUTION INTRAVENOUS; SUBCUTANEOUS
Status: DISCONTINUED
Start: 2025-01-31 | End: 2025-01-31 | Stop reason: WASHOUT

## 2025-01-31 RX ORDER — TRIAMCINOLONE ACETONIDE 1 MG/G
1 OINTMENT TOPICAL DAILY PRN
Status: DISCONTINUED | OUTPATIENT
Start: 2025-01-31 | End: 2025-02-04

## 2025-01-31 RX ORDER — IODIXANOL 320 MG/ML
100 INJECTION, SOLUTION INTRAVASCULAR
Status: COMPLETED | OUTPATIENT
Start: 2025-01-31 | End: 2025-01-31

## 2025-01-31 RX ORDER — LIDOCAINE HYDROCHLORIDE 10 MG/ML
INJECTION, SOLUTION EPIDURAL; INFILTRATION; INTRACAUDAL; PERINEURAL
Status: DISCONTINUED
Start: 2025-01-31 | End: 2025-01-31 | Stop reason: WASHOUT

## 2025-01-31 NOTE — PROGRESS NOTES
[facility] Cardiology Progress Note        Arleth Weiss Patient Status:  Inpatient    1955 MRN [id number]   Location [facility] 8NE-A Attending Cody Patel DO   Hosp Day # 3 PCP Viktoriya Garcias DO     Subjective:  The patient denies  chest pain   Breathing is comfortable at rest      Medications:   potassium chloride  40 mEq Intravenous Once    vancomycin  15 mg/kg Intravenous Q24H    [START ON 2025] furosemide  40 mg Intravenous BID (Diuretic)    Nortriptyline HCl  75 mg Oral Nightly    docusate sodium  100 mg Oral BID    sennosides  8.6 mg Oral BID    metoprolol tartrate  50 mg Oral 2x Daily(Beta Blocker)    spironolactone  25 mg Oral Daily    allopurinol  100 mg Oral Nightly    buPROPion SR  150 mg Oral BID    clopidogrel  75 mg Oral Nightly    digoxin  125 mcg Oral Nightly    FLUoxetine  20 mg Oral BID    levothyroxine  175 mcg Oral QAM AC    rosuvastatin  5 mg Oral QOD       Continuous Infusions:        Allergies:  Allergies[1]      Objective:        Intake/Output:      Intake/Output Summary (Last 24 hours) at 2025 1025  Last data filed at 2025 0530  Gross per 24 hour   Intake --   Output 2700 ml   Net -2700 ml     Wt Readings from Last 3 Encounters:   25 188 lb (85.3 kg)   25 199 lb (90.3 kg)   24 198 lb 10.2 oz (90.1 kg)       Physical Exam:        Vitals:    25 2300 25 0530 25 0540 25 0800   BP: 105/58 130/68  116/69   BP Location: Right arm Right arm  Right arm   Pulse: 96 88 91 82   Resp: 20 19  17   Temp: 97.8 °F (36.6 °C) 97.8 °F (36.6 °C)  98.1 °F (36.7 °C)   TempSrc: Oral Oral  Oral   SpO2: 91% 96% 96% 95%   Weight:   188 lb (85.3 kg)    Height:           Temp:  [97.7 °F (36.5 °C)-98.1 °F (36.7 °C)] 98.1 °F (36.7 °C)  Pulse:  [0-133] 82  Resp:  [17-21] 17  BP: (105-218)/() 116/69  SpO2:  [64 %-100 %] 95 %      Temp: 98.1 °F (36.7 °C)  Pulse: 82  Resp: 17  BP: 116/69  General:  Appears  comfortable  HEENT: No focal deficits.  Neck: No JVD, carotids 2+ no bruits.  Cardiac: Irregular S1S2.  No S3, S4, rub, click.  No murmur.  Lungs: Clear to auscultation and percussion.  Abdomen: Soft, non-tender.   Extremities: mild  LE edema.  No clubbing or cyanosis.    Neurologic: Alert and oriented, normal affect.  Skin: Warm and dry.           LABS:      HEM:  Recent Labs   Lab 01/28/25 0058 01/29/25 0447 01/30/25 0524 01/31/25 0407   WBC 7.8 6.1 5.5 6.9   HGB 10.9* 10.5* 10.5* 11.5*   HCT 32.2* 31.5* 32.1* 35.2   .0 350.0 354.0 387.0       Chem:  Recent Labs   Lab 01/28/25 0058 01/28/25  1413 01/29/25 0447 01/30/25 0524 01/31/25  0407     --  142 142 139   K 3.6 3.3* 3.2*  3.2* 3.4*  3.4* 3.2*  3.2*     --  103 105 100   CO2 26.0  --  31.0 28.0 30.0   BUN 14  --  9 10 8*   CREATSERUM 0.56  --  0.66 0.59 0.68   CA 9.4  --  8.4* 8.9 9.2   MG  --   --  1.6 1.7 1.8   GLU 89  --  74 83 70       Recent Labs   Lab 01/28/25 0058   ALT 20   AST 35*   ALB 3.6       Recent Labs   Lab 01/28/25 0058 01/29/25 0447 01/30/25 0524 01/31/25  0407   INR 1.80* 1.67* 1.87* 1.58*           Lab Results   Component Value Date    TROP <0.045 07/28/2021         Invalid input(s): \"PBNPML\"                       Diagnostics:     Telemetry: Atrial Fibrillation  80's/min     Laboratories and Data:  Diagnostics:           CXR, 1/28/2025:    1. Findings most consistent with left lower lobe atelectasis and or scar without definite focal consolidation.  Continued clinical correlation recommended.      Echo, 1/29/25:      Conclusions:     1. Left ventricle: The cavity size was normal. Wall thickness was normal.      Systolic function was at the lower limits of normal. The estimated      ejection fraction was 50-55%, by visual assessment. Unable to assess LV      diastolic function due to heart rhythm.   2. Left atrium: The left atrial volume was markedly increased.   3. Aortic valve: Elizondo MINOR S3 23mm (TAVR)  bioprosthesis was present.      Transvalvular velocity was increased. There was no significant      regurgitation. The peak systolic velocity was 3.07m/sec. The mean      systolic gradient was 17mm Hg. The valve area (VTI) was 1.55cm^2. The      valve area (VTI) index was 0.76cm^2/m^2.   4. Mitral valve: The annulus was markedly calcified. The leaflets were      mildly calcified. There was trivial regurgitation. The mean diastolic      gradient was 3mm Hg.   5. Pulmonary arteries: Systolic pressure was moderately increased, in the      range of 45mm Hg to 50mm Hg.   Impressions:  This study is compared with previous dated 9/14/2024:   *     Echo, 11/2024     Conclusions:     1. Left ventricle: The cavity size was normal. Wall thickness was normal.      Systolic function was normal. The estimated ejection fraction was 50-55%,      by visual assessment. Wall motion is normal; there are no regional wall      motion abnormalities. Unable to assess LV diastolic function.   2. Right ventricle: Systolic function was normal.   3. Left atrium: The atrium was mildly dilated.   4. Aortic valve: Elizondo MINOR S3 23mm (TAVR) bioprosthesis was present,      well seated. No functional/doppler analysis performed.   5. Mitral valve: The annulus was markedly calcified. The leaflets were      mildly calcified. Minimal stenosis. The mean diastolic gradient was 3mm      Hg.   Impressions:  This study is compared with previous dated 9/14/2024: No   significant change.      Impression:     1.  HFpEF. She reports TALAMANTES with moderate activity.  She is concerned about missing her tracey and torsemide  x 2 months. No mario fluid o/l on exam or CXR    -     net out = 6.1 liters       2. ,s/p TAVR. Normal valve fct on echo, 1/2025     3. Paroxysmal Atrial Fibrillation. Now with controlled HR    -     coumadin on hold for angiogram to be done today     4. LLE non-healing wound. . Per vascular/ID        Recommend:     Telemetry  Continue BB to  optimize control of HR and bp  3. Back on tracey  4. Follow I/O's, BMP's  5.  will stop IV lasix and  Transition back to torsemide at dc   6. Replace K+  7. Planning for LE angio today. Coumadin on hold    James Robertson MD           [1]   Allergies  Allergen Reactions    Adhesive Tape HIVES     ALL ADHESIVES    Codeine HIVES    Doxycycline SWELLING    Hydrocodone UNKNOWN    Lisinopril TONGUE SWELLING    Morphine Sulfate HIVES    Opioid Analgesics UNKNOWN    Oxycontin ITCHING    Oxytocin HIVES    Vicodin [Hydrocodone-Acetaminophen] ITCHING    Skin Adhesives OTHER (SEE COMMENTS)

## 2025-01-31 NOTE — CM/SW NOTE
Anticipated therapy need: Gradual Rehabilitative Therapy    [facility] request sent. SW to meet with pt to discuss discharge planning. Pt going for angiogram today.     Viki NORTON, LSW  Discharge Planner

## 2025-01-31 NOTE — OPERATIVE REPORT
Vascular Surgery Cath Lab Operative Note  Patients Name:  Arleth Weiss  Operating Physician: Martha Hood MD  Location:  Cath Lab  MRN:   [id number]                                            YOB: 1955    Operation Date:  January 31, 2025         Pre-Operative Diagnosis:   Atherosclerosis with LLE non-healing wound     Post-Operative Diagnosis:   Same     Procedure Performed:   Ultrasound guided access of the right common femoral artery  Left lower extremity 1st order angiogram  Radiographic supervision and interpretation    Anesthesia:   Start Time: 1203  Finish Time: 1218  The patient was sedated and monitored by an independent practitioner under my supervision  2mg Versed 25mcg Fentanyl     EBL: Minimal      Complications: None apparent     Findings: Widely patent common femoral, SFA, profunda, popliteal with brisk runoff to the foot via the AT.  The digital branches are patent.    Indication for Procedure: Arleth is a 69-year-old female who presents with a left foot nonhealing wound.  She presents today for left leg angiogram with intervention.  Risk and benefits of the procedure were explained to the patient and she elects to proceed.    Description of Procedure: The patient was brought to the operating room, she was with supine on the operating table.  She was sedated and monitored by an independent practitioner under my supervision.  Her bilateral groins were prepped and draped in the usual sterile fashion.  Under ultrasound guidance 1% lidocaine was infiltrated about the right common femoral artery.  The right common femoral artery was then accessed using a micropuncture needle, wire and sheath.  This was exchanged over an 035 braided guidewire for a short 5 [place] sheath.  A glide advantage wire and a crossover catheter were inserted into the infrarenal abdominal aorta.  The left common iliac artery was selected.  The wire and catheter were advanced to the distal external iliac artery and a  left lower extremity angiogram was performed which showed the findings above.  Access site angiography on the right showed good placement within the mid common femoral artery.  The wires and catheters were removed.  A 5 minx device was used to close the arteriotomy in the groin.  Manual pressure was held and a sterile dressing was applied.  The patient tolerated the procedure well, there were no immediate complications.  She was taken to the recovery room in good condition.     Plan: Bedrest x2 hours   Ok to resume anticoagulation tonight.   No vascular intervention indicated; patient has enough flow for healing purposes        Martha Hood MD  01/31/25  12:20 PM

## 2025-01-31 NOTE — PROGRESS NOTES
[facility] Pharmacy Dosing Service      Follow Up Pharmacokinetic Consult for Vancomycin Dosing     Arleth Weiss is a 69 year old female who is receiving vancomycin therapy for osteomyelitis. Patient is on day 3 of vancomycin and is currently receiving 1500 mg IV every 18 hours. The current treatment and monitoring approach is steady state AUC strategy.        Weight and Temperature:    Wt Readings from Last 1 Encounters:   25 87.6 kg (193 lb 2 oz)         Temp Readings from Last 1 Encounters:   25 (P) 97.8 °F (36.6 °C) ((P) Oral)      Labs:   Recent Labs   Lab 25  0447 25  0524 25  0407   CREATSERUM 0.66 0.59 0.68      Estimated Creatinine Clearance: 79.3 mL/min (based on SCr of 0.68 mg/dL).     Recent Labs   Lab 25  0524 25  0407   WBC 6.1 5.5 6.9        Vancomycin Levels:  Lab Results   Component Value Date/Time    VANCT 21.0 (H) 2025 04:07 AM    VANCP 36.1 2025 02:48 PM       Corresponding 24 h-AUC:  665 mg-h/L     The Pharmacokinetic Target is:     to 600 mg-h/L and trough <=15 mg/L    Renal Dosing Considerations:    None     Assessment/Plan:   Maintenance Regimen: Adjust vancomycin to 1500 mg IV every 24 hours.  The predicted AUC and trough with this new regimen are 499 mg-h/L and 12.78 mg/L, respectively    Monitorin) Plan for vancomycin peak and trough to be obtained in approximately 72 hours    2) Pharmacy will order SCr as clinically indicated to assess renal function.    3) Pharmacy will monitor for toxicity and efficacy, adjust vancomycin dose and/or frequency, and order vancomycin levels as appropriate per the Pharmacy and Therapeutics Committee approved protocol until discontinuation of the medication.       We appreciate the opportunity to assist in the care of this patient.     Benji Eisenberg [facility]  2025  6:09 AM  [facility]: [phone]

## 2025-01-31 NOTE — ANESTHESIA PREPROCEDURE EVALUATION
PRE-OP EVALUATION    Patient Name: Arleth Weiss    Admit Diagnosis: Skin tear of hand without complication, right, initial encounter [S61.411A]  Osteomyelitis of left foot, unspecified type (HCC) [M86.9]  Acute on chronic congestive heart failure, unspecified heart failure type (HCC) [I50.9]    Pre-op Diagnosis: Left second toe osteomyelitis    LEFT SECOND TOE ARTHROPLASTY AND WOUND DEBRIDEMENT    Anesthesia Procedure: LEFT SECOND TOE ARTHROPLASTY AND WOUND DEBRIDEMENT (Left)    Surgeons and Role:     * Dago Diaz DPM - Primary    Pre-op vitals reviewed.  Temp: 98 °F (36.7 °C)  Pulse: 87  Resp: 20  BP: 124/69  SpO2: 99 %  Body mass index is 27.76 kg/m².    Current medications reviewed.  Hospital Medications:  • [COMPLETED] potassium chloride 40 mEq in 250mL sodium chloride 0.9% IVPB premix  40 mEq Intravenous Once   • [COMPLETED] magnesium oxide (Mag-Ox) tab 400 mg  400 mg Oral Once   • vancomycin (Vancocin) 1.5 g in sodium chloride 0.9% 250mL IVPB premix  15 mg/kg Intravenous Q24H   • nortriptyline (Pamelor) cap 75 mg  75 mg Oral Nightly   • triamcinolone (Kenalog) 0.1 % ointment 1 Application  1 Application Topical Daily PRN   • calcium carbonate (Tums) chewable tab 500 mg  500 mg Oral Q8H PRN   • zinc sulfate (Zincate) cap 220 mg  220 mg Oral Nightly   • torsemide (Demadex) tab 20 mg  20 mg Oral Daily   • [COMPLETED] lidocaine PF (Xylocaine-MPF) 1 % injection       • [COMPLETED] heparin (Porcine) 5000 UNIT/ML injection       • [COMPLETED] iodixanol (VISIPAQUE) 320 MG/ML injection 100 mL  100 mL Injection ONCE PRN   • [COMPLETED] fentaNYL (Sublimaze) 50 mcg/mL injection       • [COMPLETED] midazolam (Versed) 2 MG/2ML injection       • enoxaparin (Lovenox) 100 MG/ML SUBQ injection 90 mg  1 mg/kg Subcutaneous 2 times per day   • warfarin (Coumadin) tab 5 mg  5 mg Oral Once at night   • [COMPLETED] gadoterate meglumine (Dotarem) 10 MMOL/20ML injection 20 mL  20 mL Intravenous ONCE PRN   • [COMPLETED] potassium  chloride 40 mEq in 250mL sodium chloride 0.9% IVPB premix  40 mEq Intravenous Once   • [COMPLETED] magnesium oxide (Mag-Ox) tab 400 mg  400 mg Oral Once   • [COMPLETED] bisacodyl (Dulcolax) 10 MG rectal suppository 10 mg  10 mg Rectal Once   • docusate sodium (Colace) cap 100 mg  100 mg Oral BID   • sennosides (Senokot) tab 8.6 mg  8.6 mg Oral BID   • metoprolol tartrate (Lopressor) tab 50 mg  50 mg Oral 2x Daily(Beta Blocker)   • [COMPLETED] potassium chloride 40 mEq in 250mL sodium chloride 0.9% IVPB premix  40 mEq Intravenous Once   • [COMPLETED] magnesium oxide (Mag-Ox) tab 400 mg  400 mg Oral Once   • spironolactone (Aldactone) tab 25 mg  25 mg Oral Daily   • [COMPLETED] Perflutren Lipid Microsphere (DEFINITY) 6.52 MG/ML injection 1.5 mL  1.5 mL Intravenous ONCE PRN   • [COMPLETED] lidocaine 1%-EPINEPHrine 1:100,000 (Xylocaine-Epinephrine) injection  1 mL Infiltration Once   • [COMPLETED] lidocaine (Urojet) 2 % urethral jelly       • [COMPLETED] lidocaine-epinephrine-tetracaine (LET) 1:1000-0.5 % topical solution 3 mL  3 mL Topical Once   • [COMPLETED] vancomycin (Vancocin) 1.5 g in sodium chloride 0.9% 250mL IVPB premix  15 mg/kg Intravenous Once   • [COMPLETED] furosemide (Lasix) 10 mg/mL injection 40 mg  40 mg Intravenous Once   • albuterol (Ventolin HFA) 108 (90 Base) MCG/ACT inhaler 2 puff  2 puff Inhalation Q6H PRN   • allopurinol (Zyloprim) tab 100 mg  100 mg Oral Nightly   • buPROPion SR (WELLBUTRIN SR) 12 hr tab 150 mg  150 mg Oral BID   • clopidogrel (Plavix) tab 75 mg  75 mg Oral Nightly   • digoxin (Lanoxin) tab 125 mcg  125 mcg Oral Nightly   • FLUoxetine (PROzac) cap 20 mg  20 mg Oral BID   • levothyroxine (Synthroid) tab 175 mcg  175 mcg Oral QAM AC   • rosuvastatin (Crestor) tab 5 mg  5 mg Oral QOD   • [] potassium chloride (Klor-Con) 20 MEQ oral powder 40 mEq  40 mEq Oral Q4H   • [COMPLETED] potassium chloride 40 mEq in 250mL sodium chloride 0.9% IVPB premix  40 mEq Intravenous Once   •  acetaminophen (Tylenol Extra Strength) tab 500-1,000 mg  500-1,000 mg Oral BID PRN       Outpatient Medications:   Prescriptions Prior to Admission[1]    Allergies: Adhesive tape, Codeine, Doxycycline, Hydrocodone, Lisinopril, Morphine sulfate, Opioid analgesics, Oxycontin, Oxytocin, Vicodin [hydrocodone-acetaminophen], and Skin adhesives      Anesthesia Evaluation    Patient summary reviewed.    Anesthetic Complications  (-) history of anesthetic complications         GI/Hepatic/Renal      (+) GERD       (+) chronic renal disease                    Cardiovascular                (+) obesity  (+) hypertension     (+) CAD        (+) valvular problems/murmurs     (+) dysrhythmias and atrial fibrillation  (+) CHF                Endo/Other               (+) anemia      (+) clotting disorder (INR high, Coumadin held)      (+) arthritis  (+) rheumatoid arthritis     Pulmonary      (+) asthma  (+) COPD       (+) shortness of breath     (+) sleep apnea       Neuro/Psych                 (+) neuromuscular disease             ERINN (acute kidney injury) ([facility]) Acute bronchospasm  Acute on chronic congestive heart failure, unspecified heart failure type ([facility]) Acute on chronic diastolic congestive heart failure ([facility])  Acute pain of left knee Anemia, unspecified type  Ankylosing spondylitis ([facility]) Aortic valve disease  Atrial fibrillation with RVR ([facility]) Atrial fibrillation with rapid ventricular response ([facility])  Atrial fibrillation, chronic ([facility]) Bilateral carotid artery disease ([facility])  Binge eating disorder CKD (chronic kidney disease) stage 3, GFR 30-59 ml/min ([facility])  COPD (chronic obstructive pulmonary disease) ([facility]) Carotid artery plaque, bilateral  Cellulitis of right lower extremity Cervical stenosis of spinal canal  Chronic diastolic CHF (congestive heart failure) ([facility]) Community acquired pneumonia, unspecified laterality  Constipation, unspecified constipation type Coronary artery disease involving native coronary artery of  native heart with other form of angina pectoris (HCC)  Degenerative arthritis of finger Dehydration  Depression, unspecified depression type Essential hypertension  Exertional dyspnea Fibromyalgia  GERD (gastroesophageal reflux disease) 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, right hand  Gross hematuria High blood pressure  Hip pain Hospital discharge follow-up  Hypercholesteremia Hypervolemia  Hypervolemia, unspecified hypervolemia type Hypokalemia  Hyponatremia Hypothyroidism  Long term (current) use of anticoagulants [Z79.01] Lumbar degenerative disc disease  Major depressive disorder, single episode, in partial remission (HCC) Malaise and fatigue  Morbid obesity with BMI of 40.0-44.9, adult (HCC) Neuropathy  SHELDON on CPAP Osteomyelitis of toe of right foot (HCC)  PAF (paroxysmal atrial fibrillation) ([facility]) PE (pulmonary thromboembolism) ([facility])  Pre-op testing Presence of IVC filter  Renal insufficiency syndrome Respiratory syncytial virus (RSV)  Rheumatoid arthritis ([facility]) S/P KATHLEEN to LCx 3/15/21  S/P TAVR (transcatheter aortic valve replacement) for Severe aortic stenosis  Status post evacuation of subdural hematoma Vitamin deficiency  Weakness generalized           Past Surgical History:   Procedure Laterality Date   • Anesth,shoulder replacement Right 2014    hemiathroplasty   • Back surgery  2000   • Back surgery  2004    complete c-3 -7 ectomy   • Biopsy Left 07/20/2023    TEMPORAL ARTERY   • Carpal tunnel release     • Cath transcatheter aortic valve replacement     • Colonoscopy N/A 05/10/2018    Procedure: COLONOSCOPY, POSSIBLE BIOPSY, POSSIBLE POLYPECTOMY 63190;  Surgeon: Kendell Valentin MD;  Location: [facility]   • Colonoscopy     • Craniotomy for lobotomy Left    • D & c  09/2009   • Dilation/curettage,diagnostic     • Hip replacement surgery  09/2009    Rt hip   • Knee replacement surgery  2003    Both knees   • Other surgical history  1989    Thyroid removal   • Other surgical history  2004    LT  foot spur removal   • Spine surgery procedure unlisted     • Thyroidectomy Bilateral    • Total hip replacement     • Total knee replacement       Social History     Socioeconomic History   • Marital status: Single   Tobacco Use   • Smoking status: Former     Current packs/day: 0.00     Average packs/day: 0.5 packs/day for 30.0 years (15.0 ttl pk-yrs)     Types: Cigarettes     Start date: 1961     Quit date: 1991     Years since quittin.0   • Smokeless tobacco: Never   Vaping Use   • Vaping status: Never Used   Substance and Sexual Activity   • Alcohol use: No   • Drug use: No   Other Topics Concern   • Caffeine Concern No   • Exercise Yes   • Seat Belt Yes   • Special Diet Yes     Comment: low sodium   • Stress Concern Yes   • Weight Concern No     History   Drug Use No     Available pre-op labs reviewed.  Lab Results   Component Value Date    WBC 6.9 2025    RBC 3.65 (L) 2025    HGB 11.5 (L) 2025    HCT 35.2 2025    MCV 96.4 2025    MCH 31.5 2025    MCHC 32.7 2025    RDW 14.0 2025    .0 2025     Lab Results   Component Value Date     2025    K 3.2 (L) 2025    K 3.2 (L) 2025     2025    CO2 30.0 2025    BUN 8 (L) 2025    CREATSERUM 0.68 2025    GLU 70 2025    CA 9.2 2025     Lab Results   Component Value Date    INR 1.58 (H) 2025         Airway      Mallampati: I  Mouth opening: >3 FB  TM distance: > 6 cm  Neck ROM: full Cardiovascular    Cardiovascular exam normal.         Dental  Comment: Poor dentition - many diseased, missing, compromised teeth           Pulmonary    Pulmonary exam normal.                 Other findings        ASA: 4   Plan: MAC  NPO status verified and patient meets guidelines.    Post-procedure pain management plan discussed with surgeon and patient.    Comment: Plan IV sedation with GA backup.  Risks and benefits of MAC/GA explained including but  not limited to aspiration, mouth/dental/airway injury, PONV explained.  Understanding expressed as well as a wish to proceed.    Plan/risks discussed with: patient              Present on Admission:  **None**             [1]   Medications Prior to Admission   Medication Sig Dispense Refill Last Dose/Taking   • metoprolol succinate ER 25 MG Oral Tablet 24 Hr Take 1 tablet (25 mg total) by mouth at bedtime.   Taking   • Acetaminophen ER (TYLENOL 8 HOUR) 650 MG Oral Tab CR Take 1-2 tablets (650-1,300 mg total) by mouth 2 (two) times daily as needed for Pain.   Taking As Needed   • albuterol 108 (90 Base) MCG/ACT Inhalation Aero Soln Inhale 2 puffs into the lungs every 6 (six) hours as needed for Wheezing.   Taking As Needed   • Nortriptyline HCl 75 MG Oral Cap Take 1 capsule (75 mg total) by mouth nightly.   Taking   • Multiple Vitamins-Minerals (CENTRUM MINIS WOMEN 50+) Oral Tab Take 2 tablets by mouth at bedtime.   Taking   • warfarin 5 MG Oral Tab See Admin Instructions. Take ½ tablet (2.5 MG total) by mouth daily on Sundays, Mondays, Wednesdays, Fridays, and Saturdays.   Taking   • empagliflozin (JARDIANCE) 10 MG Oral Tab Take 1 tablet (10 mg total) by mouth at bedtime.   Taking   • levothyroxine 175 MCG Oral Tab Take 1 tablet (175 mcg total) by mouth every morning before breakfast.   Taking   • clopidogrel 75 MG Oral Tab Take 1 tablet (75 mg total) by mouth at bedtime.   Taking   • Potassium Chloride ER 10 MEQ Oral Cap CR Take 4 capsules (40 mEq total) by mouth at bedtime. Per pt.   Taking   • rosuvastatin 5 MG Oral Tab Take 1 tablet (5 mg total) by mouth every other day.   Taking   • spironolactone 25 MG Oral Tab Take 1 tablet (25 mg total) by mouth at bedtime.   Taking   • warfarin 5 MG Oral Tab Take 1 tablet (5 mg total) by mouth twice a week. on Tuesdays and Thursdays.   Taking   • ferrous sulfate 325 (65 FE) MG Oral Tab EC Take 1 tablet (325 mg total) by mouth at bedtime.   Taking   • calcium carbonate 500 MG  Oral Chew Tab Chew 1 tablet (500 mg total) by mouth every 8 (eight) hours as needed (upset stomach).   Taking As Needed   • Zinc 50 MG Oral Cap Take 50 mg by mouth at bedtime.   Taking   • Cholecalciferol 125 MCG (5000 UT) Oral Tab Take 1 tablet (5,000 Units total) by mouth at bedtime. Per pt.   Taking   • buPROPion  MG Oral Tablet 12 Hr Take 1 tablet (150 mg total) by mouth 2 (two) times daily.   Taking   • cyproheptadine 4 MG Oral Tab Take 1 tablet (4 mg total) by mouth nightly.   Taking   • FLUoxetine 20 MG Oral Cap Take 1 capsule (20 mg total) by mouth 2 (two) times daily.   Taking   • triamcinolone 0.1 % External Ointment Apply 1 Application topically daily as needed (Neuro dermatitis per pt.).   Taking As Needed   • digoxin 0.125 MG Oral Tab Take 1 tablet (125 mcg total) by mouth daily. (Patient taking differently: Take 1 tablet (125 mcg total) by mouth at bedtime.) 30 tablet 0 Taking Differently   • allopurinol 100 MG Oral Tab Take 1 tablet (100 mg total) by mouth daily. (Patient taking differently: Take 1 tablet (100 mg total) by mouth at bedtime.) 90 tablet 3 Taking Differently   • torsemide 10 MG Oral Tab Take 1 tablet (10 mg total) by mouth daily. 30 tablet 0    • clotrimazole-betamethasone 1-0.05 % External Cream APPLY TO AFFECTED AREA(S) TWO TIMES A DAY AS DIRECTED ( UNDER FOLDS/ABDOMEN AND UNDER BREAST) (Patient taking differently: Apply topically 2 (two) times daily as needed.)      • ketoconazole 2 % External Cream Apply 1 Application topically daily as needed. IN THE MORNING      • [] metOLazone 5 MG Oral Tab Take 1 tablet (5 mg total) by mouth daily as needed (only if weight increases >5 lbs in 1 day). 30 tablet 3    • Vitamin C 500 MG Oral Tab Take 1 tablet (500 mg total) by mouth at bedtime. Per pt.

## 2025-01-31 NOTE — CONSULTS
[facility] Pharm[facility] Dosing Service  Warfarin (Coumadin) Initial Dosing    Arleth Weiss is a 69 year old patient for whom pharmacy has been consulted to dose warfarin (COUMADIN) for prevention of an embolism by Dr. Patel.  Based on this indication, goal INR is 2.5-3.5.    Pertinent Patient Medical History:  atrial fibrillation, h/o PE, TAVR  Potential Drug Interactions:  allopurinol, clopidogrel, enoxaparin, levothyroxine    Latest INR:   Recent Labs     01/31/25  0407   INR 1.58*       Other Anticoagulants:  enoxaparin 90 mg subcutaneously every 12 hours  Home regimen:  warfarin 5 mg on Tuesdays and Thursdays and 2.5 mg all other days of the week   Date/Time last dose was given: 5 mg given 1/28 2141    Based on above -  1.  For today, Give warfarin (COUMADIN) 5 mg at 2100 tonight    2.  PT/INR ordered daily while on warfarin    3.  Pharmacy will continue to follow.  We appreciate the opportunity to assist in the care of this patient.    Lexus Vital, PharmD  1/31/2025  2:09 PM

## 2025-01-31 NOTE — CM/SW NOTE
SW met with pt at bedside. Pt had angiogram today, pt going for debridement of the left second toe on Saturday probably. Noted that PT/OT state that pt would benefit from rehab at discharge. SW discussed this as an option, but would have to verify how many Medicare days pt has remaining. Pt states that she would prefer to return home w/ HH. Pt has history with IV abx at home, and states that she can manage the IV abx if it's needed. Unsure if IV abx are going to be needed at discharge. Pt is agreeable to sending a NAIF referral as back up. Pt states that if she has to go to rehab, she prefers to go to [facility].     SW will continue to follow. Will need PASRR. [facility] requested.     Addendum: PASRR completed, however, currently queued for review. Will attach to [facility] referral once completed.     Viki NORTON, LSW  Discharge Planner

## 2025-01-31 NOTE — PROGRESS NOTES
Patient seen at bedside in NAD.  No acute events overnight.  Patient is status post left lower extremity angiogram.    ROS  Constitutional: negative for - fever, chills, weight change  Integument:  Left foot ulcers  Respiratory: negative for - wheezing, SOB, chest congestion, cough  Cardiovascular: negative for - lower extremity edema, cyanosis, palpitations, chest pain, night cramps  Gastrointestinal: negative for - abdominal pain, diarrhea, heartburn, nausea  Musculoskeletal: + foot pain  Neurological: negative for - tremors, memory loss, paralysis, numbness    Physical exam:   Vitals:    01/31/25 1345   BP: 101/69   Pulse: 88   Resp:    Temp:      General: NAD  HEENT: EOMI, PERRLA  Respiratory: No wheezing, no rhonchi  Musculoskeletal: Left foot pain  Integument: left foot ulcer  Neuro: No focal deficits  Psych: Mood and affect normal    Lower extremity exam:  DP/PT diminished bilaterally. CFT<3secs all digits. Sensation diminished.  Necrotic ulcer noted at second PIP joint with probe to bone and no erythema or drainage.  Ulcer noted plantar first MTP joint with no erythema or drainage.     Last A1c value was 6.7% done 11/23/2024.      Lab Results   Component Value Date    WBC 6.9 01/31/2025    HGB 11.5 01/31/2025    HCT 35.2 01/31/2025    .0 01/31/2025    CREATSERUM 0.68 01/31/2025    BUN 8 01/31/2025     01/31/2025    K 3.2 01/31/2025    K 3.2 01/31/2025     01/31/2025    CO2 30.0 01/31/2025    GLU 70 01/31/2025    CA 9.2 01/31/2025    INR 1.58 01/31/2025    MG 1.8 01/31/2025       Hospital Encounter on 01/28/25   1. Blood Culture     Status: None (Preliminary result)    Collection Time: 01/28/25  3:20 AM    Specimen: Blood,peripheral   Result Value Ref Range    Blood Culture Result No Growth 3 Days N/A       MRI FOOT (W+WO), LEFT (CPT=73720)    Result Date: 1/30/2025  CONCLUSION:  1. There is osteomyelitis of the middle phalange of 2nd toe and distal aspect of the proximal phalange of 2nd  toe centered around the proximal to phalangeal joint. 2. 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   LOCATION:  [facility]   Dictated by (CST): Buzz Barclay MD on 1/30/2025 at 8:09 AM     Finalized by (CST): Buzz Barclay MD on 1/30/2025 at 8:11 AM       XR CHEST AP PORTABLE  (CPT=71045)    Result Date: 1/28/2025  CONCLUSION:  1. Findings most consistent with left lower lobe atelectasis and or scar without definite focal consolidation.  Continued clinical correlation recommended.  [facility] M.D. notified of these findings with preliminary radiology report from [facility].    LOCATION:  [facility]      Dictated by (CST): Tasha Land MD on 1/28/2025 at 8:33 AM     Finalized by (CST): Tasha Land MD on 1/28/2025 at 8:34 AM       XR FOOT, COMPLETE (MIN 3 VIEWS), LEFT (CPT=73630)    Result Date: 1/28/2025  CONCLUSION:  1. Findings concerning for osteomyelitis as detailed above.  An MRI can be performed for further evaluation as clinically indicated.  [facility] M[facility] notified of these findings with preliminary radiology report from [facility].    LOCATION:  [facility]   Dictated by (CST): Tasha Land MD on 1/28/2025 at 8:11 AM     Finalized by (CST): Tasha Land MD on 1/28/2025 at 8:14 AM       XR HAND (MIN 3 VIEWS), RIGHT (CPT=73130)    Result Date: 1/28/2025  CONCLUSION:  1. No acute visible fracture.  Please see details as above.  [facility] M[facility] notified of these findings with preliminary radiology report from [facility].    LOCATION:  [facility]   Dictated by (CST): Tasha Land MD on 1/28/2025 at 8:08 AM     Finalized by (CST): Tasha Land MD on 1/28/2025 at 8:10 AM        Assessment & Plan: 69 year old female with     Left second toe osteomyelitis  Chronic nonhealing ulcers of the left foot  Peripheral arterial disease    Afebrile, no leukocytosis  Betadine paint to the left foot ulcers  Weightbearing as tolerated    +OM on MRI  IV antibiotics per ID    Patient is status post left lower extremity  angiogram.  Patent blood flow through the AT and digital branches.    Discussed conservative versus surgical treatment options with the patient at length including all risks, benefits and complications   Advised patient the surgical treatment would be the best option to treat the osteomyelitis of the left second toe.  Patient verbalized understanding of the above and agreed to surgical treatment.    Will plan for surgery.  Will follow    Dago Diaz D.P.M.

## 2025-01-31 NOTE — PLAN OF CARE
Assumed care of patient at 0730.  Alert and oriented x4.  On room air with adequate O2 saturations.  Afib on tele, rates are controlled. R groin site from peripheral angiogram soft, clean, dry, intact. No hematoma.  Continent of bowel, incontinent of bladder. Purewick in place.  Up with pivot.  Pt updated on plan of care, verbalized understanding.   Problem: Patient/Family Goals  Goal: Patient/Family Long Term Goal  Description: Patient's Long Term Goal: to go home    Interventions:  - consults to see  - medication adjustment as needed  - get stronger  - See additional Care Plan goals for specific interventions  Outcome: Progressing  Goal: Patient/Family Short Term Goal  Description: Patient's Short Term Goal: address foot issue    Interventions:   - ID consult  - MRI  - IV abt  - See additional Care Plan goals for specific interventions  Outcome: Progressing     Problem: PAIN - ADULT  Goal: Verbalizes/displays adequate comfort level or patient's stated pain goal  Description: INTERVENTIONS:  - Encourage pt to monitor pain and request assistance  - Assess pain using appropriate pain scale  - Administer analgesics based on type and severity of pain and evaluate response  - Implement non-pharmacological measures as appropriate and evaluate response  - Consider cultural and social influences on pain and pain management  - Manage/alleviate anxiety  - Utilize distraction and/or relaxation techniques  - Monitor for opioid side effects  - Notify MD/LIP if interventions unsuccessful or patient reports new pain  - Anticipate increased pain with activity and pre-medicate as appropriate  Outcome: Progressing     Problem: RISK FOR INFECTION - ADULT  Goal: Absence of fever/infection during anticipated neutropenic period  Description: INTERVENTIONS  - Monitor WBC  - Administer growth factors as ordered  - Implement neutropenic guidelines  Outcome: Progressing     Problem: SAFETY ADULT - FALL  Goal: Free from fall  injury  Description: INTERVENTIONS:  - Assess pt frequently for physical needs  - Identify cognitive and physical deficits and behaviors that affect risk of falls.  - [place] fall precautions as indicated by assessment.  - Educate pt/family on patient safety including physical limitations  - Instruct pt to call for assistance with activity based on assessment  - Modify environment to reduce risk of injury  - Provide assistive devices as appropriate  - Consider OT/PT consult to assist with strengthening/mobility  - Encourage toileting schedule  Outcome: Progressing     Problem: DISCHARGE PLANNING  Goal: Discharge to home or other facility with appropriate resources  Description: INTERVENTIONS:  - Identify barriers to discharge w/pt and caregiver  - Include patient/family/discharge partner in discharge planning  - Arrange for needed discharge resources and transportation as appropriate  - Identify discharge learning needs (meds, wound care, etc)  - Arrange for interpreters to assist at discharge as needed  - Consider post-discharge preferences of patient/family/discharge partner  - Complete POLST form as appropriate  - Assess patient's ability to be responsible for managing their own health  - Refer to Case Management Department for coordinating discharge planning if the patient needs post-hospital services based on physician/LIP order or complex needs related to functional status, cognitive ability or social support system  Outcome: Progressing     Problem: CARDIOVASCULAR - ADULT  Goal: Maintains optimal cardiac output and hemodynamic stability  Description: INTERVENTIONS:  - Monitor vital signs, rhythm, and trends  - Monitor for bleeding, hypotension and signs of decreased cardiac output  - Evaluate effectiveness of vasoactive medications to optimize hemodynamic stability  - Monitor arterial and/or venous puncture sites for bleeding and/or hematoma  - Assess quality of pulses, skin color and temperature  - Assess  for signs of decreased coronary artery perfusion - ex. Angina  - Evaluate fluid balance, assess for edema, trend weights  Outcome: Progressing  Goal: Absence of cardiac arrhythmias or at baseline  Description: INTERVENTIONS:  - Continuous cardiac monitoring, monitor vital signs, obtain 12 lead EKG if indicated  - Evaluate effectiveness of antiarrhythmic and heart rate control medications as ordered  - Initiate emergency measures for life threatening arrhythmias  - Monitor electrolytes and administer replacement therapy as ordered  Outcome: Progressing

## 2025-01-31 NOTE — PROGRESS NOTES
CC: follow-up hospital admission chf, OM of foot     SUBJECTIVE:  Interval History:     Would like kenalog cream resumed having itching of skin  Hx of eczema, sees derm in clinic    OBJECTIVE:  Scheduled Meds:    potassium chloride  40 mEq Intravenous Once    vancomycin  15 mg/kg Intravenous Q24H    [START ON 2/1/2025] furosemide  40 mg Intravenous BID (Diuretic)    Nortriptyline HCl  75 mg Oral Nightly    docusate sodium  100 mg Oral BID    sennosides  8.6 mg Oral BID    metoprolol tartrate  50 mg Oral 2x Daily(Beta Blocker)    spironolactone  25 mg Oral Daily    allopurinol  100 mg Oral Nightly    buPROPion SR  150 mg Oral BID    clopidogrel  75 mg Oral Nightly    digoxin  125 mcg Oral Nightly    FLUoxetine  20 mg Oral BID    levothyroxine  175 mcg Oral QAM AC    rosuvastatin  5 mg Oral QOD     Continuous Infusions:   PRN Meds:   triamcinolone    albuterol    acetaminophen    PHYSICAL EXAM  Vital signs: Temp:  [97.7 °F (36.5 °C)-98.1 °F (36.7 °C)] 98.1 °F (36.7 °C)  Pulse:  [0-133] 82  Resp:  [17-21] 17  BP: (105-218)/() 116/69  SpO2:  [64 %-100 %] 95 %      GENERAL - NAD, AAO  EYES- sclera anicteric,    HENT- normocephalic,    NECK - supple  CV- RRR  RESP - CTAB, normal resp effort  ABDOMEN- soft, NT/ND   EXT- no LE edema        Data Review:   Labs:   Recent Labs   Lab 01/28/25 0058 01/29/25 0447 01/30/25 0524 01/31/25  0407   WBC 7.8 6.1 5.5 6.9   HGB 10.9* 10.5* 10.5* 11.5*   MCV 95.0 96.0 97.6 96.4   .0 350.0 354.0 387.0   INR 1.80* 1.67* 1.87* 1.58*       Recent Labs   Lab 01/28/25  0058 01/28/25  1413 01/29/25  0447 01/30/25  0524 01/31/25  0407     --  142 142 139   K 3.6 3.3* 3.2*  3.2* 3.4*  3.4* 3.2*  3.2*     --  103 105 100   CO2 26.0  --  31.0 28.0 30.0   BUN 14  --  9 10 8*   CREATSERUM 0.56  --  0.66 0.59 0.68   CA 9.4  --  8.4* 8.9 9.2   MG  --   --  1.6 1.7 1.8   GLU 89  --  74 83 70       Recent Labs   Lab 01/28/25  0058   ALT 20   AST 35*   ALB 3.6       No results  for input(s): \"PGLU\" in the last 168 hours.        ASSESSMENT/PLAN:    69 year old female with PMH sig for HFpEF, Afib on coumadin, CAD, PAD, COPD, SHELDON who presents for evaluation after a fall and multiple medical complaints.      # Lower extremity cellulitis/suspected osteomyelitis  -MRI with OM of 2nd toe  -Continue IV vancomycin per ID  -Wound care  -plan for angiogram today for revascularization attempt      # Right hand laceration   -Continue local wound care     # acute on chronic HFpEF  -Apparently her diuretic was not given when she was arrived at the nursing facility.  Continue IV Lasix. - will skip today given contrast exposure   -Strict I's/O, daily weights, fluid restriction  -Monitor volume status.  Trend renal function  Appears euvolmic now.     #paroxysmal A-fib on Coumadin  -Continue Toprol and Coumadin  -Daily PT/INR - subtherapeutic, will bridge with lovenox - now held for procedure today     #CAD  #PAD  -severe aortic stenosis sp TAVR  -Continue Plavix, statin     #COPD  -Continue bronchodilators     #SHELDON  -CPAP as nightly and as needed      #depression  -Continue bupropion and fluoxetine        CODE STATUS: Full code  DVT prophylaxis: Coumadin    Will continue to follow while hospitalized. Please page me or the on-call hospitalist with questions or concerns.    Cody Floyd Hospitalist  [phone]  Answering Service: [phone]

## 2025-01-31 NOTE — PROGRESS NOTES
[facility]   part of [facility] Infectious Disease  Progress Note    Arleth Weiss Patient Status:  Inpatient    1955 MRN [id number]   Location [facility] 8NE-A Attending Cody Patel DO   Hosp Day # 3 PCP Viktoriya Garcias DO     Subjective:  Patient seen and examined in bed. No acute overnight events. Feeling better today. Foot pain controlled at the present. Denies F/C. Denies n/v/d. Otherwise no complaints.     Objective:  Blood pressure 116/69, pulse 82, temperature 98.1 °F (36.7 °C), temperature source Oral, resp. rate 17, height 5' 9\" (1.753 m), weight 188 lb (85.3 kg), SpO2 95%, not currently breastfeeding.    Intake/Output:    Intake/Output Summary (Last 24 hours) at 2025 0834  Last data filed at 2025 0530  Gross per 24 hour   Intake --   Output 3200 ml   Net -3200 ml       Physical Exam:  General: Awake, alert, non-tox, NAD.  HEENT:  Oropharynx clear, trachea ML.  Heart: RRR S1S2 no murmurs.  Lungs: Essentially CTA b/l, no rhonchi, rales, wheezes.  Abdomen: Soft, NT/ND.  BS present.  No guarding or rebound.  Extremity: L 2nd toe ulcer without any cellulitis on exam. Mild TTP.   Neurological: No focal deficits.  Derm:  Warm and dry.    Lab Data Review:  Lab Results   Component Value Date    WBC 6.9 2025    HGB 11.5 2025    HCT 35.2 2025    .0 2025    CREATSERUM 0.68 2025    BUN 8 2025     2025    K 3.2 2025    K 3.2 2025     2025    CO2 30.0 2025    GLU 70 2025    CA 9.2 2025    INR 1.58 2025    MG 1.8 2025        Cultures:  Hospital Encounter on 25   1. Blood Culture     Status: None (Preliminary result)    Collection Time: 25  3:20 AM    Specimen: Blood,peripheral   Result Value Ref Range    Blood Culture Result No Growth 2 Days N/A       Radiology:  MRI FOOT (W+WO), LEFT (CPT=73720)    Result Date: 2025  CONCLUSION:  1.  There is osteomyelitis of the middle phalange of 2nd toe and distal aspect of the proximal phalange of 2nd toe centered around the proximal to phalangeal joint. 2. 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   LOCATION:  [facility]   Dictated by (CST): Buzz Barclay MD on 1/30/2025 at 8:09 AM     Finalized by (CST): Buzz Barclay MD on 1/30/2025 at 8:11 AM        Assessment and Plan:  1. L foot OM with threat to limb  - Outpatient cultures with MRSA on 1/17/25.  - MRI foot confirming OM.  - Podiatry following.  - IV vancomycin, ongoing.     2. PVD  - Vascular Surgery following -- plans for angiogram today, 1/31/25.    3. R hand stable after injury  - XR without any acute abnormalities.  - No signs of infection on exam.    4. H/o MRSA bacteremia in September 2024  - Patient also with h/o TAVR.   - LOYD during that admission without evidence of endocarditis.   - Treated with 4 weeks of IV vancomycin.  - Repeat blood cultures remain NGTD.     5. Recs  - Continue IV vancomycin, pharmacy to dose.   - F/u WBC and fever curve.  - Podiatry input noted and appreciated.   - Plans for angiogram with vascular today.   - Supportive care as per the primary team.  - Discussed plan of care with nursing.  - Discussed plan of care with patient. All questions addressed and understanding verbalized.  - Further recommendations pending clinical course.     Discussed case with ID attending/collaborating physician, Dr. Yvette Whitfield, who is in agreement with the above plan of care    Please note that this report has been produced using speech recognition software and may contain errors related to that system including, but not limited to, errors in grammar, punctuation, and spelling, as well as words and phrases that possibly may have been recognized inappropriately.  If there are any questions or concerns, contact the dictating provider for clarification.     The 21st Century Cures Act makes medical notes like  these available to patients in the interest of transparency. Please be advised this is a medical document. Medical documents are intended to carry relevant information, facts as evident, and the clinical opinion of the practitioner. The medical note is intended as peer to peer communication and may appear blunt or direct. It is written in medical language and may contain abbreviations or verbiage that are unfamiliar.     If you have any questions or concerns please call [facility] Infectious Disease at [phone].     Sanjiv Ortega, APRN    1/29/2025  8:34 AM

## 2025-02-01 ENCOUNTER — ANESTHESIA (OUTPATIENT)
Dept: SURGERY | Facility: HOSPITAL | Age: 70
End: 2025-02-01
Payer: MEDICARE

## 2025-02-01 LAB
ANION GAP SERPL CALC-SCNC: 10 MMOL/L (ref 0–18)
BASOPHILS # BLD AUTO: 0.04 X10(3) UL (ref 0–0.2)
BASOPHILS NFR BLD AUTO: 0.6 %
BUN BLD-MCNC: 11 MG/DL (ref 9–23)
CALCIUM BLD-MCNC: 9.3 MG/DL (ref 8.7–10.6)
CHLORIDE SERPL-SCNC: 96 MMOL/L (ref 98–112)
CO2 SERPL-SCNC: 30 MMOL/L (ref 21–32)
CREAT BLD-MCNC: 0.71 MG/DL
EGFRCR SERPLBLD CKD-EPI 2021: 91 ML/MIN/1.73M2 (ref 60–?)
EOSINOPHIL # BLD AUTO: 0.57 X10(3) UL (ref 0–0.7)
EOSINOPHIL NFR BLD AUTO: 8.6 %
ERYTHROCYTE [DISTWIDTH] IN BLOOD BY AUTOMATED COUNT: 13.9 %
GLUCOSE BLD-MCNC: 74 MG/DL (ref 70–99)
HCT VFR BLD AUTO: 34.9 %
HGB BLD-MCNC: 11.7 G/DL
IMM GRANULOCYTES # BLD AUTO: 0.02 X10(3) UL (ref 0–1)
IMM GRANULOCYTES NFR BLD: 0.3 %
INR BLD: 1.6 (ref 0.8–1.2)
LYMPHOCYTES # BLD AUTO: 1.96 X10(3) UL (ref 1–4)
LYMPHOCYTES NFR BLD AUTO: 29.6 %
MAGNESIUM SERPL-MCNC: 1.8 MG/DL (ref 1.6–2.6)
MCH RBC QN AUTO: 31.4 PG (ref 26–34)
MCHC RBC AUTO-ENTMCNC: 33.5 G/DL (ref 31–37)
MCV RBC AUTO: 93.6 FL
MONOCYTES # BLD AUTO: 0.95 X10(3) UL (ref 0.1–1)
MONOCYTES NFR BLD AUTO: 14.3 %
NEUTROPHILS # BLD AUTO: 3.09 X10 (3) UL (ref 1.5–7.7)
NEUTROPHILS # BLD AUTO: 3.09 X10(3) UL (ref 1.5–7.7)
NEUTROPHILS NFR BLD AUTO: 46.6 %
NT-PROBNP SERPL-MCNC: 1213 PG/ML (ref ?–125)
NT-PROBNP SERPL-MCNC: 1456 PG/ML (ref ?–125)
OSMOLALITY SERPL CALC.SUM OF ELEC: 280 MOSM/KG (ref 275–295)
PLATELET # BLD AUTO: 388 10(3)UL (ref 150–450)
POTASSIUM SERPL-SCNC: 3.2 MMOL/L (ref 3.5–5.1)
POTASSIUM SERPL-SCNC: 3.2 MMOL/L (ref 3.5–5.1)
PROTHROMBIN TIME: 19.2 SECONDS (ref 11.6–14.8)
RBC # BLD AUTO: 3.73 X10(6)UL
SODIUM SERPL-SCNC: 136 MMOL/L (ref 136–145)
WBC # BLD AUTO: 6.6 X10(3) UL (ref 4–11)

## 2025-02-01 PROCEDURE — 83880 ASSAY OF NATRIURETIC PEPTIDE: CPT | Performed by: INTERNAL MEDICINE

## 2025-02-01 PROCEDURE — 0JBR0ZZ EXCISION OF LEFT FOOT SUBCUTANEOUS TISSUE AND FASCIA, OPEN APPROACH: ICD-10-PCS | Performed by: PODIATRIST

## 2025-02-01 PROCEDURE — 85025 COMPLETE CBC W/AUTO DIFF WBC: CPT | Performed by: SURGERY

## 2025-02-01 PROCEDURE — 0QBR0ZZ EXCISION OF LEFT TOE PHALANX, OPEN APPROACH: ICD-10-PCS | Performed by: PODIATRIST

## 2025-02-01 PROCEDURE — 84132 ASSAY OF SERUM POTASSIUM: CPT | Performed by: SURGERY

## 2025-02-01 PROCEDURE — 85610 PROTHROMBIN TIME: CPT | Performed by: SURGERY

## 2025-02-01 PROCEDURE — 80048 BASIC METABOLIC PNL TOTAL CA: CPT | Performed by: SURGERY

## 2025-02-01 PROCEDURE — 83735 ASSAY OF MAGNESIUM: CPT | Performed by: SURGERY

## 2025-02-01 RX ORDER — BUPIVACAINE HYDROCHLORIDE 5 MG/ML
INJECTION, SOLUTION EPIDURAL; INTRACAUDAL AS NEEDED
Status: DISCONTINUED | OUTPATIENT
Start: 2025-02-01 | End: 2025-02-01 | Stop reason: HOSPADM

## 2025-02-01 RX ORDER — ONDANSETRON 2 MG/ML
4 INJECTION INTRAMUSCULAR; INTRAVENOUS EVERY 6 HOURS PRN
Status: DISCONTINUED | OUTPATIENT
Start: 2025-02-01 | End: 2025-02-01 | Stop reason: HOSPADM

## 2025-02-01 RX ORDER — LABETALOL HYDROCHLORIDE 5 MG/ML
5 INJECTION, SOLUTION INTRAVENOUS EVERY 5 MIN PRN
Status: DISCONTINUED | OUTPATIENT
Start: 2025-02-01 | End: 2025-02-01 | Stop reason: HOSPADM

## 2025-02-01 RX ORDER — SODIUM CHLORIDE, SODIUM LACTATE, POTASSIUM CHLORIDE, CALCIUM CHLORIDE 600; 310; 30; 20 MG/100ML; MG/100ML; MG/100ML; MG/100ML
INJECTION, SOLUTION INTRAVENOUS CONTINUOUS
Status: DISCONTINUED | OUTPATIENT
Start: 2025-02-01 | End: 2025-02-01 | Stop reason: HOSPADM

## 2025-02-01 RX ORDER — TORSEMIDE 20 MG/1
40 TABLET ORAL DAILY
Status: DISCONTINUED | OUTPATIENT
Start: 2025-02-01 | End: 2025-02-03

## 2025-02-01 RX ORDER — NALOXONE HYDROCHLORIDE 0.4 MG/ML
80 INJECTION, SOLUTION INTRAMUSCULAR; INTRAVENOUS; SUBCUTANEOUS AS NEEDED
Status: DISCONTINUED | OUTPATIENT
Start: 2025-02-01 | End: 2025-02-01 | Stop reason: HOSPADM

## 2025-02-01 RX ORDER — DEXAMETHASONE SODIUM PHOSPHATE 4 MG/ML
VIAL (ML) INJECTION AS NEEDED
Status: DISCONTINUED | OUTPATIENT
Start: 2025-02-01 | End: 2025-02-01 | Stop reason: SURG

## 2025-02-01 RX ORDER — CEFAZOLIN SODIUM 1 G/3ML
INJECTION, POWDER, FOR SOLUTION INTRAMUSCULAR; INTRAVENOUS AS NEEDED
Status: DISCONTINUED | OUTPATIENT
Start: 2025-02-01 | End: 2025-02-01 | Stop reason: SURG

## 2025-02-01 RX ORDER — ONDANSETRON 2 MG/ML
INJECTION INTRAMUSCULAR; INTRAVENOUS AS NEEDED
Status: DISCONTINUED | OUTPATIENT
Start: 2025-02-01 | End: 2025-02-01 | Stop reason: SURG

## 2025-02-01 RX ORDER — LIDOCAINE HYDROCHLORIDE 10 MG/ML
INJECTION, SOLUTION EPIDURAL; INFILTRATION; INTRACAUDAL; PERINEURAL AS NEEDED
Status: DISCONTINUED | OUTPATIENT
Start: 2025-02-01 | End: 2025-02-01 | Stop reason: SURG

## 2025-02-01 RX ORDER — SODIUM CHLORIDE, SODIUM LACTATE, POTASSIUM CHLORIDE, CALCIUM CHLORIDE 600; 310; 30; 20 MG/100ML; MG/100ML; MG/100ML; MG/100ML
INJECTION, SOLUTION INTRAVENOUS CONTINUOUS PRN
Status: DISCONTINUED | OUTPATIENT
Start: 2025-02-01 | End: 2025-02-01 | Stop reason: SURG

## 2025-02-01 RX ORDER — SILVER SULFADIAZINE 10 MG/G
CREAM TOPICAL DAILY
Status: DISCONTINUED | OUTPATIENT
Start: 2025-02-01 | End: 2025-02-04

## 2025-02-01 RX ORDER — DEXAMETHASONE SODIUM PHOSPHATE 4 MG/ML
VIAL (ML) INJECTION AS NEEDED
Status: DISCONTINUED | OUTPATIENT
Start: 2025-02-01 | End: 2025-02-01 | Stop reason: HOSPADM

## 2025-02-01 RX ORDER — WARFARIN SODIUM 5 MG/1
5 TABLET ORAL
Status: COMPLETED | OUTPATIENT
Start: 2025-02-01 | End: 2025-02-01

## 2025-02-01 RX ORDER — METOCLOPRAMIDE HYDROCHLORIDE 5 MG/ML
10 INJECTION INTRAMUSCULAR; INTRAVENOUS EVERY 8 HOURS PRN
Status: DISCONTINUED | OUTPATIENT
Start: 2025-02-01 | End: 2025-02-01 | Stop reason: HOSPADM

## 2025-02-01 RX ORDER — POTASSIUM CHLORIDE 1500 MG/1
40 TABLET, EXTENDED RELEASE ORAL EVERY 6 HOURS
Status: DISCONTINUED | OUTPATIENT
Start: 2025-02-01 | End: 2025-02-01

## 2025-02-01 RX ADMIN — DEXAMETHASONE SODIUM PHOSPHATE 4 MG: 4 MG/ML VIAL (ML) INJECTION at 07:52:00

## 2025-02-01 RX ADMIN — CEFAZOLIN SODIUM 2 G: 1 INJECTION, POWDER, FOR SOLUTION INTRAMUSCULAR; INTRAVENOUS at 07:53:00

## 2025-02-01 RX ADMIN — ONDANSETRON 4 MG: 2 INJECTION INTRAMUSCULAR; INTRAVENOUS at 07:52:00

## 2025-02-01 RX ADMIN — LIDOCAINE HYDROCHLORIDE 50 MG: 10 INJECTION, SOLUTION EPIDURAL; INFILTRATION; INTRACAUDAL; PERINEURAL at 07:47:00

## 2025-02-01 RX ADMIN — SODIUM CHLORIDE, SODIUM LACTATE, POTASSIUM CHLORIDE, CALCIUM CHLORIDE: 600; 310; 30; 20 INJECTION, SOLUTION INTRAVENOUS at 07:42:00

## 2025-02-01 NOTE — PROGRESS NOTES
[facility] Pharmacy Dosing Service  Warfarin (Coumadin) Subsequent Dosing    Arleth Weiss is a 69 year old patient for whom pharmacy is dosing warfarin (Coumadin). Goal INR is 2.5-3.5    Recent Labs   Lab 01/28/25  0058 01/29/25  0447 01/30/25  0524 01/31/25  0407 02/01/25  0521   INR 1.80* 1.67* 1.87* 1.58* 1.60*       Consulted by:  Dr Patel  Indication:  atrial fibrillation, h/o PE, TAVR   Potential Drug Interactions:    Other Anticoagulants:  allopurinol, clopidogrel, enoxaparin, levothyroxine   Home regimen (if applicable):  warfarin 5 mg on Tuesdays and Thursdays and 2.5 mg all other days of the week     Inpatient Dosing History:    Date 1/31 2/1       INR 1.58 1.6       Coumadin dose 5 mg                 Based on above -  1.  For today, Give warfarin (COUMADIN) 5 mg at 2100 tonight  2   PT/INR ordered daily while on warfarin  3.  Pharmacy will continue to follow.  We appreciate the opportunity to assist in the care of this patient.    Patiot Good PharmD  2/1/2025  12:01 PM

## 2025-02-01 NOTE — PROGRESS NOTES
[facility]   part of [facility] Infectious Disease  Progress Note    Arleth Weiss Patient Status:  Inpatient    1955 MRN [id number]   Location [facility] 8NE-A Attending Cody Patel DO   Hosp Day # 4 PCP Viktoriya Garcias DO     Subjective:  Patient seen/examined.  Up in bed in NAD.  Comfortable s/p amputation.  Message received from Dr. Diaz today that all infected bone was resected.  IV vancomycin ongoing.    Objective:  Blood pressure 118/72, pulse 95, temperature 98.6 °F (37 °C), temperature source Oral, resp. rate 18, height 5' 9\" (1.753 m), weight 181 lb 8 oz (82.3 kg), SpO2 98%, not currently breastfeeding.    Intake/Output:    Intake/Output Summary (Last 24 hours) at 2025 1451  Last data filed at 2025 0820  Gross per 24 hour   Intake 0 ml   Output 902 ml   Net -902 ml       Physical Exam:  General: Awake, alert, non-tox, NAD.  HEENT:  Oropharynx clear, trachea ML.  Heart: RRR S1S2 no murmurs.  Lungs: Essentially CTA b/l, no rhonchi, rales, wheezes.  Abdomen: Soft, NT/ND.  BS present.  No organomegaly.  Extremity: L foot dressing intact.  Neurological: No focal deficits.  Derm:  Warm, dry, free from rashes.    Lab Data Review:  Lab Results   Component Value Date    WBC 6.6 2025    HGB 11.7 2025    HCT 34.9 2025    .0 2025    CREATSERUM 0.71 2025    BUN 11 2025     2025    K 3.2 2025    K 3.2 2025    CL 96 2025    CO2 30.0 2025    GLU 74 2025    CA 9.3 2025    INR 1.60 2025    MG 1.8 2025        Cultures:  Blood cultures negative  H/o MRSA    Radiology:  Reviewed    Antibiotics Reviewed:  Vancomycin    Assessment and Plan:    Complicated L foot OM with threat to limb  - Outpatient cultures 25 with MRSA  - MRI confirms OM with podiatry following  - s/p OR this a.m. with L 2nd toe arthroplasty and debridement, all infected material removed  - IV  vancomycin ongoing post-op    2.  H/o severe PVD  - s/p angiogram with Dr. Hood on 1/31/25    3.  R hand injury without radiographic abnormalities    4.  H/o MRSA sepsis in September 2024  - Known h/o TAVR  - LOYD without vegetations  - Treated with long course IV vancomycin  - Repeat blood cultures negative    5.  Disposition - inpatient.  Continue IV vancomycin as Rx while hospitalized.  If no evidence of continued bone involvement with OM, plan to step down to a short course of bactrim DS BID at discharge.  D/w patient.  Will follow.    Светлана Li DO, [facility] Infectious Disease  [phone]    2/1/2025  2:51 PM

## 2025-02-01 NOTE — PLAN OF CARE
Assumed care for pt at 19:30 on 1/31    A&Ox4. Tylenol given for pain. VSS on RA. Afib on tele. INR 1.58, coumadin given. Spoke with Dr. Dago Diaz, who confirmed orders to give Lovenox and coumadin. Purewick in place. Declined senna and colace. Up with 2x3 assist. Dressing to R hand changed.     Plan: NPO for debridement and arthroplasty of left foot second toe. AM labs, IV Vanco    Bed alarm on, call light in reach, able to make needs known.

## 2025-02-01 NOTE — PROGRESS NOTES
[facility] Cardiology Progress Note    Arleth Weiss Patient Status:  Inpatient    1955 MRN [id number]   Location [facility] 8NE-A Attending Cody Patel DO   Hosp Day # 4 PCP Viktoriya Garcias DO     Outpatient cardiologist: Manpreet  Reason for initial consult: Fall with injury to right hand with fluid volume overload recently discharged from [facility] for Afib RVR     Subjective: No c/o.  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 DDx includes vegetation, thrombus, fibrin. No Sx of infectious process at present.   Atrial fibrillation   Severe aortic stenosis S/P TAVR (transcatheter aortic valve replacement)    Coronary artery disease involving native coronary artery of native heart without angina pectoris  S/P coronary artery stent placement to LCx 3/15/21   Essential hypertension   Mixed hyperlipidemia   Chronic obstructive pulmonary disease, unspecified COPD type (HCC)  CKD stage III  Follows with renal   improved  SHELDON on CPAP    PVD with acute limb ischemia and was treated with angiojet and POBA and aborted left pop cutdown 10/23   Cath yesterday Findings: Widely patent common femoral, SFA, profunda, popliteal with brisk runoff to the foot via the AT.  The digital branches are patent   Debridement today       Plan:    Wt down  BP variable but 110-120s  Consider increased dose spironolactone  Check BNP and BMP in am   Increase oral loop      Medications:   vancomycin  15 mg/kg Intravenous Q24H    Nortriptyline HCl  75 mg Oral Nightly    zinc sulfate  220 mg Oral Nightly    torsemide  20 mg Oral Daily    enoxaparin  1 mg/kg Subcutaneous 2 times per day    cholecalciferol  1,000 Units Oral Nightly    docusate sodium  100 mg Oral  BID    sennosides  8.6 mg Oral BID    metoprolol tartrate  50 mg Oral 2x Daily(Beta Blocker)    spironolactone  25 mg Oral Daily    allopurinol  100 mg Oral Nightly    buPROPion SR  150 mg Oral BID    clopidogrel  75 mg Oral Nightly    digoxin  125 mcg Oral Nightly    FLUoxetine  20 mg Oral BID    levothyroxine  175 mcg Oral QAM AC    rosuvastatin  5 mg Oral QOD       Continuous Infusions:      Allergies:  Allergies[1]    Intake/Output:    Intake/Output Summary (Last 24 hours) at 2/1/2025 0700  Last data filed at 1/31/2025 2111  Gross per 24 hour   Intake --   Output 900 ml   Net -900 ml       Last 3 Weights   02/01/25 0645 181 lb 8 oz (82.3 kg)   01/31/25 0540 188 lb (85.3 kg)   01/29/25 0512 193 lb 2 oz (87.6 kg)   01/28/25 0511 199 lb 15.3 oz (90.7 kg)   01/27/25 2212 202 lb (91.6 kg)   01/28/25 1009 199 lb (90.3 kg)   12/04/24 0430 198 lb 10.2 oz (90.1 kg)   12/03/24 0613 204 lb 2.3 oz (92.6 kg)   12/02/24 0620 203 lb 7.8 oz (92.3 kg)   12/01/24 0640 203 lb 7.8 oz (92.3 kg)   11/30/24 0523 205 lb 7.5 oz (93.2 kg)   11/29/24 0956 207 lb 6.4 oz (94.1 kg)   11/29/24 0405 214 lb 15.2 oz (97.5 kg)   11/28/24 0550 207 lb 14.3 oz (94.3 kg)   11/27/24 0522 208 lb 15.9 oz (94.8 kg)   11/26/24 0520 214 lb 1.1 oz (97.1 kg)   11/25/24 0408 218 lb 1.6 oz (98.9 kg)   11/23/24 2313 227 lb 1.2 oz (103 kg)   11/23/24 2233 227 lb 1.2 oz (103 kg)   11/23/24 1746 225 lb (102.1 kg)       Physical Exam:  Temp:  [97.6 °F (36.4 °C)-98.1 °F (36.7 °C)] 97.6 °F (36.4 °C)  Pulse:  [] 81  Resp:  [17-20] 17  BP: ()/(57-82) 100/82  SpO2:  [95 %-100 %] 98 %    General: No apparent distress  HEENT: No focal deficits.  Neck: supple. JVP normal  Cardiac: Regular rhythm, S1, S2 normal, no rub or gallop.  No murmur.  Lungs: Clear bilaterally  Abdomen: Soft, non-tender.   Extremities: No LE edema, radial pulses 2+ and pedal pulses 2+  Neurologic: no focal deficits  Skin: Warm and dry.     Telemetry:  Afib 80s      Cath LOYD  5/30/24  Findings:   Mod-Sev LAE   Smoke in LA / JOE   No LA / JOE thrombus   Mod ENA   Low NL EF - 50%   Thickened MV leaflets with reduced excursion   Mobile echogenicity associated with the mitral yumiko - appears   associated with chordal apparatus of the posterior MV leaflet   right near attachment to the mitral valve   Mild MR   Well seated TAVR prosthesis   Mild TR   PAP WNL   No doppler or echocontrast evidence of interatrial shunting   Mild atheromatous aortic plaquing     ECHO 8/5/24  1. Left ventricle: The cavity size was normal. Wall thickness was normal.   Systolic function was mildly reduced. The estimated ejection fraction was   45-50%. Beat to beat variation of ejection fraction due to AFIB.   2. Left atrium: The atrium was moderately dilated.   3. Aortic valve: Elizondo MINOR S3 23mm (TAVR) bioprosthesis was present.   4. Mitral valve: The annulus was markedly calcified. This is consistent with   at least mild mitral stenosis The mean diastolic gradient was 7mm Hg.   Impressions: No significant changes from last study     LOYD 9/17/24  CONCLUSIONS:   1. There is no evidence of valvular vegetations, intracardiac masses or   thrombi.   2. The mitral valve is thickened and there is moderate to severe mitral   annular calcium The aortic valve has a TAVR present that appears to be   functioning normally.   2. Normal left ventricular systolic function. The estimated ejection   fraction is 50-55%.     Carlitos Martinez MD       Echo, 1/29/25:       Conclusions:     1. Left ventricle: The cavity size was normal. Wall thickness was normal.      Systolic function was at the lower limits of normal. The estimated      ejection fraction was 50-55%, by visual assessment. Unable to assess LV      diastolic function due to heart rhythm.   2. Left atrium: The left atrial volume was markedly increased.   3. Aortic valve: Elizondo MINOR S3 23mm (TAVR) bioprosthesis was present.      Transvalvular velocity was increased.  There was no significant      regurgitation. The peak systolic velocity was 3.07m/sec. The mean      systolic gradient was 17mm Hg. The valve area (VTI) was 1.55cm^2. The      valve area (VTI) index was 0.76cm^2/m^2.   4. Mitral valve: The annulus was markedly calcified. The leaflets were      mildly calcified. There was trivial regurgitation. The mean diastolic      gradient was 3mm Hg.   5. Pulmonary arteries: Systolic pressure was moderately increased, in the      range of 45mm Hg to 50mm Hg.   Impressions:  This study is compared with previous dated 9/14/2024:     Labs:  HEM:  Recent Labs   Lab 01/28/25 0058 01/29/25 0447 01/30/25  0524 01/31/25  0407 02/01/25  0500   WBC 7.8 6.1 5.5 6.9 6.6   HGB 10.9* 10.5* 10.5* 11.5* 11.7*   .0 350.0 354.0 387.0 388.0       Chem:  Recent Labs   Lab 01/28/25 0058 01/28/25  1413 01/29/25 0447 01/30/25 0524 01/31/25  0407 02/01/25  0500     --  142 142 139 136   K 3.6 3.3* 3.2*  3.2* 3.4*  3.4* 3.2*  3.2* 3.2*  3.2*     --  103 105 100 96*   CO2 26.0  --  31.0 28.0 30.0 30.0   BUN 14  --  9 10 8* 11   CREATSERUM 0.56  --  0.66 0.59 0.68 0.71   CA 9.4  --  8.4* 8.9 9.2 9.3   MG  --   --  1.6 1.7 1.8 1.8   GLU 89  --  74 83 70 74       Recent Labs   Lab 01/28/25 0058   ALT 20   AST 35*   ALB 3.6       No results for input(s): \"TROP\", \"CK\" in the last 168 hours.    Recent Labs   Lab 01/30/25 0524 01/31/25  0407 02/01/25  0521   PTP 21.7* 19.0* 19.2*   INR 1.87* 1.58* 1.60*         Arleth Jenifer, SERA  2/1/2025  7:00 AM         [1]   Allergies  Allergen Reactions    Adhesive Tape HIVES     ALL ADHESIVES    Codeine HIVES    Doxycycline SWELLING    Hydrocodone UNKNOWN    Lisinopril TONGUE SWELLING    Morphine Sulfate HIVES    Opioid Analgesics UNKNOWN    Oxycontin ITCHING    Oxytocin HIVES    Vicodin [Hydrocodone-Acetaminophen] ITCHING    Skin Adhesives OTHER (SEE COMMENTS)

## 2025-02-01 NOTE — PLAN OF CARE
Assumed care for pt at 19:30 on 1/30    A&Ox4. Tylenol given for pain. VSS on RA. Afib on tele. Coumadin on hold for peripheral angiogram. Purewick in place. Up 2-3 assist, lots of pain with movement. Very fragile skin. Pharmacist notified of vanco trough, dose rescheduled for later.       Plan: IV vanco, npo for peripheral angiogram.     Bed alarm on, call light in reach, able to make needs known.

## 2025-02-01 NOTE — ANESTHESIA POSTPROCEDURE EVALUATION
[facility]    Arleth Weiss Patient Status:  Inpatient   Age/Gender 69 year old female MRN [id number]   Location [facility] SURGERY Attending Cody Patel DO   Hosp Day # 4 PCP Viktoriya Garcias DO       Anesthesia Post-op Note    LEFT SECOND TOE ARTHROPLASTY AND WOUND DEBRIDEMENT    Procedure Summary       Date: 02/01/25 Room / Location:  MAIN OR 03 / [facility] MAIN OR    Anesthesia Start: 0736 Anesthesia Stop: 0822    Procedure: LEFT SECOND TOE ARTHROPLASTY AND WOUND DEBRIDEMENT (Left: Foot) Diagnosis: (Left second toe osteomyelitis)    Surgeons: Dago Diaz DPM Anesthesiologist: Adri Gil MD    Anesthesia Type: MAC ASA Status: 4            Anesthesia Type: MAC    Vitals Value Taken Time   /74 02/01/25 0824   Temp 98 02/01/25 0824   Pulse 74 02/01/25 0824   Resp 16 02/01/25 0824   SpO2 96 02/01/25 0824           Patient Location: PACU    Anesthesia Type: MAC    Airway Patency: patent    Postop Pain Control: adequate    Mental Status: preanesthetic baseline    Nausea/Vomiting: none    Cardiopulmonary/Hydration status: stable euvolemic    Complications: no apparent anesthesia related complications    Postop vital signs: stable    Dental Exam: Unchanged from Preop    Patient to be discharged from PACU when criteria met.

## 2025-02-01 NOTE — PROGRESS NOTES
CC: follow-up hospital admission chf, OM of foot     SUBJECTIVE:  Interval History:     Returned from OR this am  No new complaints  No nv    OBJECTIVE:  Scheduled Meds:    silver sulfADIAZINE   Topical Daily    torsemide  40 mg Oral Daily    warfarin  5 mg Oral Once at night    potassium chloride  40 mEq Intravenous Once    vancomycin  15 mg/kg Intravenous Q24H    Nortriptyline HCl  75 mg Oral Nightly    zinc sulfate  220 mg Oral Nightly    enoxaparin  1 mg/kg Subcutaneous 2 times per day    cholecalciferol  1,000 Units Oral Nightly    docusate sodium  100 mg Oral BID    sennosides  8.6 mg Oral BID    metoprolol tartrate  50 mg Oral 2x Daily(Beta Blocker)    spironolactone  25 mg Oral Daily    allopurinol  100 mg Oral Nightly    buPROPion SR  150 mg Oral BID    clopidogrel  75 mg Oral Nightly    digoxin  125 mcg Oral Nightly    FLUoxetine  20 mg Oral BID    levothyroxine  175 mcg Oral QAM AC    rosuvastatin  5 mg Oral QOD     Continuous Infusions:   PRN Meds:   triamcinolone    calcium carbonate    albuterol    acetaminophen    PHYSICAL EXAM  Vital signs: Temp:  [97.6 °F (36.4 °C)-98.4 °F (36.9 °C)] 98.4 °F (36.9 °C)  Pulse:  [] 89  Resp:  [10-24] 24  BP: ()/(54-82) 138/73  SpO2:  [94 %-100 %] 100 %      GENERAL - NAD, AAO  EYES- sclera anicteric,    HENT- normocephalic,    NECK - supple  CV- RRR  RESP - CTAB, normal resp effort  ABDOMEN- soft, NT/ND   EXT- no LE edema, left foot with dressing, bleeding noted through dressing       Data Review:   Labs:   Recent Labs   Lab 01/28/25  0058 01/29/25  0447 01/30/25  0524 01/31/25  0407 02/01/25  0500 02/01/25  0521   WBC 7.8 6.1 5.5 6.9 6.6  --    HGB 10.9* 10.5* 10.5* 11.5* 11.7*  --    MCV 95.0 96.0 97.6 96.4 93.6  --    .0 350.0 354.0 387.0 388.0  --    INR 1.80* 1.67* 1.87* 1.58*  --  1.60*       Recent Labs   Lab 01/28/25  0058 01/28/25  1413 01/29/25  0447 01/30/25  0524 01/31/25  0407 02/01/25  0500     --  142 142 139 136   K 3.6 3.3*  3.2*  3.2* 3.4*  3.4* 3.2*  3.2* 3.2*  3.2*     --  103 105 100 96*   CO2 26.0  --  31.0 28.0 30.0 30.0   BUN 14  --  9 10 8* 11   CREATSERUM 0.56  --  0.66 0.59 0.68 0.71   CA 9.4  --  8.4* 8.9 9.2 9.3   MG  --   --  1.6 1.7 1.8 1.8   GLU 89  --  74 83 70 74       Recent Labs   Lab 01/28/25  0058   ALT 20   AST 35*   ALB 3.6       No results for input(s): \"PGLU\" in the last 168 hours.        ASSESSMENT/PLAN:    69 year old female with PMH sig for HFpEF, Afib on coumadin, CAD, PAD, COPD, SHELDON who presents for evaluation after a fall and multiple medical complaints.      # Lower extremity cellulitis/suspected osteomyelitis  -MRI with OM of 2nd toe  -Continue IV vancomycin per ID  -Wound care  -sp angiogram - no intervention, has flow to foot  -sp left 2nd toe arthroplasty      # Right hand laceration   -Continue local wound care     # acute on chronic HFpEF  -Apparently her diuretic was not given when she was arrived at the nursing facility.  Sp IV lasix. Now on home torsemide  -Strict I's/O, daily weights, fluid restriction  -Monitor volume status.  Trend renal function  Appears euvolmic now.     #paroxysmal A-fib on Coumadin  -Continue Toprol and Coumadin  -Daily PT/INR - subtherapeutic, will bridge with lovenox - hold today given bleeding in foot     #CAD  #PAD  -severe aortic stenosis sp TAVR  -Continue Plavix, statin     #COPD  -Continue bronchodilators     #SHELDON  -CPAP as nightly and as needed      #depression  -Continue bupropion and fluoxetine        CODE STATUS: Full code  DVT prophylaxis: Coumadin    Will continue to follow while hospitalized. Please page me or the on-call hospitalist with questions or concerns.    Cody Floyd Hospitalist  [phone]  Answering Service: [phone]

## 2025-02-01 NOTE — OPERATIVE REPORT
Date of surgery: 02/01/25     Preoperative Diagnosis:   Chronic nonhealing necrotic ulcer of left second toe with bone involvement  Chronic nonhealing ulcers of the plantar great toe and first MTP joint, left foot  Left second toe osteomyelitis  Peripheral arterial disease    Postoperative Diagnosis: Same     Procedure:   Left second toe arthroplasty  Excisional wound debridement to level of and including subcutaneous tissue, 4 cm², left foot    Surgeon: Dago Diaz D.P.M.  Anesthesia: MAC  EBL: 2 mL     Specimens: None  Complications: None     Technique:  Patient was brought into the operating room and placed on the operating table in a supine position.  Patient was then placed under anesthesia.  Left foot was then prepped and draped in the usual aseptic manner.  A tourniquet was not utilized.  Left foot was given a block with 5 cc of 0.5% Marcaine.    Next, attention was directed to the necrotic ulcer at the PIP joint of the left second toe.  A elliptical incision was made around the necrotic soft tissues and all nonviable soft tissue was sharply excised and passed off the field.  Dissection was carried deep down to the bone.  Next, a bone cutter was utilized and the head of the proximal phalanx and base of the middle phalanx was resected to healthy bleeding bone.  All nonviable deep soft tissues were sharply excised.  Surgical site was copiously irrigated and then closed with 3-0 nylon.    Attention was directed to the plantar aspect of the first MTP joint and the great toe.  Using a 15 blade all nonviable soft tissue was sharply debrided and excised to level of and including subcutaneous tissue, 4 cm².    Betadine ointment and well-padded sterile dressings were applied to the surgical site.  Patient tolerated procedure well and was transferred to PACU in stable condition.

## 2025-02-02 LAB
ANION GAP SERPL CALC-SCNC: 11 MMOL/L (ref 0–18)
BASOPHILS # BLD AUTO: 0.02 X10(3) UL (ref 0–0.2)
BASOPHILS NFR BLD AUTO: 0.3 %
BUN BLD-MCNC: 17 MG/DL (ref 9–23)
CALCIUM BLD-MCNC: 9.1 MG/DL (ref 8.7–10.6)
CHLORIDE SERPL-SCNC: 97 MMOL/L (ref 98–112)
CO2 SERPL-SCNC: 29 MMOL/L (ref 21–32)
CREAT BLD-MCNC: 0.77 MG/DL
EGFRCR SERPLBLD CKD-EPI 2021: 82 ML/MIN/1.73M2 (ref 60–?)
EOSINOPHIL # BLD AUTO: 0.01 X10(3) UL (ref 0–0.7)
EOSINOPHIL NFR BLD AUTO: 0.1 %
ERYTHROCYTE [DISTWIDTH] IN BLOOD BY AUTOMATED COUNT: 13.3 %
GLUCOSE BLD-MCNC: 120 MG/DL (ref 70–99)
HCT VFR BLD AUTO: 34.5 %
HGB BLD-MCNC: 11.7 G/DL
IMM GRANULOCYTES # BLD AUTO: 0.03 X10(3) UL (ref 0–1)
IMM GRANULOCYTES NFR BLD: 0.4 %
INR BLD: 1.69 (ref 0.8–1.2)
LYMPHOCYTES # BLD AUTO: 1.59 X10(3) UL (ref 1–4)
LYMPHOCYTES NFR BLD AUTO: 21.3 %
MAGNESIUM SERPL-MCNC: 1.8 MG/DL (ref 1.6–2.6)
MCH RBC QN AUTO: 31.3 PG (ref 26–34)
MCHC RBC AUTO-ENTMCNC: 33.9 G/DL (ref 31–37)
MCV RBC AUTO: 92.2 FL
MONOCYTES # BLD AUTO: 1.06 X10(3) UL (ref 0.1–1)
MONOCYTES NFR BLD AUTO: 14.2 %
NEUTROPHILS # BLD AUTO: 4.75 X10 (3) UL (ref 1.5–7.7)
NEUTROPHILS # BLD AUTO: 4.75 X10(3) UL (ref 1.5–7.7)
NEUTROPHILS NFR BLD AUTO: 63.7 %
NT-PROBNP SERPL-MCNC: 2376 PG/ML (ref ?–125)
OSMOLALITY SERPL CALC.SUM OF ELEC: 287 MOSM/KG (ref 275–295)
PLATELET # BLD AUTO: 427 10(3)UL (ref 150–450)
POTASSIUM SERPL-SCNC: 3.8 MMOL/L (ref 3.5–5.1)
PROTHROMBIN TIME: 20.1 SECONDS (ref 11.6–14.8)
RBC # BLD AUTO: 3.74 X10(6)UL
SODIUM SERPL-SCNC: 137 MMOL/L (ref 136–145)
WBC # BLD AUTO: 7.5 X10(3) UL (ref 4–11)

## 2025-02-02 PROCEDURE — 80048 BASIC METABOLIC PNL TOTAL CA: CPT | Performed by: PODIATRIST

## 2025-02-02 PROCEDURE — 83735 ASSAY OF MAGNESIUM: CPT | Performed by: PODIATRIST

## 2025-02-02 PROCEDURE — 85025 COMPLETE CBC W/AUTO DIFF WBC: CPT | Performed by: PODIATRIST

## 2025-02-02 PROCEDURE — 36415 COLL VENOUS BLD VENIPUNCTURE: CPT

## 2025-02-02 PROCEDURE — 85610 PROTHROMBIN TIME: CPT | Performed by: PODIATRIST

## 2025-02-02 PROCEDURE — 83880 ASSAY OF NATRIURETIC PEPTIDE: CPT | Performed by: PODIATRIST

## 2025-02-02 RX ORDER — MAGNESIUM SULFATE HEPTAHYDRATE 40 MG/ML
2 INJECTION, SOLUTION INTRAVENOUS ONCE
Status: COMPLETED | OUTPATIENT
Start: 2025-02-02 | End: 2025-02-02

## 2025-02-02 RX ORDER — WARFARIN SODIUM 5 MG/1
5 TABLET ORAL
Status: COMPLETED | OUTPATIENT
Start: 2025-02-02 | End: 2025-02-02

## 2025-02-02 RX ORDER — DILTIAZEM HYDROCHLORIDE 240 MG/1
240 CAPSULE, COATED, EXTENDED RELEASE ORAL DAILY
Status: DISCONTINUED | OUTPATIENT
Start: 2025-02-02 | End: 2025-02-04

## 2025-02-02 RX ORDER — POTASSIUM CHLORIDE 14.9 MG/ML
20 INJECTION INTRAVENOUS ONCE
Status: COMPLETED | OUTPATIENT
Start: 2025-02-02 | End: 2025-02-02

## 2025-02-02 RX ORDER — METOPROLOL TARTRATE 25 MG/1
25 TABLET, FILM COATED ORAL
Status: DISCONTINUED | OUTPATIENT
Start: 2025-02-02 | End: 2025-02-04

## 2025-02-02 RX ORDER — FUROSEMIDE 10 MG/ML
20 INJECTION INTRAMUSCULAR; INTRAVENOUS ONCE
Status: COMPLETED | OUTPATIENT
Start: 2025-02-02 | End: 2025-02-02

## 2025-02-02 NOTE — PROGRESS NOTES
[facility] Card[facility] Progress Note    Arleth Weiss Patient Status:  Inpatient    1955 MRN [id number]   Location [facility] 8NE-A Attending Cody Patel DO   Hosp Day # 5 PCP Viktoriya Garcias DO     Outpatient cardiologist: Manpreet  Reason for initial consult: Fall with injury to right hand with fluid volume overload recently discharged from [facility] for Afib RVR     Subjective: No c/o     Assessment and Plan:    Acute on chronic heart failure with preserved ejection fraction (HCC) hypokalemia 3.8 with eGFR 91  BNP up overnight 1200 to 2400 with 1 lb wt gain may be related to OR  Recently transitioned to oral   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 DDx includes vegetation, thrombus, fibrin. No Sx of infectious process at present.     Atrial fibrillation   HR 70-90s  Watch HR     Severe aortic stenosis S/P TAVR (transcatheter aortic valve replacement)    Coronary artery disease involving native coronary artery of native heart without angina pectoris  S/P coronary artery stent placement to LCx 3/15/21     Essential hypertension   Mixed hyperlipidemia     Chronic obstructive pulmonary disease, unspecified COPD type ([facility])    CKD stage III  Follows with renal   improved    SHELDON on CPAP      PVD with acute limb ischemia and was treated with angiojet and POBA and aborted left pop cutdown 10/23   Cath Findings: Widely patent common femoral, SFA, profunda, popliteal with brisk runoff to the foot via the AT.  The digital branches are patent   Debridement 2-1  MRI confirms OM with podiatry following  - s/p OR this a.m. with L 2nd toe arthroplasty and debridement, all infected material removed  - IV vancomycin ongoing post-op      Plan:    Add back Jardiance 10mg daily (home med)   Add back Cardizem 240mg (home med)  Decrease metoprolol (home dose)   Increase spironolactone 50mg daily   Repeat BMP and BNP tomorrow.   X 1 dose Lasix  20mg daily   Weigh in am     Medications:   potassium chloride  20 mEq Intravenous Once    magnesium sulfate  2 g Intravenous Once    silver sulfADIAZINE   Topical Daily    torsemide  40 mg Oral Daily    vancomycin  15 mg/kg Intravenous Q24H    Nortriptyline HCl  75 mg Oral Nightly    zinc sulfate  220 mg Oral Nightly    [Held by provider] enoxaparin  1 mg/kg Subcutaneous 2 times per day    cholecalciferol  1,000 Units Oral Nightly    docusate sodium  100 mg Oral BID    sennosides  8.6 mg Oral BID    metoprolol tartrate  50 mg Oral 2x Daily(Beta Blocker)    spironolactone  25 mg Oral Daily    allopurinol  100 mg Oral Nightly    buPROPion SR  150 mg Oral BID    clopidogrel  75 mg Oral Nightly    digoxin  125 mcg Oral Nightly    FLUoxetine  20 mg Oral BID    levothyroxine  175 mcg Oral QAM AC    rosuvastatin  5 mg Oral QOD       Continuous Infusions:      Allergies:  Allergies[1]    Intake/Output:    Intake/Output Summary (Last 24 hours) at 2/2/2025 0712  Last data filed at 2/2/2025 0510  Gross per 24 hour   Intake 720 ml   Output 1602 ml   Net -882 ml       Last 3 Weights   02/02/25 0520 182 lb 8.7 oz (82.8 kg)   02/01/25 0645 181 lb 8 oz (82.3 kg)   01/31/25 0540 188 lb (85.3 kg)   01/29/25 0512 193 lb 2 oz (87.6 kg)   01/28/25 0511 199 lb 15.3 oz (90.7 kg)   01/27/25 2212 202 lb (91.6 kg)   01/28/25 1009 199 lb (90.3 kg)   12/04/24 0430 198 lb 10.2 oz (90.1 kg)   12/03/24 0613 204 lb 2.3 oz (92.6 kg)   12/02/24 0620 203 lb 7.8 oz (92.3 kg)   12/01/24 0640 203 lb 7.8 oz (92.3 kg)   11/30/24 0523 205 lb 7.5 oz (93.2 kg)   11/29/24 0956 207 lb 6.4 oz (94.1 kg)   11/29/24 0405 214 lb 15.2 oz (97.5 kg)   11/28/24 0550 207 lb 14.3 oz (94.3 kg)   11/27/24 0522 208 lb 15.9 oz (94.8 kg)   11/26/24 0520 214 lb 1.1 oz (97.1 kg)   11/25/24 0408 218 lb 1.6 oz (98.9 kg)   11/23/24 2313 227 lb 1.2 oz (103 kg)   11/23/24 2233 227 lb 1.2 oz (103 kg)   11/23/24 1746 225 lb (102.1 kg)       Physical Exam:  Temp:  [97.6 °F (36.4  °C)-98.6 °F (37 °C)] 97.6 °F (36.4 °C)  Pulse:  [82-98] 83  Resp:  [10-24] 13  BP: ()/(54-83) 118/72  SpO2:  [94 %-100 %] 96 %    General: No apparent distress  HEENT: No focal deficits.  Neck: supple. JVP normal  Cardiac: Regular rhythm, S1, S2 normal, no rub or gallop.  No murmur.  Lungs: Clear bilaterally  Abdomen: Soft, non-tender.   Extremities: No LE edema, radial pulses 2+ and pedal pulses 2+  Neurologic: no focal deficits  Skin: Warm and dry.     Telemetry:  Afib 80s      Cath LOYD 5/30/24  Findings:   Mod-Sev LAE   Smoke in LA / JOE   No LA / JOE thrombus   Mod ENA   Low NL EF - 50%   Thickened MV leaflets with reduced excursion   Mobile echogenicity associated with the mitral yumiko - appears   associated with chordal apparatus of the posterior MV leaflet   right near attachment to the mitral valve   Mild MR   Well seated TAVR prosthesis   Mild TR   PAP WNL   No doppler or echocontrast evidence of interatrial shunting   Mild atheromatous aortic plaquing     ECHO 8/5/24  1. Left ventricle: The cavity size was normal. Wall thickness was normal.   Systolic function was mildly reduced. The estimated ejection fraction was   45-50%. Beat to beat variation of ejection fraction due to AFIB.   2. Left atrium: The atrium was moderately dilated.   3. Aortic valve: Elizondo MINOR S3 23mm (TAVR) bioprosthesis was present.   4. Mitral valve: The annulus was markedly calcified. This is consistent with   at least mild mitral stenosis The mean diastolic gradient was 7mm Hg.   Impressions: No significant changes from last study     LOYD 9/17/24  CONCLUSIONS:   1. There is no evidence of valvular vegetations, intracardiac masses or   thrombi.   2. The mitral valve is thickened and there is moderate to severe mitral   annular calcium The aortic valve has a TAVR present that appears to be   functioning normally.   2. Normal left ventricular systolic function. The estimated ejection   fraction is 50-55%.     Carlitos Martinez,  MD       Echo, 1/29/25:       Conclusions:     1. Left ventricle: The cavity size was normal. Wall thickness was normal.      Systolic function was at the lower limits of normal. The estimated      ejection fraction was 50-55%, by visual assessment. Unable to assess LV      diastolic function due to heart rhythm.   2. Left atrium: The left atrial volume was markedly increased.   3. Aortic valve: Elizondo MINOR S3 23mm (TAVR) bioprosthesis was present.      Transvalvular velocity was increased. There was no significant      regurgitation. The peak systolic velocity was 3.07m/sec. The mean      systolic gradient was 17mm Hg. The valve area (VTI) was 1.55cm^2. The      valve area (VTI) index was 0.76cm^2/m^2.   4. Mitral valve: The annulus was markedly calcified. The leaflets were      mildly calcified. There was trivial regurgitation. The mean diastolic      gradient was 3mm Hg.   5. Pulmonary arteries: Systolic pressure was moderately increased, in the      range of 45mm Hg to 50mm Hg.   Impressions:  This study is compared with previous dated 9/14/2024:     Labs:  HEM:  Recent Labs   Lab 01/29/25 0447 01/30/25 0524 01/31/25 0407 02/01/25  0500 02/02/25  0459   WBC 6.1 5.5 6.9 6.6 7.5   HGB 10.5* 10.5* 11.5* 11.7* 11.7*   .0 354.0 387.0 388.0 427.0       Chem:  Recent Labs   Lab 01/29/25 0447 01/30/25 0524 01/31/25 0407 02/01/25  0500 02/02/25  0459    142 139 136 137   K 3.2*  3.2* 3.4*  3.4* 3.2*  3.2* 3.2*  3.2* 3.8    105 100 96* 97*   CO2 31.0 28.0 30.0 30.0 29.0   BUN 9 10 8* 11 17   CREATSERUM 0.66 0.59 0.68 0.71 0.77   CA 8.4* 8.9 9.2 9.3 9.1   MG 1.6 1.7 1.8 1.8 1.8   GLU 74 83 70 74 120*       Recent Labs   Lab 01/28/25  0058   ALT 20   AST 35*   ALB 3.6       No results for input(s): \"TROP\", \"CK\" in the last 168 hours.    Recent Labs   Lab 01/31/25  0407 02/01/25  0521 02/02/25  0459   PTP 19.0* 19.2* 20.1*   INR 1.58* 1.60* 1.69*         Arleth Burgess NP  2/1/2025  7:00 AM          [1]   Allergies  Allergen Reactions    Adhesive Tape HIVES     ALL ADHESIVES    Codeine HIVES    Doxycycline SWELLING    Hydrocodone UNKNOWN    Lisinopril TONGUE SWELLING    Morphine Sulfate HIVES    Opioid Analgesics UNKNOWN    Oxycontin ITCHING    Oxytocin HIVES    Vicodin [Hydrocodone-Acetaminophen] ITCHING    Skin Adhesives OTHER (SEE COMMENTS)

## 2025-02-02 NOTE — PLAN OF CARE
Assumed patient care around 0730 this am. Patient A&O x4, glasses at bedside. SPO2 maintained on RA, no c/o SOB. Afib on tele, HR controlled. On Coumadin, INR 1.69 this am. Diltiazem and Jardiance resumed by cardiology, IV lasix x1 today. S/p left toe amputation yesterday, dressing Cdi, foot elevated on pillow. Pt declining need for pain medication at this time. Electrolyte replacement per protocol. Right forearm IV infiltrated with potassium- attempted to aspirate site, iv removed, MD notified, inflamed area outlined, ice packs applied, Hyaluronidase injected as ordered. Purewick in place d/t urinary incontinence. Continent of bowel, last BM Thursday 1/30, pt declined bowel regimen. Updated on POC, needs met.     Problem: CARDIOVASCULAR - ADULT  Goal: Maintains optimal cardiac output and hemodynamic stability  Description: INTERVENTIONS:  - Monitor vital signs, rhythm, and trends  - Monitor for bleeding, hypotension and signs of decreased cardiac output  - Evaluate effectiveness of vasoactive medications to optimize hemodynamic stability  - Monitor arterial and/or venous puncture sites for bleeding and/or hematoma  - Assess quality of pulses, skin color and temperature  - Assess for signs of decreased coronary artery perfusion - ex. Angina  - Evaluate fluid balance, assess for edema, trend weights  Outcome: Progressing  Goal: Absence of cardiac arrhythmias or at baseline  Description: INTERVENTIONS:  - Continuous cardiac monitoring, monitor vital signs, obtain 12 lead EKG if indicated  - Evaluate effectiveness of antiarrhythmic and heart rate control medications as ordered  - Initiate emergency measures for life threatening arrhythmias  - Monitor electrolytes and administer replacement therapy as ordered  Outcome: Progressing     Problem: SAFETY ADULT - FALL  Goal: Free from fall injury  Description: INTERVENTIONS:  - Assess pt frequently for physical needs  - Identify cognitive and physical deficits and behaviors  that affect risk of falls.  - [place] fall precautions as indicated by assessment.  - Educate pt/family on patient safety including physical limitations  - Instruct pt to call for assistance with activity based on assessment  - Modify environment to reduce risk of injury  - Provide assistive devices as appropriate  - Consider OT/PT consult to assist with strengthening/mobility  - Encourage toileting schedule  Outcome: Progressing     Problem: PAIN - ADULT  Goal: Verbalizes/displays adequate comfort level or patient's stated pain goal  Description: INTERVENTIONS:  - Encourage pt to monitor pain and request assistance  - Assess pain using appropriate pain scale  - Administer analgesics based on type and severity of pain and evaluate response  - Implement non-pharmacological measures as appropriate and evaluate response  - Consider cultural and social influences on pain and pain management  - Manage/alleviate anxiety  - Utilize distraction and/or relaxation techniques  - Monitor for opioid side effects  - Notify MD/LIP if interventions unsuccessful or patient reports new pain  - Anticipate increased pain with activity and pre-medicate as appropriate  Outcome: Progressing

## 2025-02-02 NOTE — PLAN OF CARE
Patient received to room 8600 from OR where she had a debridement of her left second toe. Her left foot is dressed with surgical dressing. A slight amount of blood is noted on the dressing. Dr. Higginbotham notified and lovenox is on hold. Will monitor. She is alert and oriented times four. Her lungs are clear on auscultation. She has no c/o pain. She is in afib on the [facility]

## 2025-02-02 NOTE — PROGRESS NOTES
[facility] Pharmacy Dosing Service  Warfarin (Coumadin) Subsequent Dosing    Arleth Weiss is a 69 year old patient for whom pharmacy is dosing warfarin (Coumadin). Goal INR is 2.5-3.5    Recent Labs   Lab 01/29/25  0447 01/30/25  0524 01/31/25  0407 02/01/25  0521 02/02/25  0459   INR 1.67* 1.87* 1.58* 1.60* 1.69*       Consulted by:  Dr long  Indication:  atrial fibrillation, h/o PE, TAVR   Potential Drug Interactions:   allopurinol, clopidogrel,levothyroxine   Other Anticoagulants:  enoxaparin stopped  Home regimen (if applicable):   warfarin 5 mg on Tuesdays and Thursdays and 2.5 mg all other days of the week     Inpatient Dosing History:    Date 1/31 2/1 2/2      INR 1.58 1.6 1.69      Coumadin dose 5 mg 5 mg                Based on above -  1.  For today, Give warfarin (COUMADIN) 5 mg at 2100 tonight; INR slowly rising despite higher than normal warfarin dosing; needs very close monitoring  2   PT/INR ordered daily while on warfarin  3.  Pharmacy will continue to follow.  We appreciate the opportunity to assist in the care of this patient.    Patito Good PharmD  2/2/2025  1:49 PM

## 2025-02-02 NOTE — PLAN OF CARE
Dr. Diaz notified that patient has increased bleeding through the left foot surgical dressing. When asked whether I it should just be reinforced, he said to change it (and not use kerlix or ace wrap.     Dressing was changed. I did leave some gauze on the toes as the gauze was stuck to the wound.

## 2025-02-02 NOTE — PROGRESS NOTES
CC: follow-up hospital admission chf, OM of foot     SUBJECTIVE:  Interval History:     Was having pain at IV site from potassium. It appears may have extravasated. [facility] will luisa antidote     OBJECTIVE:  Scheduled Meds:    magnesium sulfate  2 g Intravenous Once    metoprolol tartrate  25 mg Oral 2x Daily(Beta Blocker)    dilTIAZem ER  240 mg Oral Daily    furosemide  20 mg Intravenous Once    empagliflozin  10 mg Oral Daily    silver sulfADIAZINE   Topical Daily    torsemide  40 mg Oral Daily    vancomycin  15 mg/kg Intravenous Q24H    Nortriptyline HCl  75 mg Oral Nightly    zinc sulfate  220 mg Oral Nightly    [Held by provider] enoxaparin  1 mg/kg Subcutaneous 2 times per day    cholecalciferol  1,000 Units Oral Nightly    docusate sodium  100 mg Oral BID    sennosides  8.6 mg Oral BID    spironolactone  25 mg Oral Daily    allopurinol  100 mg Oral Nightly    buPROPion SR  150 mg Oral BID    clopidogrel  75 mg Oral Nightly    digoxin  125 mcg Oral Nightly    FLUoxetine  20 mg Oral BID    levothyroxine  175 mcg Oral QAM AC    rosuvastatin  5 mg Oral QOD     Continuous Infusions:   PRN Meds:   triamcinolone    calcium carbonate    albuterol    acetaminophen    PHYSICAL EXAM  Vital signs: Temp:  [97.5 °F (36.4 °C)-98.6 °F (37 °C)] 97.5 °F (36.4 °C)  Pulse:  [] 104  Resp:  [13-24] 22  BP: (117-138)/(72-83) 117/79  SpO2:  [95 %-100 %] 97 %      GENERAL - NAD, AAO  EYES- sclera anicteric,    HENT- normocephalic,    NECK - supple  CV- RRR  RESP - CTAB, normal resp effort  ABDOMEN- soft, NT/ND   EXT- R arm with mild swelling / redness at site of infiltrate       Data Review:   Labs:   Recent Labs   Lab 01/29/25  0447 01/30/25  0524 01/31/25  0407 02/01/25  0500 02/01/25  0521 02/02/25  0459   WBC 6.1 5.5 6.9 6.6  --  7.5   HGB 10.5* 10.5* 11.5* 11.7*  --  11.7*   MCV 96.0 97.6 96.4 93.6  --  92.2   .0 354.0 387.0 388.0  --  427.0   INR 1.67* 1.87* 1.58*  --  1.60* 1.69*       Recent Labs   Lab  01/29/25  0447 01/30/25  0524 01/31/25  0407 02/01/25  0500 02/02/25  0459    142 139 136 137   K 3.2*  3.2* 3.4*  3.4* 3.2*  3.2* 3.2*  3.2* 3.8    105 100 96* 97*   CO2 31.0 28.0 30.0 30.0 29.0   BUN 9 10 8* 11 17   CREATSERUM 0.66 0.59 0.68 0.71 0.77   CA 8.4* 8.9 9.2 9.3 9.1   MG 1.6 1.7 1.8 1.8 1.8   GLU 74 83 70 74 120*       Recent Labs   Lab 01/28/25  0058   ALT 20   AST 35*   ALB 3.6       No results for input(s): \"PGLU\" in the last 168 hours.        ASSESSMENT/PLAN:    69 year old female with PMH sig for HFpEF, Afib on coumadin, CAD, PAD, COPD, SHELDON who presents for evaluation after a fall and multiple medical complaints.      # Lower extremity cellulitis/suspected osteomyelitis  -MRI with OM of 2nd toe  -Continue IV vancomycin per ID  -Wound care  -sp angiogram - no intervention, has flow to foot  -sp left 2nd toe arthroplasty  02/01     # Right hand laceration   -Continue local wound care     # acute on chronic HFpEF  -Apparently her diuretic was not given when she was arrived at the nursing facility.  Sp IV lasix. Now on home torsemide  -Strict I's/O, daily weights, fluid restriction  -Monitor volume status.  Trend renal function  Appears euvolmic now.     #paroxysmal A-fib on Coumadin  -Continue Toprol and Coumadin  -Daily PT/INR - subtherapeutic, was bridged with lovenox but was having bleeding through her dressing in foot. Dw cards, can hold off lovenox, INR trending up    #CAD  #PAD  -severe aortic stenosis sp TAVR  -Continue Plavix, statin     #COPD  -Continue bronchodilators     #SHELDON  -CPAP as nightly and as needed      #depression  -Continue bupropion and fluoxetine        CODE STATUS: Full code  DVT prophylaxis: Coumadin    Will continue to follow while hospitalized. Please page me or the on-call hospitalist with questions or concerns.    Cody Floyd Hospitalist  [phone]  Answering Service: [phone]

## 2025-02-02 NOTE — PLAN OF CARE
Assumed patient care approximately 1930. Patient is alert and oriented X4, fatigue and sleepy. SpO2 maintained on room air with saturation maintaining greater than 89%.. A-fib on tele, heart rate controlled. Continent bowel, incontinent of bladder, purewick and brief in place. Dressings to wounds intact, declined to change dressing . Pain managed with PRN medications. Patient able to roll side to side while in bed. Education provided on call light, medication, and plan of care, patient verbalize understanding.      Plan of care: Tele, SpO2, Replace K, Monitor labs, wound care,       Problem: Patient/Family Goals  Goal: Patient/Family Long Term Goal  Description: Patient's Long Term Goal: to go home    Interventions:  - consults to see  - medication adjustment as needed  - get stronger  - See additional Care Plan goals for specific interventions  Outcome: Progressing  Goal: Patient/Family Short Term Goal  Description: Patient's Short Term Goal: address foot issue    Interventions:   - ID consult  - MRI  - IV abt  - See additional Care Plan goals for specific interventions  Outcome: Progressing     Problem: PAIN - ADULT  Goal: Verbalizes/displays adequate comfort level or patient's stated pain goal  Description: INTERVENTIONS:  - Encourage pt to monitor pain and request assistance  - Assess pain using appropriate pain scale  - Administer analgesics based on type and severity of pain and evaluate response  - Implement non-pharmacological measures as appropriate and evaluate response  - Consider cultural and social influences on pain and pain management  - Manage/alleviate anxiety  - Utilize distraction and/or relaxation techniques  - Monitor for opioid side effects  - Notify MD/LIP if interventions unsuccessful or patient reports new pain  - Anticipate increased pain with activity and pre-medicate as appropriate  Outcome: Progressing     Problem: RISK FOR INFECTION - ADULT  Goal: Absence of fever/infection during  anticipated neutropenic period  Description: INTERVENTIONS  - Monitor WBC  - Administer growth factors as ordered  - Implement neutropenic guidelines  Outcome: Progressing     Problem: SAFETY ADULT - FALL  Goal: Free from fall injury  Description: INTERVENTIONS:  - Assess pt frequently for physical needs  - Identify cognitive and physical deficits and behaviors that affect risk of falls.  - [place] fall precautions as indicated by assessment.  - Educate pt/family on patient safety including physical limitations  - Instruct pt to call for assistance with activity based on assessment  - Modify environment to reduce risk of injury  - Provide assistive devices as appropriate  - Consider OT/PT consult to assist with strengthening/mobility  - Encourage toileting schedule  Outcome: Progressing     Problem: DISCHARGE PLANNING  Goal: Discharge to home or other facility with appropriate resources  Description: INTERVENTIONS:  - Identify barriers to discharge w/pt and caregiver  - Include patient/family/discharge partner in discharge planning  - Arrange for needed discharge resources and transportation as appropriate  - Identify discharge learning needs (meds, wound care, etc)  - Arrange for interpreters to assist at discharge as needed  - Consider post-discharge preferences of patient/family/discharge partner  - Complete POLST form as appropriate  - Assess patient's ability to be responsible for managing their own health  - Refer to Case Management Department for coordinating discharge planning if the patient needs post-hospital services based on physician/LIP order or complex needs related to functional status, cognitive ability or social support system  Outcome: Progressing     Problem: Altered Communication/Language Barrier  Goal: Patient/Family is able to understand and participate in their care  Description: Interventions:  - Assess communication ability and preferred communication style  - Implement communication  aides and strategies  - Use visual cues when possible  - Listen attentively, be patient, do not interrupt  - Minimize distractions  - Allow time for understanding and response  - Establish method for patient to ask for assistance (call light)  - Provide an  as needed  - Communicate barriers and strategies to overcome with those who interact with patient  Outcome: Progressing     Problem: CARDIOVASCULAR - ADULT  Goal: Maintains optimal cardiac output and hemodynamic stability  Description: INTERVENTIONS:  - Monitor vital signs, rhythm, and trends  - Monitor for bleeding, hypotension and signs of decreased cardiac output  - Evaluate effectiveness of vasoactive medications to optimize hemodynamic stability  - Monitor arterial and/or venous puncture sites for bleeding and/or hematoma  - Assess quality of pulses, skin color and temperature  - Assess for signs of decreased coronary artery perfusion - ex. Angina  - Evaluate fluid balance, assess for edema, trend weights  Outcome: Progressing  Goal: Absence of cardiac arrhythmias or at baseline  Description: INTERVENTIONS:  - Continuous cardiac monitoring, monitor vital signs, obtain 12 lead EKG if indicated  - Evaluate effectiveness of antiarrhythmic and heart rate control medications as ordered  - Initiate emergency measures for life threatening arrhythmias  - Monitor electrolytes and administer replacement therapy as ordered  Outcome: Progressing

## 2025-02-03 LAB
ANION GAP SERPL CALC-SCNC: 10 MMOL/L (ref 0–18)
BASOPHILS # BLD AUTO: 0.06 X10(3) UL (ref 0–0.2)
BASOPHILS NFR BLD AUTO: 0.9 %
BUN BLD-MCNC: 20 MG/DL (ref 9–23)
CALCIUM BLD-MCNC: 9.2 MG/DL (ref 8.7–10.6)
CHLORIDE SERPL-SCNC: 96 MMOL/L (ref 98–112)
CO2 SERPL-SCNC: 33 MMOL/L (ref 21–32)
CREAT BLD-MCNC: 0.75 MG/DL
EGFRCR SERPLBLD CKD-EPI 2021: 85 ML/MIN/1.73M2 (ref 60–?)
EOSINOPHIL # BLD AUTO: 0.38 X10(3) UL (ref 0–0.7)
EOSINOPHIL NFR BLD AUTO: 5.4 %
ERYTHROCYTE [DISTWIDTH] IN BLOOD BY AUTOMATED COUNT: 13.8 %
GLUCOSE BLD-MCNC: 99 MG/DL (ref 70–99)
HCT VFR BLD AUTO: 34.8 %
HGB BLD-MCNC: 11.6 G/DL
IMM GRANULOCYTES # BLD AUTO: 0.03 X10(3) UL (ref 0–1)
IMM GRANULOCYTES NFR BLD: 0.4 %
INR BLD: 2.02 (ref 0.8–1.2)
LYMPHOCYTES # BLD AUTO: 2.21 X10(3) UL (ref 1–4)
LYMPHOCYTES NFR BLD AUTO: 31.6 %
MAGNESIUM SERPL-MCNC: 2.1 MG/DL (ref 1.6–2.6)
MCH RBC QN AUTO: 31.7 PG (ref 26–34)
MCHC RBC AUTO-ENTMCNC: 33.3 G/DL (ref 31–37)
MCV RBC AUTO: 95.1 FL
MONOCYTES # BLD AUTO: 0.81 X10(3) UL (ref 0.1–1)
MONOCYTES NFR BLD AUTO: 11.6 %
NEUTROPHILS # BLD AUTO: 3.51 X10 (3) UL (ref 1.5–7.7)
NEUTROPHILS # BLD AUTO: 3.51 X10(3) UL (ref 1.5–7.7)
NEUTROPHILS NFR BLD AUTO: 50.1 %
NT-PROBNP SERPL-MCNC: 1183 PG/ML (ref ?–125)
OSMOLALITY SERPL CALC.SUM OF ELEC: 291 MOSM/KG (ref 275–295)
PLATELET # BLD AUTO: 390 10(3)UL (ref 150–450)
POTASSIUM SERPL-SCNC: 2.9 MMOL/L (ref 3.5–5.1)
POTASSIUM SERPL-SCNC: 2.9 MMOL/L (ref 3.5–5.1)
POTASSIUM SERPL-SCNC: 3.7 MMOL/L (ref 3.5–5.1)
PROTHROMBIN TIME: 23.1 SECONDS (ref 11.6–14.8)
RBC # BLD AUTO: 3.66 X10(6)UL
SODIUM SERPL-SCNC: 139 MMOL/L (ref 136–145)
VANCOMYCIN TROUGH SERPL-MCNC: 22.9 UG/ML (ref 10–20)
WBC # BLD AUTO: 7 X10(3) UL (ref 4–11)

## 2025-02-03 PROCEDURE — 83880 ASSAY OF NATRIURETIC PEPTIDE: CPT | Performed by: INTERNAL MEDICINE

## 2025-02-03 PROCEDURE — 85610 PROTHROMBIN TIME: CPT | Performed by: PODIATRIST

## 2025-02-03 PROCEDURE — 80202 ASSAY OF VANCOMYCIN: CPT | Performed by: HOSPITALIST

## 2025-02-03 PROCEDURE — 80048 BASIC METABOLIC PNL TOTAL CA: CPT | Performed by: PODIATRIST

## 2025-02-03 PROCEDURE — 84132 ASSAY OF SERUM POTASSIUM: CPT | Performed by: HOSPITALIST

## 2025-02-03 PROCEDURE — 85025 COMPLETE CBC W/AUTO DIFF WBC: CPT | Performed by: PODIATRIST

## 2025-02-03 PROCEDURE — 83735 ASSAY OF MAGNESIUM: CPT | Performed by: PODIATRIST

## 2025-02-03 RX ORDER — TORSEMIDE 20 MG/1
20 TABLET ORAL DAILY
Status: DISCONTINUED | OUTPATIENT
Start: 2025-02-03 | End: 2025-02-04

## 2025-02-03 RX ORDER — POTASSIUM CHLORIDE 14.9 MG/ML
20 INJECTION INTRAVENOUS ONCE
Status: COMPLETED | OUTPATIENT
Start: 2025-02-03 | End: 2025-02-03

## 2025-02-03 RX ORDER — WARFARIN SODIUM 2 MG/1
4 TABLET ORAL
Status: COMPLETED | OUTPATIENT
Start: 2025-02-03 | End: 2025-02-03

## 2025-02-03 NOTE — PROGRESS NOTES
Pharmacy dosing service    Follow-up Pharmacokinetic Consult for Vancomycin Dosing     Arleth Weiss is a 69 year old female who is being treated for osteomyelitis.  Patient is on day 7 of Vancomycin 1500 mg IV Q 24 hours.  Goal trough is 10-15 ug/mL. -600  Lab Results   Component Value Date/Time    VANCT 22.9 (H) 02/03/2025 10:54 AM     She is allergic to adhesive tape, codeine, doxycycline, hydrocodone, lisinopril, morphine sulfate, opioid analgesics, oxycontin, oxytocin, vicodin [hydrocodone-acetaminophen], and skin adhesives.    Other current Anti-infective Medications: none    Actual weight:  76 kg     Vitals: /77 (BP Location: Right arm)   Pulse 69   Temp 98.9 °F (37.2 °C) (Oral)   Resp 17   Ht 1.753 m (5' 9\")   Wt 76 kg (167 lb 8.8 oz)   LMP  (LMP Unknown)   SpO2 100%   BMI 24.74 kg/m²     Labs:   Lab Results   Component Value Date    CREATSERUM 0.75 02/03/2025    WBC 7.0 02/03/2025      CrCl:  Estimated Creatinine Clearance: 71.9 mL/min (based on SCr of 0.75 mg/dL).    Intake/Output Summary (Last 24 hours) at 2/3/2025 1231  Last data filed at 2/3/2025 0900  Gross per 24 hour   Intake 1380 ml   Output 1475 ml   Net -95 ml             Date/Time last dose was given: vancomycin 1500 mg was given 2/2 @ 1211    Based on the above:    1.  Hold  Vancomycin for now(based on Trough of 22.9 ug/mL and Estimated Creatinine Clearance: 71.9 mL/min (based on SCr of 0.75 mg/dL).    2.  Pharmacy will re-check Vancomycin random level with morning labs tomorrow  Goal trough level 10-15 ug/mL. -600    3.  Pharmacy will need BUN/Scr daily while on Vancomycin to assess renal function.    4.  Pharmacy will follow and monitor renal function changes, toxicity and efficacy.    Pharmacy will continue to follow her.  We appreciate the opportunity to assist in her care.    Patito Good PharmD  2/3/2025  12:31 PM  Pharmacy Extension: [phone]

## 2025-02-03 NOTE — PROGRESS NOTES
[facility] Pharmacy Dosing Service  Warfarin (Coumadin) Subsequent Dosing    Arleth Weiss is a 69 year old patient for whom pharmacy is dosing warfarin (Coumadin). Goal INR is 2.5-3.5    Recent Labs   Lab 01/30/25  0524 01/31/25  0407 02/01/25  0521 02/02/25  0459 02/03/25  0511   INR 1.87* 1.58* 1.60* 1.69* 2.02*       Consulted by:  Dr Patel  Indication:  atrial fibrillation, h/o PE, TAVR   Potential Drug Interactions:  allopurinol, clopidogrel,levothyroxine   Other Anticoagulants:  enoxaparin stopped  Home regimen (if applicable):  warfarin 5 mg on Tuesdays and Thursdays and 2.5 mg all other days of the week     Inpatient Dosing History:    Date 1/31 2/1 2/2 2/3        INR 1.58 1.6 1.69  2.02       Coumadin dose 5 mg 5 mg  5 mg                  Based on above -  1.  For today, Give warfarin (COUMADIN) 4 mg at 2100 tonight  2   PT/INR ordered daily while on warfarin  3.  Pharmacy will continue to follow.  We appreciate the opportunity to assist in the care of this patient.    Patito Good PharmD  2/3/2025  10:13 AM

## 2025-02-03 NOTE — PLAN OF CARE
Received patient at 0730. Patient alert and oriented x4. Tele Rhythm A-Fib. O2 sats on RA. Lungs clear. Bed is locked and low position. Call light & personal belongings within reach.  Patient voiding per purwick.Reviewed plan of care with patient and verbalized understanding.     POC:  Torsemide decreased to 20mg  PT rec NAIF  Potassium replaced     Problem: Patient/Family Goals  Goal: Patient/Family Long Term Goal  Description: Patient's Long Term Goal: to go home    Interventions:  - consults to see  - medication adjustment as needed  - get stronger  - See additional Care Plan goals for specific interventions  Outcome: Progressing  Goal: Patient/Family Short Term Goal  Description: Patient's Short Term Goal: address foot issue    Interventions:   - ID consult  - MRI  - IV abt  - See additional Care Plan goals for specific interventions  Outcome: Progressing

## 2025-02-03 NOTE — PLAN OF CARE
Assumed patient care approximately 1930. Patient is alert and oriented X4. SpO2 maintained on room air with saturation maintaining greater than 89%.. A-fib on tele, heart rate controlled. Continent bowel, incontinent of bladder, purewick and brief in place, patient declined bowel regiment medication, has decreased appetite, had only one bite of dinner. Dressings to wounds intact, declined to change dressing at this time, blister to left great toe bleed, site cleaned and new dressing applied, Zinc cream applied to brie and sacrum area. Pain managed with PRN medications. Patient able to roll side to side while in bed. Education provided on call light, medication, and plan of care, patient verbalize understanding.      Plan of care: Tele, SpO2, Monitor labs, wound care,     0639: Critical lab value potassium 2.9, MD notified,Instructed to follow electrolyte protocol, patient educated on importance of PO potassium and disadvantages of IV potassium replacement especially after previus IV potassium extravasation, Patient politely stated that she understand but will only accept IV potassium for replacement.      Problem: Patient/Family Goals  Goal: Patient/Family Long Term Goal  Description: Patient's Long Term Goal: to go home    Interventions:  - consults to see  - medication adjustment as needed  - get stronger  - See additional Care Plan goals for specific interventions  Outcome: Progressing  Goal: Patient/Family Short Term Goal  Description: Patient's Short Term Goal: address foot issue    Interventions:   - ID consult  - MRI  - IV abt  - See additional Care Plan goals for specific interventions  Outcome: Progressing     Problem: PAIN - ADULT  Goal: Verbalizes/displays adequate comfort level or patient's stated pain goal  Description: INTERVENTIONS:  - Encourage pt to monitor pain and request assistance  - Assess pain using appropriate pain scale  - Administer analgesics based on type and severity of pain and evaluate  response  - Implement non-pharmacological measures as appropriate and evaluate response  - Consider cultural and social influences on pain and pain management  - Manage/alleviate anxiety  - Utilize distraction and/or relaxation techniques  - Monitor for opioid side effects  - Notify MD/LIP if interventions unsuccessful or patient reports new pain  - Anticipate increased pain with activity and pre-medicate as appropriate  Outcome: Progressing     Problem: RISK FOR INFECTION - ADULT  Goal: Absence of fever/infection during anticipated neutropenic period  Description: INTERVENTIONS  - Monitor WBC  - Administer growth factors as ordered  - Implement neutropenic guidelines  Outcome: Progressing     Problem: SAFETY ADULT - FALL  Goal: Free from fall injury  Description: INTERVENTIONS:  - Assess pt frequently for physical needs  - Identify cognitive and physical deficits and behaviors that affect risk of falls.  - [place] fall precautions as indicated by assessment.  - Educate pt/family on patient safety including physical limitations  - Instruct pt to call for assistance with activity based on assessment  - Modify environment to reduce risk of injury  - Provide assistive devices as appropriate  - Consider OT/PT consult to assist with strengthening/mobility  - Encourage toileting schedule  Outcome: Progressing     Problem: DISCHARGE PLANNING  Goal: Discharge to home or other facility with appropriate resources  Description: INTERVENTIONS:  - Identify barriers to discharge w/pt and caregiver  - Include patient/family/discharge partner in discharge planning  - Arrange for needed discharge resources and transportation as appropriate  - Identify discharge learning needs (meds, wound care, etc)  - Arrange for interpreters to assist at discharge as needed  - Consider post-discharge preferences of patient/family/discharge partner  - Complete POLST form as appropriate  - Assess patient's ability to be responsible for managing  their own health  - Refer to Case Management Department for coordinating discharge planning if the patient needs post-hospital services based on physician/LIP order or complex needs related to functional status, cognitive ability or social support system  Outcome: Progressing     Problem: Altered Communication/Language Barrier  Goal: Patient/Family is able to understand and participate in their care  Description: Interventions:  - Assess communication ability and preferred communication style  - Implement communication aides and strategies  - Use visual cues when possible  - Listen attentively, be patient, do not interrupt  - Minimize distractions  - Allow time for understanding and response  - Establish method for patient to ask for assistance (call light)  - Provide an  as needed  - Communicate barriers and strategies to overcome with those who interact with patient  Outcome: Progressing     Problem: CARDIOVASCULAR - ADULT  Goal: Maintains optimal cardiac output and hemodynamic stability  Description: INTERVENTIONS:  - Monitor vital signs, rhythm, and trends  - Monitor for bleeding, hypotension and signs of decreased cardiac output  - Evaluate effectiveness of vasoactive medications to optimize hemodynamic stability  - Monitor arterial and/or venous puncture sites for bleeding and/or hematoma  - Assess quality of pulses, skin color and temperature  - Assess for signs of decreased coronary artery perfusion - ex. Angina  - Evaluate fluid balance, assess for edema, trend weights  Outcome: Progressing  Goal: Absence of cardiac arrhythmias or at baseline  Description: INTERVENTIONS:  - Continuous cardiac monitoring, monitor vital signs, obtain 12 lead EKG if indicated  - Evaluate effectiveness of antiarrhythmic and heart rate control medications as ordered  - Initiate emergency measures for life threatening arrhythmias  - Monitor electrolytes and administer replacement therapy as ordered  Outcome:  Progressing

## 2025-02-03 NOTE — PHYSICAL THERAPY NOTE
PT orders received with updated WB status post operatively. Pt needs post op shoes. Rehab tech to obtain post ops shoes. Will follow and re-attempt as able and appropriate.

## 2025-02-03 NOTE — PROGRESS NOTES
[facility] Cardiology Progress Note        Arleth Weiss Patient Status:  Inpatient    1955 MRN [id number]   Formerly [facility] 8NE-A Attending Cody Patel DO   Hosp Day # 6 PCP Viktoriya Garcias DO     Subjective:  The patient denies  chest pain   Breathing is comfortable       Medications:   potassium chloride  40 mEq Intravenous Once    Followed by    potassium chloride  20 mEq Intravenous Once    metoprolol tartrate  25 mg Oral 2x Daily(Beta Blocker)    dilTIAZem ER  240 mg Oral Daily    empagliflozin  10 mg Oral Daily    silver sulfADIAZINE   Topical Daily    torsemide  40 mg Oral Daily    vancomycin  15 mg/kg Intravenous Q24H    Nortriptyline HCl  75 mg Oral Nightly    zinc sulfate  220 mg Oral Nightly    cholecalciferol  1,000 Units Oral Nightly    docusate sodium  100 mg Oral BID    sennosides  8.6 mg Oral BID    spironolactone  25 mg Oral Daily    allopurinol  100 mg Oral Nightly    buPROPion SR  150 mg Oral BID    clopidogrel  75 mg Oral Nightly    digoxin  125 mcg Oral Nightly    FLUoxetine  20 mg Oral BID    levothyroxine  175 mcg Oral QAM AC    rosuvastatin  5 mg Oral QOD       Continuous Infusions:        Allergies:  Allergies[1]      Objective:        Intake/Output:      Intake/Output Summary (Last 24 hours) at 2/3/2025 0849  Last data filed at 2/3/2025 0550  Gross per 24 hour   Intake 1260 ml   Output 2175 ml   Net -915 ml     Wt Readings from Last 3 Encounters:   25 167 lb 8.8 oz (76 kg)   25 199 lb (90.3 kg)   24 198 lb 10.2 oz (90.1 kg)       Physical Exam:        Vitals:    25 2057 25 0000 25 0550 25 0630   BP: 95/57 103/70 106/72    BP Location: Right arm Right arm Right arm    Pulse: 80 76 77 77   Resp: 11 20 24 15   Temp: 97.5 °F (36.4 °C) 97.3 °F (36.3 °C) 97.5 °F (36.4 °C)    TempSrc: Oral Oral Oral    SpO2: 96% 98% 100% 93%   Weight:    167 lb 8.8 oz (76 kg)   Height:           Temp:  [97.3 °F (36.3 °C)-98.1 °F  (36.7 °C)] 97.5 °F (36.4 °C)  Pulse:  [76-81] 77  Resp:  [11-24] 15  BP: ()/(57-72) 106/72  SpO2:  [93 %-100 %] 93 %      Temp: 97.5 °F (36.4 °C)  Pulse: 77  Resp: 15  BP: 106/72  General:  Appears comfortable  HEENT: No focal deficits.  Neck: No JVD, carotids 2+ no bruits.  Cardiac: Irregular S1S2.  No S3, S4, rub, click.  No murmur.  Lungs: Clear to auscultation and percussion.  Abdomen: Soft, non-tender.   Extremities: mild  LE edema.  No clubbing or cyanosis.    Neurologic: Alert and oriented, normal affect.  Skin: Warm and dry.           LABS:      HEM:  Recent Labs   Lab 01/30/25 0524 01/31/25 0407 02/01/25  0500 02/02/25  0459 02/03/25  0511   WBC 5.5 6.9 6.6 7.5 7.0   HGB 10.5* 11.5* 11.7* 11.7* 11.6*   HCT 32.1* 35.2 34.9* 34.5* 34.8*   .0 387.0 388.0 427.0 390.0       Chem:  Recent Labs   Lab 01/30/25 0524 01/31/25 0407 02/01/25  0500 02/02/25  0459 02/03/25  0511    139 136 137 139   K 3.4*  3.4* 3.2*  3.2* 3.2*  3.2* 3.8 2.9*  2.9*    100 96* 97* 96*   CO2 28.0 30.0 30.0 29.0 33.0*   BUN 10 8* 11 17 20   CREATSERUM 0.59 0.68 0.71 0.77 0.75   CA 8.9 9.2 9.3 9.1 9.2   MG 1.7 1.8 1.8 1.8 2.1   GLU 83 70 74 120* 99       Recent Labs   Lab 01/28/25  0058   ALT 20   AST 35*   ALB 3.6       Recent Labs   Lab 01/30/25 0524 01/31/25  0407 02/01/25  0521 02/02/25  0459 02/03/25  0511   INR 1.87* 1.58* 1.60* 1.69* 2.02*           Lab Results   Component Value Date    TROP <0.045 07/28/2021         Invalid input(s): \"PBNPML\"                       Diagnostics:     Telemetry: Atrial Fibrillation  80's/min     Laboratories and Data:  Diagnostics:           CXR, 1/28/2025:    1. Findings most consistent with left lower lobe atelectasis and or scar without definite focal consolidation.  Continued clinical correlation recommended.      Echo, 1/29/25:      Conclusions:     1. Left ventricle: The cavity size was normal. Wall thickness was normal.      Systolic function was at the lower  limits of normal. The estimated      ejection fraction was 50-55%, by visual assessment. Unable to assess LV      diastolic function due to heart rhythm.   2. Left atrium: The left atrial volume was markedly increased.   3. Aortic valve: Elizondo MINOR S3 23mm (TAVR) bioprosthesis was present.      Transvalvular velocity was increased. There was no significant      regurgitation. The peak systolic velocity was 3.07m/sec. The mean      systolic gradient was 17mm Hg. The valve area (VTI) was 1.55cm^2. The      valve area (VTI) index was 0.76cm^2/m^2.   4. Mitral valve: The annulus was markedly calcified. The leaflets were      mildly calcified. There was trivial regurgitation. The mean diastolic      gradient was 3mm Hg.   5. Pulmonary arteries: Systolic pressure was moderately increased, in the      range of 45mm Hg to 50mm Hg.   Impressions:  This study is compared with previous dated 9/14/2024:   *     Echo, 11/2024     Conclusions:     1. Left ventricle: The cavity size was normal. Wall thickness was normal.      Systolic function was normal. The estimated ejection fraction was 50-55%,      by visual assessment. Wall motion is normal; there are no regional wall      motion abnormalities. Unable to assess LV diastolic function.   2. Right ventricle: Systolic function was normal.   3. Left atrium: The atrium was mildly dilated.   4. Aortic valve: Elizondo MINOR S3 23mm (TAVR) bioprosthesis was present,      well seated. No functional/doppler analysis performed.   5. Mitral valve: The annulus was markedly calcified. The leaflets were      mildly calcified. Minimal stenosis. The mean diastolic gradient was 3mm      Hg.   Impressions:  This study is compared with previous dated 9/14/2024: No   significant change.          Assessment and Plan:        Acute on chronic heart failure with preserved ejection fraction (HCC) hypokalemia with eGFR 91      Atrial fibrillation   HR 70-90s  Watch HR      Severe aortic stenosis  S/P TAVR (transcatheter aortic valve replacement) 2019     Coronary artery disease involving native coronary artery of native heart without angina pectoris  S/P coronary artery stent placement to LCx 3/15/21      Essential hypertension   Mixed hyperlipidemia      Chronic obstructive pulmonary disease, unspecified COPD type (HCC)     CKD stage III  Follows with renal   improved     SHELDON on CPAP       PVD with acute limb ischemia and was treated with angiojet and POBA and aborted left pop cutdown 10/23   Cath Findings: Widely patent common femoral, SFA, profunda, popliteal with brisk runoff to the foot via the AT.  The digital branches are patent   Debridement 2-1  MRI confirms OM with podiatry following  - s/p OR this a.m. with L 2nd toe arthroplasty and debridement, all infected material removed  - IV vancomycin ongoing post-op        Plan:      Repeat BMP  tomorrow.   Resume torsemide at 20 mg daily  Replace K+  Weigh daily      James Robertson MD         [1]   Allergies  Allergen Reactions    Adhesive Tape HIVES     ALL ADHESIVES    Codeine HIVES    Doxycycline SWELLING    Hydrocodone UNKNOWN    Lisinopril TONGUE SWELLING    Morphine Sulfate HIVES    Opioid Analgesics UNKNOWN    Oxycontin ITCHING    Oxytocin HIVES    Vicodin [Hydrocodone-Acetaminophen] ITCHING    Skin Adhesives OTHER (SEE COMMENTS)

## 2025-02-03 NOTE — PROGRESS NOTES
[facility]   part of [facility] Infectious Disease Progress Note    Arleth Weiss Patient Status:  Inpatient    1955 MRN [id number]   Location [facility] 8NE-A Attending Cody Patel DO   Hosp Day # 6 PCP Viktoriya Garcias DO     Subjective:  Chart reviewed, no acute events.  Pt seen bedside. She reports she is doing ok.   She is concerned that her L toe continues to bleed with dressing changes and currently has blood soaked bandage on it.   She is not sure if she is going home or to a [facility].      Objective:    Allergies:  Allergies[1]    Medications:    Current Facility-Administered Medications:     [COMPLETED] potassium chloride 40 mEq in 250mL sodium chloride 0.9% IVPB premix, 40 mEq, Intravenous, Once **FOLLOWED BY** potassium chloride 20 mEq/100mL IVPB premix 20 mEq, 20 mEq, Intravenous, Once    warfarin (Coumadin) tab 4 mg, 4 mg, Oral, Once at night    torsemide (Demadex) tab 20 mg, 20 mg, Oral, Daily    metoprolol tartrate (Lopressor) tab 25 mg, 25 mg, Oral, 2x Daily(Beta Blocker)    dilTIAZem ER (CardIZEM CD) 24 hr cap 240 mg, 240 mg, Oral, Daily    empagliflozin (Jardiance) tab 10 mg, 10 mg, Oral, Daily    silver sulfADIAZINE (Silvadene) 1 % cream, , Topical, Daily    [Held by provider] vancomycin (Vancocin) 1.5 g in sodium chloride 0.9% 250mL IVPB premix, 15 mg/kg, Intravenous, Q24H    nortriptyline (Pamelor) cap 75 mg, 75 mg, Oral, Nightly    triamcinolone (Kenalog) 0.1 % ointment 1 Application, 1 Application, Topical, Daily PRN    calcium carbonate (Tums) chewable tab 500 mg, 500 mg, Oral, Q8H PRN    zinc sulfate (Zincate) cap 220 mg, 220 mg, Oral, Nightly    cholecalciferol (Vitamin D3) tab 1,000 Units, 1,000 Units, Oral, Nightly    docusate sodium (Colace) cap 100 mg, 100 mg, Oral, BID    sennosides (Senokot) tab 8.6 mg, 8.6 mg, Oral, BID    spironolactone (Aldactone) tab 25 mg, 25 mg, Oral, Daily    albuterol (Ventolin HFA) 108 (90 Base) MCG/ACT inhaler 2 puff, 2 puff,  Inhalation, Q6H PRN    allopurinol (Zyloprim) tab 100 mg, 100 mg, Oral, Nightly    buPROPion SR (WELLBUTRIN SR) 12 hr tab 150 mg, 150 mg, Oral, BID    clopidogrel (Plavix) tab 75 mg, 75 mg, Oral, Nightly    digoxin (Lanoxin) tab 125 mcg, 125 mcg, Oral, Nightly    FLUoxetine (PROzac) cap 20 mg, 20 mg, Oral, BID    levothyroxine (Synthroid) tab 175 mcg, 175 mcg, Oral, QAM AC    rosuvastatin (Crestor) tab 5 mg, 5 mg, Oral, QOD    acetaminophen (Tylenol Extra Strength) tab 500-1,000 mg, 500-1,000 mg, Oral, BID PRN    Physical Exam:  General: Alert, orientated x3.  Cooperative.  No apparent distress.  Vital Signs:  Blood pressure 124/77, pulse 69, temperature 98.9 °F (37.2 °C), temperature source Oral, resp. rate 17, height 5' 9\" (1.753 m), weight 167 lb 8.8 oz (76 kg), SpO2 100%, not currently breastfeeding.   Temp (24hrs), Av.9 °F (36.6 °C), Min:97.3 °F (36.3 °C), Max:98.9 °F (37.2 °C)      HEENT: Exam is unremarkable.  Without scleral icterus.  Mucous membranes are moist. PERRLA.  Oropharynx is clear.  Lungs: Clear to auscultation bilaterally.  Cardiac: Regular rate   Abdomen:  Soft, non-distended, non-tender, with no rebound or guarding.    Extremities:  L foot wrapped,  blood soaked medial gauze area with blood pooling noted at distal toe area medially    Skin: Normal texture and turgor.  Neurologic: Cranial nerves are grossly intact.      Labs:  Lab Results   Component Value Date    WBC 7.0 2025    HGB 11.6 2025    HCT 34.8 2025    .0 2025    CREATSERUM 0.75 2025    BUN 20 2025     2025    K 2.9 2025    K 2.9 2025    CL 96 2025    CO2 33.0 2025    GLU 99 2025    CA 9.2 2025    INR 2.02 2025    MG 2.1 2025                 Radiology:  MRI 25:  CONCLUSION:    1. There is osteomyelitis of the middle phalange of 2nd toe and distal aspect of the proximal phalange of 2nd toe centered around the proximal to  phalangeal joint.   2. 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     Assessment/Plan:    1.  Complicated L foot OM  -OP cultures with MRSA on 1/17/25  -MRI with OM of 2nd toe  -s/p OR on 2/1/25 for L 2nd toe arthroplasty and excisional wound debridement to level of subcutaneous tissue  -per discussion with podiatry, all infected bone and tissue removed in OR  -on IV Vancomycin day# 7    2. Hx of severe PAD  -vascular following  -s/p angiogram on 1/31/25, note reviewed and no intervention performed,  found to have widely patent common femoral, SFA, profunda, popliteal with brisk runoff via AT    3. Hx of MRSA sepsis in Sept 2024  -with hx of TAVR  -LOYD without vegetations  -s/p long course of IV Vancomycin  -repeat blood cultures NG    DISPO:  Continue IV Vancomycin while here.  Anticipate DC on PO Bactrim DS BID for ~ 10 days when ready for DC.  Will continue to follow along.  D/w patient bedside.  D/w RN    If you have any questions or concerns please call [facility] at [phone].     IRIS Chaudhary  2/3/2025  12:31 PM         [1]   Allergies  Allergen Reactions    Adhesive Tape HIVES     ALL ADHESIVES    Codeine HIVES    Doxycycline SWELLING    Hydrocodone UNKNOWN    Lisinopril TONGUE SWELLING    Morphine Sulfate HIVES    Opioid Analgesics UNKNOWN    Oxycontin ITCHING    Oxytocin HIVES    Vicodin [Hydrocodone-Acetaminophen] ITCHING    Skin Adhesives OTHER (SEE COMMENTS)

## 2025-02-03 NOTE — PROGRESS NOTES
CC: follow-up hospital admission chf, OM of foot     SUBJECTIVE:  Interval History:     No f/c. Pain in feet. No f/c. Taking PO a little.     OBJECTIVE:  Scheduled Meds:    potassium chloride  20 mEq Intravenous Once    warfarin  4 mg Oral Once at night    torsemide  20 mg Oral Daily    metoprolol tartrate  25 mg Oral 2x Daily(Beta Blocker)    dilTIAZem ER  240 mg Oral Daily    empagliflozin  10 mg Oral Daily    silver sulfADIAZINE   Topical Daily    [Held by provider] vancomycin  15 mg/kg Intravenous Q24H    Nortriptyline HCl  75 mg Oral Nightly    zinc sulfate  220 mg Oral Nightly    cholecalciferol  1,000 Units Oral Nightly    docusate sodium  100 mg Oral BID    sennosides  8.6 mg Oral BID    spironolactone  25 mg Oral Daily    allopurinol  100 mg Oral Nightly    buPROPion SR  150 mg Oral BID    clopidogrel  75 mg Oral Nightly    digoxin  125 mcg Oral Nightly    FLUoxetine  20 mg Oral BID    levothyroxine  175 mcg Oral QAM AC    rosuvastatin  5 mg Oral QOD     Continuous Infusions:   PRN Meds:   triamcinolone    calcium carbonate    albuterol    acetaminophen    PHYSICAL EXAM  Vital signs: Temp:  [97.3 °F (36.3 °C)-98.9 °F (37.2 °C)] 97.9 °F (36.6 °C)  Pulse:  [69-80] 75  Resp:  [11-24] 14  BP: ()/(57-77) 111/72  SpO2:  [93 %-100 %] 99 %      GENERAL - NAD, AAO  EYES- sclera anicteric,    HENT- normocephalic,    NECK - supple  CV- RRR  RESP - CTAB, normal resp effort  ABDOMEN- soft, NT/ND   EXT- R arm with mild swelling / redness at site of infiltrate       Data Review:   Labs:   Recent Labs   Lab 01/30/25  0524 01/31/25  0407 02/01/25  0500 02/01/25  0521 02/02/25  0459 02/03/25  0511   WBC 5.5 6.9 6.6  --  7.5 7.0   HGB 10.5* 11.5* 11.7*  --  11.7* 11.6*   MCV 97.6 96.4 93.6  --  92.2 95.1   .0 387.0 388.0  --  427.0 390.0   INR 1.87* 1.58*  --  1.60* 1.69* 2.02*       Recent Labs   Lab 01/30/25  0524 01/31/25  0407 02/01/25  0500 02/02/25  0459 02/03/25  0511    139 136 137 139   K 3.4*   3.4* 3.2*  3.2* 3.2*  3.2* 3.8 2.9*  2.9*    100 96* 97* 96*   CO2 28.0 30.0 30.0 29.0 33.0*   BUN 10 8* 11 17 20   CREATSERUM 0.59 0.68 0.71 0.77 0.75   CA 8.9 9.2 9.3 9.1 9.2   MG 1.7 1.8 1.8 1.8 2.1   GLU 83 70 74 120* 99       Recent Labs   Lab 01/28/25  0058   ALT 20   AST 35*   ALB 3.6       No results for input(s): \"PGLU\" in the last 168 hours.        ASSESSMENT/PLAN:    69 year old female with PMH sig for HFpEF, Afib on coumadin, CAD, PAD, COPD, SHELDON who presents for evaluation after a fall and multiple medical complaints.      # Lower extremity cellulitis/suspected osteomyelitis  -MRI with OM of 2nd toe  -Continue IV vancomycin per ID  -Wound care  -sp angiogram - no intervention, has flow to foot  -sp left 2nd toe arthroplasty  02/01  -Anticipate DC on PO Bactrim DS BID for ~ 10 days when ready for DC      # Right hand laceration   -Continue local wound care     # acute on chronic HFpEF  -Apparently her diuretic was not given when she was arrived at the nursing facility.  Sp IV lasix. Now on home torsemide  -Strict I's/O, daily weights, fluid restriction  -Monitor volume status.  Trend renal function  Appears euvolmic now.     #paroxysmal A-fib on Coumadin  -Continue Toprol and Coumadin  -Daily PT/INR - subtherapeutic, was bridged with lovenox but was having bleeding through her dressing in foot. Dw cards, can hold off lovenox, INR trending up    #CAD  #PAD  -severe aortic stenosis sp TAVR  -Continue Plavix, statin     #COPD  -Continue bronchodilators     #SHELDON  -CPAP as nightly and as needed      #depression  -Continue bupropion and fluoxetine        CODE STATUS: Full code  DVT prophylaxis: Coumadin    Will continue to follow while hospitalized. Please page me or the on-call hospitalist with questions or concerns. Reviewed chart including previous progress notes    Omi Blackman MD  [facility] Hospitalist  [phone]  2/3/2025  6:59 PM

## 2025-02-03 NOTE — CM/SW NOTE
Received per protocol SENDY mann. Pt has been assessed and discharge planning is in progress. Order acknowledged. SENDY also uploaded PASRR assessment in [facility].     Viki PATIÑO MSW, LSW  Discharge Planner

## 2025-02-03 NOTE — CM/SW NOTE
SW met with pt at bedside to check in. Pt in good spirits today. States that her procedure went well on Saturday. SW discussed that it is anticipated that pt won't need IV abx at discharge. Pt is hopeful to return home at [place]. Pt wants to continue w/ [facility]. SW explained that [facility] would not be able to accept pt for NAIF and that pt has two options of either [facility] or [facility]. Pt not interested in either options. Informed pt that she has about 66 Medicare days remaining for NAIF. SW and pt discussed about preserving her NAIF days and use them when it's medically necessary. Pt was in agreement to this.     SW to continue to follow.     Viki NORTON, LSW  Discharge Planner

## 2025-02-04 VITALS
HEIGHT: 69 IN | TEMPERATURE: 98 F | SYSTOLIC BLOOD PRESSURE: 104 MMHG | WEIGHT: 168.44 LBS | HEART RATE: 80 BPM | DIASTOLIC BLOOD PRESSURE: 70 MMHG | BODY MASS INDEX: 24.95 KG/M2 | OXYGEN SATURATION: 100 % | RESPIRATION RATE: 15 BRPM

## 2025-02-04 LAB
ANION GAP SERPL CALC-SCNC: 9 MMOL/L (ref 0–18)
BASOPHILS # BLD AUTO: 0.05 X10(3) UL (ref 0–0.2)
BASOPHILS NFR BLD AUTO: 0.7 %
BUN BLD-MCNC: 17 MG/DL (ref 9–23)
CALCIUM BLD-MCNC: 9.2 MG/DL (ref 8.7–10.6)
CHLORIDE SERPL-SCNC: 97 MMOL/L (ref 98–112)
CO2 SERPL-SCNC: 32 MMOL/L (ref 21–32)
CREAT BLD-MCNC: 0.76 MG/DL
EGFRCR SERPLBLD CKD-EPI 2021: 84 ML/MIN/1.73M2 (ref 60–?)
EOSINOPHIL # BLD AUTO: 0.44 X10(3) UL (ref 0–0.7)
EOSINOPHIL NFR BLD AUTO: 6.1 %
ERYTHROCYTE [DISTWIDTH] IN BLOOD BY AUTOMATED COUNT: 13.8 %
GLUCOSE BLD-MCNC: 105 MG/DL (ref 70–99)
HCT VFR BLD AUTO: 33.4 %
HGB BLD-MCNC: 11.2 G/DL
IMM GRANULOCYTES # BLD AUTO: 0.04 X10(3) UL (ref 0–1)
IMM GRANULOCYTES NFR BLD: 0.6 %
INR BLD: 2.39 (ref 0.8–1.2)
LYMPHOCYTES # BLD AUTO: 2.03 X10(3) UL (ref 1–4)
LYMPHOCYTES NFR BLD AUTO: 28.3 %
MAGNESIUM SERPL-MCNC: 1.9 MG/DL (ref 1.6–2.6)
MCH RBC QN AUTO: 31.7 PG (ref 26–34)
MCHC RBC AUTO-ENTMCNC: 33.5 G/DL (ref 31–37)
MCV RBC AUTO: 94.6 FL
MONOCYTES # BLD AUTO: 0.98 X10(3) UL (ref 0.1–1)
MONOCYTES NFR BLD AUTO: 13.6 %
NEUTROPHILS # BLD AUTO: 3.64 X10 (3) UL (ref 1.5–7.7)
NEUTROPHILS # BLD AUTO: 3.64 X10(3) UL (ref 1.5–7.7)
NEUTROPHILS NFR BLD AUTO: 50.7 %
OSMOLALITY SERPL CALC.SUM OF ELEC: 288 MOSM/KG (ref 275–295)
PLATELET # BLD AUTO: 384 10(3)UL (ref 150–450)
POTASSIUM SERPL-SCNC: 3.4 MMOL/L (ref 3.5–5.1)
POTASSIUM SERPL-SCNC: 4.9 MMOL/L (ref 3.5–5.1)
PROTHROMBIN TIME: 26.3 SECONDS (ref 11.6–14.8)
RBC # BLD AUTO: 3.53 X10(6)UL
SODIUM SERPL-SCNC: 138 MMOL/L (ref 136–145)
VANCOMYCIN TROUGH SERPL-MCNC: 16.4 UG/ML (ref 10–20)
WBC # BLD AUTO: 7.2 X10(3) UL (ref 4–11)

## 2025-02-04 PROCEDURE — 85025 COMPLETE CBC W/AUTO DIFF WBC: CPT | Performed by: PODIATRIST

## 2025-02-04 PROCEDURE — 83735 ASSAY OF MAGNESIUM: CPT | Performed by: PODIATRIST

## 2025-02-04 PROCEDURE — 80048 BASIC METABOLIC PNL TOTAL CA: CPT | Performed by: PODIATRIST

## 2025-02-04 PROCEDURE — 80202 ASSAY OF VANCOMYCIN: CPT | Performed by: HOSPITALIST

## 2025-02-04 PROCEDURE — 85610 PROTHROMBIN TIME: CPT | Performed by: PODIATRIST

## 2025-02-04 PROCEDURE — 84132 ASSAY OF SERUM POTASSIUM: CPT | Performed by: INTERNAL MEDICINE

## 2025-02-04 PROCEDURE — 97530 THERAPEUTIC ACTIVITIES: CPT

## 2025-02-04 PROCEDURE — 94760 N-INVAS EAR/PLS OXIMETRY 1: CPT

## 2025-02-04 RX ORDER — POTASSIUM CHLORIDE 1.5 G/1.58G
40 POWDER, FOR SOLUTION ORAL EVERY 4 HOURS
Status: COMPLETED | OUTPATIENT
Start: 2025-02-04 | End: 2025-02-04

## 2025-02-04 RX ORDER — TORSEMIDE 20 MG/1
20 TABLET ORAL DAILY
Qty: 90 TABLET | Refills: 1 | Status: SHIPPED | OUTPATIENT
Start: 2025-02-05

## 2025-02-04 RX ORDER — DILTIAZEM HYDROCHLORIDE 240 MG/1
240 CAPSULE, COATED, EXTENDED RELEASE ORAL DAILY
Qty: 90 CAPSULE | Refills: 1 | Status: SHIPPED | OUTPATIENT
Start: 2025-02-05

## 2025-02-04 RX ORDER — SULFAMETHOXAZOLE AND TRIMETHOPRIM 800; 160 MG/1; MG/1
1 TABLET ORAL 2 TIMES DAILY
Qty: 20 TABLET | Refills: 0 | Status: SHIPPED | OUTPATIENT
Start: 2025-02-04 | End: 2025-02-14

## 2025-02-04 RX ORDER — METOPROLOL TARTRATE 25 MG/1
25 TABLET, FILM COATED ORAL
Qty: 180 TABLET | Refills: 1 | Status: SHIPPED | OUTPATIENT
Start: 2025-02-04

## 2025-02-04 NOTE — PROGRESS NOTES
[facility]   part of [facility] Infectious Disease Progress Note    Arleth Weiss Patient Status:  Inpatient    1955 MRN [id number]   Location [facility] 8NE-A Attending Cody Patel DO   Hosp Day # 7 PCP Viktoriya Garcias DO     Subjective:  Chart reviewed, no acute events.  Pt seen bedside during dressing change on R hand.  She is doing ok. Worked with PT today and they feel she needs NAIF.  She is nervous about going somewhere that she doesn't know anything about.  Her foot is feeling ok.  Still some tenderness in hand but otherwise doing ok.  Not eating much.  No fevers.     Objective:    Allergies:  Allergies[1]    Medications:    Current Facility-Administered Medications:     potassium chloride (Klor-Con) 20 MEQ oral powder 40 mEq, 40 mEq, Oral, Q4H    warfarin (Coumadin) tab 6 mg, 6 mg, Oral, Once at night    torsemide (Demadex) tab 20 mg, 20 mg, Oral, Daily    metoprolol tartrate (Lopressor) tab 25 mg, 25 mg, Oral, 2x Daily(Beta Blocker)    dilTIAZem ER (CardIZEM CD) 24 hr cap 240 mg, 240 mg, Oral, Daily    empagliflozin (Jardiance) tab 10 mg, 10 mg, Oral, Daily    silver sulfADIAZINE (Silvadene) 1 % cream, , Topical, Daily    [Held by provider] vancomycin (Vancocin) 1.5 g in sodium chloride 0.9% 250mL IVPB premix, 15 mg/kg, Intravenous, Q24H    nortriptyline (Pamelor) cap 75 mg, 75 mg, Oral, Nightly    triamcinolone (Kenalog) 0.1 % ointment 1 Application, 1 Application, Topical, Daily PRN    calcium carbonate (Tums) chewable tab 500 mg, 500 mg, Oral, Q8H PRN    zinc sulfate (Zincate) cap 220 mg, 220 mg, Oral, Nightly    cholecalciferol (Vitamin D3) tab 1,000 Units, 1,000 Units, Oral, Nightly    docusate sodium (Colace) cap 100 mg, 100 mg, Oral, BID    sennosides (Senokot) tab 8.6 mg, 8.6 mg, Oral, BID    spironolactone (Aldactone) tab 25 mg, 25 mg, Oral, Daily    albuterol (Ventolin HFA) 108 (90 Base) MCG/ACT inhaler 2 puff, 2 puff, Inhalation, Q6H PRN    allopurinol  (Zyloprim) tab 100 mg, 100 mg, Oral, Nightly    buPROPion SR (WELLBUTRIN SR) 12 hr tab 150 mg, 150 mg, Oral, BID    clopidogrel (Plavix) tab 75 mg, 75 mg, Oral, Nightly    digoxin (Lanoxin) tab 125 mcg, 125 mcg, Oral, Nightly    FLUoxetine (PROzac) cap 20 mg, 20 mg, Oral, BID    levothyroxine (Synthroid) tab 175 mcg, 175 mcg, Oral, QAM AC    rosuvastatin (Crestor) tab 5 mg, 5 mg, Oral, QOD    acetaminophen (Tylenol Extra Strength) tab 500-1,000 mg, 500-1,000 mg, Oral, BID PRN    Physical Exam:  General: Alert, orientated x3.  Cooperative.  No apparent distress.  Vital Signs:  Blood pressure 122/66, pulse 80, temperature 97.6 °F (36.4 °C), temperature source Oral, resp. rate 22, height 5' 9\" (1.753 m), weight 168 lb 6.9 oz (76.4 kg), SpO2 100%, not currently breastfeeding.   Temp (24hrs), Av.1 °F (36.7 °C), Min:97.6 °F (36.4 °C), Max:98.9 °F (37.2 °C)      HEENT: Exam is unremarkable.  Without scleral icterus.  Mucous membranes are moist. PERRLA.  Oropharynx is clear.  Lungs: Clear to auscultation bilaterally.  Cardiac: Regular rate   Abdomen:  Soft, non-distended, non-tender, with no rebound or guarding.    Extremities:  L foot wrapped,  mild blood noted on gauze around surgical site.    Skin: Normal texture and turgor.  Neurologic: Cranial nerves are grossly intact.      Labs:  Lab Results   Component Value Date    WBC 7.2 2025    HGB 11.2 2025    HCT 33.4 2025    .0 2025    CREATSERUM 0.76 2025    BUN 17 2025     2025    K 3.4 2025    CL 97 2025    CO2 32.0 2025     2025    CA 9.2 2025    INR 2.39 2025    MG 1.9 2025                 Radiology:  MRI 25:  CONCLUSION:    1. There is osteomyelitis of the middle phalange of 2nd toe and distal aspect of the proximal phalange of 2nd toe centered around the proximal to phalangeal joint.   2. Prior osteonecrosis medial cuneiform and 1st metatarsal bone are  noted.     Assessment/Plan:    1.  Complicated L foot OM  -OP cultures with MRSA on 1/17/25  -MRI with OM of 2nd toe  -s/p OR on 2/1/25 for L 2nd toe arthroplasty and excisional wound debridement to level of subcutaneous tissue  -per discussion with podiatry, all infected bone and tissue removed in OR  -on IV Vancomycin day# 8    2. Hx of severe PAD  -vascular following  -s/p angiogram on 1/31/25, note reviewed and no intervention performed,  found to have widely patent common femoral, SFA, profunda, popliteal with brisk runoff via AT    3. Hx of MRSA sepsis in Sept 2024  -with hx of TAVR  -LOYD without vegetations  -s/p long course of IV Vancomycin  -repeat blood cultures NG    DISPO:  Continue IV Vancomycin while here.  Will leave rx for PO Bactrim DS BID x 10 more days upon discharge.  Pt should f/u in 7-10 days after DC.   Will need ongoing wound care upon discharge as well.  D/w patient bedside.  D/w RN.     If you have any questions or concerns please call [facility] at [phone].     IRIS Chaudhary  2/3/2025  12:31 PM         [1]   Allergies  Allergen Reactions    Adhesive Tape HIVES     ALL ADHESIVES    Codeine HIVES    Doxycycline SWELLING    Hydrocodone UNKNOWN    Lisinopril TONGUE SWELLING    Morphine Sulfate HIVES    Opioid Analgesics UNKNOWN    Oxycontin ITCHING    Oxytocin HIVES    Vicodin [Hydrocodone-Acetaminophen] ITCHING    Skin Adhesives OTHER (SEE COMMENTS)

## 2025-02-04 NOTE — CM/SW NOTE
02/04/25 1404   Discharge disposition   Expected discharge disposition subacute   Post Acute Care Provider   ([facility])   Discharge transportation [facility]     Liaison met with pt at bedside, pt agreeable to a short stay at [facility] before returning home. [facility] can accept pt for admit today, requesting 6:00pm discharge. BLS set up for 6:00pm. PCS form completed. Pt aware of the plan. Pt instructed to order dinner before discharging.     RN to call report at dc:   Alexi  Ph: [phone]      [facility]: [phone]    Viki PATIÑO MSW, LSW  Discharge Planner

## 2025-02-04 NOTE — PLAN OF CARE
Assumed patient care approximately 1930. Patient is alert and oriented X4. SpO2 maintained on room air with saturation maintaining greater than 89%.. A-fib on tele, heart rate controlled. Continent of  bowel, incontinent of bladder, purewick and brief in place, patient declined bowel regiment medication, has decreased appetite, had only one bite of dinner again, encouraged to eat more as well to eat food rich in potassium. Dressings to wounds intact, dressing to hand changed, dressing to L toe blister reinforced, Zinc cream applied to brie area. No pain reported. Patient able to roll side to side while in bed. Education provided on call light, medication, and plan of care, patient verbalize understanding.  Risk benefit regarding IV potassium discussed encouraged to do oral to prevent extravasation.     Plan of care: Tele, SpO2, Monitor labs, wound care,         Problem: Patient/Family Goals  Goal: Patient/Family Long Term Goal  Description: Patient's Long Term Goal: to go home    Interventions:  - consults to see  - medication adjustment as needed  - get stronger  - See additional Care Plan goals for specific interventions  Outcome: Progressing  Goal: Patient/Family Short Term Goal  Description: Patient's Short Term Goal: address foot issue    Interventions:   - ID consult  - MRI  - IV abt  - See additional Care Plan goals for specific interventions  Outcome: Progressing     Problem: PAIN - ADULT  Goal: Verbalizes/displays adequate comfort level or patient's stated pain goal  Description: INTERVENTIONS:  - Encourage pt to monitor pain and request assistance  - Assess pain using appropriate pain scale  - Administer analgesics based on type and severity of pain and evaluate response  - Implement non-pharmacological measures as appropriate and evaluate response  - Consider cultural and social influences on pain and pain management  - Manage/alleviate anxiety  - Utilize distraction and/or relaxation techniques  -  Monitor for opioid side effects  - Notify MD/LIP if interventions unsuccessful or patient reports new pain  - Anticipate increased pain with activity and pre-medicate as appropriate  Outcome: Progressing     Problem: RISK FOR INFECTION - ADULT  Goal: Absence of fever/infection during anticipated neutropenic period  Description: INTERVENTIONS  - Monitor WBC  - Administer growth factors as ordered  - Implement neutropenic guidelines  Outcome: Progressing     Problem: SAFETY ADULT - FALL  Goal: Free from fall injury  Description: INTERVENTIONS:  - Assess pt frequently for physical needs  - Identify cognitive and physical deficits and behaviors that affect risk of falls.  - [place] fall precautions as indicated by assessment.  - Educate pt/family on patient safety including physical limitations  - Instruct pt to call for assistance with activity based on assessment  - Modify environment to reduce risk of injury  - Provide assistive devices as appropriate  - Consider OT/PT consult to assist with strengthening/mobility  - Encourage toileting schedule  Outcome: Progressing     Problem: DISCHARGE PLANNING  Goal: Discharge to home or other facility with appropriate resources  Description: INTERVENTIONS:  - Identify barriers to discharge w/pt and caregiver  - Include patient/family/discharge partner in discharge planning  - Arrange for needed discharge resources and transportation as appropriate  - Identify discharge learning needs (meds, wound care, etc)  - Arrange for interpreters to assist at discharge as needed  - Consider post-discharge preferences of patient/family/discharge partner  - Complete POLST form as appropriate  - Assess patient's ability to be responsible for managing their own health  - Refer to Case Management Department for coordinating discharge planning if the patient needs post-hospital services based on physician/LIP order or complex needs related to functional status, cognitive ability or social  support system  Outcome: Progressing     Problem: Altered Communication/Language Barrier  Goal: Patient/Family is able to understand and participate in their care  Description: Interventions:  - Assess communication ability and preferred communication style  - Implement communication aides and strategies  - Use visual cues when possible  - Listen attentively, be patient, do not interrupt  - Minimize distractions  - Allow time for understanding and response  - Establish method for patient to ask for assistance (call light)  - Provide an  as needed  - Communicate barriers and strategies to overcome with those who interact with patient  Outcome: Progressing     Problem: CARDIOVASCULAR - ADULT  Goal: Maintains optimal cardiac output and hemodynamic stability  Description: INTERVENTIONS:  - Monitor vital signs, rhythm, and trends  - Monitor for bleeding, hypotension and signs of decreased cardiac output  - Evaluate effectiveness of vasoactive medications to optimize hemodynamic stability  - Monitor arterial and/or venous puncture sites for bleeding and/or hematoma  - Assess quality of pulses, skin color and temperature  - Assess for signs of decreased coronary artery perfusion - ex. Angina  - Evaluate fluid balance, assess for edema, trend weights  Outcome: Progressing  Goal: Absence of cardiac arrhythmias or at baseline  Description: INTERVENTIONS:  - Continuous cardiac monitoring, monitor vital signs, obtain 12 lead EKG if indicated  - Evaluate effectiveness of antiarrhythmic and heart rate control medications as ordered  - Initiate emergency measures for life threatening arrhythmias  - Monitor electrolytes and administer replacement therapy as ordered  Outcome: Progressing

## 2025-02-04 NOTE — CM/SW NOTE
SW met with pt at bedside to discuss discharge planning. Pt worked with PT today and still is anticipating rehab. Pt unsure what she wants to do. Pt stated that she felt wobbly with PT today. Pt interested in [facility] NAIF. SW sent referral via [facility]. SW spoke with Admissions to discuss potential admit. Await update from [facility]: [phone]    Addendum: [facility] does not have a bed available. SW updated pt of this. Pt open to speak with liaison from [facility]. Liaison will come to hospital today to meet with pt and discuss [facility] services. Pt not interested in [facility].     Viki PATIÑO MSW, LSW  Discharge Planner

## 2025-02-04 NOTE — PHYSICAL THERAPY NOTE
PHYSICAL THERAPY TREATMENT NOTE - INPATIENT    Room Number: 8600/8600-A     Session: 1     Number of Visits to Meet Established Goals: 8    Presenting Problem: Fall, LE cellulitis, osteomyelitis  Co-Morbidities : HFpEF, afib, CAD, PAD, COPD, SHELDON  History related to current admission: Patient is a 69 year old female admitted on 1/28/2025 from home for fall.  Pt diagnosed with LE cellulitis, osteomyelitis.    Update 2/4/25:   S/p LEFT SECOND TOE ARTHROPLASTY AND WOUND DEBRIDEMENT on 2/1/25    Previous Admissions:   12/7-12/11/2024: Afib; [facility]; [place] acute rehab [facility]  11/23-12/4/2024: acute on chronic congestive heart failure; [facility]  ED visit 11/14/2024: PAD   9/11/-9/19/2024: LLE cellulitis; declined therapy   8/12-8/16/2024: acute on chronic diastolic congestive heart failure; rec OPPT; [facility]      HOME SITUATION  Type of Home: House  Home Layout: One level;Ramped entrance       Lives With: Caregiver part-time      Prior Level of [place]: Pt typically ambulates with loftstrand crutches. Has an adjustable bed.   PHYSICAL THERAPY ASSESSMENT   Patient demonstrates limited progress this session, goals  remain in progress.      Patient is requiring minimal assist and moderate assist as a result of the following impairments: decreased functional strength, decreased endurance/aerobic capacity, pain, impaired   balance, decreased muscular endurance, and decreased compliance/participation.     Patient continues to function below baseline with bed mobility, transfers, gait, and standing prolonged periods.  Next session anticipate patient to progress bed mobility, transfers, gait, and standing prolonged periods.  Physical Therapy will continue to follow patient for duration of hospitalization.    Patient continues to benefit from continued skilled PT services: to promote return to prior level of function and safety with continuous assistance and gradual rehabilitative therapy .    PLAN DURING  HOSPITALIZATION  Nursing Mobility Recommendation : 1 Assist  PT Device Recommendation: Rolling walker  PT Treatment Plan: Bed mobility;Body mechanics;Endurance;Energy conservation;Patient education;Family education;Gait training;Neuromuscular re-educate;Transfer training;Balance training  Frequency (Obs): 3x/week     CURRENT GOALS     Goal #1 Patient is able to demonstrate supine - sit EOB @ level: supervision      Goal #2 Patient is able to demonstrate transfers EOB to/from Chair/Wheelchair at assistance level: supervision      Goal #3 Patient is able to ambulate 50 feet with assist device:  LRAD  at assistance level: supervision      Goal #4     Goal #5     Goal #6     Goal Comments: Goals established on 1/30/2025 2/4/2025 all goals ongoing    SUBJECTIVE  Pt cooperative during session    OBJECTIVE  Precautions: Bed/chair alarm    WEIGHT BEARING RESTRICTION  L Lower Extremity: -- (Heel WB in post op shoe)    PAIN ASSESSMENT   Rating: Unable to rate  Location:  (did not localize)  Management Techniques: Activity promotion;Relaxation;Repositioning    BALANCE                                                                                                                       Static Sitting: Fair +  Dynamic Sitting: Fair -           Static Standing: Poor +  Dynamic Standing: Poor +    ACTIVITY TOLERANCE                         O2 WALK       AM-PAC '6-Clicks' INPATIENT SHORT FORM - BASIC MOBILITY  How much difficulty does the patient currently have...  Patient Difficulty: Turning over in bed (including adjusting bedclothes, sheets and blankets)?: A Lot   Patient Difficulty: Sitting down on and standing up from a chair with arms (e.g., wheelchair, bedside commode, etc.): A Little   Patient Difficulty: Moving from lying on back to sitting on the side of the bed?: A Lot   How much help from another person does the patient currently need...   Help from Another: Moving to and from a bed to a chair (including a  wheelchair)?: A Lot   Help from Another: Need to walk in hospital room?: A Lot   Help from Another: Climbing 3-5 steps with a railing?: A Lot     AM-PAC Score:  Raw Score: 13   Approx Degree of Impairment: 64.91%   Standardized Score (AM-PAC Scale): 36.74   CMS Modifier (G-Code): CL    FUNCTIONAL ABILITY STATUS  Gait Assessment   Functional Mobility/Gait Assessment  Gait Assistance: Not tested  Distance (ft): 0    Skilled Therapy Provided: Per RN okay to work with pt. Pt received in supine and was agreeable to PT session.     Bed Mobility:  Rolling: NT   Supine<>Sit: mod A to torso   Sit<>Supine: mod A to BLE    Transfer Mobility:  Sit<>Stand: CGA bed elevated, increased time and attempts. Stood twice for about 30 seconds each time   Stand<>Sit: CGA bed elevated    Gait: NT    Therapist's Comments: Pt required increased time for most tasks. Pt declined attempting further activity due to c/o dizziness with standing.       Patient End of Session: In bed;Needs met;Call light within reach;RN aware of session/findings;All patient questions and concerns addressed;Alarm set    PT Session Time: 23 minutes  Therapeutic Activity: 23 minutes

## 2025-02-04 NOTE — PROGRESS NOTES
formerly [facility] Pharmacy Dosing Service      Follow Up Pharmacokinetic Consult for Vancomycin Dosing     Arleth Weiss is a 69 year old female who is being treated for osteomyelitis.  Patient is on day 8 of Vancomycin 1500 mg IV Q 24 hours.  Goal trough is 10-15 ug/mL. -600       Weight and Temperature:    Wt Readings from Last 1 Encounters:   02/04/25 76.4 kg (168 lb 6.9 oz)         Temp Readings from Last 1 Encounters:   02/04/25 97.6 °F (36.4 °C) (Oral)      Labs:   Recent Labs   Lab 02/02/25 0459 02/03/25  0511 02/04/25  0613   CREATSERUM 0.77 0.75 0.76      Estimated Creatinine Clearance: 71 mL/min (based on SCr of 0.76 mg/dL).     Recent Labs   Lab 02/02/25 0459 02/03/25  0511 02/04/25  0613   WBC 7.5 7.0 7.2        Vancomycin Levels:  Lab Results   Component Value Date/Time    VANCT 16.4 02/04/2025 06:13 AM    VANCT 22.9 (H) 02/03/2025 10:54 AM    VANCP 36.1 01/30/2025 02:48 PM       Corresponding 24 h-AUC: N/A     The Pharmacokinetic Target is:    Trough/random 10-15 mg/L    Renal Dosing Considerations:    None     Assessment/Plan:   Maintenance Regimen: Vancomycin 1500 mg q24h    Date/Time last dose was given: Vancomycin 1500 mg was given 2/2 @ 1211     Based on the above:     1.  Hold  Vancomycin for now(based on Trough of 16.4 ug/mL and Estimated Creatinine Clearance: 71 mL/min (based on SCr of 0.76 mg/dL).     2.  Pharmacy will re-check Vancomycin random level with morning labs tomorrow  Goal trough level 10-15 ug/mL. -600     3.  Pharmacy will need BUN/Scr daily while on Vancomycin to assess renal function.     4.  Pharmacy will follow and monitor renal function changes, toxicity and efficacy.     Pharmacy will continue to follow her.  We appreciate the opportunity to assist in her care.       Fanny Carroll, [facility]  2/4/2025  8:45 AM  [facility]  Pharmacy Extension: [phone]

## 2025-02-04 NOTE — CONSULTS
[facility] Pharmacy Dosing Service  Warfarin (Coumadin) Subsequent Dosing    Arleth Weiss is a 69 year old patient for whom pharmacy is dosing warfarin (Coumadin). Goal INR is 2.5-3.5    Recent Labs   Lab 01/31/25  0407 02/01/25  0521 02/02/25  0459 02/03/25  0511 02/04/25  0613   INR 1.58* 1.60* 1.69* 2.02* 2.39*     Consulted by:  Dr Patel  Indication:  atrial fibrillation, h/o PE, TAVR   Potential Drug Interactions:  allopurinol, clopidogrel,levothyroxine   Other Anticoagulants:  enoxaparin stopped  Home regimen (if applicable):  Warfarin 5 mg on Tuesdays and Thursdays and 2.5 mg all other days of the week      Inpatient Dosing History:     Date 1/31 2/1 2/2 2/3   2/4     INR 1.58 1.6 1.69  2.02 2/39      Coumadin dose 5 mg 5 mg  5 mg 5 mg             Based on above -  1.  For today, Give warfarin (COUMADIN) 6 mg at 2100 tonight  2   PT/INR ordered daily while on warfarin  3.  Pharmacy will continue to follow.  We appreciate the opportunity to assist in the care of this patient.    Fanny Carroll [facility]  2/4/2025  9:08 AM

## 2025-02-04 NOTE — DISCHARGE SUMMARY
[facility] [place] [facility] Discharge Summary   Patient ID:  Arleth Weiss  [id number]  69 year old  4/26/1955    Admit date: 1/28/2025    Discharge date and time: 2/4/2025     Attending Physician: Omi Blackman MD     Primary Care Physician: Viktoriya Garcias DO     Admit Dx: Skin tear of hand without complication, right, initial encounter [S61.411A]  Osteomyelitis of left foot, unspecified type (HCC) [M86.9]  Acute on chronic congestive heart failure, unspecified heart failure type (HCC) [I50.9]    Hospital Discharge Diagnoses:  OM    Disposition: SNF    Important follow up:  -PCP in [x] within 7 days [] within 14 days [] other   Armand Case MD  [address] [facility] [id number] [phone]    Follow up  As scheduled 2/6/2025 @ 10:40      Hospital Course:   69 year old female with PMH sig for HFpEF, Afib on coumadin, CAD, PAD, COPD, SHELDON who presents for evaluation after a fall and multiple medical complaints.      # Lower extremity cellulitis/suspected osteomyelitis  -MRI with OM of 2nd toe  -Continue IV vancomycin per ID  -Wound care  -sp angiogram - no intervention, has flow to foot  -sp left 2nd toe arthroplasty  02/01  -DC on PO Bactrim DS BID for 10 more days        # Right hand laceration   -Continue local wound care     # acute on chronic HFpEF  -Apparently her diuretic was not given when she was arrived at the nursing facility.  Sp IV lasix. Now on home torsemide  -Strict I's/O, daily weights, fluid restriction  -Monitor volume status.  Trend renal function  Appears euvolmic now.     #paroxysmal A-fib on Coumadin  -Continue Toprol and Coumadin  -Daily PT/INR - subtherapeutic, was bridged with lovenox but was having bleeding through her dressing in foot. Dw cards, can hold off lovenox, INR trending up, 2.39 on dc      #CAD  #PAD  -severe aortic stenosis sp TAVR  -Continue Plavix, statin     #COPD  -Continue bronchodilators     #SHELDON  -CPAP as nightly and as needed      #depression  -Continue bupropion  and fluoxetine     # hypokalemia  -replete per protocol, 3.4    Day of discharge Exam   Vitals:    02/04/25 1225   BP: 102/65   Pulse: 69   Resp:    Temp: 98.2 °F (36.8 °C)       Exam on day of discharge:  Doing ok, legs similar     Gen: No acute distress, alert and oriented  CV: RRR, +s1/s2  Lungs: CTAB, good respiratory effort  Abdomen: s/nt/nd  Ext: R arm with mild swelling / redness at site of infiltrate; LE c drsgs in place   Neuro: CN Intact, no focal deficits      Discharge meds     Medication List        START taking these medications      dilTIAZem  MG Cp24  Commonly known as: CardIZEM CD  Take 1 capsule (240 mg total) by mouth daily.  Start taking on: February 5, 2025     metoprolol tartrate 25 MG Tabs  Commonly known as: Lopressor  Take 1 tablet (25 mg total) by mouth 2x Daily(Beta Blocker).     sulfamethoxazole-trimethoprim -160 MG Tabs per tablet  Commonly known as: Bactrim DS  Take 1 tablet by mouth 2 (two) times daily for 10 days.            CHANGE how you take these medications      allopurinol 100 MG Tabs  Commonly known as: Zyloprim  Take 1 tablet (100 mg total) by mouth daily.  What changed: when to take this     clotrimazole-betamethasone 1-0.05 % Crea  Commonly known as: Lotrisone  What changed: See the new instructions.     digoxin 0.125 MG Tabs  Commonly known as: Lanoxin  Take 1 tablet (125 mcg total) by mouth daily.  What changed: when to take this     torsemide 20 MG Tabs  Commonly known as: Demadex  Take 1 tablet (20 mg total) by mouth daily.  Start taking on: February 5, 2025  What changed:   medication strength  how much to take            CONTINUE taking these medications      albuterol 108 (90 Base) MCG/ACT Aers  Commonly known as: Ventolin HFA     buPROPion  MG Tb12  Commonly known as: WELLBUTRIN SR     calcium carbonate 500 MG Chew  Commonly known as: Tums     Centrum Minis Women 50+ Tabs     Cholecalciferol 125 MCG (5000 UT) Tabs  Commonly known as: VITAMIN D-3      clopidogrel 75 MG Tabs  Commonly known as: Plavix     cyproheptadine 4 MG Tabs  Commonly known as: Periactin     ferrous sulfate 325 (65 FE) MG Tbec     FLUoxetine 20 MG Caps  Commonly known as: PROzac     Jardiance 10 MG Tabs  Generic drug: empagliflozin     ketoconazole 2 % Crea  Commonly known as: Nizoral     levothyroxine 175 MCG Tabs  Commonly known as: Synthroid     Nortriptyline HCl 75 MG Caps  Commonly known as: PAMELOR     rosuvastatin 5 MG Tabs  Commonly known as: Crestor     spironolactone 25 MG Tabs  Commonly known as: Aldactone     triamcinolone 0.1 % Oint  Commonly known as: Kenalog     Tylenol 8 Hour 650 MG Tbcr  Generic drug: Acetaminophen ER     Vitamin C 500 MG Tabs  Commonly known as: VITAMIN C     * warfarin 5 MG Tabs  Commonly known as: Coumadin  Take as directed. If you are unsure how to take this medication, talk to your nurse or doctor.     * warfarin 5 MG Tabs  Commonly known as: Coumadin  Take as directed. If you are unsure how to take this medication, talk to your nurse or doctor.     Zinc 50 MG Caps           * This list has 2 medication(s) that are the same as other medications prescribed for you. Read the directions carefully, and ask your doctor or other care provider to review them with you.                STOP taking these medications      metOLazone 5 MG Tabs  Commonly known as: Zaroxolyn     metoprolol succinate ER 25 MG Tb24  Commonly known as: Toprol XL     Potassium Chloride ER 10 MEQ Cpcr  Commonly known as: MICRO-K               Where to Get Your Medications        These medications were sent to [facility], [address] [phone], [phone]  [address] [place] [phone]      Phone: [phone]   dilTIAZem  MG Cp24  metoprolol tartrate 25 MG Tabs  torsemide 20 MG Tabs       You can get these medications from any pharmacy    Bring a paper prescription for each of these medications  sulfamethoxazole-trimethoprim -160  MG Tabs per tablet         Consults: IP CONSULT TO HOSPITALIST  IP CONSULT TO INFECTIOUS DISEASE  IP CONSULT TO SOCIAL WORK  CONSULT TO WOUND OSTOMY  IP CONSULT TO PHARMACY  IP CONSULT TO CARDIOLOGY  IP CONSULT TO VASCULAR SURGERY  IP CONSULT TO PODIATRY  IP CONSULT TO PHARMACY  IP CONSULT TO SOCIAL WORK  CONSULT TO WOUND OSTOMY    Radiology: CATH ANGIO    Result Date: 1/31/2025  This exam has been completed. Please refer to Notes for the results to this procedure.    MRI FOOT (W+WO), LEFT (CPT=73720)    Result Date: 1/30/2025  PROCEDURE:  MRI FOOT (W+WO), LEFT (CPT=73720)  LOCATION:   COMPARISON:  [facility] , XR, XR FOOT, COMPLETE (MIN 3 VIEWS), LEFT (CPT=73630), 1/28/2025, 1:40 AM.  INDICATIONS:  OM of left foot  TECHNIQUE:  A variety of imaging planes and parameters were utilized for visualization of suspected pathology.  Images were performed without and with intravenous gadolinium contrast.  PATIENT STATED HISTORY:(As transcribed by Technologist)  Patient stated that she has pain in her left foot.   CONTRAST USED:  18 mL of Dotarem  FINDINGS:  BONES:  There is marrow edema noted involving the middle phalange in distal aspect of the proximal phalange of the 2nd toe.  This finding would be consistent with osteomyelitis.  There is mixed sclerotic serpiginous appearance in the 1st metatarsal bone and in the medial cuneiform bone which has typical imaging appearance of prior osteonecrosis.  Marrow signal in remaining bony structures is normal. SOFT TISSUES:  There is no abscess detected.  There is subcutaneous edema over the dorsum of the foot which is consistent with cellulitis. EFFUSION:  None visible. OTHER:  Negative.            CONCLUSION:  1. There is osteomyelitis of the middle phalange of 2nd toe and distal aspect of the proximal phalange of 2nd toe centered around the proximal to phalangeal joint. 2. 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   LOCATION:  [facility]   Dictated by  ([facility]): Buzz Barclay MD on 2025 at 8:09 AM     Finalized by ([facility]): Buzz Barclay MD on 2025 at 8:11 AM       CARD ECHO 2D DOPPLER CONTRAST (CPT=93306)    Result Date: 2025  Transthoracic Echocardiogram Name:Arleth Weiss Date: 2025 :  1955 Ht:  (69in)  BP: 91 / 63 MRN:  [id number]    Age:  69years    Wt:  (193lb) HR: 84bpm Loc:  [facility]       Gndr: F          BSA: 2.03m^2 Sonographer: MEME Orellana Ordering:    James Robertson MD Consulting:  Will Andrade ---------------------------------------------------------------------------- History/Indications:   Atrial fibrillation.  Congestive heart failure. Obstructive sleep apnea.  Chronic Obstructive Pulmonary Disease.  Prior Transcatheter Aortic Valve Replacement. History of stents.  Risk factors: Hypertension. ---------------------------------------------------------------------------- Procedure information:  A transthoracic complete 2D study was performed. Additional evaluation included M-mode, complete spectral Doppler, and color Doppler. Shortness of breath.  Patient status:  Inpatient.  Location:  Room 8600.    Comparison was made to the study of 2024.    This was a routine study. Transthoracic echocardiography for ventricular function evaluation and assessment of valvular function. Image quality was suboptimal. The study was technically limited due to poor acoustic window availability. Intravenous contrast (Definity) was administered to opacify the LV. ECG rhythm:   Atrial fibrillation ---------------------------------------------------------------------------- Conclusions: 1. Left ventricle: The cavity size was normal. Wall thickness was normal.    Systolic function was at the lower limits of normal. The estimated    ejection fraction was 50-55%, by visual assessment. Unable to assess LV    diastolic function due to heart rhythm. 2. Left atrium: The left atrial volume was markedly increased. 3. Aortic valve: Elizondo  MINOR S3 23mm (TAVR) bioprosthesis was present.    Transvalvular velocity was increased. There was no significant    regurgitation. The peak systolic velocity was 3.07m/sec. The mean    systolic gradient was 17mm Hg. The valve area (VTI) was 1.55cm^2. The    valve area (VTI) index was 0.76cm^2/m^2. 4. Mitral valve: The annulus was markedly calcified. The leaflets were    mildly calcified. There was trivial regurgitation. The mean diastolic    gradient was 3mm Hg. 5. Pulmonary arteries: Systolic pressure was moderately increased, in the    range of 45mm Hg to 50mm Hg. Impressions:  This study is compared with previous dated 9/14/2024: * ---------------------------------------------------------------------------- * Findings: Left ventricle:  The cavity size was normal. Wall thickness was normal. Systolic function was at the lower limits of normal. The estimated ejection fraction was 50-55%, by visual assessment. Unable to assess LV diastolic function due to heart rhythm. Left atrium:  The left atrial volume was markedly increased. Right ventricle:  The cavity size was normal. Systolic function was normal. Right atrium:  The atrium was at the upper limits of normal in size. Mitral valve:  The annulus was markedly calcified. The leaflets were mildly calcified.  Doppler:   There was no evidence for stenosis.   There was trivial regurgitation.    The valve area (LVOT continuity) was 1.72cm^2. The valve area index (LVOT continuity) was 0.85cm^2/m^2.    The mean diastolic gradient was 3mm Hg. Aortic valve:  Elizondo MINOR S3 23mm (TAVR) bioprosthesis was present. Doppler:  Transvalvular velocity was increased. There was no significant regurgitation.    The valve area (VTI) was 1.55cm^2. The valve area (VTI) index was 0.76cm^2/m^2.    The mean systolic gradient was 17mm Hg. The peak systolic gradient was 38mm Hg. Tricuspid valve:  The valve is structurally normal. Leaflet separation was normal.  Doppler:  Transvalvular  velocity was within the normal range. There was no evidence for stenosis. There was trivial regurgitation. Pulmonic valve:   Not well visualized.  Doppler:  Transvalvular velocity was within the normal range. There was no evidence for stenosis. There was no significant regurgitation. Pericardium:   There was no pericardial effusion. Aorta: Aortic root: The aortic root was normal. Pulmonary arteries: Not well visualized. Systolic pressure was moderately increased, in the range of 45mm Hg to 50mm Hg. Systemic veins:  Central venous respirophasic diameter changes are blunted (< 50%). Inferior vena cava: The IVC was dilated. ---------------------------------------------------------------------------- Measurements  Left              Value             Ref       11/25/2024  ventricle  IVS               0.8   cm          0.6 - 0.9 1.0  thickness,  ED, PLAX  LV ID, ED,        4.9   cm          3.8 - 5.2 4.8  PLAX  LV ID, ES,        3.5   cm          2.2 - 3.5 3.5  PLAX  LV PW             0.9   cm          0.6 - 0.9 0.8  thickness,  ED, PLAX  IVS/LV PW         0.83              --------- 1.28  ratio, ED,  PLAX  LV PW/LV ID       0.19              --------- 0.16  ratio, ED,  PLAX  LV ejection       54    %           54 - 74   54  fraction  Stroke            37    ml/m^2      --------- ----------  volume/bsa,  2D  LVOT              Value             Ref       11/25/2024  LVOT ID           2.2   cm          --------- ----------  LVOT peak         1.29  m/sec       --------- ----------  velocity, S  LVOT VTI, S       18.6  cm          --------- ----------  LVOT peak         7     mm Hg       --------- ----------  gradient, S  LVOT mean         2     mm Hg       --------- ----------  gradient, S  Stroke volume     71    ml          --------- ----------  (SV), LVOT DP  Cardiac           5.9   L/min       --------- ----------  output (Qs),  LVOT DP  Cardiac index     2.9   L/(min-m^2) --------- ----------  (Qs/bsa),  LVOT DP  Stroke  index      35    ml/m^2      --------- ----------  (SV/bsa),  LVOT DP  Aortic valve      Value             Ref       11/25/2024  Aortic valve      3.07  m/sec       --------- ----------  peak  velocity, S  Aortic valve      46.8  cm          --------- ----------  VTI, S  Aortic            77    ms          --------- ----------  acceleration  time  Aortic mean       17    mm Hg       --------- ----------  gradient, S  Aortic peak       38    mm Hg       --------- ----------  gradient, S  Aortic valve      1.55  cm^2        --------- ----------  area, VTI  Aortic valve      0.76  cm^2/m^2    --------- ----------  area/bsa, VTI  Velocity          0.42              --------- ----------  ratio, peak,  LVOT/AV  Ascending         Value             Ref       11/25/2024  aorta  Ascending         2.7   cm          1.9 - 3.5 3.2  aorta ID,  A-P, ED  Left atrium       Value             Ref       11/25/2024  LA ID, A-P,   (H) 4.4   cm          2.7 - 3.8 4.4  ES  LA volume, S  (H) 135   ml          22 - 52   ----------  LA            (H) 66    ml/m^2      16 - 34   ----------  volume/bsa, S  LA volume,    (H) 135   ml          22 - 52   114  ES, 1-p A4C  Mitral valve      Value             Ref       11/25/2024  Mitral mean       3     mm Hg       --------- 3  gradient, D  Mitral peak       12    mm Hg       --------- 10  gradient, D  Mitral valve      1.72  cm^2        --------- ----------  area, LVOT  continuity  Mitral valve      0.85  cm^2/m^2    --------- ----------  area/bsa,  LVOT  continuity  Mitral regurg     44.0  cm          --------- 41.8  VTI PISA  Pulmonary         Value             Ref       11/25/2024  artery  PA pressure,      49    mm Hg       --------- ----------  S, DP  Tricuspid         Value             Ref       11/25/2024  valve  Tricuspid     (H) 2.9   m/sec       <=2.8     ----------  regurg peak  velocity  Tricuspid         34    mm Hg       --------- ----------  peak RV-RA  gradient  Systemic           Value             Ref       11/25/2024  veins  Estimated CVP     15    mm Hg       --------- ----------  Right             Value             Ref       11/25/2024  ventricle  TAPSE, 2D         2.19  cm          >=1.70    ----------  RV pressure,      49    mm Hg       --------- ----------  S, DP Legend: (L)  and  (H)  romelia values outside specified reference range. ---------------------------------------------------------------------------- Prepared and electronically signed by Mati Robertson MD 01/29/2025 17:19     XR CHEST AP PORTABLE  (CPT=71045)    Result Date: 1/28/2025  PROCEDURE:  XR CHEST AP PORTABLE  (CPT=71045)  TECHNIQUE:  AP chest radiograph was obtained.  COMPARISON:  [facility] , XR, XR CHEST AP PORTABLE  (CPT=71045), 11/23/2024, 5:57 PM.  INDICATIONS:  fall, on thinners, states [facility] has MRSA in foot  PATIENT STATED HISTORY: (As transcribed by Technologist)  Patient offered no additional history at this time.    FINDINGS:  Cardiomediastinal silhouette is stable in size.  Status post TAVR.  Left lower lobe retrocardiac increased opacity is most consistent with atelectasis and or scar.  No definite focal consolidation, pleural effusion, or pneumothorax.  Osseous structures are stable including post cervical spinal fusion and right shoulder arthroplasty.            CONCLUSION:  1. Findings most consistent with left lower lobe atelectasis and or scar without definite focal consolidation.  Continued clinical correlation recommended.  [facility] M.D. notified of these findings with preliminary radiology report from [facility] services.    LOCATION:  [facility]      Dictated by (CST): Tasha Land MD on 1/28/2025 at 8:33 AM     Finalized by (CST): Tasha Land MD on 1/28/2025 at 8:34 AM       XR FOOT, COMPLETE (MIN 3 VIEWS), LEFT (CPT=73630)    Result Date: 1/28/2025  PROCEDURE:  XR FOOT, COMPLETE (MIN 3 VIEWS), LEFT (CPT=73630)  TECHNIQUE:  AP, oblique, and lateral views were obtained.  COMPARISON:  None.  INDICATIONS:  fall,  on thinners, states [facility] has MRSA in foot  PATIENT STATED HISTORY: (As transcribed by Technologist)  Patient states wound along plantar aspect of left foot due to calcium build x6 months. MRSA per patient; No known injury. Patient states she fell earlier today and injured right hand. Large laceration along medial aspect of right hand and along posterior 5th digit.    FINDINGS:  BONES:  Osseous destruction involves the 2nd middle and distal phalanx concerning for osteomyelitis.  There is surrounding soft tissue swelling.  Decreased bone mineralization involves the 2nd proximal phalanx distally with age indeterminate mildly displaced fracture.  No dislocation.  Marked vascular calcifications.  Osseous defect involves the base of the 5th metatarsal bone best seen on lateral projection, infectious process also cannot be excluded within this location.            CONCLUSION:  1. Findings concerning for osteomyelitis as detailed above.  An MRI can be performed for further evaluation as clinically indicated.  [facility] M.D. notified of these findings with preliminary radiology report from [facility] services.    LOCATION:  [facility]   Dictated by (CST): Tasha Land MD on 1/28/2025 at 8:11 AM     Finalized by (CST): Tasha Land MD on 1/28/2025 at 8:14 AM       XR HAND (MIN 3 VIEWS), RIGHT (CPT=73130)    Result Date: 1/28/2025  PROCEDURE:  XR HAND (MIN 3 VIEWS), RIGHT (CPT=73130)  TECHNIQUE:  Three views of the right hand were obtained.  COMPARISON:  None.  INDICATIONS:  fall, on thinners, states [facility] has MRSA in foot  PATIENT STATED HISTORY: (As transcribed by Technologist)  Patient states wound along plantar aspect of left foot due to calcium build x6 months. MRSA per patient; No known injury. Patient states she fell earlier today and injured right hand. Large laceration along medial aspect of right hand and along posterior 5th digit.    FINDINGS:  Osseous structures are intact.  Distal interphalangeal joint space narrowing predominantly  involves the 3rd digit where there is complete obliteration of the joint space, large marginal osteophytes and palmar subluxation of the distal phalanx.   Findings are most consistent with degenerative change.    Marked vascular calcifications.  Soft tissue irregularity adjacent to the 5th metacarpal bone is most likely due to patient's laceration given the history.            CONCLUSION:  1. No acute visible fracture.  Please see details as above.  [facility] M.D. notified of these findings with preliminary radiology report from [facility] services.    LOCATION:  [facility]   Dictated by (CST): Tasha Land MD on 1/28/2025 at 8:08 AM     Finalized by (CST): Tasha Land MD on 1/28/2025 at 8:10 AM          Operative Procedures: Procedure(s) (LRB):  LEFT SECOND TOE ARTHROPLASTY AND WOUND DEBRIDEMENT (Left)     Code Status: Full Code    Total Time Coordinating Care: Greater than 30 minutes    Patient had opportunity to ask questions and state understand and agree with therapeutic plan as outlined. I reviewed and reconciled current and discharge medications on day of discharge and discussed meds with patient. D/w RN     Omi Blackman MD  [facility]  [phone]  2/4/2025  3:19 PM

## 2025-02-04 NOTE — PLAN OF CARE
Received patient at 0730. Patient alert and oriented x4. Tele Rhythm A-Fib. O2 sats on RA. Lungs clear. Bed is locked and low position. Call light & personal belongings within reach. Patient hasn't had BM in 6 days, not eating well, taking stool softeners. Patient voiding per purwick.Reviewed plan of care with patient and verbalized understanding.     POC:  Coumadin - INR goal 2-3  PT rec NAIF  Potassium replaced     Problem: Patient/Family Goals  Goal: Patient/Family Long Term Goal  Description: Patient's Long Term Goal: to go home    Interventions:  - consults to see  - medication adjustment as needed  - get stronger  - See additional Care Plan goals for specific interventions  Outcome: Progressing  Goal: Patient/Family Short Term Goal  Description: Patient's Short Term Goal: address foot issue    Interventions:   - ID consult  - MRI  - IV abt  - See additional Care Plan goals for specific interventions  Outcome: Progressing

## 2025-02-04 NOTE — DIETARY NOTE
[facility]   part of [facility]   CLINICAL NUTRITION    Arleth Weiss     Admitting diagnosis:  Skin tear of hand without complication, right, initial encounter [S61.411A]  Osteomyelitis of left foot, unspecified type (HCC) [M86.9]  Acute on chronic congestive heart failure, unspecified heart failure type (HCC) [I50.9]    Ht: 175.3 cm (5' 9\")  Wt: 76.4 kg (168 lb 6.9 oz).   Body mass index is 24.87 kg/m².  IBW: 65.9 kg 145 lbs    Wt Readings from Last 6 Encounters:   02/04/25 76.4 kg (168 lb 6.9 oz)   01/28/25 90.3 kg (199 lb)   12/04/24 90.1 kg (198 lb 10.2 oz)   11/14/24 96.6 kg (213 lb)   09/19/24 91.3 kg (201 lb 4.8 oz)   08/16/24 97.8 kg (215 lb 11.2 oz)        Labs/Meds reviewed    Diet:       Procedures    Cardiac diet Cardiac; Is Patient on Accuchecks? No     Percent Meals Eaten (last 3 days)       Date/Time Percent Meals Eaten (%)    02/02/25 0832 0 %     Percent Meals Eaten (%): Pt refused breakfast at 02/02/25 0832    02/02/25 1411 100 %    02/02/25 2057 10 %    02/03/25 0900 0 %    02/03/25 1225 0 %    02/03/25 1609 0 %    02/03/25 2050 20 %              Pt chart reviewed d/t LOS.  Patient reports fair appetite at this time.  Nursing notes reports Percent Meals Eaten (%): 20 % intake for last meal.  Tolerating po diet without diarrhea, emesis, or constipation.   No significant weight changes noted.     2/4/25- Spoke w pt about recent appetite. She says her appetite is usually fine at home but has not had much home time since ~Thanksgiving d/t frequent hospital and rehab center stays. She had recently been taken off of her diuretics when she should not have been which resulted in a large amount of fluid accumulation. Her recent drop in wt is d/t being put back on them upon admit. Offered ONS to help provide extra calories and protein but pt refused. She states she does not care for nutrition shakes and was also concerned about them providing extra sodium. Assured her they would not interfere w  her sodium levels but no change in answer and did not want to be pushy. Pt makes smoothies at home as well as soup. During visit she was finishing up an english muffin w jelly and two cartons of white milk. Complained about food quality but expressed that she understood why it had to be less flavorful d/t her restrictions. No physical wasting. No n/v. Talkative and in good spirits. Will follow up soon to monitor PO intake. All questions answered at this time.      PMH: CHF, DVT, CAD, Osteomyelitis    Patient is at low nutrition risk at this time.    Please consult if patient status changes or nutrition issues arise.    Yaritza Weaver  Dietetic Intern

## 2025-02-04 NOTE — CONSULTS
[facility]  Inpatient Wound Care Contact Note    Arleth Weiss Patient Status:  Inpatient    1955 MRN [id number]   Location [facility] 8NE-A Attending Omi Blackman MD   Hosp Day # 7 PCP Viktoriya Garcias,        Re consult : wound, s/p debridement  left second toe    Spoke with the nurse.Possible discharge today.Recommend to continue betadine to left foot /toe ulcers as per podiatry.    Please call me at [phone] if you have any questions about this consultation and plan of care      Thank you,    Massiel Gill RN BSN [facility]  [place] [address]

## 2025-02-04 NOTE — PROGRESS NOTES
[facility] Cardiology Progress Note        Arleth Weiss Patient Status:  Inpatient    1955 MRN [id number]   Location [facility] 8NE-A Attending Cody Patel DO   Hosp Day # 7 PCP Viktoriya Garcias DO     Subjective:  The patient denies  chest pain   Breathing is comfortable       Medications:   potassium chloride  40 mEq Oral Q4H    torsemide  20 mg Oral Daily    metoprolol tartrate  25 mg Oral 2x Daily(Beta Blocker)    dilTIAZem ER  240 mg Oral Daily    empagliflozin  10 mg Oral Daily    silver sulfADIAZINE   Topical Daily    [Held by provider] vancomycin  15 mg/kg Intravenous Q24H    Nortriptyline HCl  75 mg Oral Nightly    zinc sulfate  220 mg Oral Nightly    cholecalciferol  1,000 Units Oral Nightly    docusate sodium  100 mg Oral BID    sennosides  8.6 mg Oral BID    spironolactone  25 mg Oral Daily    allopurinol  100 mg Oral Nightly    buPROPion SR  150 mg Oral BID    clopidogrel  75 mg Oral Nightly    digoxin  125 mcg Oral Nightly    FLUoxetine  20 mg Oral BID    levothyroxine  175 mcg Oral QAM AC    rosuvastatin  5 mg Oral QOD       Continuous Infusions:        Allergies:  Allergies[1]      Objective:        Intake/Output:      Intake/Output Summary (Last 24 hours) at 2025 0824  Last data filed at 2025 0423  Gross per 24 hour   Intake 840 ml   Output 1700 ml   Net -860 ml     Wt Readings from Last 3 Encounters:   25 168 lb 6.9 oz (76.4 kg)   25 199 lb (90.3 kg)   24 198 lb 10.2 oz (90.1 kg)       Physical Exam:        Vitals:    25 2155 25 0020 25 0423 25 0700   BP:  106/50 112/71    BP Location:  Right arm Right arm    Pulse: 68 75 82 77   Resp:  21 16 18   Temp:  98 °F (36.7 °C) 97.7 °F (36.5 °C)    TempSrc:  Oral Oral    SpO2:  97% 96% 100%   Weight:   168 lb 6.9 oz (76.4 kg)    Height:           Temp:  [97.7 °F (36.5 °C)-98.9 °F (37.2 °C)] 97.7 °F (36.5 °C)  Pulse:  [68-88] 77  Resp:  [13-26] 18  BP:  (106-124)/(50-77) 112/71  SpO2:  [96 %-100 %] 100 %      Temp: 97.7 °F (36.5 °C)  Pulse: 77  Resp: 18  BP: 112/71  General:  Appears comfortable  HEENT: No focal deficits.  Neck: No JVD, carotids 2+ no bruits.  Cardiac: Irregular S1S2.  No S3, S4, rub, click.  No murmur.  Lungs: Clear to auscultation and percussion.  Abdomen: Soft, non-tender.   Extremities: mild  LE edema.  No clubbing or cyanosis.    Neurologic: Alert and oriented, normal affect.  Skin: Warm and dry.           LABS:      HEM:  Recent Labs   Lab 01/31/25  0407 02/01/25  0500 02/02/25  0459 02/03/25  0511 02/04/25  0613   WBC 6.9 6.6 7.5 7.0 7.2   HGB 11.5* 11.7* 11.7* 11.6* 11.2*   HCT 35.2 34.9* 34.5* 34.8* 33.4*   .0 388.0 427.0 390.0 384.0       Chem:  Recent Labs   Lab 01/31/25  0407 02/01/25  0500 02/02/25  0459 02/03/25  0511 02/03/25  2104 02/04/25  0613    136 137 139  --  138   K 3.2*  3.2* 3.2*  3.2* 3.8 2.9*  2.9* 3.7 3.4*    96* 97* 96*  --  97*   CO2 30.0 30.0 29.0 33.0*  --  32.0   BUN 8* 11 17 20  --  17   CREATSERUM 0.68 0.71 0.77 0.75  --  0.76   CA 9.2 9.3 9.1 9.2  --  9.2   MG 1.8 1.8 1.8 2.1  --  1.9   GLU 70 74 120* 99  --  105*       No results for input(s): \"ALT\", \"AST\", \"ALB\", \"AMYLASE\", \"LIPASE\", \"LDH\" in the last 168 hours.    Invalid input(s): \"ALPHOS\", \"TBIL\", \"DBIL\", \"TPROT\"      Recent Labs   Lab 01/31/25  0407 02/01/25  0521 02/02/25  0459 02/03/25  0511 02/04/25  0613   INR 1.58* 1.60* 1.69* 2.02* 2.39*           Lab Results   Component Value Date    TROP <0.045 07/28/2021         Invalid input(s): \"PBNPML\"                       Diagnostics:     Telemetry: Atrial Fibrillation  80's/min     Laboratories and Data:  Diagnostics:           CXR, 1/28/2025:    1. Findings most consistent with left lower lobe atelectasis and or scar without definite focal consolidation.  Continued clinical correlation recommended.      Echo, 1/29/25:      Conclusions:     1. Left ventricle: The cavity size was normal.  Wall thickness was normal.      Systolic function was at the lower limits of normal. The estimated      ejection fraction was 50-55%, by visual assessment. Unable to assess LV      diastolic function due to heart rhythm.   2. Left atrium: The left atrial volume was markedly increased.   3. Aortic valve: Elizondo MINOR S3 23mm (TAVR) bioprosthesis was present.      Transvalvular velocity was increased. There was no significant      regurgitation. The peak systolic velocity was 3.07m/sec. The mean      systolic gradient was 17mm Hg. The valve area (VTI) was 1.55cm^2. The      valve area (VTI) index was 0.76cm^2/m^2.   4. Mitral valve: The annulus was markedly calcified. The leaflets were      mildly calcified. There was trivial regurgitation. The mean diastolic      gradient was 3mm Hg.   5. Pulmonary arteries: Systolic pressure was moderately increased, in the      range of 45mm Hg to 50mm Hg.   Impressions:  This study is compared with previous dated 9/14/2024:   *     Echo, 11/2024     Conclusions:     1. Left ventricle: The cavity size was normal. Wall thickness was normal.      Systolic function was normal. The estimated ejection fraction was 50-55%,      by visual assessment. Wall motion is normal; there are no regional wall      motion abnormalities. Unable to assess LV diastolic function.   2. Right ventricle: Systolic function was normal.   3. Left atrium: The atrium was mildly dilated.   4. Aortic valve: Elizondo MINOR S3 23mm (TAVR) bioprosthesis was present,      well seated. No functional/doppler analysis performed.   5. Mitral valve: The annulus was markedly calcified. The leaflets were      mildly calcified. Minimal stenosis. The mean diastolic gradient was 3mm      Hg.   Impressions:  This study is compared with previous dated 9/14/2024: No   significant change.          Assessment and Plan:        Acute on chronic heart failure with preserved ejection fraction (HCC) hypokalemia with eGFR 91    -      net out = 9.6 liters  -     on po torsemide    Atrial fibrillation   HR 70-90s  Watch HR      Severe aortic stenosis S/P TAVR (transcatheter aortic valve replacement) 2019     Coronary artery disease involving native coronary artery of native heart without angina pectoris  S/P coronary artery stent placement to LCx 3/15/21      Essential hypertension   Mixed hyperlipidemia      Chronic obstructive pulmonary disease, unspecified COPD type (HCC)     CKD stage III  Follows with renal   improved     SHELDON on CPAP       PVD with acute limb ischemia and was treated with angiojet and POBA and aborted left pop cutdown 10/23   Cath Findings: Widely patent common femoral, SFA, profunda, popliteal with brisk runoff to the foot via the AT.  The digital branches are patent   Debridement 2-1  MRI confirms OM with podiatry following  - s/p OR  with L 2nd toe arthroplasty and debridement, all infected material removed  - IV vancomycin ongoing post-op        Plan:      Back on coumadin. Aim for INR 2-3  Repeat BMP  tomorrow.   Resumed torsemide at 20 mg daily  Replace K+  Weigh daily      James Robertson MD         [1]   Allergies  Allergen Reactions    Adhesive Tape HIVES     ALL ADHESIVES    Codeine HIVES    Doxycycline SWELLING    Hydrocodone UNKNOWN    Lisinopril TONGUE SWELLING    Morphine Sulfate HIVES    Opioid Analgesics UNKNOWN    Oxycontin ITCHING    Oxytocin HIVES    Vicodin [Hydrocodone-Acetaminophen] ITCHING    Skin Adhesives OTHER (SEE COMMENTS)

## 2025-02-05 NOTE — PLAN OF CARE
Pt okay to discharge per primary and consults. Report given to Andres LAMBERT at [facility]. Patient transport by ambulance.

## 2025-03-02 ENCOUNTER — HOSPITAL ENCOUNTER (EMERGENCY)
Facility: HOSPITAL | Age: 70
Discharge: HOME | End: 2025-03-02
Attending: EMERGENCY MEDICINE
Payer: MEDICARE

## 2025-03-02 ENCOUNTER — APPOINTMENT (OUTPATIENT)
Dept: GENERAL RADIOLOGY | Facility: HOSPITAL | Age: 70
End: 2025-03-02
Attending: EMERGENCY MEDICINE
Payer: MEDICARE

## 2025-03-02 VITALS
OXYGEN SATURATION: 100 % | DIASTOLIC BLOOD PRESSURE: 59 MMHG | HEIGHT: 69 IN | SYSTOLIC BLOOD PRESSURE: 125 MMHG | WEIGHT: 176 LBS | TEMPERATURE: 98 F | HEART RATE: 81 BPM | BODY MASS INDEX: 26.07 KG/M2 | RESPIRATION RATE: 19 BRPM

## 2025-03-02 DIAGNOSIS — D72.829 LEUKOCYTOSIS, UNSPECIFIED TYPE: Primary | ICD-10-CM

## 2025-03-02 LAB
ALBUMIN SERPL-MCNC: 4.1 G/DL (ref 3.2–4.8)
ALBUMIN/GLOB SERPL: 1.1 {RATIO} (ref 1–2)
ALP LIVER SERPL-CCNC: 137 U/L
ALT SERPL-CCNC: 20 U/L
ANION GAP SERPL CALC-SCNC: 15 MMOL/L (ref 0–18)
AST SERPL-CCNC: 46 U/L (ref ?–34)
BASOPHILS # BLD AUTO: 0.02 X10(3) UL (ref 0–0.2)
BASOPHILS NFR BLD AUTO: 0.2 %
BILIRUB SERPL-MCNC: 0.7 MG/DL (ref 0.2–1.1)
BILIRUB UR QL STRIP.AUTO: NEGATIVE
BUN BLD-MCNC: 41 MG/DL (ref 9–23)
CALCIUM BLD-MCNC: 9.6 MG/DL (ref 8.7–10.6)
CHLORIDE SERPL-SCNC: 96 MMOL/L (ref 98–112)
CLARITY UR REFRACT.AUTO: CLEAR
CO2 SERPL-SCNC: 22 MMOL/L (ref 21–32)
CREAT BLD-MCNC: 0.86 MG/DL
EGFRCR SERPLBLD CKD-EPI 2021: 72 ML/MIN/1.73M2 (ref 60–?)
EOSINOPHIL # BLD AUTO: 0.12 X10(3) UL (ref 0–0.7)
EOSINOPHIL NFR BLD AUTO: 1 %
ERYTHROCYTE [DISTWIDTH] IN BLOOD BY AUTOMATED COUNT: 13.6 %
GLOBULIN PLAS-MCNC: 3.8 G/DL (ref 2–3.5)
GLUCOSE BLD-MCNC: 95 MG/DL (ref 70–99)
GLUCOSE UR STRIP.AUTO-MCNC: 150 MG/DL
HCT VFR BLD AUTO: 35.4 %
HGB BLD-MCNC: 12.5 G/DL
IMM GRANULOCYTES # BLD AUTO: 0.07 X10(3) UL (ref 0–1)
IMM GRANULOCYTES NFR BLD: 0.6 %
INR BLD: 3.98 (ref 0.8–1.2)
KETONES UR STRIP.AUTO-MCNC: NEGATIVE MG/DL
LACTATE SERPL-SCNC: 1 MMOL/L (ref 0.5–2)
LEUKOCYTE ESTERASE UR QL STRIP.AUTO: NEGATIVE
LYMPHOCYTES # BLD AUTO: 1.31 X10(3) UL (ref 1–4)
LYMPHOCYTES NFR BLD AUTO: 10.4 %
MCH RBC QN AUTO: 31.3 PG (ref 26–34)
MCHC RBC AUTO-ENTMCNC: 35.3 G/DL (ref 31–37)
MCV RBC AUTO: 88.5 FL
MONOCYTES # BLD AUTO: 1.23 X10(3) UL (ref 0.1–1)
MONOCYTES NFR BLD AUTO: 9.8 %
NEUTROPHILS # BLD AUTO: 9.85 X10 (3) UL (ref 1.5–7.7)
NEUTROPHILS # BLD AUTO: 9.85 X10(3) UL (ref 1.5–7.7)
NEUTROPHILS NFR BLD AUTO: 78 %
NITRITE UR QL STRIP.AUTO: NEGATIVE
OSMOLALITY SERPL CALC.SUM OF ELEC: 286 MOSM/KG (ref 275–295)
PH UR STRIP.AUTO: 5 [PH] (ref 5–8)
PLATELET # BLD AUTO: 407 10(3)UL (ref 150–450)
POTASSIUM SERPL-SCNC: 4 MMOL/L (ref 3.5–5.1)
PROT SERPL-MCNC: 7.9 G/DL (ref 5.7–8.2)
PROT UR STRIP.AUTO-MCNC: NEGATIVE MG/DL
PROTHROMBIN TIME: 39.2 SECONDS (ref 11.6–14.8)
RBC # BLD AUTO: 4 X10(6)UL
RBC UR QL AUTO: NEGATIVE
SODIUM SERPL-SCNC: 133 MMOL/L (ref 136–145)
SP GR UR STRIP.AUTO: 1.01 (ref 1–1.03)
TROPONIN I SERPL HS-MCNC: 25 NG/L
UROBILINOGEN UR STRIP.AUTO-MCNC: NORMAL MG/DL
WBC # BLD AUTO: 12.6 X10(3) UL (ref 4–11)

## 2025-03-02 PROCEDURE — 80053 COMPREHEN METABOLIC PANEL: CPT | Performed by: EMERGENCY MEDICINE

## 2025-03-02 PROCEDURE — 93005 ELECTROCARDIOGRAM TRACING: CPT

## 2025-03-02 PROCEDURE — 93010 ELECTROCARDIOGRAM REPORT: CPT

## 2025-03-02 PROCEDURE — 71045 X-RAY EXAM CHEST 1 VIEW: CPT | Performed by: EMERGENCY MEDICINE

## 2025-03-02 PROCEDURE — 36415 COLL VENOUS BLD VENIPUNCTURE: CPT

## 2025-03-02 PROCEDURE — 96360 HYDRATION IV INFUSION INIT: CPT

## 2025-03-02 PROCEDURE — 99284 EMERGENCY DEPT VISIT MOD MDM: CPT

## 2025-03-02 PROCEDURE — 87040 BLOOD CULTURE FOR BACTERIA: CPT | Performed by: EMERGENCY MEDICINE

## 2025-03-02 PROCEDURE — 99285 EMERGENCY DEPT VISIT HI MDM: CPT

## 2025-03-02 PROCEDURE — 84484 ASSAY OF TROPONIN QUANT: CPT | Performed by: EMERGENCY MEDICINE

## 2025-03-02 PROCEDURE — 81003 URINALYSIS AUTO W/O SCOPE: CPT | Performed by: EMERGENCY MEDICINE

## 2025-03-02 PROCEDURE — 85610 PROTHROMBIN TIME: CPT | Performed by: EMERGENCY MEDICINE

## 2025-03-02 PROCEDURE — 85025 COMPLETE CBC W/AUTO DIFF WBC: CPT | Performed by: EMERGENCY MEDICINE

## 2025-03-02 PROCEDURE — 83605 ASSAY OF LACTIC ACID: CPT | Performed by: EMERGENCY MEDICINE

## 2025-03-03 LAB
Q-T INTERVAL: 356 MS
QRS DURATION: 114 MS
QTC CALCULATION (BEZET): 440 MS
R AXIS: -47 DEGREES
T AXIS: -17 DEGREES
VENTRICULAR RATE: 92 BPM

## 2025-03-03 NOTE — ED PROVIDER NOTES
Patient Seen in: [facility] Emergency Department      History     Chief Complaint   Patient presents with    Abnormal Labs    Weakness     Stated Complaint:     Subjective:   HPI       69 year old female with PMH sig for HFpEF, Afib on coumadin, CAD, PAD, COPD, SHELDON, with bedbound resident at [place], presents ED for evaluation for abnormal labs.  White count was 16,000 BUN was elevated.  Patient is on home oxygen.  Has chronic lower extremity wounds which are painful but unchanged.  Otherwise has no particular complaints    Objective:     Past Medical History:    Aortic stenosis    S/P TAVR    Arrhythmia    ASTHMA    Asthma (HCC)    Back problem    CANCER    thyroid    Cancer (HCC)    thyroid     CHF (congestive heart failure) (HCC)    CKD (chronic kidney disease) stage 2, GFR 60-89 ml/min    Congestive heart disease (HCC)    COPD (chronic obstructive pulmonary disease) (HCC)    Deep vein thrombosis (HCC)    DEPRESSION    Depression    Disorder of thyroid    Esophageal reflux    Essential hypertension    Fibromyalgia    Gout    High blood pressure    High cholesterol    History of blood transfusion    HYPERTENSION    HYPOTHYROIDISM    Incontinence    Muscle weakness    Neuropathy    OBESITY    OSTEOARTHRITIS    Osteoarthritis    OTHER DISEASES    Fibromyalgia    OTHER DISEASES    Pulmonary emboli    OTHER DISEASES    Lymph edema    OTHER DISEASES    Pulmonary hypertension    Peripheral vascular disease    Pneumonia due to organism    Prediabetes    Pulmonary embolism (HCC)    RA (rheumatoid arthritis) (HCC)    Renal disorder    Shortness of breath    ON EXERTION    SLEEP APNEA    Sleep apnea    CPAP              Past Surgical History:   Procedure Laterality Date    Anesth,shoulder replacement Right 2014    hemiathroplasty    Back surgery  2000    Back surgery  2004    complete c-3 -7 ectomy    Biopsy Left 07/20/2023    TEMPORAL ARTERY    Carpal tunnel release      Cath transcatheter aortic valve replacement       Colonoscopy N/A 05/10/2018    Procedure: COLONOSCOPY, POSSIBLE BIOPSY, POSSIBLE POLYPECTOMY 17878;  Surgeon: Kendell Valentin MD;  [facility]    Colonoscopy      Craniotomy for lobotomy Left     D & c  2009    Dilation/curettage,diagnostic      Hip replacement surgery  2009    Rt hip    Knee replacement surgery      Both knees    Other surgical history      Thyroid removal    Other surgical history      LT foot spur removal    Spine surgery procedure unlisted      Thyroidectomy Bilateral     Total hip replacement      Total knee replacement                  Social History     Socioeconomic History    Marital status: Single   Tobacco Use    Smoking status: Former     Current packs/day: 0.00     Average packs/day: 0.5 packs/day for 30.0 years (15.0 ttl pk-yrs)     Types: Cigarettes     Start date: 1961     Quit date: 1991     Years since quittin.1    Smokeless tobacco: Never   Vaping Use    Vaping status: Never Used   Substance and Sexual Activity    Alcohol use: No    Drug use: No   Other Topics Concern    Caffeine Concern No    Exercise Yes    Seat Belt Yes    Special Diet Yes     Comment: low sodium    Stress Concern Yes    Weight Concern No     Social Drivers of Health     Food Insecurity: No Food Insecurity (2025)    Food Insecurity     Food Insecurity: Never true   Transportation Needs: No Transportation Needs (2025)    Transportation Needs     Lack of Transportation: No   Housing Stability: Low Risk  (2025)    Housing Stability     Housing Instability: No                  Physical Exam     ED Triage Vitals    92/60   Pulse 25 85   Resp 25 16   Temp 25 98 °F (36.7 °C)   Temp src 25 Temporal   SpO2 25 92 %   O2 Device 25 Nasal cannula       Current Vitals:   Vital Signs  BP: 125/59  Pulse: 81  Resp: 19  Temp: 98 °F (36.7 °C)  Temp src: Temporal  MAP (mmHg):  75    Oxygen Therapy  SpO2: 100 %  O2 Device: None (Room air)        Physical Exam  Vitals and nursing note reviewed.   HENT:      Head: Normocephalic and atraumatic.   Cardiovascular:      Rate and Rhythm: Normal rate.      Pulses: Normal pulses.   Pulmonary:      Effort: Pulmonary effort is normal. No respiratory distress.      Breath sounds: No stridor. No wheezing or rhonchi.   Abdominal:      General: There is no distension.      Tenderness: There is no abdominal tenderness.   Musculoskeletal:         General: No deformity or signs of injury.   Skin:     Comments: Bilateral lower extremity venous stasis wounds   Neurological:      Mental Status: She is alert.            ED Course     Labs Reviewed   CBC WITH DIFFERENTIAL WITH PLATELET - Abnormal; Notable for the following components:       Result Value    WBC 12.6 (*)     Neutrophil Absolute Prelim 9.85 (*)     Neutrophil Absolute 9.85 (*)     Monocyte Absolute 1.23 (*)     All other components within normal limits   COMP METABOLIC PANEL (14) - Abnormal; Notable for the following components:    Sodium 133 (*)     Chloride 96 (*)     BUN 41 (*)     AST 46 (*)     Globulin  3.8 (*)     All other components within normal limits   URINALYSIS WITH CULTURE REFLEX - Abnormal; Notable for the following components:    Glucose Urine 150 (*)     All other components within normal limits   PROTHROMBIN TIME (PT) - Abnormal; Notable for the following components:    PT 39.2 (*)     INR 3.98 (*)     All other components within normal limits   TROPONIN I HIGH SENSITIVITY - Normal   LACTIC ACID, PLASMA - Normal   RAINBOW DRAW LAVENDER   RAINBOW DRAW LIGHT GREEN   RAINBOW DRAW BLUE   BLOOD CULTURE   BLOOD CULTURE     EKG    Rate, intervals and axes as noted on EKG Report.  Rate: 92  Rhythm: Atrial Fibrillation  Reading: Atrial fibrillation, significant baseline artifact.  No definitive ischemic change                        MDM       \     -History source other than patient  -EMS        -Comorbidities did add complexity to the management are mentioned in the HPI above        -I personally reviewed the prior external notes and the medical record to obtain additional history reviewed discharge summary from January 2025 she was admitted for lower extremity cellulitis suspected osteomyelitis, and acute on chronic CHF.  History of paroxysmal A-fib on Coumadin.-        -DDX: Includes but not limited to dehydration, UTI, pneumonia, sepsis which is a medical condition that can pose a threat to life/function        -I personally reviewed the radiographs findings and they show no definitive infiltrate.  Please refer to radiology report for official interpretation    Labs Reviewed   CBC WITH DIFFERENTIAL WITH PLATELET - Abnormal; Notable for the following components:       Result Value    WBC 12.6 (*)     Neutrophil Absolute Prelim 9.85 (*)     Neutrophil Absolute 9.85 (*)     Monocyte Absolute 1.23 (*)     All other components within normal limits   COMP METABOLIC PANEL (14) - Abnormal; Notable for the following components:    Sodium 133 (*)     Chloride 96 (*)     BUN 41 (*)     AST 46 (*)     Globulin  3.8 (*)     All other components within normal limits   URINALYSIS WITH CULTURE REFLEX - Abnormal; Notable for the following components:    Glucose Urine 150 (*)     All other components within normal limits   PROTHROMBIN TIME (PT) - Abnormal; Notable for the following components:    PT 39.2 (*)     INR 3.98 (*)     All other components within normal limits   TROPONIN I HIGH SENSITIVITY - Normal   LACTIC ACID, PLASMA - Normal   RAINBOW DRAW LAVENDER   RAINBOW DRAW LIGHT GREEN   RAINBOW DRAW BLUE   BLOOD CULTURE   BLOOD CULTURE     Labs reviewed INR 3.9.  Recommended to recheck if it remains elevated may need to be held.  BUN slightly elevated but  seems to be improved from from previous.  Creatinine is normal.  White count is 12.6, lactic acid is normal.  She is hemodynamically stable.  No  evidence of UTI.  Chest x-ray findings reviewed but patient without any cough or respiratory symptoms.  No fever.  No leukocytosis.  Lactic acid normal.  Doubt that this is an acute infection.  Patient is already on antibiotics at the nursing home.  At this point time patient will be discharged back to [facility] for further care.  Return if any concerns or problems.  Patient discharged in stable condition.      Medical Decision Making      Disposition and Plan     Clinical Impression:  1. Leukocytosis, unspecified type         Disposition:  Discharge  3/2/2025 11:08 pm    Follow-up:  Viktoriya Garcias DO  [address] [place] [address]  [phone]    Follow up            Medications Prescribed:  Current Discharge Medication List              Supplementary Documentation:

## 2025-03-03 NOTE — ED INITIAL ASSESSMENT (HPI)
Pt to ED via EMS c/o abnormal labs that were drawn yesterday  and increased weakness starting today, WBCs 16/BUN 49, A&Ox3 on assessment - unsure of time, on 2L NC at baseline - hx COPD

## (undated) DIAGNOSIS — M51.36 LUMBAR DEGENERATIVE DISC DISEASE: Primary | ICD-10-CM

## (undated) DEVICE — GLOVE SURG TRIUMPH SZ 8

## (undated) DEVICE — 3M™ BAIR HUGGER® UNDERBODY BLANKET, FULL ACCESS, 10 PER CASE 63500: Brand: BAIR HUGGER™

## (undated) DEVICE — C-ARM: Brand: UNBRANDED

## (undated) DEVICE — SYRINGE 30ML LL TIP

## (undated) DEVICE — DISPOSABLE TOURNIQUET CUFF SINGLE BLADDER, DUAL PORT AND QUICK CONNECT CONNECTOR: Brand: COLOR CUFF

## (undated) DEVICE — BANDAGE CAST 5X30IN HT PLSTR X FAST SET

## (undated) DEVICE — SUTURE SILK 0 FSL

## (undated) DEVICE — GAUZE SPONGES,12 PLY: Brand: CURITY

## (undated) DEVICE — GLOVE SUR 7.5 SENSICARE PI PIP CRM PWD F

## (undated) DEVICE — DRESSING ADAPTIC L16XW3IN OIL ADH

## (undated) DEVICE — TRANSPOSAL ULTRAFLEX DUO/QUAD ULTRA CART MANIFOLD

## (undated) DEVICE — GOWN,SIRUS,FABRIC-REINFORCED,X-LARGE: Brand: MEDLINE

## (undated) DEVICE — TIBURON DRAPE TOWELS: Brand: CONVERTORS

## (undated) DEVICE — SUT ETHLN 3-0 18IN PS-2 NABSRB BLK 19MM 3/8 C

## (undated) DEVICE — LOWER EXTREMITY CDS-LF: Brand: MEDLINE INDUSTRIES, INC.

## (undated) DEVICE — ANTIBACTERIAL UNDYED BRAIDED (POLYGLACTIN 910), SYNTHETIC ABSORBABLE SUTURE: Brand: COATED VICRYL

## (undated) DEVICE — GOWN SUR 2XL LEV 4 BLU W/ WHT V NK BND AERO

## (undated) DEVICE — REM POLYHESIVE ADULT PATIENT RETURN ELECTRODE: Brand: VALLEYLAB

## (undated) DEVICE — GAUZE - RF AND X-RAY DETECTABLE USP TYPE VII, 16 PLY: Brand: SITUATE

## (undated) DEVICE — 3M™ IOBAN™ 2 ANTIMICROBIAL INCISE DRAPE 6651EZ: Brand: IOBAN™ 2

## (undated) DEVICE — PADDING CAST 4INX4YD 100% COT SFT SLF BOND

## (undated) DEVICE — ADULT, RADIOTRANSPARENT ELEMENT, COMPATIBLE W/ GRAY FLAT CONNECTOR: Brand: DEFIBRILLATION ELECTRODES

## (undated) DEVICE — PRECISION (12.0 X 0.51 X 34.5MM)

## (undated) DEVICE — SOLUTION IRRIG 1000ML 0.9% NACL USP BTL

## (undated) DEVICE — SPONGE LAP 18X18 XRAY STRL

## (undated) DEVICE — 3M™ TEGADERM™ TRANSPARENT FILM DRESSING, 1626W, 4 IN X 4-3/4 IN (10 CM X 12 CM), 50 EACH/CARTON, 4 CARTON/CASE: Brand: 3M™ TEGADERM™

## (undated) DEVICE — X-RAY DETECTABLE SPONGES,16 PLY: Brand: VISTEC

## (undated) DEVICE — 1010 S-DRAPE TOWEL DRAPE 10/BX: Brand: STERI-DRAPE™

## (undated) DEVICE — STANDARD HYPODERMIC NEEDLE,POLYPROPYLENE HUB: Brand: MONOJECT

## (undated) DEVICE — TOWEL OR BLU 16X26 STRL

## (undated) DEVICE — STERILE POLYISOPRENE POWDER-FREE SURGICAL GLOVES: Brand: PROTEXIS

## (undated) DEVICE — SOLUTION SURG DURA PREP HAZMAT

## (undated) DEVICE — SLEEVE COMPR MD KNEE LEN SGL USE KENDALL SCD

## (undated) DEVICE — CSTM UNIVERSAL DRAPE PK: Brand: MEDLINE INDUSTRIES, INC.

## (undated) DEVICE — DRAPE TABLE COVER 44X90 TC-10

## (undated) DEVICE — PADDING CAST 6INX4YD 100% COT ABSRB HIGHLY

## (undated) NOTE — LETTER
[address] [facility] FOR BLOOD OR BLOOD COMPONENTS      In the course of your treatment, it may become necessary to administer a transfusion of blood or blood components.  This form provides basic information concerning this proc If loss of blood poses serious threats in the course of your treatment, THERE IS NO EFFECTIVE ALTERNATIVE TO BLOOD TRANSFUSION.  However, if you have any further questions on this matter, your physician will fully explain the alternatives to you if it has n

## (undated) NOTE — LETTER
Date: 1/11/2022  Patient name:  Frederick Barry  YOB: 1955  Medical Record Number: [id number]  Coverage: Payor: MEDICARE / Plan: MEDICARE PART A&B / Product Type: *No Product type* /   Insurance ID: [id number]  Patient Address: [address] [facility] [place] [address] which one) Dorsal;Right (Active)   Date First Assessed/Time First Assessed: 01/06/22 1451   Present on Hospital Admission: No   Wound Number (Wound Clinic Only): #2 right great toe  Primary Wound Type: Traumatic  Location: Toe (Comment which one)  Wound Lo Yes, mechanical     Compression Stockings ordered: Yes, Product: Extremit-ease compression garment - X-small regular with large liner    Calf  Point of Measurement - Left Calf: 32  Point of Measurement - Right Calf: 32     Calf Left cm[de-identified] 35.4     Right Pradip

## (undated) NOTE — LETTER
Date: 11/9/2021  Patient name:  Tata Quintana  YOB: 1955  Medical Record Number: [id number]  Coverage: Payor: MEDICARE / Plan: MEDICARE PART A&B / Product Type: *No Product type* /   Insurance ID: [id number]  Patient Address: [address] [facility] [place] [address] tissue product application Routine Active Nelwyn Case, APRN       Compression Wrap 10/26/21 Leg Anterior;Right (Active)   Placement Date/Time: 10/26/21 1702   Location: Leg  Wound Location Orientation: Anterior;Right      Assessments 10/26/2021  5:03 PM

## (undated) NOTE — IP AVS SNAPSHOT
[address]            (For Outpatient Use Only) Initial Admit Date: 10/12/2022   Inpt/Obs Admit Date: Inpt: N/A / Obs: 10/12/22   Discharge Date:    Hospital Acct:  [de-identified]   MRN: [de-identified]   CSN: [id number]   CEID: [id number]        ENCOUNTER  Patient Class: Observation Admitting Provider: Mana Holguin MD Unit: [address] [place] [address] Service: Medical Attending Provider: Delroy Dorantes MD   Bed: 9465-S   Visit Type:   Referring Physician: No ref. provider found Billing Flag:    Admit Diagnosis: Hip pain [M25.559]      PATIENT  Legal Name:   Jaimee Sebastian   Legal Sex: Female  Gender ID: Female             Pref Name:   [facility] PCP:  Dieter Reilly DO Home: [phone]   Address:  [address] [place] [address] : 1955 (67 yrs) Mobile: [phone]         City/State/Zip: [facility] Marital: Single Language: Nkechi Sabal: Biju Cast SSN4: [id number] Cheondoism: [address] Not Albin Ion*     Race: White Ethnicity: Non  Or [address]   Name Relationship Legal Guardian? Home Phone Work Phone Mobile Phone   1. Sean Weiss  2.  Howard Gore Brother  Friend No         [phone]     GUARANTOR  Guarantor: Don Dunaway : 1955 Home Phone: [phone]   Address: [address] [place] [address]  Sex: Female Work Phone: [phone]   City/State/Zip: [facility]   Rel. to Patient: Self Guarantor ID: [id number]   GUARANTOR EMPLOYER   Employer:  Status: RETIRED     COVERAGE  PRIMARY INSURANCE   Payor: MEDICARE Plan: MEDICARE PART A&B   Group Number:  Insurance Type: Francesco [id number] Name: Leilani Rojas : 1955   Subscriber ID: [id number] Pt Rel to Subscriber: Self   SECONDARY INSURANCE   Payor: AARELIA Plan: [address]   Group Number:  Insurance Type: INDEMNITY   Subscriber Name: Leilani Rojas : 1955   Subscriber ID: [id number] Pt Rel to Subscriber: SELF   TERTIARY INSURANCE   Payor:  Plan:    Group Number: Insurance Type:    Subscriber Name:  Subscriber :    Subscriber ID:  Pt Rel to Subscriber:    Hospital Account Financial Class: Medicare    2022

## (undated) NOTE — LETTER
Date: 12/9/2021  Patient name:  Chela Sullivan  YOB: 1955  Medical Record Number: [id number]  Coverage: Payor: MEDICARE / Plan: MEDICARE PART A&B / Product Type: *No Product type* /   Insurance ID: [id number]  Patient Address: [address] [facility] [place] [address] ordered: Yes, Product: Extremit-ease garment X-SMALL REGULAR with a large liner      Calf  Point of Measurement - Right Calf: 32  Right Calf cm[de-identified] 34     Ankle  Point of Measurement - Right Ankle: 11  Right Ankle cm[de-identified] 20     Wound Information/Order:  Was a

## (undated) NOTE — IP AVS SNAPSHOT
[address]            (For Outpatient Use Only) Initial Admit Date: 2022   Inpt/Obs Admit Date: Inpt: 22 / Obs: N/A   Discharge Date:    Marianne Knee:  [de-identified]   MRN: [de-identified]   CSN: [id number]   CEID: [id number]        ENCOUNTER  Patient Class: Inpatient Admitting Provider: Tino Cain MD Unit: [place] [address] Service: Cardiac Telemetry Attending Provider: Shabana Norris MD   Bed: 1499-E   Visit Type:   Referring Physician: No ref. provider found Billing Flag:    Admit Diagnosis: Cellulitis of right lower extremity [D05.539]      PATIENT  Legal Name:   Andres Rowell   Legal Sex: Female  Gender ID: Female             Pref Name:   [facility] PCP:  Sid Cano DO Home:    Address:  [address] [place] [address] : 1955 (67 yrs) Mobile: [phone]         City/State/Zip: [place] Marital: Single Language: Minetta Randall: Gayathri Jesu SSN4: [id number] Advent: [address] Not Elin Fenton*     Race: White Ethnicity: Non  Or [address]   Name Relationship Legal Guardian? Home Phone Work Phone Mobile Phone   1. Sean Weiss  2.  Jimena Or Brother  Friend No         [phone]     GUARANTOR  Guarantor: Rosalee Arianna : 1955 Home Phone: [phone]   Address: [address] [place] [address]  Sex: Female Work Phone: [phone]   City/State/Zip: [place]   Rel. to Patient: Self Guarantor ID: [id number]   GUARANTOR EMPLOYER   Employer:  Status: RETIRED     COVERAGE  PRIMARY INSURANCE   Payor: MEDICARE Plan: MEDICARE PART A&B   Group Number:  Insurance Type: [id number] Name: Salena Santillan : 1955   Subscriber ID: [id number] Pt Rel to Subscriber: Self   SECONDARY INSURANCE   Payor: RHYS Plan: [address]   Group Number:  Insurance Type: INDEMNITY   Subscriber Name: Salena Santillan : 1955   Subscriber ID: [id number] Pt Rel to Subscriber: SELF   TERTIARY INSURANCE   Payor:  Plan: Group Number:  Insurance Type:    Subscriber Name:  Subscriber :    Subscriber ID:  Pt Rel to Subscriber:    Hospital Account Financial Class: Medicare    2022

## (undated) NOTE — LETTER
TOM Notifier: [address]  B. Patient Name: Luann Roth Identification Number: [id number]    Advance Beneficiary Notice of Non-coverage (ABN)  NOTE: If Medicare doesn't pay for D. item/service(s) below, you may have to pay. Medicare does not pay for everything, even some care that you or your health care provider have good reason to think you need. We expect Medicare may not pay for the D. item/service(s) below. DCornelious Argue Reason Medicare May Not Pay: F. Estimated Cost   ___Kenalog with Lidocaine and Marcaine   $320  ___Betamethasone with Lidocaine and/or Marcaine    $48  ___ Kenalog, Ketorolac, with Lidocaine and/or Marcaine   $370  ___Durolane      $5046  ___Euflexxa          $804  ___Hyalgan/Supartz  $2653  ___Orthovisc         $190  ___Synvisc   $1104  ___Synvisc One    $368  ___Monovisc         $1088  ___Zilretta        $856  ___Injection intra-articular $260  __  Medicare does not cover this service      __  Medicare may not pay for this   item/service for your condition     __  Medicare may not pay for this item/service as often as this          WHAT YOU NEED TO DO NOW:  Read this notice, so you can make an informed decision about your care. Ask us any questions that you may have after you finish reading. Choose an option below about whether to receive the D. item/service(s) listed above. Note: If you choose Option 1 or 2, we may help you to use any other insurance that you might have, but Medicare cannot require us to do this. G. OPTIONS: Check only one box. We cannot choose a box for you. OPTION 1. I want the D. item/service(s) listed above. You may ask to be paid now, but I also want Medicare billed for an official decision on payment, which is sent to me on a Medicare Summary Notice (MSN). I understand that if Medicare doesn't pay, I am responsible for payment, but I can appeal to Medicare by following the directions on the MSN.  If Medicare does pay, you will refund any payments I made to you, less co-pays or deductibles. OPTION 2. I want the D. item/service(s) listed above, but do not bill Medicare. You may ask to be paid now as I am responsible for payment. I cannot appeal if Medicare is not billed. OPTION 3. I don't want the D. item/service(s) listed above. I understand with this choice I am not responsible for payment, and I cannot appeal to see if Medicare would pay. H. Additional Information: This notice gives our opinion, not an official Medicare decision. If you have other questions on this notice or Medicare billing, call 1-800-MEDICARE (7-326.210.5727/PFE: 2-680.649.3215). Signing below means that you have received and understand this notice. You may ask to receive a copy. I. Signature: J. Date:   You have the right to get Medicare information in an accessible format, like large print, Braille, or audio. You also have the right to file a complaint if you feel you've been discriminated against. Visit Medicare.gov/about- us/lxceafdrmpqif-qaihxjhmotuojbgvs-kgcfsi. According to the Paperwork Reduction Act of 1995, no persons are required to respond to a collection of information unless it displays a valid OMB control number. The valid OMB control number for this information collection is 6703-2089. The time required to complete this information collection is estimated to average 7 minutes per response, including the time to review instructions, search existing data resources, gather the data needed, and complete and review the information collection. If you have comments concerning the accuracy of the time estimate or suggestions for improving this form, please write to: CMS, [address], Attn: [address]   Form CMS-R-131 (Exp.01/31/2026) Form Approved OMB No. [id number]

## (undated) NOTE — IP AVS SNAPSHOT
[address]            (For Outpatient Use Only) Initial Admit Date: 2022   Inpt/Obs Admit Date: Inpt: 22 / Obs: N/A   Discharge Date:    Latrell Lazaro:  [de-identified]   MRN: [de-identified]   CSN: [id number]   CEID: [id number]        ENCOUNTER  Patient Class: Inpatient Admitting Provider: Aman Curtis MD Unit: [address] [place] [address] Service: Medical Attending Provider: Madiha Montalvo MD   Bed: 1384-Z   Visit Type:   Referring Physician: No ref. provider found Billing Flag:    Admit Diagnosis: Gross hematuria [R31.0]      PATIENT  Legal Name:   Leticia Sequeira   Legal Sex: Female  Gender ID: Female             Pref Name:   [facility] PCP:  Shelly Aparicio DO Home: [phone]   Address:  [address] [place] [address] : 1955 (67 yrs) Mobile: [phone]         City/[facility]: [facility] Marital: Single Language: Geovaniananyadouglas Ego: Alexis Sahu SSN4: [id number] Gnosticism: [address] Not Lacey Villaseñor*     Race: White Ethnicity: Non  Or [address]   Name Relationship Legal Guardian? Home Phone Work Phone Mobile Phone   1. Sean Weiss  2.  Dirk Ivory Brother  Friend No         [phone]     GUARANTOR  Guarantor: Emili Chavira : 1955 Home Phone: [phone]   Address: [address] [place] [address]  Sex: Female Work Phone: [phone]   City/State/Zip: [facility]   Rel. to Patient: Self Guarantor ID: [id number]   GUARANTOR EMPLOYER   Employer:  Status: RETIRED     COVERAGE  PRIMARY INSURANCE   Payor: MEDICARE Plan: MEDICARE PART A&B   Group Number:  Insurance Type: [address] Name: Liliya Mccarthy : 1955   Subscriber ID: [id number] Pt Rel to Subscriber: Self   SECONDARY INSURANCE   Payor: RHYS Plan: [address]   Group Number:  Insurance Type: INDEMNITY   Subscriber Name: Liliya Mccarthy : 1955   Subscriber ID: [id number] Pt Rel to Subscriber: SELF   TERTIARY INSURANCE   Payor:  Plan:    Group Number: Insurance Type:    Subscriber Name:  Subscriber :    Subscriber ID:  Pt Rel to Subscriber:    Hospital Account Financial Class: Medicare    2022

## (undated) NOTE — LETTER
[address]  [address]  Authorization for Surgical Operation and Procedure     Date:___________                                                                                                         Time:__________  I hereby authorize Transesophageal Echocardiogram , my physician and his/her assistants (if applicable), which may include medical students, residents, and/or fellows, to perform the following surgical operation/ procedure and administer such anesthesia as may be determined necessary by my physician: Otoniel French MD Operation/Procedure name (s)  on Arleth Weiss   2.   I recognize that during the surgical operation/procedure, unforeseen conditions may necessitate additional or different procedures than those listed above.  I, therefore, further authorize and request that the above-named surgeon, assistants, or designees perform such procedures as are, in their judgment, necessary and desirable.    3.   My surgeon/physician has discussed prior to my surgery the potential benefits, risks and side effects of this procedure; the likelihood of achieving goals; and potential problems that might occur during recuperation.  They also discussed reasonable alternatives to the procedure, including risks, benefits, and side effects related to the alternatives and risks related to not receiving this procedure.  I have had all my questions answered and I acknowledge that no guarantee has been made as to the result that may be obtained.    4.   Should the need arise during my operation/procedure, which includes change of level of care prior to discharge, I also consent to the administration of blood and/or blood products.  Further, I understand that despite careful testing and screening of blood or blood products by collecting agencies, I may still be subject to ill effects as a result of receiving a blood transfusion and/or blood products.  The following are some, but not  all, of the potential risks that can occur: fever and allergic reactions, hemolytic reactions, transmission of diseases such as Hepatitis, AIDS and Cytomegalovirus (CMV) and fluid overload.  In the event that I wish to have an autologous transfusion of my own blood, or a directed donor transfusion, I will discuss this with my physician.  Check only if Refusing Blood or Blood Products  I understand refusal of blood or blood products as deemed necessary by my physician may have serious consequences to my condition to include possible death. I hereby assume responsibility for my refusal and release the hospital, its personnel, and my physicians from any responsibility for the consequences of my refusal.          o  Refuse      5.   I authorize the use of any specimen, organs, tissues, body parts or foreign objects that may be removed from my body during the operation/procedure for diagnosis, research or teaching purposes and their subsequent disposal by hospital authorities.  I also authorize the release of specimen test results and/or written reports to my treating physician on the hospital medical staff or other referring or consulting physicians involved in my care, at the discretion of the Pathologist or my treating physician.    6.   I consent to the photographing or videotaping of the operations or procedures to be performed, including appropriate portions of my body for medical, scientific, or educational purposes, provided my identity is not revealed by the pictures or by descriptive texts accompanying them.  If the procedure has been photographed/videotaped, the surgeon will obtain the original picture, image, videotape or CD.  The hospital will not be responsible for storage, release or maintenance of the picture, image, tape or CD.    7.   I consent to the presence of a  or observers in the operating room as deemed necessary by my physician or their designees.    8.   I recognize that in  the event my procedure results in extended X-Ray/fluoroscopy time, I may develop a skin reaction.    9. If I have a Do Not Attempt Resuscitation (DNAR) order in place, that status will be suspended while in the operating room, procedural suite, and during the recovery period unless otherwise explicitly stated by me (or a person authorized to consent on my behalf). The surgeon or my attending physician will determine when the applicable recovery period ends for purposes of reinstating the DNAR order.  10. Patients having a sterilization procedure: I understand that if the procedure is successful the results will be permanent and it will therefore be impossible for me to inseminate, conceive, or bear children.  I also understand that the procedure is intended to result in sterility, although the result has not been guaranteed.   11. I acknowledge that my physician has explained sedation/analgesia administration to me including the risk and benefits I consent to the administration of sedation/analgesia as may be necessary or desirable in the judgment of my physician.    I CERTIFY THAT I HAVE READ AND FULLY UNDERSTAND THE ABOVE CONSENT TO OPERATION and/or OTHER PROCEDURE.    _________________________________________  __________________________________  Signature of Patient     Signature of Responsible Person         ___________________________________         Printed Name of Responsible Person           _________________________________                 Relationship to Patient  _________________________________________  ______________________________  Signature of Witness          Date  Time      Patient Name: Arleth Weiss     : 1955                 Printed: 2024     Medical Record #: [id number]                     Page 1 of [address] [place] [address]  [id number]    Consent for Anesthesia    I, Arleth Weiss agree to be cared for by an  anesthesiologist, who is specially trained to monitor me and give me medicine to put me to sleep or keep me comfortable during my procedure    I understand that my anesthesiologist is not an employee or agent of [facility] or [employer]. He or she works for [employer].    As the patient asking for anesthesia services, I agree to:  Allow the anesthesiologist (anesthesia doctor) to give me medicine and do additional procedures as necessary. Some examples are: Starting or using an “IV” to give me medicine, fluids or blood during my procedure, and having a breathing tube placed to help me breathe when I’m asleep (intubation). In the event that my heart stops working properly, I understand that my anesthesiologist will make every effort to sustain my life, unless otherwise directed by [facility] Do Not Resuscitate documents.  Tell my anesthesia doctor before my procedure:  If I am pregnant.  The last time that I ate or drank.  All of the medicines I take (including prescriptions, herbal supplements, and pills I can buy without a prescription (including street drugs/illegal medications). Failure to inform my anesthesiologist about these medicines may increase my risk of anesthetic complications.  If I am allergic to anything or have had a reaction to anesthesia before.  I understand how the anesthesia medicine will help me (benefits).  I understand that with any type of anesthesia medicine there are risks:  The most common risks are: nausea, vomiting, sore throat, muscle soreness, damage to my eyes, mouth, or teeth (from breathing tube placement).  Rare risks include: remembering what happened during my procedure, allergic reactions to medications, injury to my airway, heart, lungs, vision, nerves, or muscles and in extremely rare instances death.  My doctor has explained to me other choices available to me for my care (alternatives).  Pregnant Patients (“epidural”):  I understand  that the risks of having an epidural (medicine given into my back to help control pain during labor), include itching, low blood pressure, difficulty urinating, headache or slowing of the baby’s heart. Very rare risks include infection, bleeding, seizure, irregular heart rhythms and nerve injury.  Regional Anesthesia (“spinal”, “epidural”, & “nerve blocks”):  I understand that rare but potential complications include headache, bleeding, infection, seizure, irregular heart rhythms, and nerve injury.    I can change my mind about having anesthesia services at any time before I get the medicine.    _____________________________________________________________________________  Patient (or Representative) Signature/Relationship to Patient  Date   Time    _____________________________________________________________________________   Name (if used)    Language/Organization   Time    _____________________________________________________________________________  Anesthesiologist Signature     Date   Time  I have discussed the procedure and information above with the patient (or patient’s representative) and answered their questions. The patient or their representative has agreed to have anesthesia services.    _____________________________________________________________________________  Witness        Date   Time  I have verified that the signature is that of the patient or patient’s representative, and that it was signed before the procedure  Patient Name: Arleth Weiss     : 1955                 Printed: 2024     Medical Record #: [id number]                     Page 2 of 2

## (undated) NOTE — IP AVS SNAPSHOT
[address]            (For Outpatient Use Only) Initial Admit Date: 3/14/2022   Inpt/Obs Admit Date: Inpt: 3/14/22 / Obs: N/A   Discharge Date:    Noni Poles:  [de-identified]   MRN: [de-identified]   CSN: [id number]   CEID: [id number]        ENCOUNTER  Patient Class: Inpatient Admitting Provider: Yoselyn Medeiros MD Unit: [address] [facility] 3SW-A   Hospital Service: Medical Attending Provider: Yoselyn Medeiros MD   Bed: 373-A   Visit Type:   Referring Physician: No ref. provider found Billing Flag:    Admit Diagnosis: Hypokalemia [E87.6]      PATIENT  Legal Name:   Suzanna Home   Legal Sex: Female  Gender ID: Female             Pref Name:   [facility] PCP:  Roula Phipps DO Home: [phone]   Address:  [address] [place] [address] : 1955 (66 yrs) Mobile: No mobile phone on file. [facility] Scheuermann, [place] Marital: Single Language: [address] [place] [address] Drive: [employer] SSN4: [id number] Yazidi: [address] Not Courtney Arms*     Race: White Ethnicity: Non  Or  O*   EMERGENCY CONTACT   Name Relationship Legal Guardian? Home Phone Work Phone Mobile Phone   1. Sean Weiss  2.  Pieter Adkins Brother  Friend No         [phone]     GUARANTOR  Guarantor: Angelique Hancock : 1955 Home Phone: [phone]   Address: [address] [place] [address]  Sex: Female Work Phone: [phone]   City/[facility]: [place]   Rel. to Patient: Self Guarantor ID: [id number]   GUARANTOR EMPLOYER   Employer:  Status: RETIRED     COVERAGE  PRIMARY INSURANCE   Payor: MEDICARE Plan: MEDICARE PART A&B   Group Number:  Insurance Type: [address] Name: Dwayne Solomon : 1955   Subscriber ID: [id number] Pt Rel to Subscriber: Self   SECONDARY INSURANCE   Payor: RHYS Plan: [address]   Group Number:  Insurance Type: INDEMNITY   Subscriber Name: Dwayne Solomon : 1955   Subscriber ID: [id number] Pt Rel to Subscriber: SELF   TERTIARY INSURANCE   Payor:  Plan:    Group Number:  Insurance Type:    Subscriber Name:  Subscriber :    Subscriber ID:  Pt Rel to Subscriber:    Hospital Account Financial Class: Medicare    2022

## (undated) NOTE — LETTER
Jade Estrada M.D., F.A.C.S. Osvaldo Richardson M.D., F.A.C.S. Alpa Braxton M.D., Daniela Covington. HARISH Panchal M.D., F.A.C.S. Herminia Robledo. Champ Strauss M.D., F.A.C.S. Yolie Vaughan M.D. JENNIFER Felix M.D., F. To that end, on the following page we will ask you some questions to make certain that you understand everything which has been explained to you.  Included in this understanding is that there are both surgical and nonsurgical treatments available for you, t [facility] ([facility]) surgeon has met with me and explained the matter of my illness, and what treatments might be available to improve my condition.  As a result of that conversation, I understand the following:    A [facility] surgeon met with The nature and options for treatment for my condition have been explained to me, in detail, by a [facility] surgeon and all questions have been answered to my satisfaction.  I understand that I am not required to undergo surgery, and further, that if I so desire,

## (undated) NOTE — LETTER
Kathy Glasgow [address]  [address] [facility] [address] [facility]  Authorization for Surgical Operation and Procedure     Date:___________                                                                                                         Time:__________ 4.   Should the need arise during my operation or immediate post-operative period, I also consent to the administration of blood and/or blood products.   Further, I understand that despite careful testing and screening of blood or blood products by korin 8.   I recognize that in the event my procedure results in extended X-Ray/fluoroscopy time, I may develop a skin reaction. 9.  If I have a Do Not Attempt Resuscitation (DNAR) order in place, that status will be suspended while in the operating room, proc 1. IShanae agree to be cared for by an anesthesiologist, who is specially trained to monitor me and give me medicine to put me to sleep or keep me comfortable during my procedure    I understand that my anesthesiologist is not an employee or agent 5. My doctor has explained to me other choices available to me for my care (alternatives).   6. Pregnant Patients (“epidural”):  I understand that the risks of having an epidural (medicine given into my back to help control pain during labor), include itchi

## (undated) NOTE — LETTER
[address]  [address]  Authorization for Surgical Operation and Procedure     Date:___________                                                                                                         Time:__________  I hereby authorize Surgeon(s):  Dago Diaz DPM, my physician and his/her assistants (if applicable), which may include medical students, residents, and/or fellows, to perform the following surgical operation/ procedure and administer such anesthesia as may be determined necessary by my physician:  Operation/Procedure name (s) Procedure(s):  LEFT SECOND TOE ARTHROPLASTY AND WOUND DEBRIDEMENT on Arleth Weiss   2.   I recognize that during the surgical operation/procedure, unforeseen conditions may necessitate additional or different procedures than those listed above.  I, therefore, further authorize and request that the above-named surgeon, assistants, or designees perform such procedures as are, in their judgment, necessary and desirable.    3.   My surgeon/physician has discussed prior to my surgery the potential benefits, risks and side effects of this procedure; the likelihood of achieving goals; and potential problems that might occur during recuperation.  They also discussed reasonable alternatives to the procedure, including risks, benefits, and side effects related to the alternatives and risks related to not receiving this procedure.  I have had all my questions answered and I acknowledge that no guarantee has been made as to the result that may be obtained.    4.   Should the need arise during my operation/procedure, which includes change of level of care prior to discharge, I also consent to the administration of blood and/or blood products.  Further, I understand that despite careful testing and screening of blood or blood products by collecting agencies, I may still be subject to ill effects as a result of receiving a blood transfusion and/or blood  products.  The following are some, but not all, of the potential risks that can occur: fever and allergic reactions, hemolytic reactions, transmission of diseases such as Hepatitis, AIDS and Cytomegalovirus (CMV) and fluid overload.  In the event that I wish to have an autologous transfusion of my own blood, or a directed donor transfusion, I will discuss this with my physician.  Check only if Refusing Blood or Blood Products  I understand refusal of blood or blood products as deemed necessary by my physician may have serious consequences to my condition to include possible death. I hereby assume responsibility for my refusal and release the hospital, its personnel, and my physicians from any responsibility for the consequences of my refusal.          o  Refuse      5.   I authorize the use of any specimen, organs, tissues, body parts or foreign objects that may be removed from my body during the operation/procedure for diagnosis, research or teaching purposes and their subsequent disposal by hospital authorities.  I also authorize the release of specimen test results and/or written reports to my treating physician on the hospital medical staff or other referring or consulting physicians involved in my care, at the discretion of the Pathologist or my treating physician.    6.   I consent to the photographing or videotaping of the operations or procedures to be performed, including appropriate portions of my body for medical, scientific, or educational purposes, provided my identity is not revealed by the pictures or by descriptive texts accompanying them.  If the procedure has been photographed/videotaped, the surgeon will obtain the original picture, image, videotape or CD.  The hospital will not be responsible for storage, release or maintenance of the picture, image, tape or CD.    7.   I consent to the presence of a  or observers in the operating room as deemed necessary by my physician or  their designees.    8.   I recognize that in the event my procedure results in extended X-Ray/fluoroscopy time, I may develop a skin reaction.    9. If I have a Do Not Attempt Resuscitation (DNAR) order in place, that status will be suspended while in the operating room, procedural suite, and during the recovery period unless otherwise explicitly stated by me (or a person authorized to consent on my behalf). The surgeon or my attending physician will determine when the applicable recovery period ends for purposes of reinstating the DNAR order.  10. Patients having a sterilization procedure: I understand that if the procedure is successful the results will be permanent and it will therefore be impossible for me to inseminate, conceive, or bear children.  I also understand that the procedure is intended to result in sterility, although the result has not been guaranteed.   11. I acknowledge that my physician has explained sedation/analgesia administration to me including the risk and benefits I consent to the administration of sedation/analgesia as may be necessary or desirable in the judgment of my physician.    I CERTIFY THAT I HAVE READ AND FULLY UNDERSTAND THE ABOVE CONSENT TO OPERATION and/or OTHER PROCEDURE.    _________________________________________  __________________________________  Signature of Patient     Signature of Responsible Person         ___________________________________         Printed Name of Responsible Person           _________________________________                 Relationship to Patient  _________________________________________  ______________________________  Signature of Witness          Date  Time      Patient Name: Arleth Weiss     : 1955                 Printed: 2025     Medical Record #: [id number]                     Page 1 of [address] [place] [address] [place]  [address]    Consent for Anesthesia    IArleth  KEILA Weiss agree to be cared for by an anesthesiologist, who is specially trained to monitor me and give me medicine to put me to sleep or keep me comfortable during my procedure    I understand that my anesthesiologist is not an employee or agent of [facility] [employer]. He or she works for [employer].    As the patient asking for anesthesia services, I agree to:  Allow the anesthesiologist (anesthesia doctor) to give me medicine and do additional procedures as necessary. Some examples are: Starting or using an “IV” to give me medicine, fluids or blood during my procedure, and having a breathing tube placed to help me breathe when I’m asleep (intubation). In the event that my heart stops working properly, I understand that my anesthesiologist will make every effort to sustain my life, unless otherwise directed by [facility] Do Not Resuscitate documents.  Tell my anesthesia doctor before my procedure:  If I am pregnant.  The last time that I ate or drank.  All of the medicines I take (including prescriptions, herbal supplements, and pills I can buy without a prescription (including street drugs/illegal medications). Failure to inform my anesthesiologist about these medicines may increase my risk of anesthetic complications.  If I am allergic to anything or have had a reaction to anesthesia before.  I understand how the anesthesia medicine will help me (benefits).  I understand that with any type of anesthesia medicine there are risks:  The most common risks are: nausea, vomiting, sore throat, muscle soreness, damage to my eyes, mouth, or teeth (from breathing tube placement).  Rare risks include: remembering what happened during my procedure, allergic reactions to medications, injury to my airway, heart, lungs, vision, nerves, or muscles and in extremely rare instances death.  My doctor has explained to me other choices available to me for my care (alternatives).  Pregnant  Patients (“epidural”):  I understand that the risks of having an epidural (medicine given into my back to help control pain during labor), include itching, low blood pressure, difficulty urinating, headache or slowing of the baby’s heart. Very rare risks include infection, bleeding, seizure, irregular heart rhythms and nerve injury.  Regional Anesthesia (“spinal”, “epidural”, & “nerve blocks”):  I understand that rare but potential complications include headache, bleeding, infection, seizure, irregular heart rhythms, and nerve injury.    I can change my mind about having anesthesia services at any time before I get the medicine.    _____________________________________________________________________________  Patient (or Representative) Signature/Relationship to Patient  Date   Time    _____________________________________________________________________________   Name (if used)    Language/Organization   Time    _____________________________________________________________________________  Anesthesiologist Signature     Date   Time  I have discussed the procedure and information above with the patient (or patient’s representative) and answered their questions. The patient or their representative has agreed to have anesthesia services.    _____________________________________________________________________________  Witness        Date   Time  I have verified that the signature is that of the patient or patient’s representative, and that it was signed before the procedure  Patient Name: Arleth Weiss     : 1955                 Printed: 2025     Medical Record #: [id number]                     Page 2 of 2

## (undated) NOTE — LETTER
[address], [address] [facility], [facility] [address]  [address], [address] [place] [address]   [phone] [address] [facility], [address] [place] [address], [address] [place] [address]   [phone]       March 4, 2021    [place] [address]

## (undated) NOTE — LETTER
23  [facility] Orthopedic Surgery   Pre-Operative Clearance Request    Patient Name:   Fito Grace             :   1955    Surgeon: Dr. Estefana Spurling             Date of Surgery: 23    Surgical Procedure: LEFT REVERSE SHOULDER ARTHROPLASTY. Please complete all of the following 2-3 weeks PRIOR TO your scheduled surgery to avoid potential cancellation. [x]  History and Physical        [x]  Medical  Clearance                     Required pre-op testing to be ordered:                        [x]  CBC W/Diff                                                                   [x]  CMP                                                                                                                                                   [x]  Type and Screen                     [x]  Other:   EKG 12 LEAD         **Please fax test results, H&P, and clearance to [phone] and to [facility] at [phone]**

## (undated) NOTE — LETTER
24      Orthopedic Surgery   Pre-Operative Clearance Request    Patient Name:   Arleth Weiss             :   1955    Surgeon: Dr. Collins             Date of Surgery: 2024    Surgical Procedure: LEFT REVERSE SHOULDER ARTHROPLASTY BICEPS TENODESIS       MUST COMPLETE ALL OF THE FOLLOWING 2-3 WEEKS PRIOR TO YOUR SURGERY TO AVOID CANCELLATION, DUE TO THE RULE THEIR WILL BE NO EXCEPTIONS!      [x]  History and Physical        [x]  Medical  Clearance                                                                                                                                                                   **Please fax test results, H&P, and clearance to [phone] and to [facility] at [phone]**

## (undated) NOTE — LETTER
[address]  [address]  Consent for Procedure/Sedation  Date: ***         Time: ***    I hereby authorize ***, my physician and his/her assistants (if applicable), which may include medical students, residents, and/or fellows, to perform the following surgical operation/ procedure and administer such anesthesia as may be determined necessary by my physician:  Operation/Procedure name (s) Peripheral angiography, atherectomy, percutaneous transluminal angioplasty (PTA) and/or vascular stenting on Arleth Weiss  2.   I recognize that during the surgical operation/procedure, unforeseen conditions may necessitate additional or different procedures than those listed above.  I, therefore, further authorize and request that the above-named surgeon, assistants, or designees perform such procedures as are, in their judgment, necessary and desirable.    3.   My surgeon/physician has discussed prior to my surgery the potential benefits, risks and side effects of this procedure; the likelihood of achieving goals; and potential problems that might occur during recuperation.  They also discussed reasonable alternatives to the procedure, including risks, benefits, and side effects related to the alternatives and risks related to not receiving this procedure.  I have had all my questions answered and I acknowledge that no guarantee has been made as to the result that may be obtained.    4.   Should the need arise during my operation/procedure, which includes change of level of care prior to discharge, I also consent to the administration of blood and/or blood products.  Further, I understand that despite careful testing and screening of blood or blood products by collecting agencies, I may still be subject to ill effects as a result of receiving a blood transfusion and/or blood products.  The following are some, but not all, of the potential risks that can occur: fever and allergic reactions,  hemolytic reactions, transmission of diseases such as Hepatitis, AIDS and Cytomegalovirus (CMV) and fluid overload.  In the event that I wish to have an autologous transfusion of my own blood, or a directed donor transfusion, I will discuss this with my physician.     Check only if Refusing Blood or Blood Products  I understand refusal of blood or blood products as deemed necessary by my physician may have serious consequences to my condition to include possible death. I hereby assume responsibility for my refusal and release the hospital, its personnel, and my physicians from any responsibility for the consequences of my refusal.      o  Refuse        5.   I authorize the use of any specimen, organs, tissues, body parts or foreign objects that may be removed from my body during the operation/procedure for diagnosis, research or teaching purposes and their subsequent disposal by hospital authorities.  I also authorize the release of specimen test results and/or written reports to my treating physician on the hospital medical staff or other referring or consulting physicians involved in my care, at the discretion of the Pathologist or my treating physician.    6.   I consent to the photographing or videotaping of the operations or procedures to be performed, including appropriate portions of my body for medical, scientific, or educational purposes, provided my identity is not revealed by the pictures or by descriptive texts accompanying them.  If the procedure has been photographed/videotaped, the surgeon will obtain the original picture, image, videotape or CD.  The hospital will not be responsible for storage, release or maintenance of the picture, image, tape or CD.    7.   I consent to the presence of a  or observers in the operating room as deemed necessary by my physician or their designees.    8.   I recognize that in the event my procedure results in extended X-Ray/fluoroscopy time, I may  develop a skin reaction.    9. If I have a Do Not Attempt Resuscitation (DNAR) order in place, that status will be suspended while in the operating room, procedural suite, and during the recovery period unless otherwise explicitly stated by me (or a person authorized to consent on my behalf). The surgeon or my attending physician will determine when the applicable recovery period ends for purposes of reinstating the DNAR order.  10. Patients having a sterilization procedure: I understand that if the procedure is successful the results will be permanent and it will therefore be impossible for me to inseminate, conceive, or bear children.  I also understand that the procedure is intended to result in sterility, although the result has not been guaranteed.   11. I acknowledge that my physician has explained sedation/analgesia administration to me including the risk and benefits I consent to the administration of sedation/analgesia as may be necessary or desirable in the judgment of my physician.    I CERTIFY THAT I HAVE READ AND FULLY UNDERSTAND THE ABOVE CONSENT TO OPERATION and/or OTHER PROCEDURE.      ____________________________________       _________________________________      ______________________________  Signature of Patient         Signature of Responsible Person        Printed Name of Responsible Person    ____________________________________      _________________________________      ______________________________       Signature of Witness          Relationship to Patient                       Date                                         Time  Patient Name: Arleth Weiss  : 1955    Reviewed: 2024   Printed: 2025  Medical Record #: [id number] Page 1 of 1

## (undated) NOTE — LETTER
[facility] [address], [address]  Consent for Procedure/Sedation    Date: 1/4/21    Time: _____________      1. I authorize the performance upon Saud Held the following:  Transcatheter aortic valve replacement     2.  I Di Young Witness: _________________________      Date: ___________________    Printed: 2021   9:15 AM  Patient Name: Hamida Ledesma        : 1955       Medical Record #: [id number]

## (undated) NOTE — LETTER
[address]  [address]  Consent for Procedure/Sedation  Date: 5/29/2024         Time: 11:00 am    I hereby authorize Dr Ryan, my physician and his/her assistants (if applicable), which may include medical students, residents, and/or fellows, to perform the following surgical operation/ procedure and administer such anesthesia as may be determined necessary by my physician: transesophageal echocardiogram on Arleth Weiss  2.   I recognize that during the surgical operation/procedure, unforeseen conditions may necessitate additional or different procedures than those listed above.  I, therefore, further authorize and request that the above-named surgeon, assistants, or designees perform such procedures as are, in their judgment, necessary and desirable.    3.   My surgeon/physician has discussed prior to my surgery the potential benefits, risks and side effects of this procedure; the likelihood of achieving goals; and potential problems that might occur during recuperation.  They also discussed reasonable alternatives to the procedure, including risks, benefits, and side effects related to the alternatives and risks related to not receiving this procedure.  I have had all my questions answered and I acknowledge that no guarantee has been made as to the result that may be obtained.    4.   Should the need arise during my operation/procedure, which includes change of level of care prior to discharge, I also consent to the administration of blood and/or blood products.  Further, I understand that despite careful testing and screening of blood or blood products by collecting agencies, I may still be subject to ill effects as a result of receiving a blood transfusion and/or blood products.  The following are some, but not all, of the potential risks that can occur: fever and allergic reactions, hemolytic reactions, transmission of diseases such as Hepatitis, AIDS and Cytomegalovirus  (CMV) and fluid overload.  In the event that I wish to have an autologous transfusion of my own blood, or a directed donor transfusion, I will discuss this with my physician.   Check only if Refusing Blood or Blood Products  I understand refusal of blood or blood products as deemed necessary by my physician may have serious consequences to my condition to include possible death. I hereby assume responsibility for my refusal and release the hospital, its personnel, and my physicians from any responsibility for the consequences of my refusal.         o  Refuse         5.   I authorize the use of any specimen, organs, tissues, body parts or foreign objects that may be removed from my body during the operation/procedure for diagnosis, research or teaching purposes and their subsequent disposal by hospital authorities.  I also authorize the release of specimen test results and/or written reports to my treating physician on the hospital medical staff or other referring or consulting physicians involved in my care, at the discretion of the Pathologist or my treating physician.    6.   I consent to the photographing or videotaping of the operations or procedures to be performed, including appropriate portions of my body for medical, scientific, or educational purposes, provided my identity is not revealed by the pictures or by descriptive texts accompanying them.  If the procedure has been photographed/videotaped, the surgeon will obtain the original picture, image, videotape or CD.  The hospital will not be responsible for storage, release or maintenance of the picture, image, tape or CD.    7.   I consent to the presence of a  or observers in the operating room as deemed necessary by my physician or their designees.    8.   I recognize that in the event my procedure results in extended X-Ray/fluoroscopy time, I may develop a skin reaction.    9. If I have a Do Not Attempt Resuscitation (DNAR) order in  place, that status will be suspended while in the operating room, procedural suite, and during the recovery period unless otherwise explicitly stated by me (or a person authorized to consent on my behalf). The surgeon or my attending physician will determine when the applicable recovery period ends for purposes of reinstating the DNAR order.  10. Patients having a sterilization procedure: I understand that if the procedure is successful the results will be permanent and it will therefore be impossible for me to inseminate, conceive, or bear children.  I also understand that the procedure is intended to result in sterility, although the result has not been guaranteed.   11. I acknowledge that my physician has explained sedation/analgesia administration to me including the risk and benefits I consent to the administration of sedation/analgesia as may be necessary or desirable in the judgment of my physician.    I CERTIFY THAT I HAVE READ AND FULLY UNDERSTAND THE ABOVE CONSENT TO OPERATION and/or OTHER PROCEDURE.        ____________________________________       _________________________________      ______________________________  Signature of Patient         Signature of Responsible Person        Printed Name of Responsible Person        ____________________________________      _________________________________      ______________________________       Signature of Witness          Relationship to Patient                       Date                                       Time  Patient Name: Arleth Weiss  : 1955    Reviewed: 2024   Printed: May 29, 2024  Medical Record #: [id number] Page 1 of 1

## (undated) NOTE — IP AVS SNAPSHOT
[address]            (For Outpatient Use Only) Initial Admit Date: 1/4/2021   Inpt/Obs Admit Date: Inpt: 1/4/21 / Obs: N/A   Discharge Date:    Link Pattee:  [de-identified]   MRN: [de-identified]   CSN: [id number]   CEID: [id number] Subscriber Name:  Ernesto Youssef :    Subscriber ID:  Pt Rel to Subscriber:    Hospital Account Financial Class: Medicare    2021

## (undated) NOTE — LETTER
Date: 12/9/2021  Patient name:  Pablo Augustin  YOB: 1955  Medical Record Number: [id number]  Coverage: Payor: MEDICARE / Plan: MEDICARE PART A&B / Product Type: *No Product type* /   Insurance ID: [id number]  [facility] [address] [facility] Pl[facility] Date/Time: 10/26/21 1702   Location: Leg  Wound Location Orientation: Anterior;Right      Assessments 10/26/2021  5:03 PM 11/30/2021  5:08 PM   Response to Treatment Well tolerated Well tolerated   Compression Layers 2 2   Compression Product T

## (undated) NOTE — IP AVS SNAPSHOT
[address]            (For Outpatient Use Only) Initial Admit Date: 2/10/2023   Inpt/Obs Admit Date: Inpt: 2/10/23 / Obs: N/A   Discharge Date:    Hospital Acct:  [de-identified]   MRN: [de-identified]   CSN: [id number]   CEID: [id number]        ENCOUNTER  Patient Class: Inpatient Admitting Provider: Stephenie Velez DO Unit: [address] [place] [address] Service: Cardiac Telemetry Attending Provider: Stephenie Velez DO   Bed: 6272-D   Visit Type:   Referring Physician: No ref. provider found Billing Flag:    Admit Diagnosis: Dehydration [E86.0]      PATIENT  Legal Name:   Chrissy Clemente   Legal Sex: Female  Gender ID: Female             Pref Name:   [facility] PCP:  Erinn Gaspar DO Home: [phone]   Address:  [address] [place] [address] : 1955 (67 yrs) Mobile: [phone]         City/State/Zip: [facility] Marital: Single Language: Cordelia Grover: Danielle Else SSN4: [id number] Mandaen: [address]*     Race: White Ethnicity: Non  Or [address]   Name Relationship Legal Guardian? Home Phone Work Phone Mobile Phone   1. Sean Weiss  2.  Stephenie Fernando Brother  Friend No         [phone]     GUARANTOR  Guarantor: Eduardo Mathurphilipp : 1955 Home Phone: [phone]   Address: [address] [place] [address]  Sex: Female Work Phone: [phone]   City/State/Zip: [facility]   Rel. to Patient: Self Guarantor ID: [id number]   GUARANTOR EMPLOYER   Employer:  Status: RETIRED     COVERAGE  PRIMARY INSURANCE   Payor: MEDICARE Plan: MEDICARE PART A&B   Group Number:  Insurance Type: [id number] Name: Julián Rosalio : 1955   Subscriber ID: [id number] Pt Rel to Subscriber: Self   SECONDARY INSURANCE   Payor: RHYS Plan: [address]   Group Number:  Insurance Type: INDEMNITY   Subscriber Name: Julián Rosalio : 1955   Subscriber ID: [id number] Pt Rel to Subscriber: SELF   TERTIARY INSURANCE   Payor:  Plan:    Group Number: Insurance Type:    Subscriber Name:  Subscriber :    Subscriber ID:  Pt Rel to Subscriber:    Hospital Account Financial Class: Medicare    2023

## (undated) NOTE — LETTER
Date: 9/8/2021  Patient name:  Prieto Arteaga  YOB: 1955  Medical Record Number: [id number]  Coverage: Payor: MEDICARE / Plan: MEDICARE PART A&B / Product Type: *No Product type* /   Insurance ID: [id number]  Patient Address: [address] Compression Stockings ordered: Yes, Product:  Farrow 4000 wrap with liner     Calf  Point of Measurement - Left Calf: 32  Point of Measurement - Right Calf: 32     Calf Left cm[de-identified] 39.5     Right Calf cm[de-identified] 37.3    Ankle  Point of Measurement - Left Ankle: 11

## (undated) NOTE — IP AVS SNAPSHOT
[address]            (For Outpatient Use Only) Initial Admit Date: 2023   Inpt/Obs Admit Date: Inpt: 23 / Obs: N/A   Discharge Date:    Remedios Lanes:  [de-identified]   MRN: [de-identified]   CSN: [id number]   CEID: [id number]        ENCOUNTER  Patient Class: Inpatient Admitting Provider: Ever Johnson MD Unit: [address] [place] [address] Service: Cardiac Telemetry Attending Provider: Ed Rosario MD   Bed: 5900-T   Visit Type:   Referring Physician: No ref. provider found Billing Flag:    Admit Diagnosis: Respiratory syncytial virus (RSV) [B33.8]      PATIENT  Legal Name:   Josefina Barron   Legal Sex: Female  Gender ID: Female             Pref Name:   [facility] [place] PCP:  Jeanine Garg DO Home: [phone]   Address:  [address] [place] [address] : 1955 (67 yrs) Mobile: [phone]         [facility] [facility] Marital: Single Language: [place]: Barndi Aislinn SSN4: [id number] Catholic: [address]*     Race: White Ethnicity: Non  Or [address]   Name Relationship Legal Guardian? Home Phone Work Phone Mobile Phone   1. Sean Weiss  2.  Angelica West Brother  Friend No         [phone]     GUARANTOR  Guarantor: Imelda Rowan : 1955 Home Phone: [phone]   Address: [address] [place] [address]  Sex: Female Work Phone: [phone]   [facility] [facility]   Rel. to Patient: Self Guarantor ID: [id number]   GUARANTOR EMPLOYER   Employer:  Status: RETIRED     COVERAGE  PRIMARY INSURANCE   Payor: MEDICARE Plan: MEDICARE PART A&B   Group Number:  Insurance Type: [id number] Name: Radha Alexandra : 1955   Subscriber ID: [id number] Pt Rel to Subscriber: Self   SECONDARY INSURANCE   Payor: RHYS Plan: [address]   Group Number:  Insurance Type: INDEMNITY   Subscriber Name: Radha Alexandra : 1955   Subscriber ID: [id number] Pt Rel to Subscriber: SELF   TERTIARY INSURANCE   Payor:  Plan: Group Number:  Insurance Type:    Subscriber Name:  Subscriber :    Subscriber ID:  Pt Rel to Subscriber:    Hospital Account Financial Class: Medicare    2023

## (undated) NOTE — LETTER
[address]  [address]  Consent for Procedure/Sedation  Date: 1/30/2025         Time: 1042    I hereby authorize , my physician and his/her assistants (if applicable), which may include medical students, residents, and/or fellows, to perform the following surgical operation/ procedure and administer such anesthesia as may be determined necessary by my physician:  Operation/Procedure name (s) Peripheral angiography, atherectomy, percutaneous transluminal angioplasty (PTA) and/or vascular stenting on Arleth Weiss  2.   I recognize that during the surgical operation/procedure, unforeseen conditions may necessitate additional or different procedures than those listed above.  I, therefore, further authorize and request that the above-named surgeon, assistants, or designees perform such procedures as are, in their judgment, necessary and desirable.    3.   My surgeon/physician has discussed prior to my surgery the potential benefits, risks and side effects of this procedure; the likelihood of achieving goals; and potential problems that might occur during recuperation.  They also discussed reasonable alternatives to the procedure, including risks, benefits, and side effects related to the alternatives and risks related to not receiving this procedure.  I have had all my questions answered and I acknowledge that no guarantee has been made as to the result that may be obtained.    4.   Should the need arise during my operation/procedure, which includes change of level of care prior to discharge, I also consent to the administration of blood and/or blood products.  Further, I understand that despite careful testing and screening of blood or blood products by collecting agencies, I may still be subject to ill effects as a result of receiving a blood transfusion and/or blood products.  The following are some, but not all, of the potential risks that can occur: fever and allergic  reactions, hemolytic reactions, transmission of diseases such as Hepatitis, AIDS and Cytomegalovirus (CMV) and fluid overload.  In the event that I wish to have an autologous transfusion of my own blood, or a directed donor transfusion, I will discuss this with my physician.     Check only if Refusing Blood or Blood Products  I understand refusal of blood or blood products as deemed necessary by my physician may have serious consequences to my condition to include possible death. I hereby assume responsibility for my refusal and release the hospital, its personnel, and my physicians from any responsibility for the consequences of my refusal.      o  Refuse        5.   I authorize the use of any specimen, organs, tissues, body parts or foreign objects that may be removed from my body during the operation/procedure for diagnosis, research or teaching purposes and their subsequent disposal by hospital authorities.  I also authorize the release of specimen test results and/or written reports to my treating physician on the hospital medical staff or other referring or consulting physicians involved in my care, at the discretion of the Pathologist or my treating physician.    6.   I consent to the photographing or videotaping of the operations or procedures to be performed, including appropriate portions of my body for medical, scientific, or educational purposes, provided my identity is not revealed by the pictures or by descriptive texts accompanying them.  If the procedure has been photographed/videotaped, the surgeon will obtain the original picture, image, videotape or CD.  The hospital will not be responsible for storage, release or maintenance of the picture, image, tape or CD.    7.   I consent to the presence of a  or observers in the operating room as deemed necessary by my physician or their designees.    8.   I recognize that in the event my procedure results in extended X-Ray/fluoroscopy  time, I may develop a skin reaction.    9. If I have a Do Not Attempt Resuscitation (DNAR) order in place, that status will be suspended while in the operating room, procedural suite, and during the recovery period unless otherwise explicitly stated by me (or a person authorized to consent on my behalf). The surgeon or my attending physician will determine when the applicable recovery period ends for purposes of reinstating the DNAR order.  10. Patients having a sterilization procedure: I understand that if the procedure is successful the results will be permanent and it will therefore be impossible for me to inseminate, conceive, or bear children.  I also understand that the procedure is intended to result in sterility, although the result has not been guaranteed.   11. I acknowledge that my physician has explained sedation/analgesia administration to me including the risk and benefits I consent to the administration of sedation/analgesia as may be necessary or desirable in the judgment of my physician.    I CERTIFY THAT I HAVE READ AND FULLY UNDERSTAND THE ABOVE CONSENT TO OPERATION and/or OTHER PROCEDURE.      ____________________________________       _________________________________      ______________________________  Signature of Patient         Signature of Responsible Person        Printed Name of Responsible Person    ____________________________________      _________________________________      ______________________________       Signature of Witness          Relationship to Patient                       Date                                         Time  Patient Name: Arleth Weiss  : 1955    Reviewed: 2024   Printed: 2025  Medical Record #: [id number] Page 1 of 1

## (undated) NOTE — LETTER
[address]  [address]  Authorization for Surgical Operation and Procedure     Date:___________                                                                                                         Time:__________  I hereby authorize Surgeon(s):  Dago Diaz DPM, my physician and his/her assistants (if applicable), which may include medical students, residents, and/or fellows, to perform the following surgical operation/ procedure and administer such anesthesia as may be determined necessary by my physician:  Operation/Procedure name (s) Procedure(s):  LEFT SECOND TOE ARTHROPLASTY AND WOUND DEBRIDEMENT on Arleth Weiss   2.   I recognize that during the surgical operation/procedure, unforeseen conditions may necessitate additional or different procedures than those listed above.  I, therefore, further authorize and request that the above-named surgeon, assistants, or designees perform such procedures as are, in their judgment, necessary and desirable.    3.   My surgeon/physician has discussed prior to my surgery the potential benefits, risks and side effects of this procedure; the likelihood of achieving goals; and potential problems that might occur during recuperation.  They also discussed reasonable alternatives to the procedure, including risks, benefits, and side effects related to the alternatives and risks related to not receiving this procedure.  I have had all my questions answered and I acknowledge that no guarantee has been made as to the result that may be obtained.    4.   Should the need arise during my operation/procedure, which includes change of level of care prior to discharge, I also consent to the administration of blood and/or blood products.  Further, I understand that despite careful testing and screening of blood or blood products by collecting agencies, I may still be subject to ill effects as a result of receiving a blood transfusion and/or blood  products.  The following are some, but not all, of the potential risks that can occur: fever and allergic reactions, hemolytic reactions, transmission of diseases such as Hepatitis, AIDS and Cytomegalovirus (CMV) and fluid overload.  In the event that I wish to have an autologous transfusion of my own blood, or a directed donor transfusion, I will discuss this with my physician.  Check only if Refusing Blood or Blood Products  I understand refusal of blood or blood products as deemed necessary by my physician may have serious consequences to my condition to include possible death. I hereby assume responsibility for my refusal and release the hospital, its personnel, and my physicians from any responsibility for the consequences of my refusal.          o  Refuse      5.   I authorize the use of any specimen, organs, tissues, body parts or foreign objects that may be removed from my body during the operation/procedure for diagnosis, research or teaching purposes and their subsequent disposal by hospital authorities.  I also authorize the release of specimen test results and/or written reports to my treating physician on the hospital medical staff or other referring or consulting physicians involved in my care, at the discretion of the Pathologist or my treating physician.    6.   I consent to the photographing or videotaping of the operations or procedures to be performed, including appropriate portions of my body for medical, scientific, or educational purposes, provided my identity is not revealed by the pictures or by descriptive texts accompanying them.  If the procedure has been photographed/videotaped, the surgeon will obtain the original picture, image, videotape or CD.  The hospital will not be responsible for storage, release or maintenance of the picture, image, tape or CD.    7.   I consent to the presence of a  or observers in the operating room as deemed necessary by my physician or  their designees.    8.   I recognize that in the event my procedure results in extended X-Ray/fluoroscopy time, I may develop a skin reaction.    9. If I have a Do Not Attempt Resuscitation (DNAR) order in place, that status will be suspended while in the operating room, procedural suite, and during the recovery period unless otherwise explicitly stated by me (or a person authorized to consent on my behalf). The surgeon or my attending physician will determine when the applicable recovery period ends for purposes of reinstating the DNAR order.  10. Patients having a sterilization procedure: I understand that if the procedure is successful the results will be permanent and it will therefore be impossible for me to inseminate, conceive, or bear children.  I also understand that the procedure is intended to result in sterility, although the result has not been guaranteed.   11. I acknowledge that my physician has explained sedation/analgesia administration to me including the risk and benefits I consent to the administration of sedation/analgesia as may be necessary or desirable in the judgment of my physician.    I CERTIFY THAT I HAVE READ AND FULLY UNDERSTAND THE ABOVE CONSENT TO OPERATION and/or OTHER PROCEDURE.    _________________________________________  __________________________________  Signature of Patient     Signature of Responsible Person         ___________________________________         Printed Name of Responsible Person           _________________________________                 Relationship to Patient  _________________________________________  ______________________________  Signature of Witness          Date  Time      Patient Name: Arleth Weiss     : 1955                 Printed: 2025     Medical Record #: [id number]                     Page [address] of [address] [place] [address] [place]  [address]    Consent for Anesthesia    IArleth  KEILA Weiss agree to be cared for by an anesthesiologist, who is specially trained to monitor me and give me medicine to put me to sleep or keep me comfortable during my procedure    I understand that my anesthesiologist is not an employee or agent of [facility] [employer]. He or she works for [employer].    As the patient asking for anesthesia services, I agree to:  Allow the anesthesiologist (anesthesia doctor) to give me medicine and do additional procedures as necessary. Some examples are: Starting or using an “IV” to give me medicine, fluids or blood during my procedure, and having a breathing tube placed to help me breathe when I’m asleep (intubation). In the event that my heart stops working properly, I understand that my anesthesiologist will make every effort to sustain my life, unless otherwise directed by [facility] Do Not Resuscitate documents.  Tell my anesthesia doctor before my procedure:  If I am pregnant.  The last time that I ate or drank.  All of the medicines I take (including prescriptions, herbal supplements, and pills I can buy without a prescription (including street drugs/illegal medications). Failure to inform my anesthesiologist about these medicines may increase my risk of anesthetic complications.  If I am allergic to anything or have had a reaction to anesthesia before.  I understand how the anesthesia medicine will help me (benefits).  I understand that with any type of anesthesia medicine there are risks:  The most common risks are: nausea, vomiting, sore throat, muscle soreness, damage to my eyes, mouth, or teeth (from breathing tube placement).  Rare risks include: remembering what happened during my procedure, allergic reactions to medications, injury to my airway, heart, lungs, vision, nerves, or muscles and in extremely rare instances death.  My doctor has explained to me other choices available to me for my care (alternatives).  Pregnant  Patients (“epidural”):  I understand that the risks of having an epidural (medicine given into my back to help control pain during labor), include itching, low blood pressure, difficulty urinating, headache or slowing of the baby’s heart. Very rare risks include infection, bleeding, seizure, irregular heart rhythms and nerve injury.  Regional Anesthesia (“spinal”, “epidural”, & “nerve blocks”):  I understand that rare but potential complications include headache, bleeding, infection, seizure, irregular heart rhythms, and nerve injury.    I can change my mind about having anesthesia services at any time before I get the medicine.    _____________________________________________________________________________  Patient (or Representative) Signature/Relationship to Patient  Date   Time    _____________________________________________________________________________   Name (if used)    Language/Organization   Time    _____________________________________________________________________________  Anesthesiologist Signature     Date   Time  I have discussed the procedure and information above with the patient (or patient’s representative) and answered their questions. The patient or their representative has agreed to have anesthesia services.    _____________________________________________________________________________  Witness        Date   Time  I have verified that the signature is that of the patient or patient’s representative, and that it was signed before the procedure  Patient Name: Arleth Weiss     : 1955                 Printed: 2025     Medical Record #: [id number]                     Page 2 of 2